# Patient Record
Sex: FEMALE | Race: WHITE | NOT HISPANIC OR LATINO | Employment: OTHER | URBAN - METROPOLITAN AREA
[De-identification: names, ages, dates, MRNs, and addresses within clinical notes are randomized per-mention and may not be internally consistent; named-entity substitution may affect disease eponyms.]

---

## 2017-04-12 PROBLEM — R09.82 PND (POST-NASAL DRIP): Status: ACTIVE | Noted: 2017-04-12

## 2017-04-12 PROBLEM — R19.7 DIARRHEA: Status: ACTIVE | Noted: 2017-04-12

## 2017-04-12 PROBLEM — R06.7 SNEEZING: Status: ACTIVE | Noted: 2017-04-12

## 2017-04-12 PROBLEM — J01.00 ACUTE NON-RECURRENT MAXILLARY SINUSITIS: Status: ACTIVE | Noted: 2017-04-12

## 2017-04-12 PROBLEM — J34.89 RHINORRHEA: Status: ACTIVE | Noted: 2017-04-12

## 2017-04-12 PROBLEM — R42 DIZZINESS: Status: ACTIVE | Noted: 2017-04-12

## 2017-04-12 PROBLEM — R51 HEADACHE(784.0): Status: ACTIVE | Noted: 2017-04-12

## 2017-04-19 PROBLEM — R19.7 DIARRHEA: Status: RESOLVED | Noted: 2017-04-12 | Resolved: 2017-04-19

## 2017-04-19 PROBLEM — R42 DIZZINESS: Status: RESOLVED | Noted: 2017-04-12 | Resolved: 2017-04-19

## 2017-04-19 PROBLEM — R06.7 SNEEZING: Status: RESOLVED | Noted: 2017-04-12 | Resolved: 2017-04-19

## 2017-04-19 PROBLEM — R51 HEADACHE(784.0): Status: RESOLVED | Noted: 2017-04-12 | Resolved: 2017-04-19

## 2017-04-19 PROBLEM — J34.89 RHINORRHEA: Status: RESOLVED | Noted: 2017-04-12 | Resolved: 2017-04-19

## 2017-07-17 PROBLEM — J01.00 ACUTE NON-RECURRENT MAXILLARY SINUSITIS: Status: RESOLVED | Noted: 2017-04-12 | Resolved: 2017-07-17

## 2018-05-07 ENCOUNTER — HOSPITAL ENCOUNTER (OUTPATIENT)
Dept: CARDIOLOGY | Facility: HOSPITAL | Age: 73
Discharge: HOME | End: 2018-05-07
Attending: NURSE PRACTITIONER
Payer: MEDICARE

## 2018-05-07 DIAGNOSIS — R01.1 MURMUR, HEART: ICD-10-CM

## 2018-05-07 PROCEDURE — C8929 TTE W OR WO FOL WCON,DOPPLER: HCPCS

## 2018-05-10 LAB
AORTIC VALVE CUSP SEPERATION: 1 CM
CV ECHO LV RWT: 0.4 CM
DOP CALC LVOT AREA: 1.77 CM2
DOP CALC LVOT DIAMETER: 1.5 CM
DOP CALC LVOT PEAK VEL: 0.9 M/S
ECHO EF ESTIMATED: 55 %
ECHO LV POSTERIOR WALL: 0.8 CM (ref 0.6–1.1)
FRACTIONAL SHORTENING: 33 % (ref 28–44)
INTERVENTRICULAR SEPTUM: 0.9 CM (ref 0.6–1.1)
LEFT ATRIUM SIZE: 3.6 CM
LEFT INTERNAL DIMENSION IN SYSTOLE: 2.9 CM (ref 2.1–4)
LEFT VENTRICULAR INTERNAL DIMENSION IN DIASTOLE: 4.3 CM (ref 3.5–6)
RA PRESSURE: 8 MMHG
TR MAX PG: 30 MMHG
TV REST PULMONARY ARTERY PRESSURE: 38 MMHG

## 2018-05-14 ENCOUNTER — TELEPHONE (OUTPATIENT)
Dept: UROLOGY | Facility: CLINIC | Age: 73
End: 2018-05-14

## 2018-05-14 NOTE — TELEPHONE ENCOUNTER
Please have pt come in for a nurse visit for a voided urine sample  H/o microhematuria and hasn't been seen in 2 years but workup negative 2 years ago  If blood present needs ua microscopic

## 2018-05-15 NOTE — TELEPHONE ENCOUNTER
Spoke with patient informed her of recommendations. Patient verbally voiced understanding. Nurse visit scheduled for 5/22 at 1:30pm

## 2018-05-21 ENCOUNTER — TELEPHONE (OUTPATIENT)
Dept: UROLOGY | Facility: CLINIC | Age: 73
End: 2018-05-21

## 2018-05-21 NOTE — TELEPHONE ENCOUNTER
Left message on patient's voicemail informing her nurse visit rescheduled per patient request to Wednesday. And to give the office a call with any questions

## 2018-05-21 NOTE — TELEPHONE ENCOUNTER
----- Message from Elaine Gay sent at 5/21/2018 12:15 PM CDT -----  Contact: pt [phone]  Patient called and asked if you will reschedule her Nurse visit to Wednesday the same time.

## 2018-05-23 ENCOUNTER — CLINICAL SUPPORT (OUTPATIENT)
Dept: UROLOGY | Facility: CLINIC | Age: 73
End: 2018-05-23
Payer: MEDICARE

## 2018-05-23 DIAGNOSIS — R31.29 MICROHEMATURIA: Primary | ICD-10-CM

## 2018-05-23 LAB
BACTERIA #/AREA URNS HPF: NORMAL /HPF
BILIRUB SERPL-MCNC: ABNORMAL MG/DL
BLOOD URINE, POC: ABNORMAL
COLOR, POC UA: YELLOW
GLUCOSE UR QL STRIP: ABNORMAL
KETONES UR QL STRIP: ABNORMAL
LEUKOCYTE ESTERASE URINE, POC: ABNORMAL
MICROSCOPIC COMMENT: NORMAL
NITRITE, POC UA: ABNORMAL
PH, POC UA: 6
PROTEIN, POC: ABNORMAL
RBC #/AREA URNS HPF: 2 /HPF (ref 0–4)
SPECIFIC GRAVITY, POC UA: 1.01
SQUAMOUS #/AREA URNS HPF: 2 /HPF
UROBILINOGEN, POC UA: ABNORMAL
WBC #/AREA URNS HPF: 1 /HPF (ref 0–5)

## 2018-05-23 PROCEDURE — 87086 URINE CULTURE/COLONY COUNT: CPT

## 2018-05-23 PROCEDURE — 81002 URINALYSIS NONAUTO W/O SCOPE: CPT | Mod: PBBFAC,PN

## 2018-05-23 PROCEDURE — 87088 URINE BACTERIA CULTURE: CPT

## 2018-05-23 PROCEDURE — 81000 URINALYSIS NONAUTO W/SCOPE: CPT

## 2018-05-23 PROCEDURE — 99499 UNLISTED E&M SERVICE: CPT | Mod: S$PBB,,, | Performed by: UROLOGY

## 2018-05-23 NOTE — PROGRESS NOTES
Per  patient in clinic today to give a urine sample. poct urine showed 1.015, 6, trace leukocytes, trace blood. Urine sent for microscopic u/a and urine culture.

## 2018-05-25 ENCOUNTER — TELEPHONE (OUTPATIENT)
Dept: UROLOGY | Facility: CLINIC | Age: 73
End: 2018-05-25

## 2018-05-25 DIAGNOSIS — R31.29 MICROHEMATURIA: Primary | ICD-10-CM

## 2018-05-25 LAB — BACTERIA UR CULT: NORMAL

## 2018-05-28 ENCOUNTER — TELEPHONE (OUTPATIENT)
Dept: UROLOGY | Facility: CLINIC | Age: 73
End: 2018-05-28

## 2018-05-28 NOTE — TELEPHONE ENCOUNTER
----- Message from Mandy Nuñez sent at 5/28/2018  9:47 AM CDT -----  Contact: patient   Patient calling to speak to the Nurse. Please advise. She is returning a missed call. Call to pod. No answer.   Call back    Thanks!

## 2018-05-30 ENCOUNTER — TELEPHONE (OUTPATIENT)
Dept: GASTROENTEROLOGY | Facility: CLINIC | Age: 73
End: 2018-05-30

## 2018-05-30 ENCOUNTER — TELEPHONE (OUTPATIENT)
Dept: CARDIOLOGY | Facility: CLINIC | Age: 73
End: 2018-05-30

## 2018-05-30 NOTE — TELEPHONE ENCOUNTER
Called pt to let her know we have her scheduled for 6/6/2018 at 3:20pm. No answer, left a message for pt to call us back.

## 2018-05-30 NOTE — TELEPHONE ENCOUNTER
----- Message from Precious Castorena MD sent at 5/30/2018  2:01 PM CDT -----  Please call patient to schedule clinic appointment for LLQ abdominal pain and family history of colon cancer    Thanks (it is Dr. Cristobal's mother in law)

## 2018-06-05 ENCOUNTER — TELEPHONE (OUTPATIENT)
Dept: CARDIOLOGY | Facility: CLINIC | Age: 73
End: 2018-06-05

## 2018-06-05 ENCOUNTER — HOSPITAL ENCOUNTER (OUTPATIENT)
Dept: RADIOLOGY | Facility: HOSPITAL | Age: 73
Discharge: HOME | End: 2018-06-05
Attending: UROLOGY
Payer: MEDICARE

## 2018-06-05 ENCOUNTER — OFFICE VISIT (OUTPATIENT)
Dept: CARDIOLOGY | Facility: CLINIC | Age: 73
End: 2018-06-05
Payer: MEDICARE

## 2018-06-05 ENCOUNTER — CLINICAL SUPPORT (OUTPATIENT)
Dept: UROLOGY | Facility: CLINIC | Age: 73
End: 2018-06-05
Payer: MEDICARE

## 2018-06-05 VITALS
WEIGHT: 115.5 LBS | HEIGHT: 60 IN | DIASTOLIC BLOOD PRESSURE: 88 MMHG | OXYGEN SATURATION: 98 % | SYSTOLIC BLOOD PRESSURE: 168 MMHG | BODY MASS INDEX: 22.68 KG/M2 | HEART RATE: 76 BPM | RESPIRATION RATE: 12 BRPM

## 2018-06-05 DIAGNOSIS — R09.89 BILATERAL CAROTID BRUITS: ICD-10-CM

## 2018-06-05 DIAGNOSIS — F41.1 GAD (GENERALIZED ANXIETY DISORDER): ICD-10-CM

## 2018-06-05 DIAGNOSIS — E78.5 DYSLIPIDEMIA: ICD-10-CM

## 2018-06-05 DIAGNOSIS — F10.10 EXCESSIVE DRINKING ALCOHOL: ICD-10-CM

## 2018-06-05 DIAGNOSIS — I35.0 AORTIC STENOSIS, MILD: ICD-10-CM

## 2018-06-05 DIAGNOSIS — R31.29 MICROHEMATURIA: ICD-10-CM

## 2018-06-05 DIAGNOSIS — Z91.89 CARDIOVASCULAR EVENT RISK: ICD-10-CM

## 2018-06-05 DIAGNOSIS — I10 HYPERTENSION, UNSPECIFIED TYPE: ICD-10-CM

## 2018-06-05 DIAGNOSIS — R55 SYNCOPE, UNSPECIFIED SYNCOPE TYPE: ICD-10-CM

## 2018-06-05 DIAGNOSIS — C50.911 MALIGNANT NEOPLASM OF RIGHT FEMALE BREAST, UNSPECIFIED ESTROGEN RECEPTOR STATUS, UNSPECIFIED SITE OF BREAST: ICD-10-CM

## 2018-06-05 DIAGNOSIS — R01.1 HEART MURMUR: Primary | ICD-10-CM

## 2018-06-05 DIAGNOSIS — I10 HYPERTENSION, UNSPECIFIED TYPE: Primary | ICD-10-CM

## 2018-06-05 DIAGNOSIS — I10 ESSENTIAL HYPERTENSION: ICD-10-CM

## 2018-06-05 LAB
BILIRUB UR QL STRIP: NEGATIVE
CLARITY UR: CLEAR
COLOR UR: YELLOW
GLUCOSE UR QL STRIP: NEGATIVE
HGB UR QL STRIP: NEGATIVE
KETONES UR QL STRIP: NEGATIVE
LEUKOCYTE ESTERASE UR QL STRIP: NEGATIVE
NITRITE UR QL STRIP: NEGATIVE
PH UR STRIP: 7 [PH] (ref 5–8)
PROT UR QL STRIP: NEGATIVE
SP GR UR STRIP: 1.01 (ref 1–1.03)
URN SPEC COLLECT METH UR: NORMAL
UROBILINOGEN UR STRIP-ACNC: NEGATIVE EU/DL

## 2018-06-05 PROCEDURE — 99499 UNLISTED E&M SERVICE: CPT | Mod: S$PBB,,, | Performed by: UROLOGY

## 2018-06-05 PROCEDURE — 87086 URINE CULTURE/COLONY COUNT: CPT

## 2018-06-05 PROCEDURE — 76770 US EXAM ABDO BACK WALL COMP: CPT | Mod: TC

## 2018-06-05 PROCEDURE — 88112 CYTOPATH CELL ENHANCE TECH: CPT | Mod: 26,,, | Performed by: PATHOLOGY

## 2018-06-05 PROCEDURE — 99205 OFFICE O/P NEW HI 60 MIN: CPT | Mod: S$PBB,,, | Performed by: INTERNAL MEDICINE

## 2018-06-05 PROCEDURE — 99212 OFFICE O/P EST SF 10 MIN: CPT | Mod: PBBFAC,25,27,PN

## 2018-06-05 PROCEDURE — 99213 OFFICE O/P EST LOW 20 MIN: CPT | Mod: PBBFAC,25,PO | Performed by: INTERNAL MEDICINE

## 2018-06-05 PROCEDURE — 76770 US EXAM ABDO BACK WALL COMP: CPT | Mod: 26,,, | Performed by: RADIOLOGY

## 2018-06-05 PROCEDURE — 93005 ELECTROCARDIOGRAM TRACING: CPT | Mod: PBBFAC,PO | Performed by: INTERNAL MEDICINE

## 2018-06-05 PROCEDURE — 93010 ELECTROCARDIOGRAM REPORT: CPT | Mod: S$PBB,,, | Performed by: INTERNAL MEDICINE

## 2018-06-05 PROCEDURE — 88112 CYTOPATH CELL ENHANCE TECH: CPT | Performed by: PATHOLOGY

## 2018-06-05 PROCEDURE — 81003 URINALYSIS AUTO W/O SCOPE: CPT

## 2018-06-05 PROCEDURE — 99999 PR PBB SHADOW E&M-EST. PATIENT-LVL III: CPT | Mod: PBBFAC,,, | Performed by: INTERNAL MEDICINE

## 2018-06-05 PROCEDURE — 99999 PR PBB SHADOW E&M-EST. PATIENT-LVL II: CPT | Mod: PBBFAC,,,

## 2018-06-05 RX ORDER — NAPROXEN SODIUM 220 MG/1
81 TABLET, FILM COATED ORAL DAILY
Qty: 50 TABLET | Refills: 3 | Status: SHIPPED | OUTPATIENT
Start: 2018-06-05 | End: 2019-01-21 | Stop reason: HOSPADM

## 2018-06-05 NOTE — PROGRESS NOTES
Subjective:    Patient ID:  Vivien Burton is a 73 y.o. female who presents for evaluation of Establish Care and Heart Murmur  PCP: Dr. Robert, see biannually, in [facility]  Urologist and referred by Dr. LORRIE Burton  Lives alone, no pets  Retired     Patient is a new patient to me.    HPI  WF with history of HTN around the time breast CA diagnosis, treated with radiation to the right chest and chemo. Stopped HTN Rx about a year ago with normal BP. Here due to heart murmur, first detected in childhood and then no mention until recent examination. Active, own housework, own yard work, caring to grandchildren, no problem, occasional soreness. Quit smoking in 1998, after 15 pack-years, admit to drinking Ethel about 7.5 oz daily. Get sleepy not drunk, do not drive after. ECG is normal, rate 87. Labs reviewed: LDL 57, ASCVD 10-year event risk of 11.3%, intermediate.     Echo read by Dr. Koehler - Conclusion   · Left ventricle shows mild concentric hypertrophy.  · Mitral valve shows mild regurgitation.  · Tricuspid valve shows trace regurgitation.  · There is mild valvular aortic stenosis.  · Mild regurgitation is present in the aortic valve.  · Left ventricular diastolic filling is abnormal.       Review of Systems   Constitution: Negative for diaphoresis, fever, weakness, malaise/fatigue, night sweats and weight gain.   HENT: Positive for congestion. Negative for nosebleeds and tinnitus.    Eyes: Positive for visual halos. Negative for visual disturbance.   Cardiovascular: Positive for dyspnea on exertion (with grass work) and palpitations. Negative for chest pain, claudication, cyanosis, irregular heartbeat, leg swelling, near-syncope, orthopnea and paroxysmal nocturnal dyspnea.   Respiratory: Positive for cough and snoring. Negative for shortness of breath, sleep disturbances due to breathing and wheezing.    Endocrine: Negative for polydipsia and polyuria.  "  Hematologic/Lymphatic: Does not bruise/bleed easily.   Skin: Positive for dry skin, itching and skin cancer. Negative for color change, flushing, nail changes, poor wound healing and suspicious lesions.   Musculoskeletal: Positive for muscle cramps and stiffness. Negative for arthritis, falls, gout, joint pain, joint swelling, muscle weakness and myalgias.   Gastrointestinal: Positive for bloating, abdominal pain, constipation, diarrhea, flatus, heartburn and hemorrhoids. Negative for hematemesis, hematochezia, melena and nausea.   Genitourinary: Positive for hematuria.   Neurological: Positive for excessive daytime sleepiness, dizziness, headaches (sinus) and paresthesias. Negative for disturbances in coordination, focal weakness, light-headedness, loss of balance, numbness and vertigo.   Psychiatric/Behavioral: Negative for depression and substance abuse. The patient is nervous/anxious. The patient does not have insomnia.         Some claustrophobia        Objective:    Physical Exam   Constitutional: She is oriented to person, place, and time. She appears well-developed and well-nourished.   HENT:   Head: Normocephalic.   Eyes: Conjunctivae and EOM are normal. Pupils are equal, round, and reactive to light.   Neck: Normal range of motion. Neck supple. No JVD present. No thyromegaly present.   Circumference 12"   Cardiovascular: Normal rate, regular rhythm and intact distal pulses.  Exam reveals no gallop and no friction rub.    Murmur heard.   Medium-pitched harsh midsystolic murmur is present with a grade of 3/6  at the upper right sternal border radiating to the neck S2 sound is preserved.  Superficial varicose veins in both LE.  Pulses:       Carotid pulses are 2+ on the right side with bruit, and 2+ on the left side with bruit.       Radial pulses are 2+ on the right side, and 2+ on the left side.        Femoral pulses are 2+ on the right side, and 2+ on the left side.       Popliteal pulses are 2+ on the " "right side, and 2+ on the left side.        Dorsalis pedis pulses are 2+ on the right side, and 2+ on the left side.        Posterior tibial pulses are 2+ on the right side, and 2+ on the left side.   Pulmonary/Chest: Effort normal and breath sounds normal. She has no rales. She exhibits no tenderness.   Abdominal: Soft. Bowel sounds are normal. There is no tenderness.   Waist 30.5", hip 35"   Musculoskeletal: Normal range of motion. She exhibits no edema.   Lymphadenopathy:     She has no cervical adenopathy.   Neurological: She is alert and oriented to person, place, and time.   Skin: Skin is warm and dry. Rash (mild stasis dermatitis) noted.         Assessment:       1. Heart murmur, since childhood    2. Excessive drinking alcohol    3. Essential hypertension    4. Malignant neoplasm of right female breast, unspecified estrogen receptor status, unspecified site of breast    5. Syncope, unspecified syncope type    6. Dyslipidemia, baseline     7. Cardiovascular event risk, ASCVD 10-year risk 11.3%, on simvastatin 20 mg, 2017    8. Hypertension, unspecified type    9. Bilateral carotid bruits    10. Aortic stenosis, mild    11. ISIS (generalized anxiety disorder)    12. BMI 22.0-22.9, adult         Plan:       Vivien was seen today for establish care and heart murmur.    Diagnoses and all orders for this visit:    Heart murmur, since childhood  -     Stress test; Future    Excessive drinking alcohol    Essential hypertension  -     Stress test; Future    Malignant neoplasm of right female breast, unspecified estrogen receptor status, unspecified site of breast    Syncope, unspecified syncope type  -     US Carotid Bilateral; Future  -     CAR Ultrasound doppler carotid bliateral; Future  -     Stress test; Future    Dyslipidemia, baseline     Cardiovascular event risk, ASCVD 10-year risk 11.3%, on simvastatin 20 mg, 2017  -     US Carotid Bilateral; Future  -     CAR Ultrasound doppler carotid " bliateral; Future  -     Stress test; Future  -     aspirin 81 MG Chew; Take 1 tablet (81 mg total) by mouth once daily.    Hypertension, unspecified type  -     EKG 12-lead    Bilateral carotid bruits  -     US Carotid Bilateral; Future  -     CAR Ultrasound doppler carotid bliateral; Future  -     aspirin 81 MG Chew; Take 1 tablet (81 mg total) by mouth once daily.    Aortic stenosis, mild    ISIS (generalized anxiety disorder)    BMI 22.0-22.9, adult    - All medical issues reviewed, continue current Rx.  - Instruction for Mediterranean diet and heart healthy exercise given.  - Check home blood pressure, 2 days weekly, do 2 readings within 5 minutes in AM and PM, keep log for review.  - Highly recommend 30 minutes of exercise daily, can have Sunday off, with 2-3 sessions of muscle strengthening weekly. A  would be very helpful.  - Discussed healthy daily limit of 0.5 oz of pure alcohol in any 24 hours (roughly 1 12-oz beers or 12.5 oz of liquor (80 proof)), can not save up.  - Consider Yoga, Siddhartha-Chi and or meditation  - Recommend at least annual cardiovascular evaluation in view of her significant risk factors.  - Phone review / encourage use of [employer]      Patient Active Problem List   Diagnosis    Cancer    GERD (gastroesophageal reflux disease)    Hyperlipidemia    Hypertension, onset 2012, off medications since 2016    Breast cancer    Rash    Unspecified hypothyroidism    Breast cancer, post right chest radiation    Malignant neoplasm of female breast    Encounter for long-term (current) use of medications    Syncope, 2016, dehydration    PND (post-nasal drip)    Excessive drinking alcohol    Heart murmur, since childhood    Dyslipidemia, baseline     Cardiovascular event risk, ASCVD 10-year risk 11.3%, on simvastatin 20 mg, 2017    Bilateral carotid bruits    Aortic stenosis, mild    ISIS (generalized anxiety disorder)    BMI 22.0-22.9, adult     Total  face-to-face time with the patient was 40 minutes and greater than 50% was spent in counseling and coordination of care. The above assessment and plan have been discussed at length. Labs and procedure over the last 6 months reviewed. Problem List updated. Asked to bring in all active medications / pills bottles with next visit.

## 2018-06-05 NOTE — LETTER
June 5, 2018      Dottie Burton MD  [address] [place] [address]   [address]  [place] [address]           [facility] [place] [facility]  [address]  [place] [address]  Phone: [phone]          Patient: Vivien Burton   MR Number: [id number]   YOB: 1945   Date of Visit: 6/5/2018       Dear Dr. Dottie Burton:    Thank you for referring Vivien Burton to me for evaluation. Attached you will find relevant portions of my assessment and plan of care.    If you have questions, please do not hesitate to call me. I look forward to following Vivien Burton along with you.    Sincerely,    Rick Mccoy MD    Enclosure  CC:  No Recipients    If you would like to receive this communication electronically, please contact [email or web address] or [phone] to request more information on [employer] Link access.    For providers and/or their staff who would like to refer a patient to [facility], please contact us through our one-stop-shop provider referral line, [facility], at [phone].    If you feel you have received this communication in error or would no longer like to receive these types of communications, please e-mail [email or web address]

## 2018-06-05 NOTE — PROGRESS NOTES
Per  patient in clinic today for catheterized urine sample sent for u/a, urine culture and urine cytology

## 2018-06-05 NOTE — TELEPHONE ENCOUNTER
----- Message from Samantha Ponce sent at 6/5/2018  9:26 AM CDT -----  Type:  Patient Returning Call    Who Called:  Self Who Left Message for Patient:  Swathi Does the patient know what this is regarding?:  Patient returning a phone call. Call was placed to the pod.  Best Call Back Number:  [phone] Additional Information:

## 2018-06-05 NOTE — PATIENT INSTRUCTIONS
Recommended Mediterranean dietEating Heart-Healthy Food: Using the DASH Plan  Eating for your heart doesnt have to be hard or boring. You just need to know how to make healthier choices. The DASH eating plan has been developed to help you do just that. DASH stands for Dietary Approaches to Stop Hypertension. It is a plan that has been proven to be healthier for your heart and to lower your risk for high blood pressure. It can also help lower your risk for cancer, heart disease, osteoporosis, and diabetes.  Choosing from Each Food Group  Choose foods from each of the food groups below each day. Try to get the recommended number of servings for each food group. The serving numbers are based on a diet of 2,000 calories a day. Talk to your doctor if youre unsure about your calorie needs.  Grains   Servings: 7-8 a day  A serving is:  · 1 slice bread  · 1 ounce dry cereal  · half a cup cooked rice or pasta  Best choices: Whole grains and any grains high in fiber.  Vegetables   Servings: 4-5 a day  A serving is:  · 1 cup raw leafy vegetable  · Half a cup cooked vegetable  · Three-quarter cup vegetable juice  Best choices: Fresh or frozen vegetable prepared without too much added salt or fat.    Fruits   Servings: 4-5 a day  A serving is:  · Three-quarter cup fruit juice  · 1 medium fruit  · One-quarter cup dried fruit  · One-half cup fresh, frozen, or canned fruit  Best choices: A variety of fresh fruits of different colors. Whole fruits are a much better choice than fruit juices.  Low-fat or Fat Free Dairy   Servings: 2-3 a day  A serving is:  · 8 ounces milk  · 1 cup yogurt  · One and a half ounces cheese  Best choices: Skim or 1% milk, low-fat or fat free yogurt or buttermilk, and low-fat cheeses.       Meat, Poultry, Fish   Servings: 2 or fewer a day  A serving is:  · 3 ounces cooked meat, poultry, or fish  Best choices: Lean meats and fish. Trim away visible fat. Broil, roast, or boil instead of frying. Remove skin  from poultry before eating.  Nuts, Seeds, Beans   Servings: 4-5 a week  A serving is:  · One third cup nuts (or one and a half ounces)  · 2 tablespoons sunflower seeds  · Half a cup cooked beans  Best choices: Dry roasted nuts with no salt added, lentils, kidney beans, garbanzo beans, and whole saenz beans.    Fats and Oils   Servings: 2 a day  A serving is:  · 1 teaspoon vegetable oil  · 1 teaspoon soft margarine  · 1 tablespoon low-fat mayonnaise  · 1 teaspoon regular mayonnaise  · 2 tablespoons light salad dressing  · 1 tablespoon regular salad dressing  Best choices: Monounsaturated and polyunsaturated fats such as olive, canola, or safflower oil.  Sweets   Servings: 5 a week or fewer  A serving is:  · 1 tablespoon sugar, maple syrup, or honey  · 1 tablespoon jam or jelly  · 1 half-ounce jelly beans (about 15)  · 8 ounces lemonade  Best choices: Dried fruit can be a satisfying sweet. Choose low-fat sweets when possible. And watch your serving sizes!       Aerobic Exercise for a Healthy Heart  Exercise is a lot more than an energy booster and a stress reliever. It also strengthens your heart muscle, lowers your blood pressure and blood cholesterol, and burns calories.      Remember, some activity is better than none.     Choose an Aerobic Activity  Choose a nonstop activity that makes your heart and lungs work harder than they do when you rest or walk normally. This aerobic exercise can improve the way your heart and other muscles use oxygen. Make it fun by exercising with a friend and choosing an activity you enjoy. Here are some ideas:  · Walking  · Swimming  · Bicycling  · Stair climbing  · Dancing  · Jogging  Exercise Regularly  If you havent been exercising regularly,  get your doctors okay first. Then start slowly.  Here are some tips:  · Begin exercising 3 times a week for 5-10 minutes at a time.  · When you feel comfortable, add a few minutes each week.  · Slowly build up to exercising 3-4 times each  week for 20-40 minutes. Aim for a total of 150 or more minutes a week.  · Be sure to carry your nitroglycerin with you when you exercise.  · If you get angina when youre exercising, stop what youre doing, take your nitroglycerin, and call your doctor.  © 5815-5023 Emily Owens, [address] [place] [address], [place] [address]. All rights reserved. This information is not intended as a substitute for professional medical care. Always follow your healthcare professional's instructions.

## 2018-06-05 NOTE — TELEPHONE ENCOUNTER
Called pt per Dr. Mccoy to see if she could come in this morning. Called a few times and a voicemail was left twice. Pt did call back. But since then I have been calling and it would ring once and say that she is not able to take calls at this time. Will continue to call.

## 2018-06-05 NOTE — TELEPHONE ENCOUNTER
Returned call to Ms. Burton, No answer. Informed Dr. Burton that she has an appt at 10:30 this morning

## 2018-06-06 LAB — BACTERIA UR CULT: NO GROWTH

## 2018-06-13 ENCOUNTER — HOSPITAL ENCOUNTER (OUTPATIENT)
Dept: RADIOLOGY | Facility: HOSPITAL | Age: 73
Discharge: HOME | End: 2018-06-13
Attending: INTERNAL MEDICINE
Payer: MEDICARE

## 2018-06-13 ENCOUNTER — HOSPITAL ENCOUNTER (OUTPATIENT)
Dept: CARDIOLOGY | Facility: HOSPITAL | Age: 73
Discharge: HOME | End: 2018-06-13
Attending: INTERNAL MEDICINE
Payer: MEDICARE

## 2018-06-13 VITALS — SYSTOLIC BLOOD PRESSURE: 154 MMHG | DIASTOLIC BLOOD PRESSURE: 94 MMHG | HEART RATE: 71 BPM

## 2018-06-13 DIAGNOSIS — Z91.89 CARDIOVASCULAR EVENT RISK: ICD-10-CM

## 2018-06-13 DIAGNOSIS — I10 ESSENTIAL HYPERTENSION: ICD-10-CM

## 2018-06-13 DIAGNOSIS — R01.1 HEART MURMUR: ICD-10-CM

## 2018-06-13 DIAGNOSIS — R55 SYNCOPE, UNSPECIFIED SYNCOPE TYPE: ICD-10-CM

## 2018-06-13 DIAGNOSIS — I10 HYPERTENSION, UNSPECIFIED TYPE: ICD-10-CM

## 2018-06-13 DIAGNOSIS — R09.89 BILATERAL CAROTID BRUITS: ICD-10-CM

## 2018-06-13 LAB
CV STRESS BASE HR: 81
CVPEAKDIABP: 101
CVPEAKSYSBP: 220
CVRESTSYSBP: 154
STRESS ECHO POST ESTIMATED WORKLOAD: 7 METS
STRESS ECHO POST EXERCISE DUR MIN: 4 MIN
STRESS ECHO POST EXERCISE DUR SEC: 40

## 2018-06-13 PROCEDURE — 93018 CV STRESS TEST I&R ONLY: CPT | Mod: ,,, | Performed by: INTERNAL MEDICINE

## 2018-06-13 PROCEDURE — 93016 CV STRESS TEST SUPVJ ONLY: CPT | Mod: ,,, | Performed by: INTERNAL MEDICINE

## 2018-06-13 PROCEDURE — 93880 EXTRACRANIAL BILAT STUDY: CPT | Mod: 26,,, | Performed by: INTERNAL MEDICINE

## 2018-06-13 PROCEDURE — 93017 CV STRESS TEST TRACING ONLY: CPT

## 2018-06-13 PROCEDURE — 93880 EXTRACRANIAL BILAT STUDY: CPT

## 2018-06-14 LAB
LEFT CCA DIST DIAS: 14 CM/S
LEFT CCA DIST SYS: 51 CM/S
LEFT CCA MID DIAS: 15 CM/S
LEFT CCA MID SYS: 55 CM/S
LEFT CCA PROX DIAS: 13 CM/S
LEFT CCA PROX SYS: 57 CM/S
LEFT ECA DIAS: 6 CM/S
LEFT ECA SYS: 47 CM/S
LEFT ICA DIST DIAS: 23 CM/S
LEFT ICA DIST SYS: 58 CM/S
LEFT ICA MID DIAS: 24 CM/S
LEFT ICA MID SYS: 69 CM/S
LEFT ICA PROX DIAS: 21 CM/S
LEFT ICA PROX SYS: 59 CM/S
LEFT ICA/CCA SYS: 1.4 M/S
LEFT VERTEBRAL DIAS: 12 CM/S
LEFT VERTEBRAL SYS: 45 CM/S
RIGHT CCA DIST DIAS: 18 CM/S
RIGHT CCA DIST SYS: 69 CM/S
RIGHT CCA MID DIAS: 16 CM/S
RIGHT CCA MID SYS: 65 CM/S
RIGHT CCA PROX DIAS: 17 CM/S
RIGHT CCA PROX SYS: 73 CM/S
RIGHT ECA DIAS: 9 CM/S
RIGHT ECA SYS: 56 CM/S
RIGHT ICA DIST DIAS: 23 CM/S
RIGHT ICA DIST SYS: 73 CM/S
RIGHT ICA MID DIAS: 24 CM/S
RIGHT ICA MID SYS: 94 CM/S
RIGHT ICA PROX DIAS: 23 CM/S
RIGHT ICA PROX SYS: 85 CM/S
RIGHT ICA/CCA SYS: 1.4 CM/S
RIGHT VERTEBRAL DIAS: 10 CM/S
RIGHT VERTEBRAL SYS: 41 CM/S

## 2018-06-15 ENCOUNTER — OFFICE VISIT (OUTPATIENT)
Dept: GASTROENTEROLOGY | Facility: CLINIC | Age: 73
End: 2018-06-15
Payer: MEDICARE

## 2018-06-15 VITALS
DIASTOLIC BLOOD PRESSURE: 73 MMHG | HEART RATE: 66 BPM | SYSTOLIC BLOOD PRESSURE: 175 MMHG | BODY MASS INDEX: 22.69 KG/M2 | WEIGHT: 116.19 LBS

## 2018-06-15 DIAGNOSIS — Z80.0 FAMILY HISTORY OF COLON CANCER: Primary | ICD-10-CM

## 2018-06-15 DIAGNOSIS — Z12.11 SCREENING FOR COLON CANCER: ICD-10-CM

## 2018-06-15 PROCEDURE — 99212 OFFICE O/P EST SF 10 MIN: CPT | Mod: PBBFAC,PO | Performed by: INTERNAL MEDICINE

## 2018-06-15 PROCEDURE — 99205 OFFICE O/P NEW HI 60 MIN: CPT | Mod: S$PBB,,, | Performed by: INTERNAL MEDICINE

## 2018-06-15 PROCEDURE — 99999 PR PBB SHADOW E&M-EST. PATIENT-LVL II: CPT | Mod: PBBFAC,,, | Performed by: INTERNAL MEDICINE

## 2018-06-15 NOTE — LETTER
June 18, 2018      Dottie Burton MD  [address] [place] [address]   [address]  [place] [address]           [place] [facility]  [address]  [place] [address]  Phone: [phone]          Patient: Vivien Burton   MR Number: [id number]   YOB: 1945   Date of Visit: 6/15/2018       Dear Dr. Dottie Burton:    Thank you for referring Vivien Burton to me for evaluation. Attached you will find relevant portions of my assessment and plan of care.    If you have questions, please do not hesitate to call me. I look forward to following Vivien Burton along with you.    Sincerely,    Renae Garcia LPN    Enclosure  CC:  No Recipients    If you would like to receive this communication electronically, please contact [email or web address] or [phone] to request more information on [employer] Link access.    For providers and/or their staff who would like to refer a patient to [facility], please contact us through our one-stop-shop provider referral line, [facility], at [phone].    If you feel you have received this communication in error or would no longer like to receive these types of communications, please e-mail [email or web address]

## 2018-06-15 NOTE — PROGRESS NOTES
[facility]     CC: Family history of colon cancer    HPI 73 y.o. female with history of hypothyroidism, presents due to family history of colon cancer in her brother diagnosed in his 70's, as well as in her nephew diagnosed in his 30's. She denies changes in bowel habits, blood in stool, abdominal pain or weight loss. Her last colonoscopy was > 10 years ago. She does note history of diverticulitis, with rare episodes of lower abdominal pain, last several months ago.      Past Medical History:   Diagnosis Date    Cancer     GERD (gastroesophageal reflux disease)     Hyperlipidemia     Hypertension        Past Surgical History:   Procedure Laterality Date     SECTION, CLASSIC      right breast      TONSILLECTOMY, ADENOIDECTOMY         Social History   Substance Use Topics    Smoking status: Former Smoker     Packs/day: 1.00     Types: Cigarettes    Smokeless tobacco: Never Used    Alcohol use 1.8 oz/week     3 Shots of liquor per week   -No illicits    Family History:  -Brother (70's) and nephew (30's) with colon cancer    Review of Systems  General ROS: negative for - chills, fever or weight loss  Psychological ROS: negative for - hallucination, depression or suicidal ideation  Ophthalmic ROS: negative for - blurry vision, photophobia or eye pain  ENT ROS: negative for - epistaxis, sore throat or rhinorrhea  Respiratory ROS: no cough, shortness of breath, or wheezing  Cardiovascular ROS: no chest pain or dyspnea on exertion  Gastrointestinal ROS: no changes in bowel habits, no abdominal pain, no blood in stool   Genito-Urinary ROS: no dysuria, trouble voiding, or hematuria  Musculoskeletal ROS: negative for - arthralgia, myalgia, weakness  Neurological ROS: no syncope or seizures; no ataxia  Dermatological ROS: negative for pruritis, rash and jaundice    Physical Examination  BP (!) 175/73   Pulse 66   Wt 52.7 kg (116 lb 2.9 oz)   BMI 22.69 kg/m²   General appearance: alert,  cooperative, no distress  HENT: Normocephalic, atraumatic, neck symmetrical, no nasal discharge   Eyes: conjunctivae/corneas clear, PERRL, EOM's intact, sclera anicteric  Lungs: clear to auscultation bilaterally, no dullness to percussion bilaterally, symmetric expansion, breathing unlabored  Heart: regular rate and rhythm without rub; no displacement of the PMI   Abdomen: thin, soft, nontender,nondistended, BS normoactive, no masses palpated  Extremities: extremities symmetric; no clubbing, cyanosis, or edema  Integument: Skin color, texture, turgor normal; no rashes; hair distrubution normal, no jaundice  Neurologic: Alert and oriented X 3, no focal sensory or motor neurologic deficits  Psychiatric: no pressured speech; normal affect; no evidence of impaired cognition, no anxiety/depression     Labs:  Lab Results   Component Value Date    WBC 12.9 (H) 12/21/2017    HGB 12.7 12/21/2017    HCT 37.1 12/21/2017    MCV 88.3 12/21/2017     12/21/2017     -LFTs- normal    Imaging:  Retroperitoneal ultrasound May 2018 was independently visualized and reviewed by me and showed small right renal simple cyst    Assessment:   73 y.o. female with history of diverticulosis/diverticulitis and family history of colon cancer presents for evaluation. She currently feels well.    Plan:  -Schedule colonoscopy     Precious Castorena MD  [facility]  [address] [facility], [address]  [place] [address]  Office: [phone]  Fax: [phone]

## 2018-07-11 ENCOUNTER — ANESTHESIA EVENT (OUTPATIENT)
Dept: ENDOSCOPY | Facility: HOSPITAL | Age: 73
End: 2018-07-11
Payer: MEDICARE

## 2018-07-11 ENCOUNTER — HOSPITAL ENCOUNTER (OUTPATIENT)
Facility: HOSPITAL | Age: 73
Discharge: HOME | End: 2018-07-11
Attending: INTERNAL MEDICINE | Admitting: INTERNAL MEDICINE
Payer: MEDICARE

## 2018-07-11 ENCOUNTER — SURGERY (OUTPATIENT)
Age: 73
End: 2018-07-11

## 2018-07-11 ENCOUNTER — TELEPHONE (OUTPATIENT)
Dept: UROLOGY | Facility: CLINIC | Age: 73
End: 2018-07-11

## 2018-07-11 ENCOUNTER — ANESTHESIA (OUTPATIENT)
Dept: ENDOSCOPY | Facility: HOSPITAL | Age: 73
End: 2018-07-11
Payer: MEDICARE

## 2018-07-11 VITALS
SYSTOLIC BLOOD PRESSURE: 158 MMHG | TEMPERATURE: 98 F | RESPIRATION RATE: 16 BRPM | HEART RATE: 70 BPM | DIASTOLIC BLOOD PRESSURE: 76 MMHG | OXYGEN SATURATION: 98 %

## 2018-07-11 DIAGNOSIS — Z80.0 FAMILY HISTORY OF COLON CANCER: ICD-10-CM

## 2018-07-11 PROCEDURE — 37000009 HC ANESTHESIA EA ADD 15 MINS: Performed by: INTERNAL MEDICINE

## 2018-07-11 PROCEDURE — 27201012 HC FORCEPS, HOT/COLD, DISP: Performed by: INTERNAL MEDICINE

## 2018-07-11 PROCEDURE — D9220A PRA ANESTHESIA: Mod: PT,CRNA,, | Performed by: NURSE ANESTHETIST, CERTIFIED REGISTERED

## 2018-07-11 PROCEDURE — 45380 COLONOSCOPY AND BIOPSY: CPT | Performed by: INTERNAL MEDICINE

## 2018-07-11 PROCEDURE — 25000003 PHARM REV CODE 250: Performed by: INTERNAL MEDICINE

## 2018-07-11 PROCEDURE — 63600175 PHARM REV CODE 636 W HCPCS: Performed by: NURSE ANESTHETIST, CERTIFIED REGISTERED

## 2018-07-11 PROCEDURE — 88305 TISSUE EXAM BY PATHOLOGIST: CPT | Mod: 26,,, | Performed by: PATHOLOGY

## 2018-07-11 PROCEDURE — 45380 COLONOSCOPY AND BIOPSY: CPT | Mod: PT,,, | Performed by: INTERNAL MEDICINE

## 2018-07-11 PROCEDURE — 88305 TISSUE EXAM BY PATHOLOGIST: CPT | Performed by: PATHOLOGY

## 2018-07-11 PROCEDURE — 37000008 HC ANESTHESIA 1ST 15 MINUTES: Performed by: INTERNAL MEDICINE

## 2018-07-11 PROCEDURE — D9220A PRA ANESTHESIA: Mod: PT,ANES,, | Performed by: ANESTHESIOLOGY

## 2018-07-11 RX ORDER — PROPOFOL 10 MG/ML
VIAL (ML) INTRAVENOUS
Status: DISCONTINUED | OUTPATIENT
Start: 2018-07-11 | End: 2018-07-11

## 2018-07-11 RX ORDER — SODIUM CHLORIDE 9 MG/ML
INJECTION, SOLUTION INTRAVENOUS CONTINUOUS
Status: DISCONTINUED | OUTPATIENT
Start: 2018-07-11 | End: 2018-07-11 | Stop reason: HOSPADM

## 2018-07-11 RX ADMIN — PROPOFOL 30 MG: 10 INJECTION, EMULSION INTRAVENOUS at 09:07

## 2018-07-11 RX ADMIN — SODIUM CHLORIDE: 0.9 INJECTION, SOLUTION INTRAVENOUS at 08:07

## 2018-07-11 RX ADMIN — PROPOFOL 20 MG: 10 INJECTION, EMULSION INTRAVENOUS at 09:07

## 2018-07-11 RX ADMIN — PROPOFOL 100 MG: 10 INJECTION, EMULSION INTRAVENOUS at 09:07

## 2018-07-11 NOTE — TELEPHONE ENCOUNTER
----- Message from Dottie Burton MD sent at 7/11/2018  7:22 AM CDT -----  Please make f/u at next avail sometime in august

## 2018-07-11 NOTE — ANESTHESIA POSTPROCEDURE EVALUATION
Anesthesia Post Evaluation    Patient: Vivien Burton    Procedure(s) Performed: Procedure(s) (LRB):  COLONOSCOPY (N/A)    Final Anesthesia Type: general  Patient location during evaluation: PACU  Patient participation: Yes- Able to Participate  Level of consciousness: awake and alert and oriented  Post-procedure vital signs: reviewed and stable  Pain management: adequate  Airway patency: patent  PONV status at discharge: No PONV  Anesthetic complications: no      Cardiovascular status: blood pressure returned to baseline  Respiratory status: unassisted, spontaneous ventilation and room air  Hydration status: euvolemic  Follow-up not needed.        Visit Vitals  /65 (BP Location: Left arm, Patient Position: Lying)   Pulse 69   Temp 36.9 °C (98.4 °F) (Temporal)   Resp 18   SpO2 98%   Breastfeeding? No       Pain/Lizy Score: Pain Assessment Performed: Yes (7/11/2018 10:10 AM)  Presence of Pain: denies (7/11/2018 10:10 AM)  Lizy Score: 10 (7/11/2018 10:10 AM)

## 2018-07-11 NOTE — OR NURSING
Have you had a colonoscopy LESS THAN 3 years ago?   * If YES, answer these questions*: No. Last colonoscopy 15 years ago.    1. Did patient have a prior colonic polyp in a previous surveillance/diagnostic colonoscopy and is 18 years or older on date of encounter?  2. Documentation of < 3 year interval since the patients last colonoscopy due to medical reasons (eg., last colonoscopy incomplete, last colonoscopy had inadequate prep, piecemeal removal of adenomas, or last colonoscopy found > 10 adenomas) ?

## 2018-07-11 NOTE — DISCHARGE INSTRUCTIONS
Hemorrhoids    Hemorrhoids are swollen and inflamed veins inside the rectum and near the anus. The rectum is the last several inches of the colon. The anus is the passage between the rectum and the outside of the body.  Causes  The veins can become swollen due to increased pressure in them. This is most often caused by:  · Chronic constipation or diarrhea  · Straining when having a bowel movement  · Sitting too long on the toilet  · A low-fiber diet  · Pregnancy  Symptoms  · Bleeding from the rectum (this may be noticeable after bowel movements)  · Lump near the anus  · Itching around the anus  · Pain around the anus  There are different types of hemorrhoids. Depending on the type you have and the severity, you may be able to treat yourself at home. In some cases, a procedure may be the best treatment option. Your healthcare provider can tell you more about this, if needed.  Home care  General care  · To get relief from pain or itching, try:  ¨ Topical products. Your healthcare provider may prescribe or recommend creams, ointments, or pads that can be applied to the hemorrhoid. Use these exactly as directed.  ¨ Medicines. Your healthcare provider may recommend stool softeners, suppositories, or laxatives to help manage constipation. Use these exactly as directed.  ¨ Sitz baths. A sitz bath involves sitting in a few inches of warm bath water. Be careful not to make the water so hot that you burn yourself--test it before sitting in it. Soak for about 10 to 15 minutes a few times a day. This may help relieve pain.  Tips to help prevent hemorrhoids  · Eat more fiber. Fiber adds bulk to stool and absorbs water as it moves through your colon. This makes stool softer and easier to pass.  ¨ Increase the fiber in your diet with more fiber-rich foods. These include fresh fruit, vegetables, and whole grains.  ¨ Take a fiber supplement or bulking agent, if advised to by your provider. These include products such as psyllium  or methylcellulose.  · Drink plenty of water, if directed to by your provider. This can help keep stool soft.  · Be more active. Frequent exercise aids digestion and helps prevent constipation. It may also help make bowel movements more regular.  · Dont strain during bowel movements. This can make hemorrhoids more likely. Also, dont sit on the toilet for long periods of time.  Follow-up care  Follow up with your healthcare provider, or as advised. If a culture or imaging tests were done, you will be notified of the results when they are ready. This may take a few days or longer.  When to seek medical advice  Call your healthcare provider right away if any of these occur:  · Increased bleeding from the rectum  · Increased pain around the rectum or anus  · Weakness or dizziness  Call 911  Call 911 or return to the emergency department right away if any of these occur:  · Trouble breathing or swallowing  · Fainting or loss of consciousness  · Unusually fast heart rate  · Vomiting blood  · Large amounts of blood in stool  Date Last Reviewed: 6/22/2015 © 2000-2017 [employer]. [address] [place] [address]. All rights reserved. This information is not intended as a substitute for professional medical care. Always follow your healthcare professional's instructions.        Diverticulosis    Diverticulosis means that small pouches have formed in the wall of your large intestine (colon). Most often, this problem causes no symptoms and is common as people age. But the pouches in the colon are at risk of becoming infected. When this happens, the condition is called diverticulitis. Although most people with diverticulosis never develop diverticulitis, it is still not uncommon. Rectal bleeding can also occur and in less common situations, a type of colon inflammation called colitis.  While most people do not have symptoms, some people with diverticulosis may have:  · Abdominal cramps and  pain  · Bloating  · Constipation  · Change in bowel habits  Causes  The exact cause of diverticulosis (and diverticulitis) has not been proved, but a few things are associated with the condition:  · Low-fiber diet  · Constipation  · Lack of exercise  Your healthcare provider will talk with you about how to manage your condition. Diet changes may be all that are needed to help control diverticulosis and prevent progression to diverticulitis. If you develop diverticulitis, you will likely need other treatments.  Home care  You may be told to take fiber supplements daily. Fiber adds bulk to the stool so that it passes through the colon more easily. Stool softeners may be recommended. You may also be given medications for pain relief. Be sure to take all medications as directed.  In the past, people were told to avoid corn, nuts, and seeds. This is no longer necessary.  Follow these guidelines when caring for yourself at home:  · Eat unprocessed foods that are high in fiber. Whole grains, fruits, and vegetables are good choices.  · Drink 6 to 8 glasses of water every day unless your healthcare provider has you limit how much fluid you should have.  · Watch for changes in your bowel movements. Tell your provider if you notice any changes.  · Begin an exercise program. Ask your provider how to get started. Generally, walking is the best.  · Get plenty of rest and sleep.  Follow-up care  Follow up with your healthcare provider, or as advised. Regular visits may be needed to check on your health. Sometimes special procedures such as colonoscopy, are needed after an episode of diverticulitis or blooding. Be sure to keep all your appointments.  If a stool sample was taken, or cultures were done, you should be told if they are positive, or if your treatment needs to be changed. You can call as directed for the results.  If X-rays were done, a radiologist will look at them. You will be told if there is a change in your  treatment.  If antibiotics were prescribed, be sure to finish them all.  When to seek medical advice  Call your healthcare provider right away if any of these occur:  · Fever of 100.4°F (38°C) or higher, or as directed by your healthcare provider  · Severe cramps in the lower left side of the abdomen or pain that is getting worse  · Tenderness in the lower left side of the abdomen or worsening pain throughout the abdomen  · Diarrhea or constipation that doesn't get better within 24 hours  · Nausea and vomiting  · Bleeding from the rectum  Call 911  Call emergency services if any of the following occur:  · Trouble breathing  · Confusion  · Very drowsy or trouble awakening  · Fainting or loss of consciousness  · Rapid heart rate  · Chest pain  Date Last Reviewed: 12/30/2015 © 2000-2017 [employer]. [address] [place] [address], [place] [address]. All rights reserved. This information is not intended as a substitute for professional medical care. Always follow your healthcare professional's instructions.        Understanding Colon and Rectal Polyps    The colon (also called the large intestine) is a muscular tube that forms the last part of the digestive tract. It absorbs water and stores food waste. The colon is about 4 to 6 feet long. The rectum is the last 6 inches of the colon. The colon and rectum have a smooth lining composed of millions of cells. Changes in these cells can lead to growths in the colon that can become cancerous and should be removed. Multiple tests are available to screen for colon cancer, but the colonoscopy is the most recommended test. During colonoscopy, these polyps can be removed. How often you need this test depends on many things including your condition, your family history, symptoms, and what the findings were at the previous colonoscopy.   When the colon lining changes  Changes that happen in the cells that line the colon or rectum can lead to growths called polyps. Over a  period of years, polyps can turn cancerous. Removing polyps early may prevent cancer from ever forming.  Polyps  Polyps are fleshy clumps of tissue that form on the lining of the colon or rectum. Small polyps are usually benign (not cancerous). However, over time, cells in a polyp can change and become cancerous. Certain types of polyps known as adenomatous polyps are premalignant. The risk for invasive cancer increases with the size of the polyp and certain cell and gene features. This means that they can become cancerous if they're not removed. Hyperplastic polyps are benign. They can grow quite large and not turn cancerous.   Cancer  Almost all colorectal cancers start when polyp cells begin growing abnormally. As a cancerous tumor grows, it may involve more and more of the colon or rectum. In time, cancer can also grow beyond the colon or rectum and spread to nearby organs or to glands called lymph nodes. The cells can also travel to other parts of the body. This is known as metastasis. The earlier a cancerous tumor is removed, the better the chance of preventing its spread.    Date Last Reviewed: 8/1/2016  © 9457-1015 [employer]. [address] [place] [id number]. All rights reserved. This information is not intended as a substitute for professional medical care. Always follow your healthcare professional's instructions.      Discharge Instructions: After Your Surgery/Procedure  Youve just had surgery. During surgery you were given medicine called anesthesia to keep you relaxed and free of pain. After surgery you may have some pain or nausea. This is common. Here are some tips for feeling better and getting well after surgery.     Stay on schedule with your medication.   Going home  Your doctor or nurse will show you how to take care of yourself when you go home. He or she will also answer your questions. Have an adult family member or friend drive you home.      For your safety we  "recommend these precaution for the first 24 hours after your procedure:  · Do not drive or use heavy equipment.  · Do not make important decisions or sign legal papers.  · Do not drink alcohol.  · Have someone stay with you, if needed. He or she can watch for problems and help keep you safe.  · Your concentration, balance, coordination, and judgement may be impaired for many hours after anesthesia.  Use caution when ambulating or standing up.     · You may feel weak and "washed out" after anesthesia and surgery.      Subtle residual effects of general anesthesia or sedation with regional / local anesthesia can last more than 24 hours.  Rest for the remainder of the day or longer if your Doctor/Surgeon has advised you to do so.  Although you may feel normal within the first 24 hours, your reflexes and mental ability may be impaired without you realizing it.  You may feel dizzy, lightheaded or sleepy for 24 hours or longer.      Be sure to go to all follow-up visits with your doctor. And rest after your surgery for as long as your doctor tells you to.  Coping with pain  If you have pain after surgery, pain medicine will help you feel better. Take it as told, before pain becomes severe. Also, ask your doctor or pharmacist about other ways to control pain. This might be with heat, ice, or relaxation. And follow any other instructions your surgeon or nurse gives you.  Tips for taking pain medicine  To get the best relief possible, remember these points:  · Pain medicines can upset your stomach. Taking them with a little food may help.  · Most pain relievers taken by mouth need at least 20 to 30 minutes to start to work.  · Taking medicine on a schedule can help you remember to take it. Try to time your medicine so that you can take it before starting an activity. This might be before you get dressed, go for a walk, or sit down for dinner.  · Constipation is a common side effect of pain medicines. Call your doctor before " taking any medicines such as laxatives or stool softeners to help ease constipation. Also ask if you should skip any foods. Drinking lots of fluids and eating foods such as fruits and vegetables that are high in fiber can also help. Remember, do not take laxatives unless your surgeon has prescribed them.  · Drinking alcohol and taking pain medicine can cause dizziness and slow your breathing. It can even be deadly. Do not drink alcohol while taking pain medicine.  · Pain medicine can make you react more slowly to things. Do not drive or run machinery while taking pain medicine.  Your health care provider may tell you to take acetaminophen to help ease your pain. Ask him or her how much you are supposed to take each day. Acetaminophen or other pain relievers may interact with your prescription medicines or other over-the-counter (OTC) drugs. Some prescription medicines have acetaminophen and other ingredients. Using both prescription and OTC acetaminophen for pain can cause you to overdose. Read the labels on your OTC medicines with care. This will help you to clearly know the list of ingredients, how much to take, and any warnings. It may also help you not take too much acetaminophen. If you have questions or do not understand the information, ask your pharmacist or health care provider to explain it to you before you take the OTC medicine.  Managing nausea  Some people have an upset stomach after surgery. This is often because of anesthesia, pain, or pain medicine, or the stress of surgery. These tips will help you handle nausea and eat healthy foods as you get better. If you were on a special food plan before surgery, ask your doctor if you should follow it while you get better. These tips may help:  · Do not push yourself to eat. Your body will tell you when to eat and how much.  · Start off with clear liquids and soup. They are easier to digest.  · Next try semi-solid foods, such as mashed potatoes, applesauce,  and gelatin, as you feel ready.  · Slowly move to solid foods. Dont eat fatty, rich, or spicy foods at first.  · Do not force yourself to have 3 large meals a day. Instead eat smaller amounts more often.  · Take pain medicines with a small amount of solid food, such as crackers or toast, to avoid nausea.     Call your surgeon if  · You still have pain an hour after taking medicine. The medicine may not be strong enough.  · You feel too sleepy, dizzy, or groggy. The medicine may be too strong.  · You have side effects like nausea, vomiting, or skin changes, such as rash, itching, or hives.       If you have obstructive sleep apnea  You were given anesthesia medicine during surgery to keep you comfortable and free of pain. After surgery, you may have more apnea spells because of this medicine and other medicines you were given. The spells may last longer than usual.   At home:  · Keep using the continuous positive airway pressure (CPAP) device when you sleep. Unless your health care provider tells you not to, use it when you sleep, day or night. CPAP is a common device used to treat obstructive sleep apnea.  · Talk with your provider before taking any pain medicine, muscle relaxants, or sedatives. Your provider will tell you about the possible dangers of taking these medicines.  © 9887-1519 [employer]. [address] [place] [address], [place] [address]. All rights reserved. This information is not intended as a substitute for professional medical care. Always follow your healthcare professional's instructions.

## 2018-07-11 NOTE — ANESTHESIA PREPROCEDURE EVALUATION
07/11/2018  Vivien Burton is a 73 y.o., female.    Anesthesia Evaluation    I have reviewed the Patient Summary Reports.    I have reviewed the Nursing Notes.   I have reviewed the Medications.     Review of Systems  Anesthesia Hx:  No problems with previous Anesthesia    Social:  Non-Smoker Smoking Status: Former Smoker  Quit Smoking:    Smokeless Tobacco Status: Never Used  Alcohol use: Yes; 1.8 oz per week  Drug use: No       Hematology/Oncology:        Oncology Comments: Breast cancer, post right chest radiation  Malignant neoplasm of female breast       Cardiovascular:   Hypertension    Hepatic/GI:   GERD    Endocrine:   Hypothyroidism    Psych:   Psychiatric History          Physical Exam  General:  Well nourished    Airway/Jaw/Neck:  Airway Findings: Mouth Opening: Normal Tongue: Normal  General Airway Assessment: Adult, Good  Mallampati: II  Improves to II with phonation.  TM Distance: 4-6 cm      Dental:  Dental Findings: In tact   Chest/Lungs:  Chest/Lungs Findings: Clear to auscultation, Normal Respiratory Rate     Heart/Vascular:  Heart Findings: Rate: Normal  Rhythm: Regular Rhythm  Sounds: Normal  Heart murmur: negative       Mental Status:  Mental Status Findings:  Cooperative, Alert and Oriented         Anesthesia Plan  Type of Anesthesia, risks & benefits discussed:  Anesthesia Type:  general  Patient's Preference:   Intra-op Monitoring Plan: standard ASA monitors  Intra-op Monitoring Plan Comments:   Post Op Pain Control Plan:   Post Op Pain Control Plan Comments:   Induction:   IV  Beta Blocker:  Patient is not currently on a Beta-Blocker (No further documentation required).       Informed Consent: Patient understands risks and agrees with Anesthesia plan.  Questions answered. Anesthesia consent signed with patient.  ASA Score: 3     Day of Surgery Review of History & Physical: I  have interviewed and examined the patient. I have reviewed the patient's H&P dated:  There are no significant changes.          Ready For Surgery From Anesthesia Perspective.

## 2018-07-11 NOTE — PLAN OF CARE
Pt. Awake and alert, vital signs stable.  Tolerated liquids without nausea.  Denies pain. Ambulates readily. IV removed per policy, catheter intact. Discharge instructions reviewed with patient and written instructions given to patient and her son,  Who both verbalized understanding.  Dr. Cerna spoke with pt. And answered questions and states pt. Is ready to discharge. Discharge home with family per wheelchair to lobby.

## 2018-07-11 NOTE — TRANSFER OF CARE
Anesthesia Transfer of Care Note    Patient: Vivien Burton    Procedure(s) Performed: Procedure(s) (LRB):  COLONOSCOPY (N/A)    Patient location: GI    Anesthesia Type: general    Transport from OR: Transported from OR on 2-3 L/min O2 by NC with adequate spontaneous ventilation    Post pain: adequate analgesia    Post assessment: no apparent anesthetic complications    Post vital signs: stable    Level of consciousness: awake    Nausea/Vomiting: no nausea/vomiting    Complications: none    Transfer of care protocol was followed      Last vitals:   Visit Vitals  BP (!) 161/73   Pulse 80   Temp 36.9 °C (98.4 °F) (Tympanic)   Resp 15   SpO2 100%   Breastfeeding? No

## 2018-07-11 NOTE — H&P (VIEW-ONLY)
[facility]     CC: Family history of colon cancer    HPI 73 y.o. female with history of hypothyroidism, presents due to family history of colon cancer in her brother diagnosed in his 70's, as well as in her nephew diagnosed in his 30's. She denies changes in bowel habits, blood in stool, abdominal pain or weight loss. Her last colonoscopy was > 10 years ago. She does note history of diverticulitis, with rare episodes of lower abdominal pain, last several months ago.      Past Medical History:   Diagnosis Date    Cancer     GERD (gastroesophageal reflux disease)     Hyperlipidemia     Hypertension        Past Surgical History:   Procedure Laterality Date     SECTION, CLASSIC      right breast      TONSILLECTOMY, ADENOIDECTOMY         Social History   Substance Use Topics    Smoking status: Former Smoker     Packs/day: 1.00     Types: Cigarettes    Smokeless tobacco: Never Used    Alcohol use 1.8 oz/week     3 Shots of liquor per week   -No illicits    Family History:  -Brother (70's) and nephew (30's) with colon cancer    Review of Systems  General ROS: negative for - chills, fever or weight loss  Psychological ROS: negative for - hallucination, depression or suicidal ideation  Ophthalmic ROS: negative for - blurry vision, photophobia or eye pain  ENT ROS: negative for - epistaxis, sore throat or rhinorrhea  Respiratory ROS: no cough, shortness of breath, or wheezing  Cardiovascular ROS: no chest pain or dyspnea on exertion  Gastrointestinal ROS: no changes in bowel habits, no abdominal pain, no blood in stool   Genito-Urinary ROS: no dysuria, trouble voiding, or hematuria  Musculoskeletal ROS: negative for - arthralgia, myalgia, weakness  Neurological ROS: no syncope or seizures; no ataxia  Dermatological ROS: negative for pruritis, rash and jaundice    Physical Examination  BP (!) 175/73   Pulse 66   Wt 52.7 kg (116 lb 2.9 oz)   BMI 22.69 kg/m²   General appearance: alert,  cooperative, no distress  HENT: Normocephalic, atraumatic, neck symmetrical, no nasal discharge   Eyes: conjunctivae/corneas clear, PERRL, EOM's intact, sclera anicteric  Lungs: clear to auscultation bilaterally, no dullness to percussion bilaterally, symmetric expansion, breathing unlabored  Heart: regular rate and rhythm without rub; no displacement of the PMI   Abdomen: thin, soft, nontender,nondistended, BS normoactive, no masses palpated  Extremities: extremities symmetric; no clubbing, cyanosis, or edema  Integument: Skin color, texture, turgor normal; no rashes; hair distrubution normal, no jaundice  Neurologic: Alert and oriented X 3, no focal sensory or motor neurologic deficits  Psychiatric: no pressured speech; normal affect; no evidence of impaired cognition, no anxiety/depression     Labs:  Lab Results   Component Value Date    WBC 12.9 (H) 12/21/2017    HGB 12.7 12/21/2017    HCT 37.1 12/21/2017    MCV 88.3 12/21/2017     12/21/2017     -LFTs- normal    Imaging:  Retroperitoneal ultrasound May 2018 was independently visualized and reviewed by me and showed small right renal simple cyst    Assessment:   73 y.o. female with history of diverticulosis/diverticulitis and family history of colon cancer presents for evaluation. She currently feels well.    Plan:  -Schedule colonoscopy     Precious Castorena MD  [facility]  [address], [address]  [place] [address]  Office: [phone]  Fax: [phone]

## 2018-07-17 ENCOUNTER — PATIENT MESSAGE (OUTPATIENT)
Dept: GASTROENTEROLOGY | Facility: CLINIC | Age: 73
End: 2018-07-17

## 2018-08-09 ENCOUNTER — OFFICE VISIT (OUTPATIENT)
Dept: UROLOGY | Facility: CLINIC | Age: 73
End: 2018-08-09
Payer: MEDICARE

## 2018-08-09 VITALS
DIASTOLIC BLOOD PRESSURE: 76 MMHG | RESPIRATION RATE: 18 BRPM | HEIGHT: 60 IN | BODY MASS INDEX: 22.76 KG/M2 | HEART RATE: 83 BPM | SYSTOLIC BLOOD PRESSURE: 156 MMHG | WEIGHT: 115.94 LBS

## 2018-08-09 DIAGNOSIS — R31.29 MICROHEMATURIA: Primary | ICD-10-CM

## 2018-08-09 DIAGNOSIS — N28.1 BILATERAL RENAL CYSTS: ICD-10-CM

## 2018-08-09 DIAGNOSIS — R73.9 HYPERGLYCEMIA: ICD-10-CM

## 2018-08-09 DIAGNOSIS — R80.9 PROTEINURIA, UNSPECIFIED TYPE: ICD-10-CM

## 2018-08-09 LAB
BILIRUB SERPL-MCNC: ABNORMAL MG/DL
BLOOD URINE, POC: ABNORMAL
COLOR, POC UA: CLEAR
GLUCOSE UR QL STRIP: ABNORMAL
KETONES UR QL STRIP: ABNORMAL
LEUKOCYTE ESTERASE URINE, POC: ABNORMAL
NITRITE, POC UA: ABNORMAL
PH, POC UA: 6
PROTEIN, POC: ABNORMAL
SPECIFIC GRAVITY, POC UA: 1.01
UROBILINOGEN, POC UA: ABNORMAL

## 2018-08-09 PROCEDURE — 99213 OFFICE O/P EST LOW 20 MIN: CPT | Mod: PBBFAC,PN | Performed by: UROLOGY

## 2018-08-09 PROCEDURE — 99215 OFFICE O/P EST HI 40 MIN: CPT | Mod: S$PBB,,, | Performed by: UROLOGY

## 2018-08-09 PROCEDURE — 99999 PR PBB SHADOW E&M-EST. PATIENT-LVL III: CPT | Mod: PBBFAC,,, | Performed by: UROLOGY

## 2018-08-09 PROCEDURE — 81002 URINALYSIS NONAUTO W/O SCOPE: CPT | Mod: PBBFAC,PN | Performed by: UROLOGY

## 2018-08-16 ENCOUNTER — LAB VISIT (OUTPATIENT)
Dept: LAB | Facility: HOSPITAL | Age: 73
End: 2018-08-16
Attending: UROLOGY
Payer: MEDICARE

## 2018-08-16 DIAGNOSIS — R80.9 PROTEINURIA, UNSPECIFIED TYPE: ICD-10-CM

## 2018-08-16 LAB
BILIRUB UR QL STRIP: NEGATIVE
CLARITY UR: CLEAR
COLOR UR: YELLOW
GLUCOSE UR QL STRIP: NEGATIVE
HGB UR QL STRIP: NEGATIVE
KETONES UR QL STRIP: NEGATIVE
LEUKOCYTE ESTERASE UR QL STRIP: NEGATIVE
NITRITE UR QL STRIP: NEGATIVE
PH UR STRIP: 6 [PH] (ref 5–8)
PROT UR QL STRIP: NEGATIVE
SP GR UR STRIP: <=1.005 (ref 1–1.03)
URN SPEC COLLECT METH UR: NORMAL
UROBILINOGEN UR STRIP-ACNC: NEGATIVE EU/DL

## 2018-08-16 PROCEDURE — 81003 URINALYSIS AUTO W/O SCOPE: CPT

## 2019-01-29 ENCOUNTER — HOSPITAL ENCOUNTER (OUTPATIENT)
Dept: RADIOLOGY | Facility: HOSPITAL | Age: 74
Discharge: HOME | End: 2019-01-29
Attending: INTERNAL MEDICINE
Payer: MEDICARE

## 2019-01-29 PROCEDURE — 77080 DXA BONE DENSITY AXIAL: CPT | Mod: TC

## 2019-01-29 PROCEDURE — 77080 DEXA BONE DENSITY SPINE HIP: ICD-10-PCS | Mod: 26,,, | Performed by: RADIOLOGY

## 2019-01-29 PROCEDURE — 77080 DXA BONE DENSITY AXIAL: CPT | Mod: 26,,, | Performed by: RADIOLOGY

## 2019-10-02 PROBLEM — R51.9 HEADACHE: Status: RESOLVED | Noted: 2017-04-12 | Resolved: 2017-04-19

## 2019-11-19 ENCOUNTER — HOSPITAL ENCOUNTER (OUTPATIENT)
Dept: RADIOLOGY | Facility: HOSPITAL | Age: 74
Discharge: HOME | End: 2019-11-19
Attending: NURSE PRACTITIONER
Payer: MEDICARE

## 2019-11-19 DIAGNOSIS — J22 LOWER RESPIRATORY INFECTION (E.G., BRONCHITIS, PNEUMONIA, PNEUMONITIS, PULMONITIS): ICD-10-CM

## 2019-11-19 DIAGNOSIS — J01.90 ACUTE NON-RECURRENT SINUSITIS, UNSPECIFIED LOCATION: ICD-10-CM

## 2020-06-16 ENCOUNTER — HOSPITAL ENCOUNTER (OUTPATIENT)
Dept: RADIOLOGY | Facility: HOSPITAL | Age: 75
Discharge: HOME | End: 2020-06-16
Attending: INTERNAL MEDICINE
Payer: MEDICARE

## 2020-06-16 PROCEDURE — 71250 CT THORAX DX C-: CPT | Mod: 26,,, | Performed by: RADIOLOGY

## 2020-06-16 PROCEDURE — 71250 CT CHEST WITHOUT CONTRAST: ICD-10-PCS | Mod: 26,,, | Performed by: RADIOLOGY

## 2020-06-16 PROCEDURE — 71250 CT THORAX DX C-: CPT | Mod: TC

## 2020-07-10 ENCOUNTER — HOSPITAL ENCOUNTER (OUTPATIENT)
Dept: RADIOLOGY | Facility: HOSPITAL | Age: 75
Discharge: HOME | End: 2020-07-10
Attending: INTERNAL MEDICINE
Payer: MEDICARE

## 2020-07-10 VITALS — WEIGHT: 115 LBS | BODY MASS INDEX: 22.58 KG/M2 | HEIGHT: 60 IN

## 2020-07-10 DIAGNOSIS — M81.0 OSTEOPOROSIS WITHOUT CURRENT PATHOLOGICAL FRACTURE, UNSPECIFIED OSTEOPOROSIS TYPE: ICD-10-CM

## 2020-07-10 DIAGNOSIS — Z12.31 ENCOUNTER FOR SCREENING MAMMOGRAM FOR MALIGNANT NEOPLASM OF BREAST: ICD-10-CM

## 2020-07-10 DIAGNOSIS — E78.5 HYPERLIPIDEMIA, UNSPECIFIED HYPERLIPIDEMIA TYPE: ICD-10-CM

## 2020-07-10 DIAGNOSIS — Z85.3 HX OF BREAST CANCER: ICD-10-CM

## 2020-07-10 DIAGNOSIS — I35.0 AORTIC VALVE STENOSIS, ETIOLOGY OF CARDIAC VALVE DISEASE UNSPECIFIED: ICD-10-CM

## 2020-07-10 DIAGNOSIS — R07.1 CHEST PAIN ON BREATHING: ICD-10-CM

## 2020-07-10 DIAGNOSIS — K21.9 GASTROESOPHAGEAL REFLUX DISEASE, ESOPHAGITIS PRESENCE NOT SPECIFIED: ICD-10-CM

## 2020-07-10 DIAGNOSIS — I89.0 LYMPHEDEMA: ICD-10-CM

## 2020-07-10 DIAGNOSIS — R07.9 CHEST PAIN, UNSPECIFIED TYPE: ICD-10-CM

## 2020-07-10 DIAGNOSIS — I10 ESSENTIAL HYPERTENSION: ICD-10-CM

## 2020-07-10 PROCEDURE — 77063 MAMMO DIGITAL SCREENING LEFT WITH TOMOSYNTHESIS_CAD: ICD-10-PCS | Mod: 26,,, | Performed by: RADIOLOGY

## 2020-07-10 PROCEDURE — 77067 SCR MAMMO BI INCL CAD: CPT | Mod: 26,,, | Performed by: RADIOLOGY

## 2020-07-10 PROCEDURE — 77067 SCR MAMMO BI INCL CAD: CPT | Mod: TC

## 2020-07-10 PROCEDURE — 77063 BREAST TOMOSYNTHESIS BI: CPT | Mod: 26,,, | Performed by: RADIOLOGY

## 2020-07-10 PROCEDURE — 77067 MAMMO DIGITAL SCREENING LEFT WITH TOMOSYNTHESIS_CAD: ICD-10-PCS | Mod: 26,,, | Performed by: RADIOLOGY

## 2020-07-30 ENCOUNTER — OFFICE VISIT (OUTPATIENT)
Dept: PRIMARY CARE CLINIC | Facility: CLINIC | Age: 75
End: 2020-07-30
Payer: MEDICARE

## 2020-07-30 VITALS
DIASTOLIC BLOOD PRESSURE: 90 MMHG | HEART RATE: 82 BPM | SYSTOLIC BLOOD PRESSURE: 194 MMHG | TEMPERATURE: 98 F | RESPIRATION RATE: 18 BRPM | OXYGEN SATURATION: 96 %

## 2020-07-30 DIAGNOSIS — R05.9 COUGH: ICD-10-CM

## 2020-07-30 PROCEDURE — U0003 INFECTIOUS AGENT DETECTION BY NUCLEIC ACID (DNA OR RNA); SEVERE ACUTE RESPIRATORY SYNDROME CORONAVIRUS 2 (SARS-COV-2) (CORONAVIRUS DISEASE [COVID-19]), AMPLIFIED PROBE TECHNIQUE, MAKING USE OF HIGH THROUGHPUT TECHNOLOGIES AS DESCRIBED BY CMS-2020-01-R: HCPCS

## 2020-07-30 PROCEDURE — 99203 OFFICE O/P NEW LOW 30 MIN: CPT | Mod: S$GLB,,, | Performed by: NURSE PRACTITIONER

## 2020-07-30 PROCEDURE — 99203 PR OFFICE/OUTPT VISIT, NEW, LEVL III, 30-44 MIN: ICD-10-PCS | Mod: S$GLB,,, | Performed by: NURSE PRACTITIONER

## 2020-07-30 NOTE — PROGRESS NOTES
Subjective:        Time seen by provider: 2:07 PM on 07/30/2020    Vivien Burton is a 75 y.o. female with PMHx of HLD and HTN who presents for an evaluation of possible COVID-19. The patient c/o HA and cough. She denies any other symptoms at this time. Patient reports exposure to an individual who recently tested positive for COVID-19. No pertinent PSHx. Patient is a former smoker.    Review of Systems   Constitutional: Negative for activity change, appetite change, fatigue and fever.   HENT: Negative for congestion, rhinorrhea and sore throat.    Respiratory: Positive for cough. Negative for chest tightness, shortness of breath and wheezing.    Cardiovascular: Negative for chest pain and palpitations.   Gastrointestinal: Negative for diarrhea, nausea and vomiting.   Musculoskeletal: Negative for arthralgias and myalgias.   Skin: Negative for rash.   Neurological: Positive for headaches. Negative for weakness, light-headedness and numbness.       Objective:      Physical Exam  Vitals signs and nursing note reviewed.   Constitutional:       General: She is not in acute distress.     Appearance: She is well-developed. She is not diaphoretic.   HENT:      Head: Normocephalic and atraumatic.      Nose: Nose normal.   Eyes:      Conjunctiva/sclera: Conjunctivae normal.   Neck:      Musculoskeletal: Normal range of motion.   Cardiovascular:      Rate and Rhythm: Normal rate. Rhythm irregular.      Heart sounds: Murmur present. Systolic murmur present with a grade of 4/6.   Pulmonary:      Effort: No respiratory distress.      Breath sounds: Normal breath sounds. No wheezing.   Musculoskeletal: Normal range of motion.   Skin:     General: Skin is warm and dry.   Neurological:      Mental Status: She is alert and oriented to person, place, and time.         Assessment:     1. Suspected COVID-19  COVID-19 Routine Screening     Plan:       1. Suspected COVID-19  COVID-19 Routine Screening     2. Discharge home and  await results.   3. Return to clinic or ED for new or worsening symptoms.   4. Follow-up with PCP as needed.     Scribe Attestation:   I, Deepa Plascencia, am scribing for, and in the presence of, BRIE Ayers. I performed the above scribed service and the documentation accurately describes the services I performed. I attest to the accuracy of the note.    I, BRIE Ayers, personally performed the services described in this documentation. All medical record entries made by the scribe were at my direction and in my presence.  I have reviewed the chart and agree that the record reflects my personal performance and is accurate and complete. BRIE Ayers.  2:32 PM 07/30/2020

## 2020-07-30 NOTE — PATIENT INSTRUCTIONS
Instructions for Patients with Confirmed or Suspected COVID-19    If you are awaiting your test result, you will either be called or it will be released to the patient portal.  If you have any questions about your test, please visit [email or web address] or call our COVID-19 information line at [phone].      Instructions for non-hospitalized or discharged patients with confirmed or suspected COVID-19:       Stay home except to get medical care.    Separate yourself from other people and animals in your home.    Call ahead before visiting your doctor.    Wear a face mask.    Cover your coughs and sneezes.    Clean your hands often.    Avoid sharing personal household items.    Clean all high-touch surfaces every day.    Monitor your symptoms. Seek prompt medical attention if your illness is worsening (e.g., difficulty breathing). Before seeking care, call your healthcare provider.    If you have a medical emergency and must call 911, notify the dispatcher that you have or are being evaluated for COVID-19. If possible, put on a face mask before emergency medical services arrive.    Use the following symptom-based strategy to return to normal activity following a suspected or confirmed case of COVID-19. Continue isolation until:   o At least 3 days (72 hours) have passed since recovery defined as resolution of fever without the use of fever-reducing medications and improvement in respiratory symptoms (e.g. cough, shortness of breath), and   o At least 10 days have passed since the first positive test.       As one of the next steps, you will receive a call or text from the [place] Department of Health ([facility]) COVID-19 Tracing Team. See the contact information below so you know not to ignore the health departments call. It is important that you contact them back immediately so they can help.     Contact Tracer Number:  [phone]  Caller ID for most carriers: [place] Dept [facility]    What is  contact tracing?   Contact tracing is a process that helps identify everyone who has been in close contact with an infected person. Contact tracers let those people know they may have been exposed and guide them on next steps. Confidentiality is important for everyone; no one will be told who may have exposed them to the virus.   Your involvement is important. The more we know about where and how this virus is spreading, the better chance we have at stopping it from spreading further.  What does exposure mean?   Exposure means you have been within 6 feet for more than 15 minutes with a person who has or had COVID-19.  What kind of questions do the contact tracers ask?   A contact tracer will confirm your basic contact information including name, address, phone number, and next of kin, as well as asking about any symptoms you may have had. Theyll also ask you how you think you may have gotten sick, such as places where you may have been exposed to the virus, and people you were with. Those names will never be shared with anyone outside of that call, and will only be used to help trace and stop the spread of the virus.   I have privacy concerns. How will the state use my information?   Your privacy about your health is important. All calls are completed using call centers that use the appropriate health privacy protection measures (HIPAA compliance), meaning that your patient information is safe. No one will ever ask you any questions related to immigration status. Your health comes first.   Do I have to participate?   You do not have to participate, but we strongly encourage you to. Contact tracing can help us catch and control new outbreaks as theyre developing to keep your friends and family safe.   What if I dont hear from anyone?   If you dont receive a call within 24 hours, you can call the number above right away to inquire about your status. That line is open from 8:00 am - 8:00 p.m., 7 days a  week.  Contact tracing saves lives! Together, we have the power to beat this virus and keep our loved ones and neighbors safe.       Instructions for household members, intimate partners and caregivers in a non-healthcare setting of a patient with confirmed or suspected COVID-19:         Close contacts should monitor their health and call their healthcare provider right away if they develop symptoms suggestive of COVID-19 (e.g., fever, cough, shortness of breath).    Stay home except to get medical care. Separate yourself from other people and animals in the home.   Monitor the patients symptoms. If the patient is getting sicker, call his or her healthcare provider. If the patient has a medical emergency and you need to call 911, notify the dispatch personnel that the patient has or is being evaluated for COVID-19.    Wear a facemask when around other people such as sharing a room or vehicle and before entering a healthcare provider's office.   Cover coughs and sneezes with a tissue. Throw used tissues in a lined trash can immediately and wash hands.   Clean hands often with soap and water for at least 20 seconds or with an alcohol-based hand , rubbing hands together until they feel dry. Avoid touching your eyes, nose, and mouth with unwashed hands.   Clean all high-touch; surfaces every day, including counters, tabletops, doorknobs, bathroom fixtures, toilets, phones, keyboards, tablets, bedside tables, etc. Use a household cleaning spray or wipe according to label instructions.   Avoid sharing personal household items such as dishes, drinking glasses, cups, towels, bedding, etc. After these items are used, they should be washed thoroughly with soap and water.   Continue isolation until:   At least 3 days (72 hours) have passed since recovery defined as resolution of fever without the use of fever-reducing medications and improvement in respiratory symptoms (e.g. cough, shortness of breath),  and    At least 10 days have passed since the patients first positive test.    https://www.cdc.gov/coronavirus/2019-ncov/your-health/index.htm

## 2020-07-31 LAB — SARS-COV-2 RNA RESP QL NAA+PROBE: NOT DETECTED

## 2021-01-02 ENCOUNTER — OFFICE VISIT (OUTPATIENT)
Dept: URGENT CARE | Facility: CLINIC | Age: 76
End: 2021-01-02
Payer: MEDICARE

## 2021-01-02 VITALS
BODY MASS INDEX: 23.44 KG/M2 | DIASTOLIC BLOOD PRESSURE: 96 MMHG | RESPIRATION RATE: 16 BRPM | WEIGHT: 120 LBS | OXYGEN SATURATION: 98 % | SYSTOLIC BLOOD PRESSURE: 191 MMHG | TEMPERATURE: 97 F

## 2021-01-02 DIAGNOSIS — J06.9 UPPER RESPIRATORY TRACT INFECTION, UNSPECIFIED TYPE: ICD-10-CM

## 2021-01-02 DIAGNOSIS — R05.9 COUGH: Primary | ICD-10-CM

## 2021-01-02 DIAGNOSIS — B34.9 VIRAL SYNDROME: ICD-10-CM

## 2021-01-02 LAB
CTP QC/QA: YES
SARS-COV-2 RDRP RESP QL NAA+PROBE: NEGATIVE

## 2021-01-02 PROCEDURE — 99214 PR OFFICE/OUTPT VISIT, EST, LEVL IV, 30-39 MIN: ICD-10-PCS | Mod: S$GLB,,, | Performed by: NURSE PRACTITIONER

## 2021-01-02 PROCEDURE — 99214 OFFICE O/P EST MOD 30 MIN: CPT | Mod: S$GLB,,, | Performed by: NURSE PRACTITIONER

## 2021-01-02 PROCEDURE — U0002 COVID-19 LAB TEST NON-CDC: HCPCS | Mod: QW,CR,S$GLB, | Performed by: NURSE PRACTITIONER

## 2021-01-02 PROCEDURE — U0002: ICD-10-PCS | Mod: QW,CR,S$GLB, | Performed by: NURSE PRACTITIONER

## 2021-01-05 ENCOUNTER — PATIENT MESSAGE (OUTPATIENT)
Dept: ADMINISTRATIVE | Facility: OTHER | Age: 76
End: 2021-01-05

## 2021-01-09 ENCOUNTER — IMMUNIZATION (OUTPATIENT)
Dept: INTERNAL MEDICINE | Facility: CLINIC | Age: 76
End: 2021-01-09
Payer: MEDICARE

## 2021-01-09 DIAGNOSIS — Z23 NEED FOR VACCINATION: ICD-10-CM

## 2021-01-09 PROCEDURE — 91300 COVID-19, MRNA, LNP-S, PF, 30 MCG/0.3 ML DOSE VACCINE: ICD-10-PCS | Mod: ,,, | Performed by: INTERNAL MEDICINE

## 2021-01-09 PROCEDURE — 0001A COVID-19, MRNA, LNP-S, PF, 30 MCG/0.3 ML DOSE VACCINE: ICD-10-PCS | Mod: CV19,,, | Performed by: INTERNAL MEDICINE

## 2021-01-09 PROCEDURE — 0001A COVID-19, MRNA, LNP-S, PF, 30 MCG/0.3 ML DOSE VACCINE: CPT | Mod: CV19,,, | Performed by: INTERNAL MEDICINE

## 2021-01-09 PROCEDURE — 91300 COVID-19, MRNA, LNP-S, PF, 30 MCG/0.3 ML DOSE VACCINE: CPT | Mod: ,,, | Performed by: INTERNAL MEDICINE

## 2021-01-30 ENCOUNTER — IMMUNIZATION (OUTPATIENT)
Dept: INTERNAL MEDICINE | Facility: CLINIC | Age: 76
End: 2021-01-30
Payer: MEDICARE

## 2021-01-30 DIAGNOSIS — Z23 NEED FOR VACCINATION: Primary | ICD-10-CM

## 2021-01-30 PROCEDURE — 91300 PR SARS-COV- 2 COVID-19 VACCINE, NO PRSV, 30MCG/0.3ML, IM: CPT | Mod: PBBFAC

## 2021-01-30 PROCEDURE — 0002A PR IMMUNIZ ADMIN, SARS-COV-2 COVID-19 VACC, 30MCG/0.3ML, 2ND DOSE: CPT | Mod: PBBFAC

## 2021-01-30 RX ADMIN — RNA INGREDIENT BNT-162B2 0.3 ML: 0.23 INJECTION, SUSPENSION INTRAMUSCULAR at 08:01

## 2021-02-15 ENCOUNTER — HOSPITAL ENCOUNTER (OUTPATIENT)
Dept: RADIOLOGY | Facility: HOSPITAL | Age: 76
Discharge: HOME | End: 2021-02-15
Attending: NURSE PRACTITIONER
Payer: MEDICARE

## 2021-02-15 DIAGNOSIS — J06.9 UPPER RESPIRATORY TRACT INFECTION, UNSPECIFIED TYPE: ICD-10-CM

## 2021-02-22 ENCOUNTER — OFFICE VISIT (OUTPATIENT)
Dept: OTOLARYNGOLOGY | Facility: CLINIC | Age: 76
End: 2021-02-22
Payer: MEDICARE

## 2021-02-22 ENCOUNTER — OFFICE VISIT (OUTPATIENT)
Dept: DERMATOLOGY | Facility: CLINIC | Age: 76
End: 2021-02-22
Payer: MEDICARE

## 2021-02-22 VITALS
HEART RATE: 87 BPM | WEIGHT: 119.25 LBS | DIASTOLIC BLOOD PRESSURE: 78 MMHG | BODY MASS INDEX: 23.41 KG/M2 | HEIGHT: 60 IN | OXYGEN SATURATION: 98 % | SYSTOLIC BLOOD PRESSURE: 168 MMHG

## 2021-02-22 DIAGNOSIS — Z85.828 HISTORY OF NONMELANOMA SKIN CANCER: ICD-10-CM

## 2021-02-22 DIAGNOSIS — J30.2 SEASONAL ALLERGIC RHINITIS, UNSPECIFIED TRIGGER: Primary | ICD-10-CM

## 2021-02-22 DIAGNOSIS — D23.9 BENIGN NEOPLASM OF SKIN, UNSPECIFIED LOCATION: ICD-10-CM

## 2021-02-22 DIAGNOSIS — L57.8 ACTINIC SKIN DAMAGE: Primary | ICD-10-CM

## 2021-02-22 DIAGNOSIS — L73.8 SEBACEOUS HYPERPLASIA: ICD-10-CM

## 2021-02-22 DIAGNOSIS — L81.4 SOLAR LENTIGO: ICD-10-CM

## 2021-02-22 DIAGNOSIS — R05.9 COUGH: ICD-10-CM

## 2021-02-22 DIAGNOSIS — L82.1 SEBORRHEIC KERATOSIS: ICD-10-CM

## 2021-02-22 DIAGNOSIS — L57.0 ACTINIC KERATOSIS: ICD-10-CM

## 2021-02-22 DIAGNOSIS — L90.5 SCAR: ICD-10-CM

## 2021-02-22 PROCEDURE — 11310 SHAVE SKIN LESION 0.5 CM/<: CPT | Mod: S$PBB,,, | Performed by: STUDENT IN AN ORGANIZED HEALTH CARE EDUCATION/TRAINING PROGRAM

## 2021-02-22 PROCEDURE — 99203 PR OFFICE/OUTPT VISIT, NEW, LEVL III, 30-44 MIN: ICD-10-PCS | Mod: S$GLB,,, | Performed by: OTOLARYNGOLOGY

## 2021-02-22 PROCEDURE — 17003 DESTRUCT PREMALG LES 2-14: CPT | Mod: S$PBB,,, | Performed by: STUDENT IN AN ORGANIZED HEALTH CARE EDUCATION/TRAINING PROGRAM

## 2021-02-22 PROCEDURE — 17000 PR DESTRUCTION(LASER SURGERY,CRYOSURGERY,CHEMOSURGERY),PREMALIGNANT LESIONS,FIRST LESION: ICD-10-PCS | Mod: 59,S$PBB,, | Performed by: STUDENT IN AN ORGANIZED HEALTH CARE EDUCATION/TRAINING PROGRAM

## 2021-02-22 PROCEDURE — 99204 OFFICE O/P NEW MOD 45 MIN: CPT | Mod: 25,S$PBB,, | Performed by: STUDENT IN AN ORGANIZED HEALTH CARE EDUCATION/TRAINING PROGRAM

## 2021-02-22 PROCEDURE — 99999 PR PBB SHADOW E&M-EST. PATIENT-LVL III: ICD-10-PCS | Mod: PBBFAC,,, | Performed by: STUDENT IN AN ORGANIZED HEALTH CARE EDUCATION/TRAINING PROGRAM

## 2021-02-22 PROCEDURE — 17000 DESTRUCT PREMALG LESION: CPT | Mod: 59,S$PBB,, | Performed by: STUDENT IN AN ORGANIZED HEALTH CARE EDUCATION/TRAINING PROGRAM

## 2021-02-22 PROCEDURE — 88305 TISSUE EXAM BY PATHOLOGIST: CPT | Mod: 26,,, | Performed by: PATHOLOGY

## 2021-02-22 PROCEDURE — 99204 PR OFFICE/OUTPT VISIT, NEW, LEVL IV, 45-59 MIN: ICD-10-PCS | Mod: 25,S$PBB,, | Performed by: STUDENT IN AN ORGANIZED HEALTH CARE EDUCATION/TRAINING PROGRAM

## 2021-02-22 PROCEDURE — 88305 TISSUE EXAM BY PATHOLOGIST: ICD-10-PCS | Mod: 26,,, | Performed by: PATHOLOGY

## 2021-02-22 PROCEDURE — 99213 OFFICE O/P EST LOW 20 MIN: CPT | Mod: PBBFAC,PO | Performed by: STUDENT IN AN ORGANIZED HEALTH CARE EDUCATION/TRAINING PROGRAM

## 2021-02-22 PROCEDURE — 17003 DESTRUCT PREMALG LES 2-14: CPT | Mod: 59,PBBFAC,PO | Performed by: STUDENT IN AN ORGANIZED HEALTH CARE EDUCATION/TRAINING PROGRAM

## 2021-02-22 PROCEDURE — 88305 TISSUE EXAM BY PATHOLOGIST: CPT | Performed by: PATHOLOGY

## 2021-02-22 PROCEDURE — 11310 PR SHAV SKIN LES <0.5 CM FACE,FACIAL: ICD-10-PCS | Mod: S$PBB,,, | Performed by: STUDENT IN AN ORGANIZED HEALTH CARE EDUCATION/TRAINING PROGRAM

## 2021-02-22 PROCEDURE — 11310 SHAVE SKIN LESION 0.5 CM/<: CPT | Mod: PBBFAC,PO | Performed by: STUDENT IN AN ORGANIZED HEALTH CARE EDUCATION/TRAINING PROGRAM

## 2021-02-22 PROCEDURE — 99203 OFFICE O/P NEW LOW 30 MIN: CPT | Mod: S$GLB,,, | Performed by: OTOLARYNGOLOGY

## 2021-02-22 PROCEDURE — 17000 DESTRUCT PREMALG LESION: CPT | Mod: 59,PBBFAC,PO | Performed by: STUDENT IN AN ORGANIZED HEALTH CARE EDUCATION/TRAINING PROGRAM

## 2021-02-22 PROCEDURE — 99999 PR PBB SHADOW E&M-EST. PATIENT-LVL III: CPT | Mod: PBBFAC,,, | Performed by: STUDENT IN AN ORGANIZED HEALTH CARE EDUCATION/TRAINING PROGRAM

## 2021-02-22 PROCEDURE — 17003 DESTRUCTION, PREMALIGNANT LESIONS; SECOND THROUGH 14 LESIONS: ICD-10-PCS | Mod: S$PBB,,, | Performed by: STUDENT IN AN ORGANIZED HEALTH CARE EDUCATION/TRAINING PROGRAM

## 2021-02-22 RX ORDER — MONTELUKAST SODIUM 10 MG/1
10 TABLET ORAL NIGHTLY
Qty: 90 TABLET | Refills: 2 | Status: SHIPPED | OUTPATIENT
Start: 2021-02-22 | End: 2021-05-23

## 2021-02-22 RX ORDER — FLUOROURACIL 50 MG/G
CREAM TOPICAL 2 TIMES DAILY
Qty: 40 G | Refills: 0 | Status: SHIPPED | OUTPATIENT
Start: 2021-02-22 | End: 2021-11-17

## 2021-02-23 ENCOUNTER — PATIENT MESSAGE (OUTPATIENT)
Dept: OTOLARYNGOLOGY | Facility: CLINIC | Age: 76
End: 2021-02-23

## 2021-02-24 DIAGNOSIS — J30.2 SEASONAL ALLERGIC RHINITIS, UNSPECIFIED TRIGGER: Primary | ICD-10-CM

## 2021-02-24 LAB
FINAL PATHOLOGIC DIAGNOSIS: NORMAL
GROSS: NORMAL

## 2021-02-24 RX ORDER — FLUTICASONE PROPIONATE 50 MCG
2 SPRAY, SUSPENSION (ML) NASAL DAILY
Qty: 16 G | Refills: 2 | Status: SHIPPED | OUTPATIENT
Start: 2021-02-24 | End: 2021-05-25

## 2021-03-09 ENCOUNTER — PATIENT MESSAGE (OUTPATIENT)
Dept: DERMATOLOGY | Facility: CLINIC | Age: 76
End: 2021-03-09

## 2021-03-11 ENCOUNTER — PATIENT MESSAGE (OUTPATIENT)
Dept: DERMATOLOGY | Facility: CLINIC | Age: 76
End: 2021-03-11

## 2021-03-26 ENCOUNTER — PATIENT MESSAGE (OUTPATIENT)
Dept: OTOLARYNGOLOGY | Facility: CLINIC | Age: 76
End: 2021-03-26

## 2021-03-26 ENCOUNTER — TELEPHONE (OUTPATIENT)
Dept: OTOLARYNGOLOGY | Facility: CLINIC | Age: 76
End: 2021-03-26

## 2021-04-19 ENCOUNTER — LAB VISIT (OUTPATIENT)
Dept: FAMILY MEDICINE | Facility: CLINIC | Age: 76
End: 2021-04-19
Payer: MEDICARE

## 2021-04-19 DIAGNOSIS — J30.2 SEASONAL ALLERGIC RHINITIS, UNSPECIFIED TRIGGER: ICD-10-CM

## 2021-04-19 PROCEDURE — U0005 INFEC AGEN DETEC AMPLI PROBE: HCPCS | Performed by: OTOLARYNGOLOGY

## 2021-04-19 PROCEDURE — U0003 INFECTIOUS AGENT DETECTION BY NUCLEIC ACID (DNA OR RNA); SEVERE ACUTE RESPIRATORY SYNDROME CORONAVIRUS 2 (SARS-COV-2) (CORONAVIRUS DISEASE [COVID-19]), AMPLIFIED PROBE TECHNIQUE, MAKING USE OF HIGH THROUGHPUT TECHNOLOGIES AS DESCRIBED BY CMS-2020-01-R: HCPCS | Performed by: OTOLARYNGOLOGY

## 2021-04-20 LAB — SARS-COV-2 RNA RESP QL NAA+PROBE: NOT DETECTED

## 2021-04-21 ENCOUNTER — OFFICE VISIT (OUTPATIENT)
Dept: OTOLARYNGOLOGY | Facility: CLINIC | Age: 76
End: 2021-04-21
Payer: MEDICARE

## 2021-04-21 VITALS
DIASTOLIC BLOOD PRESSURE: 86 MMHG | HEIGHT: 60 IN | OXYGEN SATURATION: 97 % | HEART RATE: 90 BPM | SYSTOLIC BLOOD PRESSURE: 158 MMHG | BODY MASS INDEX: 22.95 KG/M2 | WEIGHT: 116.88 LBS

## 2021-04-21 DIAGNOSIS — J30.2 SEASONAL ALLERGIC RHINITIS, UNSPECIFIED TRIGGER: Primary | ICD-10-CM

## 2021-04-21 DIAGNOSIS — R05.9 COUGH: ICD-10-CM

## 2021-04-21 PROCEDURE — 31575 PR LARYNGOSCOPY, FLEXIBLE; DIAGNOSTIC: ICD-10-PCS | Mod: S$GLB,,, | Performed by: OTOLARYNGOLOGY

## 2021-04-21 PROCEDURE — 99214 OFFICE O/P EST MOD 30 MIN: CPT | Mod: 25,S$GLB,, | Performed by: OTOLARYNGOLOGY

## 2021-04-21 PROCEDURE — 31575 DIAGNOSTIC LARYNGOSCOPY: CPT | Mod: S$GLB,,, | Performed by: OTOLARYNGOLOGY

## 2021-04-21 PROCEDURE — 99214 PR OFFICE/OUTPT VISIT, EST, LEVL IV, 30-39 MIN: ICD-10-PCS | Mod: 25,S$GLB,, | Performed by: OTOLARYNGOLOGY

## 2021-04-22 ENCOUNTER — OFFICE VISIT (OUTPATIENT)
Dept: DERMATOLOGY | Facility: CLINIC | Age: 76
End: 2021-04-22
Payer: MEDICARE

## 2021-04-22 DIAGNOSIS — L57.8 ACTINIC SKIN DAMAGE: Primary | ICD-10-CM

## 2021-04-22 PROCEDURE — 99999 PR PBB SHADOW E&M-EST. PATIENT-LVL III: ICD-10-PCS | Mod: PBBFAC,,, | Performed by: STUDENT IN AN ORGANIZED HEALTH CARE EDUCATION/TRAINING PROGRAM

## 2021-04-22 PROCEDURE — 99212 PR OFFICE/OUTPT VISIT, EST, LEVL II, 10-19 MIN: ICD-10-PCS | Mod: S$PBB,,, | Performed by: STUDENT IN AN ORGANIZED HEALTH CARE EDUCATION/TRAINING PROGRAM

## 2021-04-22 PROCEDURE — 99213 OFFICE O/P EST LOW 20 MIN: CPT | Mod: PBBFAC,PO | Performed by: STUDENT IN AN ORGANIZED HEALTH CARE EDUCATION/TRAINING PROGRAM

## 2021-04-22 PROCEDURE — 99999 PR PBB SHADOW E&M-EST. PATIENT-LVL III: CPT | Mod: PBBFAC,,, | Performed by: STUDENT IN AN ORGANIZED HEALTH CARE EDUCATION/TRAINING PROGRAM

## 2021-04-22 PROCEDURE — 99212 OFFICE O/P EST SF 10 MIN: CPT | Mod: S$PBB,,, | Performed by: STUDENT IN AN ORGANIZED HEALTH CARE EDUCATION/TRAINING PROGRAM

## 2021-08-09 ENCOUNTER — HOSPITAL ENCOUNTER (OUTPATIENT)
Dept: RADIOLOGY | Facility: HOSPITAL | Age: 76
Discharge: HOME | End: 2021-08-09
Attending: INTERNAL MEDICINE
Payer: MEDICARE

## 2021-08-09 VITALS — HEIGHT: 60 IN | BODY MASS INDEX: 22.95 KG/M2 | WEIGHT: 116.88 LBS

## 2021-08-09 DIAGNOSIS — Z79.899 ENCOUNTER FOR LONG-TERM (CURRENT) USE OF MEDICATIONS: ICD-10-CM

## 2021-08-09 DIAGNOSIS — Z12.31 ENCOUNTER FOR SCREENING MAMMOGRAM FOR MALIGNANT NEOPLASM OF BREAST: ICD-10-CM

## 2021-08-09 DIAGNOSIS — M81.0 OSTEOPOROSIS WITHOUT CURRENT PATHOLOGICAL FRACTURE, UNSPECIFIED OSTEOPOROSIS TYPE: ICD-10-CM

## 2021-08-09 DIAGNOSIS — I10 ESSENTIAL HYPERTENSION: ICD-10-CM

## 2021-08-09 DIAGNOSIS — E78.5 DYSLIPIDEMIA: ICD-10-CM

## 2021-08-09 DIAGNOSIS — E78.5 HYPERLIPIDEMIA, UNSPECIFIED HYPERLIPIDEMIA TYPE: ICD-10-CM

## 2021-08-09 DIAGNOSIS — Z23 NEED FOR VACCINATION: ICD-10-CM

## 2021-08-09 DIAGNOSIS — I35.0 AORTIC VALVE STENOSIS, ETIOLOGY OF CARDIAC VALVE DISEASE UNSPECIFIED: ICD-10-CM

## 2021-08-09 DIAGNOSIS — I89.0 LYMPHEDEMA: ICD-10-CM

## 2021-08-09 DIAGNOSIS — C50.911 MALIGNANT NEOPLASM OF RIGHT FEMALE BREAST, UNSPECIFIED ESTROGEN RECEPTOR STATUS, UNSPECIFIED SITE OF BREAST: ICD-10-CM

## 2021-08-09 PROCEDURE — 77067 SCR MAMMO BI INCL CAD: CPT | Mod: TC

## 2021-08-09 PROCEDURE — 77063 BREAST TOMOSYNTHESIS BI: CPT | Mod: 26,,, | Performed by: RADIOLOGY

## 2021-08-09 PROCEDURE — 77080 DXA BONE DENSITY AXIAL: CPT | Mod: TC

## 2021-08-09 PROCEDURE — 77067 MAMMO DIGITAL SCREENING LEFT WITH TOMO: ICD-10-PCS | Mod: 26,,, | Performed by: RADIOLOGY

## 2021-08-09 PROCEDURE — 77080 DEXA BONE DENSITY SPINE HIP: ICD-10-PCS | Mod: 26,,, | Performed by: RADIOLOGY

## 2021-08-09 PROCEDURE — 77063 MAMMO DIGITAL SCREENING LEFT WITH TOMO: ICD-10-PCS | Mod: 26,,, | Performed by: RADIOLOGY

## 2021-08-09 PROCEDURE — 77080 DXA BONE DENSITY AXIAL: CPT | Mod: 26,,, | Performed by: RADIOLOGY

## 2021-08-09 PROCEDURE — 77067 SCR MAMMO BI INCL CAD: CPT | Mod: 26,,, | Performed by: RADIOLOGY

## 2021-11-09 ENCOUNTER — HOSPITAL ENCOUNTER (OUTPATIENT)
Dept: RADIOLOGY | Facility: HOSPITAL | Age: 76
Discharge: HOME | End: 2021-11-09
Attending: NURSE PRACTITIONER
Payer: MEDICARE

## 2021-11-09 DIAGNOSIS — J22 LOWER RESPIRATORY INFECTION: ICD-10-CM

## 2021-12-08 ENCOUNTER — HOSPITAL ENCOUNTER (OUTPATIENT)
Dept: RADIOLOGY | Facility: HOSPITAL | Age: 76
Discharge: HOME | End: 2021-12-08
Attending: INTERNAL MEDICINE
Payer: MEDICARE

## 2021-12-10 ENCOUNTER — OFFICE VISIT (OUTPATIENT)
Dept: CARDIOLOGY | Facility: CLINIC | Age: 76
End: 2021-12-10
Payer: MEDICARE

## 2021-12-10 VITALS
TEMPERATURE: 98 F | HEIGHT: 60 IN | HEART RATE: 97 BPM | SYSTOLIC BLOOD PRESSURE: 140 MMHG | DIASTOLIC BLOOD PRESSURE: 87 MMHG | WEIGHT: 116 LBS | BODY MASS INDEX: 22.78 KG/M2

## 2021-12-10 DIAGNOSIS — D64.9 ANEMIA, UNSPECIFIED TYPE: ICD-10-CM

## 2021-12-10 DIAGNOSIS — Z91.89 CARDIOVASCULAR EVENT RISK: ICD-10-CM

## 2021-12-10 DIAGNOSIS — I11.9 LVH (LEFT VENTRICULAR HYPERTROPHY) DUE TO HYPERTENSIVE DISEASE, WITHOUT HEART FAILURE: ICD-10-CM

## 2021-12-10 DIAGNOSIS — R79.89 ELEVATED BRAIN NATRIURETIC PEPTIDE (BNP) LEVEL: Primary | ICD-10-CM

## 2021-12-10 DIAGNOSIS — I10 PRIMARY HYPERTENSION: ICD-10-CM

## 2021-12-10 DIAGNOSIS — E78.5 DYSLIPIDEMIA: ICD-10-CM

## 2021-12-10 DIAGNOSIS — I35.0 AORTIC STENOSIS, MILD: ICD-10-CM

## 2021-12-10 DIAGNOSIS — F10.10 EXCESSIVE DRINKING ALCOHOL: ICD-10-CM

## 2021-12-10 DIAGNOSIS — R74.01 ELEVATED ALT MEASUREMENT: ICD-10-CM

## 2021-12-10 PROCEDURE — 99214 OFFICE O/P EST MOD 30 MIN: CPT | Mod: PBBFAC | Performed by: INTERNAL MEDICINE

## 2021-12-10 PROCEDURE — 99999 PR PBB SHADOW E&M-EST. PATIENT-LVL IV: CPT | Mod: PBBFAC,,, | Performed by: INTERNAL MEDICINE

## 2021-12-10 PROCEDURE — 99205 OFFICE O/P NEW HI 60 MIN: CPT | Mod: S$PBB,,, | Performed by: INTERNAL MEDICINE

## 2021-12-10 PROCEDURE — 99205 PR OFFICE/OUTPT VISIT, NEW, LEVL V, 60-74 MIN: ICD-10-PCS | Mod: S$PBB,,, | Performed by: INTERNAL MEDICINE

## 2021-12-10 PROCEDURE — 99999 PR PBB SHADOW E&M-EST. PATIENT-LVL IV: ICD-10-PCS | Mod: PBBFAC,,, | Performed by: INTERNAL MEDICINE

## 2021-12-22 ENCOUNTER — HOSPITAL ENCOUNTER (OUTPATIENT)
Dept: CARDIOLOGY | Facility: HOSPITAL | Age: 76
Discharge: HOME | End: 2021-12-22
Attending: INTERNAL MEDICINE
Payer: MEDICARE

## 2021-12-22 ENCOUNTER — PATIENT MESSAGE (OUTPATIENT)
Dept: CARDIOLOGY | Facility: CLINIC | Age: 76
End: 2021-12-22
Payer: MEDICARE

## 2021-12-22 VITALS — HEIGHT: 60 IN | WEIGHT: 116 LBS | BODY MASS INDEX: 22.78 KG/M2

## 2021-12-22 DIAGNOSIS — I35.0 CRITICAL AORTIC VALVE STENOSIS: Primary | ICD-10-CM

## 2021-12-22 DIAGNOSIS — R60.0 PERIPHERAL EDEMA: ICD-10-CM

## 2021-12-22 DIAGNOSIS — I50.21 ACUTE HFREF (HEART FAILURE WITH REDUCED EJECTION FRACTION): ICD-10-CM

## 2021-12-22 LAB
AORTIC ROOT ANNULUS: 2.37 CM
AORTIC VALVE CUSP SEPERATION: 1.26 CM
AV INDEX (PROSTH): 0.15
AV MEAN GRADIENT: 22 MMHG
AV PEAK GRADIENT: 38 MMHG
AV VALVE AREA: 0.36 CM2
AV VELOCITY RATIO: 0.15
BSA FOR ECHO PROCEDURE: 1.49 M2
CV ECHO LV RWT: 0.61 CM
DOP CALC AO PEAK VEL: 3.07 M/S
DOP CALC AO VTI: 61.42 CM
DOP CALC LVOT AREA: 2.5 CM2
DOP CALC LVOT DIAMETER: 1.77 CM
DOP CALC LVOT PEAK VEL: 0.46 M/S
DOP CALC LVOT STROKE VOLUME: 21.99 CM3
DOP CALC MV VTI: 19.22 CM
DOP CALCLVOT PEAK VEL VTI: 8.94 CM
E WAVE DECELERATION TIME: 97.76 MSEC
E/A RATIO: 3.36
E/E' RATIO: 22 M/S
ECHO LV POSTERIOR WALL: 1.32 CM (ref 0.6–1.1)
EJECTION FRACTION: 21 %
FRACTIONAL SHORTENING: 9 % (ref 28–44)
INTERVENTRICULAR SEPTUM: 1.3 CM (ref 0.6–1.1)
IVRT: 64.7 MSEC
LA MAJOR: 4.46 CM
LA MINOR: 3.99 CM
LEFT ATRIUM SIZE: 4.12 CM
LEFT INTERNAL DIMENSION IN SYSTOLE: 3.92 CM (ref 2.1–4)
LEFT VENTRICLE DIASTOLIC VOLUME INDEX: 56.59 ML/M2
LEFT VENTRICLE DIASTOLIC VOLUME: 83.76 ML
LEFT VENTRICLE MASS INDEX: 143 G/M2
LEFT VENTRICLE SYSTOLIC VOLUME INDEX: 45 ML/M2
LEFT VENTRICLE SYSTOLIC VOLUME: 66.58 ML
LEFT VENTRICULAR INTERNAL DIMENSION IN DIASTOLE: 4.32 CM (ref 3.5–6)
LEFT VENTRICULAR MASS: 211.62 G
LV LATERAL E/E' RATIO: 20.17 M/S
LV SEPTAL E/E' RATIO: 24.2 M/S
MV MEAN GRADIENT: 1 MMHG
MV PEAK A VEL: 0.36 M/S
MV PEAK E VEL: 1.21 M/S
MV PEAK GRADIENT: 7 MMHG
MV STENOSIS PRESSURE HALF TIME: 51.35 MS
MV VALVE AREA BY CONTINUITY EQUATION: 1.14 CM2
MV VALVE AREA P 1/2 METHOD: 4.28 CM2
PISA MRMAX VEL: 0.03 M/S
PISA TR MAX VEL: 3 M/S
PV MEAN GRADIENT: 1 MMHG
PV PEAK VELOCITY: 0.81 CM/S
RA MAJOR: 4.11 CM
RA PRESSURE: 3 MMHG
RA WIDTH: 4.1 CM
RIGHT VENTRICULAR END-DIASTOLIC DIMENSION: 2.82 CM
TDI LATERAL: 0.06 M/S
TDI SEPTAL: 0.05 M/S
TDI: 0.06 M/S
TR MAX PG: 36 MMHG
TRICUSPID ANNULAR PLANE SYSTOLIC EXCURSION: 1.15 CM
TV REST PULMONARY ARTERY PRESSURE: 39 MMHG

## 2021-12-22 PROCEDURE — 93356 ECHO (CUPID ONLY): ICD-10-PCS | Mod: ,,, | Performed by: INTERNAL MEDICINE

## 2021-12-22 PROCEDURE — 93306 TTE W/DOPPLER COMPLETE: CPT

## 2021-12-22 PROCEDURE — 93356 MYOCRD STRAIN IMG SPCKL TRCK: CPT | Mod: ,,, | Performed by: INTERNAL MEDICINE

## 2021-12-22 PROCEDURE — 93306 TTE W/DOPPLER COMPLETE: CPT | Mod: 26,,, | Performed by: INTERNAL MEDICINE

## 2021-12-22 PROCEDURE — 93306 ECHO (CUPID ONLY): ICD-10-PCS | Mod: 26,,, | Performed by: INTERNAL MEDICINE

## 2021-12-22 RX ORDER — FUROSEMIDE 20 MG/1
20 TABLET ORAL DAILY PRN
Qty: 30 TABLET | Refills: 2 | Status: SHIPPED | OUTPATIENT
Start: 2021-12-22 | End: 2022-02-17

## 2021-12-23 DIAGNOSIS — I35.0 AORTIC VALVE STENOSIS, ETIOLOGY OF CARDIAC VALVE DISEASE UNSPECIFIED: Primary | ICD-10-CM

## 2021-12-27 ENCOUNTER — OFFICE VISIT (OUTPATIENT)
Dept: CARDIOLOGY | Facility: CLINIC | Age: 76
End: 2021-12-27
Payer: MEDICARE

## 2021-12-27 VITALS
HEART RATE: 92 BPM | OXYGEN SATURATION: 98 % | HEIGHT: 60 IN | BODY MASS INDEX: 23.03 KG/M2 | DIASTOLIC BLOOD PRESSURE: 79 MMHG | WEIGHT: 117.31 LBS | SYSTOLIC BLOOD PRESSURE: 141 MMHG

## 2021-12-27 DIAGNOSIS — Z91.89 CARDIOVASCULAR EVENT RISK: ICD-10-CM

## 2021-12-27 DIAGNOSIS — D64.9 ANEMIA, UNSPECIFIED TYPE: ICD-10-CM

## 2021-12-27 DIAGNOSIS — C50.919 MALIGNANT NEOPLASM OF FEMALE BREAST, UNSPECIFIED ESTROGEN RECEPTOR STATUS, UNSPECIFIED LATERALITY, UNSPECIFIED SITE OF BREAST: ICD-10-CM

## 2021-12-27 DIAGNOSIS — I35.0 NONRHEUMATIC AORTIC (VALVE) STENOSIS: ICD-10-CM

## 2021-12-27 DIAGNOSIS — E78.5 DYSLIPIDEMIA: ICD-10-CM

## 2021-12-27 DIAGNOSIS — Z88.6 HISTORY OF ALLERGY TO ASPIRIN: ICD-10-CM

## 2021-12-27 DIAGNOSIS — I50.21 ACUTE HFREF (HEART FAILURE WITH REDUCED EJECTION FRACTION): ICD-10-CM

## 2021-12-27 DIAGNOSIS — E78.5 HYPERLIPIDEMIA, UNSPECIFIED HYPERLIPIDEMIA TYPE: ICD-10-CM

## 2021-12-27 DIAGNOSIS — I35.0 AORTIC VALVE STENOSIS, ETIOLOGY OF CARDIAC VALVE DISEASE UNSPECIFIED: Primary | ICD-10-CM

## 2021-12-27 DIAGNOSIS — I35.0 AORTIC STENOSIS, MILD: Primary | ICD-10-CM

## 2021-12-27 DIAGNOSIS — I35.0 CRITICAL AORTIC VALVE STENOSIS: ICD-10-CM

## 2021-12-27 DIAGNOSIS — I10 PRIMARY HYPERTENSION: ICD-10-CM

## 2021-12-27 PROCEDURE — 99214 OFFICE O/P EST MOD 30 MIN: CPT | Mod: PBBFAC | Performed by: INTERNAL MEDICINE

## 2021-12-27 PROCEDURE — 99999 PR PBB SHADOW E&M-EST. PATIENT-LVL IV: CPT | Mod: PBBFAC,,, | Performed by: INTERNAL MEDICINE

## 2021-12-27 PROCEDURE — 99204 OFFICE O/P NEW MOD 45 MIN: CPT | Mod: S$PBB,,, | Performed by: INTERNAL MEDICINE

## 2021-12-27 PROCEDURE — 99204 PR OFFICE/OUTPT VISIT, NEW, LEVL IV, 45-59 MIN: ICD-10-PCS | Mod: S$PBB,,, | Performed by: INTERNAL MEDICINE

## 2021-12-27 PROCEDURE — 99999 PR PBB SHADOW E&M-EST. PATIENT-LVL IV: ICD-10-PCS | Mod: PBBFAC,,, | Performed by: INTERNAL MEDICINE

## 2021-12-27 RX ORDER — SODIUM CHLORIDE 9 MG/ML
INJECTION, SOLUTION INTRAVENOUS CONTINUOUS
Status: CANCELLED | OUTPATIENT
Start: 2021-12-27 | End: 2021-12-27

## 2021-12-27 RX ORDER — DIPHENHYDRAMINE HCL 50 MG
50 CAPSULE ORAL ONCE
Status: CANCELLED | OUTPATIENT
Start: 2021-12-27 | End: 2021-12-27

## 2021-12-28 ENCOUNTER — PATIENT MESSAGE (OUTPATIENT)
Dept: CARDIOLOGY | Facility: CLINIC | Age: 76
End: 2021-12-28
Payer: MEDICARE

## 2021-12-28 ENCOUNTER — TELEPHONE (OUTPATIENT)
Dept: CARDIOLOGY | Facility: CLINIC | Age: 76
End: 2021-12-28
Payer: MEDICARE

## 2021-12-28 ENCOUNTER — HOSPITAL ENCOUNTER (OUTPATIENT)
Dept: CARDIOLOGY | Facility: HOSPITAL | Age: 76
Discharge: HOME | End: 2021-12-28
Attending: INTERNAL MEDICINE
Payer: MEDICARE

## 2021-12-28 VITALS
SYSTOLIC BLOOD PRESSURE: 140 MMHG | RESPIRATION RATE: 16 BRPM | HEIGHT: 60 IN | WEIGHT: 117 LBS | BODY MASS INDEX: 22.97 KG/M2 | HEART RATE: 94 BPM | DIASTOLIC BLOOD PRESSURE: 82 MMHG

## 2021-12-28 DIAGNOSIS — I35.0 AORTIC STENOSIS, MILD: ICD-10-CM

## 2021-12-28 LAB
ASCENDING AORTA: 3.02 CM
AV INDEX (PROSTH): 0.22
AV MEAN GRADIENT: 37 MMHG
AV PEAK GRADIENT: 55 MMHG
AV VALVE AREA: 0.56 CM2
AV VELOCITY RATIO: 0.22
BSA FOR ECHO PROCEDURE: 1.5 M2
CV ECHO LV RWT: 0.31 CM
CV STRESS BASE HR: 94 BPM
DIASTOLIC BLOOD PRESSURE: 91 MMHG
DOP CALC AO PEAK VEL: 3.7 M/S
DOP CALC AO VTI: 81 CM
DOP CALC LVOT AREA: 2.5 CM2
DOP CALC LVOT DIAMETER: 1.8 CM
DOP CALC LVOT PEAK VEL: 0.83 M/S
DOP CALC LVOT STROKE VOLUME: 45.07 CM3
DOP CALCLVOT PEAK VEL VTI: 17.72 CM
E WAVE DECELERATION TIME: 125.12 MSEC
E/A RATIO: 2.46
ECHO LV POSTERIOR WALL: 0.81 CM (ref 0.6–1.1)
EJECTION FRACTION: 25 %
FRACTIONAL SHORTENING: 18 % (ref 28–44)
INTERVENTRICULAR SEPTUM: 0.66 CM (ref 0.6–1.1)
LEFT ATRIUM SIZE: 4.1 CM
LEFT INTERNAL DIMENSION IN SYSTOLE: 4.23 CM (ref 2.1–4)
LEFT VENTRICLE DIASTOLIC VOLUME INDEX: 86.09 ML/M2
LEFT VENTRICLE DIASTOLIC VOLUME: 128.27 ML
LEFT VENTRICLE MASS INDEX: 87 G/M2
LEFT VENTRICLE SYSTOLIC VOLUME INDEX: 53.7 ML/M2
LEFT VENTRICLE SYSTOLIC VOLUME: 80.06 ML
LEFT VENTRICULAR INTERNAL DIMENSION IN DIASTOLE: 5.18 CM (ref 3.5–6)
LEFT VENTRICULAR MASS: 129.63 G
MV PEAK A VEL: 0.48 M/S
MV PEAK E VEL: 1.18 M/S
MV STENOSIS PRESSURE HALF TIME: 36.29 MS
MV VALVE AREA P 1/2 METHOD: 6.06 CM2
OHS CV CPX 1 MINUTE RECOVERY HEART RATE: 93 BPM
OHS CV CPX 85 PERCENT MAX PREDICTED HEART RATE MALE: 120
OHS CV CPX MAX PREDICTED HEART RATE: 141
OHS CV CPX PATIENT IS FEMALE: 1
OHS CV CPX PATIENT IS MALE: 0
OHS CV CPX PEAK DIASTOLIC BLOOD PRESSURE: 92 MMHG
OHS CV CPX PEAK HEAR RATE: 94 BPM
OHS CV CPX PEAK RATE PRESSURE PRODUCT: ABNORMAL
OHS CV CPX PEAK SYSTOLIC BLOOD PRESSURE: 147 MMHG
OHS CV CPX PERCENT MAX PREDICTED HEART RATE ACHIEVED: 67
OHS CV CPX RATE PRESSURE PRODUCT PRESENTING: ABNORMAL
PISA TR MAX VEL: 3.72 M/S
RA PRESSURE: 3 MMHG
RIGHT VENTRICULAR END-DIASTOLIC DIMENSION: 4 CM
SINUS: 2.52 CM
STJ: 2.85 CM
SYSTOLIC BLOOD PRESSURE: 138 MMHG
TR MAX PG: 55 MMHG
TV REST PULMONARY ARTERY PRESSURE: 58 MMHG

## 2021-12-28 PROCEDURE — 63600175 PHARM REV CODE 636 W HCPCS: Performed by: INTERNAL MEDICINE

## 2021-12-28 PROCEDURE — 93351 STRESS ECHO (CUPID ONLY): ICD-10-PCS | Mod: 26,,, | Performed by: INTERNAL MEDICINE

## 2021-12-28 PROCEDURE — 93351 STRESS TTE COMPLETE: CPT

## 2021-12-28 PROCEDURE — 93351 STRESS TTE COMPLETE: CPT | Mod: 26,,, | Performed by: INTERNAL MEDICINE

## 2021-12-28 RX ORDER — DOBUTAMINE HYDROCHLORIDE 400 MG/100ML
10 INJECTION INTRAVENOUS
Status: COMPLETED | OUTPATIENT
Start: 2021-12-28 | End: 2021-12-28

## 2021-12-28 RX ADMIN — DOBUTAMINE HYDROCHLORIDE 5 MCG/KG/MIN: 400 INJECTION INTRAVENOUS at 04:12

## 2021-12-29 ENCOUNTER — PATIENT MESSAGE (OUTPATIENT)
Dept: CARDIOLOGY | Facility: CLINIC | Age: 76
End: 2021-12-29
Payer: MEDICARE

## 2021-12-29 ENCOUNTER — DOCUMENTATION ONLY (OUTPATIENT)
Dept: CARDIAC CATH/INVASIVE PROCEDURES | Facility: HOSPITAL | Age: 76
End: 2021-12-29
Payer: MEDICARE

## 2021-12-30 ENCOUNTER — PATIENT MESSAGE (OUTPATIENT)
Dept: CARDIOLOGY | Facility: CLINIC | Age: 76
End: 2021-12-30
Payer: MEDICARE

## 2022-01-03 ENCOUNTER — EDUCATION (OUTPATIENT)
Dept: CARDIOLOGY | Facility: CLINIC | Age: 77
End: 2022-01-03
Payer: MEDICARE

## 2022-01-03 ENCOUNTER — PATIENT MESSAGE (OUTPATIENT)
Dept: CARDIOLOGY | Facility: CLINIC | Age: 77
End: 2022-01-03
Payer: MEDICARE

## 2022-01-03 DIAGNOSIS — I25.10 CAD (CORONARY ARTERY DISEASE): ICD-10-CM

## 2022-01-03 RX ORDER — CLOPIDOGREL BISULFATE 75 MG/1
TABLET ORAL
Qty: 30 TABLET | Refills: 11 | Status: SHIPPED | OUTPATIENT
Start: 2022-01-03 | End: 2022-01-03 | Stop reason: SDUPTHER

## 2022-01-03 RX ORDER — CLOPIDOGREL BISULFATE 75 MG/1
TABLET ORAL
Qty: 30 TABLET | Refills: 11 | Status: ON HOLD | OUTPATIENT
Start: 2022-01-03 | End: 2022-01-07 | Stop reason: SDUPTHER

## 2022-01-03 NOTE — PROGRESS NOTES
OUTPATIENT CATHETERIZATION INSTRUCTIONS    You have been scheduled for a procedure in the catheterization lab on Friday, January 7, 2022.     Please report to the Cardiology Waiting Area on the Third floor of the hospital and check in at 9 AM.   You will then be taken to the SSCU (Short Stay Cardiac Unit) and prepared for your procedure. Please be aware that this is not the time of your procedure but the time you are to arrive. The procedures are scheduled on an hourly basis; however, emergency cases take precedence over all other cases.       You may not have anything to eat or drink after midnight the night before your test. You may take your regular morning medications with water. If there are any medications that you should not take you will be instructed to hold them that morning. If you are diabetic and on Metformin (Glucophage) do not take it the day before, the day of, and for 2 days after your procedure.      The procedure will take 1-2 hours to perform. After the procedure, you will return to SSCU on the third floor of the hospital. You will need to lie still (or keep your arm still) for the next 4 to 6 hours to minimize bleeding from the puncture site. Your family may remain in the room with you during this time.       You may be able to be discharged home that same afternoon if there is someone to drive you home and there were no complications. If you have one of the balloon, stent, or device procedures you may spend the night in the hospital. Your doctor will determine, based on your progress, the date and time of your discharge. The results of your procedure will be discussed with you before you are discharged. Any further testing or procedures will be scheduled for you either before you leave or you will be called with these appointments.       If you should have any questions, concerns, or need to change the date of your procedure, please call  JESSIE Golden @ [phone]    Special  Instructions:  Take 4 plavix tablets the day before then one daily  Aspirin 81 mg daily        THE ABOVE INSTRUCTIONS WERE GIVEN TO THE PATIENT VERBALLY AND THEY VERBALIZED UNDERSTANDING.  THEY DO NOT REQUIRE ANY SPECIAL NEEDS AND DO NOT HAVE ANY LEARNING BARRIERS.          Directions for Reporting to Cardiology Waiting Area in the Hospital  If you park in the Parking Garage:  Take elevators to the1st floor of the parking garage.  Continue past the gift shop, coffee shop, and piano.  Take a right and go to the gold elevators. (Elevator B)  Take the elevator to the 3rd floor.  Follow the arrow on the sign on the wall that says Cath Lab Registration/EP Lab Registration.  Follow the long hallway all the way around until you come to a big open area.  This is the registration area.  Check in at Reception Desk.    OR    If family is dropping you off:  Have them drop you off at the front of the Hospital under the green overhang.  Enter through the doors and take a right.  Take the E elevators to the 3rd floor Cardiology Waiting Area.  Check in at the Reception Desk in the waiting room.

## 2022-01-07 ENCOUNTER — HOSPITAL ENCOUNTER (OUTPATIENT)
Facility: HOSPITAL | Age: 77
Discharge: HOME | End: 2022-01-07
Attending: INTERNAL MEDICINE | Admitting: INTERNAL MEDICINE
Payer: MEDICARE

## 2022-01-07 VITALS
SYSTOLIC BLOOD PRESSURE: 139 MMHG | TEMPERATURE: 98 F | OXYGEN SATURATION: 95 % | DIASTOLIC BLOOD PRESSURE: 66 MMHG | HEIGHT: 60 IN | BODY MASS INDEX: 22.58 KG/M2 | RESPIRATION RATE: 16 BRPM | HEART RATE: 95 BPM | WEIGHT: 115 LBS

## 2022-01-07 DIAGNOSIS — R06.02 SOB (SHORTNESS OF BREATH): ICD-10-CM

## 2022-01-07 DIAGNOSIS — I35.0 AORTIC VALVE STENOSIS, ETIOLOGY OF CARDIAC VALVE DISEASE UNSPECIFIED: ICD-10-CM

## 2022-01-07 DIAGNOSIS — I25.10 CAD (CORONARY ARTERY DISEASE): ICD-10-CM

## 2022-01-07 LAB
ABO + RH BLD: NORMAL
ANION GAP SERPL CALC-SCNC: 11 MMOL/L (ref 8–16)
BASOPHILS # BLD AUTO: 0.05 K/UL (ref 0–0.2)
BASOPHILS NFR BLD: 0.6 % (ref 0–1.9)
BLD GP AB SCN CELLS X3 SERPL QL: NORMAL
BLD GP AB SCN CELLS X3 SERPL QL: NORMAL
BLOOD GROUP ANTIBODIES SERPL: NORMAL
BUN SERPL-MCNC: 11 MG/DL (ref 8–23)
CALCIUM SERPL-MCNC: 9.7 MG/DL (ref 8.7–10.5)
CHLORIDE SERPL-SCNC: 102 MMOL/L (ref 95–110)
CO2 SERPL-SCNC: 26 MMOL/L (ref 23–29)
CREAT SERPL-MCNC: 0.7 MG/DL (ref 0.5–1.4)
CTP QC/QA: YES
DAT IGG-SP REAG RBC-IMP: NORMAL
DIFFERENTIAL METHOD: ABNORMAL
EOSINOPHIL # BLD AUTO: 0.2 K/UL (ref 0–0.5)
EOSINOPHIL NFR BLD: 2.3 % (ref 0–8)
ERYTHROCYTE [DISTWIDTH] IN BLOOD BY AUTOMATED COUNT: 14.8 % (ref 11.5–14.5)
EST. GFR  (AFRICAN AMERICAN): >60 ML/MIN/1.73 M^2
EST. GFR  (NON AFRICAN AMERICAN): >60 ML/MIN/1.73 M^2
GLUCOSE SERPL-MCNC: 98 MG/DL (ref 70–110)
HCT VFR BLD AUTO: 38.7 % (ref 37–48.5)
HGB BLD-MCNC: 12.8 G/DL (ref 12–16)
IMM GRANULOCYTES # BLD AUTO: 0.04 K/UL (ref 0–0.04)
IMM GRANULOCYTES NFR BLD AUTO: 0.5 % (ref 0–0.5)
LYMPHOCYTES # BLD AUTO: 1.8 K/UL (ref 1–4.8)
LYMPHOCYTES NFR BLD: 20.8 % (ref 18–48)
MCH RBC QN AUTO: 29.7 PG (ref 27–31)
MCHC RBC AUTO-ENTMCNC: 33.1 G/DL (ref 32–36)
MCV RBC AUTO: 90 FL (ref 82–98)
MONOCYTES # BLD AUTO: 0.6 K/UL (ref 0.3–1)
MONOCYTES NFR BLD: 7.1 % (ref 4–15)
NEUTROPHILS # BLD AUTO: 6.1 K/UL (ref 1.8–7.7)
NEUTROPHILS NFR BLD: 68.7 % (ref 38–73)
NRBC BLD-RTO: 0 /100 WBC
PLATELET # BLD AUTO: 291 K/UL (ref 150–450)
PLATELET RESPONSE PLAVIX: 165 PRU (ref 194–418)
PMV BLD AUTO: 9.9 FL (ref 9.2–12.9)
POTASSIUM SERPL-SCNC: 3.3 MMOL/L (ref 3.5–5.1)
RBC # BLD AUTO: 4.31 M/UL (ref 4–5.4)
SARS-COV-2 AG RESP QL IA.RAPID: NEGATIVE
SODIUM SERPL-SCNC: 139 MMOL/L (ref 136–145)
WBC # BLD AUTO: 8.84 K/UL (ref 3.9–12.7)

## 2022-01-07 PROCEDURE — 92928 PRQ TCAT PLMT NTRAC ST 1 LES: CPT | Mod: LD,GC,, | Performed by: INTERNAL MEDICINE

## 2022-01-07 PROCEDURE — 85025 COMPLETE CBC W/AUTO DIFF WBC: CPT | Performed by: PHYSICIAN ASSISTANT

## 2022-01-07 PROCEDURE — 99153 MOD SED SAME PHYS/QHP EA: CPT | Performed by: INTERNAL MEDICINE

## 2022-01-07 PROCEDURE — C9600 PERC DRUG-EL COR STENT SING: HCPCS | Mod: LD,GC | Performed by: INTERNAL MEDICINE

## 2022-01-07 PROCEDURE — C1874 STENT, COATED/COV W/DEL SYS: HCPCS | Performed by: INTERNAL MEDICINE

## 2022-01-07 PROCEDURE — 25500020 PHARM REV CODE 255: Performed by: INTERNAL MEDICINE

## 2022-01-07 PROCEDURE — C1760 CLOSURE DEV, VASC: HCPCS | Performed by: INTERNAL MEDICINE

## 2022-01-07 PROCEDURE — C1894 INTRO/SHEATH, NON-LASER: HCPCS | Performed by: INTERNAL MEDICINE

## 2022-01-07 PROCEDURE — 85576 BLOOD PLATELET AGGREGATION: CPT | Performed by: INTERNAL MEDICINE

## 2022-01-07 PROCEDURE — 93454 PR CATH PLACE/CORONARY ANGIO, IMG SUPER/INTERP: ICD-10-PCS | Mod: 26,59,51,GC | Performed by: INTERNAL MEDICINE

## 2022-01-07 PROCEDURE — C1725 CATH, TRANSLUMIN NON-LASER: HCPCS | Performed by: INTERNAL MEDICINE

## 2022-01-07 PROCEDURE — 27201423 OPTIME MED/SURG SUP & DEVICES STERILE SUPPLY: Performed by: INTERNAL MEDICINE

## 2022-01-07 PROCEDURE — 93454 CORONARY ARTERY ANGIO S&I: CPT | Mod: 59,GC | Performed by: INTERNAL MEDICINE

## 2022-01-07 PROCEDURE — 93010 EKG 12-LEAD: ICD-10-PCS | Mod: ,,, | Performed by: INTERNAL MEDICINE

## 2022-01-07 PROCEDURE — 93005 ELECTROCARDIOGRAM TRACING: CPT

## 2022-01-07 PROCEDURE — 93010 ELECTROCARDIOGRAM REPORT: CPT | Mod: ,,, | Performed by: INTERNAL MEDICINE

## 2022-01-07 PROCEDURE — 86850 RBC ANTIBODY SCREEN: CPT | Performed by: PHYSICIAN ASSISTANT

## 2022-01-07 PROCEDURE — 25000003 PHARM REV CODE 250: Performed by: PHYSICIAN ASSISTANT

## 2022-01-07 PROCEDURE — C1769 GUIDE WIRE: HCPCS | Performed by: INTERNAL MEDICINE

## 2022-01-07 PROCEDURE — 92928 PR STENT: ICD-10-PCS | Mod: LD,GC,, | Performed by: INTERNAL MEDICINE

## 2022-01-07 PROCEDURE — 93010 ELECTROCARDIOGRAM REPORT: CPT | Mod: 76,,, | Performed by: INTERNAL MEDICINE

## 2022-01-07 PROCEDURE — C1887 CATHETER, GUIDING: HCPCS | Performed by: INTERNAL MEDICINE

## 2022-01-07 PROCEDURE — 93005 ELECTROCARDIOGRAM TRACING: CPT | Mod: 59

## 2022-01-07 PROCEDURE — 25000003 PHARM REV CODE 250: Performed by: INTERNAL MEDICINE

## 2022-01-07 PROCEDURE — 36415 COLL VENOUS BLD VENIPUNCTURE: CPT | Performed by: INTERNAL MEDICINE

## 2022-01-07 PROCEDURE — 63600175 PHARM REV CODE 636 W HCPCS: Performed by: INTERNAL MEDICINE

## 2022-01-07 PROCEDURE — 93454 CORONARY ARTERY ANGIO S&I: CPT | Mod: 26,59,51,GC | Performed by: INTERNAL MEDICINE

## 2022-01-07 PROCEDURE — 80048 BASIC METABOLIC PNL TOTAL CA: CPT | Performed by: PHYSICIAN ASSISTANT

## 2022-01-07 PROCEDURE — 86860 RBC ANTIBODY ELUTION: CPT | Performed by: PHYSICIAN ASSISTANT

## 2022-01-07 PROCEDURE — 99152 MOD SED SAME PHYS/QHP 5/>YRS: CPT | Performed by: INTERNAL MEDICINE

## 2022-01-07 PROCEDURE — 86870 RBC ANTIBODY IDENTIFICATION: CPT | Performed by: PHYSICIAN ASSISTANT

## 2022-01-07 PROCEDURE — 86901 BLOOD TYPING SEROLOGIC RH(D): CPT | Performed by: PHYSICIAN ASSISTANT

## 2022-01-07 PROCEDURE — 86880 COOMBS TEST DIRECT: CPT | Performed by: PHYSICIAN ASSISTANT

## 2022-01-07 DEVICE — STENT RONYX30015UX RESOLUTE ONYX 3.00X15
Type: IMPLANTABLE DEVICE | Site: CORONARY | Status: FUNCTIONAL
Brand: RESOLUTE ONYX™

## 2022-01-07 RX ORDER — ONDANSETRON 8 MG/1
8 TABLET, ORALLY DISINTEGRATING ORAL EVERY 8 HOURS PRN
Status: DISCONTINUED | OUTPATIENT
Start: 2022-01-07 | End: 2022-01-07 | Stop reason: HOSPADM

## 2022-01-07 RX ORDER — HEPARIN SODIUM 1000 [USP'U]/ML
INJECTION, SOLUTION INTRAVENOUS; SUBCUTANEOUS
Status: DISCONTINUED | OUTPATIENT
Start: 2022-01-07 | End: 2022-01-07 | Stop reason: HOSPADM

## 2022-01-07 RX ORDER — SODIUM CHLORIDE 9 MG/ML
INJECTION, SOLUTION INTRAVENOUS CONTINUOUS
Status: ACTIVE | OUTPATIENT
Start: 2022-01-07 | End: 2022-01-07

## 2022-01-07 RX ORDER — ASPIRIN 81 MG/1
81 TABLET ORAL DAILY
COMMUNITY

## 2022-01-07 RX ORDER — POTASSIUM CHLORIDE 20 MEQ/1
40 TABLET, EXTENDED RELEASE ORAL ONCE
Status: DISCONTINUED | OUTPATIENT
Start: 2022-01-07 | End: 2022-01-07 | Stop reason: HOSPADM

## 2022-01-07 RX ORDER — CLOPIDOGREL BISULFATE 75 MG/1
75 TABLET ORAL DAILY
Qty: 30 TABLET | Refills: 11 | Status: SHIPPED | OUTPATIENT
Start: 2022-01-07 | End: 2023-01-23

## 2022-01-07 RX ORDER — ACETAMINOPHEN 325 MG/1
650 TABLET ORAL EVERY 4 HOURS PRN
Status: DISCONTINUED | OUTPATIENT
Start: 2022-01-07 | End: 2022-01-07 | Stop reason: HOSPADM

## 2022-01-07 RX ORDER — MIDAZOLAM HYDROCHLORIDE 2 MG/2ML
INJECTION, SOLUTION INTRAMUSCULAR; INTRAVENOUS
Status: DISCONTINUED | OUTPATIENT
Start: 2022-01-07 | End: 2022-01-07 | Stop reason: HOSPADM

## 2022-01-07 RX ORDER — DIPHENHYDRAMINE HCL 50 MG
50 CAPSULE ORAL ONCE
Status: COMPLETED | OUTPATIENT
Start: 2022-01-07 | End: 2022-01-07

## 2022-01-07 RX ORDER — CEFAZOLIN SODIUM 1 G/3ML
INJECTION, POWDER, FOR SOLUTION INTRAMUSCULAR; INTRAVENOUS
Status: DISCONTINUED | OUTPATIENT
Start: 2022-01-07 | End: 2022-01-07 | Stop reason: HOSPADM

## 2022-01-07 RX ADMIN — SODIUM CHLORIDE: 0.9 INJECTION, SOLUTION INTRAVENOUS at 09:01

## 2022-01-07 RX ADMIN — DIPHENHYDRAMINE HYDROCHLORIDE 50 MG: 50 CAPSULE ORAL at 09:01

## 2022-01-07 RX ADMIN — IOHEXOL 100 ML: 350 INJECTION, SOLUTION INTRAVENOUS at 04:01

## 2022-01-07 NOTE — BRIEF OP NOTE
Brief Operative Note:    : Dontae Johns MD     Referring Physician: Rick Mccoy     All Operators: Surgeon(s):  MD Musa Pham MD Aashish Gupta, MD     Preoperative Diagnosis: Aortic valve stenosis, etiology of cardiac valve disease unspecified [I35.0]     Postop Diagnosis: Aortic valve stenosis, etiology of cardiac valve disease unspecified [I35.0]    Treatments/Procedures: Procedure(s) (LRB):  Left heart cath (Left)  Stent, Drug Eluting, Single Vessel, Coronary    Access: Right CFA    Findings: High Grade Stenosis of Proximal LAD     See catheterization report for full details.    Intervention: LAWRENCE placed to proximal LAD     See catheterization report for full details.    Closure device: Perclose       Plan:  - Post cath protocol   - IVF @ 75 cc/kg/hr x 4 hours  - Bed rest x 2 hours   - Continue aspirin 81 mg daily indefinitely  - Continue Plavix  for minimum 6 months, ideally up to 1 year  - Continue high intensity statin therapy (LDL goal < 70)  - Risk factor reduction (BP <130/80 mmHg, glycemic control, etc)  - Cardiac rehab referral  - Follow up with outpatient cardiologist    Estimated Blood loss: 20 cc    Specimens removed: No    Musa Renae MD  Interventional Cardiology PGY-4

## 2022-01-07 NOTE — Clinical Note
54 ml of contrast were injected throughout the case. 146 mL of contrast was the total wasted during the case. 200 mL was the total amount used during the case.

## 2022-01-07 NOTE — Clinical Note
The catheter was inserted into the ostium   left main. An angiography was performed of the left coronary arteries. Multiple views were taken.

## 2022-01-07 NOTE — Clinical Note
The catheter was repositioned into the ostium   left main. An angiography was performed of the left coronary arteries. Multiple views were taken.

## 2022-01-07 NOTE — SUBJECTIVE & OBJECTIVE
Past Medical History:   Diagnosis Date    Basal cell carcinoma     Breast cancer     Breast cancer     Cancer     GERD (gastroesophageal reflux disease)     Hyperlipidemia     Hypertension     Osteoporosis 2019    Squamous cell carcinoma of skin 2018    Thyroid disease        Past Surgical History:   Procedure Laterality Date    BREAST SURGERY       SECTION, CLASSIC      COLONOSCOPY N/A 2018    Procedure: COLONOSCOPY;  Surgeon: Precious Castorena MD;  Location: [facility];  Service: Endoscopy;  Laterality: N/A;    HYSTERECTOMY      MASTECTOMY Right 2000    right breast      TONSILLECTOMY, ADENOIDECTOMY         Review of patient's allergies indicates:  No Known Allergies    PTA Medications   Medication Sig    aspirin (ECOTRIN) 81 MG EC tablet Take 81 mg by mouth once daily.    clopidogreL (PLAVIX) 75 mg tablet Take 4 tablets the night before the procedure and one tablet on day of procedure(coronary angiogram).    fluticasone propionate (FLONASE) 50 mcg/actuation nasal spray 1 spray by Each Nostril route once daily.    furosemide (LASIX) 20 MG tablet Take 1 tablet (20 mg total) by mouth daily as needed (For fluid retention).    levothyroxine (SYNTHROID) 75 MCG tablet TAKE 1 TABLET BY MOUTH EVERY DAY    simvastatin (ZOCOR) 20 MG tablet TAKE ONE (1) TABLET BY MOUTH EVERY EVENING    doxycycline (VIBRAMYCIN) 100 MG Cap Take 1 capsule (100 mg total) by mouth 2 (two) times daily with meals.    ibandronate (BONIVA) 150 mg tablet Take 1 tablet (150 mg total) by mouth every 30 days.     Family History     Problem Relation (Age of Onset)    Cancer Sister, Brother    Liver cancer Sister    Melanoma Sister        Tobacco Use    Smoking status: Former Smoker     Packs/day: 1.00     Types: Cigarettes    Smokeless tobacco: Never Used    Tobacco comment: quit 20 years ago   Substance and Sexual Activity    Alcohol use: Yes     Alcohol/week: 2.0 standard drinks     Types: 2 Shots  of liquor per week     Comment: per pt 2 burbon drinks per night however none in last month    Drug use: No    Sexual activity: Not Currently     Review of Systems   All other systems reviewed and are negative.    Objective:     Vital Signs (Most Recent):  Temp: 98.1 °F (36.7 °C) (01/07/22 0938)  Pulse: 86 (01/07/22 0938)  Resp: 16 (01/07/22 0938)  BP: 130/64 (01/07/22 0938)  SpO2: 97 % (01/07/22 0938) Vital Signs (24h Range):  Temp:  [98.1 °F (36.7 °C)] 98.1 °F (36.7 °C)  Pulse:  [86] 86  Resp:  [16] 16  SpO2:  [97 %] 97 %  BP: (130)/(64) 130/64     Weight: 52.2 kg (115 lb)  Body mass index is 22.46 kg/m².    SpO2: 97 %       No intake or output data in the 24 hours ending 01/07/22 0954    Lines/Drains/Airways     Peripheral Intravenous Line                 Peripheral IV - Single Lumen 01/07/22 0947 18 G Left Antecubital <1 day                Physical Exam  General: alert, awake and oriented x 3  Eyes:PERRL.   Neck:no JVD   Lungs:  clear to auscultation bilaterally   Cardiovascular: Heart: regular rate and rhythm, +EDITH   rub or gallop.   Chest Wall: no tenderness.   Pulses-2+ radial, femoral, DP, PT b/l  Extremities: no cyanosis or edema.   Abdomen/Rectal: Abdomen: soft, non-tender non-distented; bowel sounds normal  Neurologic: Normal strength and tone. No focal numbness or weakness  Significant Labs: BMP: No results for input(s): GLU, NA, K, CL, CO2, BUN, CREATININE, CALCIUM, MG in the last 48 hours. and CBC No results for input(s): WBC, HGB, HCT, PLT in the last 48 hours.    Significant Imaging: Echocardiogram: 2D echo with color flow doppler: No results found for this or any previous visit.

## 2022-01-07 NOTE — PLAN OF CARE
patient completed CT scan. Ate dinner  and voided without any problems. rihgt groin with dressing intact. No bleeding, no hematoma. IV heplock removed. AVS printed. Home care instructions reviewed with patient. All questions answered. Stent card given to patient. Plavix to be continued daily. patient discharged with wheelchair. Son at bedside.

## 2022-01-07 NOTE — PLAN OF CARE
This patient likely has severe AS with depressed EF, however, dobutamine did not qualify for TAVR.  Would like to do Partner CTA and Aortic Valve Calcium Score by CT today after coronary angiography and possible intervention.    Will also need PRU.

## 2022-01-07 NOTE — H&P
[facility] - Short Stay Cardiac Unit  Interventional Cardiology  H&P    Patient Name: Vivien Burton  MRN: [id number]  Admission Date: 1/7/2022  Code Status: No Order   Attending Provider: Dontae Johns MD   Primary Care Physician: Ellen Robert III, MD  Principal Problem:<principal problem not specified>    Patient information was obtained from patient and past medical records.     Subjective:     Chief Complaint:  SOB    HPI:  76 y.o. female who presents for evaluation of Aortic Stenosis        Referring Physician: Dr Mccoy     HPI     Vivien Burton is a 76 y.o. female with a past medical history of anxiety, HTN, HLD, breast cancer, GERD, and hypothyroidism referred by Dr Mccoy for evaluation of severe AS (NYHA Class III sx). She was seen by Dr Mccoy reporting SOB. She had labs done which showed a significantly elevated BNP of 2000. TTE done showed decreased EF at 21% as well as low flow aortic stenosis. Labs also showed WBC count elevated. She is a former smoker. She has never had an angiogram in the past.    Vivien Burton is a 76 y.o. female referred by Dr Mccoy for evaluation of severe AS (NYHA Class III sx).     The patient has undergone the following TAVR work-up:   · ECHO (Date 12/22/2021): BREANNA= 0.36 cm2, MG= 22 mmHg, Peak Ruben= 3.07 m/s, EF= 20%.   · LHC: Needs  · STS: 4.0%   · Frailty: 1/4 (failed )   · Iliacs are needs CTA  · LVOT area by CTA is  · Incidental findings on CT:   · CT Surgery risk assessment: needs CTS  · Rhythm issues: none  · PFTs: needs PFTs (former smoker/pneumonia)  · Comorbidities: history of right breast cancer (s/p mastectomy and radiation), hypothyroidism, HLD        NYHA: III CCS: 0      Past Medical History:   Diagnosis Date    Basal cell carcinoma 1999    Breast cancer     Breast cancer     Cancer     GERD (gastroesophageal reflux disease)     Hyperlipidemia     Hypertension     Osteoporosis 02/05/2019    Squamous cell carcinoma of skin 2018     Thyroid disease        Past Surgical History:   Procedure Laterality Date    BREAST SURGERY       SECTION, CLASSIC      COLONOSCOPY N/A 2018    Procedure: COLONOSCOPY;  Surgeon: Precious Castorena MD;  Location: [facility];  Service: Endoscopy;  Laterality: N/A;    HYSTERECTOMY      MASTECTOMY Right 2000    right breast      TONSILLECTOMY, ADENOIDECTOMY         Review of patient's allergies indicates:  No Known Allergies    PTA Medications   Medication Sig    aspirin (ECOTRIN) 81 MG EC tablet Take 81 mg by mouth once daily.    clopidogreL (PLAVIX) 75 mg tablet Take 4 tablets the night before the procedure and one tablet on day of procedure(coronary angiogram).    fluticasone propionate (FLONASE) 50 mcg/actuation nasal spray 1 spray by Each Nostril route once daily.    furosemide (LASIX) 20 MG tablet Take 1 tablet (20 mg total) by mouth daily as needed (For fluid retention).    levothyroxine (SYNTHROID) 75 MCG tablet TAKE 1 TABLET BY MOUTH EVERY DAY    simvastatin (ZOCOR) 20 MG tablet TAKE ONE (1) TABLET BY MOUTH EVERY EVENING    doxycycline (VIBRAMYCIN) 100 MG Cap Take 1 capsule (100 mg total) by mouth 2 (two) times daily with meals.    ibandronate (BONIVA) 150 mg tablet Take 1 tablet (150 mg total) by mouth every 30 days.     Family History     Problem Relation (Age of Onset)    Cancer Sister, Brother    Liver cancer Sister    Melanoma Sister        Tobacco Use    Smoking status: Former Smoker     Packs/day: 1.00     Types: Cigarettes    Smokeless tobacco: Never Used    Tobacco comment: quit 20 years ago   Substance and Sexual Activity    Alcohol use: Yes     Alcohol/week: 2.0 standard drinks     Types: 2 Shots of liquor per week     Comment: per pt 2 burbon drinks per night however none in last month    Drug use: No    Sexual activity: Not Currently     Review of Systems   All other systems reviewed and are negative.    Objective:     Vital Signs (Most Recent):  Temp: 98.1 °F  (36.7 °C) (01/07/22 0938)  Pulse: 86 (01/07/22 0938)  Resp: 16 (01/07/22 0938)  BP: 130/64 (01/07/22 0938)  SpO2: 97 % (01/07/22 0938) Vital Signs (24h Range):  Temp:  [98.1 °F (36.7 °C)] 98.1 °F (36.7 °C)  Pulse:  [86] 86  Resp:  [16] 16  SpO2:  [97 %] 97 %  BP: (130)/(64) 130/64     Weight: 52.2 kg (115 lb)  Body mass index is 22.46 kg/m².    SpO2: 97 %       No intake or output data in the 24 hours ending 01/07/22 0954    Lines/Drains/Airways     Peripheral Intravenous Line                 Peripheral IV - Single Lumen 01/07/22 0947 18 G Left Antecubital <1 day                Physical Exam  General: alert, awake and oriented x 3  Eyes:PERRL.   Neck:no JVD   Lungs:  clear to auscultation bilaterally   Cardiovascular: Heart: regular rate and rhythm, +EDITH   rub or gallop.   Chest Wall: no tenderness.   Pulses-2+ radial, femoral, DP, PT b/l  Extremities: no cyanosis or edema.   Abdomen/Rectal: Abdomen: soft, non-tender non-distented; bowel sounds normal  Neurologic: Normal strength and tone. No focal numbness or weakness  Significant Labs: BMP: No results for input(s): GLU, NA, K, CL, CO2, BUN, CREATININE, CALCIUM, MG in the last 48 hours. and CBC No results for input(s): WBC, HGB, HCT, PLT in the last 48 hours.    Significant Imaging: Echocardiogram: 2D echo with color flow doppler: No results found for this or any previous visit.    Assessment :     Nonrheumatic aortic (valve) stenosis  Vivien Burton is a 76 y.o. female referred by Dr Mccoy for evaluation of severe AS (NYHA Class III sx).     The patient has undergone the following TAVR work-up:   · ECHO (Date 12/22/2021): BREANNA= 0.36 cm2, MG= 22 mmHg, Peak Ruben= 3.07 m/s, EF= 20%.   · LHC: Needs  · STS: 4.0%   · Frailty: 1/4 (failed )   · Iliacs are needs CTA  · LVOT area by CTA is  · Incidental findings on CT:   · CT Surgery risk assessment: needs CTS  · Rhythm issues: none  · PFTs: needs PFTs (former smoker/pneumonia)  · Comorbidities: history of right  breast cancer (s/p mastectomy and radiation), hypothyroidism, HLD     Hyperlipidemia  Chronic. Followed by Dr Mccoy. On simvastatin.      Hypertension, onset 2012, off medications since 2016  Chronic. Off medication. Followed by Dr. Mccoy.      Breast cancer  S/p right mastectomy and radiation. In remission since 2000.       PLAN  -coronary angiogram +/- PCI via right CFA access  -tolerated aspirin -continue Aspirin 81 mg daily  -Plavix load yesterday, PRU ordered  -CTA tavr and CT calcium score today    VTE Risk Mitigation (From admission, onward)    None          Tal Mckeon MD  Interventional Cardiology   [facility]

## 2022-01-07 NOTE — HOSPITAL COURSE
1. Moderate to severe aortic stenosis with low ejection fraction, needs aortic valve calcium score to further quantify the severity of AS.  2. Severe single-vessel proximal LAD disease with 99% calcified proximal stenosis.  This was successfully dilated and stented with a drug-eluting stent.  3. Cardiomyopathy, possibly ischemic.         Summary:     1. Severe single-vessel coronary disease with proximal LAD stenting today.  2. Moderate to severe aortic stenosis with aortic valve calcium score pending.  3. Breast cancer status post radiation  4. Cardiomyopathy.       Recommendations:     1. CT surgery consultation as an outpatient.  2. CT aortic valve calcium score today.  3. Partner CTA today.

## 2022-01-07 NOTE — PLAN OF CARE
Received pt to floor from home accompanied by daughter in law.  AAO x 4. Denies pain or discomfort. Respirations even and unlabored. No distress noted. Pt stable.  Admit assessment complete. IV x 2 placed.  Pt oriented to room and call bell placed within reach.  Will continue to monitor.

## 2022-01-07 NOTE — HPI
76 y.o. female who presents for evaluation of Aortic Stenosis        Referring Physician: Dr Mccoy     HPI     Vivien Burton is a 76 y.o. female with a past medical history of anxiety, HTN, HLD, breast cancer, GERD, and hypothyroidism referred by Dr Mccoy for evaluation of severe AS (NYHA Class III sx). She was seen by Dr Mccoy reporting SOB. She had labs done which showed a significantly elevated BNP of 2000. TTE done showed decreased EF at 21% as well as low flow aortic stenosis. Labs also showed WBC count elevated. She is a former smoker. She has never had an angiogram in the past.    Vivien Burton is a 76 y.o. female referred by Dr Mccoy for evaluation of severe AS (NYHA Class III sx).     The patient has undergone the following TAVR work-up:   ECHO (Date 12/22/2021): BREANNA= 0.36 cm2, MG= 22 mmHg, Peak Ruben= 3.07 m/s, EF= 20%.   LHC: Needs  STS: 4.0%   Frailty: 1/4 (failed )   Iliacs are needs CTA  LVOT area by CTA is  Incidental findings on CT:   CT Surgery risk assessment: needs CTS  Rhythm issues: none  PFTs: needs PFTs (former smoker/pneumonia)  Comorbidities: history of right breast cancer (s/p mastectomy and radiation), hypothyroidism, HLD        NYHA: III CCS: 0

## 2022-01-07 NOTE — Clinical Note
The catheter was inserted into the ostium   right coronary artery. An angiography was performed of the right coronary arteries. Multiple views were taken.

## 2022-01-07 NOTE — PLAN OF CARE
Ambulated to bathroom and around nurse's station. No complications. Dressing remains clean and intact.

## 2022-01-08 ENCOUNTER — PATIENT MESSAGE (OUTPATIENT)
Dept: CARDIOLOGY | Facility: CLINIC | Age: 77
End: 2022-01-08
Payer: MEDICARE

## 2022-01-10 ENCOUNTER — DOCUMENTATION ONLY (OUTPATIENT)
Dept: CARDIOLOGY | Facility: CLINIC | Age: 77
End: 2022-01-10
Payer: MEDICARE

## 2022-01-10 ENCOUNTER — TELEPHONE (OUTPATIENT)
Dept: CARDIAC REHAB | Facility: CLINIC | Age: 77
End: 2022-01-10
Payer: MEDICARE

## 2022-01-10 DIAGNOSIS — I25.10 CORONARY ARTERY DISEASE INVOLVING NATIVE CORONARY ARTERY OF NATIVE HEART WITHOUT ANGINA PECTORIS: ICD-10-CM

## 2022-01-10 DIAGNOSIS — Z98.61 POSTSURGICAL PERCUTANEOUS TRANSLUMINAL CORONARY ANGIOPLASTY STATUS: Primary | ICD-10-CM

## 2022-01-10 DIAGNOSIS — I35.0 AORTIC STENOSIS, MILD: Primary | ICD-10-CM

## 2022-01-10 NOTE — PROGRESS NOTES
Aortic Valve Calcium Score is only 1110.  Doesn't qualify as severe aortic stenosis.  Will follow her every six months with Dobutamine TTE for AV Gradient and velocity and intervene when she has severe, symptomatic AS.  Will f/u in one month with TTE to see if PCI improved her EF.

## 2022-01-10 NOTE — TELEPHONE ENCOUNTER
Order was placed for Phase II cardiac rehab for [facility].  Patient had a PTCA.  Nellie Pierre RN  Cardiac Rehab Nurse

## 2022-01-10 NOTE — PROGRESS NOTES
Dr. Garcia calcuated her [facility] Aortic Valve Calification Score and it is less than 1000 and does not qualify as severe AS.  Will follow her every six months and wait for echo to qualify.

## 2022-02-11 ENCOUNTER — TELEPHONE (OUTPATIENT)
Dept: RESEARCH | Facility: HOSPITAL | Age: 77
End: 2022-02-11
Payer: MEDICARE

## 2022-02-11 NOTE — TELEPHONE ENCOUNTER
Study title: Detection of Reduced Left Ventricular Ejection Fraction and Atrial Arrhymias with Single Lead ECG using [employer] Intelligence  IRB #: [id number]  IRB approval date: 6/30/2021  Sponsor: [employer]  Site Number: [id number]  Subject ID: N/A  Date of Encounter:  2/11/2022    Called to introduce the patient to the [employer] clinical trial. Left voicemail with my return number.

## 2022-02-14 ENCOUNTER — OFFICE VISIT (OUTPATIENT)
Dept: CARDIOLOGY | Facility: CLINIC | Age: 77
End: 2022-02-14
Payer: MEDICARE

## 2022-02-14 ENCOUNTER — HOSPITAL ENCOUNTER (OUTPATIENT)
Dept: CARDIOLOGY | Facility: HOSPITAL | Age: 77
Discharge: HOME | End: 2022-02-14
Attending: INTERNAL MEDICINE
Payer: MEDICARE

## 2022-02-14 ENCOUNTER — EDUCATION (OUTPATIENT)
Dept: CARDIOLOGY | Facility: CLINIC | Age: 77
End: 2022-02-14
Payer: MEDICARE

## 2022-02-14 ENCOUNTER — RESEARCH ENCOUNTER (OUTPATIENT)
Dept: RESEARCH | Facility: HOSPITAL | Age: 77
End: 2022-02-14

## 2022-02-14 VITALS
WEIGHT: 115 LBS | HEIGHT: 60 IN | SYSTOLIC BLOOD PRESSURE: 112 MMHG | DIASTOLIC BLOOD PRESSURE: 68 MMHG | BODY MASS INDEX: 22.58 KG/M2 | HEART RATE: 82 BPM

## 2022-02-14 VITALS
SYSTOLIC BLOOD PRESSURE: 115 MMHG | OXYGEN SATURATION: 100 % | BODY MASS INDEX: 21.12 KG/M2 | DIASTOLIC BLOOD PRESSURE: 52 MMHG | HEART RATE: 94 BPM | WEIGHT: 107.56 LBS | HEIGHT: 60 IN

## 2022-02-14 DIAGNOSIS — N18.9 CHRONIC KIDNEY DISEASE, UNSPECIFIED CKD STAGE: ICD-10-CM

## 2022-02-14 DIAGNOSIS — I35.0 AORTIC STENOSIS: ICD-10-CM

## 2022-02-14 DIAGNOSIS — I35.0 NONRHEUMATIC AORTIC VALVE STENOSIS: Primary | ICD-10-CM

## 2022-02-14 DIAGNOSIS — Q23.0 AORTIC STENOSIS DUE TO BICUSPID AORTIC VALVE: Primary | ICD-10-CM

## 2022-02-14 DIAGNOSIS — Q23.1 AORTIC STENOSIS DUE TO BICUSPID AORTIC VALVE: Primary | ICD-10-CM

## 2022-02-14 DIAGNOSIS — I35.0 AORTIC STENOSIS, MILD: ICD-10-CM

## 2022-02-14 LAB
ASCENDING AORTA: 2.64 CM
AV INDEX (PROSTH): 0.11
AV MEAN GRADIENT: 45 MMHG
AV PEAK GRADIENT: 78 MMHG
AV VALVE AREA: 0.29 CM2
AV VELOCITY RATIO: 0.13
BSA FOR ECHO PROCEDURE: 1.49 M2
CV ECHO LV RWT: 0.35 CM
DOP CALC AO PEAK VEL: 4.43 M/S
DOP CALC AO VTI: 110.81 CM
DOP CALC LVOT AREA: 2.5 CM2
DOP CALC LVOT DIAMETER: 1.8 CM
DOP CALC LVOT PEAK VEL: 0.59 M/S
DOP CALC LVOT STROKE VOLUME: 31.61 CM3
DOP CALC MV VTI: 6.17 CM
DOP CALCLVOT PEAK VEL VTI: 12.43 CM
E WAVE DECELERATION TIME: 141.93 MSEC
E/A RATIO: 3.09
E/E' RATIO: 23.33 M/S
ECHO LV POSTERIOR WALL: 0.79 CM (ref 0.6–1.1)
EJECTION FRACTION: 30 %
FRACTIONAL SHORTENING: 11 % (ref 28–44)
INTERVENTRICULAR SEPTUM: 0.9 CM (ref 0.6–1.1)
IVRT: 34.25 MSEC
LA MAJOR: 5.77 CM
LA MINOR: 5.82 CM
LA WIDTH: 4.48 CM
LEFT ATRIUM SIZE: 3.97 CM
LEFT ATRIUM VOLUME INDEX MOD: 48.5 ML/M2
LEFT ATRIUM VOLUME INDEX: 59.2 ML/M2
LEFT ATRIUM VOLUME MOD: 71.8 CM3
LEFT ATRIUM VOLUME: 87.61 CM3
LEFT INTERNAL DIMENSION IN SYSTOLE: 4.02 CM (ref 2.1–4)
LEFT VENTRICLE DIASTOLIC VOLUME INDEX: 62.3 ML/M2
LEFT VENTRICLE DIASTOLIC VOLUME: 92.21 ML
LEFT VENTRICLE MASS INDEX: 83 G/M2
LEFT VENTRICLE SYSTOLIC VOLUME INDEX: 48 ML/M2
LEFT VENTRICLE SYSTOLIC VOLUME: 71.03 ML
LEFT VENTRICULAR INTERNAL DIMENSION IN DIASTOLE: 4.5 CM (ref 3.5–6)
LEFT VENTRICULAR MASS: 122.11 G
LV LATERAL E/E' RATIO: 21 M/S
LV SEPTAL E/E' RATIO: 26.25 M/S
MV A" WAVE DURATION": 9.99 MSEC
MV MEAN GRADIENT: 0 MMHG
MV PEAK A VEL: 0.34 M/S
MV PEAK E VEL: 1.05 M/S
MV PEAK GRADIENT: 1 MMHG
MV STENOSIS PRESSURE HALF TIME: 44.84 MS
MV VALVE AREA BY CONTINUITY EQUATION: 5.12 CM2
MV VALVE AREA P 1/2 METHOD: 4.91 CM2
PISA TR MAX VEL: 3.07 M/S
PULM VEIN S/D RATIO: 0.47
PV PEAK D VEL: 0.62 M/S
PV PEAK S VEL: 0.29 M/S
RA MAJOR: 4.48 CM
RA PRESSURE: 3 MMHG
RA WIDTH: 4.06 CM
RIGHT VENTRICULAR END-DIASTOLIC DIMENSION: 3.67 CM
RV TISSUE DOPPLER FREE WALL SYSTOLIC VELOCITY 1 (APICAL 4 CHAMBER VIEW): 13.19 CM/S
SINUS: 2.4 CM
STJ: 2.02 CM
TDI LATERAL: 0.05 M/S
TDI SEPTAL: 0.04 M/S
TDI: 0.05 M/S
TR MAX PG: 38 MMHG
TRICUSPID ANNULAR PLANE SYSTOLIC EXCURSION: 1.51 CM
TV REST PULMONARY ARTERY PRESSURE: 41 MMHG

## 2022-02-14 PROCEDURE — 99999 PR PBB SHADOW E&M-EST. PATIENT-LVL III: CPT | Mod: PBBFAC,,, | Performed by: INTERNAL MEDICINE

## 2022-02-14 PROCEDURE — 99215 OFFICE O/P EST HI 40 MIN: CPT | Mod: S$PBB,,, | Performed by: INTERNAL MEDICINE

## 2022-02-14 PROCEDURE — 93306 TTE W/DOPPLER COMPLETE: CPT

## 2022-02-14 PROCEDURE — 93306 ECHO (CUPID ONLY): ICD-10-PCS | Mod: 26,,, | Performed by: INTERNAL MEDICINE

## 2022-02-14 PROCEDURE — 93306 TTE W/DOPPLER COMPLETE: CPT | Mod: 26,,, | Performed by: INTERNAL MEDICINE

## 2022-02-14 PROCEDURE — 99215 PR OFFICE/OUTPT VISIT, EST, LEVL V, 40-54 MIN: ICD-10-PCS | Mod: S$PBB,,, | Performed by: INTERNAL MEDICINE

## 2022-02-14 PROCEDURE — 99213 OFFICE O/P EST LOW 20 MIN: CPT | Mod: PBBFAC,25 | Performed by: INTERNAL MEDICINE

## 2022-02-14 PROCEDURE — 99999 PR PBB SHADOW E&M-EST. PATIENT-LVL III: ICD-10-PCS | Mod: PBBFAC,,, | Performed by: INTERNAL MEDICINE

## 2022-02-14 RX ORDER — DIPHENHYDRAMINE HCL 50 MG
50 CAPSULE ORAL ONCE
Status: CANCELLED | OUTPATIENT
Start: 2022-02-14 | End: 2022-02-14

## 2022-02-14 RX ORDER — SODIUM CHLORIDE 9 MG/ML
INJECTION, SOLUTION INTRAVENOUS CONTINUOUS
Status: CANCELLED | OUTPATIENT
Start: 2022-02-14 | End: 2022-02-14

## 2022-02-14 NOTE — PROGRESS NOTES
Subjective:    Patient ID:  Vivien Burton is a 77 y.o. female who presents for evaluation of Severe AS    Referring Physician: Dr Mccoy    HPI    Vivien Burton is a 76 y.o. female with a past medical history of anxiety, HTN, HLD, breast cancer, GERD, and hypothyroidism referred by Dr Mccoy for evaluation of severe AS (NYHA Class III sx). She was seen by Dr Mccoy reporting SOB. She had labs done which showed a significantly elevated BNP of 2000. TTE done showed decreased EF at 21% as well as low flow aortic stenosis. Labs also showed WBC count elevated. She is currently on antibiotics for pneumonia treatment. She is a former smoker. She has never had an angiogram in the past.     Today she states she has been short of breath for a few months now. She first noticed she shortness of breath in November when she was diagnosed with pneumonia. Prior to her pneumonia diagnosis she was experiencing dyspnea on exertion with activities like cutting her grass but did not notice her symptoms on a day to day basis. She now has SOB with walking far distances as well as leg swelling. She had to stop twice walking from the parking garage to clinic.         Vivien Burton is a 77 y.o. female referred by Dr Mccoy for evaluation of severe AS (NYHA Class III sx).    The patient has undergone the following TAVR work-up:   ECHO (Date 12/22/2021): BREANNA= 0.29 cm2, MG= 45 mmHg, Peak Ruben= 4.43 m/s, EF= 30%.   LHC: 1/7/2022: Prox LAD PCI with 3.0 x 15 LAWRENCE  STS: 4.0%    Frailty: 1/4 (failed )   Iliacs are 5.92 mm > on the L and > 7.33 on the R  LVOT area by CTA is 3.15 cm2 ( 24.3 x 17.6 ) Avg Diameter of 20.0. Bicuspid valve  Incidental findings on CT: None  CT Surgery risk assessment: Low risk per Dr. Scales  Rhythm issues: none  Comorbidities: history of right breast cancer (s/p mastectomy and radiation), hypothyroidism, HLD      NYHA: III CCS: 0    Review of Systems   Constitutional: Negative for chills and fever.    HENT: Negative for sore throat.    Eyes: Negative for blurred vision.   Cardiovascular: Positive for dyspnea on exertion and leg swelling. Negative for chest pain, claudication, cyanosis, irregular heartbeat, near-syncope, orthopnea, palpitations, paroxysmal nocturnal dyspnea and syncope.   Respiratory: Negative for cough and sputum production.    Hematologic/Lymphatic: Does not bruise/bleed easily.   Skin: Negative for itching, rash and suspicious lesions.   Musculoskeletal: Negative for falls.   Gastrointestinal: Negative for abdominal pain and change in bowel habit.   Genitourinary: Negative for dysuria.   Neurological: Negative for disturbances in coordination, dizziness and loss of balance.   Psychiatric/Behavioral: Negative for altered mental status.          Past Medical History:   Diagnosis Date    Basal cell carcinoma 1999    Breast cancer     Breast cancer     Cancer     GERD (gastroesophageal reflux disease)     Hyperlipidemia     Hypertension     Osteoporosis 02/05/2019    Squamous cell carcinoma of skin 2018    Thyroid disease        Current Outpatient Medications:     aspirin (ECOTRIN) 81 MG EC tablet, Take 81 mg by mouth once daily., Disp: , Rfl:     clopidogreL (PLAVIX) 75 mg tablet, Take 1 tablet (75 mg total) by mouth once daily., Disp: 30 tablet, Rfl: 11    doxycycline (VIBRAMYCIN) 100 MG Cap, Take 1 capsule (100 mg total) by mouth 2 (two) times daily with meals., Disp: 20 capsule, Rfl: 1    fluticasone propionate (FLONASE) 50 mcg/actuation nasal spray, 1 spray by Each Nostril route once daily., Disp: , Rfl:     furosemide (LASIX) 20 MG tablet, Take 1 tablet (20 mg total) by mouth daily as needed (For fluid retention)., Disp: 30 tablet, Rfl: 2    ibandronate (BONIVA) 150 mg tablet, Take 1 tablet (150 mg total) by mouth every 30 days., Disp: 3 tablet, Rfl: 3    levothyroxine (SYNTHROID) 75 MCG tablet, TAKE 1 TABLET BY MOUTH EVERY DAY, Disp: 90 tablet, Rfl: 4    simvastatin  (ZOCOR) 20 MG tablet, TAKE ONE (1) TABLET BY MOUTH EVERY EVENING, Disp: 90 tablet, Rfl: 3    Objective:    Physical Exam  Vitals reviewed.   Constitutional:       General: She is not in acute distress.     Appearance: She is well-developed. She is not diaphoretic.   HENT:      Head: Normocephalic and atraumatic.   Neck:      Vascular: No JVD.   Cardiovascular:      Rate and Rhythm: Normal rate and regular rhythm.      Pulses: Intact distal pulses.      Heart sounds: Murmur heard.    Harsh midsystolic murmur is present at the upper right sternal border radiating to the neck.      Pulmonary:      Effort: Pulmonary effort is normal. No respiratory distress.      Breath sounds: Normal breath sounds.   Musculoskeletal:      Cervical back: Normal range of motion.      Right lower leg: No edema.      Left lower leg: No edema.   Skin:     General: Skin is warm and dry.   Neurological:      Mental Status: She is alert and oriented to person, place, and time.             There were no vitals filed for this visit.  There is no height or weight on file to calculate BMI.    Test(s) Reviewed  I have reviewed the following in detail:  [x] Stress test   [] Angiography   [x] Echocardiogram   [x] Labs:   [] Other:       Assessment/Plan:   Nonrheumatic aortic (valve) stenosis  Vivien Burton is a 77 y.o. female referred by Dr Mccoy for evaluation of severe AS (NYHA Class III sx).    The patient has undergone the following TAVR work-up:   ECHO (Date 12/22/2021): BREANNA= 0.29 cm2, MG= 45 mmHg, Peak Ruben= 4.43 m/s, EF= 30%.   LHC: 1/7/2022: Prox LAD PCI with 3.0 x 15 LAWRENCE  STS: 4.0%    Frailty: 1/4 (failed )   Iliacs are 5.92 mm > on the L and > 7.33 on the R  LVOT area by CTA is 3.15 cm2 ( 24.3 x 17.6 ) Avg Diameter of 20.0. Bicuspid valve  Incidental findings on CT: None  CT Surgery risk assessment: Low risk per Dr. Scales  Rhythm issues: none  Comorbidities: history of right breast cancer (s/p mastectomy and radiation),  hypothyroidism, HLD    She is a 26 mm Evolut candidate via R TF access for a bicuspid aortic valve    Transcatheter Aortic valve replacement   1. Valve: 26 mm Evolut  2. TAVR access: R TF  3. Valvuloplasty balloon: 16 mm True  4. Viabahn size if needed: 7 x 5  5. Antithrombotic therapy: ASA/Plavix  6. Pacemaker risk factors: None  1. The patient was told that the procedure carries around a 12.5% risk of permanent pacemaker requirement and that risk depends on the patients underlying conduction system.  2. Prior to the [facility] visit, all available records from referring provider were reviewed.  3. I have personally reviewed all the lab tests available related to the patient's case  4. The patient's images were reviewed by myself and the procedural planning was done with my own interpretation of the iliac and aortic anatomy based on CTA, angiography or [facility]. I have reviewed all other imaging studies relevant to the patient including echocardiography and coronary angiography.  5. Patient was educated abut the pathophysiology and natural history of severe aortic stenosis and was educated about the treatment options including surgical and transcatheter valve replacement. She agrees to be full code for the forseable future and to undergo workup for treatment of severe AS.   6. The risks, benefits, and alternatives of transcatheter aortic valve replacement were discussed with the patient. All questions were answered and informed consent was obtained. I had a detailed discussion with the patient regarding risk of stroke, MI, bleeding access site complications including limb loss, allergy, kidney failure including dialysis and death.  7. The patient understands the risks and benefits and wishes to go ahead with the procedure.  8. The referring Cardiologist was notified of the plan  9. All patient's questions were answered          Hyperlipidemia  Chronic. Followed by Dr Mccoy. On simvastatin.     Hypertension, onset 2012, off  medications since 2016  Chronic. Off medication. Followed by Dr. Mccoy.     Breast cancer  S/p right mastectomy and radiation. In remission since 2000.     ASA intolerance  Needs evaluation    Lucho Holloway MD  Interventional Cardiology Fellow  Pager [phone]      Staff:  I have personally taken the history and examined this patient and agree with the fellow's note as stated above and amended it accordingly :-)

## 2022-02-14 NOTE — H&P (VIEW-ONLY)
Subjective:    Patient ID:  Vivien Burton is a 77 y.o. female who presents for evaluation of Severe AS    Referring Physician: Dr Mccoy    HPI    Vivien Burton is a 76 y.o. female with a past medical history of anxiety, HTN, HLD, breast cancer, GERD, and hypothyroidism referred by Dr Mccoy for evaluation of severe AS (NYHA Class III sx). She was seen by Dr Mccoy reporting SOB. She had labs done which showed a significantly elevated BNP of 2000. TTE done showed decreased EF at 21% as well as low flow aortic stenosis. Labs also showed WBC count elevated. She is currently on antibiotics for pneumonia treatment. She is a former smoker. She has never had an angiogram in the past.     Today she states she has been short of breath for a few months now. She first noticed she shortness of breath in November when she was diagnosed with pneumonia. Prior to her pneumonia diagnosis she was experiencing dyspnea on exertion with activities like cutting her grass but did not notice her symptoms on a day to day basis. She now has SOB with walking far distances as well as leg swelling. She had to stop twice walking from the parking garage to clinic.         Vivien Burton is a 77 y.o. female referred by Dr Mccoy for evaluation of severe AS (NYHA Class III sx).    The patient has undergone the following TAVR work-up:   ECHO (Date 12/22/2021): BREANNA= 0.29 cm2, MG= 45 mmHg, Peak Ruben= 4.43 m/s, EF= 30%.   LHC: 1/7/2022: Prox LAD PCI with 3.0 x 15 LAWRENCE  STS: 4.0%    Frailty: 1/4 (failed )   Iliacs are 5.92 mm > on the L and > 7.33 on the R  LVOT area by CTA is 3.15 cm2 ( 24.3 x 17.6 ) Avg Diameter of 20.0. Bicuspid valve  Incidental findings on CT: None  CT Surgery risk assessment: Low risk per Dr. Scales  Rhythm issues: none  Comorbidities: history of right breast cancer (s/p mastectomy and radiation), hypothyroidism, HLD      NYHA: III CCS: 0    Review of Systems   Constitutional: Negative for chills and fever.    HENT: Negative for sore throat.    Eyes: Negative for blurred vision.   Cardiovascular: Positive for dyspnea on exertion and leg swelling. Negative for chest pain, claudication, cyanosis, irregular heartbeat, near-syncope, orthopnea, palpitations, paroxysmal nocturnal dyspnea and syncope.   Respiratory: Negative for cough and sputum production.    Hematologic/Lymphatic: Does not bruise/bleed easily.   Skin: Negative for itching, rash and suspicious lesions.   Musculoskeletal: Negative for falls.   Gastrointestinal: Negative for abdominal pain and change in bowel habit.   Genitourinary: Negative for dysuria.   Neurological: Negative for disturbances in coordination, dizziness and loss of balance.   Psychiatric/Behavioral: Negative for altered mental status.          Past Medical History:   Diagnosis Date    Basal cell carcinoma 1999    Breast cancer     Breast cancer     Cancer     GERD (gastroesophageal reflux disease)     Hyperlipidemia     Hypertension     Osteoporosis 02/05/2019    Squamous cell carcinoma of skin 2018    Thyroid disease        Current Outpatient Medications:     aspirin (ECOTRIN) 81 MG EC tablet, Take 81 mg by mouth once daily., Disp: , Rfl:     clopidogreL (PLAVIX) 75 mg tablet, Take 1 tablet (75 mg total) by mouth once daily., Disp: 30 tablet, Rfl: 11    doxycycline (VIBRAMYCIN) 100 MG Cap, Take 1 capsule (100 mg total) by mouth 2 (two) times daily with meals., Disp: 20 capsule, Rfl: 1    fluticasone propionate (FLONASE) 50 mcg/actuation nasal spray, 1 spray by Each Nostril route once daily., Disp: , Rfl:     furosemide (LASIX) 20 MG tablet, Take 1 tablet (20 mg total) by mouth daily as needed (For fluid retention)., Disp: 30 tablet, Rfl: 2    ibandronate (BONIVA) 150 mg tablet, Take 1 tablet (150 mg total) by mouth every 30 days., Disp: 3 tablet, Rfl: 3    levothyroxine (SYNTHROID) 75 MCG tablet, TAKE 1 TABLET BY MOUTH EVERY DAY, Disp: 90 tablet, Rfl: 4    simvastatin  (ZOCOR) 20 MG tablet, TAKE ONE (1) TABLET BY MOUTH EVERY EVENING, Disp: 90 tablet, Rfl: 3    Objective:    Physical Exam  Vitals reviewed.   Constitutional:       General: She is not in acute distress.     Appearance: She is well-developed. She is not diaphoretic.   HENT:      Head: Normocephalic and atraumatic.   Neck:      Vascular: No JVD.   Cardiovascular:      Rate and Rhythm: Normal rate and regular rhythm.      Pulses: Intact distal pulses.      Heart sounds: Murmur heard.    Harsh midsystolic murmur is present at the upper right sternal border radiating to the neck.      Pulmonary:      Effort: Pulmonary effort is normal. No respiratory distress.      Breath sounds: Normal breath sounds.   Musculoskeletal:      Cervical back: Normal range of motion.      Right lower leg: No edema.      Left lower leg: No edema.   Skin:     General: Skin is warm and dry.   Neurological:      Mental Status: She is alert and oriented to person, place, and time.             There were no vitals filed for this visit.  There is no height or weight on file to calculate BMI.    Test(s) Reviewed  I have reviewed the following in detail:  [x] Stress test   [] Angiography   [x] Echocardiogram   [x] Labs:   [] Other:       Assessment/Plan:   Nonrheumatic aortic (valve) stenosis  Vivien Burton is a 77 y.o. female referred by Dr Mccoy for evaluation of severe AS (NYHA Class III sx).    The patient has undergone the following TAVR work-up:   ECHO (Date 12/22/2021): BREANNA= 0.29 cm2, MG= 45 mmHg, Peak Ruben= 4.43 m/s, EF= 30%.   LHC: 1/7/2022: Prox LAD PCI with 3.0 x 15 LAWRENCE  STS: 4.0%    Frailty: 1/4 (failed )   Iliacs are 5.92 mm > on the L and > 7.33 on the R  LVOT area by CTA is 3.15 cm2 ( 24.3 x 17.6 ) Avg Diameter of 20.0. Bicuspid valve  Incidental findings on CT: None  CT Surgery risk assessment: Low risk per Dr. Scales  Rhythm issues: none  Comorbidities: history of right breast cancer (s/p mastectomy and radiation),  hypothyroidism, HLD    She is a 26 mm Evolut candidate via R TF access for a bicuspid aortic valve    Transcatheter Aortic valve replacement   1. Valve: 26 mm Evolut  2. TAVR access: R TF  3. Valvuloplasty balloon: 16 mm True  4. Viabahn size if needed: 7 x 5  5. Antithrombotic therapy: ASA/Plavix  6. Pacemaker risk factors: None  1. The patient was told that the procedure carries around a 12.5% risk of permanent pacemaker requirement and that risk depends on the patients underlying conduction system.  2. Prior to the [facility] visit, all available records from referring provider were reviewed.  3. I have personally reviewed all the lab tests available related to the patient's case  4. The patient's images were reviewed by myself and the procedural planning was done with my own interpretation of the iliac and aortic anatomy based on CTA, angiography or [facility]. I have reviewed all other imaging studies relevant to the patient including echocardiography and coronary angiography.  5. Patient was educated abut the pathophysiology and natural history of severe aortic stenosis and was educated about the treatment options including surgical and transcatheter valve replacement. She agrees to be full code for the forseable future and to undergo workup for treatment of severe AS.   6. The risks, benefits, and alternatives of transcatheter aortic valve replacement were discussed with the patient. All questions were answered and informed consent was obtained. I had a detailed discussion with the patient regarding risk of stroke, MI, bleeding access site complications including limb loss, allergy, kidney failure including dialysis and death.  7. The patient understands the risks and benefits and wishes to go ahead with the procedure.  8. The referring Cardiologist was notified of the plan  9. All patient's questions were answered          Hyperlipidemia  Chronic. Followed by Dr Mccoy. On simvastatin.     Hypertension, onset 2012, off  medications since 2016  Chronic. Off medication. Followed by Dr. Mccoy.     Breast cancer  S/p right mastectomy and radiation. In remission since 2000.     ASA intolerance  Needs evaluation    Lucho Holloway MD  Interventional Cardiology Fellow  Pager [phone]      Staff:  I have personally taken the history and examined this patient and agree with the fellow's note as stated above and amended it accordingly :-)

## 2022-02-14 NOTE — PROGRESS NOTES
Transcatheter Valve Replacement (TAVR)    You have been scheduled for your valve replacement on Tuesday, February 22, 2022.     Please report to the Cardiology Waiting Area on the Third floor of the hospital and check in at 6 AM.   You will then be taken to the SSCU (Short Stay Cardiac Unit) and prepared for your procedure. Please be aware that this is not the time of your procedure but the time you are to arrive.     Preperations for your procedure:  1. Shower with Dial soap the night before and the morning of your procedure.  2. No solid foods 8 hours prior to your procedure.  You may have clear liquids (12 ounces or 1 1/2 cups) up until 2 hours of your procedure.  Patients with gastric         emptying issues should be fasting for 6- 8 hours prior to the procedure.  Clear liquids include water, black coffee, clear juices, and performance drinks - no pulp         or milk.    3. Heart failure or dialysis patients will be limited to 8 ounces (1 cup) of clear liquids up until 2 hours of the procedure.  You may take your regular morning medications         with as much water as necessary.   4. Bring your cane and/or walker with you to the hospital.  5. Bring CPAP/BiPAP, or oxygen if you are on oxygen at home.  6. Call the office for any signs of infection ( fever, cough, pneumonia, urinary tract, etc.) We cannot implant a device in an infected patient.    Medications:  You may take your regular scheduled medications the morning of your procedure with as much water as necessary.  If you are diabetic and on Metformin (Glucophage), do not take it the day before, the day of, and 2 days after your procedure.  If you are on Pradaxa, Xarelto, Eliquis, or Coumadin hold 3 days prior to your procedure.     How long will the procedure take?  The entire procedure takes three to four hours.  After the procedure, you will go to an ICU where you will be monitored closely for the next several hours.  You will remain in the ICU  overnight.      Covid-19 restrictions:  Our visitation policy has been altered due to Covid-19.  You may bring one family member with you for the day until 6 pm.  No one is allowed to spend the night with you at this time.  Please keep in mind that our policies are constantly changing and we must adhere to the current policy.    If you walk with a cane or walker, please bring it with you to the hospital so that we can get you out of bed several hours after your valve replacement.  Our goal is to get you up in a chair and moving as quickly as possible as long as it is safe for you to do so.    When can I go home?  Your length of stay in the hospital, will be determined by your physician.  Be prepared to stay 1-3 days.  You will be discharged when your physician thinks it is safe for you to go home.    Follow Up After Valve Replacement:  It is required that you return to [facility] for the follow up in one month and one year with an echo, lab work, and a clinic visit.  If you are in a research trial, your follow up will be slightly different and according to the trial requirements.  You can follow up with your regular cardiologist regarding any other heart issues.    Contacts one of our nurses at [phone] for any questions.  Chantel King, RN  Audra Carrillo RN        THE ABOVE INSTRUCTIONS WERE GIVEN TO THE PATIENT VERBALLY AND THEY VERBALIZED UNDERSTANDING.  THEY DO NOT REQUIRE ANY SPECIAL NEEDS AND DO NOT HAVE ANY LEARNING BARRIERS.          Directions for Reporting to Cardiology Waiting Area in the Hospital  If you park in the Parking Garage:  Take elevators to the1st floor of the parking garage.  Continue past the gift shop, coffee shop, and piano.  Take a right and go to the gold elevators. (Elevator B)  Take the elevator to the 3rd floor.  Follow the arrow on the sign on the wall that says Cath Lab Registration/EP Lab Registration.  Follow the long hallway all the way around until you come to a big open  area.  This is the registration area.  Check in at Reception Desk.    OR    If family is dropping you off:  Have them drop you off at the front of the Hospital under the green overhang.  Enter through the doors and take a right.  Take the E elevators to the 3rd floor Cardiology Waiting Area.  Check in at the Reception Desk in the waiting room.

## 2022-02-14 NOTE — PROGRESS NOTES
Cardiothoracic Surgery  Transcatheter Aortic Valve Replacement Evaluation      SUBJECTIVE:     History of Present Illness:  Vivien Burton is a 76 y.o. female with a past medical history of anxiety, HTN, HLD, breast cancer, GERD, and hypothyroidism referred by Dr Mccoy for evaluation of severe AS (NYHA Class III sx). She was seen by Dr Mccoy reporting SOB. She had labs done which showed a significantly elevated BNP of 2000. TTE done showed decreased EF at 21% as well as low flow aortic stenosis. Labs also showed WBC count elevated. She is currently on antibiotics for pneumonia treatment. She is a former smoker. She has never had an angiogram in the past.      Today she states she has been short of breath for a few months now. She first noticed she shortness of breath in November when she was diagnosed with pneumonia. Prior to her pneumonia diagnosis she was experiencing dyspnea on exertion with activities like cutting her grass but did not notice her symptoms on a day to day basis. She now has SOB with walking far distances as well as leg swelling. She had to stop twice walking from the parking garage to clinic. She is now s/p stening to her LAD and her symptoms have improved but she notices she is still not at her baseline from 2020. She has several grandchilrden and has several activities she would like to get back to.    Past Medical History:   Diagnosis Date    Basal cell carcinoma     Breast cancer     Breast cancer     Cancer     GERD (gastroesophageal reflux disease)     Hyperlipidemia     Hypertension     Osteoporosis 2019    Squamous cell carcinoma of skin 2018    Thyroid disease      Past Surgical History:   Procedure Laterality Date    BREAST SURGERY       SECTION, CLASSIC      COLONOSCOPY N/A 2018    Procedure: COLONOSCOPY;  Surgeon: Precious Castorena MD;  Location[facility];  Service: Endoscopy;  Laterality: N/A;    HYSTERECTOMY      LEFT HEART  CATHETERIZATION Left 2022    Procedure: Left heart cath;  Surgeon: Dontae Johns MD;  Location: [facility] CATH LAB;  Service: Cardiology;  Laterality: Left;    MASTECTOMY Right 2000    right breast      TONSILLECTOMY, ADENOIDECTOMY       Family History   Problem Relation Age of Onset    Cancer Sister     Liver cancer Sister     Melanoma Sister     Cancer Brother     Breast cancer Neg Hx     Psoriasis Neg Hx     Lupus Neg Hx     Eczema Neg Hx      Social History     Tobacco Use    Smoking status: Former Smoker     Packs/day: 1.00     Types: Cigarettes     Quit date: 2000     Years since quittin.0    Smokeless tobacco: Never Used    Tobacco comment: quit 20 years ago   Substance Use Topics    Alcohol use: Yes     Alcohol/week: 2.0 standard drinks     Types: 2 Shots of liquor per week     Comment: per pt 2 burbon drinks per night however none in last month    Drug use: No      Review of patient's allergies indicates:  No Known Allergies  Current medications reviewed    Review of Systems:  Constitutional: Negative for fever, chills, distress  Skin: no acute disorders.  Negative for rash, itching  HEENT: unremarkable.  Negative for sore throat, congestion  Cardiovascular: Positive for chest pain lower extremity edema. Negative for orthopnea or palipitations.  Respiratory: Positive for shortness of breath.  Negative for cough.  GI:  unremarkable.  Negative for abdominal pain, vomiting  :  Negative for hematuria, flank pain  Musculoskeletal: unremarkable.  Negative for falls, myalgias  Neuro:  Negative for dizziness, LOC  Psych:  Negative for substance abuse, memory loss      OBJECTIVE:     Vital Signs (Most Recent)  Pulse: 94 (22 1305)  BP: (!) 115/52 (22 1305)  SpO2: 100 % (22 1305)  Vital signs reviewed    Physical Exam:  General Appearance: no acute distress.  Normal for age  Skin: no acute lesions, minor bruises from phlebotomy  HEENT: no masses/hematoma, Jugular  veins: not distended  Resp:  excursion/effort normal; clear to auscultation  CV:  Rate:  regular  Rhythm: regular  Murmur:  systolic ejection murmur at right upper sternal border  GI: Bowel sounds: present; abdo soft, nondistended, nontender, no masses palpable  Extrem: Edema: minimal  Pulses: normal  Neuro: Alert and oriented; no focal deficit  Psych: no acute delirium noted  : voiding well  MSK: no acute findings, ranges of motion unchanged      I have personally reviewed the imaging, electrocardiogram, and pertinent lab findings    ASSESSMENT/PLAN:       77 y.o. female with multiple comorbidities presented with symptomatic severe aortic stenosis. Deemed intermediate risk for surgical aortic valve replacement (SAVR) due to age, low EF and comorbidities.  Appropriate candidate for TAVR evaluation. I have personally reviewed her CT scan. We explained the patient's condition, pathology and prognosis of severe aortic stenosis, treatment options including medical therapy, SAVR and TAVR, details of SAVR and the TAVR procedure, risks and benefits of SAVR and TAVR including myocardial infarction, stroke, pacemaker, bleeding, infection, acute renal injury, conversion to open surgery, need for emergency mechanical circulatory support, and potential complications and recovery course with patient and family, and also durability of surgical vs transcatheter valve and options for reintervention in the future.  Patient and family understood and agreed to proceed. All questions answered.She has elected to proceed with TF-TAVR which she will undergo on Feb 22, 2022.    Magdiel Scales MD  CV Surgery

## 2022-02-14 NOTE — PROGRESS NOTES
Date Consent signed: 2/14/2022    Company/  Name: [employer]  Study Title: Detection of Reduced Left Ventricular Ejection Fraction and Atrial Arrhythmias with Single Lead ECG using  Artificial Intelligence- EKO Study    IRB Number: 2021.079    Principle Investigator: Hayley Frances MD    Present for Discussion: Patient, Patient's Son, Myself    Is LAR Consenting for Subject: N/A      Michael Monge met with patient to review consent form and answer any and all outstanding questions that would present in regard to the EKO clinical trial. After thoroughly discussing the consent form patient did agree to participate in cardiology clinical trial 2021.079.      Prior to the Informed Consent (IC) being signed, or any study protocol required data collection, testing, procedure, or intervention being performed, the following was done and/or discussed:   Patient was given a copy of the IC for review   Purpose of the study and qualifications to participate   Study design, Follow up schedule, and tests or procedures done at each visit   Confidentiality and HIPAA Authorization for Release of Medical Records for the research trial/ subject's rights/research related injury   Risk, Benefits, Alternative Treatments, Compensation and Costs   Participation in the research trial is voluntary and patient may withdraw at anytime   Contact information for study related questions      Patient verbalizes understanding of the above: Yes  Contact information for CRC and PI given to patient: Yes  Patient able to adequately summarize: the purpose of the study, the risks associated with the study, and all procedures, testing, and follow-ups associated with the study: Yes      Vivien Burton signed the informed consent form for the EKO clinical research study with an IRB approval date of 6/30/2021. Each page of the consent form was reviewed with Vivien Burton (and pts family) and all questions  answered satisfactorily. Vivien Burton signed the consent form and received a copy of same. The original consent was scanned into electronic medical records (EPIC) and filed into the subject's research study binder.      Consent was obtained by: Michael Monge      As a participant in the EKO clinical trial, 5 recordings were done by a DUO monitor device. Recordings were done by Michael Monge      After all study related procedures were completed, patient was issued a $10.00 stipend for participation via [employer] provided by the sponsor.

## 2022-02-21 ENCOUNTER — ANESTHESIA EVENT (OUTPATIENT)
Dept: MEDSURG UNIT | Facility: HOSPITAL | Age: 77
DRG: 266 | End: 2022-02-21
Payer: MEDICARE

## 2022-02-22 ENCOUNTER — ANESTHESIA (OUTPATIENT)
Dept: MEDSURG UNIT | Facility: HOSPITAL | Age: 77
DRG: 266 | End: 2022-02-22
Payer: MEDICARE

## 2022-02-22 ENCOUNTER — HOSPITAL ENCOUNTER (INPATIENT)
Facility: HOSPITAL | Age: 77
LOS: 1 days | Discharge: HOME | DRG: 266 | End: 2022-02-23
Attending: INTERNAL MEDICINE | Admitting: INTERNAL MEDICINE
Payer: MEDICARE

## 2022-02-22 DIAGNOSIS — I35.0 AORTIC STENOSIS: ICD-10-CM

## 2022-02-22 DIAGNOSIS — Z95.2 S/P TAVR (TRANSCATHETER AORTIC VALVE REPLACEMENT): Primary | ICD-10-CM

## 2022-02-22 DIAGNOSIS — N18.9 CHRONIC KIDNEY DISEASE, UNSPECIFIED CKD STAGE: ICD-10-CM

## 2022-02-22 DIAGNOSIS — I35.0 NONRHEUMATIC AORTIC VALVE STENOSIS: ICD-10-CM

## 2022-02-22 PROBLEM — Z95.3 S/P TAVR (TRANSCATHETER AORTIC VALVE REPLACEMENT): Status: ACTIVE | Noted: 2022-02-22

## 2022-02-22 LAB
ABO + RH BLD: NORMAL
ANION GAP SERPL CALC-SCNC: 11 MMOL/L (ref 8–16)
APTT BLDCRRT: 29.1 SEC (ref 21–32)
BLD GP AB SCN CELLS X3 SERPL QL: NORMAL
BLD GP AB SCN CELLS X3 SERPL QL: NORMAL
BLOOD GROUP ANTIBODIES SERPL: NORMAL
BUN SERPL-MCNC: 10 MG/DL (ref 8–23)
CALCIUM SERPL-MCNC: 9.6 MG/DL (ref 8.7–10.5)
CHLORIDE SERPL-SCNC: 100 MMOL/L (ref 95–110)
CO2 SERPL-SCNC: 23 MMOL/L (ref 23–29)
CREAT SERPL-MCNC: 0.7 MG/DL (ref 0.5–1.4)
CTP QC/QA: YES
DAT IGG-SP REAG RBC-IMP: NORMAL
ERYTHROCYTE [DISTWIDTH] IN BLOOD BY AUTOMATED COUNT: 15.8 % (ref 11.5–14.5)
EST. GFR  (AFRICAN AMERICAN): >60 ML/MIN/1.73 M^2
EST. GFR  (NON AFRICAN AMERICAN): >60 ML/MIN/1.73 M^2
GLUCOSE SERPL-MCNC: 117 MG/DL (ref 70–110)
HCT VFR BLD AUTO: 28 % (ref 37–48.5)
HGB BLD-MCNC: 9.2 G/DL (ref 12–16)
INR PPP: 1.1 (ref 0.8–1.2)
MCH RBC QN AUTO: 29.4 PG (ref 27–31)
MCHC RBC AUTO-ENTMCNC: 32.9 G/DL (ref 32–36)
MCV RBC AUTO: 90 FL (ref 82–98)
PLATELET # BLD AUTO: 275 K/UL (ref 150–450)
PMV BLD AUTO: 9.6 FL (ref 9.2–12.9)
POCT GLUCOSE: 118 MG/DL (ref 70–110)
POCT GLUCOSE: 98 MG/DL (ref 70–110)
POTASSIUM SERPL-SCNC: 3.6 MMOL/L (ref 3.5–5.1)
PROTHROMBIN TIME: 10.9 SEC (ref 9–12.5)
RBC # BLD AUTO: 3.13 M/UL (ref 4–5.4)
SARS-COV-2 AG RESP QL IA.RAPID: NEGATIVE
SODIUM SERPL-SCNC: 134 MMOL/L (ref 136–145)
WBC # BLD AUTO: 7.89 K/UL (ref 3.9–12.7)

## 2022-02-22 PROCEDURE — 37000008 HC ANESTHESIA 1ST 15 MINUTES: Performed by: INTERNAL MEDICINE

## 2022-02-22 PROCEDURE — 27000239 HC STAND-BY BYPASS PUMP

## 2022-02-22 PROCEDURE — 25000003 PHARM REV CODE 250: Performed by: STUDENT IN AN ORGANIZED HEALTH CARE EDUCATION/TRAINING PROGRAM

## 2022-02-22 PROCEDURE — C1725 CATH, TRANSLUMIN NON-LASER: HCPCS | Performed by: INTERNAL MEDICINE

## 2022-02-22 PROCEDURE — 94761 N-INVAS EAR/PLS OXIMETRY MLT: CPT

## 2022-02-22 PROCEDURE — 36620 INSERTION CATHETER ARTERY: CPT | Mod: 59,Q0,, | Performed by: ANESTHESIOLOGY

## 2022-02-22 PROCEDURE — D9220A PRA ANESTHESIA: ICD-10-PCS | Mod: Q0,,, | Performed by: ANESTHESIOLOGY

## 2022-02-22 PROCEDURE — 93010 EKG 12-LEAD: ICD-10-PCS | Mod: ,,, | Performed by: INTERNAL MEDICINE

## 2022-02-22 PROCEDURE — 27000191 HC C-V MONITORING

## 2022-02-22 PROCEDURE — 27800903 OPTIME MED/SURG SUP & DEVICES OTHER IMPLANTS: Performed by: INTERNAL MEDICINE

## 2022-02-22 PROCEDURE — A4216 STERILE WATER/SALINE, 10 ML: HCPCS | Performed by: STUDENT IN AN ORGANIZED HEALTH CARE EDUCATION/TRAINING PROGRAM

## 2022-02-22 PROCEDURE — 93010 ELECTROCARDIOGRAM REPORT: CPT | Mod: ,,, | Performed by: INTERNAL MEDICINE

## 2022-02-22 PROCEDURE — 37000009 HC ANESTHESIA EA ADD 15 MINS: Performed by: INTERNAL MEDICINE

## 2022-02-22 PROCEDURE — 20000000 HC ICU ROOM

## 2022-02-22 PROCEDURE — 93005 ELECTROCARDIOGRAM TRACING: CPT

## 2022-02-22 PROCEDURE — 86850 RBC ANTIBODY SCREEN: CPT | Mod: 91 | Performed by: INTERNAL MEDICINE

## 2022-02-22 PROCEDURE — C1769 GUIDE WIRE: HCPCS | Performed by: INTERNAL MEDICINE

## 2022-02-22 PROCEDURE — 85027 COMPLETE CBC AUTOMATED: CPT | Performed by: INTERNAL MEDICINE

## 2022-02-22 PROCEDURE — D9220A PRA ANESTHESIA: Mod: Q0,,, | Performed by: ANESTHESIOLOGY

## 2022-02-22 PROCEDURE — C1894 INTRO/SHEATH, NON-LASER: HCPCS | Performed by: INTERNAL MEDICINE

## 2022-02-22 PROCEDURE — 27201423 OPTIME MED/SURG SUP & DEVICES STERILE SUPPLY: Performed by: INTERNAL MEDICINE

## 2022-02-22 PROCEDURE — 86880 COOMBS TEST DIRECT: CPT | Performed by: INTERNAL MEDICINE

## 2022-02-22 PROCEDURE — 86850 RBC ANTIBODY SCREEN: CPT | Performed by: INTERNAL MEDICINE

## 2022-02-22 PROCEDURE — 85730 THROMBOPLASTIN TIME PARTIAL: CPT | Performed by: INTERNAL MEDICINE

## 2022-02-22 PROCEDURE — 86870 RBC ANTIBODY IDENTIFICATION: CPT | Performed by: INTERNAL MEDICINE

## 2022-02-22 PROCEDURE — 85610 PROTHROMBIN TIME: CPT | Performed by: INTERNAL MEDICINE

## 2022-02-22 PROCEDURE — 25000003 PHARM REV CODE 250: Performed by: PHYSICIAN ASSISTANT

## 2022-02-22 PROCEDURE — 25000003 PHARM REV CODE 250: Performed by: INTERNAL MEDICINE

## 2022-02-22 PROCEDURE — 63600175 PHARM REV CODE 636 W HCPCS: Performed by: STUDENT IN AN ORGANIZED HEALTH CARE EDUCATION/TRAINING PROGRAM

## 2022-02-22 PROCEDURE — 99900035 HC TECH TIME PER 15 MIN (STAT)

## 2022-02-22 PROCEDURE — C1760 CLOSURE DEV, VASC: HCPCS | Performed by: INTERNAL MEDICINE

## 2022-02-22 PROCEDURE — 33361 REPLACE AORTIC VALVE PERQ: CPT | Mod: 62,Q0,, | Performed by: THORACIC SURGERY (CARDIOTHORACIC VASCULAR SURGERY)

## 2022-02-22 PROCEDURE — 33361 PR TAVR, PERCUTANEOUS FEMORAL: ICD-10-PCS | Mod: 62,Q0,, | Performed by: INTERNAL MEDICINE

## 2022-02-22 PROCEDURE — 33361 PR TAVR, PERCUTANEOUS FEMORAL: ICD-10-PCS | Mod: 62,Q0,, | Performed by: THORACIC SURGERY (CARDIOTHORACIC VASCULAR SURGERY)

## 2022-02-22 PROCEDURE — 33361 REPLACE AORTIC VALVE PERQ: CPT | Mod: 62,Q0,, | Performed by: INTERNAL MEDICINE

## 2022-02-22 PROCEDURE — 33361 REPLACE AORTIC VALVE PERQ: CPT | Performed by: INTERNAL MEDICINE

## 2022-02-22 PROCEDURE — 36620 ARTERIAL: ICD-10-PCS | Mod: 59,Q0,, | Performed by: ANESTHESIOLOGY

## 2022-02-22 PROCEDURE — 80048 BASIC METABOLIC PNL TOTAL CA: CPT | Performed by: INTERNAL MEDICINE

## 2022-02-22 PROCEDURE — 36556 CENTRAL LINE: ICD-10-PCS | Mod: 59,Q0,, | Performed by: ANESTHESIOLOGY

## 2022-02-22 PROCEDURE — 36556 INSERT NON-TUNNEL CV CATH: CPT | Mod: 59,Q0,, | Performed by: ANESTHESIOLOGY

## 2022-02-22 DEVICE — SYS EVOLUT PRO+ DELIVERY 23-29: Type: IMPLANTABLE DEVICE | Site: OTHER (ADD COMMENT) | Status: FUNCTIONAL

## 2022-02-22 DEVICE — VALVE EVOLUT PRO+ TAVR 26MM: Type: IMPLANTABLE DEVICE | Site: HEART | Status: FUNCTIONAL

## 2022-02-22 DEVICE — SYS EVOLUT PRO+ LOADING 23-29: Type: IMPLANTABLE DEVICE | Site: OTHER (ADD COMMENT) | Status: FUNCTIONAL

## 2022-02-22 RX ORDER — SODIUM,POTASSIUM PHOSPHATES 280-250MG
2 POWDER IN PACKET (EA) ORAL
Status: DISCONTINUED | OUTPATIENT
Start: 2022-02-22 | End: 2022-02-23 | Stop reason: HOSPADM

## 2022-02-22 RX ORDER — ACETAMINOPHEN 325 MG/1
650 TABLET ORAL EVERY 4 HOURS PRN
Status: DISCONTINUED | OUTPATIENT
Start: 2022-02-22 | End: 2022-02-23 | Stop reason: HOSPADM

## 2022-02-22 RX ORDER — LANOLIN ALCOHOL/MO/W.PET/CERES
800 CREAM (GRAM) TOPICAL
Status: DISCONTINUED | OUTPATIENT
Start: 2022-02-22 | End: 2022-02-23 | Stop reason: HOSPADM

## 2022-02-22 RX ORDER — POTASSIUM CHLORIDE 20 MEQ/1
40 TABLET, EXTENDED RELEASE ORAL ONCE
Status: COMPLETED | OUTPATIENT
Start: 2022-02-22 | End: 2022-02-22

## 2022-02-22 RX ORDER — ATORVASTATIN CALCIUM 10 MG/1
20 TABLET, FILM COATED ORAL NIGHTLY
Refills: 3 | Status: CANCELLED | OUTPATIENT
Start: 2022-02-22

## 2022-02-22 RX ORDER — SODIUM CHLORIDE 9 MG/ML
INJECTION, SOLUTION INTRAVENOUS CONTINUOUS
Status: ACTIVE | OUTPATIENT
Start: 2022-02-22 | End: 2022-02-22

## 2022-02-22 RX ORDER — LEVOTHYROXINE SODIUM 75 UG/1
75 TABLET ORAL DAILY
Status: CANCELLED | OUTPATIENT
Start: 2022-02-23

## 2022-02-22 RX ORDER — HEPARIN SODIUM 1000 [USP'U]/ML
INJECTION, SOLUTION INTRAVENOUS; SUBCUTANEOUS
Status: DISCONTINUED | OUTPATIENT
Start: 2022-02-22 | End: 2022-02-22

## 2022-02-22 RX ORDER — HYDROMORPHONE HYDROCHLORIDE 1 MG/ML
0.2 INJECTION, SOLUTION INTRAMUSCULAR; INTRAVENOUS; SUBCUTANEOUS EVERY 5 MIN PRN
Status: CANCELLED | OUTPATIENT
Start: 2022-02-22

## 2022-02-22 RX ORDER — LIDOCAINE HYDROCHLORIDE 20 MG/ML
INJECTION, SOLUTION INFILTRATION; PERINEURAL
Status: DISCONTINUED | OUTPATIENT
Start: 2022-02-22 | End: 2022-02-22 | Stop reason: HOSPADM

## 2022-02-22 RX ORDER — MEPERIDINE HYDROCHLORIDE 50 MG/ML
12.5 INJECTION INTRAMUSCULAR; INTRAVENOUS; SUBCUTANEOUS ONCE AS NEEDED
Status: CANCELLED | OUTPATIENT
Start: 2022-02-22 | End: 2022-02-23

## 2022-02-22 RX ORDER — SODIUM CHLORIDE 9 MG/ML
INJECTION, SOLUTION INTRAVENOUS CONTINUOUS
Status: DISCONTINUED | OUTPATIENT
Start: 2022-02-22 | End: 2022-02-23 | Stop reason: HOSPADM

## 2022-02-22 RX ORDER — CEFAZOLIN SODIUM 1 G/3ML
INJECTION, POWDER, FOR SOLUTION INTRAMUSCULAR; INTRAVENOUS
Status: DISCONTINUED | OUTPATIENT
Start: 2022-02-22 | End: 2022-02-22

## 2022-02-22 RX ORDER — ONDANSETRON 2 MG/ML
4 INJECTION INTRAMUSCULAR; INTRAVENOUS DAILY PRN
Status: CANCELLED | OUTPATIENT
Start: 2022-02-22

## 2022-02-22 RX ORDER — ONDANSETRON 8 MG/1
8 TABLET, ORALLY DISINTEGRATING ORAL EVERY 8 HOURS PRN
Status: DISCONTINUED | OUTPATIENT
Start: 2022-02-22 | End: 2022-02-23 | Stop reason: HOSPADM

## 2022-02-22 RX ORDER — LIDOCAINE HYDROCHLORIDE 20 MG/ML
INJECTION, SOLUTION EPIDURAL; INFILTRATION; INTRACAUDAL; PERINEURAL
Status: DISCONTINUED | OUTPATIENT
Start: 2022-02-22 | End: 2022-02-22

## 2022-02-22 RX ORDER — PROTAMINE SULFATE 10 MG/ML
INJECTION, SOLUTION INTRAVENOUS
Status: DISCONTINUED | OUTPATIENT
Start: 2022-02-22 | End: 2022-02-22

## 2022-02-22 RX ORDER — SODIUM CHLORIDE 0.9 % (FLUSH) 0.9 %
3 SYRINGE (ML) INJECTION
Status: DISCONTINUED | OUTPATIENT
Start: 2022-02-22 | End: 2022-02-23 | Stop reason: HOSPADM

## 2022-02-22 RX ORDER — CLOPIDOGREL BISULFATE 75 MG/1
75 TABLET ORAL DAILY
Status: CANCELLED | OUTPATIENT
Start: 2022-02-23

## 2022-02-22 RX ORDER — ASPIRIN 81 MG/1
81 TABLET ORAL DAILY
Status: CANCELLED | OUTPATIENT
Start: 2022-02-23

## 2022-02-22 RX ORDER — NOREPINEPHRINE BITARTRATE 1 MG/ML
INJECTION, SOLUTION INTRAVENOUS
Status: DISCONTINUED | OUTPATIENT
Start: 2022-02-22 | End: 2022-02-22

## 2022-02-22 RX ORDER — DIPHENHYDRAMINE HCL 50 MG
50 CAPSULE ORAL ONCE
Status: COMPLETED | OUTPATIENT
Start: 2022-02-22 | End: 2022-02-22

## 2022-02-22 RX ORDER — HEPARIN SOD,PORCINE/0.9 % NACL 1000/500ML
INTRAVENOUS SOLUTION INTRAVENOUS
Status: DISCONTINUED | OUTPATIENT
Start: 2022-02-22 | End: 2022-02-22 | Stop reason: HOSPADM

## 2022-02-22 RX ADMIN — NOREPINEPHRINE BITARTRATE 0.04 MCG/KG/MIN: 1 INJECTION, SOLUTION, CONCENTRATE INTRAVENOUS at 08:02

## 2022-02-22 RX ADMIN — LIDOCAINE HYDROCHLORIDE 60 MG: 20 INJECTION, SOLUTION EPIDURAL; INFILTRATION; INTRACAUDAL at 08:02

## 2022-02-22 RX ADMIN — FENTANYL CITRATE 50 MCG: 50 INJECTION, SOLUTION INTRAMUSCULAR; INTRAVENOUS at 07:02

## 2022-02-22 RX ADMIN — DEXMEDETOMIDINE HYDROCHLORIDE 1 MCG/KG/HR: 100 INJECTION, SOLUTION, CONCENTRATE INTRAVENOUS at 07:02

## 2022-02-22 RX ADMIN — SODIUM CHLORIDE: 0.9 INJECTION, SOLUTION INTRAVENOUS at 07:02

## 2022-02-22 RX ADMIN — FENTANYL CITRATE 50 MCG: 50 INJECTION, SOLUTION INTRAMUSCULAR; INTRAVENOUS at 08:02

## 2022-02-22 RX ADMIN — DIPHENHYDRAMINE HYDROCHLORIDE 50 MG: 50 CAPSULE ORAL at 07:02

## 2022-02-22 RX ADMIN — PROTAMINE SULFATE 50 MG: 10 INJECTION, SOLUTION INTRAVENOUS at 09:02

## 2022-02-22 RX ADMIN — SODIUM CHLORIDE 200 ML/HR: 0.9 INJECTION, SOLUTION INTRAVENOUS at 09:02

## 2022-02-22 RX ADMIN — CEFAZOLIN SODIUM 2 G: 1 INJECTION, POWDER, FOR SOLUTION INTRAMUSCULAR; INTRAVENOUS at 08:02

## 2022-02-22 RX ADMIN — HEPARIN SODIUM 5500 UNITS: 1000 INJECTION, SOLUTION INTRAVENOUS; SUBCUTANEOUS at 08:02

## 2022-02-22 RX ADMIN — NOREPINEPHRINE BITARTRATE 16 MCG: 1 INJECTION INTRAVENOUS at 08:02

## 2022-02-22 RX ADMIN — SODIUM CHLORIDE, SODIUM GLUCONATE, SODIUM ACETATE, POTASSIUM CHLORIDE, MAGNESIUM CHLORIDE, SODIUM PHOSPHATE, DIBASIC, AND POTASSIUM PHOSPHATE: .53; .5; .37; .037; .03; .012; .00082 INJECTION, SOLUTION INTRAVENOUS at 07:02

## 2022-02-22 RX ADMIN — NOREPINEPHRINE BITARTRATE 8 MCG: 1 INJECTION INTRAVENOUS at 08:02

## 2022-02-22 RX ADMIN — POTASSIUM CHLORIDE 40 MEQ: 1500 TABLET, EXTENDED RELEASE ORAL at 03:02

## 2022-02-22 NOTE — ANESTHESIA PROCEDURE NOTES
Arterial    Diagnosis: aortic stenosis    Patient location during procedure: done in OR    Staffing  Authorizing Provider: Jemal Wetzel Jr., MD  Performing Provider: Derik Marie MD    Anesthesiologist was present at the time of the procedure.    Preanesthetic Checklist  Completed: patient identified, IV checked, site marked, risks and benefits discussed, surgical consent, monitors and equipment checked, pre-op evaluation, timeout performed and anesthesia consent givenArterial  Skin Prep: chlorhexidine gluconate  Local Infiltration: lidocaine  Orientation: left  Location: radial    Catheter placement by Ultrasound guidance. Heme positive aspiration all ports.   Vessel Caliber: patent  Needle advanced into vessel with real time Ultrasound guidance.Insertion Attempts: 1  Assessment  Dressing: secured with tape and tegaderm  Patient: Tolerated well

## 2022-02-22 NOTE — PROGRESS NOTES
Dr kristin villasenor at bedside. ekg done and in chart. md shown ekg.  tvp discontinued by dr villasenor in ep pacu 1. drsg clean, dry and intact.

## 2022-02-22 NOTE — Clinical Note
The DP pulses were detected with doppler bilaterally. The PT pulses were detected with doppler bilaterally. The radial pulses were +2 bilaterally.

## 2022-02-22 NOTE — PROGRESS NOTES
PATIENT ADMITTED TO REC[facility]ERY [facility] FOR COMPLETE ASSESSMENT PACU BCG'S MAINTAINED SAFETY MEASURES VERIFIED PATIENT INSTRUCTED ON PAIN SCALE AND PAITENT VERBALIZED UNDERSTANDING. CALLED FOR EKG ALSO CALLED PATIENT'S SON AND UPDATED ON PATIENT LOCATION WITH THE PERMISSION OF PATIENT. PATIENT HAS NORMAL SALINE INFUSING AT 10CC/HR VIA CORDIS AND NORMAL SALINE AT 200CC/HR X 1 LITER TO PIV AS ORDERED WILL MONITOR. NO DISTRESS NO COMPLAINTS NOTED. TRANSVENOUS PACER INTACT WITH WIRES ISOLATED AND SECURED .

## 2022-02-22 NOTE — TRANSFER OF CARE
Anesthesia Transfer of Care Note    Patient: iVvien Burton    Procedure(s) Performed: Procedure(s) (LRB):  REPLACEMENT, AORTIC VALVE, TRANSCATHETER (TAVR) (N/A)  Cardiac Cath [facility]geon (N/A)  Left heart cath (Left)    Patient location: PACU    Anesthesia Type: MAC    Transport from OR: Transported from OR on 6-10 L/min O2 by face mask with adequate spontaneous ventilation    Post pain: adequate analgesia    Post assessment: no apparent anesthetic complications    Post vital signs: stable    Level of consciousness: awake and alert    Nausea/Vomiting: no nausea/vomiting    Complications: none    Transfer of care protocol was followed      Last vitals:   Visit Vitals  BP (!) 118/57 (BP Location: Left arm, Patient Position: Lying)   Pulse 91   Temp 36.6 °C (97.9 °F)   Resp 17   Ht 5' (1.524 m)   Wt 49.9 kg (110 lb)   SpO2 98%   BMI 21.48 kg/m²

## 2022-02-22 NOTE — BRIEF OP NOTE
Brief Operative Report:    : Dontae Johns MD     All Operators: Surgeon(s):  MD Dontae Johnson MD Aashish Gupta, MD Jose D. Tafur Soto, MD Marloe Prince, MD     Preoperative Diagnosis: Nonrheumatic aortic valve stenosis [I35.0]     Postop Diagnosis: Nonrheumatic aortic valve stenosis [I35.0]    Treatments/Procedures: Procedure(s) (LRB):  REPLACEMENT, AORTIC VALVE, TRANSCATHETER (TAVR) (N/A)  Cardiac Cath Cosurgeon (N/A)  Left heart cath (Left)    Findings:Severe structural disease is present. See catheterization report for full details. 26 mm Evolut no PVL.     Estimated Blood loss: 20 cc    Specimens removed: Shannon Holloway

## 2022-02-22 NOTE — INTERVAL H&P NOTE
The patient has been examined and the H&P has been reviewed:    I concur with the findings and no changes have occurred since H&P was written.    Anesthesia/Surgery risks, benefits and alternative options discussed and understood by patient/family.    Nonrheumatic aortic (valve) stenosis  Vivien Burton is a 77 y.o. female referred by Dr Mccoy for evaluation of severe AS (NYHA Class III sx).     The patient has undergone the following TAVR work-up:   ECHO (Date 12/22/2021): BREANNA= 0.29 cm2, MG= 45 mmHg, Peak Ruben= 4.43 m/s, EF= 30%.   LHC: 1/7/2022: Prox LAD PCI with 3.0 x 15 LAWRENCE  STS: 4.0%    Frailty: 1/4 (failed )   Iliacs are 5.92 mm > on the L and > 7.33 on the R  LVOT area by CTA is 3.15 cm2 ( 24.3 x 17.6 ) Avg Diameter of 20.0. Bicuspid valve  Incidental findings on CT: None  CT Surgery risk assessment: Low risk per Dr. Scales  Rhythm issues: none  Comorbidities: history of right breast cancer (s/p mastectomy and radiation), hypothyroidism, HLD     She is a 26 mm Evolut candidate via R TF access for a bicuspid aortic valve     Transcatheter Aortic valve replacement   Valve: 26 mm Evolut  TAVR access: R TF  Valvuloplasty balloon: 16 mm True  Viabahn size if needed: 7 x 5  Antithrombotic therapy: ASA/Plavix  Pacemaker risk factors: None  The patient was told that the procedure carries around a 12.5% risk of permanent pacemaker requirement and that risk depends on the patients underlying conduction system.  Prior to the clinc visit, all available records from referring provider were reviewed.  I have personally reviewed all the lab tests available related to the patient's case  The patient's images were reviewed by myself and the procedural planning was done with my own interpretation of the iliac and aortic anatomy based on CTA, angiography or [facility]. I have reviewed all other imaging studies relevant to the patient including echocardiography and coronary angiography.  Patient was educated abut the  pathophysiology and natural history of severe aortic stenosis and was educated about the treatment options including surgical and transcatheter valve replacement. She agrees to be full code for the forseable future and to undergo workup for treatment of severe AS.   The risks, benefits, and alternatives of transcatheter aortic valve replacement were discussed with the patient. All questions were answered and informed consent was obtained. I had a detailed discussion with the patient regarding risk of stroke, MI, bleeding access site complications including limb loss, allergy, kidney failure including dialysis and death.  The patient understands the risks and benefits and wishes to go ahead with the procedure.  The referring Cardiologist was notified of the plan  All patient's questions were answered      There are no hospital problems to display for this patient.

## 2022-02-22 NOTE — PLAN OF CARE
SICU PLAN OF CARE NOTE    Dx: S/P TAVR (transcatheter aortic valve replacement)    Shift Events: transfer from PACU, ambulated to recliner    Goals of Care: MAP>65, ambulation    Neuro: AAO x4, Follows Commands and Moves All Extremities    Vital Signs: BP (!) 102/50 (BP Location: Left arm, Patient Position: Lying)   Pulse 79   Temp 97.6 °F (36.4 °C) (Oral)   Resp (!) 21   Ht 5' (1.524 m)   Wt 49.9 kg (110 lb)   SpO2 99%   BMI 21.48 kg/m²     Respiratory: Nasal Cannula    Diet: Cardiac Diet        Urine Output: Voids Spontaneously 000 cc/shift         Labs/Accuchecks: Daily Labs in AM    Skin: foam to heels and sacrum, patient able to turn self Q2H, up in recliner, no breakdown noted to skin upon transfer

## 2022-02-22 NOTE — Clinical Note
15 ml of contrast were injected throughout the case. 85 mL of contrast was the total wasted during the case. 100 mL was the total amount used during the case.

## 2022-02-22 NOTE — PLAN OF CARE
Pt ambulated on unit this morning with her son at her side.  Denies pain or SOB.  Oriented to unit and call bell provided.  Verbalized an understanding of procedure.  Will continue to monitor.

## 2022-02-22 NOTE — PROGRESS NOTES
Perfusion Standby Note:      02/22/2022  Perfusionist:  Dax Nicole Jr  [facility]  Surgeon(s) and Role:  Panel 1:     * Dontae Johns MD - Primary     * Harsh Johnson MD - Assisting     * Tal Mckeon MD - Fellow     * Lucho Holloway MD - Fellow  Panel 2:     * Dontae Wooten MD - Primary  Anesthesiologist:  Jemal Wetzel Jr., MD    Past Medical History:   Diagnosis Date    Basal cell carcinoma 1999    Breast cancer     Breast cancer     Cancer     GERD (gastroesophageal reflux disease)     Hyperlipidemia     Hypertension     Osteoporosis 02/05/2019    Squamous cell carcinoma of skin 2018    Thyroid disease          Perfusion department standby was requested for this case, utilizing either a full cardiopulmonary machine or ECMO pump.      All pre-op checklists were completed, all equipment was available, as were cannulae and other disposables.  A TAVR instrument tray was also verified as available, per the checklist, for any case requiring ECMO standby.    I fully participated in the briefing and time-out for this procedure.  I was present in the room for all critical portions of this case during which mechanical circulatory support could be required.  Please see cardiopulmonary bypass record for details.  There were no complications.    9:08 AM

## 2022-02-22 NOTE — ANESTHESIA PROCEDURE NOTES
Central Line    Diagnosis: aortic stenosis  Patient location during procedure: done in OR  Procedure start time: 2/22/2022 7:15 AM    Staffing  Authorizing Provider: Jemal Wetzel Jr., MD  Performing Provider: Derik Marie MD    Anesthesiologist was present at the time of the procedure.  Preanesthetic Checklist  Completed: patient identified, IV checked, site marked, risks and benefits discussed, surgical consent, monitors and equipment checked, pre-op evaluation, timeout performed and anesthesia consent given  Indication   Indication: vascular access, hemodynamic monitoring     Anesthesia   local infiltration    Central Line   Skin Prep: skin prepped with ChloraPrep, skin prep agent completely dried prior to procedure  Sterile Barriers Followed: Yes    All five maximal barriers used- gloves, gown, cap, mask, and large sterile sheet    hand hygiene performed prior to central venous catheter insertion  Location: right internal jugular.   Catheter type: introducer  Catheter Size: 8 Fr  Ultrasound: vascular probe with ultrasound   Vessel Caliber: patent  Needle advanced into vessel with real time Ultrasound guidance.  Guidewire confirmed in vessel.  sterile gel and probe cover used in ultrasound-guided central venous catheter insertion   Manometry: Venous cannualation confirmed by visual estimation of blood vessel pressure using manometry.  Insertion Attempts: 1   Securement:line sutured, chlorhexidine patch, sterile dressing applied and blood return through all ports    Post-Procedure        Guidewire Guidewire removed intact.    Medications:  Medication Administration Time: 2/22/2022 7:15 AM

## 2022-02-22 NOTE — PROGRESS NOTES
PATIENT CAME OUT FROM PROCEDURE WITH PADDING TO BILATERAL HEELS AND TO BUTTOCKS FOAM DRESSINGS CDI.

## 2022-02-22 NOTE — Clinical Note
An airway assessment has been completed by Anesthesia; all sedation and sedation monitoring done by anesthesia.

## 2022-02-22 NOTE — OP NOTE
[facility]  Cardiothoracic Surgery Operative Report    Patient Name:  Vivien Burton; [id number]    DATE OF PROCEDURE: 02/22/2022   ATTENDING SURGEONS: Dontae Johns M.D., Harsh Garrett M.D., and Dontae Wooten M.D.  PREOPERATIVE DIAGNOSIS:  Severe aortic stenosis.  POSTOPERATIVE DIAGNOSIS:  Severe aortic stenosis  ?  OPERATION PERFORMED: Transcatheter aortic valve insertion via transfemoral approach using a 26mm Medtronic Evolut bioprosthesis  ANESTHESIA: General.  ESTIMATED BLOOD LOSS: 10 mL.  BRIEF HISTORY: This patient is a 77 year old female with symptomatic severe aortic stenosis.  The patient has had progressive dyspnea on exertion. This prompted a thoughtful and thorough evaluation, which demonstrated severe aortic stenosis. Given the comorbid conditions, a transcatheter valve insertion was recommended. The patient now presents for transcatheter aortic valve insertion.  PROCEDURE: After obtaining informed and written consent, the patient was brought to the cath lab and placed on the cath table in supine position. After induction of adequate anesthesia, a transvenous pacing wire was placed.  Bilateral femoral arterial and femoral venous access was obtained. A wire was advanced across the aortic valve. It was exchanged for stiff wire, and an aortic balloon was placed. Under rapid ventricular pacing, balloon valvuloplasty was performed. The balloon was then withdrawn, and a valve was advanced to the level of the aortic annulus. Once the team was satisfied that the valve was in proper position, it was deployed. Excellent positioning was obtained. Post-procedure echo demonstrated no aortic insufficiency. The femoral access sites were controlled using percutaneous techniques. The patient tolerated the procedure well, there were no complications. At the conclusion of the case, sponge and instrument counts were correct.

## 2022-02-22 NOTE — ANESTHESIA PREPROCEDURE EVALUATION
[facility]  Anesthesia Pre-Operative Evaluation         Patient Name: Vivien Burton  YOB: 1945  MRN: [id number]    SUBJECTIVE:     Pre-operative evaluation for Procedure(s) (LRB):  REPLACEMENT, AORTIC VALVE, TRANSCATHETER (TAVR) (N/A)     02/22/2022    Vivien Burton is a 77 y.o. female w/ a significant PMHx of HTN, HLD, GERD, hypothyroidism, and severe aortic stenosis.     She is a 26 mm Evolut candidate via R TF access for a bicuspid aortic valve.    Patient now presents for the above procedure(s).    2D ECHO: 2/14/22  · Severe left atrial enlargement.  · The left ventricle is normal in size with moderately decreased systolic function.  · The estimated ejection fraction is 30%.  · Grade III left ventricular diastolic dysfunction.  · Normal right ventricular size with normal right ventricular systolic function.  · Critical aortic valve stenosis.  · Aortic valve area is 0.29 cm2; peak velocity is 4.43 m/s; mean gradient is 45 mmHg.  · Mild mitral regurgitation.  · Mild tricuspid regurgitation.  · Mild pulmonic regurgitation.  · Normal central venous pressure (3 mmHg).  · There is pulmonary hypertension. The estimated PA systolic pressure is 41 mmHg.    LDA: None documented.     Prev airway: None documented.    Drips: None documented.    Patient Active Problem List   Diagnosis    Cancer    GERD (gastroesophageal reflux disease)    Hyperlipidemia    Hypertension, onset 2012, off medications since 2016    Breast cancer    Rash    Unspecified hypothyroidism    Breast cancer, post right chest radiation    Malignant neoplasm of female breast    Encounter for long-term (current) use of medications    Syncope, 2016, dehydration    PND (post-nasal drip)    Excessive drinking alcohol    Heart murmur, since childhood    Dyslipidemia, baseline     Cardiovascular event risk, ASCVD 10-year risk 11.3%, on simvastatin 20 mg, 2017    Bilateral carotid bruits     Nonrheumatic aortic (valve) stenosis    ISIS (generalized anxiety disorder)    BMI 22.0-22.9, adult    Family history of colon cancer    Elevated brain natriuretic peptide (BNP) level    Anemia    Elevated ALT measurement    LVH (left ventricular hypertrophy) due to hypertensive disease, without heart failure    Acute HFrEF (heart failure with reduced ejection fraction)       Review of patient's allergies indicates:  No Known Allergies    Current Inpatient Medications:      No current facility-administered medications on file prior to encounter.     Current Outpatient Medications on File Prior to Encounter   Medication Sig Dispense Refill    aspirin (ECOTRIN) 81 MG EC tablet Take 81 mg by mouth once daily.      clopidogreL (PLAVIX) 75 mg tablet Take 1 tablet (75 mg total) by mouth once daily. 30 tablet 11    doxycycline (VIBRAMYCIN) 100 MG Cap Take 1 capsule (100 mg total) by mouth 2 (two) times daily with meals. 20 capsule 1    fluticasone propionate (FLONASE) 50 mcg/actuation nasal spray 1 spray by Each Nostril route once daily.      ibandronate (BONIVA) 150 mg tablet Take 1 tablet (150 mg total) by mouth every 30 days. 3 tablet 3    levothyroxine (SYNTHROID) 75 MCG tablet TAKE 1 TABLET BY MOUTH EVERY DAY 90 tablet 4    simvastatin (ZOCOR) 20 MG tablet TAKE ONE (1) TABLET BY MOUTH EVERY EVENING 90 tablet 3       Past Surgical History:   Procedure Laterality Date    BREAST SURGERY       SECTION, CLASSIC      COLONOSCOPY N/A 2018    Procedure: COLONOSCOPY;  Surgeon: Precious Castorena MD;  Location: [facility];  Service: Endoscopy;  Laterality: N/A;    HYSTERECTOMY      LEFT HEART CATHETERIZATION Left 2022    Procedure: Left heart cath;  Surgeon: Dontae Johns MD;  Location: [facility] CATH LAB;  Service: Cardiology;  Laterality: Left;    MASTECTOMY Right 2000    right breast      TONSILLECTOMY, ADENOIDECTOMY         Social History     Socioeconomic History    Marital  status:    Tobacco Use    Smoking status: Former Smoker     Packs/day: 1.00     Types: Cigarettes     Quit date: 2000     Years since quittin.0    Smokeless tobacco: Never Used    Tobacco comment: quit 20 years ago   Substance and Sexual Activity    Alcohol use: Yes     Alcohol/week: 2.0 standard drinks     Types: 2 Shots of liquor per week     Comment: per pt 2 burbon drinks per night however none in last month    Drug use: No    Sexual activity: Not Currently     Social Determinants of Health     Financial Resource Strain: Low Risk     Difficulty of Paying Living Expenses: Not very hard   Food Insecurity: Food Insecurity Present    Worried About Running Out of Food in the Last Year: Sometimes true    Ran Out of Food in the Last Year: Sometimes true   Transportation Needs: No Transportation Needs    Lack of Transportation (Medical): No    Lack of Transportation (Non-Medical): No   Physical Activity: Insufficiently Active    Days of Exercise per Week: 4 days    Minutes of Exercise per Session: 30 min   Stress: No Stress Concern Present    Feeling of Stress : Only a little   Social Connections: Unknown    Frequency of Communication with Friends and Family: More than three times a week    Frequency of Social Gatherings with Friends and Family: Once a week    Active Member of Clubs or Organizations: Yes    Attends Club or Organization Meetings: Patient refused    Marital Status:        OBJECTIVE:     Vital Signs Range (Last 24H):         Significant Labs:  Lab Results   Component Value Date    WBC 8.84 2022    HGB 12.8 2022    HCT 38.7 2022     2022    CHOL 135 2021    TRIG 82 2021    HDL 69 2021    ALT 71 (H) 2021    AST 28 2021     2022    K 3.3 (L) 2022     2022    CREATININE 0.7 2022    BUN 11 2022    CO2 26 2022    TSH 2.85 2021    HGBA1C 4.2 2021        Diagnostic Studies: No relevant studies.    EKG:     Results for orders placed or performed during the hospital encounter of 01/07/22   EKG 12-LEAD on arrival to floor    Collection Time: 01/07/22 12:33 PM    Narrative    Test Reason : I25.10,    Vent. Rate : 079 BPM     Atrial Rate : 079 BPM     P-R Int : 154 ms          QRS Dur : 082 ms      QT Int : 436 ms       P-R-T Axes : 066 038 073 degrees     QTc Int : 499 ms    Normal sinus rhythm  Minimal voltage criteria for LVH, may be normal variant ( Sokolow-Bartlett )  Nonspecific T wave abnormality  Prolonged QT  Abnormal ECG  When compared with ECG of 07-JAN-2022 09:24,  No significant change was found  Confirmed by FARHAN MORALES MD ([id number]) on 1/7/2022 12:57:43 PM    Referred By: STEVO BROWN           Confirmed By:FARHAN MORALES MD         2D ECHO: 2/14/22  · Severe left atrial enlargement.  · The left ventricle is normal in size with moderately decreased systolic function.  · The estimated ejection fraction is 30%.  · Grade III left ventricular diastolic dysfunction.  · Normal right ventricular size with normal right ventricular systolic function.  · Critical aortic valve stenosis.  · Aortic valve area is 0.29 cm2; peak velocity is 4.43 m/s; mean gradient is 45 mmHg.  · Mild mitral regurgitation.  · Mild tricuspid regurgitation.  · Mild pulmonic regurgitation.  · Normal central venous pressure (3 mmHg).  · There is pulmonary hypertension. The estimated PA systolic pressure is 41 mmHg.        ASSESSMENT/PLAN:       Pre-op Assessment    I have reviewed the Patient Summary Reports.     I have reviewed the Nursing Notes.    I have reviewed the Medications.     Review of Systems  Anesthesia Hx:  No problems with previous Anesthesia  History of prior surgery of interest to airway management or planning:   Social:  Non-Smoker        Hematology/Oncology:        Oncology Comments: Breast cancer, post right chest radiation  Malignant neoplasm of female breast        Cardiovascular:   Hypertension    Hepatic/GI:   GERD    Endocrine:   Hypothyroidism    Psych:   Psychiatric History          Physical Exam  General: Alert, Cooperative and Well nourished    Airway:  Mallampati: III / IV  Mouth Opening: Small, but > 3cm  TM Distance: 4 - 6 cm  Neck ROM: Normal ROM        Anesthesia Plan  Type of Anesthesia, risks & benefits discussed:    Anesthesia Type: Gen Natural Airway  Intra-op Monitoring Plan: Standard ASA Monitors, Art Line and Central Line  Post Op Pain Control Plan: multimodal analgesia  ASA Score: 4  Day of Surgery Review of History & Physical: H&P Update referred to the surgeon/provider.    Ready For Surgery From Anesthesia Perspective.     .

## 2022-02-22 NOTE — NURSING TRANSFER
Nursing Transfer Note      2/22/2022     Reason patient is being transferred: D/C CRITERIA MET     Transfer To: [id number]    Transfer via stretcher    Transfer with cardiac monitoring AND 2LITERS NC .   Transported by NASREEN RN     Medicines sent: IV FLUIDS     Any special needs or follow-up needed: GROIN CHECKS     Chart send with patient: Yes    Notified: son    Patient reassessed at: SEE Epic  (date, time)    Upon arrival to floor: TO ROOM NO COMPLAINTS NO DISTRESS NOTED.

## 2022-02-23 VITALS
HEART RATE: 87 BPM | SYSTOLIC BLOOD PRESSURE: 131 MMHG | HEIGHT: 60 IN | WEIGHT: 110 LBS | BODY MASS INDEX: 21.6 KG/M2 | TEMPERATURE: 98 F | OXYGEN SATURATION: 99 % | DIASTOLIC BLOOD PRESSURE: 59 MMHG | RESPIRATION RATE: 23 BRPM

## 2022-02-23 PROBLEM — Z79.02 PLATELET INHIBITION DUE TO PLAVIX: Status: ACTIVE | Noted: 2022-02-23

## 2022-02-23 PROBLEM — I50.43 ACUTE ON CHRONIC COMBINED SYSTOLIC AND DIASTOLIC HEART FAILURE: Status: ACTIVE | Noted: 2022-02-23

## 2022-02-23 PROBLEM — I25.10 CORONARY ARTERY DISEASE: Status: ACTIVE | Noted: 2022-02-23

## 2022-02-23 PROBLEM — R57.0 CARDIOGENIC SHOCK: Status: ACTIVE | Noted: 2022-02-23

## 2022-02-23 LAB
ALBUMIN SERPL BCP-MCNC: 2.4 G/DL (ref 3.5–5.2)
ALP SERPL-CCNC: 97 U/L (ref 55–135)
ALT SERPL W/O P-5'-P-CCNC: 18 U/L (ref 10–44)
ANION GAP SERPL CALC-SCNC: 9 MMOL/L (ref 8–16)
AST SERPL-CCNC: 26 U/L (ref 10–40)
BASOPHILS # BLD AUTO: 0.04 K/UL (ref 0–0.2)
BASOPHILS # BLD AUTO: 0.05 K/UL (ref 0–0.2)
BASOPHILS NFR BLD: 0.4 % (ref 0–1.9)
BASOPHILS NFR BLD: 0.5 % (ref 0–1.9)
BILIRUB SERPL-MCNC: 0.4 MG/DL (ref 0.1–1)
BUN SERPL-MCNC: 11 MG/DL (ref 8–23)
CALCIUM SERPL-MCNC: 7.4 MG/DL (ref 8.7–10.5)
CHLORIDE SERPL-SCNC: 108 MMOL/L (ref 95–110)
CO2 SERPL-SCNC: 18 MMOL/L (ref 23–29)
CREAT SERPL-MCNC: 0.6 MG/DL (ref 0.5–1.4)
DIFFERENTIAL METHOD: ABNORMAL
DIFFERENTIAL METHOD: ABNORMAL
EOSINOPHIL # BLD AUTO: 0.2 K/UL (ref 0–0.5)
EOSINOPHIL # BLD AUTO: 0.2 K/UL (ref 0–0.5)
EOSINOPHIL NFR BLD: 1.6 % (ref 0–8)
EOSINOPHIL NFR BLD: 1.8 % (ref 0–8)
ERYTHROCYTE [DISTWIDTH] IN BLOOD BY AUTOMATED COUNT: 15.8 % (ref 11.5–14.5)
ERYTHROCYTE [DISTWIDTH] IN BLOOD BY AUTOMATED COUNT: 15.9 % (ref 11.5–14.5)
EST. GFR  (AFRICAN AMERICAN): >60 ML/MIN/1.73 M^2
EST. GFR  (NON AFRICAN AMERICAN): >60 ML/MIN/1.73 M^2
GLUCOSE SERPL-MCNC: 102 MG/DL (ref 70–110)
HCT VFR BLD AUTO: 22.9 % (ref 37–48.5)
HCT VFR BLD AUTO: 24.7 % (ref 37–48.5)
HCT VFR BLD AUTO: 24.8 % (ref 37–48.5)
HGB BLD-MCNC: 7.4 G/DL (ref 12–16)
HGB BLD-MCNC: 7.8 G/DL (ref 12–16)
HGB BLD-MCNC: 8 G/DL (ref 12–16)
IMM GRANULOCYTES # BLD AUTO: 0.1 K/UL (ref 0–0.04)
IMM GRANULOCYTES # BLD AUTO: 0.11 K/UL (ref 0–0.04)
IMM GRANULOCYTES NFR BLD AUTO: 1 % (ref 0–0.5)
IMM GRANULOCYTES NFR BLD AUTO: 1.1 % (ref 0–0.5)
LYMPHOCYTES # BLD AUTO: 1.3 K/UL (ref 1–4.8)
LYMPHOCYTES # BLD AUTO: 1.6 K/UL (ref 1–4.8)
LYMPHOCYTES NFR BLD: 13 % (ref 18–48)
LYMPHOCYTES NFR BLD: 16.1 % (ref 18–48)
MCH RBC QN AUTO: 28.9 PG (ref 27–31)
MCH RBC QN AUTO: 29.4 PG (ref 27–31)
MCHC RBC AUTO-ENTMCNC: 31.6 G/DL (ref 32–36)
MCHC RBC AUTO-ENTMCNC: 32.3 G/DL (ref 32–36)
MCV RBC AUTO: 91 FL (ref 82–98)
MCV RBC AUTO: 92 FL (ref 82–98)
MONOCYTES # BLD AUTO: 0.6 K/UL (ref 0.3–1)
MONOCYTES # BLD AUTO: 0.6 K/UL (ref 0.3–1)
MONOCYTES NFR BLD: 5.6 % (ref 4–15)
MONOCYTES NFR BLD: 6.1 % (ref 4–15)
NEUTROPHILS # BLD AUTO: 7.4 K/UL (ref 1.8–7.7)
NEUTROPHILS # BLD AUTO: 8.1 K/UL (ref 1.8–7.7)
NEUTROPHILS NFR BLD: 74.6 % (ref 38–73)
NEUTROPHILS NFR BLD: 78.2 % (ref 38–73)
NRBC BLD-RTO: 0 /100 WBC
NRBC BLD-RTO: 0 /100 WBC
PLATELET # BLD AUTO: 203 K/UL (ref 150–450)
PLATELET # BLD AUTO: 205 K/UL (ref 150–450)
PMV BLD AUTO: 10.3 FL (ref 9.2–12.9)
PMV BLD AUTO: 9.8 FL (ref 9.2–12.9)
POC ACTIVATED CLOTTING TIME K: 237 SEC (ref 74–137)
POC ACTIVATED CLOTTING TIME K: 321 SEC (ref 74–137)
POTASSIUM SERPL-SCNC: 3.8 MMOL/L (ref 3.5–5.1)
PROT SERPL-MCNC: 4.4 G/DL (ref 6–8.4)
RBC # BLD AUTO: 2.52 M/UL (ref 4–5.4)
RBC # BLD AUTO: 2.7 M/UL (ref 4–5.4)
SAMPLE: ABNORMAL
SAMPLE: ABNORMAL
SODIUM SERPL-SCNC: 135 MMOL/L (ref 136–145)
WBC # BLD AUTO: 10.34 K/UL (ref 3.9–12.7)
WBC # BLD AUTO: 9.91 K/UL (ref 3.9–12.7)

## 2022-02-23 PROCEDURE — 93005 ELECTROCARDIOGRAM TRACING: CPT

## 2022-02-23 PROCEDURE — 94761 N-INVAS EAR/PLS OXIMETRY MLT: CPT

## 2022-02-23 PROCEDURE — 80053 COMPREHEN METABOLIC PANEL: CPT | Performed by: STUDENT IN AN ORGANIZED HEALTH CARE EDUCATION/TRAINING PROGRAM

## 2022-02-23 PROCEDURE — 85018 HEMOGLOBIN: CPT | Performed by: PHYSICIAN ASSISTANT

## 2022-02-23 PROCEDURE — 99239 PR HOSPITAL DISCHARGE DAY,>30 MIN: ICD-10-PCS | Mod: ,,, | Performed by: PHYSICIAN ASSISTANT

## 2022-02-23 PROCEDURE — 85025 COMPLETE CBC W/AUTO DIFF WBC: CPT | Mod: 91 | Performed by: INTERNAL MEDICINE

## 2022-02-23 PROCEDURE — 93010 ELECTROCARDIOGRAM REPORT: CPT | Mod: ,,, | Performed by: INTERNAL MEDICINE

## 2022-02-23 PROCEDURE — 85014 HEMATOCRIT: CPT | Performed by: PHYSICIAN ASSISTANT

## 2022-02-23 PROCEDURE — 85025 COMPLETE CBC W/AUTO DIFF WBC: CPT | Performed by: STUDENT IN AN ORGANIZED HEALTH CARE EDUCATION/TRAINING PROGRAM

## 2022-02-23 PROCEDURE — 99239 HOSP IP/OBS DSCHRG MGMT >30: CPT | Mod: ,,, | Performed by: PHYSICIAN ASSISTANT

## 2022-02-23 PROCEDURE — 93010 EKG 12-LEAD: ICD-10-PCS | Mod: ,,, | Performed by: INTERNAL MEDICINE

## 2022-02-23 PROCEDURE — 25000003 PHARM REV CODE 250: Performed by: PHYSICIAN ASSISTANT

## 2022-02-23 RX ADMIN — POTASSIUM BICARBONATE 50 MEQ: 978 TABLET, EFFERVESCENT ORAL at 06:02

## 2022-02-23 NOTE — HOSPITAL COURSE
Vivien Burton was admitted and underwent successful placement of a 26 mm Evolut TAVR via RTF access under MAC sedation on 2/22/22. Please see full cath report for details. A transthoracic echo was performed immediately post procedure which showed no paravalvular leak. An aortic valve maximum velocity through the aortic valve of 1.0 m/s. She was transported to the CCU in stable condition with a TVP and arterial line in place. She remained hemodynamically stable overnight. EKG remained stable post TAVR therefore no EP consult was warranted. This morning, she ambulated without difficulty and was eager to go home. It was felt she was stable for discharge and will go home on ASA/Plavix for antithrombotic therapy post TAVR.

## 2022-02-23 NOTE — PLAN OF CARE
Pt RA, no gtts, lines pulled, ambulated with minimal SOB, O2 sats >95% with ambulation.     Problem: Adult Inpatient Plan of Care  Goal: Plan of Care Review  Outcome: Met  Goal: Patient-Specific Goal (Individualized)  Outcome: Met  Goal: Absence of Hospital-Acquired Illness or Injury  Outcome: Met  Goal: Optimal Comfort and Wellbeing  Outcome: Met  Goal: Readiness for Transition of Care  Outcome: Met     Problem: Infection  Goal: Absence of Infection Signs and Symptoms  Outcome: Met     Problem: Fall Injury Risk  Goal: Absence of Fall and Fall-Related Injury  Outcome: Met

## 2022-02-23 NOTE — ASSESSMENT & PLAN NOTE
Successful right transfemoral 26 mm Evolut Pro TAVR. No paravalvular leak, peak velocity 1.0 and mean gradient for post TAVR by transthoracic echo.     Discharge Plans:  1. Follow up with [facility] in 1 month and 1 year with labs and Echo.  2. ASA indefinitely.   3. Plavix for 1 year post PCI (OK to discontinue 1/7/23).   4. No non sterile procedures which could cause endocarditis for 6 months post TAVR including dental work, endoscopy, colonoscopy, and  procedures.   5. SBE prophylaxis for life.

## 2022-02-23 NOTE — DISCHARGE SUMMARY
[facility]  Interventional Cardiology  Discharge Summary      Patient Name: Vivien Burton  MRN: [id number]  Admission Date: 2/22/2022  Hospital Length of Stay: 1 days  Discharge Date and Time:  02/23/2022 10:31 AM  Attending Physician: Dontae Johns MD  Discharging Provider: Marley Rudolph PA-C  Primary Care Physician: Ellen Robert III, MD    HPI:  Referring Physician: Dr Mccoy     HPI     Vivien Burton is a 76 y.o. female with a past medical history of anxiety, HTN, HLD, breast cancer, GERD, and hypothyroidism referred by Dr Mccoy for evaluation of severe AS (NYHA Class III sx). She was seen by Dr Mccoy reporting SOB. She had labs done which showed a significantly elevated BNP of 2000. TTE done showed decreased EF at 21% as well as low flow aortic stenosis. Labs also showed WBC count elevated. She is currently on antibiotics for pneumonia treatment. She is a former smoker. She has never had an angiogram in the past.      Today she states she has been short of breath for a few months now. She first noticed she shortness of breath in November when she was diagnosed with pneumonia. Prior to her pneumonia diagnosis she was experiencing dyspnea on exertion with activities like cutting her grass but did not notice her symptoms on a day to day basis. She now has SOB with walking far distances as well as leg swelling. She had to stop twice walking from the parking garage to clinic.            Vivien Burton is a 77 y.o. female referred by Dr Mccoy for evaluation of severe AS (NYHA Class III sx).     The patient has undergone the following TAVR work-up:   · ECHO (Date 12/22/2021): BREANNA= 0.29 cm2, MG= 45 mmHg, Peak Ruben= 4.43 m/s, EF= 30%.   · LHC: 1/7/2022: Prox LAD PCI with 3.0 x 15 LAWRENCE  · STS: 4.0%    · Frailty: 1/4 (failed )   · Iliacs are 5.92 mm > on the L and > 7.33 on the R  · LVOT area by CTA is 3.15 cm2 ( 24.3 x 17.6 ) Avg Diameter of 20.0. Bicuspid  valve  · Incidental findings on CT: None  · CT Surgery risk assessment: Low risk per Dr. Scales  · Rhythm issues: none  · Comorbidities: history of right breast cancer (s/p mastectomy and radiation), hypothyroidism, HLD      Procedure(s) (LRB):  REPLACEMENT, AORTIC VALVE, TRANSCATHETER (TAVR) (N/A)  Cardiac Cath Cosurgeon (N/A)  Left heart cath (Left)     Indwelling Lines/Drains at time of discharge:  Lines/Drains/Airways     None                 Hospital Course:  Vivien Burton was admitted and underwent successful placement of a 26 mm Evolut TAVR via RTF access under MAC sedation on 2/22/22. Please see full cath report for details. A transthoracic echo was performed immediately post procedure which showed no paravalvular leak. An aortic valve maximum velocity through the aortic valve of 1.0 m/s. She was transported to the CCU in stable condition with a TVP and arterial line in place. She remained hemodynamically stable overnight. EKG remained stable post TAVR therefore no EP consult was warranted. This morning, she ambulated without difficulty and was eager to go home. It was felt she was stable for discharge and will go home on ASA/Plavix for antithrombotic therapy post TAVR.       Goals of Care Treatment Preferences:       Living Will: Yes                  Significant Diagnostic Studies: Labs:   BMP:   Recent Labs   Lab 02/22/22  0558 02/23/22  0353   * 102   * 135*   K 3.6 3.8    108   CO2 23 18*   BUN 10 11   CREATININE 0.7 0.6   CALCIUM 9.6 7.4*   , CMP   Recent Labs   Lab 02/22/22  0558 02/23/22  0353   * 135*   K 3.6 3.8    108   CO2 23 18*   * 102   BUN 10 11   CREATININE 0.7 0.6   CALCIUM 9.6 7.4*   PROT  --  4.4*   ALBUMIN  --  2.4*   BILITOT  --  0.4   ALKPHOS  --  97   AST  --  26   ALT  --  18   ANIONGAP 11 9   ESTGFRAFRICA >60.0 >60.0   EGFRNONAA >60.0 >60.0   , CBC   Recent Labs   Lab 02/22/22  0558 02/23/22  0353 02/23/22  0544 02/23/22  0743   WBC  7.89 10.34 9.91  --    HGB 9.2* 7.4* 7.8* 8.0*   HCT 28.0* 22.9* 24.7* 24.8*    205 203  --     and All labs within the past 24 hours have been reviewed    Pending Diagnostic Studies:     None        * S/P TAVR (transcatheter aortic valve replacement)  Successful right transfemoral 26 mm Evolut Pro TAVR. No paravalvular leak, peak velocity 1.0 and mean gradient for post TAVR by transthoracic echo.     Discharge Plans:  1. Follow up with [facility] in 1 month and 1 year with labs and Echo.  2. ASA indefinitely.   3. Plavix for 1 year post PCI (OK to discontinue 1/7/23).   4. No non sterile procedures which could cause endocarditis for 6 months post TAVR including dental work, endoscopy, colonoscopy, and  procedures.   5. SBE prophylaxis for life.    Coronary artery disease  S/p LAD PCI with LAWRENCE on 1/7/22. DAPT for 1 year. Continue statin.     Cardiogenic shock  Cardiogenic shock requiring 1 inotrope. Now resolved.     Acute on chronic combined systolic and diastolic heart failure  Severe symptomatic aortic stenosis with acute on chronic combined heart failure and LVEDP of 22.     Nonrheumatic aortic (valve) stenosis  See above.     Hyperlipidemia  Chronic. Controlled. Follow up with Cardiology/PCP.        Discharged Condition: good    Follow Up:    Patient Instructions:      CBC Without Differential   Standing Status: Future Standing Exp. Date: 04/23/23     Basic Metabolic Panel   Standing Status: Future Standing Exp. Date: 04/23/23     Notify your health care provider if you experience any of the following:  temperature >100.4     Notify your health care provider if you experience any of the following:  persistent nausea and vomiting or diarrhea     Notify your health care provider if you experience any of the following:  severe uncontrolled pain     Notify your health care provider if you experience any of the following:  redness, tenderness, or signs of infection (pain, swelling, redness, odor or  green/yellow discharge around incision site)     Notify your health care provider if you experience any of the following:  difficulty breathing or increased cough     Notify your health care provider if you experience any of the following:  severe persistent headache     Notify your health care provider if you experience any of the following:  worsening rash     Notify your health care provider if you experience any of the following:  persistent dizziness, light-headedness, or visual disturbances     Notify your health care provider if you experience any of the following:  increased confusion or weakness     Echo   Standing Status: Future Standing Exp. Date: 02/22/23     Order Specific Question Answer Comments   Color Doppler? Yes      Medications:  Reconciled Home Medications:      Medication List      CONTINUE taking these medications    aspirin 81 MG EC tablet  Commonly known as: ECOTRIN  Take 81 mg by mouth once daily.     clopidogreL 75 mg tablet  Commonly known as: PLAVIX  Take 1 tablet (75 mg total) by mouth once daily.     fluticasone propionate 50 mcg/actuation nasal spray  Commonly known as: FLONASE  1 spray by Each Nostril route once daily.     furosemide 20 MG tablet  Commonly known as: LASIX  TAKE 1 TABLET BY MOUTH EVERY DAY AS NEEDED FOR FLUID RETENTION     ibandronate 150 mg tablet  Commonly known as: BONIVA  Take 1 tablet (150 mg total) by mouth every 30 days.     levothyroxine 75 MCG tablet  Commonly known as: SYNTHROID  TAKE 1 TABLET BY MOUTH EVERY DAY     simvastatin 20 MG tablet  Commonly known as: ZOCOR  TAKE ONE (1) TABLET BY MOUTH EVERY EVENING        STOP taking these medications    doxycycline 100 MG Cap  Commonly known as: VIBRAMYCIN            Time spent on the discharge of patient: 40 minutes    Marley Rudolph PA-C  Interventional Cardiology  [facility]

## 2022-02-23 NOTE — PLAN OF CARE
02/23/22 1056   Post-Acute Status   Post-Acute Authorization Other   Other Status No Post-Acute Service Needs      The patient is being d/c today with no social service needs identified at this time.         Deanna Segura LMSW  Case Management [facility] [place]  Ext: [phone]

## 2022-02-23 NOTE — PLAN OF CARE
[facility]  Discharge Final Note    Primary Care Provider: Ellen Robert III, MD    Expected Discharge Date: 2/23/2022    Final Discharge Note (most recent)     Final Note - 02/23/22 1135        Final Note    Assessment Type Final Discharge Note     Anticipated Discharge Disposition Home or Self Care     Hospital Resources/Appts/Education Provided Appointments scheduled and added to AVS        Post-Acute Status    Post-Acute Authorization Other     Other Status No Post-Acute Service Needs                 Important Message from Medicare             Contact Info     Ellen Robert III, MD   Specialty: Internal Medicine, Cardiology   Relationship: PCP - General    [address] [place] [id number]   Phone: [phone]       Next Steps: Go on 3/2/2022    Instructions: Please go to your hospital follow up appointment with Dr. Robert on 3/2/2022 at 1:45PM. Please bring your discharge paperwork, ID and insurance card.          Deanna Segura LMSW  Case Management [facility]  Ext: [phone]

## 2022-02-23 NOTE — ANESTHESIA POSTPROCEDURE EVALUATION
Anesthesia Post Evaluation    Patient: Vivien Burton    Procedure(s) Performed: Procedure(s) (LRB):  REPLACEMENT, AORTIC VALVE, TRANSCATHETER (TAVR) (N/A)  Cardiac Cath Cosurgeon (N/A)  Left heart cath (Left)    Final Anesthesia Type: general      Patient location during evaluation: ICU  Patient participation: Yes- Able to Participate  Level of consciousness: awake and alert  Post-procedure vital signs: reviewed and stable  Pain management: adequate  Airway patency: patent    PONV status at discharge: No PONV  Anesthetic complications: no      Cardiovascular status: stable  Respiratory status: spontaneous ventilation  Hydration status: euvolemic  Follow-up not needed.          Vitals Value Taken Time   /59 02/23/22 0901   Temp 36.4 °C (97.6 °F) 02/23/22 0700   Pulse 88 02/23/22 0934   Resp 19 02/23/22 0934   SpO2 98 % 02/23/22 0934   Vitals shown include unvalidated device data.      Event Time   Out of Recovery 13:30:00         Pain/Lizy Score: Lizy Score: 8 (2/22/2022  1:00 PM)

## 2022-02-23 NOTE — DISCHARGE INSTRUCTIONS
TAVR Discharge Instructions    You have received a temporary card with your valve serial number on it. Please keep this in your wallet. The company will send you a permanent plastic card in approximately 6 weeks. This is important to keep with you in the future.     Preventing Infection on Your Heart Valve  You have also been given a card: PREVENTION OF INFECTIVE BACTERIAL ENDOCARDITIS  Provide a copy of this card to your Dentist and Gastroenterologist to keep in your chart.  You DO need to take antibiotics prior to any routine dental cleaning, GI procedures (colonoscopy, endoscopy),  (cystoscopy, bladder procedures), or respiratory procedures (bronchoscopy).  NO elective procedures for 6 months after your valve replacement  You need antibiotics if you are having a procedure that requires cutting into infected skin (lancing a boil).  Your Dentist or Gastroenterologist will prescribe these for you prior to your appointment. Should you have any issues getting these prescribed, please call our office.     Managing Your Heart Failure  Weigh yourself daily.  If you are on Lasix, continue to take it as prescribed.  If you gain 3 pounds overnight or 5 pounds over 5 days, begin taking Lasix or double on your Lasix for 3 days.     General Post-op Instructions  No lifting > 5 pounds for 5 days.  No driving for 5 days.   You may shower as soon as your get home but no bathing or submerging in water (lakes, pools, tub, etc.) for 1 week. You may remove the dressing and replace it with a band-aid.  Keep incision clean and dry. No lotions, oils, or creams. You may chance the band-aid daily until a scab has formed over.     Follow Up  As agreed upon prior to your TAVR, you are required to return to [facility] for a one month and one year visit with lab work, an echo, and an appointment to see a member of our team. Please remember these visits are required at [facility] in [place] in order to have your TAVR covered by your  insurance. These appointments will be made for you.     Discharge  Should you have any questions or concerns, please do not hesitate to call our office ([phone])    Please address any other concerns with your primary Cardiologist.

## 2022-02-24 RX ORDER — FENTANYL CITRATE 50 UG/ML
INJECTION, SOLUTION INTRAMUSCULAR; INTRAVENOUS
Status: DISCONTINUED | OUTPATIENT
Start: 2022-02-22 | End: 2022-02-24

## 2022-03-02 PROBLEM — R57.0 CARDIOGENIC SHOCK: Status: RESOLVED | Noted: 2022-02-23 | Resolved: 2022-03-02

## 2022-03-29 ENCOUNTER — HOSPITAL ENCOUNTER (OUTPATIENT)
Dept: CARDIOLOGY | Facility: HOSPITAL | Age: 77
Discharge: HOME | End: 2022-03-29
Attending: PHYSICIAN ASSISTANT
Payer: MEDICARE

## 2022-03-29 ENCOUNTER — OFFICE VISIT (OUTPATIENT)
Dept: CARDIOLOGY | Facility: CLINIC | Age: 77
End: 2022-03-29
Payer: MEDICARE

## 2022-03-29 VITALS
OXYGEN SATURATION: 100 % | HEART RATE: 78 BPM | DIASTOLIC BLOOD PRESSURE: 79 MMHG | SYSTOLIC BLOOD PRESSURE: 184 MMHG | WEIGHT: 105.81 LBS | BODY MASS INDEX: 20.67 KG/M2

## 2022-03-29 VITALS
BODY MASS INDEX: 21.6 KG/M2 | WEIGHT: 110 LBS | HEIGHT: 60 IN | HEART RATE: 75 BPM | SYSTOLIC BLOOD PRESSURE: 144 MMHG | DIASTOLIC BLOOD PRESSURE: 70 MMHG

## 2022-03-29 DIAGNOSIS — I25.10 CORONARY ARTERY DISEASE, UNSPECIFIED VESSEL OR LESION TYPE, UNSPECIFIED WHETHER ANGINA PRESENT, UNSPECIFIED WHETHER NATIVE OR TRANSPLANTED HEART: ICD-10-CM

## 2022-03-29 DIAGNOSIS — Z95.2 S/P TAVR (TRANSCATHETER AORTIC VALVE REPLACEMENT): ICD-10-CM

## 2022-03-29 DIAGNOSIS — I70.0 AORTIC ATHEROSCLEROSIS: ICD-10-CM

## 2022-03-29 DIAGNOSIS — I50.42 CHRONIC COMBINED SYSTOLIC AND DIASTOLIC HEART FAILURE: ICD-10-CM

## 2022-03-29 LAB
ASCENDING AORTA: 2.8 CM
AV INDEX (PROSTH): 0.46
AV MEAN GRADIENT: 9 MMHG
AV PEAK GRADIENT: 14 MMHG
AV VALVE AREA: 1.71 CM2
AV VELOCITY RATIO: 0.46
BSA FOR ECHO PROCEDURE: 1.45 M2
CV ECHO LV RWT: 0.4 CM
DOP CALC AO PEAK VEL: 1.85 M/S
DOP CALC AO VTI: 42.59 CM
DOP CALC LVOT AREA: 3.7 CM2
DOP CALC LVOT DIAMETER: 2.17 CM
DOP CALC LVOT PEAK VEL: 0.85 M/S
DOP CALC LVOT STROKE VOLUME: 72.67 CM3
DOP CALCLVOT PEAK VEL VTI: 19.66 CM
E WAVE DECELERATION TIME: 160.72 MSEC
E/A RATIO: 1.25
E/E' RATIO: 20.2 M/S
ECHO LV POSTERIOR WALL: 0.92 CM (ref 0.6–1.1)
EJECTION FRACTION: 50 %
FRACTIONAL SHORTENING: 18 % (ref 28–44)
INTERVENTRICULAR SEPTUM: 1.1 CM (ref 0.6–1.1)
LA MAJOR: 4.62 CM
LA MINOR: 4.17 CM
LA WIDTH: 3.99 CM
LEFT ATRIUM SIZE: 4.22 CM
LEFT ATRIUM VOLUME INDEX MOD: 32.3 ML/M2
LEFT ATRIUM VOLUME INDEX: 43.3 ML/M2
LEFT ATRIUM VOLUME MOD: 46.84 CM3
LEFT ATRIUM VOLUME: 62.74 CM3
LEFT INTERNAL DIMENSION IN SYSTOLE: 3.74 CM (ref 2.1–4)
LEFT VENTRICLE DIASTOLIC VOLUME INDEX: 66.4 ML/M2
LEFT VENTRICLE DIASTOLIC VOLUME: 96.28 ML
LEFT VENTRICLE MASS INDEX: 110 G/M2
LEFT VENTRICLE SYSTOLIC VOLUME INDEX: 41 ML/M2
LEFT VENTRICLE SYSTOLIC VOLUME: 59.51 ML
LEFT VENTRICULAR INTERNAL DIMENSION IN DIASTOLE: 4.58 CM (ref 3.5–6)
LEFT VENTRICULAR MASS: 159.86 G
LV LATERAL E/E' RATIO: 16.83 M/S
LV SEPTAL E/E' RATIO: 25.25 M/S
MV A" WAVE DURATION": 8.85 MSEC
MV PEAK A VEL: 0.81 M/S
MV PEAK E VEL: 1.01 M/S
MV STENOSIS PRESSURE HALF TIME: 46.61 MS
MV VALVE AREA P 1/2 METHOD: 4.72 CM2
PISA TR MAX VEL: 2.3 M/S
PULM VEIN S/D RATIO: 0.88
PV PEAK D VEL: 0.49 M/S
PV PEAK S VEL: 0.43 M/S
QEF: 48 %
RA MAJOR: 4.28 CM
RA PRESSURE: 3 MMHG
RA WIDTH: 3.62 CM
RIGHT VENTRICULAR END-DIASTOLIC DIMENSION: 3.99 CM
RV TISSUE DOPPLER FREE WALL SYSTOLIC VELOCITY 1 (APICAL 4 CHAMBER VIEW): 9.89 CM/S
SINUS: 2.78 CM
STJ: 2.65 CM
TDI LATERAL: 0.06 M/S
TDI SEPTAL: 0.04 M/S
TDI: 0.05 M/S
TR MAX PG: 21 MMHG
TRICUSPID ANNULAR PLANE SYSTOLIC EXCURSION: 1.7 CM
TV REST PULMONARY ARTERY PRESSURE: 24 MMHG

## 2022-03-29 PROCEDURE — 99213 OFFICE O/P EST LOW 20 MIN: CPT | Mod: PBBFAC,25 | Performed by: PHYSICIAN ASSISTANT

## 2022-03-29 PROCEDURE — 99214 PR OFFICE/OUTPT VISIT, EST, LEVL IV, 30-39 MIN: ICD-10-PCS | Mod: S$PBB,,, | Performed by: PHYSICIAN ASSISTANT

## 2022-03-29 PROCEDURE — 99999 PR PBB SHADOW E&M-EST. PATIENT-LVL III: CPT | Mod: PBBFAC,,, | Performed by: PHYSICIAN ASSISTANT

## 2022-03-29 PROCEDURE — 93306 ECHO (CUPID ONLY): ICD-10-PCS | Mod: 26,,, | Performed by: INTERNAL MEDICINE

## 2022-03-29 PROCEDURE — 93306 TTE W/DOPPLER COMPLETE: CPT

## 2022-03-29 PROCEDURE — 99214 OFFICE O/P EST MOD 30 MIN: CPT | Mod: S$PBB,,, | Performed by: PHYSICIAN ASSISTANT

## 2022-03-29 PROCEDURE — 99999 PR PBB SHADOW E&M-EST. PATIENT-LVL III: ICD-10-PCS | Mod: PBBFAC,,, | Performed by: PHYSICIAN ASSISTANT

## 2022-03-29 PROCEDURE — 93306 TTE W/DOPPLER COMPLETE: CPT | Mod: 26,,, | Performed by: INTERNAL MEDICINE

## 2022-03-29 NOTE — PROGRESS NOTES
Subjective:    Patient ID:  Vivien Burton is a 77 y.o. female who presents for follow-up of Aortic Stenosis      Referring Physician: Dr Mccoy    HPI  Vivien Burton is a 77 y.o. female with a past medical history of anxiety, HTN, HLD, breast cancer, GERD, and hypothyroidism who is now s/p TAVR. She was seen by Dr Mccoy reporting SOB. She had labs done which showed a significantly elevated BNP of 2000. TTE done showed decreased EF at 21% as well as low flow aortic stenosis. Labs also showed WBC count elevated. She is a former smoker.      TAVR Hospital Course:  Vivien Burton was admitted and underwent successful placement of a 26 mm Evolut TAVR via RTF access under MAC sedation on 2/22/22. Please see full cath report for details. A transthoracic echo was performed immediately post procedure which showed no paravalvular leak. An aortic valve maximum velocity through the aortic valve of 1.0 m/s. She was transported to the CCU in stable condition with a TVP and arterial line in place. She remained hemodynamically stable overnight. EKG remained stable post TAVR therefore no EP consult was warranted. This morning, she ambulated without difficulty and was eager to go home. It was felt she was stable for discharge and will go home on ASA/Plavix for antithrombotic therapy post TAVR.     Interval History  She is doing very well today with no complaint. Was able to ride in a [employer] parade 1 week after TAVR. Denies SOB, CLIFFORD, LE swelling, weight gain, and orthopnea.       NYHA: I CCS: 0    Review of Systems   Constitutional: Negative for chills and fever.   HENT: Negative for sore throat.    Eyes: Negative for blurred vision.   Cardiovascular: Negative for chest pain, claudication, cyanosis, dyspnea on exertion, irregular heartbeat, leg swelling, near-syncope, orthopnea, palpitations, paroxysmal nocturnal dyspnea and syncope.   Respiratory: Negative for cough and sputum production.     Hematologic/Lymphatic: Does not bruise/bleed easily.   Skin: Negative for itching, rash and suspicious lesions.   Musculoskeletal: Negative for falls.   Gastrointestinal: Negative for abdominal pain and change in bowel habit.   Genitourinary: Negative for dysuria.   Neurological: Negative for disturbances in coordination, dizziness and loss of balance.   Psychiatric/Behavioral: Negative for altered mental status.          Past Medical History:   Diagnosis Date    Acute on chronic combined systolic and diastolic heart failure 2/23/2022    Anemia     Basal cell carcinoma 1999    Breast cancer     Breast cancer     Cancer     CHF (congestive heart failure)     Coronary artery disease     GERD (gastroesophageal reflux disease)     Hyperlipidemia     Hypertension     Hypothyroidism     Nonrheumatic aortic (valve) stenosis 6/5/2018    Osteoporosis 02/05/2019    S/P TAVR (transcatheter aortic valve replacement) 2/22/2022    Squamous cell carcinoma of skin 2018    Thyroid disease        Current Outpatient Medications:     aspirin (ECOTRIN) 81 MG EC tablet, Take 81 mg by mouth once daily., Disp: , Rfl:     clopidogreL (PLAVIX) 75 mg tablet, Take 1 tablet (75 mg total) by mouth once daily., Disp: 30 tablet, Rfl: 11    fluticasone propionate (FLONASE) 50 mcg/actuation nasal spray, 1 spray by Each Nostril route once daily., Disp: , Rfl:     furosemide (LASIX) 20 MG tablet, TAKE 1 TABLET BY MOUTH EVERY DAY AS NEEDED FOR FLUID RETENTION, Disp: 30 tablet, Rfl: 3    ibandronate (BONIVA) 150 mg tablet, Take 1 tablet (150 mg total) by mouth every 30 days., Disp: 3 tablet, Rfl: 3    levothyroxine (SYNTHROID) 75 MCG tablet, TAKE 1 TABLET BY MOUTH EVERY DAY, Disp: 90 tablet, Rfl: 4    simvastatin (ZOCOR) 20 MG tablet, TAKE ONE (1) TABLET BY MOUTH EVERY EVENING, Disp: 90 tablet, Rfl: 3    Objective:    Physical Exam  Vitals reviewed.   Constitutional:       General: She is not in acute distress.     Appearance:  She is well-developed. She is not diaphoretic.   HENT:      Head: Normocephalic and atraumatic.   Neck:      Vascular: No JVD.   Cardiovascular:      Rate and Rhythm: Normal rate and regular rhythm.      Pulses: Intact distal pulses.      Heart sounds: No murmur heard.  Pulmonary:      Effort: Pulmonary effort is normal. No respiratory distress.      Breath sounds: Normal breath sounds.   Musculoskeletal:      Cervical back: Normal range of motion.      Right lower leg: No edema.      Left lower leg: No edema.   Skin:     General: Skin is warm and dry.   Neurological:      Mental Status: She is alert and oriented to person, place, and time.             Vitals:    03/29/22 1428   BP: (!) 184/79   BP Location: Left arm   Patient Position: Sitting   BP Method: Large (Automatic)   Pulse: 78   SpO2: 100%   Weight: 48 kg (105 lb 13.1 oz)     Body mass index is 20.67 kg/m².    Test(s) Reviewed  I have reviewed the following in detail:  [] Stress test   [] Angiography   [] Echocardiogram   [] Labs:   [] Other:     · The left ventricle is normal in size with eccentric hypertrophy and low normal systolic function. The estimated ejection fraction is 50%.  · The quantitatively derived ejection fraction is 48%.  · Normal right ventricular size with normal right ventricular systolic function.  · Grade II left ventricular diastolic dysfunction.  · Moderate left atrial enlargement.  · There is a 26 mm Evolut transcutaneously-placed aortic bioprosthesis present. There is trivial paravalvular aortic insufficiency present.  · The aortic valve mean gradient is 9 mmHg with a dimensionless index of 0.46.  · The estimated PA systolic pressure is 24 mmHg.  · Normal central venous pressure (3 mmHg).       Assessment:   S/P TAVR (transcatheter aortic valve replacement)  Successful right transfemoral 26 mm Evolut Pro TAVR. Trivial PVL reviewed on TTE today.   Coronary artery disease  S/p LAD PCI with LAWRENCE on 1/7/22. DAPT for 1 year. Continue  statin.     Chronic combined systolic and diastolic heart failure  Chronic. Controlled. Follow up with Cardiology/PCP.    Aortic atherosclerosis  See CTA    Plan:       1. Follow up with [facility] in 1 year with labs and Echo.  2. ASA indefinitely.   3. Plavix for 1 year post PCI (OK to discontinue 1/7/23).   4. No non sterile procedures which could cause endocarditis for 6 months post TAVR including dental work, endoscopy, colonoscopy, and  procedures.   5. SBE prophylaxis for life.           Marley Rudolph PA-C  Valve and Structural Heart Disease  [facility]

## 2022-03-29 NOTE — ASSESSMENT & PLAN NOTE
Successful right transfemoral 26 mm Evolut Pro TAVR. No paravalvular leak, peak velocity 1.0 and mean gradient for post TAVR by transthoracic echo.     Discharge Plans:  1. Follow up with [facility] in 1 month and 1 year with labs and Echo.  2. ASA indefinitely.   3. Plavix for 1 year post PCI (OK to discontinue 1/7/23).   4. No non sterile procedures which could cause endocarditis for 6 months post TAVR including dental work, endoscopy, colonoscopy, and  procedures.   5. SBE prophylaxis for life.   How Severe Are Your Spot(S)?: moderate What Is The Reason For Today's Visit?: Full Body Skin Examination What Is The Reason For Today's Visit? (Being Monitored For X): concerning skin lesions on an annual basis

## 2022-04-05 NOTE — ADDENDUM NOTE
Addendum  created 04/05/22 1710 by Jemal Wetzel Jr., MD    Clinical Note Signed, Intraprocedure Blocks edited, SmartForm saved

## 2022-04-20 PROBLEM — R74.01 ELEVATED ALT MEASUREMENT: Status: RESOLVED | Noted: 2021-12-10 | Resolved: 2022-04-20

## 2022-04-20 PROBLEM — I50.21 ACUTE HFREF (HEART FAILURE WITH REDUCED EJECTION FRACTION): Status: RESOLVED | Noted: 2021-12-22 | Resolved: 2022-04-20

## 2022-04-20 PROBLEM — R09.82 PND (POST-NASAL DRIP): Status: RESOLVED | Noted: 2017-04-12 | Resolved: 2022-04-20

## 2022-05-11 ENCOUNTER — OFFICE VISIT (OUTPATIENT)
Dept: DERMATOLOGY | Facility: CLINIC | Age: 77
End: 2022-05-11
Payer: MEDICARE

## 2022-05-11 DIAGNOSIS — L57.0 ACTINIC KERATOSIS: ICD-10-CM

## 2022-05-11 DIAGNOSIS — D48.5 NEOPLASM OF UNCERTAIN BEHAVIOR OF SKIN: Primary | ICD-10-CM

## 2022-05-11 DIAGNOSIS — L82.1 SEBORRHEIC KERATOSES: ICD-10-CM

## 2022-05-11 DIAGNOSIS — L90.5 SCAR: ICD-10-CM

## 2022-05-11 DIAGNOSIS — Z85.828 HISTORY OF NONMELANOMA SKIN CANCER: ICD-10-CM

## 2022-05-11 PROCEDURE — 17000 DESTRUCT PREMALG LESION: CPT | Mod: 59,PBBFAC,PO | Performed by: STUDENT IN AN ORGANIZED HEALTH CARE EDUCATION/TRAINING PROGRAM

## 2022-05-11 PROCEDURE — 99213 PR OFFICE/OUTPT VISIT, EST, LEVL III, 20-29 MIN: ICD-10-PCS | Mod: 25,S$PBB,, | Performed by: STUDENT IN AN ORGANIZED HEALTH CARE EDUCATION/TRAINING PROGRAM

## 2022-05-11 PROCEDURE — 88305 TISSUE EXAM BY PATHOLOGIST: CPT | Mod: 26,,, | Performed by: PATHOLOGY

## 2022-05-11 PROCEDURE — 17003 DESTRUCT PREMALG LES 2-14: CPT | Mod: PBBFAC,PO | Performed by: STUDENT IN AN ORGANIZED HEALTH CARE EDUCATION/TRAINING PROGRAM

## 2022-05-11 PROCEDURE — 17003 DESTRUCTION, PREMALIGNANT LESIONS; SECOND THROUGH 14 LESIONS: ICD-10-PCS | Mod: S$PBB,,, | Performed by: STUDENT IN AN ORGANIZED HEALTH CARE EDUCATION/TRAINING PROGRAM

## 2022-05-11 PROCEDURE — 11103 PR TANGENTIAL BIOPSY, SKIN, EA ADDTL LESION: ICD-10-PCS | Mod: S$PBB,,, | Performed by: STUDENT IN AN ORGANIZED HEALTH CARE EDUCATION/TRAINING PROGRAM

## 2022-05-11 PROCEDURE — 88305 TISSUE EXAM BY PATHOLOGIST: ICD-10-PCS | Mod: 26,,, | Performed by: PATHOLOGY

## 2022-05-11 PROCEDURE — 88305 TISSUE EXAM BY PATHOLOGIST: CPT | Mod: 59 | Performed by: PATHOLOGY

## 2022-05-11 PROCEDURE — 11102 TANGNTL BX SKIN SINGLE LES: CPT | Mod: S$PBB,,, | Performed by: STUDENT IN AN ORGANIZED HEALTH CARE EDUCATION/TRAINING PROGRAM

## 2022-05-11 PROCEDURE — 99999 PR PBB SHADOW E&M-EST. PATIENT-LVL III: CPT | Mod: PBBFAC,,, | Performed by: STUDENT IN AN ORGANIZED HEALTH CARE EDUCATION/TRAINING PROGRAM

## 2022-05-11 PROCEDURE — 17003 DESTRUCT PREMALG LES 2-14: CPT | Mod: S$PBB,,, | Performed by: STUDENT IN AN ORGANIZED HEALTH CARE EDUCATION/TRAINING PROGRAM

## 2022-05-11 PROCEDURE — 11102 PR TANGENTIAL BIOPSY, SKIN, SINGLE LESION: ICD-10-PCS | Mod: S$PBB,,, | Performed by: STUDENT IN AN ORGANIZED HEALTH CARE EDUCATION/TRAINING PROGRAM

## 2022-05-11 PROCEDURE — 99999 PR PBB SHADOW E&M-EST. PATIENT-LVL III: ICD-10-PCS | Mod: PBBFAC,,, | Performed by: STUDENT IN AN ORGANIZED HEALTH CARE EDUCATION/TRAINING PROGRAM

## 2022-05-11 PROCEDURE — 11102 TANGNTL BX SKIN SINGLE LES: CPT | Mod: PBBFAC,PO | Performed by: STUDENT IN AN ORGANIZED HEALTH CARE EDUCATION/TRAINING PROGRAM

## 2022-05-11 PROCEDURE — 99213 OFFICE O/P EST LOW 20 MIN: CPT | Mod: PBBFAC,PO,25 | Performed by: STUDENT IN AN ORGANIZED HEALTH CARE EDUCATION/TRAINING PROGRAM

## 2022-05-11 PROCEDURE — 11103 TANGNTL BX SKIN EA SEP/ADDL: CPT | Mod: S$PBB,,, | Performed by: STUDENT IN AN ORGANIZED HEALTH CARE EDUCATION/TRAINING PROGRAM

## 2022-05-11 PROCEDURE — 17000 DESTRUCT PREMALG LESION: CPT | Mod: 59,S$PBB,, | Performed by: STUDENT IN AN ORGANIZED HEALTH CARE EDUCATION/TRAINING PROGRAM

## 2022-05-11 PROCEDURE — 11103 TANGNTL BX SKIN EA SEP/ADDL: CPT | Mod: PBBFAC,PO | Performed by: STUDENT IN AN ORGANIZED HEALTH CARE EDUCATION/TRAINING PROGRAM

## 2022-05-11 PROCEDURE — 99213 OFFICE O/P EST LOW 20 MIN: CPT | Mod: 25,S$PBB,, | Performed by: STUDENT IN AN ORGANIZED HEALTH CARE EDUCATION/TRAINING PROGRAM

## 2022-05-11 PROCEDURE — 17000 PR DESTRUCTION(LASER SURGERY,CRYOSURGERY,CHEMOSURGERY),PREMALIGNANT LESIONS,FIRST LESION: ICD-10-PCS | Mod: 59,S$PBB,, | Performed by: STUDENT IN AN ORGANIZED HEALTH CARE EDUCATION/TRAINING PROGRAM

## 2022-05-11 NOTE — PROGRESS NOTES
Subjective:       Patient ID:  Vivien Burton is a 77 y.o. female who presents for   Chief Complaint   Patient presents with    Spot     Right upper arm     LOV 4/22/21    Patient here today for spot on right upper arm x over 1 month  Patient states it itches and stings. Started as a dry spot and grew.    CAD w/ stent  Atrial valve TAVR    Derm hx:   phx of BCC (20 years ago) and SCC (2 years ago?)  Sister with h/o MM       Review of Systems   Constitutional: Negative for fever, chills and fatigue.   Respiratory: Negative for cough and shortness of breath.    Gastrointestinal: Negative for nausea and vomiting.   Skin: Positive for daily sunscreen use and activity-related sunscreen use.   Hematologic/Lymphatic: Bruises/bleeds easily (asa, plavix).        Objective:    Physical Exam   Constitutional: She appears well-developed and well-nourished. No distress.   Neurological: She is alert and oriented to person, place, and time. She is not disoriented.   Psychiatric: She has a normal mood and affect.   Skin:   Areas Examined (abnormalities noted in diagram):   Scalp / Hair Palpated and Inspected  Head / Face Inspection Performed  Neck Inspection Performed  Chest / Axilla Inspection Performed  Abdomen Inspection Performed  Back Inspection Performed  RUE Inspected  LUE Inspection Performed  Nails and Digits Inspection Performed                       Diagram Legend     Erythematous scaling macule/papule c/w actinic keratosis       Vascular papule c/w angioma      Pigmented verrucoid papule/plaque c/w seborrheic keratosis      Yellow umbilicated papule c/w sebaceous hyperplasia      Irregularly shaped tan macule c/w lentigo     1-2 mm smooth white papules consistent with Milia      Movable subcutaneous cyst with punctum c/w epidermal inclusion cyst      Subcutaneous movable cyst c/w pilar cyst      Firm pink to brown papule c/w dermatofibroma      Pedunculated fleshy papule(s) c/w skin tag(s)      Evenly  pigmented macule c/w junctional nevus     Mildly variegated pigmented, slightly irregular-bordered macule c/w mildly atypical nevus      Flesh colored to evenly pigmented papule c/w intradermal nevus       Pink pearly papule/plaque c/w basal cell carcinoma      Erythematous hyperkeratotic cursted plaque c/w SCC      Surgical scar with no sign of skin cancer recurrence      Open and closed comedones      Inflammatory papules and pustules      Verrucoid papule consistent consistent with wart     Erythematous eczematous patches and plaques     Dystrophic onycholytic nail with subungual debris c/w onychomycosis     Umbilicated papule    Erythematous-base heme-crusted tan verrucoid plaque consistent with inflamed seborrheic keratosis     Erythematous Silvery Scaling Plaque c/w Psoriasis     See annotation                  Assessment / Plan:      Pathology Orders:     Normal Orders This Visit    Specimen to Pathology, Dermatology     Comments:    Number of Specimens:->2  ------------------------->-------------------------  Spec 1 Procedure:->Biopsy  Spec 1 Clinical Impression:->r/o scc KA  Spec 1 Source:->right upper arm  ------------------------->-------------------------  Spec 2 Procedure:->Biopsy  Spec 2 Clinical Impression:->ISK  Spec 2 Source:->right nasal sidewall    Questions:    Procedure Type: Dermatology and skin neoplasms    Number of Specimens: 2    ------------------------: -------------------------    Spec 1 Procedure: Biopsy    Spec 1 Clinical Impression: r/o scc, KA    Spec 1 Source: right upper arm    ------------------------: -------------------------    Spec 2 Procedure: Biopsy    Spec 2 Clinical Impression: ISK    Spec 2 Source: right nasal sidewall    Release to patient:         Neoplasm of uncertain behavior of skin x2  -     Specimen to Pathology, Dermatology  Shave biopsy procedure note:    Shave biopsy performed after verbal consent including risk of infection, scar, recurrence, need for  additional treatment of site. Area prepped with alcohol, anesthetized with approximately 1.0cc of 1% lidocaine with epinephrine. Lesional tissue shaved with razor blade. Hemostasis achieved with application of aluminum chloride followed by hyfrecation. No complications. Dressing applied. Wound care explained.    Actinic keratosis  Cryosurgery Procedure Note    Verbal consent from the patient is obtained and the patient is aware of the precancerous quality and need for treatment of these lesions. Liquid nitrogen cryosurgery is applied to the 8 actinic keratoses, as detailed in the physical exam, to produce a freeze injury. The patient is aware that blisters may form and is instructed on wound care with gentle cleansing and use of vaseline ointment to keep moist until healed. The patient is supplied a handout on cryosurgery and is instructed to call if lesions do not completely resolve.    Seborrheic keratoses  These are benign inherited growths without a malignant potential. Reassurance given to patient. No treatment is necessary.     History of nonmelanoma skin cancer  Scar  Area(s) of previous NMSC evaluated with no signs of recurrence.    Upper body skin examination performed today including at least 6 points as noted in physical examination. Suspicious lesions noted.  Patient instructed in importance in daily broad spectrum sun protection of at least spf 30. Mineral sunscreen ingredients preferred (Zinc +/- Titanium) and can be found OTC.   Patient encouraged to wear hat for all outdoor exposure.   Also discussed sun avoidance and use of protective clothing.             6 months  No follow-ups on file.

## 2022-05-11 NOTE — PATIENT INSTRUCTIONS
Shave Biopsy Wound Care    Your doctor has performed a shave biopsy today.  A band aid and vaseline ointment has been placed over the site.  This should remain in place for 24 hours.  It is recommended that you keep the area dry for the first 24 hours.  After 24 hours, you may remove the band aid and wash the area with warm soap and water and apply Vaseline jelly.  Many patients prefer to use Neosporin or Bacitracin ointment.  This is acceptable; however, know that you can develop an allergy to this medication even if you have used it safely for years.  It is important to keep the area moist.  Letting it dry out and get air slows healing time, and will worsen the scar.  Band aid is optional after first 24 hours.      If you notice increasing redness, tenderness, pain, or yellow drainage at the biopsy site, please notify your doctor.  These are signs of an infection.    If your biopsy site is bleeding, apply firm pressure for 15 minutes straight.  Repeat for another 15 minutes, if it is still bleeding.   If the surgical site continues to bleed, then please contact your doctor.      [facility] [place] [address]/ [phone] FAX/ [email or web address]     CRYOSURGERY      Your doctor has used a method called cryosurgery to treat your skin condition. Cryosurgery refers to the use of very cold substances to treat a variety of skin conditions such as warts, pre-skin cancers, molluscum contagiosum, sun spots, and several benign growths. The substance we use in cryosurgery is liquid nitrogen and is so cold (-195 degrees Celsius) that is burns when administered.     Following treatment in the office, the skin may immediately burn and become red. You may find the area around the lesion is affected as well. It is sometimes necessary to treat not only the lesion, but a small area of the surrounding normal skin to achieve a good response.     A blister, and even a blood filled blister, may form  after treatment.   This is a normal response. If the blister is painful, it is acceptable to sterilize a needle and with rubbing alcohol and gently pop the blister. It is important that you gently wash the area with soap and warm water as the blister fluid may contain wart virus if a wart was treated. Do no remove the roof of the blister.     The area treated can take anywhere from 1-3 weeks to heal. Healing time depends on the kind skin lesion treated, the location, and how aggressively the lesion was treated. It is recommended that the areas treated are covered with Vaseline or bacitracin ointment and a band-aid. If a band-aid is not practical, just ointment applied several times per day will do. Keeping these areas moist will speed the healing time.    Treatment with liquid nitrogen can leave a scar. In dark skin, it may be a light or dark scar, in light skin it may be a white or pink scar. These will generally fade with time, but may never go away completely.     If you have any concerns after your treatment, please feel free to call the office.       [facility] [place] [address]/ [phone] FAX/ [email or web address]

## 2022-05-12 ENCOUNTER — HOSPITAL ENCOUNTER (EMERGENCY)
Facility: HOSPITAL | Age: 77
Discharge: HOME | End: 2022-05-12
Attending: EMERGENCY MEDICINE
Payer: MEDICARE

## 2022-05-12 VITALS
BODY MASS INDEX: 19.63 KG/M2 | SYSTOLIC BLOOD PRESSURE: 138 MMHG | DIASTOLIC BLOOD PRESSURE: 85 MMHG | OXYGEN SATURATION: 98 % | WEIGHT: 100 LBS | HEIGHT: 60 IN | RESPIRATION RATE: 18 BRPM | HEART RATE: 84 BPM | TEMPERATURE: 98 F

## 2022-05-12 DIAGNOSIS — Z48.89 ENCOUNTER FOR POST SURGICAL WOUND CHECK: Primary | ICD-10-CM

## 2022-05-12 PROCEDURE — 99282 EMERGENCY DEPT VISIT SF MDM: CPT

## 2022-05-12 NOTE — DISCHARGE INSTRUCTIONS
Continue previously prescribed medications and treatments.  Keep a tight dressing on the wound.  Return here as needed, otherwise follow-up with your primary care doctor and your dermatologist.

## 2022-05-12 NOTE — ED PROVIDER NOTES
"Encounter Date: 2022       History     Chief Complaint   Patient presents with    Wound Check     Pt states "I had a cancer removed yesterday morning and it won't stop bleeding."     [age]-year-old female here from home via private vehicle for evaluation and treatment of a wound on the right upper arm.  Patient had a procedure to remove skin cancer done yesterday morning, and patient states that she woke up few hours ago with blood all over the bed.  She states she tried wrapped the wound in a tighter fashion, but she was unsuccessful.  Denies any symptoms.  No lightheadedness, weakness, etc..  She states that she takes Plavix.        Review of patient's allergies indicates:  No Known Allergies  Past Medical History:   Diagnosis Date    Acute on chronic combined systolic and diastolic heart failure 2022    Anemia     Basal cell carcinoma     Breast cancer     Breast cancer     Cancer     CHF (congestive heart failure)     Coronary artery disease     GERD (gastroesophageal reflux disease)     Hyperlipidemia     Hypertension     Hypothyroidism     Nonrheumatic aortic (valve) stenosis 2018    Osteoporosis 2019    S/P TAVR (transcatheter aortic valve replacement) 2022    Squamous cell carcinoma of skin 2018    Thyroid disease      Past Surgical History:   Procedure Laterality Date    BREAST SURGERY      CARDIAC CATH COSURGEON N/A 2022    Procedure: Cardiac Cath Cosurgeon;  Surgeon: Dontae Wooten MD;  Location: [facility] CATH LAB;  Service: Cardiovascular;  Laterality: N/A;     SECTION, CLASSIC      COLONOSCOPY N/A 2018    Procedure: COLONOSCOPY;  Surgeon: Precious Castorena MD;  Location: [facility] ENDO;  Service: Endoscopy;  Laterality: N/A;    HYSTERECTOMY      LEFT HEART CATHETERIZATION Left 2022    Procedure: Left heart cath;  Surgeon: Dontae Johns MD;  Location: [facility] CATH LAB;  Service: Cardiology;  Laterality: Left;    LEFT HEART " CATHETERIZATION Left 2022    Procedure: Left heart cath;  Surgeon: Dontae Johns MD;  Location: [facility] CATH LAB;  Service: Cardiology;  Laterality: Left;    MASTECTOMY Right 2000    right breast      TONSILLECTOMY, ADENOIDECTOMY      TRANSCATHETER AORTIC VALVE REPLACEMENT (TAVR) N/A 2022    Procedure: REPLACEMENT, AORTIC VALVE, TRANSCATHETER (TAVR);  Surgeon: Dontae Johns MD;  Location: [facility] CATH LAB;  Service: Cardiology;  Laterality: N/A;     Family History   Problem Relation Age of Onset    Cancer Sister     Liver cancer Sister     Melanoma Sister     Cancer Brother     Breast cancer Neg Hx     Psoriasis Neg Hx     Lupus Neg Hx     Eczema Neg Hx      Social History     Tobacco Use    Smoking status: Former Smoker     Packs/day: 1.00     Types: Cigarettes     Quit date: 2000     Years since quittin.2    Smokeless tobacco: Never Used    Tobacco comment: quit 20 years ago   Substance Use Topics    Alcohol use: Yes     Alcohol/week: 2.0 standard drinks     Types: 2 Shots of liquor per week     Comment: per pt 2 burbon drinks per night however none in last month    Drug use: No     Review of Systems   Constitutional: Negative.    HENT: Negative.    Eyes: Negative.    Respiratory: Negative.    Cardiovascular: Negative.    Gastrointestinal: Negative.    Endocrine: Negative.    Genitourinary: Negative.    Musculoskeletal: Negative.    Skin: Negative.    Allergic/Immunologic: Negative.    Neurological: Negative.    Psychiatric/Behavioral: Negative.        Physical Exam     Initial Vitals [22 0237]   BP Pulse Resp Temp SpO2   138/85 84 18 97.9 °F (36.6 °C) 98 %      MAP       --         Physical Exam    Vitals reviewed.  Constitutional: She appears well-developed and well-nourished. She is not diaphoretic. No distress.   HENT:   Head: Normocephalic and atraumatic.   Nose: Nose normal.   Mouth/Throat: Oropharynx is clear and moist. No oropharyngeal exudate.   Eyes:  Conjunctivae and EOM are normal. Pupils are equal, round, and reactive to light. No scleral icterus.   Neck: Neck supple. No JVD present.   Normal range of motion.  Cardiovascular: Normal rate, regular rhythm, normal heart sounds and intact distal pulses.   No murmur heard.  Pulmonary/Chest: Breath sounds normal. No stridor. No respiratory distress. She has no wheezes. She has no rhonchi. She has no rales.   Abdominal: Abdomen is soft. Bowel sounds are normal. She exhibits no distension. There is no abdominal tenderness. There is no rebound and no guarding.   Genitourinary:    Vagina and uterus normal.     Musculoskeletal:         General: No tenderness or edema. Normal range of motion.      Cervical back: Normal range of motion and neck supple.     Neurological: She is alert and oriented to person, place, and time. She has normal strength and normal reflexes. No cranial nerve deficit or sensory deficit. GCS score is 15. GCS eye subscore is 4. GCS verbal subscore is 5. GCS motor subscore is 6.   Skin: Skin is warm and dry. Capillary refill takes less than 2 seconds. No rash noted. No erythema.   There is a 1.5 cm roughly circular surgical excision over the right bicep.  Very slight venous oozing only.   Psychiatric: She has a normal mood and affect. Her behavior is normal.         ED Course   Procedures  Labs Reviewed - No data to display       Imaging Results    None          Medications - No data to display  Medical Decision Making:   Differential Diagnosis:   Arterial bleed, venous oozing, coagulation disorder, etc.  ED Management:  Patient's wound has some very slight venous oozing only.  A pressure dressing has controlled bleeding well.  I believe patient is safe for discharge home.  She is instructions on how to apply pressure to the wound.  Later this morning she will follow-up with her primary care provider or with her dermatologist to perform the procedure.  She will return here as needed or if worse in any  way.                      Clinical Impression:   Final diagnoses:  [Z48.89] Encounter for post surgical wound check (Primary)          ED Disposition Condition    Discharge Stable        ED Prescriptions     None        Follow-up Information     Follow up With Specialties Details Why Contact Info    Ellen Robert III, MD Internal Medicine, Cardiology Today  [address]  [facility] [address] [phone]      Your surgeon/dermatologist  Today      [facility] Emergency Dept Emergency Medicine  If symptoms worsen [address] [facility] [address] [phone]           Damian Fowler MD  05/12/22 0401

## 2022-05-12 NOTE — ED NOTES
Wound bleeding small amounts at a time. Wound wrapped with sterile gauze, kerlex, and coban. Bleeding controlled at this time

## 2022-05-20 LAB
FINAL PATHOLOGIC DIAGNOSIS: NORMAL
GROSS: NORMAL
Lab: NORMAL
MICROSCOPIC EXAM: NORMAL

## 2022-06-10 ENCOUNTER — OFFICE VISIT (OUTPATIENT)
Dept: CARDIOLOGY | Facility: CLINIC | Age: 77
End: 2022-06-10
Payer: MEDICARE

## 2022-06-10 VITALS
HEART RATE: 86 BPM | RESPIRATION RATE: 18 BRPM | HEIGHT: 60 IN | SYSTOLIC BLOOD PRESSURE: 188 MMHG | WEIGHT: 107.5 LBS | TEMPERATURE: 97 F | DIASTOLIC BLOOD PRESSURE: 81 MMHG | OXYGEN SATURATION: 98 % | BODY MASS INDEX: 21.1 KG/M2

## 2022-06-10 DIAGNOSIS — I50.42 CHRONIC COMBINED SYSTOLIC AND DIASTOLIC HEART FAILURE: Primary | ICD-10-CM

## 2022-06-10 DIAGNOSIS — I10 PRIMARY HYPERTENSION: ICD-10-CM

## 2022-06-10 DIAGNOSIS — Z95.5 STATUS POST INSERTION OF DRUG ELUTING CORONARY ARTERY STENT: ICD-10-CM

## 2022-06-10 DIAGNOSIS — E78.5 DYSLIPIDEMIA: ICD-10-CM

## 2022-06-10 DIAGNOSIS — Z79.899 ENCOUNTER FOR LONG-TERM (CURRENT) USE OF HIGH-RISK MEDICATION: ICD-10-CM

## 2022-06-10 DIAGNOSIS — Z95.2 S/P TAVR (TRANSCATHETER AORTIC VALVE REPLACEMENT): ICD-10-CM

## 2022-06-10 DIAGNOSIS — F10.10 EXCESSIVE DRINKING ALCOHOL: ICD-10-CM

## 2022-06-10 PROBLEM — D64.9 ANEMIA: Status: RESOLVED | Noted: 2021-12-10 | Resolved: 2022-06-10

## 2022-06-10 PROCEDURE — 99215 PR OFFICE/OUTPT VISIT, EST, LEVL V, 40-54 MIN: ICD-10-PCS | Mod: S$PBB,,, | Performed by: INTERNAL MEDICINE

## 2022-06-10 PROCEDURE — 99213 OFFICE O/P EST LOW 20 MIN: CPT | Mod: PBBFAC | Performed by: INTERNAL MEDICINE

## 2022-06-10 PROCEDURE — 99999 PR PBB SHADOW E&M-EST. PATIENT-LVL III: CPT | Mod: PBBFAC,,, | Performed by: INTERNAL MEDICINE

## 2022-06-10 PROCEDURE — 99999 PR PBB SHADOW E&M-EST. PATIENT-LVL III: ICD-10-PCS | Mod: PBBFAC,,, | Performed by: INTERNAL MEDICINE

## 2022-06-10 PROCEDURE — 99215 OFFICE O/P EST HI 40 MIN: CPT | Mod: S$PBB,,, | Performed by: INTERNAL MEDICINE

## 2022-06-10 RX ORDER — SIMVASTATIN 40 MG/1
40 TABLET, FILM COATED ORAL NIGHTLY
Qty: 90 TABLET | Refills: 3 | Status: SHIPPED | OUTPATIENT
Start: 2022-06-10 | End: 2023-05-29 | Stop reason: SDUPTHER

## 2022-06-10 NOTE — PROGRESS NOTES
Subjective:    Patient ID:  Vivien Burton is a 77 y.o. female who presents for evaluation of Follow-up  For critical AS with HFrEF, CAD post LAWRENCE 1/2022, post TAVR 2/2022, HLD on 20 mg of simvastatin, LDL-C greater than 70  PCP: Dr. Robert, see biannually, in [facility]  Urologist and urgently referred by daughter-in-law Dr. LORRIE Burton for markedly elevated BNP with SOB  Interventional cardiologist: Dontae Johns MD  Valve and Structural Heart Disease: Marley Rudolph PA-C, last seen 3/2022  Lives alone, no pets, son, Damari, comes and goes.   Retired     Health literacy: high   Vaccinations: waiting on flu, completed COVID, no infection   Activities: do own housework, yard work and home schooling, no problem, not limited  Nicotine: quit 1995, 30 years < 1ppd  Alcohol: 2-3 drinks daily, 4.5 oz of whiskey, max 3 in any 24 hours.  Illicit drugs: none  Cardiac symptoms: none  Home BP: 140 /70  Medication compliance: yes  Diet: regular, low salt  Caffeine: 1 cpd  Labs:   Lab Results   Component Value Date    TSH 0.54 04/13/2022        Lab Results   Component Value Date    LABA1C 4.7 06/21/2018    HGBA1C 4.5 04/13/2022       Lab Results   Component Value Date    WBC 7.1 04/13/2022    HGB 12.0 04/13/2022    HCT 36.3 04/13/2022    MCV 87.1 04/13/2022     04/13/2022       CMP @CBC  Sodium   Date Value Ref Range Status   04/13/2022 138 135 - 146 mmol/L Final     Potassium   Date Value Ref Range Status   04/13/2022 4.0 3.5 - 5.3 mmol/L Final     Chloride   Date Value Ref Range Status   04/13/2022 98 98 - 110 mmol/L Final     CO2   Date Value Ref Range Status   04/13/2022 30 20 - 32 mmol/L Final     Glucose   Date Value Ref Range Status   04/13/2022 77 65 - 139 mg/dL Final     Comment:               Non-fasting reference interval          BUN   Date Value Ref Range Status   04/13/2022 10 7 - 25 mg/dL Final     Creatinine   Date Value Ref Range Status   04/13/2022 0.68 0.60  - 0.93 mg/dL Final     Comment:     For patients >49 years of age, the reference limit  for Creatinine is approximately 13% higher for people  identified as -American.          Calcium   Date Value Ref Range Status   04/13/2022 9.9 8.6 - 10.4 mg/dL Final     Total Protein   Date Value Ref Range Status   04/13/2022 6.2 6.1 - 8.1 g/dL Final     Albumin   Date Value Ref Range Status   04/13/2022 4.4 3.6 - 5.1 g/dL Final     Total Bilirubin   Date Value Ref Range Status   04/13/2022 0.5 0.2 - 1.2 mg/dL Final     Alkaline Phosphatase   Date Value Ref Range Status   02/23/2022 97 55 - 135 U/L Final     AST   Date Value Ref Range Status   04/13/2022 16 10 - 35 U/L Final     ALT   Date Value Ref Range Status   04/13/2022 14 6 - 29 U/L Final     Anion Gap   Date Value Ref Range Status   03/29/2022 7 (L) 8 - 16 mmol/L Final     eGFR if    Date Value Ref Range Status   04/13/2022 99 > OR = 60 mL/min/1.73m2 Final     eGFR if non    Date Value Ref Range Status   04/13/2022 86 > OR = 60 mL/min/1.73m2 Final     @labrcntip(troponini)@    BNP   Date Value Ref Range Status   04/13/2022 240 (H) <100 pg/mL Final     Comment:        BNP levels increase with age in the general  population with the highest values seen in  individuals greater than 75 years of age.  Reference: J. Am. Cynthia. Cardiol. 2002; 40:976-982.        }   Lab Results   Component Value Date    CHOL 185 04/13/2022    CHOL 135 08/09/2021    CHOL 173 01/06/2021     Lab Results   Component Value Date    HDL 75 04/13/2022    HDL 69 08/09/2021    HDL 75 01/06/2021     Lab Results   Component Value Date    LDLCALC 89 04/13/2022    LDLCALC 50 08/09/2021    LDLCALC 79 01/06/2021     Lab Results   Component Value Date    TRIG 110 04/13/2022    TRIG 82 08/09/2021    TRIG 104 01/06/2021     Lab Results   Component Value Date    CHOLHDL 2.5 04/13/2022    CHOLHDL 2.0 08/09/2021    CHOLHDL 2.3 01/06/2021     Lab Results   Component Value Date     "IRON 80 04/13/2022    TIBC 411 04/13/2022    FERRITIN 81 04/13/2022     UA 8/2021 no protein.    Last Echo: 3/2022  Last stress test: 6/2018  Cardiovascular angiogram: 1/2022, with PCI  ECG: NSR, rate 80, normal  Fundoscopic exam: within the past year, negative for retinopathy    In 6/2018:  WF with history of HTN around the time breast CA diagnosis, treated with radiation to the right chest and chemo. Stopped HTN Rx about a year ago with normal BP. Here due to heart murmur, first detected in childhood and then no mention until recent examination. Active, own housework, own yard work, caring to grandchildren, no problem, occasional soreness. Quit smoking in 1998, after 15 pack-years, admit to drinking Maries about 7.5 oz daily. Get sleepy not drunk, do not drive after. ECG is normal, rate 87. Labs reviewed: LDL 57, ASCVD 10-year event risk of 11.3%, intermediate.     Echo read by Dr. Koehler - Conclusion   · Left ventricle shows mild concentric hypertrophy.  · Mitral valve shows mild regurgitation.  · Tricuspid valve shows trace regurgitation.  · There is mild valvular aortic stenosis.  · Mild regurgitation is present in the aortic valve.  · Left ventricular diastolic filling is abnormal.     In 12/2021, return at advise of daughter for severely elevated BNP. SOB with first pneumonia about a month ago and then second pneumonia this week. No CP. Continue with excess alcohol use.     Dr. BRETT Robert III noted 12/8/2021 "2 week f/u pt states she isn't feeling good. She states she is sob and it makes her very fatigue. She has abd pain, sore throat)"    CXR - Impression:     1. Bibasilar pulmonary infiltrates with small bilateral pleural effusions and bibasilar dependent atelectasis.  Correlate clinically with possible fever and/or elevated white count.  2. Underlying COPD with mild chronic changes of interstitial fibrosis.  3. Bone demineralization.    Stress test 6/2018 - Arrhythmias during stress: rare PACs.  The EKG " "portion of this study is negative for myocardial ischemia.  The test was stopped because and the patient complained of fatigue.  The patient reported shortness of breath during the stress test.  Negative for ischemia with good exercise tolerance, 7 METS    Carotid US 6/2018 - No significant stenosis noted  Homogenous plaques in the ICAs  Antegrade flows in the vertebral arteries    HPI comments: in 6/2022, return for CV follow up. Complex recent history with critical AS and HFrEF. Now no symptoms, able to do everything she wants to do. Remain with excess alcohol intake.     Marley Rudolph PA-C noted "TAVR Hospital Course:  Vivien Burton was admitted and underwent successful placement of a 26 mm Evolut TAVR via RTF access under MAC sedation on 2/22/22. Please see full cath report for details. A transthoracic echo was performed immediately post procedure which showed no paravalvular leak. An aortic valve maximum velocity through the aortic valve of 1.0 m/s. She was transported to the CCU in stable condition with a TVP and arterial line in place. She remained hemodynamically stable overnight. EKG remained stable post TAVR therefore no EP consult was warranted. This morning, she ambulated without difficulty and was eager to go home. It was felt she was stable for discharge and will go home on ASA/Plavix for antithrombotic therapy post TAVR.      Interval History  She is doing very well today with no complaint. Was able to ride in a [employer] parade 1 week after TAVR. Denies SOB, CLIFFORD, LE swelling, weight gain, and orthopnea.     S/p LAD PCI with LAWRENCE on 1/7/22. DAPT for 1 year. Continue statin.  [facility] 1/2022 - The Prox LAD lesion was 99% stenosed with 0% stenosis post-intervention.  · A STENT RESOLUTE DONTRELL [id number] stent was successfully placed at 14 LORRI for 10 sec.    Follow up with [facility] in 1 year with labs and Echo.  ASA indefinitely.   Plavix for 1 year post PCI (OK to discontinue 1/7/23).   No " "non sterile procedures which could cause endocarditis for 6 months post TAVR including dental work, endoscopy, colonoscopy, and  procedures.   SBE prophylaxis for life."    Echo 3/2022 - The left ventricle is normal in size with eccentric hypertrophy and low normal systolic function. The estimated ejection fraction is 50%.  The quantitatively derived ejection fraction is 48%.  Normal right ventricular size with normal right ventricular systolic function.  Grade II left ventricular diastolic dysfunction.  Moderate left atrial enlargement.  There is a 26 mm Evolut transcutaneously-placed aortic bioprosthesis present. There is trivial paravalvular aortic insufficiency present.  The aortic valve mean gradient is 9 mmHg with a dimensionless index of 0.46.  The estimated PA systolic pressure is 24 mmHg.  Normal central venous pressure (3 mmHg).    Review of Systems   Constitutional: Negative for diaphoresis, fever, malaise/fatigue, night sweats and weight gain.        Weight stable from 6/2018   HENT: Negative for nosebleeds and tinnitus.    Eyes: Negative for visual disturbance.   Cardiovascular: Negative for chest pain, claudication, cyanosis, irregular heartbeat, leg swelling, near-syncope, orthopnea and paroxysmal nocturnal dyspnea. Dyspnea on exertion: with grass work.   Respiratory: Negative for sleep disturbances due to breathing and wheezing.         And[place] score 0, today a 3, awaken refreshed.   Endocrine: Negative for polydipsia and polyuria.   Hematologic/Lymphatic: Bruises/bleeds easily.   Skin: Positive for skin cancer. Negative for color change, flushing, nail changes, poor wound healing and suspicious lesions.   Musculoskeletal: Negative for arthritis, falls, gout, joint pain, joint swelling, muscle weakness and myalgias.   Gastrointestinal: Positive for heartburn and hemorrhoids. Negative for bloating, hematemesis, hematochezia, melena and nausea.   Genitourinary: Positive for nocturia.   Neurological: " "Negative for disturbances in coordination, focal weakness, light-headedness, loss of balance, numbness, vertigo and weakness. Headaches: sinus.   Psychiatric/Behavioral: Negative for depression and substance abuse. The patient does not have insomnia.         Some claustrophobia        Objective:    Physical Exam  Constitutional:       Appearance: She is well-developed.      Comments: RA O2 sat 98%   HENT:      Head: Normocephalic.   Eyes:      Conjunctiva/sclera: Conjunctivae normal.      Pupils: Pupils are equal, round, and reactive to light.   Neck:      Thyroid: No thyromegaly.      Vascular: No JVD.      Comments: Circumference 12"  Cardiovascular:      Rate and Rhythm: Normal rate and regular rhythm.      Pulses: Intact distal pulses.           Carotid pulses are 2+ on the right side with bruit and 2+ on the left side.       Radial pulses are 2+ on the right side and 2+ on the left side.        Dorsalis pedis pulses are 1+ on the right side and 1+ on the left side.        Posterior tibial pulses are 1+ on the right side and 1+ on the left side.      Heart sounds: Murmur heard.    Early systolic murmur is present with a grade of 1/6 at the upper right sternal border radiating to the neck. S2 sound is preserved.  Superficial varicose veins in both LE.     No friction rub. No gallop.   Pulmonary:      Effort: Pulmonary effort is normal.      Breath sounds: Rales: left basiler.      Comments: Diminished breath sounds and prolong expiration.  Chest:      Chest wall: No tenderness.   Abdominal:      General: Bowel sounds are normal.      Palpations: Abdomen is soft.      Tenderness: There is no abdominal tenderness.      Comments: Waist 30.5", hip 35"   Musculoskeletal:         General: Normal range of motion.      Cervical back: Normal range of motion and neck supple.   Lymphadenopathy:      Cervical: No cervical adenopathy.   Skin:     General: Skin is warm and dry.      Findings: Rash: mild stasis dermatitis.      " Comments: Decreased skin turgor    Neurological:      Mental Status: She is alert and oriented to person, place, and time.           Assessment:       1. Chronic combined systolic and diastolic heart failure, HFimpEF    2. Excessive drinking alcohol    3. Primary hypertension    4. Encounter for long-term (current) use of high-risk medication    5. S/P TAVR (transcatheter aortic valve replacement), 26 mm    6. Status post insertion of drug eluting coronary artery stent    7. Dyslipidemia, baseline          Plan:       Vivien was seen today for follow-up.    Diagnoses and all orders for this visit:    Chronic combined systolic and diastolic heart failure, HFimpEF    Excessive drinking alcohol  -     Hypertension Digital Medicine (HDMP) Enrollment Order  -     Hypertension Digital Medicine ([facility]): Assign Onboarding Questionnaires    Primary hypertension  -     Hypertension Digital Medicine ([facility]) Enrollment Order  -     Hypertension Digital Medicine ([facility]): Assign Onboarding Questionnaires    Encounter for long-term (current) use of high-risk medication    S/P TAVR (transcatheter aortic valve replacement), 26 mm    Status post insertion of drug eluting coronary artery stent  -     simvastatin (ZOCOR) 40 MG tablet; Take 1 tablet (40 mg total) by mouth every evening.    Dyslipidemia, baseline   -     simvastatin (ZOCOR) 40 MG tablet; Take 1 tablet (40 mg total) by mouth every evening.    - All medical issues reviewed, will stop Lasix, will double simvastatin dose, target LDL-C is less than 70.  - Warning signs of MI and stroke given, if symptoms last more than 5 minutes, stop immediately and call 911, then chew 2-4 low-dose ASA (81 mg).  - Instruction for Mediterranean diet and heart healthy exercise given.  - Check home blood pressure, 2 days weekly, do 2 readings within 5 minutes in AM and PM, keep log for review. Target resting BP is less than 130/85.  - Need good exercise program, 4 key elements: 1.  Aerobic (walking, swimming, dancing, jogging, biking, etc, 2. Muscle strengthening / resistance exercise, need to do 2-3 times weekly, 3. Stretching daily, good stretch takes a whole  total minute. 4. Balance exercise daily.  - Highly recommend 30-60 minutes of exercise daily, can have Sunday off, with 2-3 sessions of muscle strengthening weekly. A  would be very helpful.  - Discussed healthy daily limit of 0.5 oz of pure alcohol in any 24 hours (roughly 1 12-oz beers or 0.75 oz of liquor (80 proof)), can not save up.  - Recommend healthy living: moderate alcohol, healthy diet and regular exercise aiming for fitness, restorative sleep and weight control  - Consider Yoga, Siddhartha-Chi and or meditation  - Recommend at least annual cardiovascular evaluation in view of her significant risk factors.      Patient Active Problem List   Diagnosis    Cancer    GERD (gastroesophageal reflux disease)    Hyperlipidemia    Hypertension, onset 2012, off medications since 2016    Breast cancer    Unspecified hypothyroidism    Breast cancer, post right chest radiation    Unspecified essential hypertension    Malignant neoplasm of female breast    Encounter for long-term (current) use of medications    Syncope, 2016, dehydration    Excessive drinking alcohol    Heart murmur, since childhood    Dyslipidemia, baseline     Cardiovascular event risk, ASCVD 10-year risk 11.3%, on simvastatin 20 mg, 2017    Bilateral carotid bruits    Nonrheumatic aortic (valve) stenosis    ISIS (generalized anxiety disorder)    BMI 22.0-22.9, adult    Family history of colon cancer    Elevated brain natriuretic peptide (BNP) level    LVH (left ventricular hypertrophy) due to hypertensive disease, without heart failure    S/P TAVR (transcatheter aortic valve replacement), 26 mm    Platelet inhibition due to Plavix, responder    Chronic combined systolic and diastolic heart failure, HFimpEF    Coronary artery  disease    Aortic atherosclerosis    Status post insertion of drug eluting coronary artery stent     Greater than 50% was spent in counseling and coordination of care. The above assessment and plan have been discussed at length. Labs and procedure over the last 6 months reviewed. Problem List updated. Asked to bring in all active medications / pills bottles with next visit.

## 2022-06-11 ENCOUNTER — PATIENT MESSAGE (OUTPATIENT)
Dept: ADMINISTRATIVE | Facility: OTHER | Age: 77
End: 2022-06-11
Payer: MEDICARE

## 2022-06-14 ENCOUNTER — PROCEDURE VISIT (OUTPATIENT)
Dept: DERMATOLOGY | Facility: CLINIC | Age: 77
End: 2022-06-14
Payer: MEDICARE

## 2022-06-14 DIAGNOSIS — C44.622 SQUAMOUS CELL CARCINOMA, ARM, RIGHT: Primary | ICD-10-CM

## 2022-06-14 PROCEDURE — 12032 INTMD RPR S/A/T/EXT 2.6-7.5: CPT | Mod: PBBFAC,PO | Performed by: STUDENT IN AN ORGANIZED HEALTH CARE EDUCATION/TRAINING PROGRAM

## 2022-06-14 PROCEDURE — 17263 DSTRJ MAL LES T/A/L 2.1-3.0: CPT | Mod: PBBFAC,PO | Performed by: STUDENT IN AN ORGANIZED HEALTH CARE EDUCATION/TRAINING PROGRAM

## 2022-06-14 PROCEDURE — 17263 PR DESTR MALIG TRUNK,EXTREM 2.1-3 CM: ICD-10-PCS | Mod: S$PBB,59,, | Performed by: STUDENT IN AN ORGANIZED HEALTH CARE EDUCATION/TRAINING PROGRAM

## 2022-06-14 PROCEDURE — 17263 DSTRJ MAL LES T/A/L 2.1-3.0: CPT | Mod: S$PBB,59,, | Performed by: STUDENT IN AN ORGANIZED HEALTH CARE EDUCATION/TRAINING PROGRAM

## 2022-06-14 PROCEDURE — 12032 PR LAYR CLOS WND TRUNK,ARM,LEG 2.6-7.5 CM: ICD-10-PCS | Mod: S$PBB,,, | Performed by: STUDENT IN AN ORGANIZED HEALTH CARE EDUCATION/TRAINING PROGRAM

## 2022-06-14 PROCEDURE — 88305 TISSUE EXAM BY PATHOLOGIST: CPT | Performed by: DERMATOLOGY

## 2022-06-14 PROCEDURE — 99499 NO LOS: ICD-10-PCS | Mod: S$PBB,,, | Performed by: STUDENT IN AN ORGANIZED HEALTH CARE EDUCATION/TRAINING PROGRAM

## 2022-06-14 PROCEDURE — 88305 TISSUE EXAM BY PATHOLOGIST: ICD-10-PCS | Mod: 26,,, | Performed by: DERMATOLOGY

## 2022-06-14 PROCEDURE — 99499 UNLISTED E&M SERVICE: CPT | Mod: S$PBB,,, | Performed by: STUDENT IN AN ORGANIZED HEALTH CARE EDUCATION/TRAINING PROGRAM

## 2022-06-14 PROCEDURE — 12032 INTMD RPR S/A/T/EXT 2.6-7.5: CPT | Mod: S$PBB,,, | Performed by: STUDENT IN AN ORGANIZED HEALTH CARE EDUCATION/TRAINING PROGRAM

## 2022-06-14 PROCEDURE — 88305 TISSUE EXAM BY PATHOLOGIST: CPT | Mod: 26,,, | Performed by: DERMATOLOGY

## 2022-06-14 NOTE — PROGRESS NOTES
Subjective:       Patient ID:  Vivien Burton is a 77 y.o. female who presents for   Chief Complaint   Patient presents with    Skin Cancer     Patient here today for E&S of SCC to right upper arm.    Final Pathologic Diagnosis 1. Skin, right upper arm, shave biopsy:   -SQUAMOUS CELL CARCINOMA, KERATOACANTHOMA TYPE, EXTENDING TO THE DEEP BIOPSY   EDGE     Had porcine valve replacement.     On plavix and asa.      Review of Systems   Constitutional: Negative for fever, chills and fatigue.   Respiratory: Negative for cough and shortness of breath.    Gastrointestinal: Negative for nausea and vomiting.   Skin: Positive for daily sunscreen use and activity-related sunscreen use.   Hematologic/Lymphatic: Bruises/bleeds easily (asa, plavix).        Objective:    Physical Exam   Constitutional: She appears well-developed and well-nourished.   Neurological: She is alert and oriented to person, place, and time.   Psychiatric: She has a normal mood and affect.   Skin:   Areas Examined (abnormalities noted in diagram):   RUE Inspected              Diagram Legend     Erythematous scaling macule/papule c/w actinic keratosis       Vascular papule c/w angioma      Pigmented verrucoid papule/plaque c/w seborrheic keratosis      Yellow umbilicated papule c/w sebaceous hyperplasia      Irregularly shaped tan macule c/w lentigo     1-2 mm smooth white papules consistent with Milia      Movable subcutaneous cyst with punctum c/w epidermal inclusion cyst      Subcutaneous movable cyst c/w pilar cyst      Firm pink to brown papule c/w dermatofibroma      Pedunculated fleshy papule(s) c/w skin tag(s)      Evenly pigmented macule c/w junctional nevus     Mildly variegated pigmented, slightly irregular-bordered macule c/w mildly atypical nevus      Flesh colored to evenly pigmented papule c/w intradermal nevus       Pink pearly papule/plaque c/w basal cell carcinoma      Erythematous hyperkeratotic cursted plaque c/w SCC       Surgical scar with no sign of skin cancer recurrence      Open and closed comedones      Inflammatory papules and pustules      Verrucoid papule consistent consistent with wart     Erythematous eczematous patches and plaques     Dystrophic onycholytic nail with subungual debris c/w onychomycosis     Umbilicated papule    Erythematous-base heme-crusted tan verrucoid plaque consistent with inflamed seborrheic keratosis     Erythematous Silvery Scaling Plaque c/w Psoriasis     See annotation          Assessment / Plan:      Pathology Orders:     Normal Orders This Visit    Specimen to Pathology, Dermatology     Questions:    Procedure Type: Dermatology and skin neoplasms    Number of Specimens: 1    ------------------------: -------------------------    Spec 1 Procedure: Biopsy    Spec 1 Clinical Impression: biopsy proven SCC, please check margins    Spec 1 Source: right upper arm    Release to patient:         Squamous cell carcinoma in situ (SCCIS) of skin of right upper arm  PROCEDURE: Elliptical excision with intermediate layered repair in order to decrease dead space, decrease tension and close large gap.    ANESTHETIC: 12 cc 1% Xylocaine with Epinephrine 1:100,000, buffered    SURGEON: Arleth Van MD  ASSISTANTS: Fidel Griffin LPN    PREOPERATIVE DIAGNOSIS:  Biopsy-proven Squamous Cell Carcinoma    POSTOPERATIVE DIAGNOSIS:  Same as preoperative diagnosis    PATHOLOGIC DIAGNOSIS: Pending    LOCATION: right upper arm    INITIAL LESION SIZE: 1.2 cm    EXCISED DIAMETER: 2.3 cm    PREPARATION: The diagnosis, procedure, alternatives, benefits and risks, including but not limited to: infection, bleeding/bruising, drug reactions, pain, scar or cosmetic defect, local sensation disturbances, wound dehiscence (separation of wound edges after sutures removed) and/or recurrence of present condition were explained to the patient. The patient elected to proceed.  Patient's identity was verified using 2 patient identifiers  and the side and site was verified.  Time out period with surgeon, assistant and patient in surgical suite was taken.    PROCEDURE: The location noted above was prepped and draped in the usual sterile fashion. The area was anesthetized with intradermal buffered xylocaine. Lesional tissue was carefully marked with at least 5 mm margins of clinically normal skin in all directions. A fusiform elliptical excision was done with #15 blade carried down completely through the dermis into the subcutaneous tissues to the level of the subcutaneous fat, and dissection was carried out in that plane.  Electrocoagulation was used to obtain hemostasis. Blood loss was minimal. The wound was then approximated in a layered fashion with subcutaneous and intradermal sutures of 4.0 Monocryl, approximately 7 in number, and the wound was then superficially closed with running sutures of 4.0 Prolene.    The patient tolerated the procedure well.    The area was cleaned and dressed appropriately and the patient was given wound care instructions, as well as an appointment for follow-up evaluation.    LENGTH OF REPAIR: 7.4 cm               2 weeks for s/r   No follow-ups on file.

## 2022-06-14 NOTE — PATIENT INSTRUCTIONS

## 2022-06-20 LAB
FINAL PATHOLOGIC DIAGNOSIS: NORMAL
GROSS: NORMAL
Lab: NORMAL
MICROSCOPIC EXAM: NORMAL

## 2022-06-27 ENCOUNTER — TELEPHONE (OUTPATIENT)
Dept: DERMATOLOGY | Facility: CLINIC | Age: 77
End: 2022-06-27
Payer: MEDICARE

## 2022-06-28 ENCOUNTER — CLINICAL SUPPORT (OUTPATIENT)
Dept: DERMATOLOGY | Facility: CLINIC | Age: 77
End: 2022-06-28
Payer: MEDICARE

## 2022-06-28 DIAGNOSIS — C44.622 SQUAMOUS CELL CARCINOMA, ARM, RIGHT: Primary | ICD-10-CM

## 2022-06-28 PROCEDURE — 99024 PR POST-OP FOLLOW-UP VISIT: ICD-10-PCS | Mod: POP,,, | Performed by: STUDENT IN AN ORGANIZED HEALTH CARE EDUCATION/TRAINING PROGRAM

## 2022-06-28 PROCEDURE — 99024 POSTOP FOLLOW-UP VISIT: CPT | Mod: POP,,, | Performed by: STUDENT IN AN ORGANIZED HEALTH CARE EDUCATION/TRAINING PROGRAM

## 2022-08-11 ENCOUNTER — HOSPITAL ENCOUNTER (OUTPATIENT)
Dept: RADIOLOGY | Facility: HOSPITAL | Age: 77
Discharge: HOME | End: 2022-08-11
Attending: NURSE PRACTITIONER
Payer: MEDICARE

## 2022-08-11 VITALS — HEIGHT: 60 IN | BODY MASS INDEX: 20.77 KG/M2 | WEIGHT: 105.81 LBS

## 2022-08-11 DIAGNOSIS — Z90.11 HX OF MASTECTOMY, RIGHT: ICD-10-CM

## 2022-08-11 DIAGNOSIS — Z12.31 ENCOUNTER FOR SCREENING MAMMOGRAM FOR MALIGNANT NEOPLASM OF BREAST: ICD-10-CM

## 2022-08-11 DIAGNOSIS — Z85.3 HISTORY OF BREAST CANCER: ICD-10-CM

## 2022-08-11 PROCEDURE — 77063 BREAST TOMOSYNTHESIS BI: CPT | Mod: 26,,, | Performed by: RADIOLOGY

## 2022-08-11 PROCEDURE — 77067 SCR MAMMO BI INCL CAD: CPT | Mod: 26,,, | Performed by: RADIOLOGY

## 2022-08-11 PROCEDURE — 77063 MAMMO DIGITAL SCREENING LEFT WITH TOMO: ICD-10-PCS | Mod: 26,,, | Performed by: RADIOLOGY

## 2022-08-11 PROCEDURE — 77067 MAMMO DIGITAL SCREENING LEFT WITH TOMO: ICD-10-PCS | Mod: 26,,, | Performed by: RADIOLOGY

## 2022-08-11 PROCEDURE — 77063 BREAST TOMOSYNTHESIS BI: CPT | Mod: TC

## 2022-09-20 ENCOUNTER — OFFICE VISIT (OUTPATIENT)
Dept: PODIATRY | Facility: CLINIC | Age: 77
End: 2022-09-20
Payer: MEDICARE

## 2022-09-20 VITALS
SYSTOLIC BLOOD PRESSURE: 156 MMHG | WEIGHT: 104.31 LBS | DIASTOLIC BLOOD PRESSURE: 75 MMHG | RESPIRATION RATE: 16 BRPM | HEART RATE: 83 BPM | HEIGHT: 60 IN | BODY MASS INDEX: 20.48 KG/M2

## 2022-09-20 DIAGNOSIS — B35.3 TINEA PEDIS OF LEFT FOOT: ICD-10-CM

## 2022-09-20 DIAGNOSIS — M20.11 HALLUX ABDUCTO VALGUS, BILATERAL: Primary | ICD-10-CM

## 2022-09-20 DIAGNOSIS — M20.12 HALLUX ABDUCTO VALGUS, BILATERAL: Primary | ICD-10-CM

## 2022-09-20 DIAGNOSIS — L84 FOOT CALLUS: ICD-10-CM

## 2022-09-20 DIAGNOSIS — B35.1 ONYCHOMYCOSIS OF TOENAIL: ICD-10-CM

## 2022-09-20 PROCEDURE — 99214 OFFICE O/P EST MOD 30 MIN: CPT | Mod: PBBFAC | Performed by: PODIATRIST

## 2022-09-20 PROCEDURE — 99999 PR PBB SHADOW E&M-EST. PATIENT-LVL IV: ICD-10-PCS | Mod: PBBFAC,,, | Performed by: PODIATRIST

## 2022-09-20 PROCEDURE — 99202 OFFICE O/P NEW SF 15 MIN: CPT | Mod: S$PBB,,, | Performed by: PODIATRIST

## 2022-09-20 PROCEDURE — 99202 PR OFFICE/OUTPT VISIT, NEW, LEVL II, 15-29 MIN: ICD-10-PCS | Mod: S$PBB,,, | Performed by: PODIATRIST

## 2022-09-20 PROCEDURE — 99999 PR PBB SHADOW E&M-EST. PATIENT-LVL IV: CPT | Mod: PBBFAC,,, | Performed by: PODIATRIST

## 2022-09-23 NOTE — PROGRESS NOTES
Subjective:       Patient ID: Vivien Burton is a 77 y.o. female.    Chief Complaint: Nail Problem, Callouses, and Skin Problem  Patient presents for evaluation skin and nail problem.  Relates her daughter in law wanted her to come in to have her feet checked.  Has had problems with skin and nails on both feet, calluses left foot for years.  Occasionally will have itching and uses Lotrimin.  No foot pain    Past Medical History:   Diagnosis Date    Acute on chronic combined systolic and diastolic heart failure 2022    Anemia     Basal cell carcinoma     Breast cancer     Breast cancer     Cancer     CHF (congestive heart failure)     Coronary artery disease     GERD (gastroesophageal reflux disease)     Hyperlipidemia     Hypertension     Hypothyroidism     Nonrheumatic aortic (valve) stenosis 2018    Osteoporosis 2019    S/P TAVR (transcatheter aortic valve replacement) 2022    Squamous cell carcinoma of skin     Thyroid disease      Past Surgical History:   Procedure Laterality Date    BREAST SURGERY      CARDIAC CATH COSURGEON N/A 2022    Procedure: Cardiac Cath Cosurgeon;  Surgeon: Dontae Wooten MD;  Location: [facility] CATH LAB;  Service: Cardiovascular;  Laterality: N/A;     SECTION, CLASSIC      COLONOSCOPY N/A 2018    Procedure: COLONOSCOPY;  Surgeon: Precious Castorena MD;  Location: [facility] ENDO;  Service: Endoscopy;  Laterality: N/A;    HYSTERECTOMY      LEFT HEART CATHETERIZATION Left 2022    Procedure: Left heart cath;  Surgeon: Dontae Johns MD;  Location: [facility] CATH LAB;  Service: Cardiology;  Laterality: Left;    LEFT HEART CATHETERIZATION Left 2022    Procedure: Left heart cath;  Surgeon: Dontae Johns MD;  Location: [facility] CATH LAB;  Service: Cardiology;  Laterality: Left;    MASTECTOMY Right 2000    right breast      TONSILLECTOMY, ADENOIDECTOMY      TRANSCATHETER AORTIC VALVE REPLACEMENT (TAVR) N/A 2022    Procedure:  REPLACEMENT, AORTIC VALVE, TRANSCATHETER (TAVR);  Surgeon: Dontae Johns MD;  Location: [facility] CATH LAB;  Service: Cardiology;  Laterality: N/A;     Family History   Problem Relation Age of Onset    Cancer Sister     Liver cancer Sister     Melanoma Sister     Cancer Brother     Breast cancer Neg Hx     Psoriasis Neg Hx     Lupus Neg Hx     Eczema Neg Hx      Social History     Socioeconomic History    Marital status:    Tobacco Use    Smoking status: Former     Packs/day: 1.00     Types: Cigarettes     Quit date: 2000     Years since quittin.6    Smokeless tobacco: Never    Tobacco comments:     quit 20 years ago   Substance and Sexual Activity    Alcohol use: Yes     Alcohol/week: 2.0 standard drinks     Types: 2 Shots of liquor per week     Comment: per pt 2 burbon drinks per night however none in last month    Drug use: No    Sexual activity: Not Currently       Current Outpatient Medications   Medication Sig Dispense Refill    aspirin (ECOTRIN) 81 MG EC tablet Take 81 mg by mouth once daily.      clopidogreL (PLAVIX) 75 mg tablet Take 1 tablet (75 mg total) by mouth once daily. 30 tablet 11    ibandronate (BONIVA) 150 mg tablet Take 1 tablet (150 mg total) by mouth every 30 days. 3 tablet 3    levothyroxine (SYNTHROID) 75 MCG tablet TAKE 1 TABLET BY MOUTH EVERY DAY 90 tablet 4    lisinopriL (PRINIVIL,ZESTRIL) 5 MG tablet Take 1 tablet (5 mg total) by mouth once daily. 30 tablet 1    simvastatin (ZOCOR) 40 MG tablet Take 1 tablet (40 mg total) by mouth every evening. 90 tablet 3     No current facility-administered medications for this visit.     Review of patient's allergies indicates:  No Known Allergies    Review of Systems   HENT:  Negative for congestion.    Respiratory:  Negative for cough.    Cardiovascular:  Negative for leg swelling.   Musculoskeletal:  Negative for gait problem.   All other systems reviewed and are negative.    Objective:      Vitals:    22 1037   BP: (!)  156/75   Pulse: 83   Resp: 16   Weight: 47.3 kg (104 lb 4.8 oz)   Height: 5' (1.524 m)     Physical Exam  Vitals and nursing note reviewed.   Constitutional:       General: She is not in acute distress.  Cardiovascular:      Pulses:           Dorsalis pedis pulses are 2+ on the right side and 2+ on the left side.        Posterior tibial pulses are 1+ on the right side and 1+ on the left side.   Pulmonary:      Effort: Pulmonary effort is normal.   Musculoskeletal:         General: No tenderness. Normal range of motion.      Right foot: Bunion present.      Left foot: Bunion and prominent metatarsal heads present.   Feet:      Right foot:      Skin integrity: Dry skin present.      Toenail Condition: Right toenails are abnormally thick. Fungal disease present.     Left foot:      Skin integrity: Callus and dry skin (Dry skin with chronic fungal involvement plantar left foot mild calluses.  Onychomycosis.  No foot pain or complications) present.      Toenail Condition: Left toenails are abnormally thick. Fungal disease present.  Skin:     General: Skin is dry.      Capillary Refill: Capillary refill takes 2 to 3 seconds.   Neurological:      General: No focal deficit present.      Mental Status: She is alert.   Psychiatric:         Mood and Affect: Mood normal.         Behavior: Behavior normal.         Thought Content: Thought content normal.         Judgment: Judgment normal.                            Assessment:       1. Hallux abducto valgus, bilateral    2. Foot callus    3. Tinea pedis of left foot    4. Onychomycosis of toenail        Plan:         We reviewed bunion deformities and calluses especially under the 2nd met head left foot.    Calluses were debrided  Advised patient these areas are fairly well maintained and we discussed maintenance of callus, appropriate shoes, wide light tennis/walking shoe with soft breathable fabric.  Additionally we discussed arch supports to help take pressure off the ball of  the foot.  Reviewed treatment for athlete's foot/fungal involvement of skin on the left foot.  Explained to patient how this has affected the nails over the years and she is developing a chronic fungal skin and nail problem on the left foot.  Explained Lotrimin can be helpful but to resolve this condition needs to be applied twice daily x2 weeks and then spot treat any remaining areas until resolved.  We also discussed other over-the-counter treatments and soaking regimens which have been very effective for both skin and nails  Additionally we discussed treatment for fungal nails.  Showed patient how to reduce thickness of nails weekly to allow medication to penetrate more effectively.  This is also more effective when soaking.  We discussed multiple over-the-counter treatments for fungal nail which needs to be applied daily over an 8-12 months period of time.  We did discuss potential complications, any area of redness, swelling, cracking or bleeding which has not improved with conservative treatments in a few days should be followed up with in the office  Patient was in understanding and agreement with treatment plan.  I counseled the patient on their conditions, implications and medical management.  Instructed patient/family to contact the office with any changes, questions, concerns, worsening of symptoms.   Total face to face time, exam, assessment, treatment, discussion, documentation 20 minutes, more than half this time spent on consultation and coordination of care.   Follow up as needed      This note was created using M*Modal voice recognition software that occasionally misinterpreted phrases or words.

## 2022-11-21 ENCOUNTER — OFFICE VISIT (OUTPATIENT)
Dept: CARDIOLOGY | Facility: CLINIC | Age: 77
End: 2022-11-21
Payer: MEDICARE

## 2022-11-21 VITALS
HEIGHT: 60 IN | HEART RATE: 88 BPM | DIASTOLIC BLOOD PRESSURE: 63 MMHG | SYSTOLIC BLOOD PRESSURE: 140 MMHG | WEIGHT: 108.19 LBS | BODY MASS INDEX: 21.24 KG/M2 | OXYGEN SATURATION: 99 %

## 2022-11-21 DIAGNOSIS — F10.10 EXCESSIVE DRINKING ALCOHOL: ICD-10-CM

## 2022-11-21 DIAGNOSIS — E61.1 IRON DEFICIENCY: ICD-10-CM

## 2022-11-21 DIAGNOSIS — R79.89 ELEVATED BRAIN NATRIURETIC PEPTIDE (BNP) LEVEL: ICD-10-CM

## 2022-11-21 DIAGNOSIS — D64.9 ANEMIA, UNSPECIFIED TYPE: ICD-10-CM

## 2022-11-21 DIAGNOSIS — I50.42 CHRONIC COMBINED SYSTOLIC AND DIASTOLIC HEART FAILURE: ICD-10-CM

## 2022-11-21 DIAGNOSIS — Z80.0 FAMILY HISTORY OF COLON CANCER: ICD-10-CM

## 2022-11-21 DIAGNOSIS — Z91.89 CARDIOVASCULAR EVENT RISK: ICD-10-CM

## 2022-11-21 DIAGNOSIS — E78.5 DYSLIPIDEMIA: ICD-10-CM

## 2022-11-21 DIAGNOSIS — I11.9 LVH (LEFT VENTRICULAR HYPERTROPHY) DUE TO HYPERTENSIVE DISEASE, WITHOUT HEART FAILURE: ICD-10-CM

## 2022-11-21 DIAGNOSIS — Z95.5 STATUS POST INSERTION OF DRUG ELUTING CORONARY ARTERY STENT: ICD-10-CM

## 2022-11-21 DIAGNOSIS — Z01.810 PREOP CARDIOVASCULAR EXAM: Primary | ICD-10-CM

## 2022-11-21 DIAGNOSIS — I10 PRIMARY HYPERTENSION: ICD-10-CM

## 2022-11-21 DIAGNOSIS — Z95.2 S/P TAVR (TRANSCATHETER AORTIC VALVE REPLACEMENT): ICD-10-CM

## 2022-11-21 DIAGNOSIS — Z79.02 PLATELET INHIBITION DUE TO PLAVIX: ICD-10-CM

## 2022-11-21 PROCEDURE — 99214 PR OFFICE/OUTPT VISIT, EST, LEVL IV, 30-39 MIN: ICD-10-PCS | Mod: S$PBB,,, | Performed by: INTERNAL MEDICINE

## 2022-11-21 PROCEDURE — 99999 PR PBB SHADOW E&M-EST. PATIENT-LVL III: ICD-10-PCS | Mod: PBBFAC,,, | Performed by: INTERNAL MEDICINE

## 2022-11-21 PROCEDURE — 99214 OFFICE O/P EST MOD 30 MIN: CPT | Mod: S$PBB,,, | Performed by: INTERNAL MEDICINE

## 2022-11-21 PROCEDURE — 99999 PR PBB SHADOW E&M-EST. PATIENT-LVL III: CPT | Mod: PBBFAC,,, | Performed by: INTERNAL MEDICINE

## 2022-11-21 PROCEDURE — 99213 OFFICE O/P EST LOW 20 MIN: CPT | Mod: PBBFAC | Performed by: INTERNAL MEDICINE

## 2022-11-21 NOTE — PROGRESS NOTES
Subjective:    Patient ID:  Vivien Burton is a 77 y.o. female who presents for evaluation of No chief complaint on file.  For critical AS with HFrEF, CAD post LAWRENCE 1/2022, post TAVR 2/2022 on DAPT, HLD on 20 mg of simvastatin, LDL-C greater than 70, for pre-op endoscopy clearance.  PCP: Dr. Robert, now see Rosi Del Real NP biannually, in [facility]  Urologist and urgently referred by daughter-in-law Willam ANDREW Josephine for markedly elevated BNP with SOB  Interventional cardiologist: Dontae Johns MD  Valve and Structural Heart Disease: Marley Rudolph PA-C, last seen 3/2022  Lives alone, no pets, son, Damari, comes and goes.   Retired     Health literacy: high   Vaccinations: up-to-date, completed COVID, no infection   Activities: do own housework, yard work and home schooling, no problem, not limited, do not get tired.  Nicotine: quit 1995, 30 years < 1ppd  Alcohol: 2-3 drinks daily, 4.5 oz of whiskey, max 3 in any 24 hours.  Illicit drugs: none  Cardiac symptoms: none  Home BP: 140 /70, now with [facility], wants to stop since readings are off a lot. Get BP check with Dr. BRETT Robert III   Medication compliance: yes  Diet: regular, low salt  Caffeine: 1 cpd  Labs:   Lab Results   Component Value Date    TSH 0.83 10/04/2022        Lab Results   Component Value Date    LABA1C 4.7 06/21/2018    HGBA1C 4.3 10/04/2022       Lab Results   Component Value Date    WBC 7.2 10/04/2022    HGB 11.2 (L) 10/04/2022    HCT 32.8 (L) 10/04/2022    MCV 92.7 10/04/2022     10/04/2022       CMP @CBC  Sodium   Date Value Ref Range Status   10/04/2022 139 135 - 146 mmol/L Final     Potassium   Date Value Ref Range Status   10/04/2022 3.8 3.5 - 5.3 mmol/L Final     Chloride   Date Value Ref Range Status   10/04/2022 102 98 - 110 mmol/L Final     CO2   Date Value Ref Range Status   10/04/2022 31 20 - 32 mmol/L Final     Glucose   Date Value Ref Range Status   10/04/2022 78 64 - 139  mg/dL Final     Comment:               Non-fasting reference interval          BUN   Date Value Ref Range Status   10/04/2022 13 7 - 25 mg/dL Final     Creatinine   Date Value Ref Range Status   10/04/2022 0.54 (L) 0.60 - 1.00 mg/dL Final     Calcium   Date Value Ref Range Status   10/04/2022 10.2 8.6 - 10.4 mg/dL Final     Total Protein   Date Value Ref Range Status   10/04/2022 5.8 (L) 6.1 - 8.1 g/dL Final     Albumin   Date Value Ref Range Status   10/04/2022 4.2 3.6 - 5.1 g/dL Final     Total Bilirubin   Date Value Ref Range Status   10/04/2022 0.6 0.2 - 1.2 mg/dL Final     Alkaline Phosphatase   Date Value Ref Range Status   02/23/2022 97 55 - 135 U/L Final     AST   Date Value Ref Range Status   10/04/2022 12 10 - 35 U/L Final     ALT   Date Value Ref Range Status   10/04/2022 11 6 - 29 U/L Final     Anion Gap   Date Value Ref Range Status   03/29/2022 7 (L) 8 - 16 mmol/L Final     eGFR if    Date Value Ref Range Status   04/13/2022 99 > OR = 60 mL/min/1.73m2 Final     eGFR if non    Date Value Ref Range Status   04/13/2022 86 > OR = 60 mL/min/1.73m2 Final     @labrcntip(troponini)@    BNP   Date Value Ref Range Status   10/04/2022 141 (H) <100 pg/mL Final     Comment:        BNP levels increase with age in the general  population with the highest values seen in  individuals greater than 75 years of age.  Reference: J. Am. Cynthia. Cardiol. 2002; 40:976-982.        }   Lab Results   Component Value Date    CHOL 144 10/04/2022    CHOL 185 04/13/2022    CHOL 135 08/09/2021     Lab Results   Component Value Date    HDL 71 10/04/2022    HDL 75 04/13/2022    HDL 69 08/09/2021     Lab Results   Component Value Date    LDLCALC 57 10/04/2022    LDLCALC 89 04/13/2022    LDLCALC 50 08/09/2021     Lab Results   Component Value Date    TRIG 77 10/04/2022    TRIG 110 04/13/2022    TRIG 82 08/09/2021     Lab Results   Component Value Date    CHOLHDL 2.0 10/04/2022    CHOLHDL 2.5 04/13/2022     "CHOLHDL 2.0 2021     Lab Results   Component Value Date    IRON 80 2022    TIBC 411 2022    FERRITIN 81 2022     Lab Results   Component Value Date    IRON 80 2022    TIBC 411 2022    LABIRON 19 2022      UA 2021 no protein.    Last Echo: 3/2022  Last stress test: 2018  Cardiovascular angiogram: 2022, with PCI  EC/10/2022 NSR, rate 80, normal  Fundoscopic exam: within the past year, negative for retinopathy    In 2018:  WF with history of HTN around the time breast CA diagnosis, treated with radiation to the right chest and chemo. Stopped HTN Rx about a year ago with normal BP. Here due to heart murmur, first detected in childhood and then no mention until recent examination. Active, own housework, own yard work, caring to grandchildren, no problem, occasional soreness. Quit smoking in , after 15 pack-years, admit to drinking [place] about 7.5 oz daily. Get sleepy not drunk, do not drive after. ECG is normal, rate 87. Labs reviewed: LDL 57, ASCVD 10-year event risk of 11.3%, intermediate.     Echo read by Dr. Koehler - Conclusion   Left ventricle shows mild concentric hypertrophy.  Mitral valve shows mild regurgitation.  Tricuspid valve shows trace regurgitation.  There is mild valvular aortic stenosis.  Mild regurgitation is present in the aortic valve.  Left ventricular diastolic filling is abnormal.     In 2021, return at advise of daughter for severely elevated BNP. SOB with first pneumonia about a month ago and then second pneumonia this week. No CP. Continue with excess alcohol use.     Dr. BRETT Robert III noted 2021 "2 week f/u pt states she isn't feeling good. She states she is sob and it makes her very fatigue. She has abd pain, sore throat)"    CXR - Impression:     1. Bibasilar pulmonary infiltrates with small bilateral pleural effusions and bibasilar dependent atelectasis.  Correlate clinically with possible fever and/or elevated white " "count.  2. Underlying COPD with mild chronic changes of interstitial fibrosis.  3. Bone demineralization.    Stress test 6/2018 - Arrhythmias during stress: rare PACs.  The EKG portion of this study is negative for myocardial ischemia.  The test was stopped because and the patient complained of fatigue.  The patient reported shortness of breath during the stress test.  Negative for ischemia with good exercise tolerance, 7 METS    Carotid US 6/2018 - No significant stenosis noted  Homogenous plaques in the ICAs  Antegrade flows in the vertebral arteries    In 6/2022, return for CV follow up. Complex recent history with critical AS and HFrEF. Now no symptoms, able to do everything she wants to do. Remain with excess alcohol intake.     Marley Rudolph PA-C noted "TAVR Hospital Course:  Vivien Burton was admitted and underwent successful placement of a 26 mm Evolut TAVR via RTF access under MAC sedation on 2/22/22. Please see full cath report for details. A transthoracic echo was performed immediately post procedure which showed no paravalvular leak. An aortic valve maximum velocity through the aortic valve of 1.0 m/s. She was transported to the CCU in stable condition with a TVP and arterial line in place. She remained hemodynamically stable overnight. EKG remained stable post TAVR therefore no EP consult was warranted. This morning, she ambulated without difficulty and was eager to go home. It was felt she was stable for discharge and will go home on ASA/Plavix for antithrombotic therapy post TAVR.      Interval History  She is doing very well today with no complaint. Was able to ride in a [employer] parade 1 week after TAVR. Denies SOB, CLIFFORD, LE swelling, weight gain, and orthopnea.     S/p LAD PCI with LAWRENCE on 1/7/22. DAPT for 1 year. Continue statin.  [facility] 1/2022 - The Prox LAD lesion was 99% stenosed with 0% stenosis post-intervention.  A STENT RESOLUTE DONTRELL [id number] stent was successfully placed at 14 " "LORRI for 10 sec.    Follow up with [facility] in 1 year with labs and Echo.  ASA indefinitely.   Plavix for 1 year post PCI (OK to discontinue 1/7/23).   No non sterile procedures which could cause endocarditis for 6 months post TAVR including dental work, endoscopy, colonoscopy, and  procedures.   SBE prophylaxis for life."    Echo 3/2022 - The left ventricle is normal in size with eccentric hypertrophy and low normal systolic function. The estimated ejection fraction is 50%.  The quantitatively derived ejection fraction is 48%.  Normal right ventricular size with normal right ventricular systolic function.  Grade II left ventricular diastolic dysfunction.  Moderate left atrial enlargement.  There is a 26 mm Evolut transcutaneously-placed aortic bioprosthesis present. There is trivial paravalvular aortic insufficiency present.  The aortic valve mean gradient is 9 mmHg with a dimensionless index of 0.46.  The estimated PA systolic pressure is 24 mmHg.  Normal central venous pressure (3 mmHg).    HPI comments: in 11/2022 for pre-endoscopy clearance. Feeling fine, do not tire anymore. Continue with excess alcohol and home [employer] always shows high reading which are way off.     Rosi Del Real NP noted 10/12/2022 "Pt to make f/u with Dr. Mccoy for cardiac clearance  If cleared, schedule EGD and c-scope    Review of Systems   Constitutional: Negative for diaphoresis, fever, malaise/fatigue, night sweats and weight gain.        Weight stable from 6/2018   HENT:  Positive for congestion. Negative for nosebleeds and tinnitus.    Eyes:  Negative for visual disturbance.   Cardiovascular:  Negative for chest pain, claudication, cyanosis, irregular heartbeat, leg swelling, near-syncope, orthopnea, palpitations and paroxysmal nocturnal dyspnea. Dyspnea on exertion: with grass work.  Respiratory:  Positive for cough. Negative for shortness of breath, sleep disturbances due to breathing, snoring and wheezing.       " "  [place] score 0, today a 5, awaken refreshed.   Endocrine: Negative for polydipsia and polyuria.   Hematologic/Lymphatic: Bruises/bleeds easily.   Skin:  Positive for dry skin. Negative for color change, flushing, nail changes, poor wound healing and suspicious lesions.   Musculoskeletal:  Negative for arthritis, falls, gout, joint pain, joint swelling, muscle cramps, muscle weakness and myalgias.   Gastrointestinal:  Positive for dysphagia, hemorrhoids and nausea. Negative for bloating, heartburn, hematemesis, hematochezia and melena.   Genitourinary:  Positive for nocturia.   Neurological:  Negative for disturbances in coordination, excessive daytime sleepiness, dizziness, focal weakness, light-headedness, loss of balance, numbness, vertigo and weakness. Headaches: sinus.  Psychiatric/Behavioral:  Negative for depression and substance abuse. The patient does not have insomnia and is not nervous/anxious.         Some claustrophobia   Allergic/Immunologic: Positive for environmental allergies.      Objective:    Physical Exam  Constitutional:       Appearance: She is well-developed.      Comments: RA O2 sat 99%   HENT:      Head: Normocephalic.   Eyes:      Conjunctiva/sclera: Conjunctivae normal.      Pupils: Pupils are equal, round, and reactive to light.   Neck:      Thyroid: No thyromegaly.      Vascular: No JVD.      Comments: Circumference 12"  Cardiovascular:      Rate and Rhythm: Normal rate and regular rhythm.      Pulses: Intact distal pulses.           Carotid pulses are 2+ on the right side with bruit and 2+ on the left side.       Radial pulses are 2+ on the right side and 2+ on the left side.        Dorsalis pedis pulses are 1+ on the right side and 1+ on the left side.        Posterior tibial pulses are 1+ on the right side and 1+ on the left side.      Heart sounds: Murmur heard.   Early systolic murmur is present with a grade of 1/6 at the upper right sternal border radiating to the neck. S2 sound " "is preserved.  Superficial varicose veins in both LE.     No friction rub. No gallop.   Pulmonary:      Effort: Pulmonary effort is normal.      Breath sounds: Rales: left basiler.      Comments: Diminished breath sounds and prolong expiration.  Chest:      Chest wall: No tenderness.   Abdominal:      General: Bowel sounds are normal.      Palpations: Abdomen is soft.      Tenderness: There is no abdominal tenderness.      Comments: Waist 31.5", hip 35"   Musculoskeletal:         General: Normal range of motion.      Cervical back: Normal range of motion and neck supple.   Lymphadenopathy:      Cervical: No cervical adenopathy.   Skin:     General: Skin is warm and dry.      Findings: Rash: mild stasis dermatitis.      Comments: Decreased skin turgor    Neurological:      Mental Status: She is alert and oriented to person, place, and time.         Assessment:       1. Preop cardiovascular exam    2. Excessive drinking alcohol    3. Anemia, unspecified type    4. Elevated brain natriuretic peptide (BNP) level    5. Iron deficiency    6. Primary hypertension    7. Dyslipidemia, baseline     8. Cardiovascular event risk, ASCVD 10-year risk 11.3%, on simvastatin 20 mg, 2017    9. Family history of colon cancer    10. LVH (left ventricular hypertrophy) due to hypertensive disease, without heart failure    11. S/P TAVR (transcatheter aortic valve replacement), 26 mm    12. Platelet inhibition due to Plavix, responder    13. Chronic combined systolic and diastolic heart failure, HFimpEF    14. Status post insertion of drug eluting coronary artery stent           Plan:       Diagnoses and all orders for this visit:    Preop cardiovascular exam    Excessive drinking alcohol    Anemia, unspecified type    Elevated brain natriuretic peptide (BNP) level    Iron deficiency    Primary hypertension    Dyslipidemia, baseline     Cardiovascular event risk, ASCVD 10-year risk 11.3%, on simvastatin 20 mg, 2017    Family " history of colon cancer    LVH (left ventricular hypertrophy) due to hypertensive disease, without heart failure    S/P TAVR (transcatheter aortic valve replacement), 26 mm    Platelet inhibition due to Plavix, responder    Chronic combined systolic and diastolic heart failure, HFimpEF    Status post insertion of drug eluting coronary artery stent    - All medical issues reviewed, continue current Rx.  - Warning signs of MI and stroke given, if symptoms last more than 5 minutes, stop immediately and call 911, then chew 2-4 low-dose ASA (81 mg).  - Clear for Surgery and anesthesia with acceptable risk from the cardiac standpoint. Will be at increase risk for complications secondary to patient's co-morbidities.   - Would hold ASA and Plavix starting 5-7 days before operation per surgeon's preference.   - Have a number of potential severed life-threatening comorbidities, all review along with medication regiment and discussed future plans.   - Consider use of Potassium chloride salt substitute, Perdomo Nu-Salt.   - Instruction for Mediterranean, high potassium diet and heart healthy exercise given.  - Check home blood pressure, 2 days weekly, do 2 readings within 5 minutes in AM and PM, keep log for review. Target resting BP is less than 130/85.  - Need good exercise program, 4 key elements: 1. Aerobic (walking, swimming, dancing, jogging, biking, etc, 2. Muscle strengthening / resistance exercise, need to do 2-3 times weekly, 3. Stretching daily, good stretch takes a whole  total minute. 4. Balance exercise daily.  - Highly recommend 30-60 minutes of exercise daily, can have Sunday off, with 2-3 sessions of muscle strengthening weekly. A  would be very helpful.  - Discussed healthy daily limit of 0.5 oz of pure alcohol in any 24 hours (roughly 1 12-oz beers or 0.75 oz of liquor (80 proof)), can not save up.  - Recommend healthy living: moderate alcohol, healthy diet and regular exercise aiming for  fitness, restorative sleep and weight control  - Consider Yoga, Siddhartha-Chi and or meditation  - Recommend at least annual cardiovascular evaluation in view of her significant risk factors. Patient's preference.  - Will send note to to referring provider for review.       Patient Active Problem List   Diagnosis    Cancer    GERD (gastroesophageal reflux disease)    Hyperlipidemia    Hypertension, onset 2012, off medications since 2016    Breast cancer    Unspecified hypothyroidism    Breast cancer, post right chest radiation    Unspecified essential hypertension    Malignant neoplasm of female breast    Encounter for long-term (current) use of medications    Syncope, 2016, dehydration    Excessive drinking alcohol    Heart murmur, since childhood    Dyslipidemia, baseline     Cardiovascular event risk, ASCVD 10-year risk 11.3%, on simvastatin 20 mg, 2017    Bilateral carotid bruits    Nonrheumatic aortic (valve) stenosis    ISIS (generalized anxiety disorder)    BMI 22.0-22.9, adult    Family history of colon cancer    Elevated brain natriuretic peptide (BNP) level    Anemia    LVH (left ventricular hypertrophy) due to hypertensive disease, without heart failure    S/P TAVR (transcatheter aortic valve replacement), 26 mm    Platelet inhibition due to Plavix, responder    Chronic combined systolic and diastolic heart failure, HFimpEF    Coronary artery disease    Aortic atherosclerosis    Status post insertion of drug eluting coronary artery stent    Iron deficiency    Preop cardiovascular exam     Greater than 50% was spent in counseling and coordination of care. The above assessment and plan have been discussed at length. Labs and procedure over the last 6 months reviewed. Problem List updated. Asked to bring in all active medications / pills bottles with next visit.

## 2023-01-23 ENCOUNTER — HOSPITAL ENCOUNTER (INPATIENT)
Facility: HOSPITAL | Age: 78
LOS: 6 days | Discharge: HOME HEALTH | DRG: 871 | End: 2023-01-29
Attending: STUDENT IN AN ORGANIZED HEALTH CARE EDUCATION/TRAINING PROGRAM | Admitting: STUDENT IN AN ORGANIZED HEALTH CARE EDUCATION/TRAINING PROGRAM
Payer: MEDICARE

## 2023-01-23 ENCOUNTER — HOSPITAL ENCOUNTER (INPATIENT)
Facility: HOSPITAL | Age: 78
LOS: 1 days | Discharge: ANOTHER HOSPITAL | DRG: 871 | End: 2023-01-23
Attending: INTERNAL MEDICINE | Admitting: STUDENT IN AN ORGANIZED HEALTH CARE EDUCATION/TRAINING PROGRAM
Payer: MEDICARE

## 2023-01-23 VITALS
TEMPERATURE: 99 F | SYSTOLIC BLOOD PRESSURE: 149 MMHG | HEIGHT: 60 IN | HEART RATE: 88 BPM | OXYGEN SATURATION: 98 % | RESPIRATION RATE: 20 BRPM | WEIGHT: 110 LBS | DIASTOLIC BLOOD PRESSURE: 49 MMHG | BODY MASS INDEX: 21.6 KG/M2

## 2023-01-23 DIAGNOSIS — R51.9 ACUTE NONINTRACTABLE HEADACHE, UNSPECIFIED HEADACHE TYPE: Primary | ICD-10-CM

## 2023-01-23 DIAGNOSIS — R51.9 NONINTRACTABLE EPISODIC HEADACHE, UNSPECIFIED HEADACHE TYPE: ICD-10-CM

## 2023-01-23 DIAGNOSIS — I10 HYPERTENSION: ICD-10-CM

## 2023-01-23 DIAGNOSIS — J18.9 PNEUMONIA: ICD-10-CM

## 2023-01-23 DIAGNOSIS — R78.81 BACTEREMIA: ICD-10-CM

## 2023-01-23 DIAGNOSIS — A41.9 SEVERE SEPSIS: Primary | ICD-10-CM

## 2023-01-23 DIAGNOSIS — D64.9 ANEMIA, UNSPECIFIED TYPE: ICD-10-CM

## 2023-01-23 DIAGNOSIS — T39.1X1D ACCIDENTAL ACETAMINOPHEN OVERDOSE, SUBSEQUENT ENCOUNTER: ICD-10-CM

## 2023-01-23 DIAGNOSIS — R07.9 CHEST PAIN: ICD-10-CM

## 2023-01-23 DIAGNOSIS — D72.829 LEUKOCYTOSIS, UNSPECIFIED TYPE: ICD-10-CM

## 2023-01-23 DIAGNOSIS — R65.20 SEVERE SEPSIS: Primary | ICD-10-CM

## 2023-01-23 DIAGNOSIS — A41.9 SEPSIS: ICD-10-CM

## 2023-01-23 DIAGNOSIS — J18.9 COMMUNITY ACQUIRED PNEUMONIA: ICD-10-CM

## 2023-01-23 PROBLEM — T39.1X1A ACCIDENTAL ACETAMINOPHEN OVERDOSE: Status: ACTIVE | Noted: 2023-01-23

## 2023-01-23 PROBLEM — R74.01 TRANSAMINITIS: Status: ACTIVE | Noted: 2023-01-23

## 2023-01-23 LAB
ALBUMIN SERPL BCP-MCNC: 2.7 G/DL (ref 3.5–5.2)
ALBUMIN SERPL BCP-MCNC: 3.2 G/DL (ref 3.5–5.2)
ALP SERPL-CCNC: 145 U/L (ref 55–135)
ALP SERPL-CCNC: 161 U/L (ref 55–135)
ALT SERPL W/O P-5'-P-CCNC: 222 U/L (ref 10–44)
ALT SERPL W/O P-5'-P-CCNC: 279 U/L (ref 10–44)
AMPHET+METHAMPHET UR QL: NEGATIVE
ANION GAP SERPL CALC-SCNC: 10 MMOL/L (ref 8–16)
ANION GAP SERPL CALC-SCNC: 15 MMOL/L (ref 8–16)
APAP SERPL-MCNC: <3 UG/ML (ref 10–20)
APTT BLDCRRT: 42.4 SEC (ref 21–32)
AST SERPL-CCNC: 239 U/L (ref 10–40)
AST SERPL-CCNC: 280 U/L (ref 10–40)
BACTERIA #/AREA URNS HPF: ABNORMAL /HPF
BARBITURATES UR QL SCN>200 NG/ML: NEGATIVE
BASOPHILS # BLD AUTO: ABNORMAL K/UL (ref 0–0.2)
BASOPHILS NFR BLD: 0 % (ref 0–1.9)
BENZODIAZ UR QL SCN>200 NG/ML: NEGATIVE
BILIRUB SERPL-MCNC: 0.9 MG/DL (ref 0.1–1)
BILIRUB SERPL-MCNC: 1.1 MG/DL (ref 0.1–1)
BILIRUB UR QL STRIP: ABNORMAL
BUN SERPL-MCNC: 13 MG/DL (ref 8–23)
BUN SERPL-MCNC: 13 MG/DL (ref 8–23)
BZE UR QL SCN: NEGATIVE
CALCIUM SERPL-MCNC: 8.6 MG/DL (ref 8.7–10.5)
CALCIUM SERPL-MCNC: 9.5 MG/DL (ref 8.7–10.5)
CANNABINOIDS UR QL SCN: NEGATIVE
CHLORIDE SERPL-SCNC: 103 MMOL/L (ref 95–110)
CHLORIDE SERPL-SCNC: 99 MMOL/L (ref 95–110)
CLARITY UR: CLEAR
CO2 SERPL-SCNC: 20 MMOL/L (ref 23–29)
CO2 SERPL-SCNC: 20 MMOL/L (ref 23–29)
COLOR UR: YELLOW
CREAT SERPL-MCNC: 0.7 MG/DL (ref 0.5–1.4)
CREAT SERPL-MCNC: 0.7 MG/DL (ref 0.5–1.4)
CREAT UR-MCNC: 133.4 MG/DL (ref 15–325)
DIFFERENTIAL METHOD: ABNORMAL
EOSINOPHIL # BLD AUTO: ABNORMAL K/UL (ref 0–0.5)
EOSINOPHIL NFR BLD: 0 % (ref 0–8)
ERYTHROCYTE [DISTWIDTH] IN BLOOD BY AUTOMATED COUNT: 13.6 % (ref 11.5–14.5)
ERYTHROCYTE [SEDIMENTATION RATE] IN BLOOD BY WESTERGREN METHOD: 37 MM/HR (ref 0–20)
EST. GFR  (NO RACE VARIABLE): >60 ML/MIN/1.73 M^2
EST. GFR  (NO RACE VARIABLE): >60 ML/MIN/1.73 M^2
GLUCOSE SERPL-MCNC: 116 MG/DL (ref 70–110)
GLUCOSE SERPL-MCNC: 151 MG/DL (ref 70–110)
GLUCOSE UR QL STRIP: NEGATIVE
HCT VFR BLD AUTO: 30.7 % (ref 37–48.5)
HGB BLD-MCNC: 10.7 G/DL (ref 12–16)
HGB UR QL STRIP: NEGATIVE
HYALINE CASTS #/AREA URNS LPF: 0 /LPF
IMM GRANULOCYTES # BLD AUTO: ABNORMAL K/UL (ref 0–0.04)
IMM GRANULOCYTES NFR BLD AUTO: ABNORMAL % (ref 0–0.5)
INFLUENZA A, MOLECULAR: NEGATIVE
INFLUENZA B, MOLECULAR: NEGATIVE
INR PPP: 1.8 (ref 0.8–1.2)
KETONES UR QL STRIP: ABNORMAL
LACTATE SERPL-SCNC: 1.6 MMOL/L (ref 0.5–2.2)
LACTATE SERPL-SCNC: 2.4 MMOL/L (ref 0.5–2.2)
LEUKOCYTE ESTERASE UR QL STRIP: ABNORMAL
LYMPHOCYTES # BLD AUTO: ABNORMAL K/UL (ref 1–4.8)
LYMPHOCYTES NFR BLD: 4 % (ref 18–48)
MAGNESIUM SERPL-MCNC: 1.2 MG/DL (ref 1.6–2.6)
MCH RBC QN AUTO: 29.9 PG (ref 27–31)
MCHC RBC AUTO-ENTMCNC: 34.9 G/DL (ref 32–36)
MCV RBC AUTO: 86 FL (ref 82–98)
METAMYELOCYTES NFR BLD MANUAL: 3 %
METHADONE UR QL SCN>300 NG/ML: NEGATIVE
MICROSCOPIC COMMENT: ABNORMAL
MONOCYTES # BLD AUTO: ABNORMAL K/UL (ref 0.3–1)
MONOCYTES NFR BLD: 3 % (ref 4–15)
MYELOCYTES NFR BLD MANUAL: 2 %
NEUTROPHILS NFR BLD: 80 % (ref 38–73)
NEUTS BAND NFR BLD MANUAL: 8 %
NITRITE UR QL STRIP: NEGATIVE
NRBC BLD-RTO: 0 /100 WBC
OPIATES UR QL SCN: NEGATIVE
PCP UR QL SCN>25 NG/ML: NEGATIVE
PH UR STRIP: 7 [PH] (ref 5–8)
PLATELET # BLD AUTO: 214 K/UL (ref 150–450)
PMV BLD AUTO: 9.1 FL (ref 9.2–12.9)
POLYCHROMASIA BLD QL SMEAR: ABNORMAL
POTASSIUM SERPL-SCNC: 3.3 MMOL/L (ref 3.5–5.1)
POTASSIUM SERPL-SCNC: 3.5 MMOL/L (ref 3.5–5.1)
PROT SERPL-MCNC: 5.2 G/DL (ref 6–8.4)
PROT SERPL-MCNC: 5.8 G/DL (ref 6–8.4)
PROT UR QL STRIP: ABNORMAL
PROTHROMBIN TIME: 17.9 SEC (ref 9–12.5)
RBC # BLD AUTO: 3.58 M/UL (ref 4–5.4)
RBC #/AREA URNS HPF: 8 /HPF (ref 0–4)
SARS-COV-2 RDRP RESP QL NAA+PROBE: NEGATIVE
SODIUM SERPL-SCNC: 133 MMOL/L (ref 136–145)
SODIUM SERPL-SCNC: 134 MMOL/L (ref 136–145)
SP GR UR STRIP: 1.01 (ref 1–1.03)
SPECIMEN SOURCE: NORMAL
TARGETS BLD QL SMEAR: ABNORMAL
TOXICOLOGY INFORMATION: NORMAL
TROPONIN I SERPL DL<=0.01 NG/ML-MCNC: <0.006 NG/ML (ref 0–0.03)
URN SPEC COLLECT METH UR: ABNORMAL
UROBILINOGEN UR STRIP-ACNC: 4 EU/DL
WBC # BLD AUTO: 43.13 K/UL (ref 3.9–12.7)
WBC #/AREA URNS HPF: 10 /HPF (ref 0–5)

## 2023-01-23 PROCEDURE — 87899 AGENT NOS ASSAY W/OPTIC: CPT | Performed by: STUDENT IN AN ORGANIZED HEALTH CARE EDUCATION/TRAINING PROGRAM

## 2023-01-23 PROCEDURE — 93010 EKG 12-LEAD: ICD-10-PCS | Mod: ,,, | Performed by: INTERNAL MEDICINE

## 2023-01-23 PROCEDURE — 25500020 PHARM REV CODE 255: Performed by: STUDENT IN AN ORGANIZED HEALTH CARE EDUCATION/TRAINING PROGRAM

## 2023-01-23 PROCEDURE — 85610 PROTHROMBIN TIME: CPT | Performed by: INTERNAL MEDICINE

## 2023-01-23 PROCEDURE — 71045 X-RAY EXAM CHEST 1 VIEW: CPT | Mod: TC

## 2023-01-23 PROCEDURE — 25000003 PHARM REV CODE 250: Performed by: STUDENT IN AN ORGANIZED HEALTH CARE EDUCATION/TRAINING PROGRAM

## 2023-01-23 PROCEDURE — 80307 DRUG TEST PRSMV CHEM ANLYZR: CPT | Performed by: INTERNAL MEDICINE

## 2023-01-23 PROCEDURE — 84145 PROCALCITONIN (PCT): CPT | Performed by: NURSE PRACTITIONER

## 2023-01-23 PROCEDURE — 93005 ELECTROCARDIOGRAM TRACING: CPT

## 2023-01-23 PROCEDURE — 71260 CT THORAX DX C+: CPT | Mod: TC

## 2023-01-23 PROCEDURE — 87186 SC STD MICRODIL/AGAR DIL: CPT | Performed by: INTERNAL MEDICINE

## 2023-01-23 PROCEDURE — 85027 COMPLETE CBC AUTOMATED: CPT | Performed by: INTERNAL MEDICINE

## 2023-01-23 PROCEDURE — 85007 BL SMEAR W/DIFF WBC COUNT: CPT | Performed by: INTERNAL MEDICINE

## 2023-01-23 PROCEDURE — 74177 CT ABD & PELVIS W/CONTRAST: CPT | Mod: 26,,, | Performed by: RADIOLOGY

## 2023-01-23 PROCEDURE — 87502 INFLUENZA DNA AMP PROBE: CPT | Performed by: INTERNAL MEDICINE

## 2023-01-23 PROCEDURE — 93010 ELECTROCARDIOGRAM REPORT: CPT | Mod: ,,, | Performed by: INTERNAL MEDICINE

## 2023-01-23 PROCEDURE — 87449 NOS EACH ORGANISM AG IA: CPT | Performed by: STUDENT IN AN ORGANIZED HEALTH CARE EDUCATION/TRAINING PROGRAM

## 2023-01-23 PROCEDURE — 74177 CT CHEST ABDOMEN PELVIS WITH CONTRAST (XPD): ICD-10-PCS | Mod: 26,,, | Performed by: RADIOLOGY

## 2023-01-23 PROCEDURE — 71260 CT CHEST ABDOMEN PELVIS WITH CONTRAST (XPD): ICD-10-PCS | Mod: 26,,, | Performed by: RADIOLOGY

## 2023-01-23 PROCEDURE — 84484 ASSAY OF TROPONIN QUANT: CPT | Performed by: INTERNAL MEDICINE

## 2023-01-23 PROCEDURE — U0002 COVID-19 LAB TEST NON-CDC: HCPCS | Performed by: INTERNAL MEDICINE

## 2023-01-23 PROCEDURE — 71260 CT THORAX DX C+: CPT | Mod: 26,,, | Performed by: RADIOLOGY

## 2023-01-23 PROCEDURE — 96375 TX/PRO/DX INJ NEW DRUG ADDON: CPT

## 2023-01-23 PROCEDURE — 85651 RBC SED RATE NONAUTOMATED: CPT | Performed by: INTERNAL MEDICINE

## 2023-01-23 PROCEDURE — 83605 ASSAY OF LACTIC ACID: CPT | Mod: 91 | Performed by: INTERNAL MEDICINE

## 2023-01-23 PROCEDURE — 80053 COMPREHEN METABOLIC PANEL: CPT | Performed by: INTERNAL MEDICINE

## 2023-01-23 PROCEDURE — 81000 URINALYSIS NONAUTO W/SCOPE: CPT | Mod: 59 | Performed by: INTERNAL MEDICINE

## 2023-01-23 PROCEDURE — 70450 CT HEAD/BRAIN W/O DYE: CPT | Mod: 26,,, | Performed by: RADIOLOGY

## 2023-01-23 PROCEDURE — 71045 X-RAY EXAM CHEST 1 VIEW: CPT | Mod: 26,,, | Performed by: RADIOLOGY

## 2023-01-23 PROCEDURE — 80053 COMPREHEN METABOLIC PANEL: CPT | Mod: 91 | Performed by: STUDENT IN AN ORGANIZED HEALTH CARE EDUCATION/TRAINING PROGRAM

## 2023-01-23 PROCEDURE — 80143 DRUG ASSAY ACETAMINOPHEN: CPT | Performed by: INTERNAL MEDICINE

## 2023-01-23 PROCEDURE — 99223 PR INITIAL HOSPITAL CARE,LEVL III: ICD-10-PCS | Mod: AI,,, | Performed by: STUDENT IN AN ORGANIZED HEALTH CARE EDUCATION/TRAINING PROGRAM

## 2023-01-23 PROCEDURE — 71045 XR CHEST AP PORTABLE: ICD-10-PCS | Mod: 26,,, | Performed by: RADIOLOGY

## 2023-01-23 PROCEDURE — 85730 THROMBOPLASTIN TIME PARTIAL: CPT | Performed by: INTERNAL MEDICINE

## 2023-01-23 PROCEDURE — 87040 BLOOD CULTURE FOR BACTERIA: CPT | Mod: 59 | Performed by: INTERNAL MEDICINE

## 2023-01-23 PROCEDURE — 70450 CT HEAD/BRAIN W/O DYE: CPT | Mod: TC

## 2023-01-23 PROCEDURE — 12000002 HC ACUTE/MED SURGE SEMI-PRIVATE ROOM

## 2023-01-23 PROCEDURE — 70450 CT HEAD WITHOUT CONTRAST: ICD-10-PCS | Mod: 26,,, | Performed by: RADIOLOGY

## 2023-01-23 PROCEDURE — 74177 CT ABD & PELVIS W/CONTRAST: CPT | Mod: TC

## 2023-01-23 PROCEDURE — 99285 EMERGENCY DEPT VISIT HI MDM: CPT | Mod: 25

## 2023-01-23 PROCEDURE — 83605 ASSAY OF LACTIC ACID: CPT | Performed by: NURSE PRACTITIONER

## 2023-01-23 PROCEDURE — 63600175 PHARM REV CODE 636 W HCPCS: Performed by: INTERNAL MEDICINE

## 2023-01-23 PROCEDURE — 83735 ASSAY OF MAGNESIUM: CPT | Performed by: INTERNAL MEDICINE

## 2023-01-23 PROCEDURE — 36415 COLL VENOUS BLD VENIPUNCTURE: CPT | Performed by: NURSE PRACTITIONER

## 2023-01-23 PROCEDURE — 99223 1ST HOSP IP/OBS HIGH 75: CPT | Mod: AI,,, | Performed by: STUDENT IN AN ORGANIZED HEALTH CARE EDUCATION/TRAINING PROGRAM

## 2023-01-23 PROCEDURE — 96374 THER/PROPH/DIAG INJ IV PUSH: CPT

## 2023-01-23 PROCEDURE — 25000003 PHARM REV CODE 250: Performed by: INTERNAL MEDICINE

## 2023-01-23 PROCEDURE — 63600175 PHARM REV CODE 636 W HCPCS: Performed by: STUDENT IN AN ORGANIZED HEALTH CARE EDUCATION/TRAINING PROGRAM

## 2023-01-23 RX ORDER — MAG HYDROX/ALUMINUM HYD/SIMETH 200-200-20
30 SUSPENSION, ORAL (FINAL DOSE FORM) ORAL 4 TIMES DAILY PRN
Status: DISCONTINUED | OUTPATIENT
Start: 2023-01-24 | End: 2023-01-24

## 2023-01-23 RX ORDER — ENOXAPARIN SODIUM 100 MG/ML
40 INJECTION SUBCUTANEOUS EVERY 24 HOURS
Status: DISCONTINUED | OUTPATIENT
Start: 2023-01-24 | End: 2023-01-23 | Stop reason: HOSPADM

## 2023-01-23 RX ORDER — MAGNESIUM SULFATE 1 G/100ML
1 INJECTION INTRAVENOUS ONCE
Status: COMPLETED | OUTPATIENT
Start: 2023-01-23 | End: 2023-01-23

## 2023-01-23 RX ORDER — POTASSIUM CHLORIDE 20 MEQ/1
40 TABLET, EXTENDED RELEASE ORAL EVERY 4 HOURS
Status: DISCONTINUED | OUTPATIENT
Start: 2023-01-23 | End: 2023-01-23 | Stop reason: HOSPADM

## 2023-01-23 RX ORDER — PIPERACILLIN SODIUM, TAZOBACTAM SODIUM 3; .375 G/15ML; G/15ML
3.38 INJECTION, POWDER, LYOPHILIZED, FOR SOLUTION INTRAVENOUS
Status: DISCONTINUED | OUTPATIENT
Start: 2023-01-23 | End: 2023-01-23

## 2023-01-23 RX ORDER — LANOLIN ALCOHOL/MO/W.PET/CERES
800 CREAM (GRAM) TOPICAL
Status: DISCONTINUED | OUTPATIENT
Start: 2023-01-24 | End: 2023-01-24

## 2023-01-23 RX ORDER — SIMETHICONE 80 MG
1 TABLET,CHEWABLE ORAL 4 TIMES DAILY PRN
Status: DISCONTINUED | OUTPATIENT
Start: 2023-01-24 | End: 2023-01-24

## 2023-01-23 RX ORDER — ONDANSETRON 2 MG/ML
4 INJECTION INTRAMUSCULAR; INTRAVENOUS EVERY 8 HOURS PRN
Status: DISCONTINUED | OUTPATIENT
Start: 2023-01-24 | End: 2023-01-29 | Stop reason: HOSPADM

## 2023-01-23 RX ORDER — GLUCAGON 1 MG
1 KIT INJECTION
Status: DISCONTINUED | OUTPATIENT
Start: 2023-01-23 | End: 2023-01-23 | Stop reason: HOSPADM

## 2023-01-23 RX ORDER — LEVOTHYROXINE SODIUM 25 UG/1
75 TABLET ORAL
Status: DISCONTINUED | OUTPATIENT
Start: 2023-01-24 | End: 2023-01-23 | Stop reason: HOSPADM

## 2023-01-23 RX ORDER — SODIUM CHLORIDE 0.9 % (FLUSH) 0.9 %
10 SYRINGE (ML) INJECTION EVERY 8 HOURS PRN
Status: DISCONTINUED | OUTPATIENT
Start: 2023-01-24 | End: 2023-01-29 | Stop reason: HOSPADM

## 2023-01-23 RX ORDER — MORPHINE SULFATE 4 MG/ML
4 INJECTION, SOLUTION INTRAMUSCULAR; INTRAVENOUS ONCE
Status: COMPLETED | OUTPATIENT
Start: 2023-01-23 | End: 2023-01-24

## 2023-01-23 RX ORDER — NALOXONE HCL 0.4 MG/ML
0.02 VIAL (ML) INJECTION
Status: DISCONTINUED | OUTPATIENT
Start: 2023-01-23 | End: 2023-01-23 | Stop reason: HOSPADM

## 2023-01-23 RX ORDER — IBUPROFEN 200 MG
24 TABLET ORAL
Status: DISCONTINUED | OUTPATIENT
Start: 2023-01-24 | End: 2023-01-24

## 2023-01-23 RX ORDER — IPRATROPIUM BROMIDE AND ALBUTEROL SULFATE 2.5; .5 MG/3ML; MG/3ML
3 SOLUTION RESPIRATORY (INHALATION) EVERY 4 HOURS PRN
Status: DISCONTINUED | OUTPATIENT
Start: 2023-01-24 | End: 2023-01-29 | Stop reason: HOSPADM

## 2023-01-23 RX ORDER — SODIUM CHLORIDE 0.9 % (FLUSH) 0.9 %
10 SYRINGE (ML) INJECTION EVERY 12 HOURS PRN
Status: DISCONTINUED | OUTPATIENT
Start: 2023-01-23 | End: 2023-01-23 | Stop reason: HOSPADM

## 2023-01-23 RX ORDER — SODIUM,POTASSIUM PHOSPHATES 280-250MG
2 POWDER IN PACKET (EA) ORAL
Status: DISCONTINUED | OUTPATIENT
Start: 2023-01-24 | End: 2023-01-24

## 2023-01-23 RX ORDER — NALOXONE HCL 0.4 MG/ML
0.02 VIAL (ML) INJECTION
Status: DISCONTINUED | OUTPATIENT
Start: 2023-01-24 | End: 2023-01-24

## 2023-01-23 RX ORDER — IBUPROFEN 200 MG
16 TABLET ORAL
Status: DISCONTINUED | OUTPATIENT
Start: 2023-01-23 | End: 2023-01-23 | Stop reason: HOSPADM

## 2023-01-23 RX ORDER — KETOROLAC TROMETHAMINE 30 MG/ML
15 INJECTION, SOLUTION INTRAMUSCULAR; INTRAVENOUS
Status: COMPLETED | OUTPATIENT
Start: 2023-01-23 | End: 2023-01-23

## 2023-01-23 RX ORDER — IBUPROFEN 200 MG
16 TABLET ORAL
Status: DISCONTINUED | OUTPATIENT
Start: 2023-01-24 | End: 2023-01-24

## 2023-01-23 RX ORDER — TALC
9 POWDER (GRAM) TOPICAL NIGHTLY PRN
Status: DISCONTINUED | OUTPATIENT
Start: 2023-01-24 | End: 2023-01-24

## 2023-01-23 RX ORDER — GLUCAGON 1 MG
1 KIT INJECTION
Status: DISCONTINUED | OUTPATIENT
Start: 2023-01-24 | End: 2023-01-24

## 2023-01-23 RX ORDER — IBUPROFEN 200 MG
24 TABLET ORAL
Status: DISCONTINUED | OUTPATIENT
Start: 2023-01-23 | End: 2023-01-23 | Stop reason: HOSPADM

## 2023-01-23 RX ORDER — AZITHROMYCIN 250 MG/1
500 TABLET, FILM COATED ORAL DAILY
Status: DISCONTINUED | OUTPATIENT
Start: 2023-01-23 | End: 2023-01-23 | Stop reason: HOSPADM

## 2023-01-23 RX ADMIN — POTASSIUM CHLORIDE 40 MEQ: 1500 TABLET, EXTENDED RELEASE ORAL at 06:01

## 2023-01-23 RX ADMIN — IOHEXOL 75 ML: 350 INJECTION, SOLUTION INTRAVENOUS at 05:01

## 2023-01-23 RX ADMIN — SODIUM CHLORIDE 1000 ML: 9 INJECTION, SOLUTION INTRAVENOUS at 05:01

## 2023-01-23 RX ADMIN — MAGNESIUM SULFATE IN DEXTROSE 1 G: 10 INJECTION, SOLUTION INTRAVENOUS at 05:01

## 2023-01-23 RX ADMIN — MAGNESIUM SULFATE IN DEXTROSE 1 G: 10 INJECTION, SOLUTION INTRAVENOUS at 06:01

## 2023-01-23 RX ADMIN — KETOROLAC TROMETHAMINE 15 MG: 30 INJECTION, SOLUTION INTRAMUSCULAR at 04:01

## 2023-01-23 RX ADMIN — PIPERACILLIN AND TAZOBACTAM 3.38 G: 3; .375 INJECTION, POWDER, LYOPHILIZED, FOR SOLUTION INTRAVENOUS; PARENTERAL at 05:01

## 2023-01-23 RX ADMIN — ACETYLCYSTEINE 7500 MG: 200 INJECTION INTRAVENOUS at 06:01

## 2023-01-23 RX ADMIN — ACETYLCYSTEINE 2500 MG: 200 INJECTION INTRAVENOUS at 08:01

## 2023-01-23 NOTE — ED PROVIDER NOTES
Encounter Date: 2023       History     Chief Complaint   Patient presents with    Headache     Pt c/o headache since last Wednesday. Pt was told by MD to come to ER to be evaluated. Pt denies dizziness, photophobia. Reports nausea when pain becomes intense. No hx of migraines. Unrelieved by tylenol and ibuprofen.      Patient was sent here by her primary care provider for right-sided headaches that occurred approximately 5 days ago.  She said is feel like ice pick on the right side of her head.  She denies any vision abnormalities, syncope, blurred vision, nausea vomiting, paralysis paresthesias for drop or seizure.  No previous history of any strokes but hold chart review of sinuses.  Patient recently had valve surgery placement last year.      Review of patient's allergies indicates:  No Known Allergies  Past Medical History:   Diagnosis Date    Acute on chronic combined systolic and diastolic heart failure 2022    Anemia     Basal cell carcinoma     Breast cancer     Breast cancer     Cancer     CHF (congestive heart failure)     Coronary artery disease     GERD (gastroesophageal reflux disease)     Hyperlipidemia     Hypertension     Hypothyroidism     Nonrheumatic aortic (valve) stenosis 2018    Osteoporosis 2019    S/P TAVR (transcatheter aortic valve replacement) 2022    Squamous cell carcinoma of skin 2018    Thyroid disease      Past Surgical History:   Procedure Laterality Date    BREAST SURGERY      CARDIAC CATH COSURGEON N/A 2022    Procedure: Cardiac Cath Cosurgeon;  Surgeon: Dontae Wooten MD;  Location: [facility] CATH LAB;  Service: Cardiovascular;  Laterality: N/A;     SECTION, CLASSIC      COLONOSCOPY N/A 2018    Procedure: COLONOSCOPY;  Surgeon: Precious Castorena MD;  Location: [facility];  Service: Endoscopy;  Laterality: N/A;    HYSTERECTOMY      LEFT HEART CATHETERIZATION Left 2022    Procedure: Left heart cath;  Surgeon: Dontae Johns MD;   Location: [facility] CATH LAB;  Service: Cardiology;  Laterality: Left;    LEFT HEART CATHETERIZATION Left 2022    Procedure: Left heart cath;  Surgeon: Dontae Johns MD;  Location: [facility] CATH LAB;  Service: Cardiology;  Laterality: Left;    MASTECTOMY Right 2000    right breast      TONSILLECTOMY, ADENOIDECTOMY      TRANSCATHETER AORTIC VALVE REPLACEMENT (TAVR) N/A 2022    Procedure: REPLACEMENT, AORTIC VALVE, TRANSCATHETER (TAVR);  Surgeon: Dontae Johns MD;  Location: [facility] CATH LAB;  Service: Cardiology;  Laterality: N/A;     Family History   Problem Relation Age of Onset    Cancer Sister     Liver cancer Sister     Melanoma Sister     Cancer Brother     Breast cancer Neg Hx     Psoriasis Neg Hx     Lupus Neg Hx     Eczema Neg Hx      Social History     Tobacco Use    Smoking status: Former     Packs/day: 1.00     Types: Cigarettes     Quit date: 2000     Years since quittin.9    Smokeless tobacco: Never    Tobacco comments:     quit 20 years ago   Substance Use Topics    Alcohol use: Yes     Alcohol/week: 2.0 standard drinks     Types: 2 Shots of liquor per week     Comment: per pt 2 burbon drinks per night however none in last month    Drug use: No     Review of Systems   Constitutional:  Negative for fever.   HENT:  Negative for sore throat.    Respiratory:  Negative for shortness of breath.    Cardiovascular:  Negative for chest pain.   Gastrointestinal:  Negative for nausea.   Genitourinary:  Negative for dysuria.   Musculoskeletal:  Negative for back pain.   Skin:  Negative for rash.   Neurological:  Negative for weakness.   Hematological:  Does not bruise/bleed easily.     Physical Exam     Initial Vitals [23 1525]   BP Pulse Resp Temp SpO2   (!) 197/91 (!) 112 20 98.5 °F (36.9 °C) 96 %      MAP       --         Physical Exam    Vitals reviewed.  Constitutional: She appears well-developed.   Eyes: Pupils are equal, round, and reactive to light.   Neck: Trachea normal. Neck  supple. No Brudzinski's sign and no Kernig's sign noted.   Normal range of motion.   Full passive range of motion without pain.     Cardiovascular:  Normal rate, regular rhythm, normal heart sounds and normal pulses.           Pulmonary/Chest: Effort normal. She has no decreased breath sounds. She has rhonchi.   Abdominal: Abdomen is soft.   Musculoskeletal:         General: Normal range of motion.      Cervical back: Full passive range of motion without pain, normal range of motion and neck supple.     Neurological: She is alert. She has normal strength and normal reflexes. No cranial nerve deficit or sensory deficit. GCS eye subscore is 4. GCS verbal subscore is 5. GCS motor subscore is 6.   Skin: Skin is warm.   Psychiatric: She has a normal mood and affect.       ED Course   Procedures  Labs Reviewed   CBC W/ AUTO DIFFERENTIAL - Abnormal; Notable for the following components:       Result Value    WBC 43.13 (*)     RBC 3.58 (*)     Hemoglobin 10.7 (*)     Hematocrit 30.7 (*)     MPV 9.1 (*)     All other components within normal limits   COMPREHENSIVE METABOLIC PANEL - Abnormal; Notable for the following components:    Sodium 134 (*)     Potassium 3.3 (*)     CO2 20 (*)     Glucose 116 (*)     Total Protein 5.8 (*)     Albumin 3.2 (*)     Total Bilirubin 1.1 (*)     Alkaline Phosphatase 161 (*)      (*)      (*)     All other components within normal limits   MAGNESIUM - Abnormal; Notable for the following components:    Magnesium 1.2 (*)     All other components within normal limits   URINALYSIS, REFLEX TO URINE CULTURE - Abnormal; Notable for the following components:    Protein, UA 3+ (*)     Ketones, UA Trace (*)     Bilirubin (UA) 1+ (*)     Leukocytes, UA 1+ (*)     All other components within normal limits    Narrative:     Preferred Collection Type->Urine, Clean Catch  Specimen Source->Urine   INFLUENZA A & B BY MOLECULAR   CULTURE, BLOOD   CULTURE, BLOOD   DRUG SCREEN PANEL, URINE  EMERGENCY    Narrative:     Preferred Collection Type->Urine, Clean Catch  Specimen Source->Urine   URINALYSIS MICROSCOPIC    Narrative:     Preferred Collection Type->Urine, Clean Catch  Specimen Source->Urine   SARS-COV-2 RNA AMPLIFICATION, QUAL   APTT   LACTIC ACID, PLASMA   PROTIME-INR   TROPONIN I   SEDIMENTATION RATE     EKG Readings: (Independently Interpreted)   Initial Reading: No STEMI. Rhythm: Normal Sinus Rhythm. Heart Rate: 103. Ectopy: PVCs. Conduction: Normal. ST Segments: Normal ST Segments. T Waves: Normal. Axis: Left Axis Deviation. Clinical Impression: Sinus Tachycardia with PVCs   Sinus tachycardia rate of 103, LVH and no other acute abnormality except for occasional PVC     Imaging Results               X-Ray Chest AP Portable (Final result)  Result time 01/23/23 16:25:28      Final result by Seun Amaro MD (01/23/23 16:25:28)                   Impression:      1. Dense masslike infiltrate of the right lower lobe.  Correlate clinically with possible fever and/or elevated white count.  This should be followed until clear to exclude underlying suspicious pulmonary lesion.  2. Prior aortic valve replacement.  This report was flagged in Epic as abnormal.      Electronically signed by: Seun Amaro  Date:    01/23/2023  Time:    16:25               Narrative:    EXAMINATION:  XR CHEST AP PORTABLE    CLINICAL HISTORY:  Cough;    TECHNIQUE:  Portable view of the chest was performed.    COMPARISON:  12/28/2021.    FINDINGS:  Dense masslike consolidation of the right lower lobe.  Dependent discoid atelectasis at both lung bases.  Heart size normal.  Trachea midline.  Prior aortic valve replacement.    Bony thorax intact.                                       CT Head Without Contrast (Final result)  Result time 01/23/23 16:05:24      Final result by Seun Amaro MD (01/23/23 16:05:24)                   Impression:      Cortical atrophy with periventricular deep white matter change  consistent with chronic small vessel ischemic disease.    Mild paranasal sinus disease.      Electronically signed by: Seun Amaro  Date:    01/23/2023  Time:    16:05               Narrative:    EXAMINATION:  CT HEAD WITHOUT CONTRAST    CLINICAL HISTORY:  Headache, new or worsening (Age >= 50y);    TECHNIQUE:  Low dose axial images were obtained through the head.  Coronal and sagittal reformations were also performed. Contrast was not administered.    COMPARISON:  CT 12/05/2007.    FINDINGS:  There is no acute hemorrhage or infarction.  There is cortical atrophy.  There are periventricular deep white matter changes consistent with chronic small vessel ischemic disease.    No extra-axial fluid collections.  Ventricles are normal in size, shape and configuration.  The basal cisterns are patent.    Mild circumferential mucoperiosteal thickening of the right and left maxillary sinus with small air-fluid levels.  Dependent mucoperiosteal thickening is present within the sphenoid sinus.  Frontal sinuses hypoplastic.  Minimal scattered mucoperiosteal thickening within the ethmoid air cells.    The mastoid air cells and middle ears are normally pneumatized.                                    X-Rays:   Independently Interpreted Readings:   Other Readings:  CT scan shows atrophy but no acute finding the bleed a mass.  CHEST X-RAY SHOWS OF THE RIGHT MASSLIKE INFILTRATIVE CONSOLIDATION RIGHT LOWER LOBE CONSISTENT WITH PNEUMONIA WITH SOME ATELECTASIS.  Medications   magnesium sulfate in dextrose IVPB (premix) 1 g (1 g Intravenous New Bag 1/23/23 1704)   piperacillin-tazobactam (ZOSYN) 3.375 g in dextrose 5 % in water (D5W) 5 % 50 mL IVPB (MB+) (3.375 g Intravenous New Bag 1/23/23 1705)   ketorolac injection 15 mg (15 mg Intravenous Given 1/23/23 1611)     Medical Decision Making:   History:   Old Medical Records: I decided to obtain old medical records.  Old Records Summarized: records from clinic visits.       <> Summary of  Records: There has been no neurological deficits in her old chart suggest any strokes or any CT scans or neurological workup.  Initial Assessment:   Patient with right-sided headache for the last 6 days ice pick and quantity, not the worse headache of her life.  Also has associated elevated blood pressure.  Differential Diagnosis:   Will rule out CNS bleed, stroke, mass lesion, blood pressure or sinuses.  Clinical Tests:   Lab Tests: Ordered and Reviewed  The following lab test(s) were unremarkable: CBC and CMP       <> Summary of Lab: WHITE BLOOD CELL COUNT IS 40 3 LB, THE FUNCTION TESTS ELEVATED AND LOW MAGNESIUM  Radiological Study: Ordered and Reviewed  Medical Tests: Ordered and Reviewed  ED Management:  DISCUSSED FINDINGS WITH PATIENT.  ALSO PAGED DR. DO, HOSPITALIST WILL ADMIT PATIENT HERE FOR PNEUMONIA.  SEE NO NEUROLOGICAL DEFICITS TO SUGGEST ANY CAN MENINGITIS OR MENINGEAL SIGNS AT THIS TIME.           ED Course as of 01/23/23 1725   Mon Jan 23, 2023   1612 Complete Blood Count (CBC)(!) [PW]   1612 CT Head Without Contrast [PW]   1618 DISCUSSED WITH PATIENT THE ELEVATED WHITE BLOOD CELL COUNT, SHE DENIES ANY STIFF NECK, ANY NEUROLOGICAL DEFICIT EXCEPT HEADACHE, BUT SHE DOES COMPLAIN ABOUT A COUGH FOR LAST SEVERAL DAYS AND SOME SINUS TENDERNESS.  WILL CHECK FOR ANY OTHER SOURCE OF INFECTION FOR THE WHITE BLOOD CELL COUNT. [PW]   1626 Patient had episode of nausea vomiting x1, and states now his symptoms are completely resolved. [PW]   1639 Magnesium(!): 1.2 [PW]   1639 Comprehensive Metabolic Panel (CMP)(!) [PW]   1639 BILIRUBIN TOTAL(!): 1.1 [PW]   1639 AST(!): 239 [PW]   1639 ALT(!): 222 [PW]   1639 Alkaline Phosphatase(!): 161 [PW]   1640 X-Ray Chest AP Portable(!) [PW]   1640 CHEST X-RAY IS ABNORMAL WITH A LARGE INFILTRATE AND POSSIBLE MASS.  PATIENT HAS A HISTORY OF BREAST CANCER THAT HAS BEEN REMISSION.  THIS MAY BE SOURCE OF WHITE BLOOD CELL COUNT EVEN THOUGH SHE HAS COMPLAINED ABOUT HEADACHE.   WILL GET CT SCAN OF HER CHEST. [PW]   1646 THE PATIENT STATES THAT SHE HAS BEEN COUGHING, AND SHE IS PRONE TO PNEUMONIA.  WILL GET ZOSYN AND SINCE OF BILIRUBIN AND LIVER FUNCTION TESTS ELEVATED WILL GET CT SCAN OF THE ABDOMEN PELVIS AND CHEST [PW]   1655 Drug screen panel, emergency [PW]   1721 Lactate, Bob(!): 2.4 [PW]   1721 Influenza B, Molecular: Negative [PW]   1721 Influenza A, Molecular: Negative [PW]      ED Course User Index  [PW] Abisai Lyle MD                   Clinical Impression:   Final diagnoses:  [I10] Hypertension  [J18.9] Community acquired pneumonia  [R51.9] Acute nonintractable headache, unspecified headache type (Primary)  [R51.9] Nonintractable episodic headache, unspecified headache type        ED Disposition Condition    Admit Stable                Abisai Lyle MD  01/23/23 4893

## 2023-01-23 NOTE — Clinical Note
Diagnosis: Community acquired pneumonia [[id number]]   Admitting Provider:: JANET DO [[id number]]   Future Attending Provider: JANET DO [[id number]]   Reason for IP Medical Treatment  (Clinical interventions that can only be accomplished in the IP setting? ) :: IV ANTIBIOTICS, OXYGEN, BREATHING TREATMENTS   Estimated Length of Stay:: 2 midnights   I certify that Inpatient services for greater than or equal to 2 midnights are medically necessary:: Yes   Plans for Post-Acute care--if anticipated (pick the single best option):: A. No post acute care anticipated at this time

## 2023-01-24 ENCOUNTER — OUTSIDE PLACE OF SERVICE (OUTPATIENT)
Dept: INFECTIOUS DISEASES | Facility: CLINIC | Age: 78
End: 2023-01-24
Payer: MEDICARE

## 2023-01-24 DIAGNOSIS — B95.5 STREPTOCOCCAL BACTEREMIA: Primary | ICD-10-CM

## 2023-01-24 DIAGNOSIS — R78.81 STREPTOCOCCAL BACTEREMIA: Primary | ICD-10-CM

## 2023-01-24 PROBLEM — E87.6 HYPOKALEMIA: Status: ACTIVE | Noted: 2023-01-24

## 2023-01-24 PROBLEM — E87.1 HYPONATREMIA: Status: ACTIVE | Noted: 2023-01-24

## 2023-01-24 PROBLEM — Z01.810 PREOP CARDIOVASCULAR EXAM: Status: RESOLVED | Noted: 2022-11-21 | Resolved: 2023-01-24

## 2023-01-24 PROBLEM — Z79.02 PLATELET INHIBITION DUE TO PLAVIX: Status: RESOLVED | Noted: 2022-02-23 | Resolved: 2023-01-24

## 2023-01-24 PROBLEM — J18.9 PNEUMONIA: Status: ACTIVE | Noted: 2023-01-24

## 2023-01-24 PROBLEM — R51.9 HEADACHE: Status: ACTIVE | Noted: 2017-04-12

## 2023-01-24 LAB
ALBUMIN SERPL BCP-MCNC: 2.1 G/DL (ref 3.5–5.2)
ALP SERPL-CCNC: 129 U/L (ref 55–135)
ALT SERPL W/O P-5'-P-CCNC: 415 U/L (ref 10–44)
ANION GAP SERPL CALC-SCNC: 11 MMOL/L (ref 8–16)
ANION GAP SERPL CALC-SCNC: 7 MMOL/L (ref 8–16)
AORTIC ROOT ANNULUS: 1.68 CM
AORTIC VALVE CUSP SEPERATION: 1.24 CM
APAP SERPL-MCNC: <3 UG/ML (ref 10–20)
ASCENDING AORTA: 1.97 CM
AST SERPL-CCNC: 392 U/L (ref 10–40)
AV INDEX (PROSTH): 0.74
AV MEAN GRADIENT: 9 MMHG
AV PEAK GRADIENT: 16 MMHG
AV VALVE AREA: 1.89 CM2
AV VELOCITY RATIO: 0.82
BASOPHILS # BLD AUTO: ABNORMAL K/UL (ref 0–0.2)
BASOPHILS NFR BLD: 0 % (ref 0–1.9)
BILIRUB SERPL-MCNC: 0.7 MG/DL (ref 0.1–1)
BSA FOR ECHO PROCEDURE: 1.51 M2
BUN SERPL-MCNC: 11 MG/DL (ref 8–23)
BUN SERPL-MCNC: 7 MG/DL (ref 8–23)
C GATTII+NEOFOR DNA CSF QL NAA+NON-PROBE: NOT DETECTED
CALCIUM SERPL-MCNC: 7.4 MG/DL (ref 8.7–10.5)
CALCIUM SERPL-MCNC: 7.7 MG/DL (ref 8.7–10.5)
CHLORIDE SERPL-SCNC: 100 MMOL/L (ref 95–110)
CHLORIDE SERPL-SCNC: 98 MMOL/L (ref 95–110)
CLARITY CSF: CLEAR
CMV DNA CSF QL NAA+NON-PROBE: NOT DETECTED
CO2 SERPL-SCNC: 18 MMOL/L (ref 23–29)
CO2 SERPL-SCNC: 19 MMOL/L (ref 23–29)
COLOR CSF: COLORLESS
CREAT SERPL-MCNC: 0.6 MG/DL (ref 0.5–1.4)
CREAT SERPL-MCNC: 0.8 MG/DL (ref 0.5–1.4)
CREAT UR-MCNC: 7.7 MG/DL (ref 15–325)
CSF TUBE NUMBER: 2
CSF TUBE NUMBER: 3
CV ECHO LV RWT: 0.35 CM
DIFFERENTIAL METHOD: ABNORMAL
DOP CALC AO PEAK VEL: 2.01 M/S
DOP CALC AO VTI: 40.2 CM
DOP CALC LVOT AREA: 2.5 CM2
DOP CALC LVOT DIAMETER: 1.8 CM
DOP CALC LVOT PEAK VEL: 1.65 M/S
DOP CALC LVOT STROKE VOLUME: 76.05 CM3
DOP CALC MV VTI: 25.2 CM
DOP CALCLVOT PEAK VEL VTI: 29.9 CM
E COLI K1 DNA CSF QL NAA+NON-PROBE: NOT DETECTED
E WAVE DECELERATION TIME: 239.98 MSEC
E/A RATIO: 0.72
E/E' RATIO: 12.83 M/S
ECHO LV POSTERIOR WALL: 0.76 CM (ref 0.6–1.1)
EJECTION FRACTION: 55 %
EOSINOPHIL # BLD AUTO: ABNORMAL K/UL (ref 0–0.5)
EOSINOPHIL NFR BLD: 0 % (ref 0–8)
ERYTHROCYTE [DISTWIDTH] IN BLOOD BY AUTOMATED COUNT: 13.7 % (ref 11.5–14.5)
EST. GFR  (NO RACE VARIABLE): >60 ML/MIN/1.73 M^2
EST. GFR  (NO RACE VARIABLE): >60 ML/MIN/1.73 M^2
EV RNA CSF QL NAA+NON-PROBE: NOT DETECTED
FRACTIONAL SHORTENING: 28 % (ref 28–44)
GLUCOSE CSF-MCNC: 78 MG/DL (ref 40–70)
GLUCOSE SERPL-MCNC: 292 MG/DL (ref 70–110)
GLUCOSE SERPL-MCNC: 431 MG/DL (ref 70–110)
GP B STREP DNA CSF QL NAA+NON-PROBE: NOT DETECTED
GROUP A STREP, MOLECULAR: NEGATIVE
HAEM INFLU DNA CSF QL NAA+NON-PROBE: NOT DETECTED
HAEM INFLU DNA CSF QL NAA+NON-PROBE: NOT DETECTED
HCT VFR BLD AUTO: 27.8 % (ref 37–48.5)
HGB BLD-MCNC: 9.3 G/DL (ref 12–16)
HHV6 DNA CSF QL NAA+NON-PROBE: NOT DETECTED
HSV1 DNA CSF QL NAA+NON-PROBE: NOT DETECTED
HSV2 DNA CSF QL NAA+NON-PROBE: NOT DETECTED
IMM GRANULOCYTES # BLD AUTO: ABNORMAL K/UL (ref 0–0.04)
IMM GRANULOCYTES NFR BLD AUTO: ABNORMAL % (ref 0–0.5)
INR PPP: 1.8 (ref 0.8–1.2)
INTERVENTRICULAR SEPTUM: 0.78 CM (ref 0.6–1.1)
IVRT: 125.59 MSEC
LA MAJOR: 4.19 CM
LA MINOR: 4.41 CM
LACTATE SERPL-SCNC: 1.6 MMOL/L (ref 0.5–2.2)
LEFT ATRIUM VOLUME INDEX MOD: 26.7 ML/M2
LEFT ATRIUM VOLUME MOD: 39.81 CM3
LEFT INTERNAL DIMENSION IN SYSTOLE: 3.11 CM (ref 2.1–4)
LEFT VENTRICLE DIASTOLIC VOLUME INDEX: 56.99 ML/M2
LEFT VENTRICLE DIASTOLIC VOLUME: 84.91 ML
LEFT VENTRICLE MASS INDEX: 68 G/M2
LEFT VENTRICLE SYSTOLIC VOLUME INDEX: 25.7 ML/M2
LEFT VENTRICLE SYSTOLIC VOLUME: 38.23 ML
LEFT VENTRICULAR INTERNAL DIMENSION IN DIASTOLE: 4.34 CM (ref 3.5–6)
LEFT VENTRICULAR MASS: 101.73 G
LV LATERAL E/E' RATIO: 12.83 M/S
LV SEPTAL E/E' RATIO: 12.83 M/S
LVOT MG: 5.54 MMHG
LVOT MV: 1.09 CM/S
LYMPHOCYTES # BLD AUTO: ABNORMAL K/UL (ref 1–4.8)
LYMPHOCYTES NFR BLD: 12 % (ref 18–48)
MAGNESIUM SERPL-MCNC: 1.9 MG/DL (ref 1.6–2.6)
MCH RBC QN AUTO: 29.4 PG (ref 27–31)
MCHC RBC AUTO-ENTMCNC: 33.5 G/DL (ref 32–36)
MCV RBC AUTO: 88 FL (ref 82–98)
MONOCYTES # BLD AUTO: ABNORMAL K/UL (ref 0.3–1)
MONOCYTES NFR BLD: 6 % (ref 4–15)
MV A" WAVE DURATION": 163.65 MSEC
MV MEAN GRADIENT: 2 MMHG
MV PEAK A VEL: 1.07 M/S
MV PEAK E VEL: 0.77 M/S
MV PEAK GRADIENT: 5 MMHG
MV STENOSIS PRESSURE HALF TIME: 69.97 MS
MV VALVE AREA BY CONTINUITY EQUATION: 3.02 CM2
MV VALVE AREA P 1/2 METHOD: 3.14 CM2
N MEN DNA CSF QL NAA+NON-PROBE: NOT DETECTED
NEUTROPHILS NFR BLD: 77 % (ref 38–73)
NEUTS BAND NFR BLD MANUAL: 5 %
NRBC BLD-RTO: 0 /100 WBC
OSMOLALITY SERPL: 273 MOSM/KG (ref 275–295)
OVALOCYTES BLD QL SMEAR: ABNORMAL
PARECHOVIRUS A RNA CSF QL NAA+NON-PROBE: NOT DETECTED
PISA MRMAX VEL: 4.53 M/S
PISA TR MAX VEL: 3.35 M/S
PLATELET # BLD AUTO: 174 K/UL (ref 150–450)
PLATELET BLD QL SMEAR: ABNORMAL
PMV BLD AUTO: 9.6 FL (ref 9.2–12.9)
POIKILOCYTOSIS BLD QL SMEAR: SLIGHT
POLYCHROMASIA BLD QL SMEAR: ABNORMAL
POTASSIUM SERPL-SCNC: 2.4 MMOL/L (ref 3.5–5.1)
POTASSIUM SERPL-SCNC: 3.8 MMOL/L (ref 3.5–5.1)
PROCALCITONIN SERPL IA-MCNC: 143.94 NG/ML
PROT CSF-MCNC: 29 MG/DL (ref 15–40)
PROT SERPL-MCNC: 4.1 G/DL (ref 6–8.4)
PROTHROMBIN TIME: 18.8 SEC (ref 9–12.5)
PULM VEIN S/D RATIO: 0.77
PV MV: 0.56 M/S
PV PEAK D VEL: 0.6 M/S
PV PEAK S VEL: 0.46 M/S
PV PEAK VELOCITY: 0.73 CM/S
RA MAJOR: 4.09 CM
RA PRESSURE: 3 MMHG
RA VOL SYS: 28.99 ML
RA WIDTH: 3.77 CM
RBC # BLD AUTO: 3.16 M/UL (ref 4–5.4)
RBC # CSF: 3 /CU MM
RIGHT VENTRICULAR END-DIASTOLIC DIMENSION: 2.46 CM
RIGHT VENTRICULAR LENGTH IN DIASTOLE (APICAL 4-CHAMBER VIEW): 4.95 CM
RV TISSUE DOPPLER FREE WALL SYSTOLIC VELOCITY 1 (APICAL 4 CHAMBER VIEW): 0.01 CM/S
S PNEUM DNA CSF QL NAA+NON-PROBE: NOT DETECTED
SINUS: 1.75 CM
SODIUM SERPL-SCNC: 123 MMOL/L (ref 136–145)
SODIUM SERPL-SCNC: 130 MMOL/L (ref 136–145)
SODIUM UR-SCNC: <20 MMOL/L (ref 20–250)
SPECIMEN VOL CSF: 2 ML
STJ: 1.63 CM
TDI LATERAL: 0.06 M/S
TDI SEPTAL: 0.06 M/S
TDI: 0.06 M/S
TR MAX PG: 45 MMHG
TR MEAN GRADIENT: 29 MMHG
TRICUSPID ANNULAR PLANE SYSTOLIC EXCURSION: 2.46 CM
TV REST PULMONARY ARTERY PRESSURE: 48 MMHG
VANCOMYCIN SERPL-MCNC: 8.4 UG/ML
VZV DNA CSF QL NAA+NON-PROBE: NOT DETECTED
WBC # BLD AUTO: 33.73 K/UL (ref 3.9–12.7)
WBC # CSF: 1 /CU MM (ref 0–5)

## 2023-01-24 PROCEDURE — 36415 COLL VENOUS BLD VENIPUNCTURE: CPT | Performed by: STUDENT IN AN ORGANIZED HEALTH CARE EDUCATION/TRAINING PROGRAM

## 2023-01-24 PROCEDURE — 63600175 PHARM REV CODE 636 W HCPCS: Performed by: STUDENT IN AN ORGANIZED HEALTH CARE EDUCATION/TRAINING PROGRAM

## 2023-01-24 PROCEDURE — 94761 N-INVAS EAR/PLS OXIMETRY MLT: CPT

## 2023-01-24 PROCEDURE — 25000242 PHARM REV CODE 250 ALT 637 W/ HCPCS: Performed by: STUDENT IN AN ORGANIZED HEALTH CARE EDUCATION/TRAINING PROGRAM

## 2023-01-24 PROCEDURE — 87070 CULTURE OTHR SPECIMN AEROBIC: CPT | Performed by: STUDENT IN AN ORGANIZED HEALTH CARE EDUCATION/TRAINING PROGRAM

## 2023-01-24 PROCEDURE — 63600175 PHARM REV CODE 636 W HCPCS: Performed by: NURSE PRACTITIONER

## 2023-01-24 PROCEDURE — 99900035 HC TECH TIME PER 15 MIN (STAT)

## 2023-01-24 PROCEDURE — 25500020 PHARM REV CODE 255: Performed by: STUDENT IN AN ORGANIZED HEALTH CARE EDUCATION/TRAINING PROGRAM

## 2023-01-24 PROCEDURE — 80143 DRUG ASSAY ACETAMINOPHEN: CPT | Performed by: NURSE PRACTITIONER

## 2023-01-24 PROCEDURE — 25000242 PHARM REV CODE 250 ALT 637 W/ HCPCS: Performed by: NURSE PRACTITIONER

## 2023-01-24 PROCEDURE — 80048 BASIC METABOLIC PNL TOTAL CA: CPT | Mod: XB | Performed by: STUDENT IN AN ORGANIZED HEALTH CARE EDUCATION/TRAINING PROGRAM

## 2023-01-24 PROCEDURE — 83935 ASSAY OF URINE OSMOLALITY: CPT | Performed by: STUDENT IN AN ORGANIZED HEALTH CARE EDUCATION/TRAINING PROGRAM

## 2023-01-24 PROCEDURE — 99222 PR INITIAL HOSPITAL CARE,LEVL II: ICD-10-PCS | Mod: ,,, | Performed by: INTERNAL MEDICINE

## 2023-01-24 PROCEDURE — 99223 1ST HOSP IP/OBS HIGH 75: CPT | Mod: ,,, | Performed by: INTERNAL MEDICINE

## 2023-01-24 PROCEDURE — 87483 CNS DNA AMP PROBE TYPE 12-25: CPT | Performed by: STUDENT IN AN ORGANIZED HEALTH CARE EDUCATION/TRAINING PROGRAM

## 2023-01-24 PROCEDURE — 36410 VNPNXR 3YR/> PHY/QHP DX/THER: CPT

## 2023-01-24 PROCEDURE — 99223 1ST HOSP IP/OBS HIGH 75: CPT | Mod: S$GLB,,, | Performed by: STUDENT IN AN ORGANIZED HEALTH CARE EDUCATION/TRAINING PROGRAM

## 2023-01-24 PROCEDURE — 25000003 PHARM REV CODE 250: Performed by: NURSE PRACTITIONER

## 2023-01-24 PROCEDURE — 86255 FLUORESCENT ANTIBODY SCREEN: CPT | Mod: 59 | Performed by: INTERNAL MEDICINE

## 2023-01-24 PROCEDURE — 82945 GLUCOSE OTHER FLUID: CPT | Performed by: STUDENT IN AN ORGANIZED HEALTH CARE EDUCATION/TRAINING PROGRAM

## 2023-01-24 PROCEDURE — 83735 ASSAY OF MAGNESIUM: CPT | Performed by: NURSE PRACTITIONER

## 2023-01-24 PROCEDURE — 25000003 PHARM REV CODE 250: Performed by: STUDENT IN AN ORGANIZED HEALTH CARE EDUCATION/TRAINING PROGRAM

## 2023-01-24 PROCEDURE — 80053 COMPREHEN METABOLIC PANEL: CPT | Performed by: NURSE PRACTITIONER

## 2023-01-24 PROCEDURE — 87040 BLOOD CULTURE FOR BACTERIA: CPT | Mod: 59 | Performed by: STUDENT IN AN ORGANIZED HEALTH CARE EDUCATION/TRAINING PROGRAM

## 2023-01-24 PROCEDURE — A9585 GADOBUTROL INJECTION: HCPCS | Performed by: STUDENT IN AN ORGANIZED HEALTH CARE EDUCATION/TRAINING PROGRAM

## 2023-01-24 PROCEDURE — 36415 COLL VENOUS BLD VENIPUNCTURE: CPT | Performed by: NURSE PRACTITIONER

## 2023-01-24 PROCEDURE — 87449 NOS EACH ORGANISM AG IA: CPT | Performed by: NURSE PRACTITIONER

## 2023-01-24 PROCEDURE — 87205 SMEAR GRAM STAIN: CPT | Performed by: STUDENT IN AN ORGANIZED HEALTH CARE EDUCATION/TRAINING PROGRAM

## 2023-01-24 PROCEDURE — 83930 ASSAY OF BLOOD OSMOLALITY: CPT | Performed by: STUDENT IN AN ORGANIZED HEALTH CARE EDUCATION/TRAINING PROGRAM

## 2023-01-24 PROCEDURE — 80202 ASSAY OF VANCOMYCIN: CPT | Performed by: STUDENT IN AN ORGANIZED HEALTH CARE EDUCATION/TRAINING PROGRAM

## 2023-01-24 PROCEDURE — 99222 1ST HOSP IP/OBS MODERATE 55: CPT | Mod: ,,, | Performed by: INTERNAL MEDICINE

## 2023-01-24 PROCEDURE — 85007 BL SMEAR W/DIFF WBC COUNT: CPT | Performed by: NURSE PRACTITIONER

## 2023-01-24 PROCEDURE — 99223 PR INITIAL HOSPITAL CARE,LEVL III: ICD-10-PCS | Mod: ,,, | Performed by: INTERNAL MEDICINE

## 2023-01-24 PROCEDURE — 85027 COMPLETE CBC AUTOMATED: CPT | Performed by: NURSE PRACTITIONER

## 2023-01-24 PROCEDURE — 84157 ASSAY OF PROTEIN OTHER: CPT | Performed by: STUDENT IN AN ORGANIZED HEALTH CARE EDUCATION/TRAINING PROGRAM

## 2023-01-24 PROCEDURE — 87651 STREP A DNA AMP PROBE: CPT | Performed by: STUDENT IN AN ORGANIZED HEALTH CARE EDUCATION/TRAINING PROGRAM

## 2023-01-24 PROCEDURE — 89051 BODY FLUID CELL COUNT: CPT | Performed by: STUDENT IN AN ORGANIZED HEALTH CARE EDUCATION/TRAINING PROGRAM

## 2023-01-24 PROCEDURE — 82570 ASSAY OF URINE CREATININE: CPT | Performed by: STUDENT IN AN ORGANIZED HEALTH CARE EDUCATION/TRAINING PROGRAM

## 2023-01-24 PROCEDURE — 84300 ASSAY OF URINE SODIUM: CPT | Performed by: STUDENT IN AN ORGANIZED HEALTH CARE EDUCATION/TRAINING PROGRAM

## 2023-01-24 PROCEDURE — 12000002 HC ACUTE/MED SURGE SEMI-PRIVATE ROOM

## 2023-01-24 PROCEDURE — C1751 CATH, INF, PER/CENT/MIDLINE: HCPCS

## 2023-01-24 PROCEDURE — 94640 AIRWAY INHALATION TREATMENT: CPT

## 2023-01-24 PROCEDURE — 85610 PROTHROMBIN TIME: CPT | Performed by: NURSE PRACTITIONER

## 2023-01-24 PROCEDURE — 99223 PR INITIAL HOSPITAL CARE,LEVL III: ICD-10-PCS | Mod: S$GLB,,, | Performed by: STUDENT IN AN ORGANIZED HEALTH CARE EDUCATION/TRAINING PROGRAM

## 2023-01-24 RX ORDER — POTASSIUM CHLORIDE 7.45 MG/ML
10 INJECTION INTRAVENOUS
Status: DISCONTINUED | OUTPATIENT
Start: 2023-01-24 | End: 2023-01-24

## 2023-01-24 RX ORDER — LEVOTHYROXINE SODIUM 20 UG/ML
75 INJECTION, SOLUTION INTRAVENOUS DAILY
Status: DISCONTINUED | OUTPATIENT
Start: 2023-01-24 | End: 2023-01-24

## 2023-01-24 RX ORDER — NAPROXEN SODIUM 220 MG/1
81 TABLET, FILM COATED ORAL DAILY
Status: DISCONTINUED | OUTPATIENT
Start: 2023-01-24 | End: 2023-01-29 | Stop reason: HOSPADM

## 2023-01-24 RX ORDER — ENOXAPARIN SODIUM 100 MG/ML
40 INJECTION SUBCUTANEOUS EVERY 24 HOURS
Status: DISCONTINUED | OUTPATIENT
Start: 2023-01-24 | End: 2023-01-25

## 2023-01-24 RX ORDER — PANTOPRAZOLE SODIUM 40 MG/10ML
40 INJECTION, POWDER, LYOPHILIZED, FOR SOLUTION INTRAVENOUS DAILY
Status: DISCONTINUED | OUTPATIENT
Start: 2023-01-24 | End: 2023-01-24

## 2023-01-24 RX ORDER — SODIUM CHLORIDE FOR INHALATION 3 %
4 VIAL, NEBULIZER (ML) INHALATION ONCE
Status: COMPLETED | OUTPATIENT
Start: 2023-01-24 | End: 2023-01-24

## 2023-01-24 RX ORDER — LOSARTAN POTASSIUM 25 MG/1
25 TABLET ORAL DAILY
Status: DISCONTINUED | OUTPATIENT
Start: 2023-01-24 | End: 2023-01-24

## 2023-01-24 RX ORDER — SODIUM CHLORIDE FOR INHALATION 7 %
4 VIAL, NEBULIZER (ML) INHALATION ONCE
Status: DISCONTINUED | OUTPATIENT
Start: 2023-01-24 | End: 2023-01-24

## 2023-01-24 RX ORDER — THIAMINE HCL 100 MG
100 TABLET ORAL DAILY
Status: DISCONTINUED | OUTPATIENT
Start: 2023-01-24 | End: 2023-01-29 | Stop reason: HOSPADM

## 2023-01-24 RX ORDER — POTASSIUM CHLORIDE 20 MEQ/1
40 TABLET, EXTENDED RELEASE ORAL ONCE
Status: COMPLETED | OUTPATIENT
Start: 2023-01-24 | End: 2023-01-24

## 2023-01-24 RX ORDER — POTASSIUM CHLORIDE 7.45 MG/ML
10 INJECTION INTRAVENOUS
Status: DISPENSED | OUTPATIENT
Start: 2023-01-24 | End: 2023-01-24

## 2023-01-24 RX ORDER — GADOBUTROL 604.72 MG/ML
5 INJECTION INTRAVENOUS
Status: COMPLETED | OUTPATIENT
Start: 2023-01-24 | End: 2023-01-24

## 2023-01-24 RX ORDER — FOLIC ACID 1 MG/1
1 TABLET ORAL DAILY
Status: DISCONTINUED | OUTPATIENT
Start: 2023-01-24 | End: 2023-01-29 | Stop reason: HOSPADM

## 2023-01-24 RX ORDER — PANTOPRAZOLE SODIUM 40 MG/1
40 TABLET, DELAYED RELEASE ORAL DAILY
Status: DISCONTINUED | OUTPATIENT
Start: 2023-01-24 | End: 2023-01-29 | Stop reason: HOSPADM

## 2023-01-24 RX ORDER — IBUPROFEN 400 MG/1
400 TABLET ORAL EVERY 6 HOURS PRN
Status: DISCONTINUED | OUTPATIENT
Start: 2023-01-24 | End: 2023-01-25

## 2023-01-24 RX ADMIN — AMPICILLIN SODIUM 2 G: 2 INJECTION, POWDER, FOR SOLUTION INTRAMUSCULAR; INTRAVENOUS at 01:01

## 2023-01-24 RX ADMIN — AZITHROMYCIN MONOHYDRATE 500 MG: 500 INJECTION, POWDER, LYOPHILIZED, FOR SOLUTION INTRAVENOUS at 09:01

## 2023-01-24 RX ADMIN — CEFTRIAXONE SODIUM 2 G: 2 INJECTION, POWDER, FOR SOLUTION INTRAMUSCULAR; INTRAVENOUS at 12:01

## 2023-01-24 RX ADMIN — MORPHINE SULFATE 4 MG: 4 INJECTION INTRAVENOUS at 12:01

## 2023-01-24 RX ADMIN — FOLIC ACID 1 MG: 1 TABLET ORAL at 09:01

## 2023-01-24 RX ADMIN — CEFTRIAXONE 1 G: 1 INJECTION, POWDER, FOR SOLUTION INTRAMUSCULAR; INTRAVENOUS at 03:01

## 2023-01-24 RX ADMIN — POTASSIUM CHLORIDE 10 MEQ: 7.46 INJECTION, SOLUTION INTRAVENOUS at 02:01

## 2023-01-24 RX ADMIN — GADOBUTROL 5 ML: 604.72 INJECTION INTRAVENOUS at 12:01

## 2023-01-24 RX ADMIN — AMPICILLIN SODIUM 2 G: 2 INJECTION, POWDER, FOR SOLUTION INTRAMUSCULAR; INTRAVENOUS at 04:01

## 2023-01-24 RX ADMIN — AMPICILLIN SODIUM 2 G: 2 INJECTION, POWDER, FOR SOLUTION INTRAMUSCULAR; INTRAVENOUS at 02:01

## 2023-01-24 RX ADMIN — ENOXAPARIN SODIUM 40 MG: 40 INJECTION SUBCUTANEOUS at 04:01

## 2023-01-24 RX ADMIN — THERA TABS 1 TABLET: TAB at 09:01

## 2023-01-24 RX ADMIN — POTASSIUM CHLORIDE 10 MEQ: 7.46 INJECTION, SOLUTION INTRAVENOUS at 07:01

## 2023-01-24 RX ADMIN — VANCOMYCIN HYDROCHLORIDE 1000 MG: 1 INJECTION, POWDER, LYOPHILIZED, FOR SOLUTION INTRAVENOUS at 06:01

## 2023-01-24 RX ADMIN — SODIUM CHLORIDE SOLN NEBU 3% 4 ML: 3 NEBU SOLN at 02:01

## 2023-01-24 RX ADMIN — POTASSIUM CHLORIDE 10 MEQ: 7.46 INJECTION, SOLUTION INTRAVENOUS at 04:01

## 2023-01-24 RX ADMIN — LEVOTHYROXINE SODIUM 75 MCG: 50 TABLET ORAL at 05:01

## 2023-01-24 RX ADMIN — IBUPROFEN 400 MG: 400 TABLET, FILM COATED ORAL at 10:01

## 2023-01-24 RX ADMIN — THIAMINE HCL TAB 100 MG 100 MG: 100 TAB at 09:01

## 2023-01-24 RX ADMIN — ASPIRIN 81 MG CHEWABLE TABLET 81 MG: 81 TABLET CHEWABLE at 09:01

## 2023-01-24 RX ADMIN — IPRATROPIUM BROMIDE AND ALBUTEROL SULFATE 3 ML: .5; 3 SOLUTION RESPIRATORY (INHALATION) at 02:01

## 2023-01-24 RX ADMIN — VANCOMYCIN HYDROCHLORIDE 1250 MG: 1.25 INJECTION, POWDER, LYOPHILIZED, FOR SOLUTION INTRAVENOUS at 03:01

## 2023-01-24 RX ADMIN — PANTOPRAZOLE SODIUM 40 MG: 40 TABLET, DELAYED RELEASE ORAL at 09:01

## 2023-01-24 RX ADMIN — POTASSIUM CHLORIDE 10 MEQ: 7.46 INJECTION, SOLUTION INTRAVENOUS at 03:01

## 2023-01-24 RX ADMIN — POTASSIUM CHLORIDE 40 MEQ: 1500 TABLET, EXTENDED RELEASE ORAL at 09:01

## 2023-01-24 RX ADMIN — ACETYLCYSTEINE 5300 MG: 200 INJECTION, SOLUTION INTRAVENOUS at 03:01

## 2023-01-24 NOTE — PROGRESS NOTES
Pharmacokinetic Initial Assessment: IV Vancomycin    Assessment/Plan:    Initiate intravenous vancomycin with  750 mg every 24 hours.  Desired empiric serum trough concentration is 15 to 20 mcg/mL  Draw vancomycin random level on 1/24/23 at 2100; draw trough level on 1/26/23 at 2100.  Pharmacy will continue to follow and monitor vancomycin.      Please contact pharmacy at extension [phone] with any questions regarding this assessment.     Thank you for the consult,   Deny Cisneros, GuzmanD       Patient brief summary:  Vivien Burton is a 77 y.o. female initiated on antimicrobial therapy with IV Vancomycin for treatment of suspected  pneumonia.    Drug Allergies:   Review of patient's allergies indicates:  No Known Allergies    Actual Body Weight:   49.9 kg    Renal Function:   Estimated Creatinine Clearance: 49.9 mL/min (based on SCr of 0.7 mg/dL).,     Dialysis Method (if applicable):  N/A    CBC (last 72 hours):  Recent Labs   Lab Result Units 01/23/23  1546   WBC K/uL 43.13*   Hemoglobin g/dL 10.7*   Hematocrit % 30.7*   Platelets K/uL 214   Gran % % 80.0*   Lymph % % 4.0*   Mono % % 3.0*   Eosinophil % % 0.0   Basophil % % 0.0   Differential Method  Manual       Metabolic Panel (last 72 hours):  Recent Labs   Lab Result Units 01/23/23  1546 01/23/23  1631   Sodium mmol/L 134*  --    Potassium mmol/L 3.3*  --    Chloride mmol/L 99  --    CO2 mmol/L 20*  --    Glucose mg/dL 116*  --    Glucose, UA   --  Negative   BUN mg/dL 13  --    Creatinine mg/dL 0.7  --    Creatinine, Urine mg/dL  --  133.4   Albumin g/dL 3.2*  --    Total Bilirubin mg/dL 1.1*  --    Alkaline Phosphatase U/L 161*  --    AST U/L 239*  --    ALT U/L 222*  --    Magnesium mg/dL 1.2*  --        Drug levels (last 3 results):  No results for input(s): VANCOMYCINRA, VANCORANDOM, VANCOMYCINPE, VANCOPEAK, VANCOMYCINTR, VANCOTROUGH in the last 72 hours.    Microbiologic Results:  Microbiology Results (last 7 days)       Procedure Component Value  Units Date/Time    Culture, Respiratory with Gram Stain [[id number]]     Order Status: No result Specimen: Respiratory     Influenza A & B by Molecular [[id number]] Collected: 01/23/23 1646    Order Status: Completed Specimen: Nasopharyngeal Swab Updated: 01/23/23 1718     Influenza A, Molecular Negative     Influenza B, Molecular Negative     Flu A & B Source Nasal Swab    Blood culture #2 **CANNOT BE ORDERED STAT** [[id number]] Collected: 01/23/23 1648    Order Status: Sent Specimen: Blood from Peripheral, Hand, Left Updated: 01/23/23 1648    Blood culture #1 **CANNOT BE ORDERED STAT** [[id number]] Collected: 01/23/23 1644    Order Status: Sent Specimen: Blood from Peripheral, Antecubital, Left Updated: 01/23/23 1645

## 2023-01-24 NOTE — NURSING
Pt arrived to room 320 via ambulance transport from [place]. VSS. NAD. Oriented pt to room. Call light within reach. NP notified of pt arrival.

## 2023-01-24 NOTE — ASSESSMENT & PLAN NOTE
This patient does have evidence of infective focus  My overall impression is sepsis. Vital signs were reviewed and noted in progress note.  Antibiotics given-   Antibiotics (From admission, onward)    Start     Stop Route Frequency Ordered    01/24/23 0030  cefTRIAXone (ROCEPHIN) 2 g in dextrose 5 % in water (D5W) 5 % 50 mL IVPB (MB+)         -- IV Every 12 hours (non-standard times) 01/23/23 2327 01/24/23 0030  ampicillin (OMNIPEN) 2 g in sodium chloride 0.9 % 100 mL IVPB (MB+)         -- IV Every 4 hours (non-standard times) 01/23/23 2327 01/24/23 0026  vancomycin - pharmacy to dose  (vancomycin IVPB)        [facility].    -- IV pharmacy to manage frequency 01/23/23 2327 01/24/23 0000  vancomycin 1,250 mg in dextrose 5 % (D5W) 250 mL IVPB (Vial-Mate)         -- IV Once 01/23/23 2336        Cultures were taken-   Microbiology Results (last 7 days)     Procedure Component Value Units Date/Time    Group A Strep, Molecular [[id number]] Collected: 01/24/23 0130    Order Status: No result Updated: 01/24/23 0146    Throat Screen, Rapid [[id number]] Collected: 01/24/23 0130    Order Status: Sent Specimen: Throat         Latest lactate reviewed, they are-  Recent Labs   Lab 01/23/23  1637 01/23/23 2014 01/23/23 2328   LACTATE 2.4* 1.6 1.6       Organ dysfunction indicated by Encephalopathy  and Acute respiratory failure  Source- pulmonary    Source control Achieved by- vancomycin, Rocephin, ampicillin

## 2023-01-24 NOTE — ASSESSMENT & PLAN NOTE
History of CAD.  -Continue cardiac telemetry  -Hold losartan and initiation of beta blockade in setting of sepsis  -Strict I's and O's

## 2023-01-24 NOTE — ASSESSMENT & PLAN NOTE
Chronic problem   Chronic, controlled.  Latest blood pressure and vitals reviewed-   Temp:  [97.6 °F (36.4 °C)-98.5 °F (36.9 °C)]   Pulse:  []   Resp:  [16-22]   BP: (141-197)/(49-91)   SpO2:  [95 %-98 %] .   Home meds for hypertension were reviewed and noted below.   Hypertension Medications             losartan (COZAAR) 25 MG tablet Take 1 tablet (25 mg total) by mouth once daily.          While in the hospital, will manage blood pressure as follows; Continue home antihypertensive regimen    Will utilize p.r.n. blood pressure medication only if patient's blood pressure greater than  180/110 and she develops symptoms such as worsening chest pain or shortness of breath.

## 2023-01-24 NOTE — SUBJECTIVE & OBJECTIVE
Past Medical History:   Diagnosis Date    Acute on chronic combined systolic and diastolic heart failure 2022    Anemia     Basal cell carcinoma     Breast cancer     Breast cancer     Cancer     CHF (congestive heart failure)     Coronary artery disease     GERD (gastroesophageal reflux disease)     Hyperlipidemia     Hypertension     Hypothyroidism     Nonrheumatic aortic (valve) stenosis 2018    Osteoporosis 2019    S/P TAVR (transcatheter aortic valve replacement) 2022    Squamous cell carcinoma of skin     Thyroid disease        Past Surgical History:   Procedure Laterality Date    BREAST SURGERY      CARDIAC CATH COSURGEON N/A 2022    Procedure: Cardiac Cath Cosurgeon;  Surgeon: Dontae Wooten MD;  Location: [facility] CATH LAB;  Service: Cardiovascular;  Laterality: N/A;     SECTION, CLASSIC      COLONOSCOPY N/A 2018    Procedure: COLONOSCOPY;  Surgeon: Precious Castorena MD;  Location: [facility] ENDO;  Service: Endoscopy;  Laterality: N/A;    HYSTERECTOMY      LEFT HEART CATHETERIZATION Left 2022    Procedure: Left heart cath;  Surgeon: Dontae Johns MD;  Location: [facility] CATH LAB;  Service: Cardiology;  Laterality: Left;    LEFT HEART CATHETERIZATION Left 2022    Procedure: Left heart cath;  Surgeon: Dontae Johns MD;  Location: [facility] CATH LAB;  Service: Cardiology;  Laterality: Left;    MASTECTOMY Right 2000    right breast      TONSILLECTOMY, ADENOIDECTOMY      TRANSCATHETER AORTIC VALVE REPLACEMENT (TAVR) N/A 2022    Procedure: REPLACEMENT, AORTIC VALVE, TRANSCATHETER (TAVR);  Surgeon: Dontae Johns MD;  Location: [facility] CATH LAB;  Service: Cardiology;  Laterality: N/A;       Review of patient's allergies indicates:  No Known Allergies    No current facility-administered medications on file prior to encounter.     Current Outpatient Medications on File Prior to Encounter   Medication Sig    aspirin (ECOTRIN) 81 MG EC tablet Take 81 mg by  mouth once daily.    cetirizine (ZYRTEC) 10 mg Cap Take by mouth.    ferrous sulfate (FEOSOL) Tab tablet Take 1 tablet by mouth daily with breakfast. 27mg    ibandronate (BONIVA) 150 mg tablet Take 1 tablet (150 mg total) by mouth every 30 days.    levothyroxine (SYNTHROID) 75 MCG tablet TAKE 1 TABLET BY MOUTH EVERY DAY    losartan (COZAAR) 25 MG tablet Take 1 tablet (25 mg total) by mouth once daily.    omeprazole (PRILOSEC) 20 MG capsule Take 20 mg by mouth once daily.    simvastatin (ZOCOR) 40 MG tablet Take 1 tablet (40 mg total) by mouth every evening.    [DISCONTINUED] clopidogreL (PLAVIX) 75 mg tablet Take 1 tablet (75 mg total) by mouth once daily. (Patient not taking: Reported on 2023)     Family History       Problem Relation (Age of Onset)    Cancer Sister, Brother    Liver cancer Sister    Melanoma Sister          Tobacco Use    Smoking status: Former     Packs/day: 1.00     Types: Cigarettes     Quit date: 2000     Years since quittin.9    Smokeless tobacco: Never    Tobacco comments:     quit 20 years ago   Substance and Sexual Activity    Alcohol use: Yes     Alcohol/week: 2.0 standard drinks     Types: 2 Shots of liquor per week     Comment: per pt 2 burbon drinks per night however none in last month    Drug use: No    Sexual activity: Not Currently     Review of Systems   All other systems reviewed and are negative.  Objective:     Vital Signs (Most Recent):  Temp: 98.5 °F (36.9 °C) (23 1525)  Pulse: 95 (23 1703)  Resp: 20 (23 1703)  BP: (!) 141/68 (23 1703)  SpO2: 96 % (23 1703)   Vital Signs (24h Range):  Temp:  [98.2 °F (36.8 °C)-98.5 °F (36.9 °C)] 98.5 °F (36.9 °C)  Pulse:  [] 95  Resp:  [16-22] 20  SpO2:  [95 %-97 %] 96 %  BP: (141-197)/(68-91) 141/68     Weight: 49.9 kg (110 lb)  Body mass index is 21.48 kg/m².    Physical Exam     Vitals reviewed  General: NAD, Well developed, Well Nourished  Head: NC/AT  Eyes: EOMI, PBALO  Cardiovascular:  Pulses intact distally, Regular Rate and rhythm  Pulmonary: Normal Respiratory Rate, No respiratory distress  Gi: Soft, Non-tender  Extremities: Warm, No edema present  Skin: Warm, dry  Neuro: Alert, Oriented x3, No focal Deficit, no evidence of meningismus, no neck tenderness with flexion or extension. No photophobia.  Psych: Appropriate mood and affect       Significant Labs: All pertinent labs within the past 24 hours have been reviewed.    Significant Imaging: I have reviewed all pertinent imaging results/findings within the past 24 hours.

## 2023-01-24 NOTE — ASSESSMENT & PLAN NOTE
Acute problem  CT the head unremarkable   Appreciate recommendations from Neurology   Lumbar puncture via Interventional Radiology per ID request

## 2023-01-24 NOTE — ASSESSMENT & PLAN NOTE
CT head unremarkable.  -LP ordered given severe sepsis  -Continue ampicillin, vancomycin and Rocephin  -Follow up LP studies

## 2023-01-24 NOTE — HOSPITAL COURSE
Vivien Burton is a 77 year old female with a past medical history of CAD s/p PCI to LAD 1/2022, combined CHF, aortic stenosis s/p TAVR, anemia, hypothyroidism, HLD, HTN, breast cancer, and GERD who presented with one week of headache and cough secondary to a severe sepsis secondary to Strep pneumo pneumonia and bacteremia. She was started on empiric bacterial meningitis therapy initially with ampicillin, ceftriaxone and vancomycin as well as azithromycin. An LP was ordered and preliminary data was not consistent with an acute meningitis. Ampicillin and azithromycin were discontinued per ID. CT of the chest shows consolidation at the RLL with air bronchograms and ground glass opacities concerning for pneumonia. Blood cultures on admission also showed Strep pneumo. Vancomycin was also discontinued per ID. Neurology and Pulmonary were also consulted. Her sepsis eventually resolved. Of note, the patient endorsed taking four tablets of Tylenol every four hours for roughly three to five days. Her LFTs were elevated as well as INR on admission. Her albumin was also low and CT of the abdomen showed hepatic steatosis. She was started on NAC therapy while at [facility] and has started another NAC protocol on 1/25 ending 1/28. GI was also been consulted. Her LFTs improved as well as the INR. An MRI of the brain showed enhancement of the skull for which Oncology was consulted 1/25; flow cytometry is unremarkable. Her headache has improved during her course with initiation of Benadryl, Reglan and Decadron per Neurology recommendations. She was discharged 1/29 and will complete a short course of Levaquin per ID recommendations. She was counseled on EtOH cessation and to stop using Tylenol. She will follow up with her PCP, Neurology, and Oncology in the outpatient and will need a repeat CMP to reassess her LFTs.

## 2023-01-24 NOTE — ASSESSMENT & PLAN NOTE
Patient endorses cough. Procalcitonin 143.94. CXR and CT chest shows consolidation.  -Continue Rocephin and azithromycin  -Follow up respiratory cultures  -Trend WBC count with CBC  -Pulmonary consulted  -PRN [place]

## 2023-01-24 NOTE — NURSING
Nurses Note -- 4 Eyes      1/23/2023   11:54 PM      Skin assessed during: Admit      [x] No Pressure Injuries Present    []Prevention Measures Documented      [] Yes- Altered Skin Integrity Present or Discovered   [] LDA Added if Not in Epic (Describe Wound)   [] New Altered Skin Integrity was Present on Admit and Documented in LDA   [] Wound Image Taken    Wound Care Consulted? No    Attending Nurse:  Massiel Lindsey RN     Second RN/Staff Member:  Tameka VERA LPN

## 2023-01-24 NOTE — ASSESSMENT & PLAN NOTE
Acute problem   Vancomycin, Rocephin, ampicillin   Chest x-ray concerning for pulmonary lesion  Appreciate recommendations from pulmonology

## 2023-01-24 NOTE — ASSESSMENT & PLAN NOTE
Possibly secondary to meningitis and/or pneumonia.  -Continue broad spectrum antibiotics with vancomycin, Rocephin, azithromycin, and ampicillin  -Follow up respiratory and blood cultures  -Trend LFTs, INR and albumin  -Follow up LP studies

## 2023-01-24 NOTE — PLAN OF CARE
Recommendations  1) Advance PO diet to goal of Cardiac + boost plus BID chocolate  -DM 1500 kcal if glucose remains elevated   2) weigh weekly   3) NFPE at f/u    Goals: 1) PO intakes > 50% of meals and supplements at f/u  Nutrition Goal Status: new  Communication of RD Recs:  (POC, sticky note)

## 2023-01-24 NOTE — ASSESSMENT & PLAN NOTE
History of EtOH use. Possible Tylenol toxicity. Hepatic steatosis seen on CT.  -Trend LFTs, albumin and INR  -Continue NAC  -GI consulted

## 2023-01-24 NOTE — HPI
Vivien Burton is a 77-year-old female who presents in transfer from [facility].  Patient presented [facility] for evaluation of headache and cough.  She reports headache and cough onset approximately 1 week ago.  She endorses taking 4 tablets of Tylenol every 4 hours for least 3-5 days for headache.  She describes the headache as a stabbing and piercing sensation to the occipital region.  She denies any fever or chills.  She denies any known sick contacts or travel.  No aggravating or alleviating factors.  Previous medical history includes anemia, breast cancer, systolic/diastolic heart failure, coronary artery disease, heart murmur, TAVR, hypothyroidism, hypertension.  ER workup at outside facility:  CBC with leukocytosis of 43,000 in H&H of 10 and 30.  CMP with hypokalemia 3.3, bilirubin of 1.1, alk-phos 161, , .  Magnesium 1.2 (repleted).  Lactic acid elevated at 2.4.  Urinalysis with 8 red blood cells, 10 white blood cells, few bacteria.  INR was elevated 1.8, and sed rate was elevated 37.  Blood urine cultures are pending.  CT the head demonstrated no acute findings.  CT the abdomen and pelvis demonstrated right lower lobe air bronchograms concerning for pneumonia with hepatomegaly, diverticulosis, and aortic valve replacement.  Chest x-ray with a dense masslike suspicious lesion/ infiltrate of the right lower lobe.  Patient admitted to Hospital Medicine for treatment management.  Infectious Disease, Neurology, and Pulmonary consultations made.  Patient will be empirically started on meningitis coverage including ampicillin, Rocephin, vancomycin.

## 2023-01-24 NOTE — PLAN OF CARE
[facility]  Initial Discharge Assessment       Primary Care Provider: Ellen Robert III, MD    Admission Diagnosis: Sepsis [A41.9]    Admission Date: 1/23/2023  Expected Discharge Date:     Discharge Barriers Identified: None    Payor: MEDICARE / Plan: MEDICARE PART A & B / Product Type: Government /     Extended Emergency Contact Information  Primary Emergency Contact: Damari Burton   [place]  Mobile Phone: [phone]  Relation: Son  Preferred language: English   needed? No  Secondary Emergency Contact: Cleveland Burton           [facility]  Home Phone: [phone]  Mobile Phone: [phone]  Relation: Son    Discharge Plan A: Home  Discharge Plan B: Home Health      [facility] - [facility], [place] - [address]  [address] [address]  Phone: [phone] Fax: [phone]      Completed DC assessment with pt and son,Cleveland, at bedside. Verified information on facesheet as correct. Reports POA as Cleveland (requested copy). Lives at listed address alone. Reports that she will have help from friends/family as needed after DC. PCP is Dr. Robert. Pharm is [facility] in [place]. Denies hh/hd/dme/blood thinners. Independent at baseline and able to drive herself to [facility]. Reports that Cleveland will provide transportation home upon DC. Denies any outpt services. Reports being compliant with home medications and able to afford them. Prescription coverage is with [facility]. Verified insurance on file denies recent admission in last 30 days. DC plan is home.     Initial Assessment (most recent)       Adult Discharge Assessment - 01/24/23 1052          Discharge Assessment    Assessment Type Discharge Planning Assessment     Confirmed/corrected address, phone number and insurance Yes     Confirmed Demographics Correct on Facesheet     Source of Information patient;family     Communicated MIRELLA with  patient/caregiver Yes     Reason For Admission sepsis     People in Home alone     Do you expect to return to your current living situation? No     Do you have help at home or someone to help you manage your care at home? Yes     Prior to hospitilization cognitive status: Alert/Oriented     Current cognitive status: Alert/Oriented     Equipment Currently Used at Home none     Readmission within 30 days? No     Patient currently being followed by outpatient case management? No     Do you currently have service(s) that help you manage your care at home? No     Do you take prescription medications? Yes     Do you have prescription coverage? Yes     Coverage [employer]     Do you have any problems affording any of your prescribed medications? No     Is the patient taking medications as prescribed? yes     Who is going to help you get home at discharge? priyanka     How do you get to doctors appointments? car, drives self;family or friend will provide     Are you on dialysis? No     Do you take coumadin? No     Discharge Plan A Home     Discharge Plan B Home Health     DME Needed Upon Discharge  none     Discharge Plan discussed with: Patient;Adult children     Discharge Barriers Identified None

## 2023-01-24 NOTE — ASSESSMENT & PLAN NOTE
In setting of severe sepsis with pneumonia.  -Follow up urine sodium, creatinine and osmolality  -Follow up serum osmolality  -Trend Na

## 2023-01-24 NOTE — HPI
76 yo w/ hx of HF, Hypothyroidism, CAD s/p PCI of proximal LAD stenosis with LAWRENCE x 1 on 01/07/22 , Aortic Stenosis s/p TAVR 2/2022, breast cancer presenting with headache and cough since last Wednesday. No fever, chills, sick contacts. Former smoker. Has not smoked in 20 years. No SOB. Patient states that she has been taking 4 tablets of tylenol every 4 hours from Wednesday until Sunday. Unclear what dose she was taking. Patient drinks 3 bourbon drinks per night. Patient with no headache at all at time of my exam. She describes headache as pinpiont on one side of posterior head that comes and goes. No neck pain or stiffness.

## 2023-01-24 NOTE — ASSESSMENT & PLAN NOTE
This patient does have evidence of infective focus  My overall impression is sepsis. Vital signs were reviewed and noted in progress note.  Antibiotics given-   Antibiotics (From admission, onward)    Start     Stop Route Frequency Ordered    01/23/23 2200  vancomycin 750 mg in dextrose 5 % (D5W) 250 mL IVPB (Vial-Mate)         -- IV Every 24 hours (non-standard times) 01/23/23 1826    01/23/23 1845  azithromycin tablet 500 mg         -- Oral Daily 01/23/23 1834    01/23/23 1836  vancomycin - pharmacy to dose  (vancomycin IVPB)        See Hyperspace for full Linked Orders Report.    -- IV pharmacy to manage frequency 01/23/23 1736    01/23/23 0100  piperacillin-tazobactam (ZOSYN) 4.5 g in dextrose 5 % in water (D5W) 5 % 100 mL IVPB (MB+)         -- IV Every 8 hours (non-standard times) 01/23/23 2101        Cultures were taken-   Microbiology Results (last 7 days)     Procedure Component Value Units Date/Time    Culture, MRSA [[id number]]     Order Status: No result Specimen: MRSA source from Nares, Right     Culture, Respiratory with Gram Stain [[id number]]     Order Status: No result Specimen: Respiratory     Influenza A & B by Molecular [[id number]] Collected: 01/23/23 1646    Order Status: Completed Specimen: Nasopharyngeal Swab Updated: 01/23/23 1718     Influenza A, Molecular Negative     Influenza B, Molecular Negative     Flu A & B Source Nasal Swab    Blood culture #2 **CANNOT BE ORDERED STAT** [[id number]] Collected: 01/23/23 1648    Order Status: Sent Specimen: Blood from Peripheral, Hand, Left Updated: 01/23/23 1648    Blood culture #1 **CANNOT BE ORDERED STAT** [[id number]] Collected: 01/23/23 1644    Order Status: Sent Specimen: Blood from Peripheral, Antecubital, Left Updated: 01/23/23 1645        Latest lactate reviewed, they are-  Recent Labs   Lab 01/23/23  1637 01/23/23 2014   LACTATE 2.4* 1.6       Organ dysfunction indicated by Acute liver injury  Source- Lung    Source control Achieved by-  Abx  CAP(unknown organism)  F/U cultures sputum and blood  Urinary legionella and strep pneumo pending  Azithromycin added to cover for atypical pathogens such as legionella or mycoplasma  F/U MRSA nares- Deescalate abx if negative  COVID and Flu negative  Repeat lactate ordered

## 2023-01-24 NOTE — CONSULTS
[facility]  Adult Nutrition  Consult Note    SUMMARY     Recommendations  1) Advance PO diet to goal of Cardiac + boost plus BID chocolate  -DM 1500 kcal if glucose remains elevated   2) weigh weekly   3) NFPE at f/u    Goals: 1) PO intakes > 50% of meals and supplements at f/u  Nutrition Goal Status: new  Communication of RD Recs:  (POC, sticky note)    Assessment and Plan    Inadequate energy intake  R/t decreased appetite, pneumonia  As evidenced by possible PO intakes < /=75% of needs x > 1  Month  Intervention: collaboration with other providers  New    Possible malnutrition     Malnutrition Assessment     Skin (Micronutrient):  (Deo = 20, groin incision)   Micronutrient Evaluation: suspected deficiency  Micronutrient Evaluation Comments: check iron, Na, K   Energy Intake (Malnutrition): less than 75% for greater than or equal to 1 month (possible)           Edema (Fluid Accumulation): 3-->moderate             Reason for Assessment    Reason For Assessment: consult  Diagnosis:  (severe sepsis)  Relevant Medical History: CAD, HTN, TAVR, anemia, GERD, CHF, skin CA, breast CA  Interdisciplinary Rounds: attended    General Information Comments: 76 y/o female admited with pneumonia, transaminitis and tylenol OD. Down for lumba puncture today, NPO x 1 day. Per pt's son, who does not live with her, she typically eats 2-3 small balanced meals/day. Upset stomach this morning prior to procedure. NFPE to be completed at f/u, will send boost for now. Wt gain per chart review.    Nutrition Discharge Planning: To be determined- Cardiac diet + Boost plus 1-2 x daily if needed    Nutrition Risk Screen    Nutrition Risk Screen: no indicators present    Nutrition/Diet History    Spiritual, Cultural Beliefs, Adventism Practices, Values that Affect Care: no  Food Allergies: NKFA  Factors Affecting Nutritional Intake: NPO    Anthropometrics    Temp: 97.8 °F (36.6 °C)  Height Method: Measured (office  22)  Height: 5'  Height (inches): 60 in  Weight Method: Bed Scale  Weight: 53.8 kg (118 lb 9.7 oz)  Weight (lb): 118.61 lb  Ideal Body Weight (IBW), Female: 100 lb  % Ideal Body Weight, Female (lb): 118.61 %  BMI (Calculated): 23.2  BMI Grade: 18.5-24.9 - normal  Usual Body Weight (UBW), k.8 kg (22)  % Usual Body Weight: 110.48  % Weight Change From Usual Weight: 10.24 %       Lab/Procedures/Meds    Pertinent Labs Reviewed: reviewed  BMP  Lab Results   Component Value Date     (L) 2023    K 2.4 (LL) 2023     2023    CO2 19 (L) 2023    BUN 11 2023    CREATININE 0.8 2023    CALCIUM 7.7 (L) 2023    ANIONGAP 11 2023    EGFRNORACEVR >60 2023       Lab Results   Component Value Date    ALBUMIN 2.1 (L) 2023       Pertinent Medications Reviewed: reviewed  Pertinent Medications Comments: folic acid, MVI, KCl, thiamine, zofran    Estimated/Assessed Needs    Weight Used For Calorie Calculations: 53.8 kg (118 lb 9.7 oz)  Energy Calorie Requirements (kcal): MSJ ( x 1.2-1.4) = 8104-6653 kcal  Energy Need Method: [place]  Protein Requirements: 1-1.2 g protein/kg ( 53-64 g)  Weight Used For Protein Calculations: 53.8 kg (118 lb 9.7 oz)  Fluid Requirements (mL): 1350 ml or per MD  Estimated Fluid Requirement Method: RDA Method  CHO Requirement: N/A      Nutrition Prescription Ordered    Current Diet Order: NPO x 1 day    Evaluation of Received Nutrient/Fluid Intake    Energy Calories Required: not meeting needs  Protein Required: not meeting needs  Fluid Required: meeting needs  Tolerance: other (see comments) (npo)     Intake/Output Summary (Last 24 hours) at 2023 1159  Last data filed at 2023 0853  Gross per 24 hour   Intake 2248.44 ml   Output 0 ml   Net 2248.44 ml   ? How meeting as no IVF ordered    % Intake of Estimated Energy Needs: 0%  % Meal Intake: NPO    Nutrition Risk    Level of Risk/Frequency of Follow-up: moderate  2 x weekly      Monitor and Evaluation    Food and Nutrient Intake: energy intake, food and beverage intake  Food and Nutrient Adminstration: diet order  Anthropometric Measurements: weight  Biochemical Data, Medical Tests and Procedures: electrolyte and renal panel, gastrointestinal profile  Nutrition-Focused Physical Findings: overall appearance, extremities, muscles and bones       Nutrition Follow-Up    RD Follow-up?: Yes

## 2023-01-24 NOTE — PLAN OF CARE
01/24/23 1424   Patient Assessment/Suction   Level of Consciousness (AVPU) alert   Respiratory Effort Normal;Unlabored   Expansion/Accessory Muscles/Retractions no retractions;no use of accessory muscles   All Lung Fields Breath Sounds clear;Anterior:;Lateral:   Rhythm/Pattern, Respiratory pattern regular;depth regular;unlabored   Cough Frequency infrequent   PRE-TX-O2   Device (Oxygen Therapy) room air   SpO2 98 %   Pulse Oximetry Type Intermittent   $ Pulse Oximetry - Multiple Charge Pulse Oximetry - Multiple   Pulse 85   Resp 18   Aerosol Therapy   $ Aerosol Therapy Charges Aerosol Treatment   Daily Review of Necessity (SVN) completed   Respiratory Treatment Status (SVN) given   Treatment Route (SVN) mask   Patient Position (SVN) HOB elevated   Post Treatment Assessment (SVN) breath sounds unchanged   Signs of Intolerance (SVN) none   Breath Sounds Post-Respiratory Treatment   Throughout All Fields Post-Treatment All Fields   Throughout All Fields Post-Treatment no change   Post-treatment Heart Rate (beats/min) 86   Post-treatment Resp Rate (breaths/min) 18

## 2023-01-24 NOTE — ASSESSMENT & PLAN NOTE
Elevated in setting of severe sepsis and tylenol overdose  Continue to monitor  CT abdomen/pelvis completed-liver enlarged w/ fatty infiltration

## 2023-01-24 NOTE — ASSESSMENT & PLAN NOTE
Chronic problem   Patient is identified as having Combined Systolic and Diastolic heart failure that is Chronic. CHF is currently controlled. Latest ECHO performed and demonstrates- Results for orders placed during the hospital encounter of 03/29/22    Echo    Interpretation Summary  · The left ventricle is normal in size with eccentric hypertrophy and low normal systolic function. The estimated ejection fraction is 50%.  · The quantitatively derived ejection fraction is 48%.  · Normal right ventricular size with normal right ventricular systolic function.  · Grade II left ventricular diastolic dysfunction.  · Moderate left atrial enlargement.  · There is a 26 mm Evolut transcutaneously-placed aortic bioprosthesis present. There is trivial paravalvular aortic insufficiency present.  · The aortic valve mean gradient is 9 mmHg with a dimensionless index of 0.46.  · The estimated PA systolic pressure is 24 mmHg.  · Normal central venous pressure (3 mmHg).  . Continue Beta Blocker and monitor clinical status closely. Monitor on telemetry. Patient is off CHF pathway.  Monitor strict Is&Os and daily weights.  Place on fluid restriction of 1.5 L. Continue to stress to patient importance of self efficacy and  on diet for CHF. Last BNP reviewed- and noted below No results for input(s): BNP, BNPTRIAGEBLO in the last 168 hours..

## 2023-01-24 NOTE — CONSULTS
[facility]  Neurology  Consult Note        PATIENT NAME: Vivien Burton  MRN: [id number]  CSN: [id number]      TODAY'S DATE: 01/24/2023  ADMIT DATE: 1/23/2023                            CONSULTING PROVIDER: Car Tucker MD  CONSULT REQUESTED BY: Marek Escamilla MD      Reason for consult: Headache       History obtained from chart review and the patient.    HPI per EMR: Vivien Burton is a 77 y.o. female with a history of  presents in transfer from [facility].  Patient presented [facility] for evaluation of headache and cough.  She reports headache and cough onset approximately 1 week ago.  She endorses taking 4 tablets of Tylenol every 4 hours for least 3-5 days for headache.  She describes the headache as a stabbing and piercing sensation to the occipital region.  She denies any fever or chills.  She denies any known sick contacts or travel.  No aggravating or alleviating factors.  Previous medical history includes anemia, breast cancer, systolic/diastolic heart failure, coronary artery disease, heart murmur, TAVR, hypothyroidism, hypertension.  ER workup at outside facility:  CBC with leukocytosis of 43,000 in H&H of 10 and 30.  CMP with hypokalemia 3.3, bilirubin of 1.1, alk-phos 161, , .  Magnesium 1.2 (repleted).  Lactic acid elevated at 2.4.  Urinalysis with 8 red blood cells, 10 white blood cells, few bacteria.  INR was elevated 1.8, and sed rate was elevated 37.  Blood urine cultures are pending.  CT the head demonstrated no acute findings.  CT the abdomen and pelvis demonstrated right lower lobe air bronchograms concerning for pneumonia with hepatomegaly, diverticulosis, and aortic valve replacement.  Chest x-ray with a dense masslike suspicious lesion/ infiltrate of the right lower lobe.  Patient admitted to Hospital Medicine for treatment management.    Neurology consult:  Patient was seen examined me.  She is alert and  oriented x3.  She states that since Wednesday she is been having intractable headache which she describes as ice pricking headache.  She states that the headache comes in episodes of 15-20 seconds with intermittent relief for 1 minute.  She was taking around the clock Tylenol and ibuprofen with symptomatic relief however headache would come back.  She presented to [facility] with these symptoms. ER workup at outside facility:  CBC with leukocytosis of 43,000 in H&H of 10 and 30.  CMP with hypokalemia 3.3, bilirubin of 1.1, alk-phos 161, , .  Magnesium 1.2 (repleted).  Lactic acid elevated at 2.4.  Urinalysis with 8 red blood cells, 10 white blood cells, few bacteria.    Patient was then transferred here for further workup and management for headache.    On arrival here, she was started on broad-spectrum antibiotics with concern for meningitis due to elevated white count however she was afebrile.  She was also given Tylenol and ibuprofen for headache with improvement in pain.  Since morning, she is not had headache like she did at home.    Patient denies any associated symptoms with the headache including vision changes, unilateral weakness or sensory changes, nausea vomiting, dizziness.    PREVIOUS MEDICAL HISTORY:  Past Medical History:   Diagnosis Date    Acute on chronic combined systolic and diastolic heart failure 2/23/2022    Anemia     Basal cell carcinoma 1999    Breast cancer     Breast cancer     Cancer     CHF (congestive heart failure)     Coronary artery disease     GERD (gastroesophageal reflux disease)     Hyperlipidemia     Hypertension     Hypothyroidism     Nonrheumatic aortic (valve) stenosis 6/5/2018    Osteoporosis 02/05/2019    S/P TAVR (transcatheter aortic valve replacement) 2/22/2022    Squamous cell carcinoma of skin 2018    Thyroid disease      PREVIOUS SURGICAL HISTORY:  Past Surgical History:   Procedure Laterality Date    BREAST SURGERY      CARDIAC CATH  COSURGEON N/A 2022    Procedure: Cardiac Cath Cosurgeon;  Surgeon: Dontae Wooten MD;  Location: [facility] CATH LAB;  Service: Cardiovascular;  Laterality: N/A;     SECTION, CLASSIC      COLONOSCOPY N/A 2018    Procedure: COLONOSCOPY;  Surgeon: Precious Castorena MD;  Location: [facility] ENDO;  Service: Endoscopy;  Laterality: N/A;    HYSTERECTOMY      LEFT HEART CATHETERIZATION Left 2022    Procedure: Left heart cath;  Surgeon: Dontae Johns MD;  Location: [facility] CATH LAB;  Service: Cardiology;  Laterality: Left;    LEFT HEART CATHETERIZATION Left 2022    Procedure: Left heart cath;  Surgeon: Dontae Johns MD;  Location: [facility] CATH LAB;  Service: Cardiology;  Laterality: Left;    MASTECTOMY Right 2000    right breast      TONSILLECTOMY, ADENOIDECTOMY      TRANSCATHETER AORTIC VALVE REPLACEMENT (TAVR) N/A 2022    Procedure: REPLACEMENT, AORTIC VALVE, TRANSCATHETER (TAVR);  Surgeon: Dontae Johns MD;  Location: [facility] CATH LAB;  Service: Cardiology;  Laterality: N/A;     FAMILY MEDICAL HISTORY:  Family History   Problem Relation Age of Onset    Cancer Sister     Liver cancer Sister     Melanoma Sister     Cancer Brother     Breast cancer Neg Hx     Psoriasis Neg Hx     Lupus Neg Hx     Eczema Neg Hx      SOCIAL HISTORY:  Social History     Tobacco Use    Smoking status: Former     Packs/day: 1.00     Types: Cigarettes     Quit date: 2000     Years since quittin.9    Smokeless tobacco: Never    Tobacco comments:     quit 20 years ago   Substance Use Topics    Alcohol use: Yes     Alcohol/week: 2.0 standard drinks     Types: 2 Shots of liquor per week     Comment: per pt 2 burbon drinks per night however none in last month    Drug use: No     ALLERGIES:  Review of patient's allergies indicates:  No Known Allergies  HOME MEDICATIONS:  Prior to Admission medications    Medication Sig Start Date End Date Taking? Authorizing Provider   aspirin (ECOTRIN) 81 MG EC tablet Take 81  mg by mouth once daily.    Historical Provider   cetirizine (ZYRTEC) 10 mg Cap Take by mouth.    Historical Provider   ferrous sulfate (FEOSOL) Tab tablet Take 1 tablet by mouth daily with breakfast. 27mg    Historical Provider   ibandronate (BONIVA) 150 mg tablet Take 1 tablet (150 mg total) by mouth every 30 days. 10/20/21   Rosi Harrell NP-TAMMY   levothyroxine (SYNTHROID) 75 MCG tablet TAKE 1 TABLET BY MOUTH EVERY DAY 1/2/23   Ellen Robert III, MD   losartan (COZAAR) 25 MG tablet Take 1 tablet (25 mg total) by mouth once daily. 11/29/22 11/29/23  Rosi Del Real NP   omeprazole (PRILOSEC) 20 MG capsule Take 20 mg by mouth once daily.    Historical Provider   simvastatin (ZOCOR) 40 MG tablet Take 1 tablet (40 mg total) by mouth every evening. 6/10/22   Rick Mccoy MD     CURRENT SCHEDULED MEDICATIONS:   acetylcysteine  100 mg/kg Intravenous Once    ampicillin IVPB  2 g Intravenous Q4H    aspirin  81 mg Oral Daily    azithromycin  500 mg Intravenous Q24H    cefTRIAXone (ROCEPHIN) IVPB  2 g Intravenous Q12H    enoxaparin  40 mg Subcutaneous Daily    folic acid  1 mg Oral Daily    [START ON 1/25/2023] levothyroxine  75 mcg Oral Before breakfast    multivitamin  1 tablet Oral Daily    pantoprazole  40 mg Oral Daily    potassium chloride  10 mEq Intravenous Q1H    sodium chloride 3%  4 mL Nebulization Once    thiamine  100 mg Oral Daily     CURRENT INFUSIONS:    CURRENT PRN MEDICATIONS:  albuterol-ipratropium, ondansetron, sodium chloride 0.9%, [COMPLETED] Pharmacy to dose Vancomycin consult **AND** vancomycin - pharmacy to dose    REVIEW OF SYSTEMS:  Please refer to the HPI for all pertinent positive and negative findings. A comprehensive review of all other systems was negative.       PHYSICAL EXAM:  Patient Vitals for the past 24 hrs:   BP Temp Temp src Pulse Resp SpO2 Height Weight   01/24/23 0745 (!) 197/80 97.8 °F (36.6 °C) Tympanic 85 18 98 % -- --   01/24/23 0600 -- -- -- -- -- -- -- 53.8 kg (118 lb 9.7  oz)   01/24/23 0348 137/65 97.9 °F (36.6 °C) -- 83 18 98 % -- --   01/24/23 0049 -- -- -- -- -- 95 % -- --   01/24/23 0040 (!) 146/70 97.6 °F (36.4 °C) -- 91 16 97 % -- --   01/23/23 2249 (!) 141/64 97.6 °F (36.4 °C) -- 100 20 95 % 5' (1.524 m) 52.6 kg (115 lb 15.4 oz)       GENERAL APPEARANCE: Alert, well-developed, well-nourished female in no acute distress.  HEENT: Normocephalic and atraumatic. PERRL. Oropharynx unremarkable.  PULM: Normal respiratory effort. No accessory muscle use.  CV: RRR.  ABDOMEN: Soft, nontender.  EXTREMITIES: No obvious signs of vascular compromise. Pulses present. No cyanosis, clubbing or edema.  SKIN: Clear; no rashes, lesions or skin breaks in exposed areas.    NEURO:  MENTAL STATUS: Patient awake and oriented to time, place, and person, recent/remote memory normal, attention span/concentration normal, speech fluent without paraphasic errors, and good comprehension with appropriate thought content.  Affect euthymic.    CRANIAL NERVES:  CN I: Not tested.  CN II: Fundoscopic exam deferred.  CN III, IV, VI: Pupils equal, round and reactive to light.  Extraocular movements full and intact.  CN V: Facial sensation normal.  CN VII: Facial asymmetry absent.  CN VIII: Hearing grossly normal and equal bilaterally.  No skew deviation or pathologic nystagmus.  CN IX, X: Palate elevates symmetrically. Speech/articulation is clear without dysarthria.  CN XI: Shoulder shrug and chin rotation equal with good strength.  CN XII: Tongue protrusion midline.    MOTOR:  Bulk normal. Tone normal and symmetric throughout.  Abnormal movements absent.  Tremor: none present.  Strength 5/5 throughout.    REFLEXES:  DTRs 2+ throughout.  Plantar response downgoing bilaterally.  SENSATION:grossly intact throughout.  COORDINATION: normal finger-to-nose.  STATION: not tested.  GAIT: not tested.      Labs:  Recent Labs   Lab 01/23/23  1546 01/23/23 2014 01/24/23  0605   * 133* 130*   K 3.3* 3.5 2.4*   CL 99 103  100   CO2 20* 20* 19*   BUN 13 13 11   CREATININE 0.7 0.7 0.8   * 151* 292*   CALCIUM 9.5 8.6* 7.7*   MG 1.2*  --  1.9     Recent Labs   Lab 01/23/23  1546 01/24/23  0445   WBC 43.13* 33.73*   HGB 10.7* 9.3*   HCT 30.7* 27.8*    174     Recent Labs   Lab 01/23/23  1546 01/23/23 2014 01/24/23  0445   ALBUMIN 3.2* 2.7* 2.1*   PROT 5.8* 5.2* 4.1*   BILITOT 1.1* 0.9 0.7   ALKPHOS 161* 145* 129   * 279* 415*   * 280* 392*     Lab Results   Component Value Date    INR 1.8 (H) 01/24/2023     Lab Results   Component Value Date    TRIG 77 10/04/2022    HDL 71 10/04/2022    CHOLHDL 2.0 10/04/2022     Lab Results   Component Value Date    HGBA1C 4.3 10/04/2022     No results found for: PROTEINCSF, GLUCCSF, WBCCSF    Imaging:  I have reviewed and interpreted the pertinent imaging and lab results.      CT Chest Abdomen Pelvis With Contrast (xpd)  Narrative: EXAMINATION:  CT CHEST ABDOMEN PELVIS WITH CONTRAST (XPD)    CLINICAL HISTORY:  Sepsis;    TECHNIQUE:  Low dose axial images, sagittal and coronal reformations were obtained from the neck base to the pubic symphysis after the administration of 75 cc Omnipaque 350 intravenous contrast.  Oral contrast was not administered.    COMPARISON:  Chest radiograph 01/23/2023, CTA cardiac TAVR 01/07/2022    FINDINGS:  Base of neck/thoracic soft tissues: Postsurgical changes of right mastectomy.    Thoracic aorta: Left-sided aortic arch.  Postsurgical changes of aortic valve replacement.  No significant atherosclerosis or aneurysm.    Heart: Normal in size.  No pericardial effusion. Marked coronary artery calcification.    Terrie/Mediastinum: Mildly prominent but non pathologically enlarged right lower paratracheal and subcarinal lymph nodes again demonstrated, not significant changed in size since the prior study.    Lungs/Airways: Airways are patent.  Moderate consolidation within the medial posterior aspect of the right lower lobe with air bronchograms, new  since 01/07/2022, concerning for pneumonia.  Mild dependent consolidation at the left lung base.  Scattered centrilobular ground-glass opacities and tree-in-bud micro nodules within the inferior lingula and bilateral lower lobes compatible with infectious or inflammatory bronchiolitis.  Chronic pleural thickening and subpleural reticulation within the anterior aspect of the right upper and middle lobes likely reflecting radiation therapy change.  No pleural effusion or pneumothorax.    Esophagus: No significant abnormality.    Liver: Liver is enlarged and diffusely decreased in attenuation suggestive of fatty infiltration.  No focal hepatic lesions.    Gallbladder: No calcified gallstones, gallbladder wall thickening, or evidence of pericholecystic inflammation.    Bile Ducts: No evidence of dilated ducts.    Pancreas: No definite mass or peripancreatic fat stranding.    Spleen: Within normal limits.    Adrenals: No significant abnormality.    Kidneys/ Ureters: Kidneys normal in size and location. Stable subcentimeter left renal hypodensity, too small to definitively characterize, possibly a cyst.  No hydronephrosis or nephrolithiasis. No ureteral dilatation.    Bladder: Circumferential urinary bladder wall thickening.    Reproductive organs: Hysterectomy.    GI tract/Mesentery: Small type hiatal hernia.  Small diverticulum arising from the 2nd portion of the duodenum.  Diverticulosis coli without evidence of diverticulitis.  No evidence of bowel obstruction or inflammation. No evidence of appendicitis.    Peritoneal Space: No ascites. No free air.    Lymph nodes: No pathologically enlarged lymph nodes.    Soft tissues: Small right periumbilical fat containing hernia.    Vasculature: Mild calcific atherosclerosis of the abdominal aorta.  No aortic aneurysm.    Bones:  Age-appropriate degenerative changes. No acute fracture or aggressive-appearing lytic or blastic lesion.  Impression: 1. Right lower lobe  consolidation with air bronchograms, concerning for pneumonia.  Superimposed scattered centrilobular ground-glass opacities and tree-in-bud micro nodules within the inferior lingula and bilateral lower lobes suspicious for infectious or inflammatory bronchiolitis.  2. Hepatomegaly and hepatic steatosis.  3. Diverticulosis coli without evidence of diverticulitis.  4. Right mastectomy.  5. Aortic valve replacement.  Coronary artery calcification.  6. Additional findings, as above.    Electronically signed by: Marek Moreno  Date:    01/23/2023  Time:    18:22  X-Ray Chest AP Portable  Narrative: EXAMINATION:  XR CHEST AP PORTABLE    CLINICAL HISTORY:  Cough;    TECHNIQUE:  Portable view of the chest was performed.    COMPARISON:  12/28/2021.    FINDINGS:  Dense masslike consolidation of the right lower lobe.  Dependent discoid atelectasis at both lung bases.  Heart size normal.  Trachea midline.  Prior aortic valve replacement.    Bony thorax intact.  Impression: 1. Dense masslike infiltrate of the right lower lobe.  Correlate clinically with possible fever and/or elevated white count.  This should be followed until clear to exclude underlying suspicious pulmonary lesion.  2. Prior aortic valve replacement.  This report was flagged in Epic as abnormal.    Electronically signed by: Seun Amaro  Date:    01/23/2023  Time:    16:25  CT Head Without Contrast  Narrative: EXAMINATION:  CT HEAD WITHOUT CONTRAST    CLINICAL HISTORY:  Headache, new or worsening (Age >= 50y);    TECHNIQUE:  Low dose axial images were obtained through the head.  Coronal and sagittal reformations were also performed. Contrast was not administered.    COMPARISON:  CT 12/05/2007.    FINDINGS:  There is no acute hemorrhage or infarction.  There is cortical atrophy.  There are periventricular deep white matter changes consistent with chronic small vessel ischemic disease.    No extra-axial fluid collections.  Ventricles are normal in size, shape and  configuration.  The basal cisterns are patent.    Mild circumferential mucoperiosteal thickening of the right and left maxillary sinus with small air-fluid levels.  Dependent mucoperiosteal thickening is present within the sphenoid sinus.  Frontal sinuses hypoplastic.  Minimal scattered mucoperiosteal thickening within the ethmoid air cells.    The mastoid air cells and middle ears are normally pneumatized.  Impression: Cortical atrophy with periventricular deep white matter change consistent with chronic small vessel ischemic disease.    Mild paranasal sinus disease.    Electronically signed by: Seun Amaro  Date:    01/23/2023  Time:    16:05         ASSESSMENT & PLAN:    Intractable headache  Leukocytosis  Transaminitis  Sepsis    Plan  Etiology of patient's intractable headache is unknown.  She has mostly headache in the right side with radiation to the back.  There is no conjunctival injection or lacrimation associated with the headache.  Concern for trigeminal autonomic cephalgias is low however can not rule out.  MRI brain was done showed no acute intracranial pathology.  There is diffuse heterogeneous marrow signal and enhancement throughout the calvarium with suspicious for hematopoietic disorder.  Lumbar puncture was performed and CSF analysis showed WBC of 1 protein 29 and glucose 78.  Concern for intracranial infection is low.  Recommend Oncology evaluation for above MRI findings.  Infectious Disease on board.  Patient has elevated white count, elevated procalcitonin, labs showed transaminitis. Appreciate ID recommendations regarding antibiotic management  Hold Tylenol- concern for transaminitis from acetaminophen overdose  Trend LFTs  Neuro checks per unit protocol  If headache recurs, will consider starting Benadryl, Reglan around the clock to help with headache.  Continue IV fluids  Will follow      Thank you kindly for including us in the care of this patient. Please do not hesitate to contact us  with any questions.      48 minutes of care time has been spent evaluating with the patient. Time includes chart review not limited to diagnostic imaging, labs, and vitals, patient assessment, discussion with family and nursing, current order evaluations, and new order entries.       Car Tucker MD  Neurology/vascular Neurology  Date of Service: 01/24/2023  9:56 AM        Please note: This note was transcribed using voice recognition software. Because of this technology there are often uinintended grammatical, spelling, and other transcription errors. Please disregard these errors.

## 2023-01-24 NOTE — ED NOTES
Report called to JESSIE English at [facility]. Pt/family updated. Awaiting transport by [facility].

## 2023-01-24 NOTE — ASSESSMENT & PLAN NOTE
Not in acute exacerbation  Patient given 1L IVF- Hold further fluids as patient not hypotensive unless clinically necessary

## 2023-01-24 NOTE — PLAN OF CARE
Plan of care reviewed with pt. Pt verbalized understanding. Patient is alert and oriented x 4, able to make needs known. Continuous cardiac monitoring in place as ordered. A-febrile throughout the shift. ABX administered as scheduled. Meds given per MAR. IVF infusing as ordered. Ambulated to bathroom independently with standby assist. Repositions self independently. Complaints of pain and discomfort, PRN pain medication given, full relief obtained. Purposeful hourly/q2hr rounding done during shift to promote patient safety. NAD noted. Safety maintained with side rails up x3, bed wheels locked, bed in lowest positioned, call light in reach. Patient educated to call for assistance with ambulation if needed, verbalized understanding. Pt remains free of falls. No further needs expressed at this time. Will continue to monitor.

## 2023-01-24 NOTE — PROGRESS NOTES
[facility] Hospital Medicine  Progress Note    Patient Name: Vivien Burton  MRN: [id number]  Patient Class: IP- Inpatient   Admission Date: 1/23/2023  Length of Stay: 1 days  Attending Physician: Marek Escamilla MD  Primary Care Provider: Ellen Robert III, MD        Subjective:     Principal Problem:Severe sepsis        HPI:  Vivien Burton is a 77-year-old female who presents in transfer from [facility].  Patient presented [facility] for evaluation of headache and cough.  She reports headache and cough onset approximately 1 week ago.  She endorses taking 4 tablets of Tylenol every 4 hours for least 3-5 days for headache.  She describes the headache as a stabbing and piercing sensation to the occipital region.  She denies any fever or chills.  She denies any known sick contacts or travel.  No aggravating or alleviating factors.  Previous medical history includes anemia, breast cancer, systolic/diastolic heart failure, coronary artery disease, heart murmur, TAVR, hypothyroidism, hypertension.  ER workup at outside facility:  CBC with leukocytosis of 43,000 in H&H of 10 and 30.  CMP with hypokalemia 3.3, bilirubin of 1.1, alk-phos 161, , .  Magnesium 1.2 (repleted).  Lactic acid elevated at 2.4.  Urinalysis with 8 red blood cells, 10 white blood cells, few bacteria.  INR was elevated 1.8, and sed rate was elevated 37.  Blood urine cultures are pending.  CT the head demonstrated no acute findings.  CT the abdomen and pelvis demonstrated right lower lobe air bronchograms concerning for pneumonia with hepatomegaly, diverticulosis, and aortic valve replacement.  Chest x-ray with a dense masslike suspicious lesion/ infiltrate of the right lower lobe.  Patient admitted to Hospital Medicine for treatment management.  Infectious Disease, Neurology, and Pulmonary consultations made.  Patient will be empirically started on meningitis coverage including  "ampicillin, Rocephin, vancomycin.         Overview/Hospital Course:  Vivien Burton is a 77 year old female with a past medical history of CAD s/p PCI to LAD 1/2022, combined CHF, aortic stenosis s/p TAVR, anemia, hypothyroidism, HLD, HTN, and GERD who presented with one week of headache and cough secondary to a severe sepsis with potential etiologies including CAP and/or meningitis. She has been started on empiric bacterial meningitis therapy with ampicillin, ceftriaxone and vancomycin. She is also on azithromycin. An LP has been ordered and is pending. CT of the chest shows consolidation at the RLL with air bronchograms and ground glass opacities concerning for pneumonia. Respiratory and blood cultures are pending. Pulmonary and Neurology have been consulted. Of note, the patient endorses taking four tablets of Tylenol every four hours for roughly three to five days. Her LFTs are elevated as well as INR. Her albumin is also low and CT of the abdomen shows hepatic steatosis. She was started on NAC therapy while at [place]. GI has also been consulted.       Interval History: see "Hospital Course"    Review of Systems   Constitutional:  Positive for activity change and appetite change. Negative for chills, diaphoresis and fever.   HENT:  Negative for congestion, nosebleeds and tinnitus.    Eyes:  Negative for photophobia and visual disturbance.   Respiratory:  Positive for cough, shortness of breath and wheezing. Negative for chest tightness.    Cardiovascular:  Negative for chest pain, palpitations and leg swelling.   Gastrointestinal:  Negative for abdominal distention, abdominal pain, constipation, diarrhea, nausea and vomiting.   Endocrine: Negative for cold intolerance and heat intolerance.   Genitourinary:  Negative for difficulty urinating, dysuria, frequency, hematuria and urgency.   Musculoskeletal:  Negative for arthralgias, back pain and myalgias.   Skin:  Negative for pallor, rash and wound. "   Allergic/Immunologic: Negative for immunocompromised state.   Neurological:  Positive for headaches. Negative for dizziness, tremors, facial asymmetry, speech difficulty and weakness.   Hematological:  Negative for adenopathy. Does not bruise/bleed easily.   Psychiatric/Behavioral:  Negative for confusion and sleep disturbance. The patient is not nervous/anxious.    Objective:     Vital Signs (Most Recent):  Temp: 97.8 °F (36.6 °C) (01/24/23 0745)  Pulse: 85 (01/24/23 0745)  Resp: 18 (01/24/23 0745)  BP: (!) 197/80 (01/24/23 0745)  SpO2: 98 % (01/24/23 0745)   Vital Signs (24h Range):  Temp:  [97.6 °F (36.4 °C)-98.5 °F (36.9 °C)] 97.8 °F (36.6 °C)  Pulse:  [] 85  Resp:  [16-22] 18  SpO2:  [95 %-98 %] 98 %  BP: (137-197)/(49-91) 197/80     Weight: 53.8 kg (118 lb 9.7 oz)  Body mass index is 23.16 kg/m².    Intake/Output Summary (Last 24 hours) at 1/24/2023 0748  Last data filed at 1/24/2023 0626  Gross per 24 hour   Intake 1148.44 ml   Output --   Net 1148.44 ml      Physical Exam  Vitals and nursing note reviewed.   Constitutional:       General: She is not in acute distress.     Appearance: She is well-developed. She is ill-appearing. She is not diaphoretic.   HENT:      Head: Normocephalic and atraumatic.      Right Ear: External ear normal.      Left Ear: External ear normal.      Nose: Nose normal.      Mouth/Throat:      Mouth: Mucous membranes are dry.      Pharynx: Oropharynx is clear.   Eyes:      General: No scleral icterus.     Extraocular Movements: Extraocular movements intact.      Conjunctiva/sclera: Conjunctivae normal.   Neck:      Vascular: No JVD.   Cardiovascular:      Rate and Rhythm: Normal rate and regular rhythm.      Heart sounds: Murmur heard.     No friction rub. No gallop.   Pulmonary:      Effort: Pulmonary effort is normal. No respiratory distress.      Breath sounds: Wheezing present. No rales.   Abdominal:      General: Bowel sounds are normal. There is no distension.       Palpations: Abdomen is soft.      Tenderness: There is no abdominal tenderness. There is no guarding or rebound.   Musculoskeletal:         General: No tenderness. Normal range of motion.      Cervical back: Normal range of motion and neck supple.   Lymphadenopathy:      Cervical: No cervical adenopathy.   Skin:     General: Skin is warm and dry.      Coloration: Skin is not pale.      Findings: No erythema or rash.   Neurological:      General: No focal deficit present.      Mental Status: She is alert and oriented to person, place, and time. Mental status is at baseline.      Cranial Nerves: No cranial nerve deficit.      Sensory: No sensory deficit.      Coordination: Coordination normal.      Deep Tendon Reflexes: Reflexes normal.   Psychiatric:         Behavior: Behavior normal.         Thought Content: Thought content normal.         Judgment: Judgment normal.       Significant Labs: All pertinent labs within the past 24 hours have been reviewed.    Significant Imaging: I have reviewed all pertinent imaging results/findings within the past 24 hours.      Assessment/Plan:      * Severe sepsis  Possibly secondary to meningitis and/or pneumonia.  -Continue broad spectrum antibiotics with vancomycin, Rocephin, azithromycin, and ampicillin  -Follow up respiratory and blood cultures  -Trend LFTs, INR and albumin  -Follow up LP studies    Pneumonia  Patient endorses cough. Procalcitonin 143.94. CXR and CT chest shows consolidation.  -Continue Rocephin and azithromycin  -Follow up respiratory cultures  -Trend WBC count with CBC  -Pulmonary consulted  -PRN Duonebs      Transaminitis  History of EtOH use. Possible Tylenol toxicity. Hepatic steatosis seen on CT.  -Trend LFTs, albumin and INR  -Continue NAC  -GI consulted      Acetaminophen overdose  Patient endorses taking four tablets of Tylenol every four ours for three to five days.  -GI consulted  -Monitor LFTs, albumin, and INR  -Continue  NAC      Headache(784.0)  CT head unremarkable.  -LP ordered given severe sepsis  -Continue ampicillin, vancomycin and Rocephin  -Follow up LP studies      Hypokalemia  -Telemetry  -Trend K   -Replete K PRN      Hyponatremia  In setting of severe sepsis with pneumonia.  -Follow up urine sodium, creatinine and osmolality  -Follow up serum osmolality  -Trend Na      Coronary artery disease  -Continue home ASA and statin      Chronic combined systolic and diastolic heart failure, HFimpEF  History of CAD.  -Continue cardiac telemetry  -Hold losartan and initiation of beta blockade in setting of sepsis  -Strict I's and O's        S/P TAVR (transcatheter aortic valve replacement), 26 mm  Stable.      Anemia  Stable.  -Trend Hgb with CBC  -Hold home iron in setting of severe sepsis      Excessive drinking alcohol  -Monitor for signs and symptoms of withdrawal  -Folic acid, thiamine and MVI      Unspecified essential hypertension  -Hold losartan in setting of severe sepsis  -Continue to monitor        Unspecified hypothyroidism  -Continue Synthroid IV      GERD (gastroesophageal reflux disease)  -Continue PPI IV      VTE Risk Mitigation (From admission, onward)         Ordered     enoxaparin injection 40 mg  Daily         01/24/23 0739     Place LAMBERT hose  Until discontinued         01/23/23 2322     IP VTE HIGH RISK PATIENT  Once         01/23/23 2322     Place sequential compression device  Until discontinued         01/23/23 2322                Discharge Planning   MIRELLA: 1/28/2023    Code Status: Full Code   Is the patient medically ready for discharge?:     Reason for patient still in hospital (select all that apply): Patient trending condition, Laboratory test, Treatment, Imaging and Consult recommendations                     Marek Escamilla MD  Department of Hospital Medicine   [facility]

## 2023-01-24 NOTE — SUBJECTIVE & OBJECTIVE
Past Medical History:   Diagnosis Date    Acute on chronic combined systolic and diastolic heart failure 2022    Anemia     Basal cell carcinoma     Breast cancer     Breast cancer     Cancer     CHF (congestive heart failure)     Coronary artery disease     GERD (gastroesophageal reflux disease)     Hyperlipidemia     Hypertension     Hypothyroidism     Nonrheumatic aortic (valve) stenosis 2018    Osteoporosis 2019    S/P TAVR (transcatheter aortic valve replacement) 2022    Squamous cell carcinoma of skin     Thyroid disease        Past Surgical History:   Procedure Laterality Date    BREAST SURGERY      CARDIAC CATH COSURGEON N/A 2022    Procedure: Cardiac Cath Cosurgeon;  Surgeon: Dontae Wooten MD;  Location: [facility] CATH LAB;  Service: Cardiovascular;  Laterality: N/A;     SECTION, CLASSIC      COLONOSCOPY N/A 2018    Procedure: COLONOSCOPY;  Surgeon: Precious Castorena MD;  Location: [facility] ENDO;  Service: Endoscopy;  Laterality: N/A;    HYSTERECTOMY      LEFT HEART CATHETERIZATION Left 2022    Procedure: Left heart cath;  Surgeon: Dontae Johns MD;  Location: [facility] CATH LAB;  Service: Cardiology;  Laterality: Left;    LEFT HEART CATHETERIZATION Left 2022    Procedure: Left heart cath;  Surgeon: Dontae Johns MD;  Location: [facility] CATH LAB;  Service: Cardiology;  Laterality: Left;    MASTECTOMY Right 2000    right breast      TONSILLECTOMY, ADENOIDECTOMY      TRANSCATHETER AORTIC VALVE REPLACEMENT (TAVR) N/A 2022    Procedure: REPLACEMENT, AORTIC VALVE, TRANSCATHETER (TAVR);  Surgeon: Dontae Johns MD;  Location: [facility] CATH LAB;  Service: Cardiology;  Laterality: N/A;       Review of patient's allergies indicates:  No Known Allergies    Current Facility-Administered Medications on File Prior to Encounter   Medication    [COMPLETED] acetylcysteine 20% (200 mg/ml) (ACETADOTE) 7,500 mg in dextrose 5 % (D5W) 250 mL infusion    [COMPLETED]  iohexoL (OMNIPAQUE 350) injection 75 mL    [COMPLETED] ketorolac injection 15 mg    [COMPLETED] magnesium sulfate in dextrose IVPB (premix) 1 g    [COMPLETED] magnesium sulfate in dextrose IVPB (premix) 1 g    [COMPLETED] piperacillin-tazobactam (ZOSYN) 3.375 g in dextrose 5 % in water (D5W) 5 % 50 mL IVPB (MB+)    [COMPLETED] sodium chloride 0.9% bolus 1,000 mL 1,000 mL    [COMPLETED] acetylcysteine 20% (200 mg/ml) (ACETADOTE) 2,500 mg in dextrose 5 % (D5W) 250 mL infusion    [DISCONTINUED] acetylcysteine 20% (200 mg/ml) (ACETADOTE) 5,000 mg in dextrose 5 % (D5W) 1,000 mL infusion    [DISCONTINUED] azithromycin tablet 500 mg    [DISCONTINUED] dextrose 50% injection 12.5 g    [DISCONTINUED] dextrose 50% injection 25 g    [DISCONTINUED] enoxaparin injection 40 mg    [DISCONTINUED] glucagon (human recombinant) injection 1 mg    [DISCONTINUED] glucose chewable tablet 16 g    [DISCONTINUED] glucose chewable tablet 24 g    [DISCONTINUED] levothyroxine tablet 75 mcg    [DISCONTINUED] naloxone 0.4 mg/mL injection 0.02 mg    [DISCONTINUED] piperacillin-tazobactam (ZOSYN) 4.5 g in dextrose 5 % in water (D5W) 5 % 100 mL IVPB (MB+)    [DISCONTINUED] piperacillin-tazobactam injection 3.375 g    [DISCONTINUED] potassium chloride SA CR tablet 40 mEq    [DISCONTINUED] sodium chloride 0.9% bolus 500 mL 500 mL    [DISCONTINUED] sodium chloride 0.9% flush 10 mL    [DISCONTINUED] vancomycin - pharmacy to dose    [DISCONTINUED] vancomycin 750 mg in dextrose 5 % (D5W) 250 mL IVPB (Vial-Mate)     Current Outpatient Medications on File Prior to Encounter   Medication Sig    aspirin (ECOTRIN) 81 MG EC tablet Take 81 mg by mouth once daily.    cetirizine (ZYRTEC) 10 mg Cap Take by mouth.    ferrous sulfate (FEOSOL) Tab tablet Take 1 tablet by mouth daily with breakfast. 27mg    ibandronate (BONIVA) 150 mg tablet Take 1 tablet (150 mg total) by mouth every 30 days.    levothyroxine (SYNTHROID) 75 MCG tablet TAKE 1 TABLET BY MOUTH EVERY DAY     losartan (COZAAR) 25 MG tablet Take 1 tablet (25 mg total) by mouth once daily.    omeprazole (PRILOSEC) 20 MG capsule Take 20 mg by mouth once daily.    simvastatin (ZOCOR) 40 MG tablet Take 1 tablet (40 mg total) by mouth every evening.     Family History       Problem Relation (Age of Onset)    Cancer Sister, Brother    Liver cancer Sister    Melanoma Sister          Tobacco Use    Smoking status: Former     Packs/day: 1.00     Types: Cigarettes     Quit date: 2000     Years since quittin.9    Smokeless tobacco: Never    Tobacco comments:     quit 20 years ago   Substance and Sexual Activity    Alcohol use: Yes     Alcohol/week: 2.0 standard drinks     Types: 2 Shots of liquor per week     Comment: per pt 2 burbon drinks per night however none in last month    Drug use: No    Sexual activity: Not Currently     Review of Systems   Constitutional:  Positive for activity change and appetite change. Negative for chills, diaphoresis and fever.   HENT:  Negative for congestion, nosebleeds and tinnitus.    Eyes:  Negative for photophobia and visual disturbance.   Respiratory:  Positive for cough, shortness of breath and wheezing. Negative for chest tightness.    Cardiovascular:  Negative for chest pain, palpitations and leg swelling.   Gastrointestinal:  Negative for abdominal distention, abdominal pain, constipation, diarrhea, nausea and vomiting.   Endocrine: Negative for cold intolerance and heat intolerance.   Genitourinary:  Negative for difficulty urinating, dysuria, frequency, hematuria and urgency.   Musculoskeletal:  Negative for arthralgias, back pain and myalgias.   Skin:  Negative for pallor, rash and wound.   Allergic/Immunologic: Negative for immunocompromised state.   Neurological:  Positive for headaches. Negative for dizziness, tremors, facial asymmetry, speech difficulty and weakness.   Hematological:  Negative for adenopathy. Does not bruise/bleed easily.   Psychiatric/Behavioral:   Negative for confusion and sleep disturbance. The patient is not nervous/anxious.    Objective:     Vital Signs (Most Recent):  Temp: 97.6 °F (36.4 °C) (01/24/23 0040)  Pulse: 91 (01/24/23 0040)  Resp: 16 (01/24/23 0040)  BP: (!) 146/70 (01/24/23 0040)  SpO2: 95 % (01/24/23 0049)   Vital Signs (24h Range):  Temp:  [97.6 °F (36.4 °C)-98.5 °F (36.9 °C)] 97.6 °F (36.4 °C)  Pulse:  [] 91  Resp:  [16-22] 16  SpO2:  [95 %-98 %] 95 %  BP: (141-197)/(49-91) 146/70     Weight: 52.6 kg (115 lb 15.4 oz)  Body mass index is 22.65 kg/m².    Physical Exam  Vitals and nursing note reviewed.   Constitutional:       General: She is not in acute distress.     Appearance: She is well-developed. She is ill-appearing. She is not diaphoretic.   HENT:      Head: Normocephalic.      Mouth/Throat:      Mouth: Mucous membranes are dry.      Pharynx: Oropharynx is clear.   Eyes:      General: No scleral icterus.     Conjunctiva/sclera: Conjunctivae normal.      Pupils: Pupils are equal, round, and reactive to light.   Neck:      Vascular: No JVD.   Cardiovascular:      Rate and Rhythm: Regular rhythm. Tachycardia present.      Heart sounds: Murmur heard.     No friction rub. No gallop.   Pulmonary:      Effort: Pulmonary effort is normal. No respiratory distress.      Breath sounds: Wheezing present. No rales.   Abdominal:      General: Bowel sounds are normal. There is no distension.      Palpations: Abdomen is soft.      Tenderness: There is no abdominal tenderness. There is no guarding or rebound.   Musculoskeletal:         General: No tenderness. Normal range of motion.      Cervical back: Normal range of motion and neck supple.   Lymphadenopathy:      Cervical: No cervical adenopathy.   Skin:     General: Skin is warm and dry.      Capillary Refill: Capillary refill takes less than 2 seconds.      Coloration: Skin is not pale.      Findings: No erythema or rash.   Neurological:      Mental Status: She is alert and oriented to  person, place, and time.      Cranial Nerves: No cranial nerve deficit.      Sensory: No sensory deficit.      Coordination: Coordination normal.      Deep Tendon Reflexes: Reflexes normal.   Psychiatric:         Behavior: Behavior normal.         Thought Content: Thought content normal.         Judgment: Judgment normal.         CRANIAL NERVES     CN III, IV, VI   Pupils are equal, round, and reactive to light.     Significant Labs: All pertinent labs within the past 24 hours have been reviewed.  Blood Culture: No results for input(s): LABBLOO in the last 48 hours.  CBC:   Recent Labs   Lab 01/23/23  1546   WBC 43.13*   HGB 10.7*   HCT 30.7*        CMP:   Recent Labs   Lab 01/23/23  1546 01/23/23 2014   * 133*   K 3.3* 3.5   CL 99 103   CO2 20* 20*   * 151*   BUN 13 13   CREATININE 0.7 0.7   CALCIUM 9.5 8.6*   PROT 5.8* 5.2*   ALBUMIN 3.2* 2.7*   BILITOT 1.1* 0.9   ALKPHOS 161* 145*   * 280*   * 279*   ANIONGAP 15 10     Cardiac Markers: No results for input(s): CKMB, MYOGLOBIN, BNP, TROPISTAT in the last 48 hours.  Coagulation:   Recent Labs   Lab 01/23/23  1637   INR 1.8*   APTT 42.4*     Lactic Acid:   Recent Labs   Lab 01/23/23  1637 01/23/23 2014 01/23/23  2328   LACTATE 2.4* 1.6 1.6     Urine Culture: No results for input(s): LABURIN in the last 48 hours.  Urine Studies:   Recent Labs   Lab 01/23/23  1631   COLORU Yellow   APPEARANCEUA Clear   PHUR 7.0   SPECGRAV 1.015   PROTEINUA 3+*   GLUCUA Negative   KETONESU Trace*   BILIRUBINUA 1+*   OCCULTUA Negative   NITRITE Negative   UROBILINOGEN 4.0   LEUKOCYTESUR 1+*   RBCUA 8*   WBCUA 10*   BACTERIA Few*   HYALINECASTS 0       Significant Imaging: I have reviewed all pertinent imaging results/findings within the past 24 hours.    CT abdomen pelvis:     Impression:     1. Right lower lobe consolidation with air bronchograms, concerning for pneumonia.  Superimposed scattered centrilobular ground-glass opacities and tree-in-bud  micro nodules within the inferior lingula and bilateral lower lobes suspicious for infectious or inflammatory bronchiolitis.  2. Hepatomegaly and hepatic steatosis.  3. Diverticulosis coli without evidence of diverticulitis.  4. Right mastectomy.  5. Aortic valve replacement.  Coronary artery calcification    Chest x-ray:   Impression:     1. Dense masslike infiltrate of the right lower lobe.  Correlate clinically with possible fever and/or elevated white count.  This should be followed until clear to exclude underlying suspicious pulmonary lesion.  2. Prior aortic valve replacement.    CT head:     Impression:     Cortical atrophy with periventricular deep white matter change consistent with chronic small vessel ischemic disease.     Mild paranasal sinus disease.

## 2023-01-24 NOTE — PROVIDER TRANSFER
Outside Transfer Acceptance Note / Regional Referral Center    Referring facility: [facility]   Referring provider: BAILEY BERMUDEZ  Accepting facility: [facility]  Accepting provider: BAILEY BERMUDEZ  Admitting provider: MARLIN MOHAN  Reason for transfer:  ID CONSULT  Transfer diagnosis: SEVERE SEPSIS  Transfer specialty requested: Infectious Diseases  Transfer specialty notified: yes  Transfer level: NUMBER 1-5: 2  Bed type requested: TELEMETRY   Isolation status: No active isolations   Admission class or status: IP- Inpatient      Narrative     The patient is a 78 y/o female with PMH of combined heart failure, CAD, HTN, hypothyroidism, breast cancer and GERD. She presented with headache, dizziness, and photophobia which have been present since last Wednesday. No fevers reported. Work up in the ER was concerning for severe sepsis due to PNA. The patient had also been taking several grams of tylenol over the past few days. Transaminases were elevated as well. She was started on vancomycin, azithromycin, and zosyn. The patient was also started on NAC. Patient is being transferred to [facility] as a family request by Dr. Burton, patient's daughter in law. There is concern for meningitis given the headache and that the patient was exposed to Dr. Burton who had had strep erysipelas a few days ago, and also with the high leukocytosis of 43 K. Initial physical exam did not show any signs concerning for meningitis. Dr. Burton discussed the case with ID at [facility], Dr. Monique, who will consult on the patient in the am. Dr. Monique requesting the following be done:    Vancomycin 1500 initially  Rocephin 2g q12  Ampicillin 2g q4  Stret test   Procalcitonin  LP by IR; keep NPO  Blood cultures x 2 sets  Consult ID   Repeat tylenol    Please refer to Dr. Mariano's H&P from today for full admit details.       Objective     Vitals: Temp: 98.5 °F (36.9 °C) (01/23/23  1525)  Pulse: 88 (01/23/23 2100)  Resp: 20 (01/23/23 2100)  BP: (!) 149/49 (01/23/23 2100)  SpO2: 98 % (01/23/23 2100)  Recent Labs: All pertinent labs within the past 24 hours have been reviewed.  Recent imaging:    Airway:     Vent settings:     IV access:        Peripheral IV - Single Lumen 01/23/23 1546 20 G Left Antecubital (Active)   Site Assessment Clean;Dry;Intact 01/23/23 1547            Peripheral IV - Single Lumen 01/23/23 1854 22 G Left;Posterior Hand (Active)   Site Assessment Clean;Dry;Intact 01/23/23 1854     Infusions:   Allergies: Review of patient's allergies indicates:  No Known Allergies   NPO: No    Anticoagulation:   Anticoagulants       Ordered     Route Frequency Start Stop    01/23/23 1740  enoxaparin  (VTE Pharmacological Prophylaxis Orders - High Risk)         SubQ Daily 01/24/23 1700 --             Instructions      [facility]  Admit to Hospital Medicine  Upon patient arrival to floor, please contact Hospital Medicine on call.

## 2023-01-24 NOTE — PLAN OF CARE
01/24/23 0654   Final Note   Assessment Type Final Discharge Note   Anticipated Discharge Disposition Short Term     Per notes patient transferred from ER to [facility] for upgrade in care.

## 2023-01-24 NOTE — ED NOTES
[facility] at bedside for transport. Pt ambulated to stretcher w/out difficulty.  Acetylcysteine continues to infuse through L hand IV. NAD noted upon departure.

## 2023-01-24 NOTE — H&P
[facility] Medicine  History & Physical    Patient Name: Vivien Burton  MRN: [id number]  Patient Class: IP- Inpatient  Admission Date: 1/23/2023  Attending Physician: Luca Mariano MD   Primary Care Provider: Ellen Robert III, MD         Patient information was obtained from patient, relative(s), past medical records and ER records.     Subjective:     Principal Problem:Severe sepsis    Chief Complaint:   Chief Complaint   Patient presents with    Headache     Pt c/o headache since last Wednesday. Pt was told by MD to come to ER to be evaluated. Pt denies dizziness, photophobia. Reports nausea when pain becomes intense. No hx of migraines. Unrelieved by tylenol and ibuprofen.         HPI: 78 yo w/ hx of HF, Hypothyroidism, CAD s/p PCI of proximal LAD stenosis with LAWRENCE x 1 on 01/07/22 , Aortic Stenosis s/p TAVR 2/2022, breast cancer presenting with headache and cough since last Wednesday. No fever, chills, sick contacts. Former smoker. Has not smoked in 20 years. No SOB. Patient states that she has been taking 4 tablets of tylenol every 4 hours from Wednesday until Sunday. Unclear what dose she was taking. Patient drinks 3 bourbon drinks per night. Patient with no headache at all at time of my exam. She describes headache as pinpiont on one side of posterior head that comes and goes. No neck pain or stiffness.       Past Medical History:   Diagnosis Date    Acute on chronic combined systolic and diastolic heart failure 2/23/2022    Anemia     Basal cell carcinoma 1999    Breast cancer     Breast cancer     Cancer     CHF (congestive heart failure)     Coronary artery disease     GERD (gastroesophageal reflux disease)     Hyperlipidemia     Hypertension     Hypothyroidism     Nonrheumatic aortic (valve) stenosis 6/5/2018    Osteoporosis 02/05/2019    S/P TAVR (transcatheter aortic valve replacement) 2/22/2022    Squamous cell carcinoma of skin 2018    Thyroid disease        Past  Surgical History:   Procedure Laterality Date    BREAST SURGERY      CARDIAC CATH COSURGEON N/A 2022    Procedure: Cardiac Cath Cosurgeon;  Surgeon: Dontae Wooten MD;  Location: [facility] CATH LAB;  Service: Cardiovascular;  Laterality: N/A;     SECTION, CLASSIC      COLONOSCOPY N/A 2018    Procedure: COLONOSCOPY;  Surgeon: Precious Castorena MD;  Location: [facility] ENDO;  Service: Endoscopy;  Laterality: N/A;    HYSTERECTOMY      LEFT HEART CATHETERIZATION Left 2022    Procedure: Left heart cath;  Surgeon: Dontae Johns MD;  Location: [facility] CATH LAB;  Service: Cardiology;  Laterality: Left;    LEFT HEART CATHETERIZATION Left 2022    Procedure: Left heart cath;  Surgeon: Dontae Johns MD;  Location: [facility] CATH LAB;  Service: Cardiology;  Laterality: Left;    MASTECTOMY Right 2000    right breast      TONSILLECTOMY, ADENOIDECTOMY      TRANSCATHETER AORTIC VALVE REPLACEMENT (TAVR) N/A 2022    Procedure: REPLACEMENT, AORTIC VALVE, TRANSCATHETER (TAVR);  Surgeon: Dontae Johns MD;  Location: [facility] CATH LAB;  Service: Cardiology;  Laterality: N/A;       Review of patient's allergies indicates:  No Known Allergies    No current facility-administered medications on file prior to encounter.     Current Outpatient Medications on File Prior to Encounter   Medication Sig    aspirin (ECOTRIN) 81 MG EC tablet Take 81 mg by mouth once daily.    cetirizine (ZYRTEC) 10 mg Cap Take by mouth.    ferrous sulfate (FEOSOL) Tab tablet Take 1 tablet by mouth daily with breakfast. 27mg    ibandronate (BONIVA) 150 mg tablet Take 1 tablet (150 mg total) by mouth every 30 days.    levothyroxine (SYNTHROID) 75 MCG tablet TAKE 1 TABLET BY MOUTH EVERY DAY    losartan (COZAAR) 25 MG tablet Take 1 tablet (25 mg total) by mouth once daily.    omeprazole (PRILOSEC) 20 MG capsule Take 20 mg by mouth once daily.    simvastatin (ZOCOR) 40 MG tablet Take 1 tablet (40 mg total) by mouth every evening.     [DISCONTINUED] clopidogreL (PLAVIX) 75 mg tablet Take 1 tablet (75 mg total) by mouth once daily. (Patient not taking: Reported on 2023)     Family History       Problem Relation (Age of Onset)    Cancer Sister, Brother    Liver cancer Sister    Melanoma Sister          Tobacco Use    Smoking status: Former     Packs/day: 1.00     Types: Cigarettes     Quit date: 2000     Years since quittin.9    Smokeless tobacco: Never    Tobacco comments:     quit 20 years ago   Substance and Sexual Activity    Alcohol use: Yes     Alcohol/week: 2.0 standard drinks     Types: 2 Shots of liquor per week     Comment: per pt 2 burbon drinks per night however none in last month    Drug use: No    Sexual activity: Not Currently     Review of Systems   All other systems reviewed and are negative.  Objective:     Vital Signs (Most Recent):  Temp: 98.5 °F (36.9 °C) (23 1525)  Pulse: 95 (23 1703)  Resp: 20 (23 1703)  BP: (!) 141/68 (23 1703)  SpO2: 96 % (23)   Vital Signs (24h Range):  Temp:  [98.2 °F (36.8 °C)-98.5 °F (36.9 °C)] 98.5 °F (36.9 °C)  Pulse:  [] 95  Resp:  [16-22] 20  SpO2:  [95 %-97 %] 96 %  BP: (141-197)/(68-91) 141/68     Weight: 49.9 kg (110 lb)  Body mass index is 21.48 kg/m².    Physical Exam     Vitals reviewed  General: NAD, Well developed, Well Nourished  Head: NC/AT  Eyes: EOMI, PABLO  Cardiovascular: Pulses intact distally, Regular Rate and rhythm  Pulmonary: Normal Respiratory Rate, No respiratory distress  Gi: Soft, Non-tender  Extremities: Warm, No edema present  Skin: Warm, dry  Neuro: Alert, Oriented x3, No focal Deficit, no neck tenderness with flexion or extension. No photophobia.  Psych: Appropriate mood and affect       Significant Labs: All pertinent labs within the past 24 hours have been reviewed.    Significant Imaging: I have reviewed all pertinent imaging results/findings within the past 24 hours.    Assessment/Plan:     * Severe sepsis  This  patient does have evidence of infective focus  My overall impression is sepsis. Vital signs were reviewed and noted in progress note.  Antibiotics given-   Antibiotics (From admission, onward)      Start     Stop Route Frequency Ordered    01/23/23 2200  vancomycin 750 mg in dextrose 5 % (D5W) 250 mL IVPB (Vial-Mate)         -- IV Every 24 hours (non-standard times) 01/23/23 1826    01/23/23 1845  azithromycin tablet 500 mg         -- Oral Daily 01/23/23 1834    01/23/23 1836  vancomycin - pharmacy to dose  (vancomycin IVPB)        See Hyperspace for full Linked Orders Report.    -- IV pharmacy to manage frequency 01/23/23 1736    01/23/23 0100  piperacillin-tazobactam (ZOSYN) 4.5 g in dextrose 5 % in water (D5W) 5 % 100 mL IVPB (MB+)         -- IV Every 8 hours (non-standard times) 01/23/23 2101          Cultures were taken-   Microbiology Results (last 7 days)       Procedure Component Value Units Date/Time    Culture, MRSA [[id number]]     Order Status: No result Specimen: MRSA source from Nares, Right     Culture, Respiratory with Gram Stain [[id number]]     Order Status: No result Specimen: Respiratory     Influenza A & B by Molecular [[id number]] Collected: 01/23/23 1646    Order Status: Completed Specimen: Nasopharyngeal Swab Updated: 01/23/23 1718     Influenza A, Molecular Negative     Influenza B, Molecular Negative     Flu A & B Source Nasal Swab    Blood culture #2 **CANNOT BE ORDERED STAT** [[id number]] Collected: 01/23/23 1648    Order Status: Sent Specimen: Blood from Peripheral, Hand, Left Updated: 01/23/23 1648    Blood culture #1 **CANNOT BE ORDERED STAT** [[id number]] Collected: 01/23/23 1644    Order Status: Sent Specimen: Blood from Peripheral, Antecubital, Left Updated: 01/23/23 1645          Latest lactate reviewed, they are-  Recent Labs   Lab 01/23/23  1637 01/23/23 2014   LACTATE 2.4* 1.6       Organ dysfunction indicated by Acute liver injury  Source- Lung    Source control Achieved by-  Abx  CAP(unknown organism)  F/U cultures sputum and blood  Urinary legionella and strep pneumo pending  Azithromycin added to cover for atypical pathogens such as legionella or mycoplasma  F/U MRSA nares- Deescalate abx if negative  COVID and Flu negative  Repeat lactate ordered    Transaminitis  Elevated in setting of severe sepsis and tylenol overdose  Continue to monitor  CT abdomen/pelvis completed-liver enlarged w/ fatty infiltration      Accidental acetaminophen overdose  Patient 24h out from last dose of tylenol- level negative  Significant transaminitis and elevated INR  Poison control consulted- Started NAC 20h protocol  Repeat LFTs at 2100 with repeat lactate and then with am draw  If LFT's become significantly elevated will reach out to GI for possible transfer if needed      Coronary artery disease  Continue ASA      Chronic combined systolic and diastolic heart failure, HFimpEF  Not in acute exacerbation  Patient given 1L IVF- Hold further fluids as patient not hypotensive unless clinically necessary      Unspecified hypothyroidism  Continue synthroid      Hypertension, onset 2012, off medications since 2016  Hold losartan for now in setting of severe sepsis  Can restart once clinical picture more clear      Hyperlipidemia  Hold statin in setting of transaminitis      GERD (gastroesophageal reflux disease)  Protonix        VTE Risk Mitigation (From admission, onward)           Ordered     enoxaparin injection 40 mg  Daily         01/23/23 1740     IP VTE HIGH RISK PATIENT  Once         01/23/23 1740     Place sequential compression device  Until discontinued         01/23/23 1740                       Luca Mariano MD  Department of Hospital Medicine   [facility], [place], operated by [facility]

## 2023-01-24 NOTE — H&P
[facility] Medicine  History & Physical    Patient Name: Vivien Burton  MRN: [id number]  Patient Class: IP- Inpatient  Admission Date: 1/23/2023  Attending Physician: Marek Escamilla MD   Primary Care Provider: Ellen Robert III, MD         Patient information was obtained from patient, past medical records and ER records.     Subjective:     Principal Problem:Sepsis    Chief Complaint: No chief complaint on file.       HPI: Vivien Burton is a 77-year-old female who presents in transfer from [facility].  Patient presented [facility] for evaluation of headache and cough.  She reports headache and cough onset approximately 1 week ago.  She endorses taking 4 tablets of Tylenol every 4 hours for least 3-5 days for headache.  She describes the headache as a stabbing and piercing sensation to the occipital region.  She denies any fever or chills.  She denies any known sick contacts or travel.  No aggravating or alleviating factors.  Previous medical history includes anemia, breast cancer, systolic/diastolic heart failure, coronary artery disease, heart murmur, TAVR, hypothyroidism, hypertension.  ER workup at outside facility:  CBC with leukocytosis of 43,000 in H&H of 10 and 30.  CMP with hypokalemia 3.3, bilirubin of 1.1, alk-phos 161, , .  Magnesium 1.2 (repleted).  Lactic acid elevated at 2.4.  Urinalysis with 8 red blood cells, 10 white blood cells, few bacteria.  INR was elevated 1.8, and sed rate was elevated 37.  Blood urine cultures are pending.  CT the head demonstrated no acute findings.  CT the abdomen and pelvis demonstrated right lower lobe air bronchograms concerning for pneumonia with hepatomegaly, diverticulosis, and aortic valve replacement.  Chest x-ray with a dense masslike suspicious lesion/ infiltrate of the right lower lobe.  Patient admitted to Hospital Medicine for treatment management.  Infectious Disease, Neurology, and  Pulmonary consultations made.  Patient will be empirically started on meningitis coverage including ampicillin, Rocephin, vancomycin.         Past Medical History:   Diagnosis Date    Acute on chronic combined systolic and diastolic heart failure 2022    Anemia     Basal cell carcinoma     Breast cancer     Breast cancer     Cancer     CHF (congestive heart failure)     Coronary artery disease     GERD (gastroesophageal reflux disease)     Hyperlipidemia     Hypertension     Hypothyroidism     Nonrheumatic aortic (valve) stenosis 2018    Osteoporosis 2019    S/P TAVR (transcatheter aortic valve replacement) 2022    Squamous cell carcinoma of skin 2018    Thyroid disease        Past Surgical History:   Procedure Laterality Date    BREAST SURGERY      CARDIAC CATH COSURGEON N/A 2022    Procedure: Cardiac Cath Cosurgeon;  Surgeon: Dontae Wooten MD;  Location: [facility] CATH LAB;  Service: Cardiovascular;  Laterality: N/A;     SECTION, CLASSIC      COLONOSCOPY N/A 2018    Procedure: COLONOSCOPY;  Surgeon: Precious Castorena MD;  Location: [facility] ENDO;  Service: Endoscopy;  Laterality: N/A;    HYSTERECTOMY      LEFT HEART CATHETERIZATION Left 2022    Procedure: Left heart cath;  Surgeon: Dontae Johns MD;  Location: [facility] CATH LAB;  Service: Cardiology;  Laterality: Left;    LEFT HEART CATHETERIZATION Left 2022    Procedure: Left heart cath;  Surgeon: Dontae Johns MD;  Location: [facility] CATH LAB;  Service: Cardiology;  Laterality: Left;    MASTECTOMY Right 2000    right breast      TONSILLECTOMY, ADENOIDECTOMY      TRANSCATHETER AORTIC VALVE REPLACEMENT (TAVR) N/A 2022    Procedure: REPLACEMENT, AORTIC VALVE, TRANSCATHETER (TAVR);  Surgeon: Dontae Johns MD;  Location: [facility] CATH LAB;  Service: Cardiology;  Laterality: N/A;       Review of patient's allergies indicates:  No Known Allergies    Current Facility-Administered Medications on File Prior to  Encounter   Medication    [COMPLETED] acetylcysteine 20% (200 mg/ml) (ACETADOTE) 7,500 mg in dextrose 5 % (D5W) 250 mL infusion    [COMPLETED] iohexoL (OMNIPAQUE 350) injection 75 mL    [COMPLETED] ketorolac injection 15 mg    [COMPLETED] magnesium sulfate in dextrose IVPB (premix) 1 g    [COMPLETED] magnesium sulfate in dextrose IVPB (premix) 1 g    [COMPLETED] piperacillin-tazobactam (ZOSYN) 3.375 g in dextrose 5 % in water (D5W) 5 % 50 mL IVPB (MB+)    [COMPLETED] sodium chloride 0.9% bolus 1,000 mL 1,000 mL    [COMPLETED] acetylcysteine 20% (200 mg/ml) (ACETADOTE) 2,500 mg in dextrose 5 % (D5W) 250 mL infusion    [DISCONTINUED] acetylcysteine 20% (200 mg/ml) (ACETADOTE) 5,000 mg in dextrose 5 % (D5W) 1,000 mL infusion    [DISCONTINUED] azithromycin tablet 500 mg    [DISCONTINUED] dextrose 50% injection 12.5 g    [DISCONTINUED] dextrose 50% injection 25 g    [DISCONTINUED] enoxaparin injection 40 mg    [DISCONTINUED] glucagon (human recombinant) injection 1 mg    [DISCONTINUED] glucose chewable tablet 16 g    [DISCONTINUED] glucose chewable tablet 24 g    [DISCONTINUED] levothyroxine tablet 75 mcg    [DISCONTINUED] naloxone 0.4 mg/mL injection 0.02 mg    [DISCONTINUED] piperacillin-tazobactam (ZOSYN) 4.5 g in dextrose 5 % in water (D5W) 5 % 100 mL IVPB (MB+)    [DISCONTINUED] piperacillin-tazobactam injection 3.375 g    [DISCONTINUED] potassium chloride SA CR tablet 40 mEq    [DISCONTINUED] sodium chloride 0.9% bolus 500 mL 500 mL    [DISCONTINUED] sodium chloride 0.9% flush 10 mL    [DISCONTINUED] vancomycin - pharmacy to dose    [DISCONTINUED] vancomycin 750 mg in dextrose 5 % (D5W) 250 mL IVPB (Vial-Mate)     Current Outpatient Medications on File Prior to Encounter   Medication Sig    aspirin (ECOTRIN) 81 MG EC tablet Take 81 mg by mouth once daily.    cetirizine (ZYRTEC) 10 mg Cap Take by mouth.    ferrous sulfate (FEOSOL) Tab tablet Take 1 tablet by mouth daily with breakfast. 27mg    ibandronate (BONIVA)  150 mg tablet Take 1 tablet (150 mg total) by mouth every 30 days.    levothyroxine (SYNTHROID) 75 MCG tablet TAKE 1 TABLET BY MOUTH EVERY DAY    losartan (COZAAR) 25 MG tablet Take 1 tablet (25 mg total) by mouth once daily.    omeprazole (PRILOSEC) 20 MG capsule Take 20 mg by mouth once daily.    simvastatin (ZOCOR) 40 MG tablet Take 1 tablet (40 mg total) by mouth every evening.     Family History       Problem Relation (Age of Onset)    Cancer Sister, Brother    Liver cancer Sister    Melanoma Sister          Tobacco Use    Smoking status: Former     Packs/day: 1.00     Types: Cigarettes     Quit date: 2000     Years since quittin.9    Smokeless tobacco: Never    Tobacco comments:     quit 20 years ago   Substance and Sexual Activity    Alcohol use: Yes     Alcohol/week: 2.0 standard drinks     Types: 2 Shots of liquor per week     Comment: per pt 2 burbon drinks per night however none in last month    Drug use: No    Sexual activity: Not Currently     Review of Systems   Constitutional:  Positive for activity change and appetite change. Negative for chills, diaphoresis and fever.   HENT:  Negative for congestion, nosebleeds and tinnitus.    Eyes:  Negative for photophobia and visual disturbance.   Respiratory:  Positive for cough, shortness of breath and wheezing. Negative for chest tightness.    Cardiovascular:  Negative for chest pain, palpitations and leg swelling.   Gastrointestinal:  Negative for abdominal distention, abdominal pain, constipation, diarrhea, nausea and vomiting.   Endocrine: Negative for cold intolerance and heat intolerance.   Genitourinary:  Negative for difficulty urinating, dysuria, frequency, hematuria and urgency.   Musculoskeletal:  Negative for arthralgias, back pain and myalgias.   Skin:  Negative for pallor, rash and wound.   Allergic/Immunologic: Negative for immunocompromised state.   Neurological:  Positive for headaches. Negative for dizziness, tremors, facial  asymmetry, speech difficulty and weakness.   Hematological:  Negative for adenopathy. Does not bruise/bleed easily.   Psychiatric/Behavioral:  Negative for confusion and sleep disturbance. The patient is not nervous/anxious.    Objective:     Vital Signs (Most Recent):  Temp: 97.6 °F (36.4 °C) (01/24/23 0040)  Pulse: 91 (01/24/23 0040)  Resp: 16 (01/24/23 0040)  BP: (!) 146/70 (01/24/23 0040)  SpO2: 95 % (01/24/23 0049)   Vital Signs (24h Range):  Temp:  [97.6 °F (36.4 °C)-98.5 °F (36.9 °C)] 97.6 °F (36.4 °C)  Pulse:  [] 91  Resp:  [16-22] 16  SpO2:  [95 %-98 %] 95 %  BP: (141-197)/(49-91) 146/70     Weight: 52.6 kg (115 lb 15.4 oz)  Body mass index is 22.65 kg/m².    Physical Exam  Vitals and nursing note reviewed.   Constitutional:       General: She is not in acute distress.     Appearance: She is well-developed. She is ill-appearing. She is not diaphoretic.   HENT:      Head: Normocephalic.      Mouth/Throat:      Mouth: Mucous membranes are dry.      Pharynx: Oropharynx is clear.   Eyes:      General: No scleral icterus.     Conjunctiva/sclera: Conjunctivae normal.      Pupils: Pupils are equal, round, and reactive to light.   Neck:      Vascular: No JVD.   Cardiovascular:      Rate and Rhythm: Regular rhythm. Tachycardia present.      Heart sounds: Murmur heard.     No friction rub. No gallop.   Pulmonary:      Effort: Pulmonary effort is normal. No respiratory distress.      Breath sounds: Wheezing present. No rales.   Abdominal:      General: Bowel sounds are normal. There is no distension.      Palpations: Abdomen is soft.      Tenderness: There is no abdominal tenderness. There is no guarding or rebound.   Musculoskeletal:         General: No tenderness. Normal range of motion.      Cervical back: Normal range of motion and neck supple.   Lymphadenopathy:      Cervical: No cervical adenopathy.   Skin:     General: Skin is warm and dry.      Capillary Refill: Capillary refill takes less than 2  seconds.      Coloration: Skin is not pale.      Findings: No erythema or rash.   Neurological:      Mental Status: She is alert and oriented to person, place, and time.      Cranial Nerves: No cranial nerve deficit.      Sensory: No sensory deficit.      Coordination: Coordination normal.      Deep Tendon Reflexes: Reflexes normal.   Psychiatric:         Behavior: Behavior normal.         Thought Content: Thought content normal.         Judgment: Judgment normal.         CRANIAL NERVES     CN III, IV, VI   Pupils are equal, round, and reactive to light.     Significant Labs: All pertinent labs within the past 24 hours have been reviewed.  Blood Culture: No results for input(s): LABBLOO in the last 48 hours.  CBC:   Recent Labs   Lab 01/23/23  1546   WBC 43.13*   HGB 10.7*   HCT 30.7*        CMP:   Recent Labs   Lab 01/23/23  1546 01/23/23 2014   * 133*   K 3.3* 3.5   CL 99 103   CO2 20* 20*   * 151*   BUN 13 13   CREATININE 0.7 0.7   CALCIUM 9.5 8.6*   PROT 5.8* 5.2*   ALBUMIN 3.2* 2.7*   BILITOT 1.1* 0.9   ALKPHOS 161* 145*   * 280*   * 279*   ANIONGAP 15 10     Cardiac Markers: No results for input(s): CKMB, MYOGLOBIN, BNP, TROPISTAT in the last 48 hours.  Coagulation:   Recent Labs   Lab 01/23/23  1637   INR 1.8*   APTT 42.4*     Lactic Acid:   Recent Labs   Lab 01/23/23  1637 01/23/23 2014 01/23/23  2328   LACTATE 2.4* 1.6 1.6     Urine Culture: No results for input(s): LABURIN in the last 48 hours.  Urine Studies:   Recent Labs   Lab 01/23/23  1631   COLORU Yellow   APPEARANCEUA Clear   PHUR 7.0   SPECGRAV 1.015   PROTEINUA 3+*   GLUCUA Negative   KETONESU Trace*   BILIRUBINUA 1+*   OCCULTUA Negative   NITRITE Negative   UROBILINOGEN 4.0   LEUKOCYTESUR 1+*   RBCUA 8*   WBCUA 10*   BACTERIA Few*   HYALINECASTS 0       Significant Imaging: I have reviewed all pertinent imaging results/findings within the past 24 hours.    CT abdomen pelvis:     Impression:     1. Right lower  lobe consolidation with air bronchograms, concerning for pneumonia.  Superimposed scattered centrilobular ground-glass opacities and tree-in-bud micro nodules within the inferior lingula and bilateral lower lobes suspicious for infectious or inflammatory bronchiolitis.  2. Hepatomegaly and hepatic steatosis.  3. Diverticulosis coli without evidence of diverticulitis.  4. Right mastectomy.  5. Aortic valve replacement.  Coronary artery calcification    Chest x-ray:   Impression:     1. Dense masslike infiltrate of the right lower lobe.  Correlate clinically with possible fever and/or elevated white count.  This should be followed until clear to exclude underlying suspicious pulmonary lesion.  2. Prior aortic valve replacement.    CT head:     Impression:     Cortical atrophy with periventricular deep white matter change consistent with chronic small vessel ischemic disease.     Mild paranasal sinus disease.        Assessment/Plan:     * Sepsis  This patient does have evidence of infective focus  My overall impression is sepsis. Vital signs were reviewed and noted in progress note.  Antibiotics given-   Antibiotics (From admission, onward)      Start     Stop Route Frequency Ordered    01/24/23 0030  cefTRIAXone (ROCEPHIN) 2 g in dextrose 5 % in water (D5W) 5 % 50 mL IVPB (MB+)         -- IV Every 12 hours (non-standard times) 01/23/23 2327 01/24/23 0030  ampicillin (OMNIPEN) 2 g in sodium chloride 0.9 % 100 mL IVPB (MB+)         -- IV Every 4 hours (non-standard times) 01/23/23 2327 01/24/23 0026  vancomycin - pharmacy to dose  (vancomycin IVPB)        See Hyperspace for full Linked Orders Report.    -- IV pharmacy to manage frequency 01/23/23 2327 01/24/23 0000  vancomycin 1,250 mg in dextrose 5 % (D5W) 250 mL IVPB (Vial-Mate)         -- IV Once 01/23/23 2336          Cultures were taken-   Microbiology Results (last 7 days)       Procedure Component Value Units Date/Time    Group A Strep, Molecular  [[id number]] Collected: 01/24/23 0130    Order Status: No result Updated: 01/24/23 0146    Throat Screen, Rapid [[id number]] Collected: 01/24/23 0130    Order Status: Sent Specimen: Throat           Latest lactate reviewed, they are-  Recent Labs   Lab 01/23/23  1637 01/23/23 2014 01/23/23  2328   LACTATE 2.4* 1.6 1.6       Organ dysfunction indicated by Encephalopathy  and Acute respiratory failure  Source- pulmonary    Source control Achieved by- vancomycin, Rocephin, ampicillin      Pneumonia  Acute problem   Vancomycin, Rocephin, ampicillin   Chest x-ray concerning for pulmonary lesion  Appreciate recommendations from pulmonology      Transaminitis  Acute on chronic problem   Trend LFTs      Acetaminophen overdose  Acute problem   See the note of level stable   Patient given 3 doses of a Acetadote.  Follow-up Tylenol levels      Chronic combined systolic and diastolic heart failure, HFimpEF  Chronic problem   Patient is identified as having Combined Systolic and Diastolic heart failure that is Chronic. CHF is currently controlled. Latest ECHO performed and demonstrates- Results for orders placed during the hospital encounter of 03/29/22    Echo    Interpretation Summary  · The left ventricle is normal in size with eccentric hypertrophy and low normal systolic function. The estimated ejection fraction is 50%.  · The quantitatively derived ejection fraction is 48%.  · Normal right ventricular size with normal right ventricular systolic function.  · Grade II left ventricular diastolic dysfunction.  · Moderate left atrial enlargement.  · There is a 26 mm Evolut transcutaneously-placed aortic bioprosthesis present. There is trivial paravalvular aortic insufficiency present.  · The aortic valve mean gradient is 9 mmHg with a dimensionless index of 0.46.  · The estimated PA systolic pressure is 24 mmHg.  · Normal central venous pressure (3 mmHg).  . Continue Beta Blocker and monitor clinical status closely. Monitor on  telemetry. Patient is off CHF pathway.  Monitor strict Is&Os and daily weights.  Place on fluid restriction of 1.5 L. Continue to stress to patient importance of self efficacy and  on diet for CHF. Last BNP reviewed- and noted below No results for input(s): BNP, BNPTRIAGEBLO in the last 168 hours..          S/P TAVR (transcatheter aortic valve replacement), 26 mm  Chronic problem   Stable      Headache(784.0)  Acute problem  CT the head unremarkable   Appreciate recommendations from Neurology   Lumbar puncture via Interventional Radiology per ID request      Unspecified essential hypertension  Chronic problem   Chronic, controlled.  Latest blood pressure and vitals reviewed-   Temp:  [97.6 °F (36.4 °C)-98.5 °F (36.9 °C)]   Pulse:  []   Resp:  [16-22]   BP: (141-197)/(49-91)   SpO2:  [95 %-98 %] .   Home meds for hypertension were reviewed and noted below.   Hypertension Medications               losartan (COZAAR) 25 MG tablet Take 1 tablet (25 mg total) by mouth once daily.            While in the hospital, will manage blood pressure as follows; Continue home antihypertensive regimen    Will utilize p.r.n. blood pressure medication only if patient's blood pressure greater than  180/110 and she develops symptoms such as worsening chest pain or shortness of breath.        Unspecified hypothyroidism  Chronic problem   Continue levothyroxine      GERD (gastroesophageal reflux disease)  Chronic problem   Stable        VTE Risk Mitigation (From admission, onward)           Ordered     Place LAMBERT hose  Until discontinued         01/23/23 2322     IP VTE HIGH RISK PATIENT  Once         01/23/23 2322     Place sequential compression device  Until discontinued         01/23/23 2322                       Seun Valdez NP  Department of Hospital Medicine   [facility]

## 2023-01-24 NOTE — CONSULTS
"Pulmonary/Critical Care Consult      PATIENT NAME: Vivien Burton  MRN: [id number]  TODAY'S DATE: 2023  4:12 PM  ADMIT DATE: 2023  AGE: 77 y.o. : 1945    CONSULT REQUESTED BY: Marek Escamilla MD    REASON FOR CONSULT:   Pneumonia    HISTORY OF PRESENT ILLNESS   Vivien Burton is a 77 y.o. female with a PMH of breast cancer s/p mastectomy, HFrEF (48%), TAVR, CAD, hypothyroidism, and HTN on whom we have been consulted for RLL consolidation in the setting of pneumonia.      SMOKING HISTORY  Quit at age 42 (25 years ago).  Smoked 1/2 PPD x 30 years    REVIEW OF SYSTEMS  GENERAL: Feeling well. No fevers, chills, or night sweats.  EYES: Vision is good.  ENT: No sinusitis or pharyngitis.   HEART: No chest pain or palpitations.  LUNGS: No cough, sputum, or wheezing.  GI: No abdominal pain, nausea, vomiting, or diarrhea.  : No dysuria, urgency, or frequency.  SKIN: No lesions or rashes.  MUSCULOSKELETAL: No joint pain or myalgias.  NEURO: "Icepick headache", improved.  LYMPH: No edema or adenopathy.  PSYCH: No anxiety or depression.  ENDO: No unexpected weight change.    ALLERGIES  Review of patient's allergies indicates:  No Known Allergies    INPATIENT SCHEDULED MEDICATIONS   acetylcysteine  100 mg/kg Intravenous Once    aspirin  81 mg Oral Daily    azithromycin  500 mg Intravenous Q24H    cefTRIAXone (ROCEPHIN) IVPB  1 g Intravenous Q24H    enoxaparin  40 mg Subcutaneous Daily    folic acid  1 mg Oral Daily    [START ON 2023] levothyroxine  75 mcg Oral Before breakfast    multivitamin  1 tablet Oral Daily    pantoprazole  40 mg Oral Daily    potassium chloride  10 mEq Intravenous Q1H    thiamine  100 mg Oral Daily         MEDICAL AND SURGICAL HISTORY  Past Medical History:   Diagnosis Date    Acute on chronic combined systolic and diastolic heart failure 2022    Anemia     Basal cell carcinoma     Breast cancer     Breast cancer     Cancer     CHF (congestive heart failure) "     Coronary artery disease     GERD (gastroesophageal reflux disease)     Hyperlipidemia     Hypertension     Hypothyroidism     Nonrheumatic aortic (valve) stenosis 2018    Osteoporosis 2019    S/P TAVR (transcatheter aortic valve replacement) 2022    Squamous cell carcinoma of skin 2018    Thyroid disease      Past Surgical History:   Procedure Laterality Date    BREAST SURGERY      CARDIAC CATH COSURGEON N/A 2022    Procedure: Cardiac Cath Cosurgeon;  Surgeon: Dontae Wooten MD;  Location: [facility] CATH LAB;  Service: Cardiovascular;  Laterality: N/A;     SECTION, CLASSIC      COLONOSCOPY N/A 2018    Procedure: COLONOSCOPY;  Surgeon: Precious Castorena MD;  Location: [facility] ENDO;  Service: Endoscopy;  Laterality: N/A;    HYSTERECTOMY      LEFT HEART CATHETERIZATION Left 2022    Procedure: Left heart cath;  Surgeon: Dontae Johns MD;  Location: [facility] CATH LAB;  Service: Cardiology;  Laterality: Left;    LEFT HEART CATHETERIZATION Left 2022    Procedure: Left heart cath;  Surgeon: Dontae Johns MD;  Location: [facility] CATH LAB;  Service: Cardiology;  Laterality: Left;    MASTECTOMY Right 2000    right breast      TONSILLECTOMY, ADENOIDECTOMY      TRANSCATHETER AORTIC VALVE REPLACEMENT (TAVR) N/A 2022    Procedure: REPLACEMENT, AORTIC VALVE, TRANSCATHETER (TAVR);  Surgeon: Dontae Johns MD;  Location: [facility] CATH LAB;  Service: Cardiology;  Laterality: N/A;       ALCOHOL, TOBACCO AND DRUG USE  Social History     Tobacco Use   Smoking Status Former    Packs/day: 1.00    Types: Cigarettes    Quit date: 2000    Years since quittin.9   Smokeless Tobacco Never   Tobacco Comments    quit 20 years ago     Social History     Substance and Sexual Activity   Alcohol Use Yes    Alcohol/week: 2.0 standard drinks    Types: 2 Shots of liquor per week    Comment: per pt 2 burbon drinks per night however none in last month     Social History     Substance and Sexual  Activity   Drug Use No       FAMILY HISTORY  Family History   Problem Relation Age of Onset    Cancer Sister     Liver cancer Sister     Melanoma Sister     Cancer Brother     Breast cancer Neg Hx     Psoriasis Neg Hx     Lupus Neg Hx     Eczema Neg Hx        VITAL SIGNS (MOST RECENT)  Temp: 97.8 °F (36.6 °C) (01/24/23 0745)  Pulse: 85 (01/24/23 1424)  Resp: 18 (01/24/23 1424)  BP: (!) 197/80 (01/24/23 0745)  SpO2: 98 % (01/24/23 1424)    INTAKE AND OUTPUT (LAST 24 HOURS):  Intake/Output Summary (Last 24 hours) at 1/24/2023 1612  Last data filed at 1/24/2023 1200  Gross per 24 hour   Intake 2368.44 ml   Output 0 ml   Net 2368.44 ml       WEIGHT  Wt Readings from Last 1 Encounters:   01/24/23 53.8 kg (118 lb 9.7 oz)       PHYSICAL EXAM  GENERAL: A&O. NAD.  HEENT: Extraocular movements intact. Pharynx moist.  NECK: Supple. No JVD or hepatojugular reflux.  HEART: Regular rate and normal rhythm. No murmur or gallop auscultated.  LUNGS: Mild crackles from midlung to bases b/l. Right-sided squeak.  ABDOMEN: Soft, non-tender, non-distended, no masses palpated.  EXTREMITIES: Normal muscle tone and joint movement, no cyanosis or clubbing.   LYMPHATICS: No adenopathy palpated, no edema.  SKIN: Dry, intact, no lesions.   NEURO: No gross deficit.  PSYCH: Appropriate affect    ACUTE PHASE REACTANT (LAST 24 HOURS)  No results for input(s): FERRITIN, CRP, LDH, DDIMER in the last 24 hours.    CBC LAST (LAST 24 HOURS)  Recent Labs   Lab 01/24/23  0445   WBC 33.73*   RBC 3.16*   HGB 9.3*   HCT 27.8*   MCV 88   MCH 29.4   MCHC 33.5   RDW 13.7      MPV 9.6   GRAN 77.0*   LYMPH 12.0*  CANCELED   MONO 6.0  CANCELED   BASO CANCELED   NRBC 0       CHEMISTRY LAST (LAST 24 HOURS)  Recent Labs   Lab 01/24/23  0445   *   K 2.4*      CO2 19*   ANIONGAP 11   BUN 11   CREATININE 0.8   *   CALCIUM 7.7*   MG 1.9   ALBUMIN 2.1*   PROT 4.1*   ALKPHOS 129   *   *   BILITOT 0.7       COAGULATION LAST (LAST 24  HOURS)  Recent Labs   Lab 01/23/23  1637 01/24/23  0720   INR 1.8* 1.8*   APTT 42.4*  --        CARDIAC PROFILE (LAST 24 HOURS)  Recent Labs   Lab 01/23/23  1637   TROPONINI <0.006       LAST 7 DAYS MICROBIOLOGY   Microbiology Results (last 7 days)       Procedure Component Value Units Date/Time    Blood culture [[id number]]     Order Status: Sent Specimen: Blood     Blood culture [[id number]]     Order Status: Sent Specimen: Blood     CSF culture [[id number]] Collected: 01/24/23 1136    Order Status: Completed Specimen: CSF (Spinal Fluid) from CSF Tap, Tube 2 Updated: 01/24/23 1449     Gram Stain Result No WBC's      No epithelial cells      No organisms seen      01/24/2023  14:48 TN3    Culture, Respiratory with Gram Stain [[id number]]     Order Status: No result Specimen: Respiratory from Sputum, Expectorated     Culture, Respiratory with Gram Stain [[id number]]     Order Status: No result Specimen: Respiratory from Sputum     Culture, MRSA [[id number]]     Order Status: No result Specimen: MRSA source     Group A Strep, Molecular [[id number]] Collected: 01/24/23 0130    Order Status: Completed Updated: 01/24/23 0213     Group A Strep, Molecular Negative     Comment: Arcanobacterium haemolyticum and Beta Streptococcus group C   and G will not be detected by this test method.  Please order   Throat Culture ([id number]) if suspected.         Throat Screen, Rapid [[id number]] Collected: 01/24/23 0130    Order Status: Sent Specimen: Throat             MOST RECENT IMAGING  MRI Brain W WO Contrast  Narrative: EXAMINATION:  MRI BRAIN W WO CONTRAST    CLINICAL HISTORY:  Headache. R/o structural abnormalities;.    TECHNIQUE:  Multiplanar multisequence MR imaging of the brain was performed before and after the administration of 5 mL Gadavist  intravenous contrast.    COMPARISON:  CT head 01/23/2023    FINDINGS:  Prominence of the ventricles and sulci compatible with mild generalized cerebral volume loss.  No  hydrocephalus.    Scattered foci of T2/FLAIR hyperintense signal within the supratentorial white matter, nonspecific and may reflect sequelae of chronic microvascular ischemic change.    Diffusion-weighted images demonstrate no evidence of an acute infarct.   Susceptibility weighted images demonstrate no evidence of acute or chronic hemorrhage. No mass effect or midline shift.    No abnormal enhancing parenchymal or meningeal lesion.    Normal vascular flow voids are preserved.    Diffusely heterogeneous marrow signal and enhancement throughout the calvarium.  No obvious cortical destruction or extraosseous extension.  Mild scattered paranasal sinus mucosal thickening.  Small air-fluid levels within bilateral maxillary and left sphenoid sinuses.  Trace dependent left mastoid fluid.  Orbits are unremarkable.  Impression: 1. No evidence of acute intracranial abnormality.  2. Diffusely heterogeneous marrow signal and enhancement throughout the calvarium without obvious cortical destruction or extraosseous extension, nonspecific but suspicious for a hematopoietic disorder, to include a malignant process, such as lymphoma/leukemia or multiple myeloma.  Metastasis less likely, given negative CT evaluation of the chest, abdomen and pelvis.  3. Pansinusitis.  This report was flagged in Epic as abnormal.    Electronically signed by: Marek Moreno  Date:    01/24/2023  Time:    13:32  FL Lumbar Puncture (xpd)  Narrative: EXAMINATION:  FL LUMBAR PUNCTURE DIAGNOSTIC WITH IMAGING    CLINICAL HISTORY:  HA and leukocytosis;    TECHNIQUE:  Lumbar puncture under fluoroscopy performed after obtaining informed consent and using sterile technique 1% local xylocaine anesthetic and a 22 gauge spinal needle.  Procedure including risks, alternatives, precautions were discussed with the patient who consented.  Pre-procedure time-out was performed.  With the patient supine on the x-ray table the L3-L4 disc space level was marked, prepped,  draped, anesthetized with 5 cc local% xylocaine anesthetic.  A 22 gauge needle was advanced into the spinal canal no fluoroscopic observation.  Clear cerebral spinal fluid flowed spontaneously from the needle.  6 cc was obtained.  The needle was removed, hemostasis achieved and site cleansed and bandage.  Specimen sent to lab for evaluation.  The patient tolerated procedure well without immediate complication.  Blood loss minimal.  Fluoroscopy time 1.5 minutes.  Patient was returned to her room following procedure    COMPARISON:  None    FINDINGS:  Lumbar puncture under fluoroscopy  Impression: Puncture under fluoroscopy.  6 cc of clear CSF obtained and sent to the lab for evaluation.    Electronically signed by: Chantel Alarcon MD  Date:    01/24/2023  Time:    12:13      CURRENT VISIT EKG  No results found for this visit on 01/23/23.    ECHOCARDIOGRAM RESULTS  Results for orders placed during the hospital encounter of 03/29/22    Echo    Interpretation Summary  · The left ventricle is normal in size with eccentric hypertrophy and low normal systolic function. The estimated ejection fraction is 50%.  · The quantitatively derived ejection fraction is 48%.  · Normal right ventricular size with normal right ventricular systolic function.  · Grade II left ventricular diastolic dysfunction.  · Moderate left atrial enlargement.  · There is a 26 mm Evolut transcutaneously-placed aortic bioprosthesis present. There is trivial paravalvular aortic insufficiency present.  · The aortic valve mean gradient is 9 mmHg with a dimensionless index of 0.46.  · The estimated PA systolic pressure is 24 mmHg.  · Normal central venous pressure (3 mmHg).        RESPIRATORY SUPPORT          LAST ARTERIAL BLOOD GAS  ABG  No results for input(s): PH, PO2, PCO2, HCO3, BE in the last 168 hours.    IMPRESSION AND PLAN  Vivien Burton is a 77 y.o. female with a PMH of breast cancer s/p mastectomy, HFrEF (48%), TAVR, CAD, hypothyroidism,  and HTN on whom we have been consulted for RLL consolidation in the setting of pneumonia.    #CAP  #Severe sepsis  #RLL consolidation and mediastinal LAD likely secondary to pneumonia  Procal extremely elevated. GAS (-) on rapid test. Flu (-). COVID (-).  - continue Abx as per ID   - tailor as per culture data  - f/u respiratory and blood cultures  - f/u MRSA culture  - no need for repeat imaging of chest unless concern for malignancy after clinical recovery    Pulmonary & Critical Care Medicine will sign off at this time.   Please call with any further questions or concerns.    César Cartwright MD  Formerly [facility], later [facility]  Department of Pulmonology  Date of Service: 01/24/2023  4:12 PM

## 2023-01-24 NOTE — ASSESSMENT & PLAN NOTE
Patient endorses taking four tablets of Tylenol every four ours for three to five days.  -GI consulted  -Monitor LFTs, albumin, and INR  -Continue NAC

## 2023-01-24 NOTE — ASSESSMENT & PLAN NOTE
This patient does have evidence of infective focus  My overall impression is sepsis. Vital signs were reviewed and noted in progress note.  Antibiotics given-   Antibiotics (From admission, onward)    Start     Stop Route Frequency Ordered    01/23/23 2200  vancomycin 750 mg in dextrose 5 % (D5W) 250 mL IVPB (Vial-Mate)         -- IV Every 24 hours (non-standard times) 01/23/23 1826    01/23/23 1845  azithromycin tablet 500 mg         -- Oral Daily 01/23/23 1834    01/23/23 1836  vancomycin - pharmacy to dose  (vancomycin IVPB)        See Hyperspace for full Linked Orders Report.    -- IV pharmacy to manage frequency 01/23/23 1736        Cultures were taken-   Microbiology Results (last 7 days)     Procedure Component Value Units Date/Time    Culture, MRSA [[id number]]     Order Status: No result Specimen: MRSA source from Nares, Right     Culture, Respiratory with Gram Stain [[id number]]     Order Status: No result Specimen: Respiratory     Influenza A & B by Molecular [[id number]] Collected: 01/23/23 1646    Order Status: Completed Specimen: Nasopharyngeal Swab Updated: 01/23/23 1718     Influenza A, Molecular Negative     Influenza B, Molecular Negative     Flu A & B Source Nasal Swab    Blood culture #2 **CANNOT BE ORDERED STAT** [[id number]] Collected: 01/23/23 1648    Order Status: Sent Specimen: Blood from Peripheral, Hand, Left Updated: 01/23/23 1648    Blood culture #1 **CANNOT BE ORDERED STAT** [[id number]] Collected: 01/23/23 1644    Order Status: Sent Specimen: Blood from Peripheral, Antecubital, Left Updated: 01/23/23 1645        Latest lactate reviewed, they are-  Recent Labs   Lab 01/23/23  1637   LACTATE 2.4*       Organ dysfunction indicated by Acute liver injury  Source- Lung    Source control Achieved by- Abx  CAP(unknown organism)  F/U cultures sputum and blood  Urinary legionella and strep pneumo pending  Azithromycin added to cover for atypical pathogens such as legionella or mycoplasma  F/U MRSA  nares- Deescalate abx if negative  COVID and Flu negative  Repeat lactate ordered

## 2023-01-24 NOTE — ASSESSMENT & PLAN NOTE
Patient 24h out from last dose of tylenol- level negative  Significant transaminitis and elevated INR  Poison control consulted- Started NAC 20h protocol  Repeat LFTs at 2100 with repeat lactate and then with am draw  If LFT's become significantly elevated will reach out to GI for possible transfer if needed

## 2023-01-24 NOTE — PT/OT/SLP PROGRESS
Occupational Therapy      Patient Name:  Vivien Burton   MRN:  [id number]    Patient not seen today secondary to Other (Comment) (Pt out for tests.). Will follow-up.    1/24/2023

## 2023-01-24 NOTE — PROGRESS NOTES
Lumbar puncture completed at L3 L4 level without  complication. Patient tolerated procedure well. Returned to her room. Full report to be dictated.

## 2023-01-24 NOTE — CONSULTS
Consult Note  Infectious Disease    Reason for Consult:  PNA/rule out meningitis     HPI: Vivien Burton is a 77 y.o. female very nice lady, former smoker, with past medical history of CAD status post TAVR, hypertension, hyperlipidemia, right breast cancer status post mastectomy, GERD, hypothyroidism, who presents with severe, 10/10, stabbing in nature occipital right headache for the last week.  She was taking 4 tablets of Tylenol every 4 hours for the last couple of days with partial improvement of symptoms.  She also has had persistent nonproductive cough, no SOB.  Denies fever or chills, no nausea or vomit, no abdominal pain, no dysuria increased urinary frequency, no change in bowel movement.  She states she has baseline macular degeneration, has near vision impairment.    She presented to [facility] yesterday, Lab stairs significant for leukemoid reaction on CBC 67120 WBC, left shift 80%, bands 8%, H&H 10.7/30.7, platelet count 214.  ESR 37   INR 1.8   Hyponatremia 133, normal kidney function   LFTs; /, transaminitis likely from acute Tylenol toxicity   Procalcitonin 143.9, extremely high   Tylenol level less than 3   U tox negative   Group a strep throat negative   Flu a, B, COVID negative   UA with pyuria 10, few bacteria, asymptomatic-negative for UTI   CT chest revealed right lower lobe pneumonia with air bronchogram.  Scattered ground-glass opacities and tree-in-bud micro nodules within the inferior lingula and bilateral lower lobes suspicious for infectious/inflammatory bronchiolitis.  Hepatomegaly, hepatic steatosis.  Aortic valve replacement.  Right mastectomy.    Patient admitted for severe sepsis likely secondary to multifocal pneumonia, rule out meningitis.    She was empirically treated with Zosyn at [place], switch to vancomycin/ceftriaxone/ampicillin on arrival to [facility].    ID consult for severe sepsis.    Review of patient's allergies indicates:  No Known  Allergies  Past Medical History:   Diagnosis Date    Acute on chronic combined systolic and diastolic heart failure 2022    Anemia     Basal cell carcinoma     Breast cancer     Breast cancer     Cancer     CHF (congestive heart failure)     Coronary artery disease     GERD (gastroesophageal reflux disease)     Hyperlipidemia     Hypertension     Hypothyroidism     Nonrheumatic aortic (valve) stenosis 2018    Osteoporosis 2019    S/P TAVR (transcatheter aortic valve replacement) 2022    Squamous cell carcinoma of skin     Thyroid disease      Past Surgical History:   Procedure Laterality Date    BREAST SURGERY      CARDIAC CATH COSURGEON N/A 2022    Procedure: Cardiac Cath Cosurgeon;  Surgeon: Dontae Wooten MD;  Location: [facility] CATH LAB;  Service: Cardiovascular;  Laterality: N/A;     SECTION, CLASSIC      COLONOSCOPY N/A 2018    Procedure: COLONOSCOPY;  Surgeon: Precious Castorena MD;  Location: [facility] ENDO;  Service: Endoscopy;  Laterality: N/A;    HYSTERECTOMY      LEFT HEART CATHETERIZATION Left 2022    Procedure: Left heart cath;  Surgeon: Dontae Johns MD;  Location: [facility] CATH LAB;  Service: Cardiology;  Laterality: Left;    LEFT HEART CATHETERIZATION Left 2022    Procedure: Left heart cath;  Surgeon: Dontae Johns MD;  Location: [facility] CATH LAB;  Service: Cardiology;  Laterality: Left;    MASTECTOMY Right 2000    right breast      TONSILLECTOMY, ADENOIDECTOMY      TRANSCATHETER AORTIC VALVE REPLACEMENT (TAVR) N/A 2022    Procedure: REPLACEMENT, AORTIC VALVE, TRANSCATHETER (TAVR);  Surgeon: Dontae Johns MD;  Location: [facility] CATH LAB;  Service: Cardiology;  Laterality: N/A;     Social History     Tobacco Use    Smoking status: Former     Packs/day: 1.00     Types: Cigarettes     Quit date: 2000     Years since quittin.9    Smokeless tobacco: Never    Tobacco comments:     quit 20 years ago   Substance Use Topics    Alcohol  use: Yes     Alcohol/week: 2.0 standard drinks     Types: 2 Shots of liquor per week     Comment: per pt 2 burbon drinks per night however none in last month        Family History   Problem Relation Age of Onset    Cancer Sister     Liver cancer Sister     Melanoma Sister     Cancer Brother     Breast cancer Neg Hx     Psoriasis Neg Hx     Lupus Neg Hx     Eczema Neg Hx        Review of Systems:   No chills, fever, +night sweats, no weight loss  Baseline macular degeneration/near vision impairment - no diplopia  Minimal sinus congestion, no purulent nasal discharge, post nasal drip or facial pain  No pain in mouth or throat. No problems with teeth, gums.  No chest pain, palpitations, syncope  Persistent nonproductive cough, no shortness of breath, on exertion or at rest, no pleurisy, no hemoptysis.   No dysphagia, odynophagia  No nausea, vomiting, diarrhea, constipation, blood in stool, or focal abd pain,    No dysuria, hesitancy, hematuria, retention, incontinence  No swelling of joints, redness of joints, injuries, or new focal pain  Significant severe 10/10 right occipital headache, no dizziness, vertigo, numbness, paresthesias, neuropathy, falls  No anxiety, depression, substance abuse, sleep disturbance  No bleeding, lymphadenopathy  No new rashes, lesions, or wounds    Outdoor activities:  Retired 4th , lives at home.  Former smoker.  Drinks 2-3 bourbons/day.  No drugs.  Travel: none recent.  States she was recently at a , does not remember prior sick contacts.  Daughter-in-law had recent strep facial cellulitis.  Implants:  TAVR  Antibiotic History:  See HPI    EXAM & DIAGNOSTICS REVIEWED:   Vitals:     Temp:  [97.6 °F (36.4 °C)-98.5 °F (36.9 °C)]   Temp: 97.8 °F (36.6 °C) (23)  Pulse: 85 (23)  Resp: 18 (23)  BP: (!) 197/80 (23)  SpO2: 98 % (23)    Intake/Output Summary (Last 24 hours) at 2023 3118  Last data filed at  1/24/2023 0626  Gross per 24 hour   Intake 1148.44 ml   Output --   Net 1148.44 ml       General:  In NAD. Alert and attentive, cooperative, comfortable on room air  Eyes:  Anicteric, PERRL  ENT:  No ulcers, exudates, thrush, nares patent, dentition is fair  Neck:  Supple, no adenopathy appreciated  Lungs: Rhonchi R>L   Heart:  S1/S2+, regular rhythm, soft systolic murmur+  Abd:  +BS, soft, non tender, non distended, no rebound  :  Voids, urine clear, no flank tenderness  Musc:  Joints without effusion, swelling,  erythema, synovitis, ambulatory  Skin:  Warm, no rash  Wound:   Neuro:  R occipital headache, improves when laying down,   Following commands, no acute focal deficit   Psych:  Calm, cooperative  Lymphatic:     No cervical, supraclavicular nodes  Extrem: No LE edema b/l  VAD:  Peripheral IV      Isolation: None       General Labs reviewed:  Recent Labs   Lab 01/23/23  1546 01/24/23  0445   WBC 43.13* 33.73*   HGB 10.7* 9.3*   HCT 30.7* 27.8*    174       Recent Labs   Lab 01/23/23  1546 01/23/23 2014 01/24/23  0445   * 133* 130*   K 3.3* 3.5 2.4*   CL 99 103 100   CO2 20* 20* 19*   BUN 13 13 11   CREATININE 0.7 0.7 0.8   CALCIUM 9.5 8.6* 7.7*   PROT 5.8* 5.2* 4.1*   BILITOT 1.1* 0.9 0.7   ALKPHOS 161* 145* 129   * 279* 415*   * 280* 392*     No results for input(s): CRP in the last 168 hours.  Recent Labs   Lab 01/23/23  1637   SEDRATE 37*       Estimated Creatinine Clearance: 43.6 mL/min (based on SCr of 0.8 mg/dL).     Micro:  Microbiology Results (last 7 days)       Procedure Component Value Units Date/Time    Culture, Respiratory with Gram Stain [[id number]]     Order Status: No result Specimen: Respiratory from Sputum, Expectorated     CSF culture [[id number]]     Order Status: No result Specimen: CSF (Spinal Fluid)     Culture, Respiratory with Gram Stain [[id number]]     Order Status: No result Specimen: Respiratory from Sputum     Culture, MRSA [[id number]]     Order  Status: No result Specimen: MRSA source     Group A Strep, Molecular [[id number]] Collected: 01/24/23 0130    Order Status: Completed Updated: 01/24/23 0213     Group A Strep, Molecular Negative     Comment: Arcanobacterium haemolyticum and Beta Streptococcus group C   and G will not be detected by this test method.  Please order   Throat Culture ([id number]) if suspected.         Throat Screen, Rapid [[id number]] Collected: 01/24/23 0130    Order Status: Sent Specimen: Throat             Imaging Reviewed:  CXR  CT head  Cortical atrophy with periventricular deep white matter change consistent with chronic small vessel ischemic disease.  Mild paranasal sinus disease.    CT chest/abdomen/pelvis:   1. Right lower lobe consolidation with air bronchograms, concerning for pneumonia.  Superimposed scattered centrilobular ground-glass opacities and tree-in-bud micro nodules within the inferior lingula and bilateral lower lobes suspicious for infectious or inflammatory bronchiolitis.  2. Hepatomegaly and hepatic steatosis.  3. Diverticulosis coli without evidence of diverticulitis.  4. Right mastectomy.  5. Aortic valve replacement.  Coronary artery calcification.  6. Additional findings, as above.       Cardiology: LAST ECHO 3/29/22  The left ventricle is normal in size with eccentric hypertrophy and low normal systolic function. The estimated ejection fraction is 50%.  The quantitatively derived ejection fraction is 48%.  Normal right ventricular size with normal right ventricular systolic function.  Grade II left ventricular diastolic dysfunction.  Moderate left atrial enlargement.  There is a 26 mm Evolut transcutaneously-placed aortic bioprosthesis present. There is trivial paravalvular aortic insufficiency present.  The aortic valve mean gradient is 9 mmHg with a dimensionless index of 0.46.  The estimated PA systolic pressure is 24 mmHg.  Normal central venous pressure (3 mmHg).          IMPRESSION & PLAN     Severe sepsis  likely secondary to multifocal pneumonia, rule out meningitis given persistent severe headache  Leukemoid reaction WBC 43-->33, bands 8%-->5%  Procal 143.94 - very high   Blood cultures x2 1/23 no growth to date, pending final             Group A strep 1/24 negative    Transaminitis secondary to Tylenol toxicity, on NAC    PMHx:  CAD status post TAVR, hypertension, hyperlipidemia, right breast cancer status post mastectomy, GERD, hypothyroidism    Recommendations:  IR for LP, please send CSF for cell count, Gram stain and cultures, based on WBC will send CSF for PCR  Respiratory culture to be induced  MRSA nasal swab ordered  Follow cultures  Continue vancomycin IV, keep level 15-20   Ceftriaxone 2 g IV q.12  Ampicillin 2 g IV q.4  Trend protocol, order with a.m. labs   Appreciate neurology recommendations   Aspiration precautions   Incentive spirometry     Will follow     D/w patient, nursing, Dr Escamilla       Medical Decision Making during this encounter was  [_] Low Complexity  [_] Moderate Complexity  [xx] High Complexity

## 2023-01-24 NOTE — CONSULTS
Pharmacokinetic Initial Assessment: IV Vancomycin    Assessment/Plan:    Initiate intravenous vancomycin with loading dose of 1250 mg once with subsequent doses when random concentrations are less than 25 mcg/mL  Desired empiric serum trough concentration is 15 to 20 mcg/mL  Draw vancomycin random level on 01/24/23 at 1700.  Pharmacy will continue to follow and monitor vancomycin.      Please contact pharmacy at extension [phone] with any questions regarding this assessment.     Thank you for the consult,   Rachid Escobaren       Patient brief summary:  Vivien Burton is a 77 y.o. female initiated on antimicrobial therapy with IV Vancomycin for treatment of suspected meningitis    Drug Allergies:   Review of patient's allergies indicates:  No Known Allergies    Actual Body Weight:   52.6kg    Renal Function:   Estimated Creatinine Clearance: 49.9 mL/min (based on SCr of 0.7 mg/dL).,     CBC (last 72 hours):  Recent Labs   Lab Result Units 01/23/23  1546   WBC K/uL 43.13*   Hemoglobin g/dL 10.7*   Hematocrit % 30.7*   Platelets K/uL 214   Gran % % 80.0*   Lymph % % 4.0*   Mono % % 3.0*   Eosinophil % % 0.0   Basophil % % 0.0   Differential Method  Manual       Metabolic Panel (last 72 hours):  Recent Labs   Lab Result Units 01/23/23  1546 01/23/23  1631 01/23/23 2014   Sodium mmol/L 134*  --  133*   Potassium mmol/L 3.3*  --  3.5   Chloride mmol/L 99  --  103   CO2 mmol/L 20*  --  20*   Glucose mg/dL 116*  --  151*   Glucose, UA   --  Negative  --    BUN mg/dL 13  --  13   Creatinine mg/dL 0.7  --  0.7   Creatinine, Urine mg/dL  --  133.4  --    Albumin g/dL 3.2*  --  2.7*   Total Bilirubin mg/dL 1.1*  --  0.9   Alkaline Phosphatase U/L 161*  --  145*   AST U/L 239*  --  280*   ALT U/L 222*  --  279*   Magnesium mg/dL 1.2*  --   --        Drug levels (last 3 results):  No results for input(s): VANCOMYCINRA, VANCORANDOM, VANCOMYCINPE, VANCOPEAK, VANCOMYCINTR, VANCOTROUGH in the last 72 hours.    Microbiologic  Results:  Microbiology Results (last 7 days)       Procedure Component Value Units Date/Time    Throat Screen, Rapid [[id number]]     Order Status: No result Specimen: Throat

## 2023-01-25 PROBLEM — B95.5 STREPTOCOCCAL BACTEREMIA: Status: ACTIVE | Noted: 2023-01-25

## 2023-01-25 PROBLEM — R78.81 STREPTOCOCCAL BACTEREMIA: Status: ACTIVE | Noted: 2023-01-25

## 2023-01-25 PROBLEM — R65.20 SEVERE SEPSIS: Status: RESOLVED | Noted: 2023-01-23 | Resolved: 2023-01-25

## 2023-01-25 PROBLEM — A41.9 SEVERE SEPSIS: Status: RESOLVED | Noted: 2023-01-23 | Resolved: 2023-01-25

## 2023-01-25 PROBLEM — E87.6 HYPOKALEMIA: Status: RESOLVED | Noted: 2023-01-24 | Resolved: 2023-01-25

## 2023-01-25 LAB
ALBUMIN SERPL BCP-MCNC: 2.3 G/DL (ref 3.5–5.2)
ALBUMIN SERPL BCP-MCNC: 2.4 G/DL (ref 3.5–5.2)
ALBUMIN SERPL BCP-MCNC: 2.4 G/DL (ref 3.5–5.2)
ALP SERPL-CCNC: 174 U/L (ref 55–135)
ALP SERPL-CCNC: 174 U/L (ref 55–135)
ALP SERPL-CCNC: 188 U/L (ref 55–135)
ALT SERPL W/O P-5'-P-CCNC: 1915 U/L (ref 10–44)
ALT SERPL W/O P-5'-P-CCNC: 2215 U/L (ref 10–44)
ALT SERPL W/O P-5'-P-CCNC: 2215 U/L (ref 10–44)
ANION GAP SERPL CALC-SCNC: 8 MMOL/L (ref 8–16)
AST SERPL-CCNC: 1776 U/L (ref 10–40)
AST SERPL-CCNC: 1776 U/L (ref 10–40)
AST SERPL-CCNC: 931 U/L (ref 10–40)
BASOPHILS NFR BLD: 0 % (ref 0–1.9)
BASOPHILS NFR BLD: 0 % (ref 0–1.9)
BILIRUB SERPL-MCNC: 0.9 MG/DL (ref 0.1–1)
BILIRUB SERPL-MCNC: 1 MG/DL (ref 0.1–1)
BILIRUB SERPL-MCNC: 1 MG/DL (ref 0.1–1)
BUN SERPL-MCNC: 5 MG/DL (ref 8–23)
BUN SERPL-MCNC: 5 MG/DL (ref 8–23)
BUN SERPL-MCNC: 8 MG/DL (ref 8–23)
CALCIUM SERPL-MCNC: 8.5 MG/DL (ref 8.7–10.5)
CALCIUM SERPL-MCNC: 8.5 MG/DL (ref 8.7–10.5)
CALCIUM SERPL-MCNC: 8.6 MG/DL (ref 8.7–10.5)
CHLORIDE SERPL-SCNC: 103 MMOL/L (ref 95–110)
CHLORIDE SERPL-SCNC: 103 MMOL/L (ref 95–110)
CHLORIDE SERPL-SCNC: 105 MMOL/L (ref 95–110)
CO2 SERPL-SCNC: 20 MMOL/L (ref 23–29)
CO2 SERPL-SCNC: 20 MMOL/L (ref 23–29)
CO2 SERPL-SCNC: 21 MMOL/L (ref 23–29)
CREAT SERPL-MCNC: 0.6 MG/DL (ref 0.5–1.4)
DIFFERENTIAL METHOD: ABNORMAL
DIFFERENTIAL METHOD: ABNORMAL
EOSINOPHIL NFR BLD: 0 % (ref 0–8)
EOSINOPHIL NFR BLD: 0 % (ref 0–8)
ERYTHROCYTE [DISTWIDTH] IN BLOOD BY AUTOMATED COUNT: 13.8 % (ref 11.5–14.5)
ERYTHROCYTE [DISTWIDTH] IN BLOOD BY AUTOMATED COUNT: 13.8 % (ref 11.5–14.5)
EST. GFR  (NO RACE VARIABLE): >60 ML/MIN/1.73 M^2
ESTIMATED AVG GLUCOSE: 88 MG/DL (ref 68–131)
GLUCOSE SERPL-MCNC: 117 MG/DL (ref 70–110)
GLUCOSE SERPL-MCNC: 81 MG/DL (ref 70–110)
GLUCOSE SERPL-MCNC: 81 MG/DL (ref 70–110)
HBA1C MFR BLD: 4.7 % (ref 4–5.6)
HCT VFR BLD AUTO: 27.7 % (ref 37–48.5)
HCT VFR BLD AUTO: 27.7 % (ref 37–48.5)
HGB BLD-MCNC: 9.6 G/DL (ref 12–16)
HGB BLD-MCNC: 9.6 G/DL (ref 12–16)
IMM GRANULOCYTES # BLD AUTO: ABNORMAL K/UL (ref 0–0.04)
IMM GRANULOCYTES # BLD AUTO: ABNORMAL K/UL (ref 0–0.04)
IMM GRANULOCYTES NFR BLD AUTO: ABNORMAL % (ref 0–0.5)
IMM GRANULOCYTES NFR BLD AUTO: ABNORMAL % (ref 0–0.5)
INR PPP: 1.4 (ref 0.8–1.2)
INR PPP: 1.4 (ref 0.8–1.2)
LYMPHOCYTES NFR BLD: 11 % (ref 18–48)
LYMPHOCYTES NFR BLD: 11 % (ref 18–48)
MAGNESIUM SERPL-MCNC: 1.9 MG/DL (ref 1.6–2.6)
MCH RBC QN AUTO: 29.5 PG (ref 27–31)
MCH RBC QN AUTO: 29.5 PG (ref 27–31)
MCHC RBC AUTO-ENTMCNC: 34.7 G/DL (ref 32–36)
MCHC RBC AUTO-ENTMCNC: 34.7 G/DL (ref 32–36)
MCV RBC AUTO: 85 FL (ref 82–98)
MCV RBC AUTO: 85 FL (ref 82–98)
METAMYELOCYTES NFR BLD MANUAL: 3 %
METAMYELOCYTES NFR BLD MANUAL: 3 %
MONOCYTES NFR BLD: 1 % (ref 4–15)
MONOCYTES NFR BLD: 1 % (ref 4–15)
NEUTROPHILS NFR BLD: 84 % (ref 38–73)
NEUTROPHILS NFR BLD: 84 % (ref 38–73)
NEUTS BAND NFR BLD MANUAL: 1 %
NEUTS BAND NFR BLD MANUAL: 1 %
NRBC BLD-RTO: 0 /100 WBC
NRBC BLD-RTO: 0 /100 WBC
OSMOLALITY UR: 68 MOSM/KG (ref 50–1200)
PATH REV BLD -IMP: NORMAL
PHOSPHATE SERPL-MCNC: 1.6 MG/DL (ref 2.7–4.5)
PLATELET # BLD AUTO: 199 K/UL (ref 150–450)
PLATELET # BLD AUTO: 199 K/UL (ref 150–450)
PLATELET BLD QL SMEAR: ABNORMAL
PLATELET BLD QL SMEAR: ABNORMAL
PMV BLD AUTO: 9.8 FL (ref 9.2–12.9)
PMV BLD AUTO: 9.8 FL (ref 9.2–12.9)
POCT GLUCOSE: 115 MG/DL (ref 70–110)
POTASSIUM SERPL-SCNC: 3.9 MMOL/L (ref 3.5–5.1)
POTASSIUM SERPL-SCNC: 4.3 MMOL/L (ref 3.5–5.1)
POTASSIUM SERPL-SCNC: 4.3 MMOL/L (ref 3.5–5.1)
PROCALCITONIN SERPL IA-MCNC: 56.08 NG/ML
PROT SERPL-MCNC: 4.4 G/DL (ref 6–8.4)
PROTHROMBIN TIME: 14.8 SEC (ref 9–12.5)
PROTHROMBIN TIME: 14.8 SEC (ref 9–12.5)
RBC # BLD AUTO: 3.25 M/UL (ref 4–5.4)
RBC # BLD AUTO: 3.25 M/UL (ref 4–5.4)
SODIUM SERPL-SCNC: 131 MMOL/L (ref 136–145)
SODIUM SERPL-SCNC: 131 MMOL/L (ref 136–145)
SODIUM SERPL-SCNC: 134 MMOL/L (ref 136–145)
T4 FREE SERPL-MCNC: 1.21 NG/DL (ref 0.71–1.51)
TSH SERPL DL<=0.005 MIU/L-ACNC: 5.78 UIU/ML (ref 0.4–4)
VANCOMYCIN TROUGH SERPL-MCNC: 16.8 UG/ML (ref 10–22)
WBC # BLD AUTO: 19.47 K/UL (ref 3.9–12.7)
WBC # BLD AUTO: 19.47 K/UL (ref 3.9–12.7)

## 2023-01-25 PROCEDURE — 99222 PR INITIAL HOSPITAL CARE,LEVL II: ICD-10-PCS | Mod: ,,, | Performed by: INTERNAL MEDICINE

## 2023-01-25 PROCEDURE — 25000003 PHARM REV CODE 250: Performed by: STUDENT IN AN ORGANIZED HEALTH CARE EDUCATION/TRAINING PROGRAM

## 2023-01-25 PROCEDURE — 63600175 PHARM REV CODE 636 W HCPCS: Performed by: STUDENT IN AN ORGANIZED HEALTH CARE EDUCATION/TRAINING PROGRAM

## 2023-01-25 PROCEDURE — 85007 BL SMEAR W/DIFF WBC COUNT: CPT | Performed by: STUDENT IN AN ORGANIZED HEALTH CARE EDUCATION/TRAINING PROGRAM

## 2023-01-25 PROCEDURE — 88189 FLOWCYTOMETRY/READ 16 & >: CPT | Mod: ,,, | Performed by: PATHOLOGY

## 2023-01-25 PROCEDURE — 88184 FLOWCYTOMETRY/ TC 1 MARKER: CPT | Performed by: PATHOLOGY

## 2023-01-25 PROCEDURE — 25000003 PHARM REV CODE 250: Performed by: NURSE PRACTITIONER

## 2023-01-25 PROCEDURE — 85060 PATHOLOGIST REVIEW: ICD-10-PCS | Mod: ,,, | Performed by: PATHOLOGY

## 2023-01-25 PROCEDURE — 88189 PR  FLOWCYTOMETRY/READ, 16 & > MARKERS: ICD-10-PCS | Mod: ,,, | Performed by: PATHOLOGY

## 2023-01-25 PROCEDURE — 85060 BLOOD SMEAR INTERPRETATION: CPT | Mod: ,,, | Performed by: PATHOLOGY

## 2023-01-25 PROCEDURE — 12000002 HC ACUTE/MED SURGE SEMI-PRIVATE ROOM

## 2023-01-25 PROCEDURE — 63600175 PHARM REV CODE 636 W HCPCS: Performed by: INTERNAL MEDICINE

## 2023-01-25 PROCEDURE — 83036 HEMOGLOBIN GLYCOSYLATED A1C: CPT | Performed by: STUDENT IN AN ORGANIZED HEALTH CARE EDUCATION/TRAINING PROGRAM

## 2023-01-25 PROCEDURE — 97161 PT EVAL LOW COMPLEX 20 MIN: CPT

## 2023-01-25 PROCEDURE — 94640 AIRWAY INHALATION TREATMENT: CPT

## 2023-01-25 PROCEDURE — 99233 PR SUBSEQUENT HOSPITAL CARE,LEVL III: ICD-10-PCS | Mod: S$GLB,,, | Performed by: STUDENT IN AN ORGANIZED HEALTH CARE EDUCATION/TRAINING PROGRAM

## 2023-01-25 PROCEDURE — 36415 COLL VENOUS BLD VENIPUNCTURE: CPT | Performed by: STUDENT IN AN ORGANIZED HEALTH CARE EDUCATION/TRAINING PROGRAM

## 2023-01-25 PROCEDURE — 63600175 PHARM REV CODE 636 W HCPCS: Performed by: NURSE PRACTITIONER

## 2023-01-25 PROCEDURE — 84100 ASSAY OF PHOSPHORUS: CPT | Performed by: STUDENT IN AN ORGANIZED HEALTH CARE EDUCATION/TRAINING PROGRAM

## 2023-01-25 PROCEDURE — 84145 PROCALCITONIN (PCT): CPT | Performed by: STUDENT IN AN ORGANIZED HEALTH CARE EDUCATION/TRAINING PROGRAM

## 2023-01-25 PROCEDURE — 80202 ASSAY OF VANCOMYCIN: CPT | Performed by: STUDENT IN AN ORGANIZED HEALTH CARE EDUCATION/TRAINING PROGRAM

## 2023-01-25 PROCEDURE — 84165 PROTEIN E-PHORESIS SERUM: CPT | Mod: 26,,, | Performed by: PATHOLOGY

## 2023-01-25 PROCEDURE — 99233 SBSQ HOSP IP/OBS HIGH 50: CPT | Mod: S$GLB,,, | Performed by: STUDENT IN AN ORGANIZED HEALTH CARE EDUCATION/TRAINING PROGRAM

## 2023-01-25 PROCEDURE — 99900035 HC TECH TIME PER 15 MIN (STAT)

## 2023-01-25 PROCEDURE — 85610 PROTHROMBIN TIME: CPT | Performed by: STUDENT IN AN ORGANIZED HEALTH CARE EDUCATION/TRAINING PROGRAM

## 2023-01-25 PROCEDURE — 84165 PROTEIN E-PHORESIS SERUM: CPT | Performed by: NURSE PRACTITIONER

## 2023-01-25 PROCEDURE — 99222 1ST HOSP IP/OBS MODERATE 55: CPT | Mod: ,,, | Performed by: INTERNAL MEDICINE

## 2023-01-25 PROCEDURE — 99232 SBSQ HOSP IP/OBS MODERATE 35: CPT | Mod: ,,, | Performed by: INTERNAL MEDICINE

## 2023-01-25 PROCEDURE — 25000242 PHARM REV CODE 250 ALT 637 W/ HCPCS: Performed by: NURSE PRACTITIONER

## 2023-01-25 PROCEDURE — 84439 ASSAY OF FREE THYROXINE: CPT | Performed by: STUDENT IN AN ORGANIZED HEALTH CARE EDUCATION/TRAINING PROGRAM

## 2023-01-25 PROCEDURE — 84443 ASSAY THYROID STIM HORMONE: CPT | Performed by: STUDENT IN AN ORGANIZED HEALTH CARE EDUCATION/TRAINING PROGRAM

## 2023-01-25 PROCEDURE — 99232 PR SUBSEQUENT HOSPITAL CARE,LEVL II: ICD-10-PCS | Mod: ,,, | Performed by: INTERNAL MEDICINE

## 2023-01-25 PROCEDURE — 94761 N-INVAS EAR/PLS OXIMETRY MLT: CPT

## 2023-01-25 PROCEDURE — 83735 ASSAY OF MAGNESIUM: CPT | Performed by: STUDENT IN AN ORGANIZED HEALTH CARE EDUCATION/TRAINING PROGRAM

## 2023-01-25 PROCEDURE — 85027 COMPLETE CBC AUTOMATED: CPT | Performed by: STUDENT IN AN ORGANIZED HEALTH CARE EDUCATION/TRAINING PROGRAM

## 2023-01-25 PROCEDURE — 80053 COMPREHEN METABOLIC PANEL: CPT | Performed by: STUDENT IN AN ORGANIZED HEALTH CARE EDUCATION/TRAINING PROGRAM

## 2023-01-25 PROCEDURE — 87081 CULTURE SCREEN ONLY: CPT | Performed by: NURSE PRACTITIONER

## 2023-01-25 PROCEDURE — 88185 FLOWCYTOMETRY/TC ADD-ON: CPT | Mod: 59 | Performed by: PATHOLOGY

## 2023-01-25 PROCEDURE — 84165 PATHOLOGIST INTERPRETATION SPE: ICD-10-PCS | Mod: 26,,, | Performed by: PATHOLOGY

## 2023-01-25 RX ORDER — METOCLOPRAMIDE HYDROCHLORIDE 5 MG/ML
5 INJECTION INTRAMUSCULAR; INTRAVENOUS EVERY 8 HOURS
Status: DISCONTINUED | OUTPATIENT
Start: 2023-01-25 | End: 2023-01-27

## 2023-01-25 RX ORDER — DIPHENHYDRAMINE HYDROCHLORIDE 50 MG/ML
25 INJECTION INTRAMUSCULAR; INTRAVENOUS EVERY 8 HOURS
Status: DISPENSED | OUTPATIENT
Start: 2023-01-25 | End: 2023-01-27

## 2023-01-25 RX ORDER — HYDRALAZINE HYDROCHLORIDE 20 MG/ML
10 INJECTION INTRAMUSCULAR; INTRAVENOUS EVERY 6 HOURS PRN
Status: DISCONTINUED | OUTPATIENT
Start: 2023-01-25 | End: 2023-01-29 | Stop reason: HOSPADM

## 2023-01-25 RX ORDER — LOSARTAN POTASSIUM 25 MG/1
25 TABLET ORAL DAILY
Status: DISCONTINUED | OUTPATIENT
Start: 2023-01-25 | End: 2023-01-29 | Stop reason: HOSPADM

## 2023-01-25 RX ORDER — SODIUM,POTASSIUM PHOSPHATES 280-250MG
1 POWDER IN PACKET (EA) ORAL
Status: DISCONTINUED | OUTPATIENT
Start: 2023-01-25 | End: 2023-01-29 | Stop reason: HOSPADM

## 2023-01-25 RX ADMIN — POTASSIUM, SODIUM PHOSPHATES 280 MG-160 MG-250 MG ORAL POWDER PACKET 1 PACKET: POWDER IN PACKET at 05:01

## 2023-01-25 RX ADMIN — FOLIC ACID 1 MG: 1 TABLET ORAL at 09:01

## 2023-01-25 RX ADMIN — IPRATROPIUM BROMIDE AND ALBUTEROL SULFATE 3 ML: .5; 3 SOLUTION RESPIRATORY (INHALATION) at 08:01

## 2023-01-25 RX ADMIN — POTASSIUM, SODIUM PHOSPHATES 280 MG-160 MG-250 MG ORAL POWDER PACKET 1 PACKET: POWDER IN PACKET at 09:01

## 2023-01-25 RX ADMIN — THIAMINE HCL TAB 100 MG 100 MG: 100 TAB at 09:01

## 2023-01-25 RX ADMIN — IBUPROFEN 400 MG: 400 TABLET, FILM COATED ORAL at 03:01

## 2023-01-25 RX ADMIN — POTASSIUM, SODIUM PHOSPHATES 280 MG-160 MG-250 MG ORAL POWDER PACKET 1 PACKET: POWDER IN PACKET at 12:01

## 2023-01-25 RX ADMIN — ASPIRIN 81 MG CHEWABLE TABLET 81 MG: 81 TABLET CHEWABLE at 09:01

## 2023-01-25 RX ADMIN — VANCOMYCIN HYDROCHLORIDE 1000 MG: 1 INJECTION, POWDER, LYOPHILIZED, FOR SOLUTION INTRAVENOUS at 05:01

## 2023-01-25 RX ADMIN — METOCLOPRAMIDE 5 MG: 5 INJECTION, SOLUTION INTRAMUSCULAR; INTRAVENOUS at 12:01

## 2023-01-25 RX ADMIN — METOCLOPRAMIDE 5 MG: 5 INJECTION, SOLUTION INTRAMUSCULAR; INTRAVENOUS at 09:01

## 2023-01-25 RX ADMIN — PANTOPRAZOLE SODIUM 40 MG: 40 TABLET, DELAYED RELEASE ORAL at 09:01

## 2023-01-25 RX ADMIN — CEFTRIAXONE SODIUM 2 G: 2 INJECTION, POWDER, FOR SOLUTION INTRAMUSCULAR; INTRAVENOUS at 04:01

## 2023-01-25 RX ADMIN — IBUPROFEN 400 MG: 400 TABLET, FILM COATED ORAL at 09:01

## 2023-01-25 RX ADMIN — ACETYLCYSTEINE 5400 MG: 200 INJECTION, SOLUTION INTRAVENOUS at 05:01

## 2023-01-25 RX ADMIN — DIPHENHYDRAMINE HYDROCHLORIDE 25 MG: 50 INJECTION INTRAMUSCULAR; INTRAVENOUS at 12:01

## 2023-01-25 RX ADMIN — LOSARTAN POTASSIUM 25 MG: 25 TABLET, FILM COATED ORAL at 12:01

## 2023-01-25 RX ADMIN — ACETYLCYSTEINE 2700 MG: 200 INJECTION, SOLUTION INTRAVENOUS at 01:01

## 2023-01-25 RX ADMIN — ACETYLCYSTEINE 8100 MG: 200 INJECTION, SOLUTION INTRAVENOUS at 12:01

## 2023-01-25 RX ADMIN — DIPHENHYDRAMINE HYDROCHLORIDE 25 MG: 50 INJECTION INTRAMUSCULAR; INTRAVENOUS at 09:01

## 2023-01-25 RX ADMIN — THERA TABS 1 TABLET: TAB at 09:01

## 2023-01-25 RX ADMIN — LEVOTHYROXINE SODIUM 75 MCG: 0.03 TABLET ORAL at 05:01

## 2023-01-25 NOTE — CONSULTS
[facility]     CC: Elevated LFTs    HPI 77 y.o. female who presents in transfer from [facility].  Patient presented [facility] for evaluation of headache and cough.  She reports headache and cough onset approximately 1 week ago.  She endorses taking 4 tablets of Tylenol every 4 hours for least 3-5 days for headache.  She describes the headache as a stabbing and piercing sensation to the occipital region.  She denies any fever or chills.  She denies any known sick contacts or travel.  No aggravating or alleviating factors.  Previous medical history includes anemia, breast cancer, systolic/diastolic heart failure, coronary artery disease, heart murmur, TAVR, hypothyroidism, hypertension.  ER workup at outside facility:  CBC with leukocytosis of 43,000 in H&H of 10 and 30.  CMP with hypokalemia 3.3, bilirubin of 1.1, alk-phos 161, , .  Magnesium 1.2 (repleted).  Lactic acid elevated at 2.4.  Urinalysis with 8 red blood cells, 10 white blood cells, few bacteria.  INR was elevated 1.8, and sed rate was elevated 37.  Blood urine cultures are pending.  CT the head demonstrated no acute findings.  CT the abdomen and pelvis demonstrated right lower lobe air bronchograms concerning for pneumonia with hepatomegaly, diverticulosis, and aortic valve replacement.  Chest x-ray with a dense masslike suspicious lesion/ infiltrate of the right lower lobe.  Patient admitted to Hospital Medicine for treatment management.  Infectious Disease, Neurology, and Pulmonary consultations made.  Patient will be empirically started on meningitis coverage including ampicillin, Rocephin, vancomycin.           Overview/Hospital Course:  Vivien Burton is a 77 year old female with a past medical history of CAD s/p PCI to LAD 1/2022, combined CHF, aortic stenosis s/p TAVR, anemia, hypothyroidism, HLD, HTN, and GERD who presented with one week of headache and cough secondary to a severe sepsis  with potential etiologies including CAP and/or meningitis. She has been started on empiric bacterial meningitis therapy with ampicillin, ceftriaxone and vancomycin. She is also on azithromycin. An LP has been ordered and is pending. CT of the chest shows consolidation at the RLL with air bronchograms and ground glass opacities concerning for pneumonia. Respiratory and blood cultures are pending. Pulmonary and Neurology have been consulted. Of note, the patient endorses taking four tablets of Tylenol every four hours for roughly three to five days. Her LFTs are elevated as well as INR. Her albumin is also low and CT of the abdomen shows hepatic steatosis. She was started on NAC therapy while at [place]. GI has also been consulted.     FURTHER HISTORY:  Above obtained from independent review of records from admitting provider as well as from direct discussion with family member at bedside who states that patient took excessive acetaminophen for her symptoms.  In addition, on my interview, I note the following:  She has complaint of cough and generalized malaise.  Currently with sepsis workup ongoing and ID following.  She has strep bacteremia/infection without evidence of meningitis on LP.  No known history of liver disease.  She states that she took 3-4 acetaminophen every 4 hours for the last few days.  INR currently mildly/moderately elevated and acetadote protocol in progress.      Past Medical History:   Diagnosis Date    Acute on chronic combined systolic and diastolic heart failure 2/23/2022    Anemia     Basal cell carcinoma 1999    Breast cancer     Breast cancer     Cancer     CHF (congestive heart failure)     Coronary artery disease     GERD (gastroesophageal reflux disease)     Hyperlipidemia     Hypertension     Hypothyroidism     Nonrheumatic aortic (valve) stenosis 6/5/2018    Osteoporosis 02/05/2019    S/P TAVR (transcatheter aortic valve replacement) 2/22/2022    Squamous cell carcinoma of skin 2018     Thyroid disease        Past Surgical History:   Procedure Laterality Date    BREAST SURGERY      CARDIAC CATH COSURGEON N/A 2022    Procedure: Cardiac Cath Cosurgeon;  Surgeon: Dontae Wooten MD;  Location: [facility] CATH LAB;  Service: Cardiovascular;  Laterality: N/A;     SECTION, CLASSIC      COLONOSCOPY N/A 2018    Procedure: COLONOSCOPY;  Surgeon: Precious Castorena MD;  Location: [facility] ENDO;  Service: Endoscopy;  Laterality: N/A;    HYSTERECTOMY      LEFT HEART CATHETERIZATION Left 2022    Procedure: Left heart cath;  Surgeon: Dontae Johns MD;  Location: [facility] CATH LAB;  Service: Cardiology;  Laterality: Left;    LEFT HEART CATHETERIZATION Left 2022    Procedure: Left heart cath;  Surgeon: Dontae Johns MD;  Location: [facility] CATH LAB;  Service: Cardiology;  Laterality: Left;    MASTECTOMY Right 2000    right breast      TONSILLECTOMY, ADENOIDECTOMY      TRANSCATHETER AORTIC VALVE REPLACEMENT (TAVR) N/A 2022    Procedure: REPLACEMENT, AORTIC VALVE, TRANSCATHETER (TAVR);  Surgeon: Dontae Johns MD;  Location: [facility] CATH LAB;  Service: Cardiology;  Laterality: N/A;       Social History     Tobacco Use    Smoking status: Former     Packs/day: 1.00     Types: Cigarettes     Quit date: 2000     Years since quittin.9    Smokeless tobacco: Never    Tobacco comments:     quit 20 years ago   Substance Use Topics    Alcohol use: Yes     Alcohol/week: 2.0 standard drinks     Types: 2 Shots of liquor per week     Comment: per pt 2 burbon drinks per night however none in last month    Drug use: No       Family History   Problem Relation Age of Onset    Cancer Sister     Liver cancer Sister     Melanoma Sister     Cancer Brother     Breast cancer Neg Hx     Psoriasis Neg Hx     Lupus Neg Hx     Eczema Neg Hx        Review of Systems  General ROS: negative for - chills, fever or weight loss  Psychological ROS: negative for - hallucination, depression or suicidal  ideation  Ophthalmic ROS: negative for - blurry vision, photophobia or eye pain  ENT ROS: negative for - epistaxis, sore throat or rhinorrhea  Respiratory ROS: + cough,no shortness of breath, or wheezing  Cardiovascular ROS: no chest pain or dyspnea on exertion  Gastrointestinal ROS: as above  Genito-Urinary ROS: no dysuria, trouble voiding, or hematuria  Musculoskeletal ROS: negative for - arthralgia, myalgia, weakness  Neurological ROS: no syncope or seizures; no ataxia  Dermatological ROS: negative for pruritis, rash and jaundice    Physical Examination  /60 (BP Location: Left arm, Patient Position: Lying)   Pulse 82   Temp 97.4 °F (36.3 °C) (Oral)   Resp 18   Ht 5' (1.524 m)   Wt 53.5 kg (118 lb)   SpO2 100%   Breastfeeding No   BMI 23.05 kg/m²   General appearance: alert, cooperative, no distress  HENT: Normocephalic, atraumatic, neck symmetrical, no nasal discharge   Eyes: conjunctivae/corneas clear, PERRL, EOM's intact, sclera anicteric  Lungs: clear to auscultation bilaterally, no dullness to percussion bilaterally, symmetric expansion, breathing unlabored  Heart: regular rate and rhythm without rub; no displacement of the PMI   Abdomen: soft, NT  Extremities: extremities symmetric; no clubbing, cyanosis, or edema  Integument: Skin color, texture, turgor normal; no rashes; hair distrubution normal, no jaundice  Neurologic: Alert and oriented X 3, no focal sensory or motor neurologic deficits  Psychiatric: no pressured speech; normal affect; no evidence of impaired cognition, no anxiety/depression     Labs:  Lab Results   Component Value Date    WBC 33.73 (H) 01/24/2023    HGB 9.3 (L) 01/24/2023    HCT 27.8 (L) 01/24/2023    MCV 88 01/24/2023     01/24/2023         CMP  Sodium   Date Value Ref Range Status   01/24/2023 123 (L) 136 - 145 mmol/L Final     Potassium   Date Value Ref Range Status   01/24/2023 3.8 3.5 - 5.1 mmol/L Final     Chloride   Date Value Ref Range Status   01/24/2023  98 95 - 110 mmol/L Final     CO2   Date Value Ref Range Status   01/24/2023 18 (L) 23 - 29 mmol/L Final     Glucose   Date Value Ref Range Status   01/24/2023 431 (H) 70 - 110 mg/dL Final     BUN   Date Value Ref Range Status   01/24/2023 7 (L) 8 - 23 mg/dL Final     Creatinine   Date Value Ref Range Status   01/24/2023 0.6 0.5 - 1.4 mg/dL Final     Calcium   Date Value Ref Range Status   01/24/2023 7.4 (L) 8.7 - 10.5 mg/dL Final     Total Protein   Date Value Ref Range Status   01/24/2023 4.1 (L) 6.0 - 8.4 g/dL Final     Albumin   Date Value Ref Range Status   01/24/2023 2.1 (L) 3.5 - 5.2 g/dL Final     Total Bilirubin   Date Value Ref Range Status   01/24/2023 0.7 0.1 - 1.0 mg/dL Final     Comment:     For infants and newborns, interpretation of results should be based  on gestational age, weight and in agreement with clinical  observations.    Premature Infant recommended reference ranges:  Up to 24 hours.............<8.0 mg/dL  Up to 48 hours............<12.0 mg/dL  3-5 days..................<15.0 mg/dL  6-29 days.................<15.0 mg/dL       Alkaline Phosphatase   Date Value Ref Range Status   01/24/2023 129 55 - 135 U/L Final     AST   Date Value Ref Range Status   01/24/2023 392 (H) 10 - 40 U/L Final     ALT   Date Value Ref Range Status   01/24/2023 415 (H) 10 - 44 U/L Final     Anion Gap   Date Value Ref Range Status   01/24/2023 7 (L) 8 - 16 mmol/L Final     eGFR   Date Value Ref Range Status   01/24/2023 >60 >60 mL/min/1.73 m^2 Final   10/04/2022 96 > OR = 60 mL/min/1.73m2 Final     Comment:     The eGFR is based on the CKD-EPI 2021 equation. To calculate   the new eGFR from a previous Creatinine or Cystatin C  result, go to https://www.kidney.org/professionals/  kdoqi/gfr%5Fcalculator       Lab Results   Component Value Date    INR 1.8 (H) 01/24/2023    INR 1.8 (H) 01/23/2023    INR 1.1 02/22/2022         Imaging:  CT scan was independently visualized and reviewed by me and showed no acute GI  process, + hepatomegaly.  See report for other findings.    I have personally reviewed these images        Case discussed as above.      Assessment:   Recent acetaminophen ingestion  Sepsis  Gram positive bacteremia  Elevated LFTs - likely multifactorial    Plan:  Continue acetadote to complete protocol  Diet as tolerated  Agree with antibiotics  Trend LFTs and INR.  If any evidence of decompensation, will need transfer to [place] [facility] for hepatology evaluation  Further recommendations to follow after above.  Communication will be sent to the referring provider regarding my assessment and plan on this patient via EPIC.      Mal Negron MD  [facility]  [address], [address]  [place] [address]  Office: [phone]  Fax: [phone]

## 2023-01-25 NOTE — PROGRESS NOTES
Pharmacokinetic Assessment Follow Up: IV Vancomycin    Vancomycin serum concentration assessment(s):    The random level was drawn correctly and can be used to guide therapy at this time. The measurement is below the desired definitive target range of 15 to 20 mcg/mL.    Vancomycin Regimen Plan:    Change regimen to Vancomycin 1000 mg IV every 12 hours with next serum trough concentration measured at 1730 prior to 3rd dose on 1/25    Drug levels (last 3 results):  Recent Labs   Lab Result Units 01/24/23  1655   Vancomycin, Random ug/mL 8.4       Pharmacy will continue to follow and monitor vancomycin.    Please contact pharmacy at extension [phone] for questions regarding this assessment.    Thank you for the consult,   Radha Castro, PharmD       Patient brief summary:  Vivien Burton is a 77 y.o. female initiated on antimicrobial therapy with IV Vancomycin for treatment of meningitis      Drug Allergies:   Review of patient's allergies indicates:  No Known Allergies    Actual Body Weight:   53.5 kg (118 lb)    Renal Function:   Estimated Creatinine Clearance: 58.2 mL/min (based on SCr of 0.6 mg/dL).,     Dialysis Method (if applicable):  N/A    CBC (last 72 hours):  Recent Labs   Lab Result Units 01/23/23  1546 01/24/23  0445   WBC K/uL 43.13* 33.73*   Hemoglobin g/dL 10.7* 9.3*   Hematocrit % 30.7* 27.8*   Platelets K/uL 214 174   Gran % % 80.0* 77.0*   Lymph % % 4.0* 12.0*   Mono % % 3.0* 6.0   Eosinophil % % 0.0 0.0   Basophil % % 0.0 0.0   Differential Method  Manual Manual       Metabolic Panel (last 72 hours):  Recent Labs   Lab Result Units 01/23/23  1546 01/23/23  1631 01/23/23 2014 01/24/23  0445 01/24/23  1133 01/24/23  1655   Sodium mmol/L 134*  --  133* 130*  --  123*   Potassium mmol/L 3.3*  --  3.5 2.4*  --  3.8   Chloride mmol/L 99  --  103 100  --  98   CO2 mmol/L 20*  --  20* 19*  --  18*   Glucose mg/dL 116*  --  151* 292*  --  431*   Glucose, CSF mg/dL  --   --   --   --  78*  --     Glucose, UA   --  Negative  --   --   --   --    BUN mg/dL 13  --  13 11  --  7*   Creatinine mg/dL 0.7  --  0.7 0.8  --  0.6   Creatinine, Urine mg/dL  --  133.4  --   --   --   --    Albumin g/dL 3.2*  --  2.7* 2.1*  --   --    Total Bilirubin mg/dL 1.1*  --  0.9 0.7  --   --    Alkaline Phosphatase U/L 161*  --  145* 129  --   --    AST U/L 239*  --  280* 392*  --   --    ALT U/L 222*  --  279* 415*  --   --    Magnesium mg/dL 1.2*  --   --  1.9  --   --        Vancomycin Administrations:  vancomycin given in the last 96 hours                     vancomycin 1,250 mg in dextrose 5 % (D5W) 250 mL IVPB (Vial-Mate) (mg) 1,250 mg New Bag 01/24/23 0314                    Microbiologic Results:  Microbiology Results (last 7 days)       Procedure Component Value Units Date/Time    Blood culture [[id number]] Collected: 01/24/23 1716    Order Status: Sent Specimen: Blood Updated: 01/24/23 1716    Blood culture [[id number]] Collected: 01/24/23 1655    Order Status: Sent Specimen: Blood Updated: 01/24/23 1655    CSF culture [[id number]] Collected: 01/24/23 1136    Order Status: Completed Specimen: CSF (Spinal Fluid) from CSF Tap, Tube 2 Updated: 01/24/23 1449     Gram Stain Result No WBC's      No epithelial cells      No organisms seen      01/24/2023  14:48 TN3    Culture, Respiratory with Gram Stain [[id number]]     Order Status: No result Specimen: Respiratory from Sputum, Expectorated     Culture, Respiratory with Gram Stain [[id number]]     Order Status: No result Specimen: Respiratory from Sputum     Culture, MRSA [[id number]]     Order Status: No result Specimen: MRSA source     Group A Strep, Molecular [[id number]] Collected: 01/24/23 0130    Order Status: Completed Updated: 01/24/23 0213     Group A Strep, Molecular Negative     Comment: Arcanobacterium haemolyticum and Beta Streptococcus group C   and G will not be detected by this test method.  Please order   Throat Culture ([id number]) if suspected.         Throat  Screen, Rapid [[id number]] Collected: 01/24/23 0130    Order Status: Sent Specimen: Throat

## 2023-01-25 NOTE — CONSULTS
[facility]  Hematology/Oncology  Consult Note    Patient Name: Vivien Burton  MRN: [id number]  Admission Date: 1/23/2023  Hospital Length of Stay: 2 days  Code Status: Full Code   Attending Provider: Marek Escamilla MD  Consulting Provider: Priya Glover NP  Primary Care Physician: Ellen Robert III, MD  Principal Problem:Pneumonia    Consults  Subjective:     HPI: Vivien Burton is a 77 year old female with a past medical history of CAD s/p PCI to LAD 1/2022, combined CHF, aortic stenosis s/p TAVR, anemia, hypothyroidism, HLD, HTN, breast cancer, and GERD who presented with one week of headache and cough secondary to a severe sepsis with potential etiologies including CAP and/or meningitis. She was started on empiric bacterial meningitis therapy with ampicillin, ceftriaxone and vancomycin as well as azithromycin. An LP was ordered and preliminary data was not consistent with an acute meningitis. Ampicillin and azithromycin were discontinued per ID. CT of the chest shows consolidation at the RLL with air bronchograms and ground glass opacities concerning for pneumonia. Blood cultures on admission also showed organisms resembling Strep. Respiratory cultures are pending. Neurology and Pulmonary were also consulted. Of note, the patient endorses taking four tablets of Tylenol every four hours for roughly three to five days. Her LFTs were elevated as well as INR on admission. Her albumin is also low and CT of the abdomen shows hepatic steatosis. She was started on NAC therapy while at [place] and has started another NAC protocol on 1/25. GI has also been consulted. Her LFTs continue to rise, yet her liver function remains compensated. An MRI of the brain shows enhancement of the skull for which Oncology was consulted 1/25; a peripheral smear is pending.  Per medical record  We have been consulted for abnormal enhancement of the skull concerning for lymphoma/leukemia or multiple  myeloma    Her breast cancer was treated with chemotherapy followed by radiation 20 years.  Oncologist Dr. Marie at [facility]  Oncology Treatment Plan:   [No matching plan found]    Medications:  Continuous Infusions:  Scheduled Meds:   acetylcysteine  50 mg/kg Intravenous Once    acetylcysteine  100 mg/kg Intravenous Once    aspirin  81 mg Oral Daily    cefTRIAXone (ROCEPHIN) IVPB  2 g Intravenous Q24H    diphenhydrAMINE  25 mg Intravenous Q8H    enoxaparin  40 mg Subcutaneous Daily    folic acid  1 mg Oral Daily    levothyroxine  75 mcg Oral Before breakfast    losartan  25 mg Oral Daily    metoclopramide HCl  5 mg Intravenous Q8H    multivitamin  1 tablet Oral Daily    pantoprazole  40 mg Oral Daily    potassium, sodium phosphates  1 packet Oral QID (WM & HS)    thiamine  100 mg Oral Daily     PRN Meds:albuterol-ipratropium, hydrALAZINE, ondansetron, sodium chloride 0.9%     Review of patient's allergies indicates:  No Known Allergies     Past Medical History:   Diagnosis Date    Acute on chronic combined systolic and diastolic heart failure 2022    Anemia     Basal cell carcinoma     Breast cancer     Breast cancer     Cancer     CHF (congestive heart failure)     Coronary artery disease     GERD (gastroesophageal reflux disease)     Hyperlipidemia     Hypertension     Hypothyroidism     Nonrheumatic aortic (valve) stenosis 2018    Osteoporosis 2019    S/P TAVR (transcatheter aortic valve replacement) 2022    Squamous cell carcinoma of skin 2018    Thyroid disease      Past Surgical History:   Procedure Laterality Date    BREAST SURGERY      CARDIAC CATH COSURGEON N/A 2022    Procedure: Cardiac Cath Cosurgeon;  Surgeon: Dontae Wooten MD;  Location: [facility] CATH LAB;  Service: Cardiovascular;  Laterality: N/A;     SECTION, CLASSIC      COLONOSCOPY N/A 2018    Procedure: COLONOSCOPY;  Surgeon: Precious Castorena MD;  Location: [facility];  Service: Endoscopy;   Laterality: N/A;    HYSTERECTOMY      LEFT HEART CATHETERIZATION Left 2022    Procedure: Left heart cath;  Surgeon: Dontae Johns MD;  Location: [facility] CATH LAB;  Service: Cardiology;  Laterality: Left;    LEFT HEART CATHETERIZATION Left 2022    Procedure: Left heart cath;  Surgeon: Dontae Johns MD;  Location: [facility] CATH LAB;  Service: Cardiology;  Laterality: Left;    MASTECTOMY Right 2000    right breast      TONSILLECTOMY, ADENOIDECTOMY      TRANSCATHETER AORTIC VALVE REPLACEMENT (TAVR) N/A 2022    Procedure: REPLACEMENT, AORTIC VALVE, TRANSCATHETER (TAVR);  Surgeon: Dontae Johns MD;  Location: [facility] CATH LAB;  Service: Cardiology;  Laterality: N/A;     Family History       Problem Relation (Age of Onset)    Cancer Sister, Brother    Liver cancer Sister    Melanoma Sister          Tobacco Use    Smoking status: Former     Packs/day: 1.00     Types: Cigarettes     Quit date: 2000     Years since quittin.9    Smokeless tobacco: Never    Tobacco comments:     quit 20 years ago   Substance and Sexual Activity    Alcohol use: Yes     Alcohol/week: 2.0 standard drinks     Types: 2 Shots of liquor per week     Comment: per pt 2 burbon drinks per night however none in last month    Drug use: No    Sexual activity: Not Currently       Review of Systems  As per mentioned in HPI; all other systems reviewed and negative  Objective:     Vital Signs (Most Recent):  Temp: 96.5 °F (35.8 °C) (23)  Pulse: 79 (23)  Resp: 17 (23)  BP: (!) 144/63 (23)  SpO2: 96 % (23)   Vital Signs (24h Range):  Temp:  [96.5 °F (35.8 °C)-98.3 °F (36.8 °C)] 96.5 °F (35.8 °C)  Pulse:  [68-83] 79  Resp:  [16-20] 17  SpO2:  [96 %-100 %] 96 %  BP: (130-187)/(60-87) 144/63     Weight: 53.8 kg (118 lb 9.7 oz)  Body mass index is 23.16 kg/m².  Body surface area is 1.51 meters squared.      Intake/Output Summary (Last 24 hours) at 2023 1630  Last data filed at  1/25/2023 1233  Gross per 24 hour   Intake 5130.55 ml   Output 1450 ml   Net 3680.55 ml       Physical Exam  PHYSICAL EXAM:     Vitals:    01/25/23 1513   BP: (!) 144/63   Pulse: 79   Resp: 17   Temp: 96.5 °F (35.8 °C)       GENERAL: Comfortable looking patient. Patient is in no distress.  Awake, alert and oriented to time, person and place.  No anxiety, or agitation.      HEENT: Normal conjunctivae and eyelids. WNL.  PERRLA 3 to 4 mm. No icterus, no pallor, no congestion, and no discharge noted.     NECK:  Supple. Trachea is central.  No crepitus.  No JVD or masses.    RESPIRATORY:  No intercostal retractions.  No dullness to percussion.  Chest is clear to auscultation.  No rales, rhonchi or wheezes.  No crepitus.  Good air entry bilaterally.    CARDIOVASCULAR:  S1 and S2 are normally heard without murmurs or gallops.  All peripheral pulses are present.    ABDOMEN:  Normal abdomen.  No hepatosplenomegaly.  No free fluid.  Bowel sounds are present.  No hernia noted. No masses.  No rebound or tenderness.  No guarding or rigidity.  Umbilicus is midline.    LYMPHATICS:  No axillary, cervical, supraclavicular, submental, or inguinal lymphadenopathy.    SKIN/MUSCULOSKELETAL:  There is no evidence of excoriation marks or ecchmosis.  No rashes.  No cyanosis.  No clubbing.  No joint or skeletal deformities noted.  Normal range of motion.    NEUROLOGIC:  Higher functions are appropriate.  No cranial nerve deficits.  Normal keegan.  Normal strength.  Motor and sensory functions are normal.  Deep tendon reflexes are normal.    GENITAL/RECTAL:  Exams are deferred.   Significant Labs:   CBC:   Recent Labs   Lab 01/24/23  0445 01/25/23  0501   WBC 33.73* 19.47*  19.47*   HGB 9.3* 9.6*  9.6*   HCT 27.8* 27.7*  27.7*    199  199    and CMP:   Recent Labs   Lab 01/23/23 2014 01/24/23  0445 01/24/23  1655 01/25/23  0501   * 130* 123* 131*  131*   K 3.5 2.4* 3.8 4.3  4.3    100 98 103  103   CO2 20* 19* 18*  20*  20*   * 292* 431* 81  81   BUN 13 11 7* 5*  5*   CREATININE 0.7 0.8 0.6 0.6  0.6   CALCIUM 8.6* 7.7* 7.4* 8.5*  8.5*   PROT 5.2* 4.1*  --  4.4*  4.4*   ALBUMIN 2.7* 2.1*  --  2.4*  2.4*   BILITOT 0.9 0.7  --  1.0  1.0   ALKPHOS 145* 129  --  174*  174*   * 392*  --  1,776*  1,776*   * 415*  --  2,215*  2,215*   ANIONGAP 10 11 7* 8  8       Diagnostic Results:  I have reviewed all pertinent imaging results/findings within the past 24 hours.    Assessment/Plan:     Active Diagnoses:    Diagnosis Date Noted POA    PRINCIPAL PROBLEM:  Pneumonia [J18.9] 01/24/2023 Yes    Streptococcal bacteremia [R78.81, B95.5] 01/25/2023 Yes    Hyponatremia [E87.1] 01/24/2023 Yes    Transaminitis [R74.01] 01/23/2023 Yes    Acetaminophen overdose [T39.1X1A] 01/23/2023 Yes    Chronic combined systolic and diastolic heart failure, HFimpEF [I50.42] 02/23/2022 Yes    Coronary artery disease [I25.10] 02/23/2022 Yes    S/P TAVR (transcatheter aortic valve replacement), 26 mm [Z95.2] 02/22/2022 Not Applicable    Anemia [D64.9] 12/10/2021 Yes    Excessive drinking alcohol [F10.10] 06/05/2018 Yes    Headache(784.0) [R51.9] 04/12/2017 Yes    Unspecified hypothyroidism [E03.9] 07/22/2015 Yes    Unspecified essential hypertension [I10] 07/22/2015 Yes    GERD (gastroesophageal reflux disease) [K21.9]  Yes      Problems Resolved During this Admission:    Diagnosis Date Noted Date Resolved POA    Hypokalemia [E87.6] 01/24/2023 01/25/2023 Yes    Severe sepsis [A41.9, R65.20] 01/23/2023 01/25/2023 Yes       Abnormal MRI:   We will check flow cytometry  Check SPEP and serum free light chain  We will follow-up on results    Thank you for your consult. I will follow-up with patient. Please contact us if you have any additional questions.    Priya Glover NP  Hematology/Oncology  [facility]

## 2023-01-25 NOTE — PROGRESS NOTES
[facility] Hospital Medicine  Progress Note    Patient Name: Vivien Burton  MRN: [id number]  Patient Class: IP- Inpatient   Admission Date: 1/23/2023  Length of Stay: 2 days  Attending Physician: Marek Escamilla MD  Primary Care Provider: Ellen Robert III, MD        Subjective:     Principal Problem:Pneumonia        HPI:  Vivien Burton is a 77-year-old female who presents in transfer from [facility].  Patient presented [facility] for evaluation of headache and cough.  She reports headache and cough onset approximately 1 week ago.  She endorses taking 4 tablets of Tylenol every 4 hours for least 3-5 days for headache.  She describes the headache as a stabbing and piercing sensation to the occipital region.  She denies any fever or chills.  She denies any known sick contacts or travel.  No aggravating or alleviating factors.  Previous medical history includes anemia, breast cancer, systolic/diastolic heart failure, coronary artery disease, heart murmur, TAVR, hypothyroidism, hypertension.  ER workup at outside facility:  CBC with leukocytosis of 43,000 in H&H of 10 and 30.  CMP with hypokalemia 3.3, bilirubin of 1.1, alk-phos 161, , .  Magnesium 1.2 (repleted).  Lactic acid elevated at 2.4.  Urinalysis with 8 red blood cells, 10 white blood cells, few bacteria.  INR was elevated 1.8, and sed rate was elevated 37.  Blood urine cultures are pending.  CT the head demonstrated no acute findings.  CT the abdomen and pelvis demonstrated right lower lobe air bronchograms concerning for pneumonia with hepatomegaly, diverticulosis, and aortic valve replacement.  Chest x-ray with a dense masslike suspicious lesion/ infiltrate of the right lower lobe.  Patient admitted to Hospital Medicine for treatment management.  Infectious Disease, Neurology, and Pulmonary consultations made.  Patient will be empirically started on meningitis coverage including ampicillin,  "Rocephin, vancomycin.         Overview/Hospital Course:  Vivien Burton is a 77 year old female with a past medical history of CAD s/p PCI to LAD 1/2022, combined CHF, aortic stenosis s/p TAVR, anemia, hypothyroidism, HLD, HTN, breast cancer, and GERD who presented with one week of headache and cough secondary to a severe sepsis with potential etiologies including CAP and/or meningitis. She was started on empiric bacterial meningitis therapy with ampicillin, ceftriaxone and vancomycin as well as azithromycin. An LP was ordered and preliminary data was not consistent with an acute meningitis. Ampicillin and azithromycin were discontinued per ID. CT of the chest shows consolidation at the RLL with air bronchograms and ground glass opacities concerning for pneumonia. Blood cultures on admission also showed organisms resembling Strep. Respiratory cultures are pending. Neurology and Pulmonary were also consulted. Of note, the patient endorses taking four tablets of Tylenol every four hours for roughly three to five days. Her LFTs were elevated as well as INR on admission. Her albumin is also low and CT of the abdomen shows hepatic steatosis. She was started on NAC therapy while at [place] and has started another NAC protocol on 1/25. GI has also been consulted. Her LFTs continue to rise, yet her liver function remains compensated. An MRI of the brain shows enhancement of the skull for which Oncology was consulted 1/25; a peripheral smear is pending.      Interval History: see "Hospital Course"    Review of Systems   Constitutional:  Positive for activity change and appetite change. Negative for chills, diaphoresis and fever.   HENT:  Negative for congestion, nosebleeds and tinnitus.    Eyes:  Negative for photophobia and visual disturbance.   Respiratory:  Positive for cough, shortness of breath and wheezing. Negative for chest tightness.    Cardiovascular:  Negative for chest pain, palpitations and leg swelling. "   Gastrointestinal:  Negative for abdominal distention, abdominal pain, constipation, diarrhea, nausea and vomiting.   Endocrine: Negative for cold intolerance and heat intolerance.   Genitourinary:  Negative for difficulty urinating, dysuria, frequency, hematuria and urgency.   Musculoskeletal:  Negative for arthralgias, back pain and myalgias.   Skin:  Negative for pallor, rash and wound.   Allergic/Immunologic: Negative for immunocompromised state.   Neurological:  Positive for headaches. Negative for dizziness, tremors, facial asymmetry, speech difficulty and weakness.   Hematological:  Negative for adenopathy. Does not bruise/bleed easily.   Psychiatric/Behavioral:  Negative for confusion and sleep disturbance. The patient is not nervous/anxious.    Objective:     Vital Signs (Most Recent):  Temp: 97.9 °F (36.6 °C) (01/25/23 0745)  Pulse: 71 (01/25/23 0745)  Resp: 16 (01/25/23 0745)  BP: (!) 167/87 (01/25/23 0745)  SpO2: 99 % (01/25/23 0810)   Vital Signs (24h Range):  Temp:  [97.4 °F (36.3 °C)-98.1 °F (36.7 °C)] 97.9 °F (36.6 °C)  Pulse:  [71-85] 71  Resp:  [16-20] 16  SpO2:  [96 %-100 %] 99 %  BP: (130-187)/(60-87) 167/87     Weight: 53.8 kg (118 lb 9.7 oz)  Body mass index is 23.16 kg/m².    Intake/Output Summary (Last 24 hours) at 1/25/2023 1039  Last data filed at 1/25/2023 0823  Gross per 24 hour   Intake 5010.55 ml   Output 1450 ml   Net 3560.55 ml      Physical Exam  Vitals and nursing note reviewed.   Constitutional:       General: She is not in acute distress.     Appearance: She is well-developed. She is ill-appearing. She is not diaphoretic.   HENT:      Head: Normocephalic and atraumatic.      Right Ear: External ear normal.      Left Ear: External ear normal.      Nose: Nose normal.      Mouth/Throat:      Mouth: Mucous membranes are moist.      Pharynx: Oropharynx is clear.   Eyes:      General: No scleral icterus.     Extraocular Movements: Extraocular movements intact.      Conjunctiva/sclera:  Conjunctivae normal.   Neck:      Vascular: No JVD.   Cardiovascular:      Rate and Rhythm: Normal rate and regular rhythm.      Heart sounds: Murmur heard.     No friction rub. No gallop.   Pulmonary:      Effort: Pulmonary effort is normal. No respiratory distress.      Breath sounds: Wheezing present. No rales.   Abdominal:      General: Bowel sounds are normal. There is no distension.      Palpations: Abdomen is soft.      Tenderness: There is no abdominal tenderness. There is no guarding or rebound.   Musculoskeletal:         General: No tenderness. Normal range of motion.      Cervical back: Normal range of motion and neck supple.   Lymphadenopathy:      Cervical: No cervical adenopathy.   Skin:     General: Skin is warm and dry.      Coloration: Skin is not pale.      Findings: No erythema or rash.   Neurological:      General: No focal deficit present.      Mental Status: She is alert and oriented to person, place, and time. Mental status is at baseline.      Cranial Nerves: No cranial nerve deficit.      Sensory: No sensory deficit.      Coordination: Coordination normal.      Deep Tendon Reflexes: Reflexes normal.   Psychiatric:         Behavior: Behavior normal.         Thought Content: Thought content normal.         Judgment: Judgment normal.       Significant Labs: All pertinent labs within the past 24 hours have been reviewed.    Significant Imaging: I have reviewed all pertinent imaging results/findings within the past 24 hours.      Assessment/Plan:      * Pneumonia  Patient endorses cough. Procalcitonin 143.94. CXR and CT chest shows consolidation. Concern for Strep sp.  -Continue Rocephin  -Follow up respiratory cultures  -Trend WBC count with CBC  -PRN Duonebs      Streptococcal bacteremia  TTE shows no vegetations. Likely from pneumonia.  -Follow up final speciation and sensitivities  -Follow up repeat blood cultures  -Continue Rocephin and vancomycin  -ID following      Transaminitis  History  of EtOH use. Possible Tylenol toxicity. Hepatic steatosis seen on CT. Worsening, yet liver function remains compensated.  -Trend LFTs, albumin and INR  -Continue NAC repeat protocol  -Low threshold to transfer for Hepatology evaluation  -GI consulted      Acetaminophen overdose  Patient endorses taking four tablets of Tylenol every four ours for three to five days.  -GI consulted  -Monitor LFTs, albumin, and INR  -NAC protocol repeated 1/25  -Poison control aware of case  -If patient's liver failure decompensates, will need transfer to [facility] for Hepatology consultation      Headache(784.0)  CT head unremarkable. MRI brain shows enhancement of skull. LP studies not consistent with meningoencephalitis.  -PRN ibuprofen  -Oncology consulted  -Follow up peripheral smear      Hyponatremia  In setting of severe sepsis with pneumonia as well as liver injury.  -Trend Na      Coronary artery disease  -Continue home ASA    Chronic combined systolic and diastolic heart failure, HFimpEF  History of CAD. Not observed on 1/24 TTE.  -Continue cardiac telemetry  -Strict I's and O's        S/P TAVR (transcatheter aortic valve replacement), 26 mm  Stable.      Anemia  Stable.  -Trend Hgb with CBC  -Hold home iron in setting of severe sepsis      Excessive drinking alcohol  -Monitor for signs and symptoms of withdrawal  -Folic acid, thiamine and MVI      Unspecified essential hypertension  -Losartan  -PRN hydralazine  -Continue to monitor        Unspecified hypothyroidism  -Continue Synthroid      GERD (gastroesophageal reflux disease)  -Continue PPI      VTE Risk Mitigation (From admission, onward)         Ordered     enoxaparin injection 40 mg  Daily         01/24/23 0739     Place LAMBERT hose  Until discontinued         01/23/23 2322     IP VTE HIGH RISK PATIENT  Once         01/23/23 2322     Place sequential compression device  Until discontinued         01/23/23 2322                Discharge Planning   MIRELLA: 1/29/2023     Code Status:  Full Code   Is the patient medically ready for discharge?:     Reason for patient still in hospital (select all that apply): Patient trending condition, Laboratory test, Treatment, Consult recommendations, PT / OT recommendations and Pending disposition  Discharge Plan A: Home                  Marek Escamilla MD  Department of Hospital Medicine   [facility]

## 2023-01-25 NOTE — DISCHARGE SUMMARY
[facility] Emergency Dept  [facility]  Discharge Summary      Patient Name: Vivien Burton  MRN: [id number]  JYOTSNA: [id number]  Patient Class: IP- Inpatient  Admission Date: 1/23/2023  Hospital Length of Stay: 1 days  Discharge Date and Time: 1/23/2023  9:56 PM  Attending Physician: No att. providers found   Discharging Provider: Luca Mariano MD  Primary Care Provider: Ellen Robert III, MD    Primary Care Team: Networked reference to record PCT     HPI:   76 yo w/ hx of HF, Hypothyroidism, CAD s/p PCI of proximal LAD stenosis with LAWRENCE x 1 on 01/07/22 , Aortic Stenosis s/p TAVR 2/2022, breast cancer presenting with headache and cough since last Wednesday. No fever, chills, sick contacts. Former smoker. Has not smoked in 20 years. No SOB. Patient states that she has been taking 4 tablets of tylenol every 4 hours from Wednesday until Sunday. Unclear what dose she was taking. Patient drinks 3 bourbon drinks per night. Patient with no headache at all at time of my exam. She describes headache as pinpiont on one side of posterior head that comes and goes. No neck pain or stiffness.       * No surgery found *      Hospital Course:   Patient transferred for higher level of care       Goals of Care Treatment Preferences:  Code Status: Full Code    Living Will: Yes              Consults:     * Severe sepsis  This patient does have evidence of infective focus  My overall impression is sepsis. Vital signs were reviewed and noted in progress note.  Antibiotics given-   Antibiotics (From admission, onward)    Start     Stop Route Frequency Ordered    01/23/23 2200  vancomycin 750 mg in dextrose 5 % (D5W) 250 mL IVPB (Vial-Mate)         -- IV Every 24 hours (non-standard times) 01/23/23 1826    01/23/23 1845  azithromycin tablet 500 mg         -- Oral Daily 01/23/23 1834    01/23/23 1836  vancomycin - pharmacy to dose  (vancomycin IVPB)        See Oral for full Linked Orders Report.    -- IV pharmacy to  manage frequency 01/23/23 1736    01/23/23 0100  piperacillin-tazobactam (ZOSYN) 4.5 g in dextrose 5 % in water (D5W) 5 % 100 mL IVPB (MB+)         -- IV Every 8 hours (non-standard times) 01/23/23 2101        Cultures were taken-   Microbiology Results (last 7 days)     Procedure Component Value Units Date/Time    Culture, MRSA [[id number]]     Order Status: No result Specimen: MRSA source from Nares, Right     Culture, Respiratory with Gram Stain [[id number]]     Order Status: No result Specimen: Respiratory     Influenza A & B by Molecular [[id number]] Collected: 01/23/23 1646    Order Status: Completed Specimen: Nasopharyngeal Swab Updated: 01/23/23 1718     Influenza A, Molecular Negative     Influenza B, Molecular Negative     Flu A & B Source Nasal Swab    Blood culture #2 **CANNOT BE ORDERED STAT** [[id number]] Collected: 01/23/23 1648    Order Status: Sent Specimen: Blood from Peripheral, Hand, Left Updated: 01/23/23 1648    Blood culture #1 **CANNOT BE ORDERED STAT** [[id number]] Collected: 01/23/23 1644    Order Status: Sent Specimen: Blood from Peripheral, Antecubital, Left Updated: 01/23/23 1645        Latest lactate reviewed, they are-  Recent Labs   Lab 01/23/23  1637 01/23/23 2014   LACTATE 2.4* 1.6       Organ dysfunction indicated by Acute liver injury  Source- Lung    Source control Achieved by- Abx  CAP(unknown organism)  F/U cultures sputum and blood  Urinary legionella and strep pneumo pending  Azithromycin added to cover for atypical pathogens such as legionella or mycoplasma  F/U MRSA nares- Deescalate abx if negative  COVID and Flu negative  Repeat lactate ordered      Final Active Diagnoses:    Diagnosis Date Noted POA    PRINCIPAL PROBLEM:  Severe sepsis [A41.9, R65.20] 01/23/2023 Yes    Acetaminophen overdose [T39.1X1A] 01/23/2023 Yes    Transaminitis [R74.01] 01/23/2023 Yes    Chronic combined systolic and diastolic heart failure, HFimpEF [I50.42] 02/23/2022 Yes    Coronary artery  disease [I25.10] 02/23/2022 Yes    Unspecified hypothyroidism [E03.9] 07/22/2015 Yes    GERD (gastroesophageal reflux disease) [K21.9]  Yes      Problems Resolved During this Admission:       Discharged Condition: stable    Disposition: Another Health Care Inst*    Follow Up:    Patient Instructions:   No discharge procedures on file.    Significant Diagnostic Studies:     Pending Diagnostic Studies:     None         Medications:  None    Indwelling Lines/Drains at time of discharge:   Lines/Drains/Airways     None                 Time spent on the discharge of patient: 45 minutes         Luca Mariano MD  Department of Hospital Medicine  [facility] - Emergency Dept

## 2023-01-25 NOTE — PT/OT/SLP DISCHARGE
Occupational Therapy Discharge Summary    Vivien Burton  MRN: [id number]   Principal Problem: Pneumonia      Patient Discharged from acute Occupational Therapy on 1/25/2023.    Assessment:       OT screen completed; OTR met and spoke with patient regarding OT role/purpose - patient verbalizes familiarity with OT services and declines OT services at this time. Patient reports she is able to ambulate to toilet with Supervision, etc. No acute, skilled OT services indicated at this time. OTR providing education regarding safety/fall prevention in home environment - encouraging use AD should PT recommend to increase stability/safety, encouraging slowing pace of activity, energy conservation, and recommending only light meal prep activities upon arrival home. OTR also recommending patient have Supervision with bathing tasks - patient verbalizes understanding and in agreement with all education/instruction provided.     Objective:     GOALS:   Multidisciplinary Problems       Occupational Therapy Goals       Not on file                    Reasons for Discontinuation of Therapy Services  Patient functioning at baseline to near baseline and declining OT services at this time. No acute, skilled OT indicated at this time.       Plan:     Patient Discharged to: In house with medical management. Re-consult OT should patient have change in status.    1/25/2023

## 2023-01-25 NOTE — CARE UPDATE
01/25/23 0810   Patient Assessment/Suction   Level of Consciousness (AVPU) alert   Respiratory Effort Unlabored   Expansion/Accessory Muscles/Retractions expansion symmetric   All Lung Fields Breath Sounds clear   RML Breath Sounds diminished   RLL Breath Sounds diminished   Rhythm/Pattern, Respiratory no shortness of breath reported   Cough Frequency infrequent   Cough Type nonproductive   PRE-TX-O2   Device (Oxygen Therapy) room air   SpO2 99 %   Pulse Oximetry Type Intermittent   $ Pulse Oximetry - Multiple Charge Pulse Oximetry - Multiple   Aerosol Therapy   $ Aerosol Therapy Charges PRN treatment not required

## 2023-01-25 NOTE — PLAN OF CARE
Problem: Physical Therapy  Goal: Physical Therapy Goal  Description: Goals to be met by: 2023     Patient will increase functional independence with mobility by performin. Supine to sit with Contact Guard Assistance  2. Sit to stand transfer with Contact Guard Assistance  3. Bed to chair transfer with Contact Guard Assistance using Rolling Walker  4. Gait  x 250x2 feet with Contact Guard Assistance using Rolling Walker.   5. Lower extremity exercise program x20 reps  Outcome: Ongoing, Progressing   PT eval and treat. Pt ambulated 250ft with RW min assist. OOB chair

## 2023-01-25 NOTE — PLAN OF CARE
Plan of care reviewed with pt. Pt verbalized understanding. Patient is alert and oriented x 4, able to make needs known. Continuous cardiac monitoring in place as ordered. A-febrile throughout the shift. ABX administered as scheduled. Meds given per MAR. IVF infusing as ordered. Ambulated to bathroom independently with standby assist. Repositions self independently. Left upper arm midline in place infusing without difficulty, no s/s of infection noted to site, dsg with moist drainage/blood from previous midline placement, initial dsg changed and clot inducing dsg placed to promote clotting at insertion site. Complaints of pain and discomfort, PRN pain medication given. Purposeful hourly/q2hr rounding done during shift to promote patient safety. NAD noted. Safety maintained with side rails up x3, bed wheels locked, bed in lowest positioned, call light in reach. Patient educated to call for assistance with ambulation if needed, verbalized understanding. Pt remains free of falls. No further needs expressed at this time. Will continue to monitor.

## 2023-01-25 NOTE — SUBJECTIVE & OBJECTIVE
"Interval History: see "Hospital Course"    Review of Systems   Constitutional:  Positive for activity change and appetite change. Negative for chills, diaphoresis and fever.   HENT:  Negative for congestion, nosebleeds and tinnitus.    Eyes:  Negative for photophobia and visual disturbance.   Respiratory:  Positive for cough, shortness of breath and wheezing. Negative for chest tightness.    Cardiovascular:  Negative for chest pain, palpitations and leg swelling.   Gastrointestinal:  Negative for abdominal distention, abdominal pain, constipation, diarrhea, nausea and vomiting.   Endocrine: Negative for cold intolerance and heat intolerance.   Genitourinary:  Negative for difficulty urinating, dysuria, frequency, hematuria and urgency.   Musculoskeletal:  Negative for arthralgias, back pain and myalgias.   Skin:  Negative for pallor, rash and wound.   Allergic/Immunologic: Negative for immunocompromised state.   Neurological:  Positive for headaches. Negative for dizziness, tremors, facial asymmetry, speech difficulty and weakness.   Hematological:  Negative for adenopathy. Does not bruise/bleed easily.   Psychiatric/Behavioral:  Negative for confusion and sleep disturbance. The patient is not nervous/anxious.    Objective:     Vital Signs (Most Recent):  Temp: 97.9 °F (36.6 °C) (01/25/23 0745)  Pulse: 71 (01/25/23 0745)  Resp: 16 (01/25/23 0745)  BP: (!) 167/87 (01/25/23 0745)  SpO2: 99 % (01/25/23 0810)   Vital Signs (24h Range):  Temp:  [97.4 °F (36.3 °C)-98.1 °F (36.7 °C)] 97.9 °F (36.6 °C)  Pulse:  [71-85] 71  Resp:  [16-20] 16  SpO2:  [96 %-100 %] 99 %  BP: (130-187)/(60-87) 167/87     Weight: 53.8 kg (118 lb 9.7 oz)  Body mass index is 23.16 kg/m².    Intake/Output Summary (Last 24 hours) at 1/25/2023 1039  Last data filed at 1/25/2023 0823  Gross per 24 hour   Intake 5010.55 ml   Output 1450 ml   Net 3560.55 ml      Physical Exam  Vitals and nursing note reviewed.   Constitutional:       General: She is not " in acute distress.     Appearance: She is well-developed. She is ill-appearing. She is not diaphoretic.   HENT:      Head: Normocephalic and atraumatic.      Right Ear: External ear normal.      Left Ear: External ear normal.      Nose: Nose normal.      Mouth/Throat:      Mouth: Mucous membranes are moist.      Pharynx: Oropharynx is clear.   Eyes:      General: No scleral icterus.     Extraocular Movements: Extraocular movements intact.      Conjunctiva/sclera: Conjunctivae normal.   Neck:      Vascular: No JVD.   Cardiovascular:      Rate and Rhythm: Normal rate and regular rhythm.      Heart sounds: Murmur heard.     No friction rub. No gallop.   Pulmonary:      Effort: Pulmonary effort is normal. No respiratory distress.      Breath sounds: Wheezing present. No rales.   Abdominal:      General: Bowel sounds are normal. There is no distension.      Palpations: Abdomen is soft.      Tenderness: There is no abdominal tenderness. There is no guarding or rebound.   Musculoskeletal:         General: No tenderness. Normal range of motion.      Cervical back: Normal range of motion and neck supple.   Lymphadenopathy:      Cervical: No cervical adenopathy.   Skin:     General: Skin is warm and dry.      Coloration: Skin is not pale.      Findings: No erythema or rash.   Neurological:      General: No focal deficit present.      Mental Status: She is alert and oriented to person, place, and time. Mental status is at baseline.      Cranial Nerves: No cranial nerve deficit.      Sensory: No sensory deficit.      Coordination: Coordination normal.      Deep Tendon Reflexes: Reflexes normal.   Psychiatric:         Behavior: Behavior normal.         Thought Content: Thought content normal.         Judgment: Judgment normal.       Significant Labs: All pertinent labs within the past 24 hours have been reviewed.    Significant Imaging: I have reviewed all pertinent imaging results/findings within the past 24 hours.

## 2023-01-25 NOTE — PLAN OF CARE
POC/Meds reviewed, pt verbalized understanding. Vitals stable. Afebrile. Remains on room air. IVPB abx administered. Tele In place-NSR. Up with x1 assist to bathroom. IS at bedside, instructed on use and return demonstration performed. PRN pain meds administered. Repositions self. Hourly/Q2hr rounding performed, safety maintained. Bed in lowest position, wheels locked, SR up x2, call light in easy reach. No complaints at this time. Will continue to monitor.

## 2023-01-25 NOTE — ASSESSMENT & PLAN NOTE
TTE shows no vegetations. Likely from pneumonia.  -Follow up final speciation and sensitivities  -Follow up repeat blood cultures  -Continue Rocephin and vancomycin  -ID following

## 2023-01-25 NOTE — PROGRESS NOTES
Vivien Tyrone [place] [id number] is a 77 y.o. female who has been consulted for vancomycin dosing.    Pharmacy consult for vancomycin dosing in no longer required.  Vancomycin was discontinued.    Thank you for allowing us to participate in this patient's care.     Tyrone Masters, PharmD  [phone]

## 2023-01-25 NOTE — PT/OT/SLP EVAL
Physical Therapy Evaluation    Patient Name:  Vivien Burton   MRN:  [id number]    Recommendations:     Discharge Recommendations: home health PT   Discharge Equipment Recommendations: walker, rolling   Barriers to discharge: None    Assessment:     Vivien Burton is a 77 y.o. female admitted with a medical diagnosis of Pneumonia.  She presents with the following impairments/functional limitations: weakness, impaired endurance, impaired balance, impaired functional mobility, decreased lower extremity function, pain, impaired cardiopulmonary response to activity .    Pt seen seated in chair with 2 sons present. Pt stated was previously active and indep, no assistive device and even mows her yard. Pt requiring min assist for sit to stand and ambulated at hallways 250ft with RW.  Pt to benefit from continued therapies HHPT    Rehab Prognosis: Good; patient would benefit from acute skilled PT services to address these deficits and reach maximum level of function.    Recent Surgery: * No surgery found *      Plan:     During this hospitalization, patient to be seen 6 x/week to address the identified rehab impairments via gait training, therapeutic activities, therapeutic exercises and progress toward the following goals:    Plan of Care Expires:  01/30/23    Subjective   Stated is feeling much better now- had a rough day yesterday  Pt seen seated in chair with 2 sons present  Chief Complaint: headache  Patient/Family Comments/goals: get well  Pain/Comfort:  Pain Rating 1:  (not rated)  Location 1: head  Pain Addressed 1: Reposition, Distraction, Cessation of Activity    Patients cultural, spiritual, Taoism conflicts given the current situation:      Living Environment:  Home alone but has good support from sons and their family  Prior to admission, patients level of function was ambulatory/independent.  Equipment used at home: none.  DME owned (not currently used): none.  Upon discharge, patient will  have assistance from family.    Objective:     Communicated with nurse Ronel prior to session.  Patient found up in chair with telemetry, peripheral IV  upon PT entry to room.    General Precautions: Standard, fall  Orthopedic Precautions:N/A   Braces: N/A  Respiratory Status: Room air    Exams:  Postural Exam:  Patient presented with the following abnormalities:    -       Rounded shoulders  -       Forward head  -       BMI 23  RLE ROM: WFL  RLE Strength: WFL  LLE ROM: WFL  LLE Strength: WFL    Functional Mobility:  Transfers:     Sit to Stand:  contact guard assistance with rolling walker  Gait: 250ft with RW min assist      AM-PAC 6 CLICK MOBILITY  Total Score:17       Treatment & Education:  Patient was educated on the importance of OOB activity and functional mobility to negate negative effects of prolonged bed rest during hospitalization, safe transfers and ambulation, and D/C planning   Bathroom use to void  Gait at hallways and back  to chair post PT    Patient left up in chair with all lines intact, call button in reach, chair alarm on, and 2 sons present.    GOALS:   Multidisciplinary Problems       Physical Therapy Goals          Problem: Physical Therapy    Goal Priority Disciplines Outcome Goal Variances Interventions   Physical Therapy Goal     PT, PT/OT Ongoing, Progressing     Description: Goals to be met by: 2023     Patient will increase functional independence with mobility by performin. Supine to sit with Contact Guard Assistance  2. Sit to stand transfer with Contact Guard Assistance  3. Bed to chair transfer with Contact Guard Assistance using Rolling Walker  4. Gait  x 250x2 feet with Contact Guard Assistance using Rolling Walker.   5. Lower extremity exercise program x20 reps                       History:     Past Medical History:   Diagnosis Date    Acute on chronic combined systolic and diastolic heart failure 2022    Anemia     Basal cell carcinoma     Breast  cancer     Breast cancer     Cancer     CHF (congestive heart failure)     Coronary artery disease     GERD (gastroesophageal reflux disease)     Hyperlipidemia     Hypertension     Hypothyroidism     Nonrheumatic aortic (valve) stenosis 2018    Osteoporosis 2019    S/P TAVR (transcatheter aortic valve replacement) 2022    Squamous cell carcinoma of skin 2018    Thyroid disease        Past Surgical History:   Procedure Laterality Date    BREAST SURGERY      CARDIAC CATH COSURGEON N/A 2022    Procedure: Cardiac Cath Cosurgeon;  Surgeon: Dontae Wooten MD;  Location: [facility] CATH LAB;  Service: Cardiovascular;  Laterality: N/A;     SECTION, CLASSIC      COLONOSCOPY N/A 2018    Procedure: COLONOSCOPY;  Surgeon: Precious Castorena MD;  Location: [facility] ENDO;  Service: Endoscopy;  Laterality: N/A;    HYSTERECTOMY      LEFT HEART CATHETERIZATION Left 2022    Procedure: Left heart cath;  Surgeon: Dontae Johns MD;  Location: [facility] CATH LAB;  Service: Cardiology;  Laterality: Left;    LEFT HEART CATHETERIZATION Left 2022    Procedure: Left heart cath;  Surgeon: Dontae Johns MD;  Location: [facility] CATH LAB;  Service: Cardiology;  Laterality: Left;    MASTECTOMY Right 2000    right breast      TONSILLECTOMY, ADENOIDECTOMY      TRANSCATHETER AORTIC VALVE REPLACEMENT (TAVR) N/A 2022    Procedure: REPLACEMENT, AORTIC VALVE, TRANSCATHETER (TAVR);  Surgeon: Dontae Johns MD;  Location: [facility] CATH LAB;  Service: Cardiology;  Laterality: N/A;       Time Tracking:     PT Received On: 23  PT Start Time: 915     PT Stop Time: 925  PT Total Time (min): 10 min     Billable Minutes: Evaluation 10      2023

## 2023-01-25 NOTE — ASSESSMENT & PLAN NOTE
CT head unremarkable. MRI brain shows enhancement of skull. LP studies not consistent with meningoencephalitis.  -PRN ibuprofen  -Oncology consulted  -Follow up peripheral smear

## 2023-01-25 NOTE — ASSESSMENT & PLAN NOTE
History of EtOH use. Possible Tylenol toxicity. Hepatic steatosis seen on CT. Worsening, yet liver function remains compensated.  -Trend LFTs, albumin and INR  -Continue NAC repeat protocol  -Low threshold to transfer for Hepatology evaluation  -GI consulted

## 2023-01-25 NOTE — CARE UPDATE
01/24/23 1948   Patient Assessment/Suction   Level of Consciousness (AVPU) alert   Respiratory Effort Normal;Unlabored   Expansion/Accessory Muscles/Retractions expansion symmetric;no retractions;no use of accessory muscles   All Lung Fields Breath Sounds clear   PRE-TX-O2   Device (Oxygen Therapy) room air   SpO2 96 %   Pulse Oximetry Type Intermittent   Pulse 83   Resp 18   Aerosol Therapy   $ Aerosol Therapy Charges PRN treatment not required   Respiratory Treatment Status (SVN) PRN treatment not required

## 2023-01-25 NOTE — PROGRESS NOTES
Progress Note  Infectious Disease    Reason for Consult:  PNA/rule out meningitis     HPI: Vivien Burton is a 77 y.o. female very nice lady, former smoker, with past medical history of CAD status post TAVR, hypertension, hyperlipidemia, right breast cancer status post mastectomy, GERD, hypothyroidism, who presents with severe, 10/10, stabbing in nature occipital right headache for the last week.  She was taking 4 tablets of Tylenol every 4 hours for the last couple of days with partial improvement of symptoms.  She also has had persistent nonproductive cough, no SOB.  Denies fever or chills, no nausea or vomit, no abdominal pain, no dysuria increased urinary frequency, no change in bowel movement.  She states she has baseline macular degeneration, has near vision impairment.    She presented to [facility] yesterday, Lab stairs significant for leukemoid reaction on CBC 24593 WBC, left shift 80%, bands 8%, H&H 10.7/30.7, platelet count 214.  ESR 37   INR 1.8   Hyponatremia 133, normal kidney function   LFTs; /, transaminitis likely from acute Tylenol toxicity   Procalcitonin 143.9, extremely high   Tylenol level less than 3   U tox negative   Group a strep throat negative   Flu a, B, COVID negative   UA with pyuria 10, few bacteria, asymptomatic-negative for UTI   CT chest revealed right lower lobe pneumonia with air bronchogram.  Scattered ground-glass opacities and tree-in-bud micro nodules within the inferior lingula and bilateral lower lobes suspicious for infectious/inflammatory bronchiolitis.  Hepatomegaly, hepatic steatosis.  Aortic valve replacement.  Right mastectomy.    Patient admitted for severe sepsis likely secondary to multifocal pneumonia, rule out meningitis.    She was empirically treated with Zosyn at [place], switch to vancomycin/ceftriaxone/ampicillin on arrival to [facility].    ID consult for severe sepsis.    INTERVAL HISTORY:  1/25: Interim reviewed, patient  seen and examined at bedside. Hypertensive, afebrile. Family at bedside. She states she is feeling better today although still c/o stabbing/needle-like R occipital headache. LP performed yesterday, negative for meningitis, CSF sample sent for PCR, not detected as expected.  Reviewed, leukocytosis down to 19.4, left shift 84%, bands 1%, H&H 9.6/27.7, platelet count 199.  Stable kidney function, shock liver; AST 1700 ALT 2200 procalcitonin trending down to 56.  MRI brain with diffusely heterogenous marrow signal and enhancement throughout the calvarium without obvious cortical destruction or extraosseous extension, nonspecific but suspicions for hematopoietic disorder.  Heme-Onc consult ordered.    Review of patient's allergies indicates:  No Known Allergies  Past Medical History:   Diagnosis Date    Acute on chronic combined systolic and diastolic heart failure 2022    Anemia     Basal cell carcinoma     Breast cancer     Breast cancer     Cancer     CHF (congestive heart failure)     Coronary artery disease     GERD (gastroesophageal reflux disease)     Hyperlipidemia     Hypertension     Hypothyroidism     Nonrheumatic aortic (valve) stenosis 2018    Osteoporosis 2019    S/P TAVR (transcatheter aortic valve replacement) 2022    Squamous cell carcinoma of skin     Thyroid disease      Past Surgical History:   Procedure Laterality Date    BREAST SURGERY      CARDIAC CATH COSURGEON N/A 2022    Procedure: Cardiac Cath Cosurgeon;  Surgeon: Dontae Wooten MD;  Location: [facility] CATH LAB;  Service: Cardiovascular;  Laterality: N/A;     SECTION, CLASSIC      COLONOSCOPY N/A 2018    Procedure: COLONOSCOPY;  Surgeon: Precious Castorena MD;  Location: [facility];  Service: Endoscopy;  Laterality: N/A;    HYSTERECTOMY      LEFT HEART CATHETERIZATION Left 2022    Procedure: Left heart cath;  Surgeon: Dontae Johns MD;  Location: [facility] CATH LAB;  Service: Cardiology;   Laterality: Left;    LEFT HEART CATHETERIZATION Left 2022    Procedure: Left heart cath;  Surgeon: Dontae Johns MD;  Location: [facility] CATH LAB;  Service: Cardiology;  Laterality: Left;    MASTECTOMY Right 2000    right breast      TONSILLECTOMY, ADENOIDECTOMY      TRANSCATHETER AORTIC VALVE REPLACEMENT (TAVR) N/A 2022    Procedure: REPLACEMENT, AORTIC VALVE, TRANSCATHETER (TAVR);  Surgeon: Dontae Johns MD;  Location: [facility] CATH LAB;  Service: Cardiology;  Laterality: N/A;     Social History     Tobacco Use    Smoking status: Former     Packs/day: 1.00     Types: Cigarettes     Quit date: 2000     Years since quittin.9    Smokeless tobacco: Never    Tobacco comments:     quit 20 years ago   Substance Use Topics    Alcohol use: Yes     Alcohol/week: 2.0 standard drinks     Types: 2 Shots of liquor per week     Comment: per pt 2 burbon drinks per night however none in last month        Family History   Problem Relation Age of Onset    Cancer Sister     Liver cancer Sister     Melanoma Sister     Cancer Brother     Breast cancer Neg Hx     Psoriasis Neg Hx     Lupus Neg Hx     Eczema Neg Hx        Review of Systems:   No chills, fever, +night sweats, no weight loss  Baseline macular degeneration/near vision impairment - no diplopia  Minimal sinus congestion, no purulent nasal discharge, post nasal drip or facial pain  No pain in mouth or throat. No problems with teeth, gums.  No chest pain, palpitations, syncope  Persistent nonproductive cough, no shortness of breath, on exertion or at rest, no pleurisy, no hemoptysis.   No dysphagia, odynophagia  No nausea, vomiting, diarrhea, constipation, blood in stool, or focal abd pain,    No dysuria, hesitancy, hematuria, retention, incontinence  No swelling of joints, redness of joints, injuries, or new focal pain  Significant severe 10/10 right occipital headache, no dizziness, vertigo, numbness, paresthesias, neuropathy, falls  No anxiety,  depression, substance abuse, sleep disturbance  No bleeding, lymphadenopathy  No new rashes, lesions, or wounds    Outdoor activities:  Retired 4th , lives at home.  Former smoker.  Drinks 2-3 bourbons/day.  No drugs.  Travel: none recent.  States she was recently at a , does not remember prior sick contacts.  Daughter-in-law had recent strep facial cellulitis.  Implants:  TAVR  Antibiotic History:  See HPI    EXAM & DIAGNOSTICS REVIEWED:   Vitals:     Temp:  [97.4 °F (36.3 °C)-98.1 °F (36.7 °C)]   Temp: 97.9 °F (36.6 °C) (23 07)  Pulse: 71 (23)  Resp: 16 (23)  BP: (!) 198/78 (2345)  SpO2: 99 % (23 0810)    Intake/Output Summary (Last 24 hours) at 2023 0847  Last data filed at 2023 1854  Gross per 24 hour   Intake 3655.3 ml   Output 0 ml   Net 3655.3 ml       General:  In NAD. Alert and attentive, cooperative, comfortable on room air  Eyes:  Anicteric, PERRL  ENT:  No ulcers, exudates, thrush, nares patent, dentition is fair  Neck:  Supple  Lungs: Better air entry bilaterally, scattered rales R>L     S/p R mastectomy   Heart:  S1/S2+, regular rhythm, soft systolic murmur+  Abd:  +BS, soft, non tender, non distended, no rebound  :  Voids, urine clear, no flank tenderness  Musc:  Joints without effusion, swelling,  erythema, synovitis, ambulatory  Skin:  Warm, no rash  Wound:   Neuro:  Persistent R occipital headache, improves when laying down   Following commands, no acute focal deficit   Psych:  Calm, cooperative  Lymphatic:       Extrem: No LE edema b/l  VAD:  Peripheral IV      L Midline in place, dressing with dry blood noted     Isolation: None       General Labs reviewed:  Recent Labs   Lab 23  1546 23  0445 23  0501   WBC 43.13* 33.73* 19.47*  19.47*   HGB 10.7* 9.3* 9.6*  9.6*   HCT 30.7* 27.8* 27.7*  27.7*    174 199  199       Recent Labs   Lab 23  0445 23  165  01/25/23  0501   * 130* 123* 131*  131*   K 3.5 2.4* 3.8 4.3  4.3    100 98 103  103   CO2 20* 19* 18* 20*  20*   BUN 13 11 7* 5*  5*   CREATININE 0.7 0.8 0.6 0.6  0.6   CALCIUM 8.6* 7.7* 7.4* 8.5*  8.5*   PROT 5.2* 4.1*  --  4.4*  4.4*   BILITOT 0.9 0.7  --  1.0  1.0   ALKPHOS 145* 129  --  174*  174*   * 415*  --  2,215*  2,215*   * 392*  --  1,776*  1,776*     No results for input(s): CRP in the last 168 hours.  Recent Labs   Lab 01/23/23  1637   SEDRATE 37*       Estimated Creatinine Clearance: 58.2 mL/min (based on SCr of 0.6 mg/dL).     Micro:  Microbiology Results (last 7 days)       Procedure Component Value Units Date/Time    Blood culture [[id number]] Collected: 01/24/23 1655    Order Status: Completed Specimen: Blood Updated: 01/25/23 0715     Blood Culture, Routine No Growth to date    Blood culture [[id number]] Collected: 01/24/23 1716    Order Status: Completed Specimen: Blood Updated: 01/25/23 0715     Blood Culture, Routine No Growth to date    CSF culture [[id number]] Collected: 01/24/23 1136    Order Status: Completed Specimen: CSF (Spinal Fluid) from CSF Tap, Tube 2 Updated: 01/24/23 1449     Gram Stain Result No WBC's      No epithelial cells      No organisms seen      01/24/2023  14:48 TN3    Culture, Respiratory with Gram Stain [[id number]]     Order Status: No result Specimen: Respiratory from Sputum, Expectorated     Culture, Respiratory with Gram Stain [[id number]]     Order Status: No result Specimen: Respiratory from Sputum     Culture, MRSA [[id number]]     Order Status: No result Specimen: MRSA source     Group A Strep, Molecular [[id number]] Collected: 01/24/23 0130    Order Status: Completed Updated: 01/24/23 0213     Group A Strep, Molecular Negative     Comment: Arcanobacterium haemolyticum and Beta Streptococcus group C   and G will not be detected by this test method.  Please order   Throat Culture ([id number]) if suspected.         Throat Screen, Rapid  [[id number]] Collected: 01/24/23 0130    Order Status: Sent Specimen: Throat              Latest Reference Range & Units 01/24/23 11:33   COLOR CSF Colorless  Colorless   Heme Aliquot mL 2.0   Appearance, CSF Clear  Clear   RBC, CSF 0 /cu mm 3 !   WBC, CSF 0 - 5 /cu mm 1   Glucose, CSF 40 - 70 mg/dL 78 (H)   Protein, CSF 15 - 40 mg/dL 29   CSF Tube Number  3  2   Neisseria meningtidis Not Detected  Not Detected   Eschericia coli K1 Not Detected  Not Detected   Haemophilus influenzae Not Detected  Not Detected   Listeria Monocytogenes Not Detected  Not Detected   Streptococcus agalactiae Not Detected  Not Detected   Streptococcus pneumoniae Not Detected  Not Detected   Cytomegalovirus Not Detected  Not Detected   Enterovirus Not Detected  Not Detected   Herpes Simplex Virus 1 Not Detected  Not Detected   Herpes Simplex Virus 2 Not Detected  Not Detected   Human Herpes Virus 6 Not Detected  Not Detected   Human Parechovirus Not Detected  Not Detected   Varicella Zoster Virus Not Detected  Not Detected   Cryptococcus neoformans/gattii Not Detected  Not Detected   !: Data is abnormal  (H): Data is abnormally high    Imaging Reviewed:  CXR  CT head  Cortical atrophy with periventricular deep white matter change consistent with chronic small vessel ischemic disease.  Mild paranasal sinus disease.    CT chest/abdomen/pelvis:   1. Right lower lobe consolidation with air bronchograms, concerning for pneumonia.  Superimposed scattered centrilobular ground-glass opacities and tree-in-bud micro nodules within the inferior lingula and bilateral lower lobes suspicious for infectious or inflammatory bronchiolitis.  2. Hepatomegaly and hepatic steatosis.  3. Diverticulosis coli without evidence of diverticulitis.  4. Right mastectomy.  5. Aortic valve replacement.  Coronary artery calcification.  6. Additional findings, as above.    MRI Brain:   1. No evidence of acute intracranial abnormality.   2. Diffusely heterogeneous marrow  signal and enhancement throughout the calvarium without obvious cortical destruction or extraosseous extension, nonspecific but suspicious for a hematopoietic disorder, to include a malignant process, such as lymphoma/leukemia or multiple myeloma.  Metastasis less likely, given negative CT evaluation of the chest, abdomen and pelvis.   3. Pansinusitis.   This report was flagged in Epic as abnormal.     Cardiology:   ECHO 1/24/23:  The left ventricle is normal in size with normal systolic function.  The estimated ejection fraction is 55%.  Indeterminate left ventricular diastolic function with elevated left atrial pressure.  Atrial fibrillation not observed.  Normal right ventricular size with normal right ventricular systolic function.  There is a 26 mm transcutaneously-placed aortic bioprosthesis present. There is no aortic insufficiency present. Prosthetic aortic valve is normal.  The aortic valve mean gradient is 9 mmHg with a dimensionless index of 0.74.  Mild mitral regurgitation.  There is mild pulmonary hypertension.  Mild to moderate tricuspid regurgitation.  Normal central venous pressure (3 mmHg).  The estimated PA systolic pressure is 48 mmHg.      LAST ECHO 3/29/22  The left ventricle is normal in size with eccentric hypertrophy and low normal systolic function. The estimated ejection fraction is 50%.  The quantitatively derived ejection fraction is 48%.  Normal right ventricular size with normal right ventricular systolic function.  Grade II left ventricular diastolic dysfunction.  Moderate left atrial enlargement.  There is a 26 mm Evolut transcutaneously-placed aortic bioprosthesis present. There is trivial paravalvular aortic insufficiency present.  The aortic valve mean gradient is 9 mmHg with a dimensionless index of 0.46.  The estimated PA systolic pressure is 24 mmHg.  Normal central venous pressure (3 mmHg).          IMPRESSION & PLAN     Severe sepsis likely secondary to Strep bacteremia (lab  called likely Strep pneumo) in the setting of  multifocal pneumonia, meningitis ruled out  Leukemoid reaction WBC 43-->33-->19.4, bands 8%-->5-->1%, improving  Procal 143.94 -->58, trending down    Blood cultures x2 1/24 no growth to date, pending final             Group A strep 1/24 negative    Transaminitis/Shock liver secondary to Tylenol toxicity   GI following   [id number]/ ALT 2215   INR 1.8-->1.4    PMHx:  CAD status post TAVR, hypertension, hyperlipidemia, right breast cancer status post mastectomy, GERD, hypothyroidism    Recommendations:  MRSA nasal swab ordered  Follow cultures  Strep urine Ag and Legionalla Ag in process   Continue vancomycin IV, keep level 15-20 - awaiting Strep ID and sensitivities   Ceftriaxone 2 g IV daily   Agreed with Heme/Onc eval for MRI brain findings   Appreciate neurology recommendations   Aspiration precautions   Incentive spirometry   PT/OT as tolerated     Will follow     D/w patient, nursing, Dr Escamilla, Dr Tucker     Medical Decision Making during this encounter was  [_] Low Complexity  [_] Moderate Complexity  [xx] High Complexity

## 2023-01-25 NOTE — ASSESSMENT & PLAN NOTE
Patient endorses cough. Procalcitonin 143.94. CXR and CT chest shows consolidation. Concern for Strep sp.  -Continue Rocephin  -Follow up respiratory cultures  -Trend WBC count with CBC  -PRN Duonebs

## 2023-01-25 NOTE — CARE UPDATE
Hypertonic saline aerosol tx administered 1/24/23 at 1424 to induce cough for sputum sample. Patient could not produce a sample.

## 2023-01-25 NOTE — PROGRESS NOTES
[facility]  Progress Note  Date: 2023 9:04 AM            Patient Name: Vivien Burton   MRN: [id number]   : 1945    AGE: 77 y.o.    LOS: 2 days Hospital Day: 3  Admit date: 2023 10:37 PM         HPI per EMR: Vivien Burton is a 77 y.o. female with a history of  presents in transfer from [facility].  Patient presented [facility] for evaluation of headache and cough.  She reports headache and cough onset approximately 1 week ago.  She endorses taking 4 tablets of Tylenol every 4 hours for least 3-5 days for headache.  She describes the headache as a stabbing and piercing sensation to the occipital region.  She denies any fever or chills.  She denies any known sick contacts or travel.  No aggravating or alleviating factors.  Previous medical history includes anemia, breast cancer, systolic/diastolic heart failure, coronary artery disease, heart murmur, TAVR, hypothyroidism, hypertension.  ER workup at outside facility:  CBC with leukocytosis of 43,000 in H&H of 10 and 30.  CMP with hypokalemia 3.3, bilirubin of 1.1, alk-phos 161, , .  Magnesium 1.2 (repleted).  Lactic acid elevated at 2.4.  Urinalysis with 8 red blood cells, 10 white blood cells, few bacteria.  INR was elevated 1.8, and sed rate was elevated 37.  Blood urine cultures are pending.  CT the head demonstrated no acute findings.  CT the abdomen and pelvis demonstrated right lower lobe air bronchograms concerning for pneumonia with hepatomegaly, diverticulosis, and aortic valve replacement.  Chest x-ray with a dense masslike suspicious lesion/ infiltrate of the right lower lobe.  Patient admitted to Hospital Medicine for treatment management.     Neurology consult:  Patient was seen examined me.  She is alert and oriented x3.  She states that since Wednesday she is been having intractable headache which she describes as ice pricking headache.  She states that the  headache comes in episodes of 15-20 seconds with intermittent relief for 1 minute.  She was taking around the clock Tylenol and ibuprofen with symptomatic relief however headache would come back.  She presented to [facility] with these symptoms. ER workup at outside facility:  CBC with leukocytosis of 43,000 in H&H of 10 and 30.  CMP with hypokalemia 3.3, bilirubin of 1.1, alk-phos 161, , .  Magnesium 1.2 (repleted).  Lactic acid elevated at 2.4.  Urinalysis with 8 red blood cells, 10 white blood cells, few bacteria.     Patient was then transferred here for further workup and management for headache.     On arrival here, she was started on broad-spectrum antibiotics with concern for meningitis due to elevated white count however she was afebrile.  She was also given Tylenol and ibuprofen for headache with improvement in pain.  Since morning, she is not had headache like she did at home.     Patient denies any associated symptoms with the headache including vision changes, unilateral weakness or sensory changes, nausea vomiting, dizziness.    01/25/2023: No acute events overnight. Patient was seen and examined by me this morning. Neuro exam is normal.  She continues to have headache and she states it is a dull headache mostly in the occipital region on the right side.  She denies any focal deficits, denies conjunctival injection or tearing or nausea/vomiting.  Patient's sons were at bedside and discussed plan of care with them.       Vitals:  Patient Vitals for the past 24 hrs:   BP Temp Temp src Pulse Resp SpO2 Height Weight   01/25/23 0810 -- -- -- -- -- 99 % -- --   01/25/23 0745 (!) 198/78 97.9 °F (36.6 °C) -- 71 16 97 % -- --   01/25/23 0323 (!) 187/79 97.9 °F (36.6 °C) -- 80 20 99 % -- --   01/24/23 2327 (!) 176/74 98.1 °F (36.7 °C) Oral 79 18 98 % -- --   01/24/23 1955 130/60 97.4 °F (36.3 °C) Oral 82 18 100 % -- --   01/24/23 1948 -- -- -- 83 18 96 % -- --   01/24/23 1628 135/63  97.6 °F (36.4 °C) Tympanic 81 18 98 % 5' (1.524 m) 53.5 kg (118 lb)   01/24/23 1424 -- -- -- 85 18 98 % -- --   01/24/23 1153 -- -- -- -- -- -- 5' (1.524 m) --   01/24/23 1146 -- -- -- -- -- -- 5' (1.524 m) 53.8 kg (118 lb 9.7 oz)     PHYSICAL EXAM:     GENERAL APPEARANCE: Well-developed, well-nourished female in no acute distress.  HEENT: Normocephalic and atraumatic. PERRL. Oropharynx unremarkable.  PULM: Comfortable on room air.  CV: RRR.  ABDOMEN: Soft, nontender.  EXTREMITIES: No signs of vascular compromise. Pulses present. No cyanosis, clubbing or edema.  SKIN: Clear; no rashes, lesions or skin breaks in exposed areas.      NEURO:   MENTAL STATUS: Patient awake and oriented to time, place, and person. Affect normal.  CRANIAL NERVES II-XII: Pupils equal, round and reactive to light. Extraocular movements full and intact. No facial asymmetry.  MOTOR: Normal bulk. Tone normal and symmetrical throughout.  No abnormal movements. No tremor.   Strength 5/5 throughout unless specified below.  REFLEXES: DTRs 2+; normal and symmetric throughout.   SENSATION: Sensation grossly intact to fine touch.  COORDINATION: Finger-to-nose normal for age and symmetric.  STATION: Romberg deferred.  GAIT: Deferred.    CURRENT SCHEDULED MEDICATIONS:   aspirin  81 mg Oral Daily    cefTRIAXone (ROCEPHIN) IVPB  2 g Intravenous Q24H    enoxaparin  40 mg Subcutaneous Daily    folic acid  1 mg Oral Daily    levothyroxine  75 mcg Oral Before breakfast    multivitamin  1 tablet Oral Daily    pantoprazole  40 mg Oral Daily    thiamine  100 mg Oral Daily    vancomycin 1000 mg in dextrose 5% 250 mL IVPB (MB+Pyxis prep type)  1,000 mg Intravenous Q12H     CURRENT INFUSIONS:    DATA:  Recent Labs   Lab 01/23/23  1546 01/23/23 2014 01/24/23  0445 01/24/23  1655 01/25/23  0501   * 133* 130* 123* 131*  131*   K 3.3* 3.5 2.4* 3.8 4.3  4.3   CL 99 103 100 98 103  103   CO2 20* 20* 19* 18* 20*  20*   BUN 13 13 11 7* 5*  5*   CREATININE 0.7  0.7 0.8 0.6 0.6  0.6   * 151* 292* 431* 81  81   CALCIUM 9.5 8.6* 7.7* 7.4* 8.5*  8.5*   PHOS  --   --   --   --  1.6*   MG 1.2*  --  1.9  --  1.9   * 280* 392*  --  1,776*  1,776*   * 279* 415*  --  2,215*  2,215*     Recent Labs   Lab 01/23/23  1546 01/24/23  0445 01/25/23  0501   WBC 43.13* 33.73* 19.47*  19.47*   HGB 10.7* 9.3* 9.6*  9.6*   HCT 30.7* 27.8* 27.7*  27.7*    174 199  199     Lab Results   Component Value Date    PROTEINCSF 29 01/24/2023    GLUCCSF 78 (H) 01/24/2023     Hemoglobin A1C   Date Value Ref Range Status   10/04/2022 4.3 <5.7 % of total Hgb Final     Comment:     For the purpose of screening for the presence of  diabetes:     <5.7%       Consistent with the absence of diabetes  5.7-6.4%    Consistent with increased risk for diabetes              (prediabetes)  > or =6.5%  Consistent with diabetes     This assay result is consistent with a decreased risk  of diabetes.     Currently, no consensus exists regarding use of  hemoglobin A1c for diagnosis of diabetes in children.     According to American Diabetes Association (ADA)  guidelines, hemoglobin A1c <7.0% represents optimal  control in non-pregnant diabetic patients. Different  metrics may apply to specific patient populations.   Standards of Medical Care in Diabetes(ADA).         04/13/2022 4.5 <5.7 % of total Hgb Final     Comment:     For the purpose of screening for the presence of  diabetes:     <5.7%       Consistent with the absence of diabetes  5.7-6.4%    Consistent with increased risk for diabetes              (prediabetes)  > or =6.5%  Consistent with diabetes     This assay result is consistent with a decreased risk  of diabetes.     Currently, no consensus exists regarding use of  hemoglobin A1c for diagnosis of diabetes in children.     According to American Diabetes Association (ADA)  guidelines, hemoglobin A1c <7.0% represents optimal  control in non-pregnant diabetic patients.  Different  metrics may apply to specific patient populations.   Standards of Medical Care in Diabetes(ADA).         08/09/2021 4.2 <5.7 % of total Hgb Final     Comment:     For the purpose of screening for the presence of  diabetes:     <5.7%       Consistent with the absence of diabetes  5.7-6.4%    Consistent with increased risk for diabetes              (prediabetes)  > or =6.5%  Consistent with diabetes     This assay result is consistent with a decreased risk  of diabetes.     Currently, no consensus exists regarding use of  hemoglobin A1c for diagnosis of diabetes in children.     According to American Diabetes Association (ADA)  guidelines, hemoglobin A1c <7.0% represents optimal  control in non-pregnant diabetic patients. Different  metrics may apply to specific patient populations.   Standards of Medical Care in Diabetes(ADA).                  I have personally reviewed and interpreted the pertinent imaging and lab results.              ASSESSMENT AND PLAN:    Intractable headache  Leukocytosis  Transaminitis  Sepsis     Plan  Etiology of patient's intractable headache is unknown.  She has mostly headache in the right side with radiation to the back.  There is no conjunctival injection or lacrimation associated with the headache.  Concern for trigeminal autonomic cephalgias is low however can not rule out.  MRI brain was done showed no acute intracranial pathology.  There is diffuse heterogeneous marrow signal and enhancement throughout the calvarium with suspicious for hematopoietic disorder.  Lumbar puncture was performed and CSF analysis showed WBC of 1 protein 29 and glucose 78.  Concern for intracranial infection is low.  Recommend Oncology evaluation for above MRI findings.  Blood cultures were positive for Gram-positive cocci-Streptococcus pneumoniae  Infectious Disease on board.  Patient has elevated white count, elevated procalcitonin, labs showed transaminitis. Appreciate ID recommendations  regarding antibiotic management  Hold Tylenol- concern for transaminitis from acetaminophen overdose  Trend LFTs  Neuro checks per unit protocol  Started Benadryl, Reglan round the clock to help with headache.  Continue IV fluids  Will follow         42 minutes of care time has been spent evaluating with the patient. Time includes chart review not limited to diagnostic imaging, labs, and vitals, patient assessment, discussion with family and nursing, current order evaluations, and new order entries.      Car Tucker MD  Neurology/vascular Neurology  Date of Service: 01/25/2023  9:04 AM    Please note: This note was transcribed using voice recognition software. Because of this technology there are often uinintended grammatical, spelling, and other transcription errors. Please disregard these errors.

## 2023-01-25 NOTE — ASSESSMENT & PLAN NOTE
Patient endorses taking four tablets of Tylenol every four ours for three to five days.  -GI consulted  -Monitor LFTs, albumin, and INR  -NAC protocol repeated 1/25  -Poison control aware of case  -If patient's liver failure decompensates, will need transfer to [facility] for Hepatology consultation

## 2023-01-25 NOTE — PROGRESS NOTES
[facility]    CC: Elevated LFTs    HPI 77 y.o. female who presents in transfer from [facility].  Patient presented [facility] for evaluation of headache and cough.  She reports headache and cough onset approximately 1 week ago.  She endorses taking 4 tablets of Tylenol every 4 hours for least 3-5 days for headache.  She describes the headache as a stabbing and piercing sensation to the occipital region.  She denies any fever or chills.  She denies any known sick contacts or travel.  No aggravating or alleviating factors.  Previous medical history includes anemia, breast cancer, systolic/diastolic heart failure, coronary artery disease, heart murmur, TAVR, hypothyroidism, hypertension.  ER workup at outside facility:  CBC with leukocytosis of 43,000 in H&H of 10 and 30.  CMP with hypokalemia 3.3, bilirubin of 1.1, alk-phos 161, , .  Magnesium 1.2 (repleted).  Lactic acid elevated at 2.4.  Urinalysis with 8 red blood cells, 10 white blood cells, few bacteria.  INR was elevated 1.8, and sed rate was elevated 37.  Blood urine cultures are pending.  CT the head demonstrated no acute findings.  CT the abdomen and pelvis demonstrated right lower lobe air bronchograms concerning for pneumonia with hepatomegaly, diverticulosis, and aortic valve replacement.  Chest x-ray with a dense masslike suspicious lesion/ infiltrate of the right lower lobe.  Patient admitted to Hospital Medicine for treatment management.  Infectious Disease, Neurology, and Pulmonary consultations made.  Patient will be empirically started on meningitis coverage including ampicillin, Rocephin, vancomycin.           Overview/Hospital Course:  Vivien Burton is a 77 year old female with a past medical history of CAD s/p PCI to LAD 1/2022, combined CHF, aortic stenosis s/p TAVR, anemia, hypothyroidism, HLD, HTN, and GERD who presented with one week of headache and cough secondary to a severe sepsis  with potential etiologies including CAP and/or meningitis. She has been started on empiric bacterial meningitis therapy with ampicillin, ceftriaxone and vancomycin. She is also on azithromycin. An LP has been ordered and is pending. CT of the chest shows consolidation at the RLL with air bronchograms and ground glass opacities concerning for pneumonia. Respiratory and blood cultures are pending. Pulmonary and Neurology have been consulted. Of note, the patient endorses taking four tablets of Tylenol every four hours for roughly three to five days. Her LFTs are elevated as well as INR. Her albumin is also low and CT of the abdomen shows hepatic steatosis. She was started on NAC therapy while at [facility]. GI has also been consulted.      FURTHER HISTORY:  Above obtained from independent review of records from admitting provider as well as from direct discussion with family member at bedside who states that patient took excessive acetaminophen for her symptoms.  In addition, on my interview, I note the following:  She has complaint of cough and generalized malaise.  Currently with sepsis workup ongoing and ID following.  She has strep bacteremia/infection without evidence of meningitis on LP.  No known history of liver disease.  She states that she took 3-4 acetaminophen every 4 hours for the last few days.  INR currently mildly/moderately elevated and acetadote protocol in progress.    INTERVAL HISTORY:  Patient comfortable and tolerating PO.  No evidence of jaundice or encephalopathy.  Transaminases markedly elevated today but INR trending down.  No new acute issues.  Acetadote on going.    Past Medical History:   Diagnosis Date    Acute on chronic combined systolic and diastolic heart failure 2/23/2022    Anemia     Basal cell carcinoma 1999    Breast cancer     Breast cancer     Cancer     CHF (congestive heart failure)     Coronary artery disease     GERD (gastroesophageal reflux disease)     Hyperlipidemia      Hypertension     Hypothyroidism     Nonrheumatic aortic (valve) stenosis 6/5/2018    Osteoporosis 02/05/2019    S/P TAVR (transcatheter aortic valve replacement) 2/22/2022    Squamous cell carcinoma of skin 2018    Thyroid disease          Review of Systems  General ROS: negative for - chills, fever or weight loss  Cardiovascular ROS: no chest pain or dyspnea on exertion  Gastrointestinal ROS: as above    Physical Examination  BP (!) 144/63 (BP Location: Left arm)   Pulse 79   Temp 96.5 °F (35.8 °C) (Oral)   Resp 17   Ht 5' (1.524 m)   Wt 53.8 kg (118 lb 9.7 oz)   SpO2 96%   Breastfeeding No   BMI 23.16 kg/m²   General appearance: alert, cooperative, no distress  HENT: Normocephalic, atraumatic, neck symmetrical, no nasal discharge, sclera anicteric   Lungs: clear to auscultation bilaterally, symmetric chest wall expansion bilaterally  Heart: regular rate and rhythm without rub; no displacement of the PMI   Abdomen: soft, NT  Extremities: extremities symmetric; no clubbing, cyanosis, or edema  Neurologic: Alert and oriented X 3, no sensory or motor neurologic deficits      Labs:  Lab Results   Component Value Date    WBC 19.47 (H) 01/25/2023    WBC 19.47 (H) 01/25/2023    HGB 9.6 (L) 01/25/2023    HGB 9.6 (L) 01/25/2023    HCT 27.7 (L) 01/25/2023    HCT 27.7 (L) 01/25/2023    MCV 85 01/25/2023    MCV 85 01/25/2023     01/25/2023     01/25/2023         CMP  Sodium   Date Value Ref Range Status   01/25/2023 131 (L) 136 - 145 mmol/L Final   01/25/2023 131 (L) 136 - 145 mmol/L Final     Potassium   Date Value Ref Range Status   01/25/2023 4.3 3.5 - 5.1 mmol/L Final   01/25/2023 4.3 3.5 - 5.1 mmol/L Final     Chloride   Date Value Ref Range Status   01/25/2023 103 95 - 110 mmol/L Final   01/25/2023 103 95 - 110 mmol/L Final     CO2   Date Value Ref Range Status   01/25/2023 20 (L) 23 - 29 mmol/L Final   01/25/2023 20 (L) 23 - 29 mmol/L Final     Glucose   Date Value Ref Range Status   01/25/2023 81  70 - 110 mg/dL Final   01/25/2023 81 70 - 110 mg/dL Final     BUN   Date Value Ref Range Status   01/25/2023 5 (L) 8 - 23 mg/dL Final   01/25/2023 5 (L) 8 - 23 mg/dL Final     Creatinine   Date Value Ref Range Status   01/25/2023 0.6 0.5 - 1.4 mg/dL Final   01/25/2023 0.6 0.5 - 1.4 mg/dL Final     Calcium   Date Value Ref Range Status   01/25/2023 8.5 (L) 8.7 - 10.5 mg/dL Final   01/25/2023 8.5 (L) 8.7 - 10.5 mg/dL Final     Total Protein   Date Value Ref Range Status   01/25/2023 4.4 (L) 6.0 - 8.4 g/dL Final   01/25/2023 4.4 (L) 6.0 - 8.4 g/dL Final     Albumin   Date Value Ref Range Status   01/25/2023 2.4 (L) 3.5 - 5.2 g/dL Final   01/25/2023 2.4 (L) 3.5 - 5.2 g/dL Final     Total Bilirubin   Date Value Ref Range Status   01/25/2023 1.0 0.1 - 1.0 mg/dL Final     Comment:     For infants and newborns, interpretation of results should be based  on gestational age, weight and in agreement with clinical  observations.    Premature Infant recommended reference ranges:  Up to 24 hours.............<8.0 mg/dL  Up to 48 hours............<12.0 mg/dL  3-5 days..................<15.0 mg/dL  6-29 days.................<15.0 mg/dL     01/25/2023 1.0 0.1 - 1.0 mg/dL Final     Comment:     For infants and newborns, interpretation of results should be based  on gestational age, weight and in agreement with clinical  observations.    Premature Infant recommended reference ranges:  Up to 24 hours.............<8.0 mg/dL  Up to 48 hours............<12.0 mg/dL  3-5 days..................<15.0 mg/dL  6-29 days.................<15.0 mg/dL       Alkaline Phosphatase   Date Value Ref Range Status   01/25/2023 174 (H) 55 - 135 U/L Final   01/25/2023 174 (H) 55 - 135 U/L Final     AST   Date Value Ref Range Status   01/25/2023 1,776 (H) 10 - 40 U/L Final   01/25/2023 1,776 (H) 10 - 40 U/L Final     ALT   Date Value Ref Range Status   01/25/2023 2,215 (H) 10 - 44 U/L Final   01/25/2023 2,215 (H) 10 - 44 U/L Final     Anion Gap   Date Value  Ref Range Status   01/25/2023 8 8 - 16 mmol/L Final   01/25/2023 8 8 - 16 mmol/L Final     eGFR   Date Value Ref Range Status   01/25/2023 >60 >60 mL/min/1.73 m^2 Final   01/25/2023 >60 >60 mL/min/1.73 m^2 Final   10/04/2022 96 > OR = 60 mL/min/1.73m2 Final     Comment:     The eGFR is based on the CKD-EPI 2021 equation. To calculate   the new eGFR from a previous Creatinine or Cystatin C  result, go to https://www.kidney.org/professionals/  kdoqi/gfr%5Fcalculator         Lab Results   Component Value Date    INR 1.4 (H) 01/25/2023    INR 1.4 (H) 01/25/2023    INR 1.8 (H) 01/24/2023         Assessment:   Recent acetaminophen ingestion  Sepsis  Gram positive bacteremia  Elevated LFTs - likely multifactorial     Plan:  Continue acetadote to complete protocol  Diet as tolerated  Agree with antibiotics  Trend LFTs and INR.  If any evidence of decompensation, will need transfer to [facility] for hepatology evaluation.  I suspect her transaminases simply have not peaked yet, but INR is now trending in the right direction.  Further recommendations to follow after above.      Mal Negron MD  [facility]  [address], [address]  [place] [address]  Office: [phone]  Fax: [phone]

## 2023-01-26 PROBLEM — E87.1 HYPONATREMIA: Status: RESOLVED | Noted: 2023-01-24 | Resolved: 2023-01-26

## 2023-01-26 LAB
ALBUMIN SERPL BCP-MCNC: 2.4 G/DL (ref 3.5–5.2)
ALBUMIN SERPL ELPH-MCNC: 2.42 G/DL (ref 3.35–5.55)
ALP SERPL-CCNC: 224 U/L (ref 55–135)
ALPHA1 GLOB SERPL ELPH-MCNC: 0.56 G/DL (ref 0.17–0.41)
ALPHA2 GLOB SERPL ELPH-MCNC: 0.54 G/DL (ref 0.43–0.99)
ALT SERPL W/O P-5'-P-CCNC: 1785 U/L (ref 10–44)
ANION GAP SERPL CALC-SCNC: 10 MMOL/L (ref 8–16)
AST SERPL-CCNC: 697 U/L (ref 10–40)
B-GLOBULIN SERPL ELPH-MCNC: 0.32 G/DL (ref 0.5–1.1)
BACTERIA BLD CULT: ABNORMAL
BASOPHILS # BLD AUTO: ABNORMAL K/UL (ref 0–0.2)
BASOPHILS NFR BLD: 0 % (ref 0–1.9)
BILIRUB SERPL-MCNC: 0.9 MG/DL (ref 0.1–1)
BUN SERPL-MCNC: 8 MG/DL (ref 8–23)
CALCIUM SERPL-MCNC: 9.1 MG/DL (ref 8.7–10.5)
CHLORIDE SERPL-SCNC: 109 MMOL/L (ref 95–110)
CO2 SERPL-SCNC: 21 MMOL/L (ref 23–29)
CREAT SERPL-MCNC: 0.7 MG/DL (ref 0.5–1.4)
DIFFERENTIAL METHOD: ABNORMAL
EOSINOPHIL # BLD AUTO: ABNORMAL K/UL (ref 0–0.5)
EOSINOPHIL NFR BLD: 0 % (ref 0–8)
ERYTHROCYTE [DISTWIDTH] IN BLOOD BY AUTOMATED COUNT: 14 % (ref 11.5–14.5)
EST. GFR  (NO RACE VARIABLE): >60 ML/MIN/1.73 M^2
FLOW CYTOMETRY ANTIBODIES ANALYZED - BLOOD: NORMAL
FLOW CYTOMETRY COMMENT - BLOOD: NORMAL
FLOW CYTOMETRY INTERPRETATION - BLOOD: NORMAL
GAMMA GLOB SERPL ELPH-MCNC: 0.16 G/DL (ref 0.67–1.58)
GLUCOSE SERPL-MCNC: 114 MG/DL (ref 70–110)
HCT VFR BLD AUTO: 28.4 % (ref 37–48.5)
HGB BLD-MCNC: 9.6 G/DL (ref 12–16)
HYPOCHROMIA BLD QL SMEAR: ABNORMAL
IMM GRANULOCYTES # BLD AUTO: ABNORMAL K/UL (ref 0–0.04)
IMM GRANULOCYTES NFR BLD AUTO: ABNORMAL % (ref 0–0.5)
INR PPP: 1.2 (ref 0.8–1.2)
LYMPHOCYTES # BLD AUTO: ABNORMAL K/UL (ref 1–4.8)
LYMPHOCYTES NFR BLD: 29 % (ref 18–48)
MAGNESIUM SERPL-MCNC: 1.8 MG/DL (ref 1.6–2.6)
MCH RBC QN AUTO: 29.3 PG (ref 27–31)
MCHC RBC AUTO-ENTMCNC: 33.8 G/DL (ref 32–36)
MCV RBC AUTO: 87 FL (ref 82–98)
MONOCYTES # BLD AUTO: ABNORMAL K/UL (ref 0.3–1)
MONOCYTES NFR BLD: 5 % (ref 4–15)
NEUTROPHILS NFR BLD: 64 % (ref 38–73)
NEUTS BAND NFR BLD MANUAL: 2 %
NRBC BLD-RTO: 0 /100 WBC
OVALOCYTES BLD QL SMEAR: ABNORMAL
PATH REV BLD -IMP: NORMAL
PATH REV BLD -IMP: NORMAL
PHOSPHATE SERPL-MCNC: 3.4 MG/DL (ref 2.7–4.5)
PLATELET # BLD AUTO: 207 K/UL (ref 150–450)
PLATELET BLD QL SMEAR: ABNORMAL
PMV BLD AUTO: 9.5 FL (ref 9.2–12.9)
POIKILOCYTOSIS BLD QL SMEAR: SLIGHT
POTASSIUM SERPL-SCNC: 4.1 MMOL/L (ref 3.5–5.1)
PROT SERPL-MCNC: 4 G/DL (ref 6–8.4)
PROT SERPL-MCNC: 4.6 G/DL (ref 6–8.4)
PROTHROMBIN TIME: 13.3 SEC (ref 9–12.5)
RBC # BLD AUTO: 3.28 M/UL (ref 4–5.4)
SODIUM SERPL-SCNC: 140 MMOL/L (ref 136–145)
WBC # BLD AUTO: 11.06 K/UL (ref 3.9–12.7)

## 2023-01-26 PROCEDURE — 83735 ASSAY OF MAGNESIUM: CPT | Performed by: STUDENT IN AN ORGANIZED HEALTH CARE EDUCATION/TRAINING PROGRAM

## 2023-01-26 PROCEDURE — 63600175 PHARM REV CODE 636 W HCPCS: Performed by: INTERNAL MEDICINE

## 2023-01-26 PROCEDURE — 25000003 PHARM REV CODE 250: Performed by: STUDENT IN AN ORGANIZED HEALTH CARE EDUCATION/TRAINING PROGRAM

## 2023-01-26 PROCEDURE — 99900035 HC TECH TIME PER 15 MIN (STAT)

## 2023-01-26 PROCEDURE — 63600175 PHARM REV CODE 636 W HCPCS: Performed by: STUDENT IN AN ORGANIZED HEALTH CARE EDUCATION/TRAINING PROGRAM

## 2023-01-26 PROCEDURE — 80053 COMPREHEN METABOLIC PANEL: CPT | Performed by: STUDENT IN AN ORGANIZED HEALTH CARE EDUCATION/TRAINING PROGRAM

## 2023-01-26 PROCEDURE — 97116 GAIT TRAINING THERAPY: CPT

## 2023-01-26 PROCEDURE — 99232 SBSQ HOSP IP/OBS MODERATE 35: CPT | Mod: ,,, | Performed by: INTERNAL MEDICINE

## 2023-01-26 PROCEDURE — 12000002 HC ACUTE/MED SURGE SEMI-PRIVATE ROOM

## 2023-01-26 PROCEDURE — 99233 SBSQ HOSP IP/OBS HIGH 50: CPT | Mod: ,,, | Performed by: STUDENT IN AN ORGANIZED HEALTH CARE EDUCATION/TRAINING PROGRAM

## 2023-01-26 PROCEDURE — 85610 PROTHROMBIN TIME: CPT | Performed by: STUDENT IN AN ORGANIZED HEALTH CARE EDUCATION/TRAINING PROGRAM

## 2023-01-26 PROCEDURE — 83521 IG LIGHT CHAINS FREE EACH: CPT | Mod: 59 | Performed by: NURSE PRACTITIONER

## 2023-01-26 PROCEDURE — 94761 N-INVAS EAR/PLS OXIMETRY MLT: CPT

## 2023-01-26 PROCEDURE — 84100 ASSAY OF PHOSPHORUS: CPT | Performed by: STUDENT IN AN ORGANIZED HEALTH CARE EDUCATION/TRAINING PROGRAM

## 2023-01-26 PROCEDURE — 99233 SBSQ HOSP IP/OBS HIGH 50: CPT | Mod: ,,, | Performed by: INTERNAL MEDICINE

## 2023-01-26 PROCEDURE — 36415 COLL VENOUS BLD VENIPUNCTURE: CPT | Performed by: NURSE PRACTITIONER

## 2023-01-26 PROCEDURE — 99233 PR SUBSEQUENT HOSPITAL CARE,LEVL III: ICD-10-PCS | Mod: ,,, | Performed by: STUDENT IN AN ORGANIZED HEALTH CARE EDUCATION/TRAINING PROGRAM

## 2023-01-26 PROCEDURE — 85007 BL SMEAR W/DIFF WBC COUNT: CPT | Performed by: STUDENT IN AN ORGANIZED HEALTH CARE EDUCATION/TRAINING PROGRAM

## 2023-01-26 PROCEDURE — 85027 COMPLETE CBC AUTOMATED: CPT | Performed by: STUDENT IN AN ORGANIZED HEALTH CARE EDUCATION/TRAINING PROGRAM

## 2023-01-26 PROCEDURE — 36415 COLL VENOUS BLD VENIPUNCTURE: CPT | Performed by: STUDENT IN AN ORGANIZED HEALTH CARE EDUCATION/TRAINING PROGRAM

## 2023-01-26 PROCEDURE — 99233 PR SUBSEQUENT HOSPITAL CARE,LEVL III: ICD-10-PCS | Mod: ,,, | Performed by: INTERNAL MEDICINE

## 2023-01-26 PROCEDURE — 99232 PR SUBSEQUENT HOSPITAL CARE,LEVL II: ICD-10-PCS | Mod: ,,, | Performed by: INTERNAL MEDICINE

## 2023-01-26 RX ORDER — DEXAMETHASONE SODIUM PHOSPHATE 4 MG/ML
4 INJECTION, SOLUTION INTRA-ARTICULAR; INTRALESIONAL; INTRAMUSCULAR; INTRAVENOUS; SOFT TISSUE EVERY 12 HOURS
Status: COMPLETED | OUTPATIENT
Start: 2023-01-26 | End: 2023-01-28

## 2023-01-26 RX ORDER — MAGNESIUM SULFATE 1 G/100ML
1 INJECTION INTRAVENOUS ONCE
Status: COMPLETED | OUTPATIENT
Start: 2023-01-26 | End: 2023-01-26

## 2023-01-26 RX ADMIN — THIAMINE HCL TAB 100 MG 100 MG: 100 TAB at 08:01

## 2023-01-26 RX ADMIN — LEVOTHYROXINE SODIUM 75 MCG: 0.03 TABLET ORAL at 05:01

## 2023-01-26 RX ADMIN — DEXAMETHASONE SODIUM PHOSPHATE 4 MG: 4 INJECTION, SOLUTION INTRA-ARTICULAR; INTRALESIONAL; INTRAMUSCULAR; INTRAVENOUS; SOFT TISSUE at 10:01

## 2023-01-26 RX ADMIN — METOCLOPRAMIDE 5 MG: 5 INJECTION, SOLUTION INTRAMUSCULAR; INTRAVENOUS at 06:01

## 2023-01-26 RX ADMIN — METOCLOPRAMIDE 5 MG: 5 INJECTION, SOLUTION INTRAMUSCULAR; INTRAVENOUS at 10:01

## 2023-01-26 RX ADMIN — PANTOPRAZOLE SODIUM 40 MG: 40 TABLET, DELAYED RELEASE ORAL at 08:01

## 2023-01-26 RX ADMIN — POTASSIUM, SODIUM PHOSPHATES 280 MG-160 MG-250 MG ORAL POWDER PACKET 1 PACKET: POWDER IN PACKET at 08:01

## 2023-01-26 RX ADMIN — POTASSIUM, SODIUM PHOSPHATES 280 MG-160 MG-250 MG ORAL POWDER PACKET 1 PACKET: POWDER IN PACKET at 06:01

## 2023-01-26 RX ADMIN — ASPIRIN 81 MG CHEWABLE TABLET 81 MG: 81 TABLET CHEWABLE at 08:01

## 2023-01-26 RX ADMIN — ACETYLCYSTEINE 5400 MG: 200 INJECTION, SOLUTION INTRAVENOUS at 12:01

## 2023-01-26 RX ADMIN — CEFTRIAXONE SODIUM 2 G: 2 INJECTION, POWDER, FOR SOLUTION INTRAMUSCULAR; INTRAVENOUS at 04:01

## 2023-01-26 RX ADMIN — POTASSIUM, SODIUM PHOSPHATES 280 MG-160 MG-250 MG ORAL POWDER PACKET 1 PACKET: POWDER IN PACKET at 12:01

## 2023-01-26 RX ADMIN — LOSARTAN POTASSIUM 25 MG: 25 TABLET, FILM COATED ORAL at 08:01

## 2023-01-26 RX ADMIN — DIPHENHYDRAMINE HYDROCHLORIDE 25 MG: 50 INJECTION INTRAMUSCULAR; INTRAVENOUS at 06:01

## 2023-01-26 RX ADMIN — FOLIC ACID 1 MG: 1 TABLET ORAL at 08:01

## 2023-01-26 RX ADMIN — METOCLOPRAMIDE 5 MG: 5 INJECTION, SOLUTION INTRAMUSCULAR; INTRAVENOUS at 04:01

## 2023-01-26 RX ADMIN — THERA TABS 1 TABLET: TAB at 08:01

## 2023-01-26 RX ADMIN — MAGNESIUM SULFATE 1 G: 1 INJECTION INTRAVENOUS at 10:01

## 2023-01-26 NOTE — PLAN OF CARE
Plan of care reviewed with pt. Pt verbalized understanding. Patient is alert and oriented x 4, able to make needs known. Continuous cardiac monitoring in place as ordered. A-febrile throughout the shift. Meds given per MAR. IVF infusing as ordered. Ambulated to bathroom independently with standby assist. Repositions self independently. Complaints of pain and discomfort, PRN medication given. Purposeful hourly/q2hr rounding done during shift to promote patient safety. NAD noted. Safety maintained with side rails up x3, bed wheels locked, bed in lowest positioned, call light in reach. Patient educated to call for assistance with ambulation if needed, verbalized understanding. Pt remains free of falls. No further needs expressed at this time. Will continue to monitor.

## 2023-01-26 NOTE — PLAN OF CARE
Problem: Adjustment to Illness (Sepsis/Septic Shock)  Goal: Optimal Coping  Outcome: Ongoing, Progressing     Problem: Bleeding (Sepsis/Septic Shock)  Goal: Absence of Bleeding  Outcome: Ongoing, Progressing     Problem: Glycemic Control Impaired (Sepsis/Septic Shock)  Goal: Blood Glucose Level Within Desired Range  Outcome: Ongoing, Progressing     Problem: Infection Progression (Sepsis/Septic Shock)  Goal: Absence of Infection Signs and Symptoms  Outcome: Ongoing, Progressing     Problem: Nutrition Impaired (Sepsis/Septic Shock)  Goal: Optimal Nutrition Intake  Outcome: Ongoing, Progressing     Problem: Adult Inpatient Plan of Care  Goal: Plan of Care Review  Outcome: Ongoing, Progressing  Goal: Patient-Specific Goal (Individualized)  Outcome: Ongoing, Progressing  Goal: Absence of Hospital-Acquired Illness or Injury  Outcome: Ongoing, Progressing  Goal: Optimal Comfort and Wellbeing  Outcome: Ongoing, Progressing  Goal: Readiness for Transition of Care  Outcome: Ongoing, Progressing     Problem: Infection  Goal: Absence of Infection Signs and Symptoms  Outcome: Ongoing, Progressing     Problem: Fluid Imbalance (Pneumonia)  Goal: Fluid Balance  Outcome: Ongoing, Progressing     Problem: Infection (Pneumonia)  Goal: Resolution of Infection Signs and Symptoms  Outcome: Ongoing, Progressing     Problem: Respiratory Compromise (Pneumonia)  Goal: Effective Oxygenation and Ventilation  Outcome: Ongoing, Progressing     Problem: Oral Intake Inadequate  Goal: Improved Oral Intake  Outcome: Ongoing, Progressing

## 2023-01-26 NOTE — ASSESSMENT & PLAN NOTE
Patient endorses cough. Procalcitonin 143.94 on admission. CXR and CT chest shows consolidation. Blood cultures show Strep pneumo.  -Continue Rocephin  -Follow up respiratory cultures and blood culture sensitivities  -PRN Duonebs

## 2023-01-26 NOTE — PLAN OF CARE
Problem: Physical Therapy  Goal: Physical Therapy Goal  Description: Goals to be met by: 2023     Patient will increase functional independence with mobility by performin. Supine to sit with Contact Guard Assistance  2. Sit to stand transfer with Contact Guard Assistance  3. Bed to chair transfer with Contact Guard Assistance using Rolling Walker  4. Gait  x 250x2 feet with Contact Guard Assistance using Rolling Walker.   5. Lower extremity exercise program x20 reps  Outcome: Ongoing, Progressing     PT treat- EOB Sarah. Sit to stand Sarah using RW. pt ambulated 250 ft x2 using RW Sarah. Pt would benefit from HHPT.

## 2023-01-26 NOTE — ASSESSMENT & PLAN NOTE
History of EtOH use. Possible Tylenol toxicity. Hepatic steatosis seen on CT. Improving.  -Trend LFTs, albumin and INR  -Continue NAC repeat protocol  -Patient should avoid EtOH as well as Tylenol  -GI consulted

## 2023-01-26 NOTE — PROGRESS NOTES
[facility] Medicine  Progress Note    Patient Name: Vivien Burton  MRN: [id number]  Patient Class: IP- Inpatient   Admission Date: 1/23/2023  Length of Stay: 3 days  Attending Physician: Marek Escamilla MD  Primary Care Provider: Ellen Robert III, MD        Subjective:     Principal Problem:Pneumonia        HPI:  Vivien Burton is a 77-year-old female who presents in transfer from [facility].  Patient presented [facility] for evaluation of headache and cough.  She reports headache and cough onset approximately 1 week ago.  She endorses taking 4 tablets of Tylenol every 4 hours for least 3-5 days for headache.  She describes the headache as a stabbing and piercing sensation to the occipital region.  She denies any fever or chills.  She denies any known sick contacts or travel.  No aggravating or alleviating factors.  Previous medical history includes anemia, breast cancer, systolic/diastolic heart failure, coronary artery disease, heart murmur, TAVR, hypothyroidism, hypertension.  ER workup at outside facility:  CBC with leukocytosis of 43,000 in H&H of 10 and 30.  CMP with hypokalemia 3.3, bilirubin of 1.1, alk-phos 161, , .  Magnesium 1.2 (repleted).  Lactic acid elevated at 2.4.  Urinalysis with 8 red blood cells, 10 white blood cells, few bacteria.  INR was elevated 1.8, and sed rate was elevated 37.  Blood urine cultures are pending.  CT the head demonstrated no acute findings.  CT the abdomen and pelvis demonstrated right lower lobe air bronchograms concerning for pneumonia with hepatomegaly, diverticulosis, and aortic valve replacement.  Chest x-ray with a dense masslike suspicious lesion/ infiltrate of the right lower lobe.  Patient admitted to Hospital Medicine for treatment management.  Infectious Disease, Neurology, and Pulmonary consultations made.  Patient will be empirically started on meningitis coverage including ampicillin,  "Rocephin, vancomycin.         Overview/Hospital Course:  Vivien Burton is a 77 year old female with a past medical history of CAD s/p PCI to LAD 1/2022, combined CHF, aortic stenosis s/p TAVR, anemia, hypothyroidism, HLD, HTN, breast cancer, and GERD who presented with one week of headache and cough secondary to a severe sepsis secondary to Strep pneumo pneumonia and bacteremia. She was started on empiric bacterial meningitis therapy initially with ampicillin, ceftriaxone and vancomycin as well as azithromycin. An LP was ordered and preliminary data was not consistent with an acute meningitis. Ampicillin and azithromycin were discontinued per ID. CT of the chest shows consolidation at the RLL with air bronchograms and ground glass opacities concerning for pneumonia. Blood cultures on admission also showed Strep pneumo. Respiratory cultures are pending. Vancomycin was also discontinued per ID. Neurology and Pulmonary were also consulted. Her sepsis eventually resolved. Of note, the patient endorsed taking four tablets of Tylenol every four hours for roughly three to five days. Her LFTs were elevated as well as INR on admission. Her albumin is also low and CT of the abdomen shows hepatic steatosis. She was started on NAC therapy while at [facility] and has started another NAC protocol on 1/25. GI has also been consulted. Her LFTs are now improving as well as the INR. An MRI of the brain shows enhancement of the skull for which Oncology was consulted 1/25; a peripheral smear and flow cytometry are pending.      Interval History: see "Hospital Course"    Review of Systems   Constitutional:  Positive for activity change and appetite change. Negative for chills, diaphoresis and fever.   HENT:  Negative for congestion, nosebleeds and tinnitus.    Eyes:  Negative for photophobia and visual disturbance.   Respiratory:  Positive for cough, shortness of breath and wheezing. Negative for chest tightness.    Cardiovascular:  " Negative for chest pain, palpitations and leg swelling.   Gastrointestinal:  Negative for abdominal distention, abdominal pain, constipation, diarrhea, nausea and vomiting.   Endocrine: Negative for cold intolerance and heat intolerance.   Genitourinary:  Negative for difficulty urinating, dysuria, frequency, hematuria and urgency.   Musculoskeletal:  Negative for arthralgias, back pain and myalgias.   Skin:  Negative for pallor, rash and wound.   Allergic/Immunologic: Negative for immunocompromised state.   Neurological:  Positive for headaches. Negative for dizziness, tremors, facial asymmetry, speech difficulty and weakness.   Hematological:  Negative for adenopathy. Does not bruise/bleed easily.   Psychiatric/Behavioral:  Negative for confusion and sleep disturbance. The patient is not nervous/anxious.    Objective:     Vital Signs (Most Recent):  Temp: 98.8 °F (37.1 °C) (01/26/23 0825)  Pulse: 86 (01/26/23 0825)  Resp: 17 (01/26/23 0825)  BP: (!) 150/84 (01/26/23 0835)  SpO2: 96 % (01/26/23 0812)   Vital Signs (24h Range):  Temp:  [96.5 °F (35.8 °C)-98.8 °F (37.1 °C)] 98.8 °F (37.1 °C)  Pulse:  [68-89] 86  Resp:  [16-20] 17  SpO2:  [96 %-98 %] 96 %  BP: (139-217)/(63-88) 150/84     Weight: 53.8 kg (118 lb 9.7 oz)  Body mass index is 23.16 kg/m².    Intake/Output Summary (Last 24 hours) at 1/26/2023 0923  Last data filed at 1/26/2023 0650  Gross per 24 hour   Intake 2684.77 ml   Output 3200 ml   Net -515.23 ml      Physical Exam  Vitals and nursing note reviewed.   Constitutional:       General: She is not in acute distress.     Appearance: She is well-developed. She is not ill-appearing or diaphoretic.   HENT:      Head: Normocephalic and atraumatic.      Right Ear: External ear normal.      Left Ear: External ear normal.      Nose: Nose normal.      Mouth/Throat:      Mouth: Mucous membranes are moist.      Pharynx: Oropharynx is clear.   Eyes:      General: No scleral icterus.     Extraocular Movements:  Extraocular movements intact.      Conjunctiva/sclera: Conjunctivae normal.   Neck:      Vascular: No JVD.   Cardiovascular:      Rate and Rhythm: Normal rate and regular rhythm.      Heart sounds: Murmur heard.     No friction rub. No gallop.   Pulmonary:      Effort: Pulmonary effort is normal. No respiratory distress.      Breath sounds: No wheezing or rales.   Abdominal:      General: Bowel sounds are normal. There is no distension.      Palpations: Abdomen is soft.      Tenderness: There is no abdominal tenderness. There is no guarding or rebound.   Musculoskeletal:         General: No tenderness. Normal range of motion.      Cervical back: Normal range of motion and neck supple.   Lymphadenopathy:      Cervical: No cervical adenopathy.   Skin:     General: Skin is warm and dry.      Coloration: Skin is not pale.      Findings: No erythema or rash.   Neurological:      General: No focal deficit present.      Mental Status: She is alert and oriented to person, place, and time. Mental status is at baseline.      Cranial Nerves: No cranial nerve deficit.      Sensory: No sensory deficit.      Coordination: Coordination normal.      Deep Tendon Reflexes: Reflexes normal.   Psychiatric:         Behavior: Behavior normal.         Thought Content: Thought content normal.         Judgment: Judgment normal.       Significant Labs: All pertinent labs within the past 24 hours have been reviewed.    Significant Imaging: I have reviewed all pertinent imaging results/findings within the past 24 hours.      Assessment/Plan:      * Pneumonia  Patient endorses cough. Procalcitonin 143.94 on admission. CXR and CT chest shows consolidation. Blood cultures show Strep pneumo.  -Continue Rocephin  -Follow up respiratory cultures and blood culture sensitivities  -PRN Duonebs      Streptococcal bacteremia  TTE shows no vegetations. Likely from pneumonia.  -Follow up sensitivities  -Follow up repeat blood cultures  -Continue  Rocephin  -ID following      Transaminitis  History of EtOH use. Possible Tylenol toxicity. Hepatic steatosis seen on CT. Improving.  -Trend LFTs, albumin and INR  -Continue NAC repeat protocol  -Patient should avoid EtOH as well as Tylenol  -GI consulted      Acetaminophen overdose  Patient endorses taking four tablets of Tylenol every four ours for three to five days. Improving.  -GI consulted  -Monitor LFTs, albumin, and INR  -NAC protocol repeated 1/25  -Poison control aware of case  -If patient's liver failure decompensates, will need transfer to [facility] for Hepatology consultation      Headache(784.0)  CT head unremarkable. MRI brain shows enhancement of skull. LP studies not consistent with meningoencephalitis.  -PRN ibuprofen  -Oncology consulted  -Follow up peripheral smear and flow cytometry      Coronary artery disease  -Continue home ASA    Chronic combined systolic and diastolic heart failure, HFimpEF  History of CAD. Not observed on 1/24 TTE.  -Continue cardiac telemetry  -Strict I's and O's        S/P TAVR (transcatheter aortic valve replacement), 26 mm  Stable.      Anemia  Stable.  -Trend Hgb with CBC  -Hold home iron in setting of acute infection      Excessive drinking alcohol  -Monitor for signs and symptoms of withdrawal  -Folic acid, thiamine and MVI  -Patient to be counseled on cessation as well as avoidance of Tylenol      Unspecified essential hypertension  -Losartan  -PRN hydralazine  -Continue to monitor        Unspecified hypothyroidism  -Continue Synthroid      GERD (gastroesophageal reflux disease)  -Continue PPI      VTE Risk Mitigation (From admission, onward)         Ordered     Place LAMBERT hose  Until discontinued         01/23/23 2322     IP VTE HIGH RISK PATIENT  Once         01/23/23 2322     Place sequential compression device  Until discontinued         01/23/23 2322                Discharge Planning   MIRELLA: 1/27/2023     Code Status: Full Code   Is the patient medically ready for  discharge?:     Reason for patient still in hospital (select all that apply): Patient trending condition, Laboratory test, Treatment, Consult recommendations, PT / OT recommendations and Pending disposition  Discharge Plan A: Home                  Marek Escamilla MD  Department of Hospital Medicine   [facility]

## 2023-01-26 NOTE — PROGRESS NOTES
[facility] Note    CC: Elevated LFTs    HPI 77 y.o. female who presents in transfer from [facility].  Patient presented [facility] for evaluation of headache and cough.  She reports headache and cough onset approximately 1 week ago.  She endorses taking 4 tablets of Tylenol every 4 hours for least 3-5 days for headache.  She describes the headache as a stabbing and piercing sensation to the occipital region.  She denies any fever or chills.  She denies any known sick contacts or travel.  No aggravating or alleviating factors.  Previous medical history includes anemia, breast cancer, systolic/diastolic heart failure, coronary artery disease, heart murmur, TAVR, hypothyroidism, hypertension.  ER workup at outside facility:  CBC with leukocytosis of 43,000 in H&H of 10 and 30.  CMP with hypokalemia 3.3, bilirubin of 1.1, alk-phos 161, , .  Magnesium 1.2 (repleted).  Lactic acid elevated at 2.4.  Urinalysis with 8 red blood cells, 10 white blood cells, few bacteria.  INR was elevated 1.8, and sed rate was elevated 37.  Blood urine cultures are pending.  CT the head demonstrated no acute findings.  CT the abdomen and pelvis demonstrated right lower lobe air bronchograms concerning for pneumonia with hepatomegaly, diverticulosis, and aortic valve replacement.  Chest x-ray with a dense masslike suspicious lesion/ infiltrate of the right lower lobe.  Patient admitted to Hospital Medicine for treatment management.  Infectious Disease, Neurology, and Pulmonary consultations made.  Patient will be empirically started on meningitis coverage including ampicillin, Rocephin, vancomycin.           Overview/Hospital Course:  Vivien Burton is a 77 year old female with a past medical history of CAD s/p PCI to LAD 1/2022, combined CHF, aortic stenosis s/p TAVR, anemia, hypothyroidism, HLD, HTN, and GERD who presented with one week of headache and cough secondary to a severe sepsis  with potential etiologies including CAP and/or meningitis. She has been started on empiric bacterial meningitis therapy with ampicillin, ceftriaxone and vancomycin. She is also on azithromycin. An LP has been ordered and is pending. CT of the chest shows consolidation at the RLL with air bronchograms and ground glass opacities concerning for pneumonia. Respiratory and blood cultures are pending. Pulmonary and Neurology have been consulted. Of note, the patient endorses taking four tablets of Tylenol every four hours for roughly three to five days. Her LFTs are elevated as well as INR. Her albumin is also low and CT of the abdomen shows hepatic steatosis. She was started on NAC therapy while at [place]. GI has also been consulted.      FURTHER HISTORY:  Above obtained from independent review of records from admitting provider as well as from direct discussion with family member at bedside who states that patient took excessive acetaminophen for her symptoms.  In addition, on my interview, I note the following:  She has complaint of cough and generalized malaise.  Currently with sepsis workup ongoing and ID following.  She has strep bacteremia/infection without evidence of meningitis on LP.  No known history of liver disease.  She states that she took 3-4 acetaminophen every 4 hours for the last few days.  INR currently mildly/moderately elevated and acetadote protocol in progress.    INTERVAL HISTORY:  Patient comfortable and tolerating PO.  No evidence of jaundice or encephalopathy.  Transaminases beginning to trend downward and INR now normal  No new acute issues.  Acetadote on going.    Past Medical History:   Diagnosis Date    Acute on chronic combined systolic and diastolic heart failure 2/23/2022    Anemia     Basal cell carcinoma 1999    Breast cancer     Breast cancer     Cancer     CHF (congestive heart failure)     Coronary artery disease     GERD (gastroesophageal reflux disease)     Hyperlipidemia      Hypertension     Hypothyroidism     Nonrheumatic aortic (valve) stenosis 6/5/2018    Osteoporosis 02/05/2019    S/P TAVR (transcatheter aortic valve replacement) 2/22/2022    Squamous cell carcinoma of skin 2018    Thyroid disease          Review of Systems  General ROS: negative for - chills, fever or weight loss  Cardiovascular ROS: no chest pain or dyspnea on exertion  Gastrointestinal ROS: as above    Physical Examination  BP (!) 150/84 (BP Location: Left arm, Patient Position: Sitting)   Pulse 86   Temp 98.8 °F (37.1 °C) (Oral)   Resp 17   Ht 5' (1.524 m)   Wt 53.8 kg (118 lb 9.7 oz)   SpO2 96%   Breastfeeding No   BMI 23.16 kg/m²   General appearance: alert, cooperative, no distress  HENT: Normocephalic, atraumatic, neck symmetrical, no nasal discharge, sclera anicteric   Lungs: clear to auscultation bilaterally, symmetric chest wall expansion bilaterally  Heart: regular rate and rhythm without rub; no displacement of the PMI   Abdomen: soft, NT  Extremities: extremities symmetric; no clubbing, cyanosis, or edema  Neurologic: Alert and oriented X 3, no sensory or motor neurologic deficits      Labs:  Lab Results   Component Value Date    WBC 11.06 01/26/2023    HGB 9.6 (L) 01/26/2023    HCT 28.4 (L) 01/26/2023    MCV 87 01/26/2023     01/26/2023         CMP  Sodium   Date Value Ref Range Status   01/26/2023 140 136 - 145 mmol/L Final     Potassium   Date Value Ref Range Status   01/26/2023 4.1 3.5 - 5.1 mmol/L Final     Chloride   Date Value Ref Range Status   01/26/2023 109 95 - 110 mmol/L Final     CO2   Date Value Ref Range Status   01/26/2023 21 (L) 23 - 29 mmol/L Final     Glucose   Date Value Ref Range Status   01/26/2023 114 (H) 70 - 110 mg/dL Final     BUN   Date Value Ref Range Status   01/26/2023 8 8 - 23 mg/dL Final     Creatinine   Date Value Ref Range Status   01/26/2023 0.7 0.5 - 1.4 mg/dL Final     Calcium   Date Value Ref Range Status   01/26/2023 9.1 8.7 - 10.5 mg/dL Final      Total Protein   Date Value Ref Range Status   01/26/2023 4.6 (L) 6.0 - 8.4 g/dL Final     Albumin   Date Value Ref Range Status   01/26/2023 2.4 (L) 3.5 - 5.2 g/dL Final     Total Bilirubin   Date Value Ref Range Status   01/26/2023 0.9 0.1 - 1.0 mg/dL Final     Comment:     For infants and newborns, interpretation of results should be based  on gestational age, weight and in agreement with clinical  observations.    Premature Infant recommended reference ranges:  Up to 24 hours.............<8.0 mg/dL  Up to 48 hours............<12.0 mg/dL  3-5 days..................<15.0 mg/dL  6-29 days.................<15.0 mg/dL       Alkaline Phosphatase   Date Value Ref Range Status   01/26/2023 224 (H) 55 - 135 U/L Final     AST   Date Value Ref Range Status   01/26/2023 697 (H) 10 - 40 U/L Final     ALT   Date Value Ref Range Status   01/26/2023 1,785 (H) 10 - 44 U/L Final     Anion Gap   Date Value Ref Range Status   01/26/2023 10 8 - 16 mmol/L Final     eGFR   Date Value Ref Range Status   01/26/2023 >60 >60 mL/min/1.73 m^2 Final   10/04/2022 96 > OR = 60 mL/min/1.73m2 Final     Comment:     The eGFR is based on the CKD-EPI 2021 equation. To calculate   the new eGFR from a previous Creatinine or Cystatin C  result, go to https://www.kidney.org/professionals/  kdoqi/gfr%5Fcalculator         Lab Results   Component Value Date    INR 1.2 01/26/2023    INR 1.4 (H) 01/25/2023    INR 1.4 (H) 01/25/2023         Assessment:   Recent acetaminophen ingestion  Sepsis  Gram positive bacteremia  Elevated LFTs - likely multifactorial     Plan:  Continue acetadote to complete protocol  Diet as tolerated  Agree with antibiotics  Trend LFTs and INR.  Would also recommend repeat CMP and INR as outpatient 1-2 weeks after discharge.    Follow up in GI clinic as outpatient.  GI to sign off.  Call for questions.      Mal Negron MD  [facility]  [address], [address]  [place] [address]  Office: [phone]  Fax:  [phone]

## 2023-01-26 NOTE — ASSESSMENT & PLAN NOTE
Patient endorses taking four tablets of Tylenol every four ours for three to five days. Improving.  -GI consulted  -Monitor LFTs, albumin, and INR  -NAC protocol repeated 1/25  -Poison control aware of case  -If patient's liver failure decompensates, will need transfer to [facility] for Hepatology consultation

## 2023-01-26 NOTE — PROGRESS NOTES
[facility]  Progress Note  Date: 2023 9:04 AM            Patient Name: Vivien Burton   MRN: [id number]   : 1945    AGE: 77 y.o.    LOS: 3 days Hospital Day: 4  Admit date: 2023 10:37 PM         HPI per EMR: Vivien Burton is a 77 y.o. female with a history of  presents in transfer from [facility].  Patient presented [facility] for evaluation of headache and cough.  She reports headache and cough onset approximately 1 week ago.  She endorses taking 4 tablets of Tylenol every 4 hours for least 3-5 days for headache.  She describes the headache as a stabbing and piercing sensation to the occipital region.  She denies any fever or chills.  She denies any known sick contacts or travel.  No aggravating or alleviating factors.  Previous medical history includes anemia, breast cancer, systolic/diastolic heart failure, coronary artery disease, heart murmur, TAVR, hypothyroidism, hypertension.  ER workup at outside facility:  CBC with leukocytosis of 43,000 in H&H of 10 and 30.  CMP with hypokalemia 3.3, bilirubin of 1.1, alk-phos 161, , .  Magnesium 1.2 (repleted).  Lactic acid elevated at 2.4.  Urinalysis with 8 red blood cells, 10 white blood cells, few bacteria.  INR was elevated 1.8, and sed rate was elevated 37.  Blood urine cultures are pending.  CT the head demonstrated no acute findings.  CT the abdomen and pelvis demonstrated right lower lobe air bronchograms concerning for pneumonia with hepatomegaly, diverticulosis, and aortic valve replacement.  Chest x-ray with a dense masslike suspicious lesion/ infiltrate of the right lower lobe.  Patient admitted to Hospital Medicine for treatment management.     Neurology consult:  Patient was seen examined me.  She is alert and oriented x3.  She states that since Wednesday she is been having intractable headache which she describes as ice pricking headache.  She states that the  headache comes in episodes of 15-20 seconds with intermittent relief for 1 minute.  She was taking around the clock Tylenol and ibuprofen with symptomatic relief however headache would come back.  She presented to [facility] with these symptoms. ER workup at outside facility:  CBC with leukocytosis of 43,000 in H&H of 10 and 30.  CMP with hypokalemia 3.3, bilirubin of 1.1, alk-phos 161, , .  Magnesium 1.2 (repleted).  Lactic acid elevated at 2.4.  Urinalysis with 8 red blood cells, 10 white blood cells, few bacteria.     Patient was then transferred here for further workup and management for headache.     On arrival here, she was started on broad-spectrum antibiotics with concern for meningitis due to elevated white count however she was afebrile.  She was also given Tylenol and ibuprofen for headache with improvement in pain.  Since morning, she is not had headache like she did at home.     Patient denies any associated symptoms with the headache including vision changes, unilateral weakness or sensory changes, nausea vomiting, dizziness.    01/25/2023: No acute events overnight. Patient was seen and examined by me this morning. Neuro exam is normal.  She continues to have headache and she states it is a dull headache mostly in the occipital region on the right side.  She denies any focal deficits, denies conjunctival injection or tearing or nausea/vomiting.  Patient's sons were at bedside and discussed plan of care with them.    1/26:  Patient was seen examined by me this morning.  She states the Benadryl only help with sleep however headache persisted and she states headache is about 5/10 in the posterior region.       Vitals:  Patient Vitals for the past 24 hrs:   BP Temp Temp src Pulse Resp SpO2   01/26/23 0835 (!) 150/84 -- -- -- -- --   01/26/23 0826 (!) 217/88 -- -- -- -- --   01/26/23 0825 -- 98.8 °F (37.1 °C) Oral 86 17 --   01/26/23 0812 -- -- -- 79 16 96 %   01/26/23 0401 (!)  143/69 98.5 °F (36.9 °C) -- 81 18 96 %   01/25/23 2350 139/65 97.6 °F (36.4 °C) -- 87 20 98 %   01/25/23 2032 -- -- -- 86 18 97 %   01/25/23 1950 -- -- -- -- -- 97 %   01/25/23 1928 (!) 152/66 98.2 °F (36.8 °C) -- 89 20 97 %   01/25/23 1513 (!) 144/63 96.5 °F (35.8 °C) Oral 79 17 96 %   01/25/23 1105 (!) 187/76 98.3 °F (36.8 °C) Oral 68 16 98 %     PHYSICAL EXAM:     GENERAL APPEARANCE: Well-developed, well-nourished female in no acute distress.  HEENT: Normocephalic and atraumatic. PERRL. Oropharynx unremarkable.  PULM: Comfortable on room air.  CV: RRR.  ABDOMEN: Soft, nontender.  EXTREMITIES: No signs of vascular compromise. Pulses present. No cyanosis, clubbing or edema.  SKIN: Clear; no rashes, lesions or skin breaks in exposed areas.      NEURO:   MENTAL STATUS: Patient awake and oriented to time, place, and person. Affect normal.  CRANIAL NERVES II-XII: Pupils equal, round and reactive to light. Extraocular movements full and intact. No facial asymmetry.  MOTOR: Normal bulk. Tone normal and symmetrical throughout.  No abnormal movements. No tremor.   Strength 5/5 throughout unless specified below.  REFLEXES: DTRs 2+; normal and symmetric throughout.   SENSATION: Sensation grossly intact to fine touch.  COORDINATION: Finger-to-nose normal for age and symmetric.  STATION: Romberg deferred.  GAIT: Deferred.    CURRENT SCHEDULED MEDICATIONS:   acetylcysteine  100 mg/kg Intravenous Once    aspirin  81 mg Oral Daily    cefTRIAXone (ROCEPHIN) IVPB  2 g Intravenous Q24H    dexAMETHasone  4 mg Intravenous Q12H    diphenhydrAMINE  25 mg Intravenous Q8H    folic acid  1 mg Oral Daily    levothyroxine  75 mcg Oral Before breakfast    losartan  25 mg Oral Daily    magnesium sulfate IVPB  1 g Intravenous Once    metoclopramide HCl  5 mg Intravenous Q8H    multivitamin  1 tablet Oral Daily    pantoprazole  40 mg Oral Daily    potassium, sodium phosphates  1 packet Oral QID (WM & HS)    thiamine  100 mg Oral Daily      CURRENT INFUSIONS:    DATA:  Recent Labs   Lab 01/23/23  1546 01/23/23 2014 01/24/23  0445 01/24/23  1655 01/25/23  0501 01/25/23  1717 01/26/23  0357   * 133* 130* 123* 131*  131* 134* 140   K 3.3* 3.5 2.4* 3.8 4.3  4.3 3.9 4.1   CL 99 103 100 98 103  103 105 109   CO2 20* 20* 19* 18* 20*  20* 21* 21*   BUN 13 13 11 7* 5*  5* 8 8   CREATININE 0.7 0.7 0.8 0.6 0.6  0.6 0.6 0.7   * 151* 292* 431* 81  81 117* 114*   CALCIUM 9.5 8.6* 7.7* 7.4* 8.5*  8.5* 8.6* 9.1   PHOS  --   --   --   --  1.6*  --  3.4   MG 1.2*  --  1.9  --  1.9  --  1.8   * 280* 392*  --  1,776*  1,776* 931* 697*   * 279* 415*  --  2,215*  2,215* 1,915* 1,785*     Recent Labs   Lab 01/23/23  1546 01/24/23 0445 01/25/23  0501 01/26/23  0357   WBC 43.13* 33.73* 19.47*  19.47* 11.06   HGB 10.7* 9.3* 9.6*  9.6* 9.6*   HCT 30.7* 27.8* 27.7*  27.7* 28.4*    174 199  199 207     Lab Results   Component Value Date    PROTEINCSF 29 01/24/2023    GLUCCSF 78 (H) 01/24/2023     Hemoglobin A1C   Date Value Ref Range Status   01/25/2023 4.7 4.0 - 5.6 % Final     Comment:     ADA Screening Guidelines:  5.7-6.4%  Consistent with prediabetes  >or=6.5%  Consistent with diabetes    High levels of fetal hemoglobin interfere with the HbA1C  assay. Heterozygous hemoglobin variants (HbS, HgC, etc)do  not significantly interfere with this assay.   However, presence of multiple variants may affect accuracy.     10/04/2022 4.3 <5.7 % of total Hgb Final     Comment:     For the purpose of screening for the presence of  diabetes:     <5.7%       Consistent with the absence of diabetes  5.7-6.4%    Consistent with increased risk for diabetes              (prediabetes)  > or =6.5%  Consistent with diabetes     This assay result is consistent with a decreased risk  of diabetes.     Currently, no consensus exists regarding use of  hemoglobin A1c for diagnosis of diabetes in children.     According to American Diabetes  Association (ADA)  guidelines, hemoglobin A1c <7.0% represents optimal  control in non-pregnant diabetic patients. Different  metrics may apply to specific patient populations.   Standards of Medical Care in Diabetes(ADA).         04/13/2022 4.5 <5.7 % of total Hgb Final     Comment:     For the purpose of screening for the presence of  diabetes:     <5.7%       Consistent with the absence of diabetes  5.7-6.4%    Consistent with increased risk for diabetes              (prediabetes)  > or =6.5%  Consistent with diabetes     This assay result is consistent with a decreased risk  of diabetes.     Currently, no consensus exists regarding use of  hemoglobin A1c for diagnosis of diabetes in children.     According to American Diabetes Association (ADA)  guidelines, hemoglobin A1c <7.0% represents optimal  control in non-pregnant diabetic patients. Different  metrics may apply to specific patient populations.   Standards of Medical Care in Diabetes(ADA).                  I have personally reviewed and interpreted the pertinent imaging and lab results.              ASSESSMENT AND PLAN:    Intractable headache  Leukocytosis  Transaminitis  Sepsis     Plan  Etiology of patient's intractable headache is unknown.  She has mostly headache in the right side with radiation to the back.  There is no conjunctival injection or lacrimation associated with the headache.  Concern for trigeminal autonomic cephalgias is low however can not rule out. 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  There is diffuse heterogeneous marrow signal and enhancement throughout the calvarium with suspicious for hematopoietic disorder.  Lumbar puncture was performed and CSF analysis showed WBC of 1 protein 29 and glucose 78.  Concern for intracranial infection is low.  Oncology was consulted for about MRI findings.  Checking flow cytometry, SPEP and free  light chains in the serum.  Blood cultures were positive for Gram-positive cocci-Streptococcus pneumoniae  Infectious Disease on board.  Patient had elevated white count, elevated procalcitonin, labs showed transaminitis. Appreciate ID recommendations regarding antibiotic management  Hold Tylenol- concern for transaminitis from acetaminophen overdose  Trend LFTs  Neuro checks per unit protocol  Started Benadryl, Reglan round the clock to help with headache.  Continue IV fluids.  No significant improvement in headache.  Will start on 1 dose of Mag sulfate 1 mg and Decadron 4 mg b.i.d. for headache.  Will follow         42 minutes of care time has been spent evaluating with the patient. Time includes chart review not limited to diagnostic imaging, labs, and vitals, patient assessment, discussion with family and nursing, current order evaluations, and new order entries.      Car Tucker MD  Neurology/vascular Neurology  Date of Service: 01/26/2023  9:04 AM    Please note: This note was transcribed using voice recognition software. Because of this technology there are often uinintended grammatical, spelling, and other transcription errors. Please disregard these errors.

## 2023-01-26 NOTE — NURSING
Spoke with poison control and gave pt's results for ALT and AST.  Said pt's ALT was still too high even though trending down.  Recommended another 16hr bag of acetylcysteine as soon as the other one is done.  Will continue to monitor.

## 2023-01-26 NOTE — PROGRESS NOTES
[facility]  Adult Nutrition  Progress Note    SUMMARY     Recommendations  1) Continue Cardiac diet  2) weigh weekly     Goals: 1) PO intakes > 50% of meals and supplements at f/u 2) PO intakes > 75% of meals at f/u  Nutrition Goal Status: met/ new  Communication of RD Recs:  (POC, sticky note)    Assessment and Plan    No nutrition problem at this time  Pt does not meet 2 ASPEN criteria for malnutrition  .LASTWT[30    Malnutrition Assessment     Skin (Micronutrient):  (Deo = 20, groin incision)   Micronutrient Evaluation: suspected deficiency  Micronutrient Evaluation Comments: check iron, Na, K           Edema (Fluid Accumulation): 2  NFPE 1/26/23 WDL            Reason for Assessment    Reason For Assessment: follow up  Diagnosis:  (severe sepsis)  Relevant Medical History: CAD, HTN, TAVR, anemia, GERD, CHF, skin CA, breast CA  Interdisciplinary Rounds: not attended    General Information Comments: 78 y/o female admited with pneumonia, transaminitis and tylenol OD. Down for lumba puncture today, NPO x 1 day. Per pt's son, who does not live with her, she typically eats 2-3 small balanced meals/day. Upset stomach this morning prior to procedure. NFPE to be completed at f/u, will send boost for now. Wt gain per chart review.  1/26/23 Pt is tolerating PO well, says she has a good appetite and typically eats 1 big and 2 smaller meals. She is noted with ETOH abuse. Per MD watching liver function and watching for signs of withdrawal. Pt feeling well at visit. Denies GI issues.    Nutrition Discharge Planning: To be determined- Cardiac diet + Boost plus 1-2 x daily if needed    Nutrition Risk Screen    Nutrition Risk Screen: no indicators present    Nutrition/Diet History    Spiritual, Cultural Beliefs, Oriental orthodox Practices, Values that Affect Care: no  Food Allergies: NKFA  Factors Affecting Nutritional Intake: None    Anthropometrics    Temp: 98.8 °F (37.1 °C)  Height Method: Measured (office  22)  Height: 5'  Height (inches): 60 in  Weight Method: Bed Scale  Weight: 53.8 kg (118 lb 9.7 oz)  Weight (lb): 118.61 lb  Ideal Body Weight (IBW), Female: 100 lb  % Ideal Body Weight, Female (lb): 118 %  BMI (Calculated): 23.2  BMI Grade: 18.5-24.9 - normal  Usual Body Weight (UBW), k.8 kg (22)  % Usual Body Weight: 110.48  % Weight Change From Usual Weight: 10.24 %       Lab/Procedures/Meds    Pertinent Labs Reviewed: reviewed  BMP  Lab Results   Component Value Date     2023    K 4.1 2023     2023    CO2 21 (L) 2023    BUN 8 2023    CREATININE 0.7 2023    CALCIUM 9.1 2023    ANIONGAP 10 2023    EGFRNORACEVR >60 2023     Lab Results   Component Value Date    ALBUMIN 2.4 (L) 2023       Pertinent Medications Reviewed: reviewed  Dexamethasone, folic acid, Mg sulfate, MVI, thiamine, KNaPhos    Estimated/Assessed Needs    Weight Used For Calorie Calculations: 53.8 kg (118 lb 9.7 oz)  Energy Calorie Requirements (kcal): MSJ ( x 1.2-1.4) = 1496-8534 kcal  Energy Need Method: [place]- [place]  Protein Requirements: 1-1.2 g protein/kg ( 53-64 g)  Weight Used For Protein Calculations: 53.8 kg (118 lb 9.7 oz)  Fluid Requirements (mL): 1350 ml or per MD  Estimated Fluid Requirement Method: RDA Method  CHO Requirement: N/A      Nutrition Prescription Ordered    Current Diet Order: Cardiac Diet      Evaluation of Received Nutrient/Fluid Intake    Energy Calories Required: meeting needs  Protein Required: meeting needs  Fluid Required: meeting needs  Tolerance: tolerating    Intake/Output Summary (Last 24 hours) at 2023 1531  Last data filed at 2023 0650  Gross per 24 hour   Intake 2444.77 ml   Output 3200 ml   Net -755.23 ml         % Intake of Estimated Energy Needs: 75%  % Meal Intake: 75%    Nutrition Risk    Level of Risk/Frequency of Follow-up: moderate 2 x weekly      Monitor and Evaluation    Food and Nutrient Intake: energy  intake, food and beverage intake  Food and Nutrient Adminstration: diet order  Anthropometric Measurements: weight  Biochemical Data, Medical Tests and Procedures: electrolyte and renal panel, gastrointestinal profile  Nutrition-Focused Physical Findings: overall appearance, extremities, muscles and bones       Nutrition Follow-Up    RD Follow-up?: Yes

## 2023-01-26 NOTE — ASSESSMENT & PLAN NOTE
TTE shows no vegetations. Likely from pneumonia.  -Follow up sensitivities  -Follow up repeat blood cultures  -Continue Rocephin  -ID following

## 2023-01-26 NOTE — PLAN OF CARE
Pt not medically cleared.  PT recommended HH and RW upon discharge.     01/26/23 1427   Discharge Reassessment   Assessment Type Discharge Planning Brief Assessment   Did the patient's condition or plan change since previous assessment? Yes   Discharge Plan A Home Health   Discharge Plan B Home Health   DME Needed Upon Discharge  walker, rolling

## 2023-01-26 NOTE — NURSING
Notified MD about poison control's recommendation.  MD said will order.  Will continue to monitor.

## 2023-01-26 NOTE — CARE UPDATE
01/25/23 1950   Patient Assessment/Suction   Level of Consciousness (AVPU) alert   Respiratory Effort Normal;Unlabored   Expansion/Accessory Muscles/Retractions expansion symmetric;no retractions;no use of accessory muscles   All Lung Fields Breath Sounds clear   PRE-TX-O2   Device (Oxygen Therapy) room air   SpO2 97 %   Pulse Oximetry Type Intermittent   Aerosol Therapy   $ Aerosol Therapy Charges PRN treatment not required   Respiratory Treatment Status (SVN) PRN treatment not required

## 2023-01-26 NOTE — PT/OT/SLP PROGRESS
"Physical Therapy Treatment    Patient Name:  Vivien Burton   MRN:  [id number]    Recommendations:     Discharge Recommendations: home health PT  Discharge Equipment Recommendations: walker, rolling  Barriers to discharge: None    Assessment:     Vivien Burton is a 77 y.o. female admitted with a medical diagnosis of Pneumonia.  She presents with the following impairments/functional limitations: weakness, impaired endurance, impaired functional mobility, gait instability, impaired balance, decreased lower extremity function, pain     PT treat- pt found awake with son by bedside. Eager and agreeable for therapy. [place] better today than yesterday. Pt has persistent R sided headache that hurts consistently with rest or walking but is exacerbated by talking. EOB Sarah. Sit to stand Sarah using RW. Ambulated 250 ft x2 Sarah using RW. Pt would benefit from HHPT.     Rehab Prognosis: Good; patient would benefit from acute skilled PT services to address these deficits and reach maximum level of function.    Recent Surgery: * No surgery found *      Plan:     During this hospitalization, patient to be seen 6 x/week to address the identified rehab impairments via gait training, therapeutic activities, therapeutic exercises and progress toward the following goals:    Plan of Care Expires:  01/31/23    Subjective   Pt stated that the right side of her head hurts whether she is resting or walking, but said "it hurts more when I talk too much".   Pt son in room and stated that they just wanted to figure out what is causing headaches.   Has lots of family support.   Chief Complaint: right sided headache  Patient/Family Comments/goals: to get a diagnosis   Pain/Comfort:  Pain Rating 1: other (see comments) (pt did not rate)  Location - Side 1: Right  Location 1: head  Pain Addressed 1: Distraction, Cessation of Activity, Reposition      Objective:     Communicated with nurse Carrillo prior to session.  Patient found HOB " elevated with telemetry, peripheral IV upon PT entry to room.     General Precautions: Standard, fall  Orthopedic Precautions: N/A  Braces: N/A  Respiratory Status: Room air     Functional Mobility:  Bed Mobility:     Rolling Left:  minimum assistance  Transfers:     Sit to Stand:  minimum assistance with rolling walker  Gait: pt ambulated 250 ft x 2 Sarah using RW. Pace was consistent. No breaks taken.       AM-PAC 6 CLICK MOBILITY  Turning over in bed (including adjusting bedclothes, sheets and blankets)?: 4  Sitting down on and standing up from a chair with arms (e.g., wheelchair, bedside commode, etc.): 3  Moving from lying on back to sitting on the side of the bed?: 3  Moving to and from a bed to a chair (including a wheelchair)?: 3  Need to walk in hospital room?: 3  Climbing 3-5 steps with a railing?: 1  Basic Mobility Total Score: 17       Treatment & Education:  Pt educated on the importance of OOB activity and safety to prevent physical deconditioning. Encouraged to perform LE exercises for strengthening and increasing ROM.     Patient left up in chair with all lines intact, call button in reach, chair alarm on, and son present..    GOALS:   Multidisciplinary Problems       Physical Therapy Goals          Problem: Physical Therapy    Goal Priority Disciplines Outcome Goal Variances Interventions   Physical Therapy Goal     PT, PT/OT Ongoing, Progressing     Description: Goals to be met by: 2023     Patient will increase functional independence with mobility by performin. Supine to sit with Contact Guard Assistance  2. Sit to stand transfer with Contact Guard Assistance  3. Bed to chair transfer with Contact Guard Assistance using Rolling Walker  4. Gait  x 250x2 feet with Contact Guard Assistance using Rolling Walker.   5. Lower extremity exercise program x20 reps                       Time Tracking:     PT Received On: 23  PT Start Time: 954     PT Stop Time: 1008  PT Total Time (min):  14 min     Billable Minutes: Gait Training 14    Treatment Type: Treatment  PT/PTA: PT     PTA Visit Number: 0     01/26/2023

## 2023-01-26 NOTE — PLAN OF CARE
Recommendations  1) Continue Cardiac diet  2) weigh weekly      Goals: 1) PO intakes > 50% of meals and supplements at f/u 2) PO intakes > 75% of meals at f/u  Nutrition Goal Status: met/ new  Communication of RD Recs:  (POC, sticky note)

## 2023-01-26 NOTE — SUBJECTIVE & OBJECTIVE
"Interval History: see "Hospital Course"    Review of Systems   Constitutional:  Positive for activity change and appetite change. Negative for chills, diaphoresis and fever.   HENT:  Negative for congestion, nosebleeds and tinnitus.    Eyes:  Negative for photophobia and visual disturbance.   Respiratory:  Positive for cough, shortness of breath and wheezing. Negative for chest tightness.    Cardiovascular:  Negative for chest pain, palpitations and leg swelling.   Gastrointestinal:  Negative for abdominal distention, abdominal pain, constipation, diarrhea, nausea and vomiting.   Endocrine: Negative for cold intolerance and heat intolerance.   Genitourinary:  Negative for difficulty urinating, dysuria, frequency, hematuria and urgency.   Musculoskeletal:  Negative for arthralgias, back pain and myalgias.   Skin:  Negative for pallor, rash and wound.   Allergic/Immunologic: Negative for immunocompromised state.   Neurological:  Positive for headaches. Negative for dizziness, tremors, facial asymmetry, speech difficulty and weakness.   Hematological:  Negative for adenopathy. Does not bruise/bleed easily.   Psychiatric/Behavioral:  Negative for confusion and sleep disturbance. The patient is not nervous/anxious.    Objective:     Vital Signs (Most Recent):  Temp: 98.8 °F (37.1 °C) (01/26/23 0825)  Pulse: 86 (01/26/23 0825)  Resp: 17 (01/26/23 0825)  BP: (!) 150/84 (01/26/23 0835)  SpO2: 96 % (01/26/23 0812)   Vital Signs (24h Range):  Temp:  [96.5 °F (35.8 °C)-98.8 °F (37.1 °C)] 98.8 °F (37.1 °C)  Pulse:  [68-89] 86  Resp:  [16-20] 17  SpO2:  [96 %-98 %] 96 %  BP: (139-217)/(63-88) 150/84     Weight: 53.8 kg (118 lb 9.7 oz)  Body mass index is 23.16 kg/m².    Intake/Output Summary (Last 24 hours) at 1/26/2023 0923  Last data filed at 1/26/2023 0650  Gross per 24 hour   Intake 2684.77 ml   Output 3200 ml   Net -515.23 ml      Physical Exam  Vitals and nursing note reviewed.   Constitutional:       General: She is not " in acute distress.     Appearance: She is well-developed. She is not ill-appearing or diaphoretic.   HENT:      Head: Normocephalic and atraumatic.      Right Ear: External ear normal.      Left Ear: External ear normal.      Nose: Nose normal.      Mouth/Throat:      Mouth: Mucous membranes are moist.      Pharynx: Oropharynx is clear.   Eyes:      General: No scleral icterus.     Extraocular Movements: Extraocular movements intact.      Conjunctiva/sclera: Conjunctivae normal.   Neck:      Vascular: No JVD.   Cardiovascular:      Rate and Rhythm: Normal rate and regular rhythm.      Heart sounds: Murmur heard.     No friction rub. No gallop.   Pulmonary:      Effort: Pulmonary effort is normal. No respiratory distress.      Breath sounds: No wheezing or rales.   Abdominal:      General: Bowel sounds are normal. There is no distension.      Palpations: Abdomen is soft.      Tenderness: There is no abdominal tenderness. There is no guarding or rebound.   Musculoskeletal:         General: No tenderness. Normal range of motion.      Cervical back: Normal range of motion and neck supple.   Lymphadenopathy:      Cervical: No cervical adenopathy.   Skin:     General: Skin is warm and dry.      Coloration: Skin is not pale.      Findings: No erythema or rash.   Neurological:      General: No focal deficit present.      Mental Status: She is alert and oriented to person, place, and time. Mental status is at baseline.      Cranial Nerves: No cranial nerve deficit.      Sensory: No sensory deficit.      Coordination: Coordination normal.      Deep Tendon Reflexes: Reflexes normal.   Psychiatric:         Behavior: Behavior normal.         Thought Content: Thought content normal.         Judgment: Judgment normal.       Significant Labs: All pertinent labs within the past 24 hours have been reviewed.    Significant Imaging: I have reviewed all pertinent imaging results/findings within the past 24 hours.

## 2023-01-26 NOTE — PROGRESS NOTES
[facility]  Hematology/Oncology  Consult Note    Patient Name: Vivien Burton  MRN: [id number]  Admission Date: 1/23/2023  Hospital Length of Stay: 3 days  Code Status: Full Code   Attending Provider: Marek Escamilla MD  Consulting Provider: Priya Glover NP  Primary Care Physician: Ellen Robert III, MD  Principal Problem:Pneumonia    Consults  Subjective:     HPI: Vivien Burton is a 77 year old female with a past medical history of CAD s/p PCI to LAD 1/2022, combined CHF, aortic stenosis s/p TAVR, anemia, hypothyroidism, HLD, HTN, breast cancer, and GERD who presented with one week of headache and cough secondary to a severe sepsis with potential etiologies including CAP and/or meningitis. She was started on empiric bacterial meningitis therapy with ampicillin, ceftriaxone and vancomycin as well as azithromycin. An LP was ordered and preliminary data was not consistent with an acute meningitis. Ampicillin and azithromycin were discontinued per ID. CT of the chest shows consolidation at the RLL with air bronchograms and ground glass opacities concerning for pneumonia. Blood cultures on admission also showed organisms resembling Strep. Respiratory cultures are pending. Neurology and Pulmonary were also consulted. Of note, the patient endorses taking four tablets of Tylenol every four hours for roughly three to five days. Her LFTs were elevated as well as INR on admission. Her albumin is also low and CT of the abdomen shows hepatic steatosis. She was started on NAC therapy while at [place] and has started another NAC protocol on 1/25. GI has also been consulted. Her LFTs continue to rise, yet her liver function remains compensated. An MRI of the brain shows enhancement of the skull for which Oncology was consulted 1/25; a peripheral smear is pending.  Per medical record  We have been consulted for abnormal enhancement of the skull concerning for lymphoma/leukemia or multiple  myeloma    Her breast cancer was treated with chemotherapy followed by radiation 20 years.  Oncologist Dr. Marie at [facility]    2023:  Patient is sitting up in chair eating lunch.  She continues to complain of dull headache.  Oncology Treatment Plan:   [No matching plan found]    Medications:  Continuous Infusions:  Scheduled Meds:   acetylcysteine  100 mg/kg Intravenous Once    aspirin  81 mg Oral Daily    cefTRIAXone (ROCEPHIN) IVPB  2 g Intravenous Q24H    dexAMETHasone  4 mg Intravenous Q12H    diphenhydrAMINE  25 mg Intravenous Q8H    folic acid  1 mg Oral Daily    levothyroxine  75 mcg Oral Before breakfast    losartan  25 mg Oral Daily    metoclopramide HCl  5 mg Intravenous Q8H    multivitamin  1 tablet Oral Daily    pantoprazole  40 mg Oral Daily    potassium, sodium phosphates  1 packet Oral QID (WM & HS)    thiamine  100 mg Oral Daily     PRN Meds:albuterol-ipratropium, hydrALAZINE, ondansetron, sodium chloride 0.9%     Review of patient's allergies indicates:  No Known Allergies     Past Medical History:   Diagnosis Date    Acute on chronic combined systolic and diastolic heart failure 2022    Anemia     Basal cell carcinoma     Breast cancer     Breast cancer     Cancer     CHF (congestive heart failure)     Coronary artery disease     GERD (gastroesophageal reflux disease)     Hyperlipidemia     Hypertension     Hypothyroidism     Nonrheumatic aortic (valve) stenosis 2018    Osteoporosis 2019    S/P TAVR (transcatheter aortic valve replacement) 2022    Squamous cell carcinoma of skin 2018    Thyroid disease      Past Surgical History:   Procedure Laterality Date    BREAST SURGERY      CARDIAC CATH COSURGEON N/A 2022    Procedure: Cardiac Cath Cosurgeon;  Surgeon: Dontae Wooten MD;  Location: [facility] CATH LAB;  Service: Cardiovascular;  Laterality: N/A;     SECTION, CLASSIC      COLONOSCOPY N/A 2018    Procedure: COLONOSCOPY;  Surgeon:  Precious Castorena MD;  Location: [facility] ENDO;  Service: Endoscopy;  Laterality: N/A;    HYSTERECTOMY      LEFT HEART CATHETERIZATION Left 2022    Procedure: Left heart cath;  Surgeon: Dontae Johns MD;  Location: [facility] CATH LAB;  Service: Cardiology;  Laterality: Left;    LEFT HEART CATHETERIZATION Left 2022    Procedure: Left heart cath;  Surgeon: Dontae Johns MD;  Location: [facility] CATH LAB;  Service: Cardiology;  Laterality: Left;    MASTECTOMY Right 2000    right breast      TONSILLECTOMY, ADENOIDECTOMY      TRANSCATHETER AORTIC VALVE REPLACEMENT (TAVR) N/A 2022    Procedure: REPLACEMENT, AORTIC VALVE, TRANSCATHETER (TAVR);  Surgeon: Dontae Johns MD;  Location: [facility] CATH LAB;  Service: Cardiology;  Laterality: N/A;     Family History       Problem Relation (Age of Onset)    Cancer Sister, Brother    Liver cancer Sister    Melanoma Sister          Tobacco Use    Smoking status: Former     Packs/day: 1.00     Types: Cigarettes     Quit date: 2000     Years since quittin.9    Smokeless tobacco: Never    Tobacco comments:     quit 20 years ago   Substance and Sexual Activity    Alcohol use: Yes     Alcohol/week: 2.0 standard drinks     Types: 2 Shots of liquor per week     Comment: per pt 2 burbon drinks per night however none in last month    Drug use: No    Sexual activity: Not Currently       Review of Systems  As per mentioned in HPI; all other systems reviewed and negative  Objective:     Vital Signs (Most Recent):  Temp: 98.8 °F (37.1 °C) (23 08)  Pulse: 86 (23 08)  Resp: 17 (23 08)  BP: (!) 150/84 (23 0835)  SpO2: 96 % (23 08)   Vital Signs (24h Range):  Temp:  [96.5 °F (35.8 °C)-98.8 °F (37.1 °C)] 98.8 °F (37.1 °C)  Pulse:  [79-89] 86  Resp:  [16-20] 17  SpO2:  [96 %-98 %] 96 %  BP: (139-217)/(63-88) 150/84     Weight: 53.8 kg (118 lb 9.7 oz)  Body mass index is 23.16 kg/m².  Body surface area is 1.51 meters squared.      Intake/Output  Summary (Last 24 hours) at 1/26/2023 1454  Last data filed at 1/26/2023 0650  Gross per 24 hour   Intake 2444.77 ml   Output 3200 ml   Net -755.23 ml         Physical Exam  PHYSICAL EXAM:     Vitals:    01/26/23 0835   BP: (!) 150/84   Pulse:    Resp:    Temp:        GENERAL: Comfortable looking patient. Patient is in no distress.  Awake, alert and oriented to time, person and place.  No anxiety, or agitation.      HEENT: Normal conjunctivae and eyelids. WNL.  PERRLA 3 to 4 mm. No icterus, no pallor, no congestion, and no discharge noted.     NECK:  Supple. Trachea is central.  No crepitus.  No JVD or masses.    RESPIRATORY:  No intercostal retractions.  No dullness to percussion.  Chest is clear to auscultation.  No rales, rhonchi or wheezes.  No crepitus.  Good air entry bilaterally.    CARDIOVASCULAR:  S1 and S2 are normally heard without murmurs or gallops.  All peripheral pulses are present.    ABDOMEN:  Normal abdomen.  No hepatosplenomegaly.  No free fluid.  Bowel sounds are present.  No hernia noted. No masses.  No rebound or tenderness.  No guarding or rigidity.  Umbilicus is midline.    LYMPHATICS:  No axillary, cervical, supraclavicular, submental, or inguinal lymphadenopathy.    SKIN/MUSCULOSKELETAL:  There is no evidence of excoriation marks or ecchmosis.  No rashes.  No cyanosis.  No clubbing.  No joint or skeletal deformities noted.  Normal range of motion.    NEUROLOGIC:  Higher functions are appropriate.  No cranial nerve deficits.  Normal keegan.  Normal strength.  Motor and sensory functions are normal.  Deep tendon reflexes are normal.    GENITAL/RECTAL:  Exams are deferred.   Significant Labs:   CBC:   Recent Labs   Lab 01/25/23  0501 01/26/23  0357   WBC 19.47*  19.47* 11.06   HGB 9.6*  9.6* 9.6*   HCT 27.7*  27.7* 28.4*     199 207      and CMP:   Recent Labs   Lab 01/25/23  0501 01/25/23  1717 01/26/23  0357   *  131* 134* 140   K 4.3  4.3 3.9 4.1     103 105 109   CO2  20*  20* 21* 21*   GLU 81  81 117* 114*   BUN 5*  5* 8 8   CREATININE 0.6  0.6 0.6 0.7   CALCIUM 8.5*  8.5* 8.6* 9.1   PROT 4.4*  4.4* 4.4* 4.6*   ALBUMIN 2.4*  2.4* 2.3* 2.4*   BILITOT 1.0  1.0 0.9 0.9   ALKPHOS 174*  174* 188* 224*   AST 1,776*  1,776* 931* 697*   ALT 2,215*  2,215* 1,915* 1,785*   ANIONGAP 8  8 8 10         Diagnostic Results:  I have reviewed all pertinent imaging results/findings within the past 24 hours.    Assessment/Plan:     Active Diagnoses:    Diagnosis Date Noted POA    PRINCIPAL PROBLEM:  Pneumonia [J18.9] 01/24/2023 Yes    Streptococcal bacteremia [R78.81, B95.5] 01/25/2023 Yes    Transaminitis [R74.01] 01/23/2023 Yes    Acetaminophen overdose [T39.1X1A] 01/23/2023 Yes    Chronic combined systolic and diastolic heart failure, HFimpEF [I50.42] 02/23/2022 Yes    Coronary artery disease [I25.10] 02/23/2022 Yes    S/P TAVR (transcatheter aortic valve replacement), 26 mm [Z95.2] 02/22/2022 Not Applicable    Anemia [D64.9] 12/10/2021 Yes    Excessive drinking alcohol [F10.10] 06/05/2018 Yes    Headache(784.0) [R51.9] 04/12/2017 Yes    Unspecified hypothyroidism [E03.9] 07/22/2015 Yes    Unspecified essential hypertension [I10] 07/22/2015 Yes    GERD (gastroesophageal reflux disease) [K21.9]  Yes      Problems Resolved During this Admission:    Diagnosis Date Noted Date Resolved POA    Hyponatremia [E87.1] 01/24/2023 01/26/2023 Yes    Hypokalemia [E87.6] 01/24/2023 01/25/2023 Yes    Severe sepsis [A41.9, R65.20] 01/23/2023 01/25/2023 Yes       Abnormal MRI:   -her leukocytosis has spontaneously resolve.  However, I will follow-up on pending flow cytometry  -her SPEP and serum free light chains are pending.  I will follow-up on those results    Thank you for your consult. I will follow-up with patient. Please contact us if you have any additional questions.    Priya Glover NP  Hematology/Oncology  [facility]

## 2023-01-26 NOTE — PROGRESS NOTES
Progress Note  Infectious Disease    Reason for Consult:  PNA/rule out meningitis     HPI: Vivien Burton is a 77 y.o. female very nice lady, former smoker, with past medical history of CAD status post TAVR, hypertension, hyperlipidemia, right breast cancer status post mastectomy, GERD, hypothyroidism, who presents with severe, 10/10, stabbing in nature occipital right headache for the last week.  She was taking 4 tablets of Tylenol every 4 hours for the last couple of days with partial improvement of symptoms.  She also has had persistent nonproductive cough, no SOB.  Denies fever or chills, no nausea or vomit, no abdominal pain, no dysuria increased urinary frequency, no change in bowel movement.  She states she has baseline macular degeneration, has near vision impairment.    She presented to [facility] yesterday, Lab stairs significant for leukemoid reaction on CBC 16381 WBC, left shift 80%, bands 8%, H&H 10.7/30.7, platelet count 214.  ESR 37   INR 1.8   Hyponatremia 133, normal kidney function   LFTs; /, transaminitis likely from acute Tylenol toxicity   Procalcitonin 143.9, extremely high   Tylenol level less than 3   U tox negative   Group a strep throat negative   Flu a, B, COVID negative   UA with pyuria 10, few bacteria, asymptomatic-negative for UTI   CT chest revealed right lower lobe pneumonia with air bronchogram.  Scattered ground-glass opacities and tree-in-bud micro nodules within the inferior lingula and bilateral lower lobes suspicious for infectious/inflammatory bronchiolitis.  Hepatomegaly, hepatic steatosis.  Aortic valve replacement.  Right mastectomy.    Patient admitted for severe sepsis likely secondary to multifocal pneumonia, rule out meningitis.    She was empirically treated with Zosyn at [place], switched to vancomycin/ceftriaxone/ampicillin on arrival to [facility].    ID consult for severe sepsis.    INTERVAL HISTORY:  1/25: Interim reviewed,  patient seen and examined at bedside. Hypertensive, afebrile. Family at bedside. She states she is feeling better today although still c/o stabbing/needle-like R occipital headache. LP performed yesterday, negative for meningitis, CSF sample sent for PCR, not detected as expected.  Reviewed, leukocytosis down to 19.4, left shift 84%, bands 1%, H&H 9.6/27.7, platelet count 199.  Stable kidney function, shock liver; AST 1700 ALT 2200 procalcitonin trending down to 56.  MRI brain with diffusely heterogenous marrow signal and enhancement throughout the calvarium without obvious cortical destruction or extraosseous extension, nonspecific but suspicions for hematopoietic disorder.  Heme-Onc consult requested.    1/26:  Reviewed, patient seen examined at bedside.  Blood pressure improved, afebrile.  Patient still complaining of severe right occipital headache.  Heme-Onc note reviewed, plan for further workup, possible biopsy in case tests are unrevealing.  Patient's appetite is improved.  Still complaining of cough although unable to spit anything up.  Labs reviewed, white count down to 11, bands 2%, improved, H&H and platelet count stable.  Stable kidney function, LFTs slowly trending down, /ALT 1785.  Micro reviewed, as expected blood cultures on admission with strep pneumo, repeat blood cultures x2 sets no growth to date, pending final.  Strep urine antigen and Legionella antigen still in process.      Review of patient's allergies indicates:  No Known Allergies  Past Medical History:   Diagnosis Date    Acute on chronic combined systolic and diastolic heart failure 2/23/2022    Anemia     Basal cell carcinoma 1999    Breast cancer     Breast cancer     Cancer     CHF (congestive heart failure)     Coronary artery disease     GERD (gastroesophageal reflux disease)     Hyperlipidemia     Hypertension     Hypothyroidism     Nonrheumatic aortic (valve) stenosis 6/5/2018    Osteoporosis 02/05/2019    S/P TAVR  (transcatheter aortic valve replacement) 2022    Squamous cell carcinoma of skin 2018    Thyroid disease      Past Surgical History:   Procedure Laterality Date    BREAST SURGERY      CARDIAC CATH COSURGEON N/A 2022    Procedure: Cardiac Cath Cosurgeon;  Surgeon: Dontae Wooten MD;  Location: [facility] CATH LAB;  Service: Cardiovascular;  Laterality: N/A;     SECTION, CLASSIC      COLONOSCOPY N/A 2018    Procedure: COLONOSCOPY;  Surgeon: Precious Castorena MD;  Location: [facility] ENDO;  Service: Endoscopy;  Laterality: N/A;    HYSTERECTOMY      LEFT HEART CATHETERIZATION Left 2022    Procedure: Left heart cath;  Surgeon: Dontae Johns MD;  Location: [facility] CATH LAB;  Service: Cardiology;  Laterality: Left;    LEFT HEART CATHETERIZATION Left 2022    Procedure: Left heart cath;  Surgeon: Dontae Johns MD;  Location: [facility] CATH LAB;  Service: Cardiology;  Laterality: Left;    MASTECTOMY Right 2000    right breast      TONSILLECTOMY, ADENOIDECTOMY      TRANSCATHETER AORTIC VALVE REPLACEMENT (TAVR) N/A 2022    Procedure: REPLACEMENT, AORTIC VALVE, TRANSCATHETER (TAVR);  Surgeon: Dontae Johns MD;  Location: [facility] CATH LAB;  Service: Cardiology;  Laterality: N/A;     Social History     Tobacco Use    Smoking status: Former     Packs/day: 1.00     Types: Cigarettes     Quit date: 2000     Years since quittin.9    Smokeless tobacco: Never    Tobacco comments:     quit 20 years ago   Substance Use Topics    Alcohol use: Yes     Alcohol/week: 2.0 standard drinks     Types: 2 Shots of liquor per week     Comment: per pt 2 burbon drinks per night however none in last month        Family History   Problem Relation Age of Onset    Cancer Sister     Liver cancer Sister     Melanoma Sister     Cancer Brother     Breast cancer Neg Hx     Psoriasis Neg Hx     Lupus Neg Hx     Eczema Neg Hx        Review of Systems:   No chills, fever, +night sweats, no weight loss  Baseline  macular degeneration/near vision impairment - no diplopia  Minimal sinus congestion, no purulent nasal discharge, post nasal drip or facial pain  No pain in mouth or throat. No problems with teeth, gums.  No chest pain, palpitations, syncope  Persistent nonproductive cough, no shortness of breath, on exertion or at rest, no pleurisy, no hemoptysis.   No dysphagia, odynophagia  No nausea, vomiting, diarrhea, constipation, blood in stool, or focal abd pain,    No dysuria, hesitancy, hematuria, retention, incontinence  No swelling of joints, redness of joints, injuries, or new focal pain  Significant severe 10/10 right occipital headache, no dizziness, vertigo, numbness, paresthesias, neuropathy, falls  No anxiety, depression, substance abuse, sleep disturbance  No bleeding, lymphadenopathy  No new rashes, lesions, or wounds    Outdoor activities:  Retired 4th , lives at home.  Former smoker.  Drinks 2-3 bourbons/day.  No drugs.  Travel: none recent.  States she was recently at a , does not remember prior sick contacts.  Daughter-in-law had recent strep facial cellulitis.  Implants:  TAVR  Antibiotic History:  See HPI    EXAM & DIAGNOSTICS REVIEWED:   Vitals:     Temp:  [96.5 °F (35.8 °C)-98.8 °F (37.1 °C)]   Temp: 98.8 °F (37.1 °C) (23 08)  Pulse: 86 (23 08)  Resp: 17 (23 08)  BP: (!) 143/69 (23 0401)  SpO2: 96 % (23 0812)    Intake/Output Summary (Last 24 hours) at 2023 0825  Last data filed at 2023 0650  Gross per 24 hour   Intake 2684.77 ml   Output 3200 ml   Net -515.23 ml       General:  In NAD. Alert and attentive, cooperative, comfortable on room air  Eyes:  Anicteric, PERRL  ENT:  No ulcers, exudates, thrush, nares patent, dentition is fair  Neck:  Supple  Lungs: Better air entry bilaterally, scattered rales R>L     S/p R mastectomy   Heart:  S1/S2+, regular rhythm, soft systolic murmur+  Abd:  +BS, soft, non tender, non distended, no  rebound  :  Voids, urine clear, no flank tenderness  Musc:  Joints without effusion, swelling,  erythema, synovitis, ambulatory  Skin:  Warm, no rash  Wound:   Neuro:  Persistent R occipital headache, not improved this current meds    Following commands, no acute focal deficit   Psych:  Calm, cooperative  Lymphatic:       Extrem: No LE edema b/l  VAD:  Peripheral IVs          Isolation: None       General Labs reviewed:  Recent Labs   Lab 01/24/23  0445 01/25/23  0501 01/26/23  0357   WBC 33.73* 19.47*  19.47* 11.06   HGB 9.3* 9.6*  9.6* 9.6*   HCT 27.8* 27.7*  27.7* 28.4*    199  199 207       Recent Labs   Lab 01/25/23  0501 01/25/23  1717 01/26/23  0357   *  131* 134* 140   K 4.3  4.3 3.9 4.1     103 105 109   CO2 20*  20* 21* 21*   BUN 5*  5* 8 8   CREATININE 0.6  0.6 0.6 0.7   CALCIUM 8.5*  8.5* 8.6* 9.1   PROT 4.4*  4.4* 4.4* 4.6*   BILITOT 1.0  1.0 0.9 0.9   ALKPHOS 174*  174* 188* 224*   ALT 2,215*  2,215* 1,915* 1,785*   AST 1,776*  1,776* 931* 697*     No results for input(s): CRP in the last 168 hours.  Recent Labs   Lab 01/23/23  1637   SEDRATE 37*       Estimated Creatinine Clearance: 49.9 mL/min (based on SCr of 0.7 mg/dL).     Micro:  Microbiology Results (last 7 days)       Procedure Component Value Units Date/Time    CSF culture [[id number]] Collected: 01/24/23 1136    Order Status: Completed Specimen: CSF (Spinal Fluid) from CSF Tap, Tube 2 Updated: 01/26/23 0702     CSF CULTURE No Growth to date     Gram Stain Result No WBC's      No epithelial cells      No organisms seen      01/24/2023  14:48 TN3    Blood culture [[id number]] Collected: 01/24/23 1655    Order Status: Completed Specimen: Blood Updated: 01/26/23 0613     Blood Culture, Routine No Growth to date      No Growth to date    Blood culture [[id number]] Collected: 01/24/23 1716    Order Status: Completed Specimen: Blood Updated: 01/26/23 0613     Blood Culture, Routine No Growth to date      No Growth  to date    Culture, MRSA [[id number]] Collected: 01/25/23 1000    Order Status: Sent Specimen: MRSA source from Nares, Right Updated: 01/25/23 1354    Culture, Respiratory with Gram Stain [[id number]]     Order Status: Canceled Specimen: Respiratory from Sputum, Expectorated     Culture, Respiratory with Gram Stain [[id number]]     Order Status: No result Specimen: Respiratory from Sputum     Group A Strep, Molecular [[id number]] Collected: 01/24/23 0130    Order Status: Completed Updated: 01/24/23 0213     Group A Strep, Molecular Negative     Comment: Arcanobacterium haemolyticum and Beta Streptococcus group C   and G will not be detected by this test method.  Please order   Throat Culture ([id number]) if suspected.         Throat Screen, Rapid [[id number]] Collected: 01/24/23 0130    Order Status: Sent Specimen: Throat              Latest Reference Range & Units 01/24/23 11:33   COLOR CSF Colorless  Colorless   Heme Aliquot mL 2.0   Appearance, CSF Clear  Clear   RBC, CSF 0 /cu mm 3 !   WBC, CSF 0 - 5 /cu mm 1   Glucose, CSF 40 - 70 mg/dL 78 (H)   Protein, CSF 15 - 40 mg/dL 29   CSF Tube Number  3  2   Neisseria meningtidis Not Detected  Not Detected   Eschericia coli K1 Not Detected  Not Detected   Haemophilus influenzae Not Detected  Not Detected   Listeria Monocytogenes Not Detected  Not Detected   Streptococcus agalactiae Not Detected  Not Detected   Streptococcus pneumoniae Not Detected  Not Detected   Cytomegalovirus Not Detected  Not Detected   Enterovirus Not Detected  Not Detected   Herpes Simplex Virus 1 Not Detected  Not Detected   Herpes Simplex Virus 2 Not Detected  Not Detected   Human Herpes Virus 6 Not Detected  Not Detected   Human Parechovirus Not Detected  Not Detected   Varicella Zoster Virus Not Detected  Not Detected   Cryptococcus neoformans/gattii Not Detected  Not Detected   !: Data is abnormal  (H): Data is abnormally high    Imaging Reviewed:  CXR  CT head  Cortical atrophy with periventricular  deep white matter change consistent with chronic small vessel ischemic disease.  Mild paranasal sinus disease.    CT chest/abdomen/pelvis:   1. Right lower lobe consolidation with air bronchograms, concerning for pneumonia.  Superimposed scattered centrilobular ground-glass opacities and tree-in-bud micro nodules within the inferior lingula and bilateral lower lobes suspicious for infectious or inflammatory bronchiolitis.  2. Hepatomegaly and hepatic steatosis.  3. Diverticulosis coli without evidence of diverticulitis.  4. Right mastectomy.  5. Aortic valve replacement.  Coronary artery calcification.  6. Additional findings, as above.    MRI Brain:   1. No evidence of acute intracranial abnormality.   2. Diffusely heterogeneous marrow signal and enhancement throughout the calvarium without obvious cortical destruction or extraosseous extension, nonspecific but suspicious for a hematopoietic disorder, to include a malignant process, such as lymphoma/leukemia or multiple myeloma.  Metastasis less likely, given negative CT evaluation of the chest, abdomen and pelvis.   3. Pansinusitis.   This report was flagged in Epic as abnormal.     Cardiology:   ECHO 1/24/23:  The left ventricle is normal in size with normal systolic function.  The estimated ejection fraction is 55%.  Indeterminate left ventricular diastolic function with elevated left atrial pressure.  Atrial fibrillation not observed.  Normal right ventricular size with normal right ventricular systolic function.  There is a 26 mm transcutaneously-placed aortic bioprosthesis present. There is no aortic insufficiency present. Prosthetic aortic valve is normal.  The aortic valve mean gradient is 9 mmHg with a dimensionless index of 0.74.  Mild mitral regurgitation.  There is mild pulmonary hypertension.  Mild to moderate tricuspid regurgitation.  Normal central venous pressure (3 mmHg).  The estimated PA systolic pressure is 48 mmHg.      LAST ECHO 3/29/22  The  left ventricle is normal in size with eccentric hypertrophy and low normal systolic function. The estimated ejection fraction is 50%.  The quantitatively derived ejection fraction is 48%.  Normal right ventricular size with normal right ventricular systolic function.  Grade II left ventricular diastolic dysfunction.  Moderate left atrial enlargement.  There is a 26 mm Evolut transcutaneously-placed aortic bioprosthesis present. There is trivial paravalvular aortic insufficiency present.  The aortic valve mean gradient is 9 mmHg with a dimensionless index of 0.46.  The estimated PA systolic pressure is 24 mmHg.  Normal central venous pressure (3 mmHg).          IMPRESSION & PLAN     Severe sepsis resolved secondary to Strep pneumonia bacteremia in the setting of multifocal pneumonia, meningitis ruled out  WBC improved  Procal 143.94 -->58, trending down    Blood cultures x2 1/24 no growth to date, pending final             Group A strep 1/24 negative    Transaminitis/Shock liver secondary to Tylenol toxicity, s/p NAC   GI following   AST 1776-->697/ ALT 2215-->1,785   INR 1.8-->1.4-->1.2, improved    Severe headache with abnormal MRI     PMHx:  CAD status post TAVR, hypertension, hyperlipidemia, right breast cancer status post mastectomy, GERD, hypothyroidism    Recommendations:  Follow cultures, Strep urine Ag and Legionalla Ag still in process   Continue Ceftriaxone 2 g IV daily   OK for steroids for migraines, dose as per Neurology  Appreciate neurology and Heme/onc recommendations   Trend LFTs  Aspiration precautions   Incentive spirometry   PT/OT as tolerated     D/w patient, nursing, Dr Escamilla, Dr Tucker     Medical Decision Making during this encounter was  [_] Low Complexity  [_] Moderate Complexity  [xx] High Complexity

## 2023-01-26 NOTE — PHYSICIAN QUERY
PT Name: Vivien Burton  MR #: [id number]     DOCUMENTATION CLARIFICATION      CDS: Sheila Baker RN         Contact information:[email or web address] or (cell) [phone]     This form is a permanent document in the medical record.    Query Date: January 26, 2023    By submitting this query, we are merely seeking further clarification of documentation to reflect the severity of illness of your patient. Please utilize your independent clinical judgment when addressing the question(s) below.     The Medical Record contains the following:   Indicators   Supporting Clinical Findings Location in Medical Record   x Documentation of Sepsis, Septic Shock Severe sepsis  This patient does have evidence of infective focus  My overall impression is sepsis. Vital signs were reviewed and noted in progress note.  Organ dysfunction indicated by Acute liver injury  Source- Lung    DCS 1/25   x Blood Culture STREPTOCOCCUS PNEUMONIAE   Micro Blood Cx's 1/23    Respiratory Culture      Urine Culture      Other Culture      Acute/Chronic Illness     x Medication/Treatment Source control Achieved by- Abx  CAP(unknown organism)  F/U cultures sputum and blood  Urinary legionella and strep pneumo pending  Azithromycin added to cover for atypical pathogens such as legionella or mycoplasma  F/U MRSA nares- Deescalate abx if negative  COVID and Flu negative  Repeat lactate ordered   DCS 1/25    Other        Provider, please further specify the Sepsis diagnosis:     [   ] Due to Streptococcus Pneumoniae     [   ] Due to (please specify): __________________________________     [   ] Other specification (please specify): ____________________     [ x  ] Clinically Undetermined     Form No. 50509    These cultures resulted when patient was at another hospital and this information was not available at time of diagnosis of sepsis

## 2023-01-26 NOTE — ASSESSMENT & PLAN NOTE
CT head unremarkable. MRI brain shows enhancement of skull. LP studies not consistent with meningoencephalitis.  -PRN ibuprofen  -Oncology consulted  -Follow up peripheral smear and flow cytometry

## 2023-01-26 NOTE — ASSESSMENT & PLAN NOTE
-Monitor for signs and symptoms of withdrawal  -Folic acid, thiamine and MVI  -Patient to be counseled on cessation as well as avoidance of Tylenol

## 2023-01-27 ENCOUNTER — TELEPHONE (OUTPATIENT)
Dept: HEMATOLOGY/ONCOLOGY | Facility: CLINIC | Age: 78
End: 2023-01-27
Payer: MEDICARE

## 2023-01-27 LAB
ALBUMIN SERPL BCP-MCNC: 2.7 G/DL (ref 3.5–5.2)
ALP SERPL-CCNC: 217 U/L (ref 55–135)
ALT SERPL W/O P-5'-P-CCNC: 1432 U/L (ref 10–44)
ANION GAP SERPL CALC-SCNC: 10 MMOL/L (ref 8–16)
AST SERPL-CCNC: 308 U/L (ref 10–40)
BACTERIA BLD CULT: ABNORMAL
BASOPHILS # BLD AUTO: ABNORMAL K/UL (ref 0–0.2)
BASOPHILS NFR BLD: 0 % (ref 0–1.9)
BILIRUB SERPL-MCNC: 0.8 MG/DL (ref 0.1–1)
BUN SERPL-MCNC: 9 MG/DL (ref 8–23)
CALCIUM SERPL-MCNC: 9 MG/DL (ref 8.7–10.5)
CHLORIDE SERPL-SCNC: 104 MMOL/L (ref 95–110)
CO2 SERPL-SCNC: 22 MMOL/L (ref 23–29)
CREAT SERPL-MCNC: 0.6 MG/DL (ref 0.5–1.4)
DIFFERENTIAL METHOD: ABNORMAL
EOSINOPHIL # BLD AUTO: ABNORMAL K/UL (ref 0–0.5)
EOSINOPHIL NFR BLD: 0 % (ref 0–8)
ERYTHROCYTE [DISTWIDTH] IN BLOOD BY AUTOMATED COUNT: 13.8 % (ref 11.5–14.5)
EST. GFR  (NO RACE VARIABLE): >60 ML/MIN/1.73 M^2
GLUCOSE SERPL-MCNC: 184 MG/DL (ref 70–110)
HCT VFR BLD AUTO: 28.4 % (ref 37–48.5)
HGB BLD-MCNC: 9.7 G/DL (ref 12–16)
IMM GRANULOCYTES # BLD AUTO: ABNORMAL K/UL (ref 0–0.04)
IMM GRANULOCYTES NFR BLD AUTO: ABNORMAL % (ref 0–0.5)
INR PPP: 1.1 (ref 0.8–1.2)
KAPPA LC SER QL IA: 0.56 MG/DL (ref 0.33–1.94)
KAPPA LC/LAMBDA SER IA: 1.4 (ref 0.26–1.65)
L PNEUMO AG UR QL IA: NEGATIVE
L PNEUMO AG UR QL IA: NEGATIVE
LAMBDA LC SER QL IA: 0.4 MG/DL (ref 0.57–2.63)
LYMPHOCYTES # BLD AUTO: ABNORMAL K/UL (ref 1–4.8)
LYMPHOCYTES NFR BLD: 9 % (ref 18–48)
MAGNESIUM SERPL-MCNC: 1.8 MG/DL (ref 1.6–2.6)
MCH RBC QN AUTO: 29.3 PG (ref 27–31)
MCHC RBC AUTO-ENTMCNC: 34.2 G/DL (ref 32–36)
MCV RBC AUTO: 86 FL (ref 82–98)
METAMYELOCYTES NFR BLD MANUAL: 2 %
MONOCYTES # BLD AUTO: ABNORMAL K/UL (ref 0.3–1)
MONOCYTES NFR BLD: 3 % (ref 4–15)
MRSA SPEC QL CULT: NORMAL
MYELOCYTES NFR BLD MANUAL: 2 %
NEUTROPHILS NFR BLD: 83 % (ref 38–73)
NEUTS BAND NFR BLD MANUAL: 1 %
NRBC BLD-RTO: 0 /100 WBC
OVALOCYTES BLD QL SMEAR: ABNORMAL
PATHOLOGIST INTERPRETATION SPE: NORMAL
PHOSPHATE SERPL-MCNC: 3.7 MG/DL (ref 2.7–4.5)
PLATELET # BLD AUTO: 216 K/UL (ref 150–450)
PLATELET BLD QL SMEAR: ABNORMAL
PMV BLD AUTO: 9.6 FL (ref 9.2–12.9)
POIKILOCYTOSIS BLD QL SMEAR: SLIGHT
POLYCHROMASIA BLD QL SMEAR: ABNORMAL
POTASSIUM SERPL-SCNC: 3.9 MMOL/L (ref 3.5–5.1)
PROCALCITONIN SERPL IA-MCNC: 10.27 NG/ML
PROT SERPL-MCNC: 5.2 G/DL (ref 6–8.4)
PROTHROMBIN TIME: 12.3 SEC (ref 9–12.5)
RBC # BLD AUTO: 3.31 M/UL (ref 4–5.4)
SODIUM SERPL-SCNC: 136 MMOL/L (ref 136–145)
WBC # BLD AUTO: 14.13 K/UL (ref 3.9–12.7)

## 2023-01-27 PROCEDURE — 99233 PR SUBSEQUENT HOSPITAL CARE,LEVL III: ICD-10-PCS | Mod: ,,, | Performed by: STUDENT IN AN ORGANIZED HEALTH CARE EDUCATION/TRAINING PROGRAM

## 2023-01-27 PROCEDURE — 85007 BL SMEAR W/DIFF WBC COUNT: CPT | Performed by: STUDENT IN AN ORGANIZED HEALTH CARE EDUCATION/TRAINING PROGRAM

## 2023-01-27 PROCEDURE — 63600175 PHARM REV CODE 636 W HCPCS: Performed by: STUDENT IN AN ORGANIZED HEALTH CARE EDUCATION/TRAINING PROGRAM

## 2023-01-27 PROCEDURE — 25000003 PHARM REV CODE 250: Performed by: STUDENT IN AN ORGANIZED HEALTH CARE EDUCATION/TRAINING PROGRAM

## 2023-01-27 PROCEDURE — 99900035 HC TECH TIME PER 15 MIN (STAT)

## 2023-01-27 PROCEDURE — 99233 SBSQ HOSP IP/OBS HIGH 50: CPT | Mod: ,,, | Performed by: STUDENT IN AN ORGANIZED HEALTH CARE EDUCATION/TRAINING PROGRAM

## 2023-01-27 PROCEDURE — 36415 COLL VENOUS BLD VENIPUNCTURE: CPT | Performed by: STUDENT IN AN ORGANIZED HEALTH CARE EDUCATION/TRAINING PROGRAM

## 2023-01-27 PROCEDURE — 80053 COMPREHEN METABOLIC PANEL: CPT | Performed by: STUDENT IN AN ORGANIZED HEALTH CARE EDUCATION/TRAINING PROGRAM

## 2023-01-27 PROCEDURE — 99233 PR SUBSEQUENT HOSPITAL CARE,LEVL III: ICD-10-PCS | Mod: ,,, | Performed by: INTERNAL MEDICINE

## 2023-01-27 PROCEDURE — 94761 N-INVAS EAR/PLS OXIMETRY MLT: CPT

## 2023-01-27 PROCEDURE — 12000002 HC ACUTE/MED SURGE SEMI-PRIVATE ROOM

## 2023-01-27 PROCEDURE — 84145 PROCALCITONIN (PCT): CPT | Performed by: STUDENT IN AN ORGANIZED HEALTH CARE EDUCATION/TRAINING PROGRAM

## 2023-01-27 PROCEDURE — 85027 COMPLETE CBC AUTOMATED: CPT | Performed by: STUDENT IN AN ORGANIZED HEALTH CARE EDUCATION/TRAINING PROGRAM

## 2023-01-27 PROCEDURE — 63600175 PHARM REV CODE 636 W HCPCS: Performed by: INTERNAL MEDICINE

## 2023-01-27 PROCEDURE — 97116 GAIT TRAINING THERAPY: CPT | Mod: CQ

## 2023-01-27 PROCEDURE — 36415 COLL VENOUS BLD VENIPUNCTURE: CPT | Performed by: NURSE PRACTITIONER

## 2023-01-27 PROCEDURE — 84100 ASSAY OF PHOSPHORUS: CPT | Performed by: STUDENT IN AN ORGANIZED HEALTH CARE EDUCATION/TRAINING PROGRAM

## 2023-01-27 PROCEDURE — 99233 SBSQ HOSP IP/OBS HIGH 50: CPT | Mod: ,,, | Performed by: INTERNAL MEDICINE

## 2023-01-27 PROCEDURE — 83735 ASSAY OF MAGNESIUM: CPT | Performed by: STUDENT IN AN ORGANIZED HEALTH CARE EDUCATION/TRAINING PROGRAM

## 2023-01-27 PROCEDURE — 80053 COMPREHEN METABOLIC PANEL: CPT | Mod: 91 | Performed by: NURSE PRACTITIONER

## 2023-01-27 PROCEDURE — 85610 PROTHROMBIN TIME: CPT | Performed by: STUDENT IN AN ORGANIZED HEALTH CARE EDUCATION/TRAINING PROGRAM

## 2023-01-27 RX ORDER — METOCLOPRAMIDE HYDROCHLORIDE 5 MG/ML
5 INJECTION INTRAMUSCULAR; INTRAVENOUS EVERY 8 HOURS
Status: COMPLETED | OUTPATIENT
Start: 2023-01-27 | End: 2023-01-27

## 2023-01-27 RX ADMIN — POTASSIUM, SODIUM PHOSPHATES 280 MG-160 MG-250 MG ORAL POWDER PACKET 1 PACKET: POWDER IN PACKET at 01:01

## 2023-01-27 RX ADMIN — DEXAMETHASONE SODIUM PHOSPHATE 4 MG: 4 INJECTION, SOLUTION INTRA-ARTICULAR; INTRALESIONAL; INTRAMUSCULAR; INTRAVENOUS; SOFT TISSUE at 09:01

## 2023-01-27 RX ADMIN — ACETYLCYSTEINE 2700 MG: 200 INJECTION, SOLUTION INTRAVENOUS at 01:01

## 2023-01-27 RX ADMIN — POTASSIUM, SODIUM PHOSPHATES 280 MG-160 MG-250 MG ORAL POWDER PACKET 1 PACKET: POWDER IN PACKET at 10:01

## 2023-01-27 RX ADMIN — CEFTRIAXONE SODIUM 2 G: 2 INJECTION, POWDER, FOR SOLUTION INTRAMUSCULAR; INTRAVENOUS at 05:01

## 2023-01-27 RX ADMIN — PANTOPRAZOLE SODIUM 40 MG: 40 TABLET, DELAYED RELEASE ORAL at 09:01

## 2023-01-27 RX ADMIN — ASPIRIN 81 MG CHEWABLE TABLET 81 MG: 81 TABLET CHEWABLE at 09:01

## 2023-01-27 RX ADMIN — DEXAMETHASONE SODIUM PHOSPHATE 4 MG: 4 INJECTION, SOLUTION INTRA-ARTICULAR; INTRALESIONAL; INTRAMUSCULAR; INTRAVENOUS; SOFT TISSUE at 10:01

## 2023-01-27 RX ADMIN — THIAMINE HCL TAB 100 MG 100 MG: 100 TAB at 09:01

## 2023-01-27 RX ADMIN — LEVOTHYROXINE SODIUM 75 MCG: 0.03 TABLET ORAL at 05:01

## 2023-01-27 RX ADMIN — ACETYLCYSTEINE 8100 MG: 200 INJECTION, SOLUTION INTRAVENOUS at 12:01

## 2023-01-27 RX ADMIN — LOSARTAN POTASSIUM 25 MG: 25 TABLET, FILM COATED ORAL at 09:01

## 2023-01-27 RX ADMIN — POTASSIUM, SODIUM PHOSPHATES 280 MG-160 MG-250 MG ORAL POWDER PACKET 1 PACKET: POWDER IN PACKET at 09:01

## 2023-01-27 RX ADMIN — POTASSIUM, SODIUM PHOSPHATES 280 MG-160 MG-250 MG ORAL POWDER PACKET 1 PACKET: POWDER IN PACKET at 05:01

## 2023-01-27 RX ADMIN — HYDRALAZINE HYDROCHLORIDE 10 MG: 20 INJECTION INTRAMUSCULAR; INTRAVENOUS at 03:01

## 2023-01-27 RX ADMIN — POTASSIUM, SODIUM PHOSPHATES 280 MG-160 MG-250 MG ORAL POWDER PACKET 1 PACKET: POWDER IN PACKET at 12:01

## 2023-01-27 RX ADMIN — FOLIC ACID 1 MG: 1 TABLET ORAL at 09:01

## 2023-01-27 RX ADMIN — METOCLOPRAMIDE 5 MG: 5 INJECTION, SOLUTION INTRAMUSCULAR; INTRAVENOUS at 10:01

## 2023-01-27 RX ADMIN — METOCLOPRAMIDE 5 MG: 5 INJECTION, SOLUTION INTRAMUSCULAR; INTRAVENOUS at 05:01

## 2023-01-27 RX ADMIN — THERA TABS 1 TABLET: TAB at 09:01

## 2023-01-27 RX ADMIN — METOCLOPRAMIDE 5 MG: 5 INJECTION, SOLUTION INTRAMUSCULAR; INTRAVENOUS at 01:01

## 2023-01-27 RX ADMIN — ACETYLCYSTEINE 5400 MG: 200 INJECTION, SOLUTION INTRAVENOUS at 07:01

## 2023-01-27 NOTE — SUBJECTIVE & OBJECTIVE
"Interval History: see "Hospital Course"    Review of Systems   Constitutional:  Negative for activity change, appetite change, chills, diaphoresis and fever.   HENT:  Negative for congestion, nosebleeds and tinnitus.    Eyes:  Negative for photophobia and visual disturbance.   Respiratory:  Negative for cough, chest tightness, shortness of breath and wheezing.    Cardiovascular:  Negative for chest pain, palpitations and leg swelling.   Gastrointestinal:  Negative for abdominal distention, abdominal pain, constipation, diarrhea, nausea and vomiting.   Endocrine: Negative for cold intolerance and heat intolerance.   Genitourinary:  Negative for difficulty urinating, dysuria, frequency, hematuria and urgency.   Musculoskeletal:  Negative for arthralgias, back pain and myalgias.   Skin:  Negative for pallor, rash and wound.   Allergic/Immunologic: Negative for immunocompromised state.   Neurological:  Negative for dizziness, tremors, facial asymmetry, speech difficulty, weakness and headaches.   Hematological:  Negative for adenopathy. Does not bruise/bleed easily.   Psychiatric/Behavioral:  Negative for confusion and sleep disturbance. The patient is not nervous/anxious.    Objective:     Vital Signs (Most Recent):  Temp: 97.6 °F (36.4 °C) (01/27/23 0851)  Pulse: 93 (01/27/23 0851)  Resp: 18 (01/27/23 0851)  BP: (!) 148/65 (01/27/23 0851)  SpO2: 97 % (01/27/23 0851)   Vital Signs (24h Range):  Temp:  [97.6 °F (36.4 °C)-98 °F (36.7 °C)] 97.6 °F (36.4 °C)  Pulse:  [74-93] 93  Resp:  [16-18] 18  SpO2:  [97 %-98 %] 97 %  BP: (128-196)/(65-84) 148/65     Weight: 53.8 kg (118 lb 9.7 oz)  Body mass index is 23.16 kg/m².    Intake/Output Summary (Last 24 hours) at 1/27/2023 0920  Last data filed at 1/27/2023 0510  Gross per 24 hour   Intake 1585.47 ml   Output 600 ml   Net 985.47 ml      Physical Exam  Vitals and nursing note reviewed.   Constitutional:       General: She is not in acute distress.     Appearance: She is " well-developed. She is not ill-appearing or diaphoretic.   HENT:      Head: Normocephalic and atraumatic.      Right Ear: External ear normal.      Left Ear: External ear normal.      Nose: Nose normal.      Mouth/Throat:      Mouth: Mucous membranes are moist.      Pharynx: Oropharynx is clear.   Eyes:      General: No scleral icterus.     Extraocular Movements: Extraocular movements intact.      Conjunctiva/sclera: Conjunctivae normal.   Neck:      Vascular: No JVD.   Cardiovascular:      Rate and Rhythm: Normal rate and regular rhythm.      Heart sounds: Murmur heard.     No friction rub. No gallop.   Pulmonary:      Effort: Pulmonary effort is normal. No respiratory distress.      Breath sounds: No wheezing or rales.   Abdominal:      General: Bowel sounds are normal. There is no distension.      Palpations: Abdomen is soft.      Tenderness: There is no abdominal tenderness. There is no guarding or rebound.   Musculoskeletal:         General: No tenderness. Normal range of motion.      Cervical back: Normal range of motion and neck supple.   Lymphadenopathy:      Cervical: No cervical adenopathy.   Skin:     General: Skin is warm and dry.      Coloration: Skin is not pale.      Findings: No erythema or rash.   Neurological:      General: No focal deficit present.      Mental Status: She is alert and oriented to person, place, and time. Mental status is at baseline.      Cranial Nerves: No cranial nerve deficit.      Sensory: No sensory deficit.      Coordination: Coordination normal.      Deep Tendon Reflexes: Reflexes normal.   Psychiatric:         Behavior: Behavior normal.         Thought Content: Thought content normal.         Judgment: Judgment normal.       Significant Labs: All pertinent labs within the past 24 hours have been reviewed.    Significant Imaging: I have reviewed all pertinent imaging results/findings within the past 24 hours.

## 2023-01-27 NOTE — PROGRESS NOTES
[facility]  Hematology/Oncology  Progress Note    Patient Name: Vivien Burton  Admission Date: 1/23/2023  Hospital Length of Stay: 4 days  Code Status: Full Code     Subjective:     Interval History:   Vivien Burton is a 77 year old female with a past medical history of CAD s/p PCI to LAD 1/2022, combined CHF, aortic stenosis s/p TAVR, anemia, hypothyroidism, HLD, HTN, breast cancer, and GERD who presented with one week of headache and cough secondary to a severe sepsis with potential etiologies including CAP and/or meningitis. She was started on empiric bacterial meningitis therapy with ampicillin, ceftriaxone and vancomycin as well as azithromycin. An LP was ordered and preliminary data was not consistent with an acute meningitis. Ampicillin and azithromycin were discontinued per ID. CT of the chest shows consolidation at the RLL with air bronchograms and ground glass opacities concerning for pneumonia. Blood cultures on admission also showed organisms resembling Strep. . Neurology and Pulmonary were also consulted. Of note, the patient endorses taking four tablets of Tylenol every four hours for roughly three to five days. Her LFTs were elevated as well as INR on admission. Her albumin is also low and CT of the abdomen shows hepatic steatosis. She was started on NAC therapy while at [facility] and has started another NAC protocol on 1/25.  Her LFTs continue to rise, yet her liver function remains compensated. An MRI of the brain shows enhancement of the skull for which Oncology was consulted 1/25; a peripheral smear is pending.  Per medical record  We have been consulted for abnormal enhancement of the skull concerning for lymphoma/leukemia or multiple myeloma     Her breast cancer was treated with chemotherapy followed by radiation 20 years.  Oncologist Dr. Marie at [facility]        Medications:  Continuous Infusions:  Scheduled Meds:   acetylcysteine  50 mg/kg  Intravenous Once    acetylcysteine  100 mg/kg Intravenous Once    acetylcysteine  150 mg/kg Intravenous Once    aspirin  81 mg Oral Daily    cefTRIAXone (ROCEPHIN) IVPB  2 g Intravenous Q24H    dexAMETHasone  4 mg Intravenous Q12H    diphenhydrAMINE  25 mg Intravenous Q8H    folic acid  1 mg Oral Daily    levothyroxine  75 mcg Oral Before breakfast    losartan  25 mg Oral Daily    metoclopramide HCl  5 mg Intravenous Q8H    multivitamin  1 tablet Oral Daily    pantoprazole  40 mg Oral Daily    potassium, sodium phosphates  1 packet Oral QID (WM & HS)    thiamine  100 mg Oral Daily     PRN Meds:albuterol-ipratropium, hydrALAZINE, ondansetron, sodium chloride 0.9%     Review of Systems   headaches  Objective:     Vital Signs (Most Recent):  Temp: 97.6 °F (36.4 °C) (01/27/23 0851)  Pulse: 93 (01/27/23 0851)  Resp: 18 (01/27/23 0851)  BP: (!) 148/65 (01/27/23 0851)  SpO2: 97 % (01/27/23 0851)   Vital Signs (24h Range):  Temp:  [97.6 °F (36.4 °C)-98 °F (36.7 °C)] 97.6 °F (36.4 °C)  Pulse:  [74-93] 93  Resp:  [18] 18  SpO2:  [96 %-98 %] 97 %  BP: (128-196)/(65-84) 148/65     Weight: 53.8 kg (118 lb 9.7 oz)  Body mass index is 23.16 kg/m².  Body surface area is 1.51 meters squared.      Intake/Output Summary (Last 24 hours) at 1/27/2023 1213  Last data filed at 1/27/2023 0510  Gross per 24 hour   Intake 1585.47 ml   Output 600 ml   Net 985.47 ml       Physical Exam  GENERAL: Comfortable looking patient. Patient is in no distress.  Awake, alert and oriented to time, person and place.  No anxiety, or agitation.       HEENT: Normal conjunctivae and eyelids. WNL.  PERRLA 3 to 4 mm. No icterus, no pallor, no congestion, and no discharge noted.      NECK:  Supple. Trachea is central.  No crepitus.  No JVD or masses.     RESPIRATORY:  No intercostal retractions.  No dullness to percussion.  Chest is clear to auscultation.  No rales, rhonchi or wheezes.  No crepitus.  Good air entry bilaterally.     CARDIOVASCULAR:  S1 and S2 are  normally heard without murmurs or gallops.  All peripheral pulses are present.     ABDOMEN:  Normal abdomen.  No hepatosplenomegaly.  No free fluid.  Bowel sounds are present.  No hernia noted. No masses.  No rebound or tenderness.  No guarding or rigidity.  Umbilicus is midline.     LYMPHATICS:  No axillary, cervical, supraclavicular, submental, or inguinal lymphadenopathy.     SKIN/MUSCULOSKELETAL:  There is no evidence of excoriation marks or ecchmosis.  No rashes.  No cyanosis.  No clubbing.  No joint or skeletal deformities noted.  Normal range of motion.     NEUROLOGIC:  Higher functions are appropriate.  No cranial nerve deficits.  Normal keegan.  Normal strength.  Motor and sensory functions are normal.  Deep tendon reflexes are normal.     GENITAL/RECTAL:  Exams are deferred.   Significant Labs:     Lab Results   Component Value Date    WBC 14.13 (H) 01/27/2023    HGB 9.7 (L) 01/27/2023    HCT 28.4 (L) 01/27/2023    MCV 86 01/27/2023     01/27/2023     CMP  Sodium   Date Value Ref Range Status   01/27/2023 136 136 - 145 mmol/L Final     Potassium   Date Value Ref Range Status   01/27/2023 3.9 3.5 - 5.1 mmol/L Final     Chloride   Date Value Ref Range Status   01/27/2023 104 95 - 110 mmol/L Final     CO2   Date Value Ref Range Status   01/27/2023 22 (L) 23 - 29 mmol/L Final     Glucose   Date Value Ref Range Status   01/27/2023 184 (H) 70 - 110 mg/dL Final     BUN   Date Value Ref Range Status   01/27/2023 9 8 - 23 mg/dL Final     Creatinine   Date Value Ref Range Status   01/27/2023 0.6 0.5 - 1.4 mg/dL Final     Calcium   Date Value Ref Range Status   01/27/2023 9.0 8.7 - 10.5 mg/dL Final     Total Protein   Date Value Ref Range Status   01/27/2023 5.2 (L) 6.0 - 8.4 g/dL Final     Albumin   Date Value Ref Range Status   01/27/2023 2.7 (L) 3.5 - 5.2 g/dL Final     Total Bilirubin   Date Value Ref Range Status   01/27/2023 0.8 0.1 - 1.0 mg/dL Final     Comment:     For infants and newborns,  interpretation of results should be based  on gestational age, weight and in agreement with clinical  observations.    Premature Infant recommended reference ranges:  Up to 24 hours.............<8.0 mg/dL  Up to 48 hours............<12.0 mg/dL  3-5 days..................<15.0 mg/dL  6-29 days.................<15.0 mg/dL       Alkaline Phosphatase   Date Value Ref Range Status   01/27/2023 217 (H) 55 - 135 U/L Final     AST   Date Value Ref Range Status   01/27/2023 308 (H) 10 - 40 U/L Final     ALT   Date Value Ref Range Status   01/27/2023 1,432 (H) 10 - 44 U/L Final     Anion Gap   Date Value Ref Range Status   01/27/2023 10 8 - 16 mmol/L Final     eGFR   Date Value Ref Range Status   01/27/2023 >60 >60 mL/min/1.73 m^2 Final   10/04/2022 96 > OR = 60 mL/min/1.73m2 Final     Comment:     The eGFR is based on the CKD-EPI 2021 equation. To calculate   the new eGFR from a previous Creatinine or Cystatin C  result, go to https://www.kidney.org/professionals/  kdoqi/gfr%5Fcalculator        Increased CD4 to CD8 T-cell ratio without loss of pan T-cell antigens.  This   is a nonspecific finding.  Correlation with clinical findings is required.     Comment: Interp By Aurora Allen MD, Signed on 01/26/2023 at 15:17   Blood Comment Flow cytometric analysis of  peripheral blood shows small populations of   polyclonal B lymphocytes and T lymphocytes that are immunophenotypically   unremarkable other than an increased CD4 to CD8 ratio, nonspecific.  The   blast gate is not increased.   Flow differential:  Lymphocytes 7.0%, Monocytes 3.1%, Granulocytes  79.7%,   Blast  0.3%, Debris/nRBC 0.4%,  Viability 99.6%       Assessment/Plan:     Active Diagnoses:    Diagnosis Date Noted POA    PRINCIPAL PROBLEM:  Pneumonia [J18.9] 01/24/2023 Yes    Streptococcal bacteremia [R78.81, B95.5] 01/25/2023 Yes    Transaminitis [R74.01] 01/23/2023 Yes    Acetaminophen overdose [T39.1X1A] 01/23/2023 Yes    Chronic combined systolic and  diastolic heart failure, HFimpEF [I50.42] 02/23/2022 Yes    Coronary artery disease [I25.10] 02/23/2022 Yes    S/P TAVR (transcatheter aortic valve replacement), 26 mm [Z95.2] 02/22/2022 Not Applicable    Anemia [D64.9] 12/10/2021 Yes    Excessive drinking alcohol [F10.10] 06/05/2018 Yes    Headache(784.0) [R51.9] 04/12/2017 Yes    Unspecified hypothyroidism [E03.9] 07/22/2015 Yes    Unspecified essential hypertension [I10] 07/22/2015 Yes    GERD (gastroesophageal reflux disease) [K21.9]  Yes      Problems Resolved During this Admission:    Diagnosis Date Noted Date Resolved POA    Hyponatremia [E87.1] 01/24/2023 01/26/2023 Yes    Hypokalemia [E87.6] 01/24/2023 01/25/2023 Yes    Severe sepsis [A41.9, R65.20] 01/23/2023 01/25/2023 Yes     Abnormal MRI:   -her leukocytosis has spontaneously resolved.   -her SPEP and serum free light chains acceptable.    We will sign off would like to follow pt in OP clinical discharge   or pt can follow with oncologist in [place]  Thank you for your consult.      Diana Laureano MD  Hematology/Oncology  [facility]

## 2023-01-27 NOTE — PT/OT/SLP PROGRESS
"Physical Therapy Treatment    Patient Name:  Vivien Burton   MRN:  [id number]    Recommendations:     Discharge Recommendations: home health PT  Discharge Equipment Recommendations: walker, rolling  Barriers to discharge: None    Assessment:     Vivien Burton is a 77 y.o. female admitted with a medical diagnosis of Pneumonia.  She presents with the following impairments/functional limitations: weakness, impaired endurance, impaired functional mobility, gait instability . Awake, alert, supine in bed. Agreed to participate in therapy, requested to use restroom first.  Ambulated to restroom with rw and Min A , reported dizziness. Returned to sit in chair, BP assessed 193/ 81. Sat briefly , stood with rw /74. Ambulated 250' with rw and Min A with chair follow. Sat up in chair , /78 ( "no dizziness/ wooziness"). Nurse informed of activity and BP values.     Rehab Prognosis: Good; patient would benefit from acute skilled PT services to address these deficits and reach maximum level of function.    Recent Surgery: * No surgery found *      Plan:     During this hospitalization, patient to be seen 6 x/week to address the identified rehab impairments via gait training, therapeutic activities, therapeutic exercises and progress toward the following goals:    Plan of Care Expires:  01/26/23    Subjective     Chief Complaint: occasional dizziness.   Patient/Family Comments/goals: to return home to continue her normal activities.   Pain/Comfort:  Pain Rating 1: 0/10      Objective:     Communicated with nurse Claros / Sabra prior to session.  Patient found supine with bed alarm, telemetry upon PT entry to room.     General Precautions: Standard, fall  Orthopedic Precautions: N/A  Braces: N/A  Respiratory Status: Room air     Functional Mobility:  Bed Mobility:     Rolling Left:  contact guard assistance  Supine to Sit: contact guard assistance  Transfers:     Sit to Stand:  contact guard assistance " with rolling walker  Gait: 10' to restroom , 250' additional in hallway with rw and Min A.       AM-PAC 6 CLICK MOBILITY          Treatment & Education:  Ambulated to restroom, on / off commode with SBA.  Ambulated to sink to wash hands .  Ambulated 250' with rw and Min A with chair follow for safety.     Patient left up in chair with all lines intact, call button in reach, chair alarm on, nurses Hyacinth / Sabra notified, and caregiver present..    GOALS:   Multidisciplinary Problems       Physical Therapy Goals          Problem: Physical Therapy    Goal Priority Disciplines Outcome Goal Variances Interventions   Physical Therapy Goal     PT, PT/OT Ongoing, Progressing     Description: Goals to be met by: 2023     Patient will increase functional independence with mobility by performin. Supine to sit with Contact Guard Assistance  2. Sit to stand transfer with Contact Guard Assistance  3. Bed to chair transfer with Contact Guard Assistance using Rolling Walker  4. Gait  x 250x2 feet with Contact Guard Assistance using Rolling Walker.   5. Lower extremity exercise program x20 reps                       Time Tracking:     PT Received On: 23  PT Start Time: 1020     PT Stop Time: 1036  PT Total Time (min): 16 min     Billable Minutes: Gait Training 10min and Therapeutic Activity 6min    Treatment Type: Treatment  PT/PTA: PTA     PTA Visit Number: 1     2023

## 2023-01-27 NOTE — PLAN OF CARE
POC discussed with patient, verbalized understanding. Patient with uneventful night, slept well between care. VS stable.  Acetylcystein  infusing. Patient without complaints, states no longer has a headache. Hydralazine given for elevated blood pressure. Up to bathroom multiple times to void, tolerated well. NSR. Call light at bedsid.e

## 2023-01-27 NOTE — TELEPHONE ENCOUNTER
Scheduled patient per Dr. Laureano's request.    ----- Message from Diana Laureano MD sent at 1/27/2023 12:46 PM CST -----  Must get follow up in a couple of weeks wit consulting [facility] f/u

## 2023-01-27 NOTE — PROGRESS NOTES
[facility]  Progress Note  Date: 2023 9:04 AM            Patient Name: Vivien Burton   MRN: [id number]   : 1945    AGE: 77 y.o.    LOS: 4 days Hospital Day: 5  Admit date: 2023 10:37 PM         HPI per EMR: Vivien Burton is a 77 y.o. female with a history of  presents in transfer from [facility].  Patient presented [facility] for evaluation of headache and cough.  She reports headache and cough onset approximately 1 week ago.  She endorses taking 4 tablets of Tylenol every 4 hours for least 3-5 days for headache.  She describes the headache as a stabbing and piercing sensation to the occipital region.  She denies any fever or chills.  She denies any known sick contacts or travel.  No aggravating or alleviating factors.  Previous medical history includes anemia, breast cancer, systolic/diastolic heart failure, coronary artery disease, heart murmur, TAVR, hypothyroidism, hypertension.  ER workup at outside facility:  CBC with leukocytosis of 43,000 in H&H of 10 and 30.  CMP with hypokalemia 3.3, bilirubin of 1.1, alk-phos 161, , .  Magnesium 1.2 (repleted).  Lactic acid elevated at 2.4.  Urinalysis with 8 red blood cells, 10 white blood cells, few bacteria.  INR was elevated 1.8, and sed rate was elevated 37.  Blood urine cultures are pending.  CT the head demonstrated no acute findings.  CT the abdomen and pelvis demonstrated right lower lobe air bronchograms concerning for pneumonia with hepatomegaly, diverticulosis, and aortic valve replacement.  Chest x-ray with a dense masslike suspicious lesion/ infiltrate of the right lower lobe.  Patient admitted to Hospital Medicine for treatment management.     Neurology consult:  Patient was seen examined me.  She is alert and oriented x3.  She states that since Wednesday she is been having intractable headache which she describes as ice pricking headache.  She states that the  headache comes in episodes of 15-20 seconds with intermittent relief for 1 minute.  She was taking around the clock Tylenol and ibuprofen with symptomatic relief however headache would come back.  She presented to [facility] with these symptoms. ER workup at outside facility:  CBC with leukocytosis of 43,000 in H&H of 10 and 30.  CMP with hypokalemia 3.3, bilirubin of 1.1, alk-phos 161, , .  Magnesium 1.2 (repleted).  Lactic acid elevated at 2.4.  Urinalysis with 8 red blood cells, 10 white blood cells, few bacteria.     Patient was then transferred here for further workup and management for headache.     On arrival here, she was started on broad-spectrum antibiotics with concern for meningitis due to elevated white count however she was afebrile.  She was also given Tylenol and ibuprofen for headache with improvement in pain.  Since morning, she is not had headache like she did at home.     Patient denies any associated symptoms with the headache including vision changes, unilateral weakness or sensory changes, nausea vomiting, dizziness.    01/25/2023: No acute events overnight. Patient was seen and examined by me this morning. Neuro exam is normal.  She continues to have headache and she states it is a dull headache mostly in the occipital region on the right side.  She denies any focal deficits, denies conjunctival injection or tearing or nausea/vomiting.  Patient's sons were at bedside and discussed plan of care with them.    1/26:  Patient was seen examined by me this morning.  She states the Benadryl only help with sleep however headache persisted and she states headache is about 5/10 in the posterior region.    1/27: Patient was seen and examined by me this morning.  No acute overnight events, no new neurological complaints.  Vitals have been stable. headache is improved.  He denies any other new complaints this morning.       Vitals:  Patient Vitals for the past 24 hrs:   BP Temp  Temp src Pulse Resp SpO2   01/27/23 0851 (!) 148/65 97.6 °F (36.4 °C) Tympanic 93 18 97 %   01/27/23 0409 (!) 149/68 -- -- -- -- --   01/27/23 0353 (!) 188/84 -- -- -- -- --   01/27/23 0344 (!) 196/83 98 °F (36.7 °C) Oral 74 18 98 %   01/26/23 1917 128/72 98 °F (36.7 °C) -- 74 18 97 %   01/26/23 1627 -- -- -- -- -- 97 %   01/26/23 1200 130/82 -- -- 84 16 97 %     PHYSICAL EXAM:     GENERAL APPEARANCE: Well-developed, well-nourished female in no acute distress.  HEENT: Normocephalic and atraumatic. PERRL. Oropharynx unremarkable.  PULM: Comfortable on room air.  CV: RRR.  ABDOMEN: Soft, nontender.  EXTREMITIES: No signs of vascular compromise. Pulses present. No cyanosis, clubbing or edema.  SKIN: Clear; no rashes, lesions or skin breaks in exposed areas.      NEURO:   MENTAL STATUS: Patient awake and oriented to time, place, and person. Affect normal.  CRANIAL NERVES II-XII: Pupils equal, round and reactive to light. Extraocular movements full and intact. No facial asymmetry.  MOTOR: Normal bulk. Tone normal and symmetrical throughout.  No abnormal movements. No tremor.   Strength 5/5 throughout unless specified below.  REFLEXES: DTRs 2+; normal and symmetric throughout.   SENSATION: Sensation grossly intact to fine touch.  COORDINATION: Finger-to-nose normal for age and symmetric.  STATION: Romberg deferred.  GAIT: Deferred.    CURRENT SCHEDULED MEDICATIONS:   aspirin  81 mg Oral Daily    cefTRIAXone (ROCEPHIN) IVPB  2 g Intravenous Q24H    dexAMETHasone  4 mg Intravenous Q12H    diphenhydrAMINE  25 mg Intravenous Q8H    folic acid  1 mg Oral Daily    levothyroxine  75 mcg Oral Before breakfast    losartan  25 mg Oral Daily    metoclopramide HCl  5 mg Intravenous Q8H    multivitamin  1 tablet Oral Daily    pantoprazole  40 mg Oral Daily    potassium, sodium phosphates  1 packet Oral QID (WM & HS)    thiamine  100 mg Oral Daily     CURRENT INFUSIONS:    DATA:  Recent Labs   Lab 01/23/23  1546 01/23/23  2014  01/24/23  0445 01/24/23  1655 01/25/23  0501 01/25/23  1717 01/26/23  0357 01/27/23  0406   * 133* 130* 123* 131*  131* 134* 140 136   K 3.3* 3.5 2.4* 3.8 4.3  4.3 3.9 4.1 3.9   CL 99 103 100 98 103  103 105 109 104   CO2 20* 20* 19* 18* 20*  20* 21* 21* 22*   BUN 13 13 11 7* 5*  5* 8 8 9   CREATININE 0.7 0.7 0.8 0.6 0.6  0.6 0.6 0.7 0.6   * 151* 292* 431* 81  81 117* 114* 184*   CALCIUM 9.5 8.6* 7.7* 7.4* 8.5*  8.5* 8.6* 9.1 9.0   PHOS  --   --   --   --  1.6*  --  3.4 3.7   MG 1.2*  --  1.9  --  1.9  --  1.8 1.8   * 280* 392*  --  1,776*  1,776* 931* 697* 308*   * 279* 415*  --  2,215*  2,215* 1,915* 1,785* 1,432*     Recent Labs   Lab 01/23/23  1546 01/24/23  0445 01/25/23  0501 01/26/23  0357 01/27/23  0406   WBC 43.13* 33.73* 19.47*  19.47* 11.06 14.13*   HGB 10.7* 9.3* 9.6*  9.6* 9.6* 9.7*   HCT 30.7* 27.8* 27.7*  27.7* 28.4* 28.4*    174 199  199 207 216     Lab Results   Component Value Date    PROTEINCSF 29 01/24/2023    GLUCCSF 78 (H) 01/24/2023     Hemoglobin A1C   Date Value Ref Range Status   01/25/2023 4.7 4.0 - 5.6 % Final     Comment:     ADA Screening Guidelines:  5.7-6.4%  Consistent with prediabetes  >or=6.5%  Consistent with diabetes    High levels of fetal hemoglobin interfere with the HbA1C  assay. Heterozygous hemoglobin variants (HbS, HgC, etc)do  not significantly interfere with this assay.   However, presence of multiple variants may affect accuracy.     10/04/2022 4.3 <5.7 % of total Hgb Final     Comment:     For the purpose of screening for the presence of  diabetes:     <5.7%       Consistent with the absence of diabetes  5.7-6.4%    Consistent with increased risk for diabetes              (prediabetes)  > or =6.5%  Consistent with diabetes     This assay result is consistent with a decreased risk  of diabetes.     Currently, no consensus exists regarding use of  hemoglobin A1c for diagnosis of diabetes in children.     According to  American Diabetes Association (ADA)  guidelines, hemoglobin A1c <7.0% represents optimal  control in non-pregnant diabetic patients. Different  metrics may apply to specific patient populations.   Standards of Medical Care in Diabetes(ADA).         04/13/2022 4.5 <5.7 % of total Hgb Final     Comment:     For the purpose of screening for the presence of  diabetes:     <5.7%       Consistent with the absence of diabetes  5.7-6.4%    Consistent with increased risk for diabetes              (prediabetes)  > or =6.5%  Consistent with diabetes     This assay result is consistent with a decreased risk  of diabetes.     Currently, no consensus exists regarding use of  hemoglobin A1c for diagnosis of diabetes in children.     According to American Diabetes Association (ADA)  guidelines, hemoglobin A1c <7.0% represents optimal  control in non-pregnant diabetic patients. Different  metrics may apply to specific patient populations.   Standards of Medical Care in Diabetes(ADA).                  I have personally reviewed and interpreted the pertinent imaging and lab results.              ASSESSMENT AND PLAN:    Intractable headache  Leukocytosis  Transaminitis  Sepsis     Plan  Etiology of patient's intractable headache is unknown.  She has mostly headache in the right side with radiation to the back.  There is no conjunctival injection or lacrimation associated with the headache.  Concern for trigeminal autonomic cephalgias is low however can not rule out. 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  There is diffuse heterogeneous marrow signal and enhancement throughout the calvarium with suspicious for hematopoietic disorder.  Lumbar puncture was performed and CSF analysis showed WBC of 1 protein 29 and glucose 78.  Concern for intracranial infection is low.  Oncology was consulted for about MRI findings.  Checking flow cytometry,  SPEP and free light chains in the serum.  Blood cultures were positive for Gram-positive cocci-Streptococcus pneumoniae  Infectious Disease on board.  Patient had elevated white count, elevated procalcitonin, labs showed transaminitis. Appreciate ID recommendations regarding antibiotic management  Hold Tylenol- concern for transaminitis from acetaminophen overdose. Management per primary team and GI  Trend LFTs  Neuro checks per unit protocol  Decadron 4 mg b.i.d. for headache for 3 days and stop. Discussed with ID and cleared for steroid treatment for short term to the break the headache cycle. Headache improving  Will follow as needed.  Please call with any questions         36 minutes of care time has been spent evaluating with the patient. Time includes chart review not limited to diagnostic imaging, labs, and vitals, patient assessment, discussion with family and nursing, current order evaluations, and new order entries.      Car Tucker MD  Neurology/vascular Neurology  Date of Service: 01/27/2023  9:04 AM    Please note: This note was transcribed using voice recognition software. Because of this technology there are often uinintended grammatical, spelling, and other transcription errors. Please disregard these errors.

## 2023-01-27 NOTE — ASSESSMENT & PLAN NOTE
Patient endorses taking four tablets of Tylenol every four ours for three to five days. Improving.  -GI consulted  -Monitor LFTs, albumin, and INR  -NAC protocol repeated 1/25 ending 1/27  -Poison control aware of case  -If patient's liver failure decompensates, will need transfer to [facility] for Hepatology consultation

## 2023-01-27 NOTE — PROGRESS NOTES
[facility]  Progress Note    Patient Name: Vivien Burton  MRN: [id number]  Patient Class: IP- Inpatient   Admission Date: 1/23/2023  Length of Stay: 4 days  Attending Physician: Marek Escamilla MD  Primary Care Provider: Ellen Robert III, MD        Subjective:     Principal Problem:Pneumonia        HPI:  Vivien Burton is a 77-year-old female who presents in transfer from [facility].  Patient presented [facility] for evaluation of headache and cough.  She reports headache and cough onset approximately 1 week ago.  She endorses taking 4 tablets of Tylenol every 4 hours for least 3-5 days for headache.  She describes the headache as a stabbing and piercing sensation to the occipital region.  She denies any fever or chills.  She denies any known sick contacts or travel.  No aggravating or alleviating factors.  Previous medical history includes anemia, breast cancer, systolic/diastolic heart failure, coronary artery disease, heart murmur, TAVR, hypothyroidism, hypertension.  ER workup at outside facility:  CBC with leukocytosis of 43,000 in H&H of 10 and 30.  CMP with hypokalemia 3.3, bilirubin of 1.1, alk-phos 161, , .  Magnesium 1.2 (repleted).  Lactic acid elevated at 2.4.  Urinalysis with 8 red blood cells, 10 white blood cells, few bacteria.  INR was elevated 1.8, and sed rate was elevated 37.  Blood urine cultures are pending.  CT the head demonstrated no acute findings.  CT the abdomen and pelvis demonstrated right lower lobe air bronchograms concerning for pneumonia with hepatomegaly, diverticulosis, and aortic valve replacement.  Chest x-ray with a dense masslike suspicious lesion/ infiltrate of the right lower lobe.  Patient admitted to Hospital Medicine for treatment management.  Infectious Disease, Neurology, and Pulmonary consultations made.  Patient will be empirically started on meningitis coverage including ampicillin,  "Rocephin, vancomycin.         Overview/Hospital Course:  Vivien Burton is a 77 year old female with a past medical history of CAD s/p PCI to LAD 1/2022, combined CHF, aortic stenosis s/p TAVR, anemia, hypothyroidism, HLD, HTN, breast cancer, and GERD who presented with one week of headache and cough secondary to a severe sepsis secondary to Strep pneumo pneumonia and bacteremia. She was started on empiric bacterial meningitis therapy initially with ampicillin, ceftriaxone and vancomycin as well as azithromycin. An LP was ordered and preliminary data was not consistent with an acute meningitis. Ampicillin and azithromycin were discontinued per ID. CT of the chest shows consolidation at the RLL with air bronchograms and ground glass opacities concerning for pneumonia. Blood cultures on admission also showed Strep pneumo. Vancomycin was also discontinued per ID. Neurology and Pulmonary were also consulted. Her sepsis eventually resolved. Of note, the patient endorsed taking four tablets of Tylenol every four hours for roughly three to five days. Her LFTs were elevated as well as INR on admission. Her albumin is also low and CT of the abdomen shows hepatic steatosis. She was started on NAC therapy while at [place] and has started another NAC protocol on 1/25 ending 1/27. GI has also been consulted. Her LFTs are now improving as well as the INR. An MRI of the brain shows enhancement of the skull for which Oncology was consulted 1/25; flow cytometry is unremarkable. Her headache has improved during her course with initiation of Benadryl, Reglan and Decadron per Neurology recommendations.      Interval History: see "Hospital Course"    Review of Systems   Constitutional:  Negative for activity change, appetite change, chills, diaphoresis and fever.   HENT:  Negative for congestion, nosebleeds and tinnitus.    Eyes:  Negative for photophobia and visual disturbance.   Respiratory:  Negative for cough, chest tightness, " shortness of breath and wheezing.    Cardiovascular:  Negative for chest pain, palpitations and leg swelling.   Gastrointestinal:  Negative for abdominal distention, abdominal pain, constipation, diarrhea, nausea and vomiting.   Endocrine: Negative for cold intolerance and heat intolerance.   Genitourinary:  Negative for difficulty urinating, dysuria, frequency, hematuria and urgency.   Musculoskeletal:  Negative for arthralgias, back pain and myalgias.   Skin:  Negative for pallor, rash and wound.   Allergic/Immunologic: Negative for immunocompromised state.   Neurological:  Negative for dizziness, tremors, facial asymmetry, speech difficulty, weakness and headaches.   Hematological:  Negative for adenopathy. Does not bruise/bleed easily.   Psychiatric/Behavioral:  Negative for confusion and sleep disturbance. The patient is not nervous/anxious.    Objective:     Vital Signs (Most Recent):  Temp: 97.6 °F (36.4 °C) (01/27/23 0851)  Pulse: 93 (01/27/23 0851)  Resp: 18 (01/27/23 0851)  BP: (!) 148/65 (01/27/23 0851)  SpO2: 97 % (01/27/23 0851)   Vital Signs (24h Range):  Temp:  [97.6 °F (36.4 °C)-98 °F (36.7 °C)] 97.6 °F (36.4 °C)  Pulse:  [74-93] 93  Resp:  [16-18] 18  SpO2:  [97 %-98 %] 97 %  BP: (128-196)/(65-84) 148/65     Weight: 53.8 kg (118 lb 9.7 oz)  Body mass index is 23.16 kg/m².    Intake/Output Summary (Last 24 hours) at 1/27/2023 0920  Last data filed at 1/27/2023 0510  Gross per 24 hour   Intake 1585.47 ml   Output 600 ml   Net 985.47 ml      Physical Exam  Vitals and nursing note reviewed.   Constitutional:       General: She is not in acute distress.     Appearance: She is well-developed. She is not ill-appearing or diaphoretic.   HENT:      Head: Normocephalic and atraumatic.      Right Ear: External ear normal.      Left Ear: External ear normal.      Nose: Nose normal.      Mouth/Throat:      Mouth: Mucous membranes are moist.      Pharynx: Oropharynx is clear.   Eyes:      General: No scleral  icterus.     Extraocular Movements: Extraocular movements intact.      Conjunctiva/sclera: Conjunctivae normal.   Neck:      Vascular: No JVD.   Cardiovascular:      Rate and Rhythm: Normal rate and regular rhythm.      Heart sounds: Murmur heard.     No friction rub. No gallop.   Pulmonary:      Effort: Pulmonary effort is normal. No respiratory distress.      Breath sounds: No wheezing or rales.   Abdominal:      General: Bowel sounds are normal. There is no distension.      Palpations: Abdomen is soft.      Tenderness: There is no abdominal tenderness. There is no guarding or rebound.   Musculoskeletal:         General: No tenderness. Normal range of motion.      Cervical back: Normal range of motion and neck supple.   Lymphadenopathy:      Cervical: No cervical adenopathy.   Skin:     General: Skin is warm and dry.      Coloration: Skin is not pale.      Findings: No erythema or rash.   Neurological:      General: No focal deficit present.      Mental Status: She is alert and oriented to person, place, and time. Mental status is at baseline.      Cranial Nerves: No cranial nerve deficit.      Sensory: No sensory deficit.      Coordination: Coordination normal.      Deep Tendon Reflexes: Reflexes normal.   Psychiatric:         Behavior: Behavior normal.         Thought Content: Thought content normal.         Judgment: Judgment normal.       Significant Labs: All pertinent labs within the past 24 hours have been reviewed.    Significant Imaging: I have reviewed all pertinent imaging results/findings within the past 24 hours.      Assessment/Plan:      * Pneumonia  Patient endorses cough. Procalcitonin 143.94 on admission. CXR and CT chest shows consolidation. Blood cultures show Strep pneumo.  -Continue Rocephin  -ID following  -PRN Duonebs      Streptococcal bacteremia  TTE shows no vegetations. Likely from pneumonia. Repeat blood cultures negative.  -Continue Rocephin  -ID following      Transaminitis  History  of EtOH use. Possible Tylenol toxicity. Hepatic steatosis seen on CT. Improving.  -Trend LFTs, albumin and INR  -Continue NAC repeat protocol  -Patient should avoid EtOH as well as Tylenol  -GI consulted; since signed off      Acetaminophen overdose  Patient endorses taking four tablets of Tylenol every four ours for three to five days. Improving.  -GI consulted  -Monitor LFTs, albumin, and INR  -NAC protocol repeated 1/25 ending 1/27  -Poison control aware of case  -If patient's liver failure decompensates, will need transfer to [facility] [place] for Hepatology consultation      Headache(784.0)  Flow cytometry unremarkable. CT head unremarkable. MRI brain shows enhancement of skull. LP studies not consistent with meningoencephalitis.  -PRN ibuprofen  -Scheduled Reglan, Bendaryl and steroids per Neurology  -Oncology consulted    Coronary artery disease  -Continue home ASA    Chronic combined systolic and diastolic heart failure, HFimpEF  History of CAD. Not observed on 1/24 TTE.  -Continue cardiac telemetry  -Strict I's and O's        S/P TAVR (transcatheter aortic valve replacement), 26 mm  Stable.      Anemia  Stable.  -Trend Hgb with CBC  -Hold home iron in setting of acute infection      Excessive drinking alcohol  -Monitor for signs and symptoms of withdrawal  -Folic acid, thiamine and MVI  -Patient counseled on cessation as well as avoidance of Tylenol      Unspecified essential hypertension  -Losartan  -PRN hydralazine  -Continue to monitor        Unspecified hypothyroidism  -Continue Synthroid      GERD (gastroesophageal reflux disease)  -Continue PPI      VTE Risk Mitigation (From admission, onward)         Ordered     Place LAMBERT hose  Until discontinued         01/23/23 2322     IP VTE HIGH RISK PATIENT  Once         01/23/23 2322     Place sequential compression device  Until discontinued         01/23/23 2322                Discharge Planning   MIRELLA: 1/30/2023     Code Status: Full Code   Is the patient medically ready  for discharge?:     Reason for patient still in hospital (select all that apply): Patient trending condition, Laboratory test, Treatment and Consult recommendations  Discharge Plan A: Home Health                  Marek Escamilla MD  Department of Hospital Medicine   [facility]

## 2023-01-27 NOTE — ASSESSMENT & PLAN NOTE
-Monitor for signs and symptoms of withdrawal  -Folic acid, thiamine and MVI  -Patient counseled on cessation as well as avoidance of Tylenol

## 2023-01-27 NOTE — PLAN OF CARE
Problem: Physical Therapy  Goal: Physical Therapy Goal  Description: Goals to be met by: 2023     Patient will increase functional independence with mobility by performin. Supine to sit with Contact Guard Assistance  2. Sit to stand transfer with Contact Guard Assistance  3. Bed to chair transfer with Contact Guard Assistance using Rolling Walker  4. Gait  x 250x2 feet with Contact Guard Assistance using Rolling Walker.   5. Lower extremity exercise program x20 reps  Outcome: Ongoing, Progressing   Ambulate with rw and assistance for safety.

## 2023-01-27 NOTE — ASSESSMENT & PLAN NOTE
TTE shows no vegetations. Likely from pneumonia. Repeat blood cultures negative.  -Continue Rocephin  -ID following

## 2023-01-27 NOTE — PLAN OF CARE
01/27/23 0849   Patient Assessment/Suction   Level of Consciousness (AVPU) alert   Respiratory Effort Unlabored;Normal   Expansion/Accessory Muscles/Retractions no use of accessory muscles   All Lung Fields Breath Sounds clear   Rhythm/Pattern, Respiratory no shortness of breath reported;unlabored;pattern regular;depth regular   PRE-TX-O2   Device (Oxygen Therapy) room air   SpO2 96 %   Pulse Oximetry Type Intermittent   $ Pulse Oximetry - Multiple Charge Pulse Oximetry - Multiple   Pulse 88   Resp 18   Aerosol Therapy   $ Aerosol Therapy Charges PRN treatment not required   Respiratory Treatment Status (SVN) PRN treatment not required

## 2023-01-27 NOTE — ASSESSMENT & PLAN NOTE
Flow cytometry unremarkable. CT head unremarkable. MRI brain shows enhancement of skull. LP studies not consistent with meningoencephalitis.  -PRN ibuprofen  -Scheduled Reglan, Bendaryl and steroids per Neurology  -Oncology consulted

## 2023-01-27 NOTE — PROGRESS NOTES
Progress Note  Infectious Disease    Reason for Consult:  PNA/rule out meningitis     HPI: Vivien Burton is a 77 y.o. female very nice lady, former smoker, with past medical history of CAD status post TAVR, hypertension, hyperlipidemia, right breast cancer status post mastectomy, GERD, hypothyroidism, who presents with severe, 10/10, stabbing in nature occipital right headache for the last week.  She was taking 4 tablets of Tylenol every 4 hours for the last couple of days with partial improvement of symptoms.  She also has had persistent nonproductive cough, no SOB.  Denies fever or chills, no nausea or vomit, no abdominal pain, no dysuria increased urinary frequency, no change in bowel movement.  She states she has baseline macular degeneration, has near vision impairment.    She presented to [facility] yesterday, Lab stairs significant for leukemoid reaction on CBC 79191 WBC, left shift 80%, bands 8%, H&H 10.7/30.7, platelet count 214.  ESR 37   INR 1.8   Hyponatremia 133, normal kidney function   LFTs; /, transaminitis likely from acute Tylenol toxicity   Procalcitonin 143.9, extremely high   Tylenol level less than 3   U tox negative   Group a strep throat negative   Flu a, B, COVID negative   UA with pyuria 10, few bacteria, asymptomatic-negative for UTI   CT chest revealed right lower lobe pneumonia with air bronchogram.  Scattered ground-glass opacities and tree-in-bud micro nodules within the inferior lingula and bilateral lower lobes suspicious for infectious/inflammatory bronchiolitis.  Hepatomegaly, hepatic steatosis.  Aortic valve replacement.  Right mastectomy.    Patient admitted for severe sepsis likely secondary to multifocal pneumonia, rule out meningitis.    She was empirically treated with Zosyn at [place], switched to vancomycin/ceftriaxone/ampicillin on arrival to [facility].    ID consult for severe sepsis.    INTERVAL HISTORY:  1/25: Interim reviewed,  patient seen and examined at bedside. Hypertensive, afebrile. Family at bedside. She states she is feeling better today although still c/o stabbing/needle-like R occipital headache. LP performed yesterday, negative for meningitis, CSF sample sent for PCR, not detected as expected.  Reviewed, leukocytosis down to 19.4, left shift 84%, bands 1%, H&H 9.6/27.7, platelet count 199.  Stable kidney function, shock liver; AST 1700 ALT 2200 procalcitonin trending down to 56.  MRI brain with diffusely heterogenous marrow signal and enhancement throughout the calvarium without obvious cortical destruction or extraosseous extension, nonspecific but suspicions for hematopoietic disorder.  Heme-Onc consult requested.    1/26:  interim reviewed, patient seen examined at bedside.  Blood pressure improved, afebrile.  Patient still complaining of severe right occipital headache.  Heme-Onc note reviewed, plan for further workup, possible biopsy in case tests are unrevealing.  Patient's appetite is improved.  Still complaining of cough although unable to spit anything up.  Labs reviewed, white count down to 11, bands 2%, improved, H&H and platelet count stable.  Stable kidney function, LFTs slowly trending down, /ALT 1785.  Micro reviewed, as expected blood cultures on admission with strep pneumo, repeat blood cultures x2 sets no growth to date, pending final.  Strep urine antigen and Legionella antigen still in process.    1/27:  Interim reviewed, patient seen examined at bedside, sitting in bed, states steroids macerated her headache, mentions her right occipital areas no longer tender, describes pressure-like pain on her right frontal area, no sinus tenderness.  Hemodynamically stable, afebrile.  Labs reviewed, leukocytosis 14.1, left shift 83%, likely from dexamethasone for migraines, H&H 9.7/28.4, platelet count 216.  Stable electrolytes, normal kidney function, /ALT 1432, trending down.  Procalcitonin down to 10.2,  improving.  Micro reviewed, lab called for Levaquin sensitivity, sensitive.    Review of patient's allergies indicates:  No Known Allergies  Past Medical History:   Diagnosis Date    Acute on chronic combined systolic and diastolic heart failure 2022    Anemia     Basal cell carcinoma     Breast cancer     Breast cancer     Cancer     CHF (congestive heart failure)     Coronary artery disease     GERD (gastroesophageal reflux disease)     Hyperlipidemia     Hypertension     Hypothyroidism     Nonrheumatic aortic (valve) stenosis 2018    Osteoporosis 2019    S/P TAVR (transcatheter aortic valve replacement) 2022    Squamous cell carcinoma of skin 2018    Thyroid disease      Past Surgical History:   Procedure Laterality Date    BREAST SURGERY      CARDIAC CATH COSURGEON N/A 2022    Procedure: Cardiac Cath Cosurgeon;  Surgeon: Dontae Wooten MD;  Location: [facility] CATH LAB;  Service: Cardiovascular;  Laterality: N/A;     SECTION, CLASSIC      COLONOSCOPY N/A 2018    Procedure: COLONOSCOPY;  Surgeon: Precious Castorena MD;  Location: [facility] ENDO;  Service: Endoscopy;  Laterality: N/A;    HYSTERECTOMY      LEFT HEART CATHETERIZATION Left 2022    Procedure: Left heart cath;  Surgeon: Dontae Johns MD;  Location: [facility] CATH LAB;  Service: Cardiology;  Laterality: Left;    LEFT HEART CATHETERIZATION Left 2022    Procedure: Left heart cath;  Surgeon: Dontae Johns MD;  Location: [facility] CATH LAB;  Service: Cardiology;  Laterality: Left;    MASTECTOMY Right 2000    right breast      TONSILLECTOMY, ADENOIDECTOMY      TRANSCATHETER AORTIC VALVE REPLACEMENT (TAVR) N/A 2022    Procedure: REPLACEMENT, AORTIC VALVE, TRANSCATHETER (TAVR);  Surgeon: Donate Johns MD;  Location: [facility] CATH LAB;  Service: Cardiology;  Laterality: N/A;     Social History     Tobacco Use    Smoking status: Former     Packs/day: 1.00     Types: Cigarettes     Quit date: 2000     Years  since quittin.9    Smokeless tobacco: Never    Tobacco comments:     quit 20 years ago   Substance Use Topics    Alcohol use: Yes     Alcohol/week: 2.0 standard drinks     Types: 2 Shots of liquor per week     Comment: per pt 2 burbon drinks per night however none in last month        Family History   Problem Relation Age of Onset    Cancer Sister     Liver cancer Sister     Melanoma Sister     Cancer Brother     Breast cancer Neg Hx     Psoriasis Neg Hx     Lupus Neg Hx     Eczema Neg Hx        Outdoor activities:  Retired 4th , lives at home.  Former smoker.  Drinks 2-3 bourbons/day.  No drugs.  Travel: none recent.  States she was recently at a , does not remember prior sick contacts.  Daughter-in-law had recent strep facial cellulitis.  Implants:  TAVR  Antibiotic History:  See HPI    EXAM & DIAGNOSTICS REVIEWED:   Vitals:     Temp:  [98 °F (36.7 °C)-98.8 °F (37.1 °C)]   Temp: 98 °F (36.7 °C) (23 034)  Pulse: 74 (23 034)  Resp: 18 (23)  BP: (!) 149/68 (23 0409)  SpO2: 98 % (23 034)    Intake/Output Summary (Last 24 hours) at 2023 0824  Last data filed at 2023 0510  Gross per 24 hour   Intake 1585.47 ml   Output 600 ml   Net 985.47 ml       General:  In NAD. Alert and attentive, cooperative, comfortable on room air  Eyes:  Anicteric, PERRL  ENT:  No ulcers, exudates, thrush, nares patent, dentition is fair  Neck:  Supple  Lungs: Better air entry bilaterally, scattered rales R>L     S/p R mastectomy   Heart:  S1/S2+, regular rhythm, soft systolic murmur+  Abd:  +BS, soft, non tender, non distended, no rebound  :  Voids, urine clear, no flank tenderness  Musc:  Joints without effusion, swelling,  erythema, synovitis, ambulatory  Skin:  Warm, no rash  Wound:   Neuro:  R occipital headache is improved today, alert, awake, speech fluent     Following commands, no acute focal deficit   Psych:  Calm, cooperative  Lymphatic:        Extrem: Right arm lymphedema  No LE edema b/l  VAD:  Peripheral IVs          Isolation: None       General Labs reviewed:  Recent Labs   Lab 01/25/23  0501 01/26/23  0357 01/27/23  0406   WBC 19.47*  19.47* 11.06 14.13*   HGB 9.6*  9.6* 9.6* 9.7*   HCT 27.7*  27.7* 28.4* 28.4*     199 207 216       Recent Labs   Lab 01/25/23  1717 01/26/23  0357 01/27/23  0406   * 140 136   K 3.9 4.1 3.9    109 104   CO2 21* 21* 22*   BUN 8 8 9   CREATININE 0.6 0.7 0.6   CALCIUM 8.6* 9.1 9.0   PROT 4.4* 4.6* 5.2*   BILITOT 0.9 0.9 0.8   ALKPHOS 188* 224* 217*   ALT 1,915* 1,785* 1,432*   * 697* 308*     No results for input(s): CRP in the last 168 hours.  Recent Labs   Lab 01/23/23  1637   SEDRATE 37*       Estimated Creatinine Clearance: 58.2 mL/min (based on SCr of 0.6 mg/dL).     Micro:  Microbiology Results (last 7 days)       Procedure Component Value Units Date/Time    Culture, MRSA [[id number]] Collected: 01/25/23 1000    Order Status: Completed Specimen: MRSA source from Nares, Right Updated: 01/27/23 0708     MRSA Surveillance Screen No MRSA isolated    CSF culture [[id number]] Collected: 01/24/23 1136    Order Status: Completed Specimen: CSF (Spinal Fluid) from CSF Tap, Tube 2 Updated: 01/27/23 0702     CSF CULTURE No Growth to date     Gram Stain Result No WBC's      No epithelial cells      No organisms seen      01/24/2023  14:48 TN3    Blood culture [[id number]] Collected: 01/24/23 1655    Order Status: Completed Specimen: Blood Updated: 01/27/23 0612     Blood Culture, Routine No Growth to date      No Growth to date      No Growth to date    Blood culture [[id number]] Collected: 01/24/23 1716    Order Status: Completed Specimen: Blood Updated: 01/27/23 0612     Blood Culture, Routine No Growth to date      No Growth to date      No Growth to date    Culture, Respiratory with Gram Stain [[id number]]     Order Status: Canceled Specimen: Respiratory from Sputum, Expectorated     Culture,  Respiratory with Gram Stain [[id number]]     Order Status: No result Specimen: Respiratory from Sputum     Group A Strep, Molecular [[id number]] Collected: 01/24/23 0130    Order Status: Completed Updated: 01/24/23 0213     Group A Strep, Molecular Negative     Comment: Arcanobacterium haemolyticum and Beta Streptococcus group C   and G will not be detected by this test method.  Please order   Throat Culture ([id number]) if suspected.         Throat Screen, Rapid [[id number]] Collected: 01/24/23 0130    Order Status: Sent Specimen: Throat              Latest Reference Range & Units 01/24/23 11:33   COLOR CSF Colorless  Colorless   Heme Aliquot mL 2.0   Appearance, CSF Clear  Clear   RBC, CSF 0 /cu mm 3 !   WBC, CSF 0 - 5 /cu mm 1   Glucose, CSF 40 - 70 mg/dL 78 (H)   Protein, CSF 15 - 40 mg/dL 29   CSF Tube Number  3  2   Neisseria meningtidis Not Detected  Not Detected   Eschericia coli K1 Not Detected  Not Detected   Haemophilus influenzae Not Detected  Not Detected   Listeria Monocytogenes Not Detected  Not Detected   Streptococcus agalactiae Not Detected  Not Detected   Streptococcus pneumoniae Not Detected  Not Detected   Cytomegalovirus Not Detected  Not Detected   Enterovirus Not Detected  Not Detected   Herpes Simplex Virus 1 Not Detected  Not Detected   Herpes Simplex Virus 2 Not Detected  Not Detected   Human Herpes Virus 6 Not Detected  Not Detected   Human Parechovirus Not Detected  Not Detected   Varicella Zoster Virus Not Detected  Not Detected   Cryptococcus neoformans/gattii Not Detected  Not Detected   !: Data is abnormal  (H): Data is abnormally high    Imaging Reviewed:  CXR  CT head  Cortical atrophy with periventricular deep white matter change consistent with chronic small vessel ischemic disease.  Mild paranasal sinus disease.    CT chest/abdomen/pelvis:   1. Right lower lobe consolidation with air bronchograms, concerning for pneumonia.  Superimposed scattered centrilobular ground-glass opacities  and tree-in-bud micro nodules within the inferior lingula and bilateral lower lobes suspicious for infectious or inflammatory bronchiolitis.  2. Hepatomegaly and hepatic steatosis.  3. Diverticulosis coli without evidence of diverticulitis.  4. Right mastectomy.  5. Aortic valve replacement.  Coronary artery calcification.  6. Additional findings, as above.    MRI Brain:   1. No evidence of acute intracranial abnormality.   2. Diffusely heterogeneous marrow signal and enhancement throughout the calvarium without obvious cortical destruction or extraosseous extension, nonspecific but suspicious for a hematopoietic disorder, to include a malignant process, such as lymphoma/leukemia or multiple myeloma.  Metastasis less likely, given negative CT evaluation of the chest, abdomen and pelvis.   3. Pansinusitis.   This report was flagged in Epic as abnormal.     Cardiology:   ECHO 1/24/23:  The left ventricle is normal in size with normal systolic function.  The estimated ejection fraction is 55%.  Indeterminate left ventricular diastolic function with elevated left atrial pressure.  Atrial fibrillation not observed.  Normal right ventricular size with normal right ventricular systolic function.  There is a 26 mm transcutaneously-placed aortic bioprosthesis present. There is no aortic insufficiency present. Prosthetic aortic valve is normal.  The aortic valve mean gradient is 9 mmHg with a dimensionless index of 0.74.  Mild mitral regurgitation.  There is mild pulmonary hypertension.  Mild to moderate tricuspid regurgitation.  Normal central venous pressure (3 mmHg).  The estimated PA systolic pressure is 48 mmHg.      LAST ECHO 3/29/22  The left ventricle is normal in size with eccentric hypertrophy and low normal systolic function. The estimated ejection fraction is 50%.  The quantitatively derived ejection fraction is 48%.  Normal right ventricular size with normal right ventricular systolic function.  Grade II left  ventricular diastolic dysfunction.  Moderate left atrial enlargement.  There is a 26 mm Evolut transcutaneously-placed aortic bioprosthesis present. There is trivial paravalvular aortic insufficiency present.  The aortic valve mean gradient is 9 mmHg with a dimensionless index of 0.46.  The estimated PA systolic pressure is 24 mmHg.  Normal central venous pressure (3 mmHg).          IMPRESSION & PLAN     Severe sepsis resolved secondary to Strep pneumonia bacteremia in the setting of multifocal pneumonia, meningitis ruled out  WBC improved, 14 today likely from steroids   Procal 143.94 -->58-->10.2, trending down    Blood cultures x2 1/24 no growth to date, pending final             Group A strep 1/24 negative   MRSA nasal swab not detected     Transaminitis/Shock liver secondary to Tylenol toxicity, s/p NAC   GI following   AST 1776-->697-->308/ ALT 2215-->1,785---1,432   INR 1.8-->1.4-->1.2-->1.1, improved    Severe headache with abnormal MRI, improved after steroids   Heme/onc following     PMHx:  CAD status post TAVR, hypertension, hyperlipidemia, right breast cancer status post mastectomy, GERD, hypothyroidism    Recommendations:  Follow cultures, Strep urine Ag and Legionalla Ag still in process   Continue Ceftriaxone 2 g IV daily while inpatient   Upon discharge, Levaquin 750mg PO daily, end date 2/2/2023  To complete IV steroids, 3 days   Appreciate neurology and Heme/onc recommendations   Trend LFTs  She will need further work-up outpatient regarding MRI findings  Aspiration precautions   Incentive spirometry   PT/OT as tolerated     Dr Stevenson will be available over the weekend if needed    D/w patient, nursing, Dr Escamilla, Dr Tucker     Medical Decision Making during this encounter was  [_] Low Complexity  [_] Moderate Complexity  [xx] High Complexity

## 2023-01-27 NOTE — CARE UPDATE
01/26/23 1917   Patient Assessment/Suction   Level of Consciousness (AVPU) alert   Respiratory Effort Normal;Unlabored   Expansion/Accessory Muscles/Retractions expansion symmetric;no retractions   All Lung Fields Breath Sounds clear   Rhythm/Pattern, Respiratory no shortness of breath reported   Cough Frequency no cough   PRE-TX-O2   Device (Oxygen Therapy) room air   SpO2 97 %   Aerosol Therapy   $ Aerosol Therapy Charges PRN treatment not required

## 2023-01-27 NOTE — ASSESSMENT & PLAN NOTE
Patient endorses cough. Procalcitonin 143.94 on admission. CXR and CT chest shows consolidation. Blood cultures show Strep pneumo.  -Continue Rocephin  -ID following  -PRN Dabs

## 2023-01-27 NOTE — ASSESSMENT & PLAN NOTE
History of EtOH use. Possible Tylenol toxicity. Hepatic steatosis seen on CT. Improving.  -Trend LFTs, albumin and INR  -Continue NAC repeat protocol  -Patient should avoid EtOH as well as Tylenol  -GI consulted; since signed off

## 2023-01-28 LAB
ALBUMIN SERPL BCP-MCNC: 2.7 G/DL (ref 3.5–5.2)
ALBUMIN SERPL BCP-MCNC: 2.7 G/DL (ref 3.5–5.2)
ALP SERPL-CCNC: 175 U/L (ref 55–135)
ALP SERPL-CCNC: 180 U/L (ref 55–135)
ALT SERPL W/O P-5'-P-CCNC: 1043 U/L (ref 10–44)
ALT SERPL W/O P-5'-P-CCNC: 973 U/L (ref 10–44)
ANION GAP SERPL CALC-SCNC: 5 MMOL/L (ref 8–16)
ANION GAP SERPL CALC-SCNC: 6 MMOL/L (ref 8–16)
ANISOCYTOSIS BLD QL SMEAR: SLIGHT
AST SERPL-CCNC: 106 U/L (ref 10–40)
AST SERPL-CCNC: 89 U/L (ref 10–40)
BASOPHILS # BLD AUTO: ABNORMAL K/UL (ref 0–0.2)
BASOPHILS NFR BLD: 0 % (ref 0–1.9)
BILIRUB SERPL-MCNC: 0.6 MG/DL (ref 0.1–1)
BILIRUB SERPL-MCNC: 0.6 MG/DL (ref 0.1–1)
BUN SERPL-MCNC: 12 MG/DL (ref 8–23)
BUN SERPL-MCNC: 13 MG/DL (ref 8–23)
CALCIUM SERPL-MCNC: 9.2 MG/DL (ref 8.7–10.5)
CALCIUM SERPL-MCNC: 9.4 MG/DL (ref 8.7–10.5)
CHLORIDE SERPL-SCNC: 103 MMOL/L (ref 95–110)
CHLORIDE SERPL-SCNC: 104 MMOL/L (ref 95–110)
CO2 SERPL-SCNC: 25 MMOL/L (ref 23–29)
CO2 SERPL-SCNC: 25 MMOL/L (ref 23–29)
CREAT SERPL-MCNC: 0.6 MG/DL (ref 0.5–1.4)
CREAT SERPL-MCNC: 0.6 MG/DL (ref 0.5–1.4)
DIFFERENTIAL METHOD: ABNORMAL
EOSINOPHIL # BLD AUTO: ABNORMAL K/UL (ref 0–0.5)
EOSINOPHIL NFR BLD: 0 % (ref 0–8)
ERYTHROCYTE [DISTWIDTH] IN BLOOD BY AUTOMATED COUNT: 14 % (ref 11.5–14.5)
EST. GFR  (NO RACE VARIABLE): >60 ML/MIN/1.73 M^2
EST. GFR  (NO RACE VARIABLE): >60 ML/MIN/1.73 M^2
GLUCOSE SERPL-MCNC: 166 MG/DL (ref 70–110)
GLUCOSE SERPL-MCNC: 166 MG/DL (ref 70–110)
HCT VFR BLD AUTO: 29.7 % (ref 37–48.5)
HGB BLD-MCNC: 10 G/DL (ref 12–16)
IMM GRANULOCYTES # BLD AUTO: ABNORMAL K/UL (ref 0–0.04)
IMM GRANULOCYTES NFR BLD AUTO: ABNORMAL % (ref 0–0.5)
INR PPP: 1.1 (ref 0.8–1.2)
LYMPHOCYTES # BLD AUTO: ABNORMAL K/UL (ref 1–4.8)
LYMPHOCYTES NFR BLD: 11 % (ref 18–48)
MAGNESIUM SERPL-MCNC: 1.9 MG/DL (ref 1.6–2.6)
MCH RBC QN AUTO: 29.3 PG (ref 27–31)
MCHC RBC AUTO-ENTMCNC: 33.7 G/DL (ref 32–36)
MCV RBC AUTO: 87 FL (ref 82–98)
METAMYELOCYTES NFR BLD MANUAL: 1 %
MONOCYTES # BLD AUTO: ABNORMAL K/UL (ref 0.3–1)
MONOCYTES NFR BLD: 1 % (ref 4–15)
MYELOCYTES NFR BLD MANUAL: 1 %
NEUTROPHILS NFR BLD: 85 % (ref 38–73)
NEUTS BAND NFR BLD MANUAL: 1 %
NRBC BLD-RTO: 0 /100 WBC
OVALOCYTES BLD QL SMEAR: ABNORMAL
PHOSPHATE SERPL-MCNC: 3.6 MG/DL (ref 2.7–4.5)
PLATELET # BLD AUTO: 262 K/UL (ref 150–450)
PLATELET BLD QL SMEAR: ABNORMAL
PMV BLD AUTO: 9.5 FL (ref 9.2–12.9)
POIKILOCYTOSIS BLD QL SMEAR: SLIGHT
POLYCHROMASIA BLD QL SMEAR: ABNORMAL
POTASSIUM SERPL-SCNC: 3.9 MMOL/L (ref 3.5–5.1)
POTASSIUM SERPL-SCNC: 4 MMOL/L (ref 3.5–5.1)
PROT SERPL-MCNC: 5.1 G/DL (ref 6–8.4)
PROT SERPL-MCNC: 5.1 G/DL (ref 6–8.4)
PROTHROMBIN TIME: 12 SEC (ref 9–12.5)
RBC # BLD AUTO: 3.41 M/UL (ref 4–5.4)
SODIUM SERPL-SCNC: 133 MMOL/L (ref 136–145)
SODIUM SERPL-SCNC: 135 MMOL/L (ref 136–145)
WBC # BLD AUTO: 25.82 K/UL (ref 3.9–12.7)

## 2023-01-28 PROCEDURE — 12000002 HC ACUTE/MED SURGE SEMI-PRIVATE ROOM

## 2023-01-28 PROCEDURE — 85610 PROTHROMBIN TIME: CPT | Performed by: STUDENT IN AN ORGANIZED HEALTH CARE EDUCATION/TRAINING PROGRAM

## 2023-01-28 PROCEDURE — 97116 GAIT TRAINING THERAPY: CPT | Mod: CQ

## 2023-01-28 PROCEDURE — 99900035 HC TECH TIME PER 15 MIN (STAT)

## 2023-01-28 PROCEDURE — 83735 ASSAY OF MAGNESIUM: CPT | Performed by: STUDENT IN AN ORGANIZED HEALTH CARE EDUCATION/TRAINING PROGRAM

## 2023-01-28 PROCEDURE — 85027 COMPLETE CBC AUTOMATED: CPT | Performed by: STUDENT IN AN ORGANIZED HEALTH CARE EDUCATION/TRAINING PROGRAM

## 2023-01-28 PROCEDURE — 84100 ASSAY OF PHOSPHORUS: CPT | Performed by: STUDENT IN AN ORGANIZED HEALTH CARE EDUCATION/TRAINING PROGRAM

## 2023-01-28 PROCEDURE — 63600175 PHARM REV CODE 636 W HCPCS: Performed by: INTERNAL MEDICINE

## 2023-01-28 PROCEDURE — 85007 BL SMEAR W/DIFF WBC COUNT: CPT | Performed by: STUDENT IN AN ORGANIZED HEALTH CARE EDUCATION/TRAINING PROGRAM

## 2023-01-28 PROCEDURE — 63600175 PHARM REV CODE 636 W HCPCS: Performed by: STUDENT IN AN ORGANIZED HEALTH CARE EDUCATION/TRAINING PROGRAM

## 2023-01-28 PROCEDURE — 36415 COLL VENOUS BLD VENIPUNCTURE: CPT | Performed by: STUDENT IN AN ORGANIZED HEALTH CARE EDUCATION/TRAINING PROGRAM

## 2023-01-28 PROCEDURE — 25000003 PHARM REV CODE 250: Performed by: STUDENT IN AN ORGANIZED HEALTH CARE EDUCATION/TRAINING PROGRAM

## 2023-01-28 PROCEDURE — 94761 N-INVAS EAR/PLS OXIMETRY MLT: CPT

## 2023-01-28 PROCEDURE — 80053 COMPREHEN METABOLIC PANEL: CPT | Performed by: STUDENT IN AN ORGANIZED HEALTH CARE EDUCATION/TRAINING PROGRAM

## 2023-01-28 RX ADMIN — ASPIRIN 81 MG CHEWABLE TABLET 81 MG: 81 TABLET CHEWABLE at 08:01

## 2023-01-28 RX ADMIN — DEXAMETHASONE SODIUM PHOSPHATE 4 MG: 4 INJECTION, SOLUTION INTRA-ARTICULAR; INTRALESIONAL; INTRAMUSCULAR; INTRAVENOUS; SOFT TISSUE at 08:01

## 2023-01-28 RX ADMIN — POTASSIUM, SODIUM PHOSPHATES 280 MG-160 MG-250 MG ORAL POWDER PACKET 1 PACKET: POWDER IN PACKET at 05:01

## 2023-01-28 RX ADMIN — FOLIC ACID 1 MG: 1 TABLET ORAL at 08:01

## 2023-01-28 RX ADMIN — THERA TABS 1 TABLET: TAB at 08:01

## 2023-01-28 RX ADMIN — POTASSIUM, SODIUM PHOSPHATES 280 MG-160 MG-250 MG ORAL POWDER PACKET 1 PACKET: POWDER IN PACKET at 11:01

## 2023-01-28 RX ADMIN — LOSARTAN POTASSIUM 25 MG: 25 TABLET, FILM COATED ORAL at 08:01

## 2023-01-28 RX ADMIN — CEFTRIAXONE SODIUM 2 G: 2 INJECTION, POWDER, FOR SOLUTION INTRAMUSCULAR; INTRAVENOUS at 04:01

## 2023-01-28 RX ADMIN — LEVOTHYROXINE SODIUM 75 MCG: 0.03 TABLET ORAL at 05:01

## 2023-01-28 RX ADMIN — POTASSIUM, SODIUM PHOSPHATES 280 MG-160 MG-250 MG ORAL POWDER PACKET 1 PACKET: POWDER IN PACKET at 09:01

## 2023-01-28 RX ADMIN — PANTOPRAZOLE SODIUM 40 MG: 40 TABLET, DELAYED RELEASE ORAL at 08:01

## 2023-01-28 RX ADMIN — POTASSIUM, SODIUM PHOSPHATES 280 MG-160 MG-250 MG ORAL POWDER PACKET 1 PACKET: POWDER IN PACKET at 07:01

## 2023-01-28 RX ADMIN — THIAMINE HCL TAB 100 MG 100 MG: 100 TAB at 08:01

## 2023-01-28 NOTE — PT/OT/SLP PROGRESS
Physical Therapy Treatment    Patient Name:  Vivien Burton   MRN:  [id number]    Recommendations:     Discharge Recommendations: home health PT  Discharge Equipment Recommendations: walker, rolling  Barriers to discharge: None    Assessment:     Vivien Burton is a 77 y.o. female admitted with a medical diagnosis of Pneumonia.  She presents with the following impairments/functional limitations: weakness, impaired functional mobility, gait instability . Awake,alert, supine in bed. Reports feeling good in spite of increased BP, nurse aware. Seated prior to ambulation 180/ 75. Ambulated 250' x 2 with rw and CGA. Taken to restroom, stood at sink to wash hands, ambulated to chair at bedside. BP seated 169/ 100, HR 89.    Rehab Prognosis: Good; patient would benefit from acute skilled PT services to address these deficits and reach maximum level of function.    Recent Surgery: * No surgery found *      Plan:     During this hospitalization, patient to be seen 6 x/week to address the identified rehab impairments via gait training, therapeutic activities, therapeutic exercises and progress toward the following goals:    Plan of Care Expires:  01/26/23    Subjective     Chief Complaint: none stated  Patient/Family Comments/goals: to return home soon.  Pain/Comfort:  Pain Rating 1: 0/10      Objective:     Communicated with nurse Sims prior to session.  Patient found supine with bed alarm, telemetry upon PT entry to room.     General Precautions: Standard, fall  Orthopedic Precautions: N/A  Braces: N/A  Respiratory Status: Room air     Functional Mobility:  Bed Mobility:     Rolling Left:  stand by assistance  Supine to Sit: stand by assistance  Transfers:     Sit to Stand:  contact guard assistance with rolling walker  Gait: 250' x 2 with rw and CGA with IV in tow.       AM-PAC 6 CLICK MOBILITY          Treatment & Education:  Ambulated slowly / steadily with rw and CGA with IV in tow.     Patient left up in  chair with all lines intact, call button in reach, chair alarm on, and nurse Levi notified..    GOALS:   Multidisciplinary Problems       Physical Therapy Goals          Problem: Physical Therapy    Goal Priority Disciplines Outcome Goal Variances Interventions   Physical Therapy Goal     PT, PT/OT Ongoing, Progressing     Description: Goals to be met by: 2023     Patient will increase functional independence with mobility by performin. Supine to sit with Contact Guard Assistance  2. Sit to stand transfer with Contact Guard Assistance  3. Bed to chair transfer with Contact Guard Assistance using Rolling Walker  4. Gait  x 250x2 feet with Contact Guard Assistance using Rolling Walker.   5. Lower extremity exercise program x20 reps                       Time Tracking:     PT Received On: 23  PT Start Time: 0950     PT Stop Time: 1005  PT Total Time (min): 15 min     Billable Minutes: Gait Training 15min    Treatment Type: Treatment  PT/PTA: PTA     PTA Visit Number: 2     2023

## 2023-01-28 NOTE — CARE UPDATE
01/28/23 0805   Patient Assessment/Suction   Level of Consciousness (AVPU) alert   Respiratory Effort Unlabored   Expansion/Accessory Muscles/Retractions no use of accessory muscles;expansion symmetric;no retractions   All Lung Fields Breath Sounds Anterior:;Lateral:;clear;diminished   Rhythm/Pattern, Respiratory unlabored;pattern regular;depth regular;no shortness of breath reported   Aerosol Therapy   $ Aerosol Therapy Charges PRN treatment not required   Respiratory Treatment Status (SVN) PRN treatment not required     Room Air  HR 80  97% SPO2  RR 18

## 2023-01-28 NOTE — ASSESSMENT & PLAN NOTE
Flow cytometry unremarkable. CT head unremarkable. MRI brain shows enhancement of skull. LP studies not consistent with meningoencephalitis. Acute posterior headache much improved with steroids.  -PRN ibuprofen  -Scheduled Reglan, Bendaryl and steroids per Neurology  -Oncology consulted; follow up in outpatient setting  -Follow up Neurology in outpatient setting

## 2023-01-28 NOTE — PROGRESS NOTES
[facility] Medicine  Progress Note    Patient Name: Vivien Burton  MRN: [id number]  Patient Class: IP- Inpatient   Admission Date: 1/23/2023  Length of Stay: 5 days  Attending Physician: Marek Escamilla MD  Primary Care Provider: Ellen Robert III, MD        Subjective:     Principal Problem:Pneumonia        HPI:  Vivien Burton is a 77-year-old female who presents in transfer from [facility].  Patient presented [facility] for evaluation of headache and cough.  She reports headache and cough onset approximately 1 week ago.  She endorses taking 4 tablets of Tylenol every 4 hours for least 3-5 days for headache.  She describes the headache as a stabbing and piercing sensation to the occipital region.  She denies any fever or chills.  She denies any known sick contacts or travel.  No aggravating or alleviating factors.  Previous medical history includes anemia, breast cancer, systolic/diastolic heart failure, coronary artery disease, heart murmur, TAVR, hypothyroidism, hypertension.  ER workup at outside facility:  CBC with leukocytosis of 43,000 in H&H of 10 and 30.  CMP with hypokalemia 3.3, bilirubin of 1.1, alk-phos 161, , .  Magnesium 1.2 (repleted).  Lactic acid elevated at 2.4.  Urinalysis with 8 red blood cells, 10 white blood cells, few bacteria.  INR was elevated 1.8, and sed rate was elevated 37.  Blood urine cultures are pending.  CT the head demonstrated no acute findings.  CT the abdomen and pelvis demonstrated right lower lobe air bronchograms concerning for pneumonia with hepatomegaly, diverticulosis, and aortic valve replacement.  Chest x-ray with a dense masslike suspicious lesion/ infiltrate of the right lower lobe.  Patient admitted to Hospital Medicine for treatment management.  Infectious Disease, Neurology, and Pulmonary consultations made.  Patient will be empirically started on meningitis coverage including ampicillin,  "Rocephin, vancomycin.         Overview/Hospital Course:  Vivien Burton is a 77 year old female with a past medical history of CAD s/p PCI to LAD 1/2022, combined CHF, aortic stenosis s/p TAVR, anemia, hypothyroidism, HLD, HTN, breast cancer, and GERD who presented with one week of headache and cough secondary to a severe sepsis secondary to Strep pneumo pneumonia and bacteremia. She was started on empiric bacterial meningitis therapy initially with ampicillin, ceftriaxone and vancomycin as well as azithromycin. An LP was ordered and preliminary data was not consistent with an acute meningitis. Ampicillin and azithromycin were discontinued per ID. CT of the chest shows consolidation at the RLL with air bronchograms and ground glass opacities concerning for pneumonia. Blood cultures on admission also showed Strep pneumo. Vancomycin was also discontinued per ID. Neurology and Pulmonary were also consulted. Her sepsis eventually resolved. Of note, the patient endorsed taking four tablets of Tylenol every four hours for roughly three to five days. Her LFTs were elevated as well as INR on admission. Her albumin is also low and CT of the abdomen shows hepatic steatosis. She was started on NAC therapy while at [place] and has started another NAC protocol on 1/25 ending 1/27. GI has also been consulted. Her LFTs are now improving as well as the INR. An MRI of the brain shows enhancement of the skull for which Oncology was consulted 1/25; flow cytometry is unremarkable. Her headache has improved during her course with initiation of Benadryl, Reglan and Decadron per Neurology recommendations.      Interval History: see "Hospital Course"    Review of Systems   Constitutional:  Negative for activity change, appetite change, chills, diaphoresis and fever.   HENT:  Negative for congestion, nosebleeds and tinnitus.    Eyes:  Negative for photophobia and visual disturbance.   Respiratory:  Negative for cough, chest tightness, " shortness of breath and wheezing.    Cardiovascular:  Negative for chest pain, palpitations and leg swelling.   Gastrointestinal:  Negative for abdominal distention, abdominal pain, constipation, diarrhea, nausea and vomiting.   Endocrine: Negative for cold intolerance and heat intolerance.   Genitourinary:  Negative for difficulty urinating, dysuria, frequency, hematuria and urgency.   Musculoskeletal:  Negative for arthralgias, back pain and myalgias.   Skin:  Negative for pallor, rash and wound.   Allergic/Immunologic: Negative for immunocompromised state.   Neurological:  Negative for dizziness, tremors, facial asymmetry, speech difficulty, weakness and headaches.   Hematological:  Negative for adenopathy. Does not bruise/bleed easily.   Psychiatric/Behavioral:  Negative for confusion and sleep disturbance. The patient is not nervous/anxious.    Objective:     Vital Signs (Most Recent):  Temp: 98.1 °F (36.7 °C) (01/28/23 0751)  Pulse: 77 (01/28/23 0751)  Resp: 18 (01/28/23 0751)  BP: (!) 187/73 (01/28/23 0751)  SpO2: 98 % (01/28/23 0751)   Vital Signs (24h Range):  Temp:  [96.3 °F (35.7 °C)-98.1 °F (36.7 °C)] 98.1 °F (36.7 °C)  Pulse:  [75-93] 77  Resp:  [17-18] 18  SpO2:  [95 %-99 %] 98 %  BP: (148-192)/(65-80) 187/73     Weight: 53.8 kg (118 lb 9.7 oz)  Body mass index is 23.16 kg/m².    Intake/Output Summary (Last 24 hours) at 1/28/2023 0833  Last data filed at 1/28/2023 0647  Gross per 24 hour   Intake 2470.74 ml   Output 1350 ml   Net 1120.74 ml      Physical Exam  Vitals and nursing note reviewed.   Constitutional:       General: She is not in acute distress.     Appearance: She is well-developed. She is not ill-appearing or diaphoretic.   HENT:      Head: Normocephalic and atraumatic.      Right Ear: External ear normal.      Left Ear: External ear normal.      Nose: Nose normal.      Mouth/Throat:      Mouth: Mucous membranes are moist.      Pharynx: Oropharynx is clear.   Eyes:      General: No scleral  icterus.     Extraocular Movements: Extraocular movements intact.      Conjunctiva/sclera: Conjunctivae normal.   Neck:      Vascular: No JVD.   Cardiovascular:      Rate and Rhythm: Normal rate and regular rhythm.      Heart sounds: Murmur heard.     No friction rub. No gallop.   Pulmonary:      Effort: Pulmonary effort is normal. No respiratory distress.      Breath sounds: No wheezing or rales.   Abdominal:      General: Bowel sounds are normal. There is no distension.      Palpations: Abdomen is soft.      Tenderness: There is no abdominal tenderness. There is no guarding or rebound.   Musculoskeletal:         General: No tenderness. Normal range of motion.      Cervical back: Normal range of motion and neck supple.   Lymphadenopathy:      Cervical: No cervical adenopathy.   Skin:     General: Skin is warm and dry.      Coloration: Skin is not pale.      Findings: No erythema or rash.   Neurological:      General: No focal deficit present.      Mental Status: She is alert and oriented to person, place, and time. Mental status is at baseline.      Cranial Nerves: No cranial nerve deficit.      Sensory: No sensory deficit.      Coordination: Coordination normal.      Deep Tendon Reflexes: Reflexes normal.   Psychiatric:         Behavior: Behavior normal.         Thought Content: Thought content normal.         Judgment: Judgment normal.       Significant Labs: All pertinent labs within the past 24 hours have been reviewed.    Significant Imaging: I have reviewed all pertinent imaging results/findings within the past 24 hours.      Assessment/Plan:      * Pneumonia  Patient endorses cough. Procalcitonin 143.94 on admission. CXR and CT chest shows consolidation. Blood cultures show Strep pneumo.  -Continue Rocephin  -ID following  -PRN Duonebs      Streptococcal bacteremia  TTE shows no vegetations. Likely from pneumonia. Repeat blood cultures negative.  -Continue Rocephin  -ID following      Transaminitis  History  of EtOH use. Possible Tylenol toxicity. Hepatic steatosis seen on CT. Improving.  -Trend LFTs, albumin and INR  -Continue NAC repeat protocol  -Patient should avoid EtOH as well as Tylenol  -GI consulted; since signed off      Acetaminophen overdose  Patient endorses taking four tablets of Tylenol every four ours for three to five days. Improving.  -GI consulted  -Monitor LFTs, albumin, and INR  -NAC protocol repeated 1/25 ending 1/28  -Poison control aware of case  -If patient's liver failure decompensates, will need transfer to [facility] for Hepatology consultation      Headache(784.0)  Flow cytometry unremarkable. CT head unremarkable. MRI brain shows enhancement of skull. LP studies not consistent with meningoencephalitis. Acute posterior headache much improved with steroids.  -PRN ibuprofen  -Scheduled Reglan, Bendaryl and steroids per Neurology  -Oncology consulted; follow up in outpatient setting  -Follow up Neurology in outpatient setting    Coronary artery disease  -Continue home ASA    Chronic combined systolic and diastolic heart failure, HFimpEF  History of CAD. Not observed on 1/24 TTE.  -Continue cardiac telemetry  -Strict I's and O's        S/P TAVR (transcatheter aortic valve replacement), 26 mm  Stable.      Anemia  Stable.  -Trend Hgb with CBC  -Hold home iron in setting of acute infection      Excessive drinking alcohol  -Monitor for signs and symptoms of withdrawal  -Folic acid, thiamine and MVI  -Patient counseled on cessation as well as avoidance of Tylenol      Unspecified essential hypertension  -Losartan  -PRN hydralazine  -Continue to monitor        Unspecified hypothyroidism  -Continue Synthroid      GERD (gastroesophageal reflux disease)  -Continue PPI      VTE Risk Mitigation (From admission, onward)         Ordered     Place LAMBERT hose  Until discontinued         01/23/23 2322     IP VTE HIGH RISK PATIENT  Once         01/23/23 2322     Place sequential compression device  Until discontinued          01/23/23 2322                Discharge Planning   MIRELLA: 1/29/2023     Code Status: Full Code   Is the patient medically ready for discharge?:     Reason for patient still in hospital (select all that apply): Patient trending condition, Laboratory test, Treatment and Consult recommendations  Discharge Plan A: Home Health                  Marek Escamilla MD  Department of Hospital Medicine   [facility]

## 2023-01-28 NOTE — ASSESSMENT & PLAN NOTE
Patient endorses taking four tablets of Tylenol every four ours for three to five days. Improving.  -GI consulted  -Monitor LFTs, albumin, and INR  -NAC protocol repeated 1/25 ending 1/28  -Poison control aware of case  -If patient's liver failure decompensates, will need transfer to [facility] for Hepatology consultation

## 2023-01-28 NOTE — PLAN OF CARE
Problem: Adult Inpatient Plan of Care  Goal: Plan of Care Review  Outcome: Ongoing, Progressing     Problem: Adult Inpatient Plan of Care  Goal: Optimal Comfort and Wellbeing  Outcome: Ongoing, Progressing     Problem: Adult Inpatient Plan of Care  Goal: Readiness for Transition of Care  Outcome: Ongoing, Progressing    POC and medications reviewed with pt, verbalized understanding. Pt AAO x 4, able to make needs known. Meds given per MAR. IVF infusing as ordered. Purposeful hourly/q2hr rounding done during shift to promote patient safety. Safety maintained with side rails up x2, bed locked and in lowest position, call light in reach. Pt educated to call for assistance with ambulation, verbalized understanding. Pt remains free of falls. No further needs expressed at this time.

## 2023-01-28 NOTE — SUBJECTIVE & OBJECTIVE
"Interval History: see "Hospital Course"    Review of Systems   Constitutional:  Negative for activity change, appetite change, chills, diaphoresis and fever.   HENT:  Negative for congestion, nosebleeds and tinnitus.    Eyes:  Negative for photophobia and visual disturbance.   Respiratory:  Negative for cough, chest tightness, shortness of breath and wheezing.    Cardiovascular:  Negative for chest pain, palpitations and leg swelling.   Gastrointestinal:  Negative for abdominal distention, abdominal pain, constipation, diarrhea, nausea and vomiting.   Endocrine: Negative for cold intolerance and heat intolerance.   Genitourinary:  Negative for difficulty urinating, dysuria, frequency, hematuria and urgency.   Musculoskeletal:  Negative for arthralgias, back pain and myalgias.   Skin:  Negative for pallor, rash and wound.   Allergic/Immunologic: Negative for immunocompromised state.   Neurological:  Negative for dizziness, tremors, facial asymmetry, speech difficulty, weakness and headaches.   Hematological:  Negative for adenopathy. Does not bruise/bleed easily.   Psychiatric/Behavioral:  Negative for confusion and sleep disturbance. The patient is not nervous/anxious.    Objective:     Vital Signs (Most Recent):  Temp: 98.1 °F (36.7 °C) (01/28/23 0751)  Pulse: 77 (01/28/23 0751)  Resp: 18 (01/28/23 0751)  BP: (!) 187/73 (01/28/23 0751)  SpO2: 98 % (01/28/23 0751)   Vital Signs (24h Range):  Temp:  [96.3 °F (35.7 °C)-98.1 °F (36.7 °C)] 98.1 °F (36.7 °C)  Pulse:  [75-93] 77  Resp:  [17-18] 18  SpO2:  [95 %-99 %] 98 %  BP: (148-192)/(65-80) 187/73     Weight: 53.8 kg (118 lb 9.7 oz)  Body mass index is 23.16 kg/m².    Intake/Output Summary (Last 24 hours) at 1/28/2023 0833  Last data filed at 1/28/2023 0647  Gross per 24 hour   Intake 2470.74 ml   Output 1350 ml   Net 1120.74 ml      Physical Exam  Vitals and nursing note reviewed.   Constitutional:       General: She is not in acute distress.     Appearance: She is " well-developed. She is not ill-appearing or diaphoretic.   HENT:      Head: Normocephalic and atraumatic.      Right Ear: External ear normal.      Left Ear: External ear normal.      Nose: Nose normal.      Mouth/Throat:      Mouth: Mucous membranes are moist.      Pharynx: Oropharynx is clear.   Eyes:      General: No scleral icterus.     Extraocular Movements: Extraocular movements intact.      Conjunctiva/sclera: Conjunctivae normal.   Neck:      Vascular: No JVD.   Cardiovascular:      Rate and Rhythm: Normal rate and regular rhythm.      Heart sounds: Murmur heard.     No friction rub. No gallop.   Pulmonary:      Effort: Pulmonary effort is normal. No respiratory distress.      Breath sounds: No wheezing or rales.   Abdominal:      General: Bowel sounds are normal. There is no distension.      Palpations: Abdomen is soft.      Tenderness: There is no abdominal tenderness. There is no guarding or rebound.   Musculoskeletal:         General: No tenderness. Normal range of motion.      Cervical back: Normal range of motion and neck supple.   Lymphadenopathy:      Cervical: No cervical adenopathy.   Skin:     General: Skin is warm and dry.      Coloration: Skin is not pale.      Findings: No erythema or rash.   Neurological:      General: No focal deficit present.      Mental Status: She is alert and oriented to person, place, and time. Mental status is at baseline.      Cranial Nerves: No cranial nerve deficit.      Sensory: No sensory deficit.      Coordination: Coordination normal.      Deep Tendon Reflexes: Reflexes normal.   Psychiatric:         Behavior: Behavior normal.         Thought Content: Thought content normal.         Judgment: Judgment normal.       Significant Labs: All pertinent labs within the past 24 hours have been reviewed.    Significant Imaging: I have reviewed all pertinent imaging results/findings within the past 24 hours.

## 2023-01-29 VITALS
SYSTOLIC BLOOD PRESSURE: 128 MMHG | WEIGHT: 118.63 LBS | DIASTOLIC BLOOD PRESSURE: 64 MMHG | TEMPERATURE: 97 F | HEIGHT: 60 IN | HEART RATE: 86 BPM | RESPIRATION RATE: 18 BRPM | OXYGEN SATURATION: 98 % | BODY MASS INDEX: 23.29 KG/M2

## 2023-01-29 LAB
ALBUMIN SERPL BCP-MCNC: 3 G/DL (ref 3.5–5.2)
ALP SERPL-CCNC: 159 U/L (ref 55–135)
ALT SERPL W/O P-5'-P-CCNC: 675 U/L (ref 10–44)
ANION GAP SERPL CALC-SCNC: 11 MMOL/L (ref 8–16)
AST SERPL-CCNC: 48 U/L (ref 10–40)
BASOPHILS # BLD AUTO: ABNORMAL K/UL (ref 0–0.2)
BASOPHILS NFR BLD: 0 % (ref 0–1.9)
BILIRUB SERPL-MCNC: 0.6 MG/DL (ref 0.1–1)
BUN SERPL-MCNC: 18 MG/DL (ref 8–23)
CALCIUM SERPL-MCNC: 9.3 MG/DL (ref 8.7–10.5)
CHLORIDE SERPL-SCNC: 103 MMOL/L (ref 95–110)
CO2 SERPL-SCNC: 22 MMOL/L (ref 23–29)
CREAT SERPL-MCNC: 0.6 MG/DL (ref 0.5–1.4)
DIFFERENTIAL METHOD: ABNORMAL
EOSINOPHIL # BLD AUTO: ABNORMAL K/UL (ref 0–0.5)
EOSINOPHIL NFR BLD: 0 % (ref 0–8)
ERYTHROCYTE [DISTWIDTH] IN BLOOD BY AUTOMATED COUNT: 14 % (ref 11.5–14.5)
EST. GFR  (NO RACE VARIABLE): >60 ML/MIN/1.73 M^2
GLUCOSE SERPL-MCNC: 138 MG/DL (ref 70–110)
HCT VFR BLD AUTO: 32.6 % (ref 37–48.5)
HGB BLD-MCNC: 10.7 G/DL (ref 12–16)
IMM GRANULOCYTES # BLD AUTO: ABNORMAL K/UL (ref 0–0.04)
IMM GRANULOCYTES NFR BLD AUTO: ABNORMAL % (ref 0–0.5)
LYMPHOCYTES # BLD AUTO: ABNORMAL K/UL (ref 1–4.8)
LYMPHOCYTES NFR BLD: 11 % (ref 18–48)
MAGNESIUM SERPL-MCNC: 2 MG/DL (ref 1.6–2.6)
MCH RBC QN AUTO: 29.4 PG (ref 27–31)
MCHC RBC AUTO-ENTMCNC: 32.8 G/DL (ref 32–36)
MCV RBC AUTO: 90 FL (ref 82–98)
METAMYELOCYTES NFR BLD MANUAL: 5 %
MONOCYTES # BLD AUTO: ABNORMAL K/UL (ref 0.3–1)
MONOCYTES NFR BLD: 3 % (ref 4–15)
MYELOCYTES NFR BLD MANUAL: 3 %
NEUTROPHILS NFR BLD: 76 % (ref 38–73)
NEUTS BAND NFR BLD MANUAL: 2 %
NRBC BLD-RTO: 0 /100 WBC
OVALOCYTES BLD QL SMEAR: ABNORMAL
PHOSPHATE SERPL-MCNC: 4.3 MG/DL (ref 2.7–4.5)
PLATELET # BLD AUTO: 292 K/UL (ref 150–450)
PLATELET BLD QL SMEAR: ABNORMAL
PMV BLD AUTO: 9.5 FL (ref 9.2–12.9)
POIKILOCYTOSIS BLD QL SMEAR: SLIGHT
POTASSIUM SERPL-SCNC: 4.4 MMOL/L (ref 3.5–5.1)
PROT SERPL-MCNC: 5.3 G/DL (ref 6–8.4)
RBC # BLD AUTO: 3.64 M/UL (ref 4–5.4)
SODIUM SERPL-SCNC: 136 MMOL/L (ref 136–145)
WBC # BLD AUTO: 19.88 K/UL (ref 3.9–12.7)

## 2023-01-29 PROCEDURE — 36415 COLL VENOUS BLD VENIPUNCTURE: CPT | Performed by: STUDENT IN AN ORGANIZED HEALTH CARE EDUCATION/TRAINING PROGRAM

## 2023-01-29 PROCEDURE — 84100 ASSAY OF PHOSPHORUS: CPT | Performed by: STUDENT IN AN ORGANIZED HEALTH CARE EDUCATION/TRAINING PROGRAM

## 2023-01-29 PROCEDURE — 94761 N-INVAS EAR/PLS OXIMETRY MLT: CPT

## 2023-01-29 PROCEDURE — 99900035 HC TECH TIME PER 15 MIN (STAT)

## 2023-01-29 PROCEDURE — 80053 COMPREHEN METABOLIC PANEL: CPT | Performed by: STUDENT IN AN ORGANIZED HEALTH CARE EDUCATION/TRAINING PROGRAM

## 2023-01-29 PROCEDURE — 83735 ASSAY OF MAGNESIUM: CPT | Performed by: STUDENT IN AN ORGANIZED HEALTH CARE EDUCATION/TRAINING PROGRAM

## 2023-01-29 PROCEDURE — 85027 COMPLETE CBC AUTOMATED: CPT | Performed by: STUDENT IN AN ORGANIZED HEALTH CARE EDUCATION/TRAINING PROGRAM

## 2023-01-29 PROCEDURE — 85007 BL SMEAR W/DIFF WBC COUNT: CPT | Performed by: STUDENT IN AN ORGANIZED HEALTH CARE EDUCATION/TRAINING PROGRAM

## 2023-01-29 PROCEDURE — 25000003 PHARM REV CODE 250: Performed by: STUDENT IN AN ORGANIZED HEALTH CARE EDUCATION/TRAINING PROGRAM

## 2023-01-29 RX ORDER — LEVOFLOXACIN 250 MG/1
750 TABLET ORAL ONCE
Status: COMPLETED | OUTPATIENT
Start: 2023-01-29 | End: 2023-01-29

## 2023-01-29 RX ORDER — LEVOFLOXACIN 750 MG/1
750 TABLET ORAL DAILY
Qty: 4 TABLET | Refills: 0 | Status: SHIPPED | OUTPATIENT
Start: 2023-01-29 | End: 2023-02-02

## 2023-01-29 RX ADMIN — THIAMINE HCL TAB 100 MG 100 MG: 100 TAB at 08:01

## 2023-01-29 RX ADMIN — LEVOFLOXACIN 750 MG: 250 TABLET, FILM COATED ORAL at 01:01

## 2023-01-29 RX ADMIN — POTASSIUM, SODIUM PHOSPHATES 280 MG-160 MG-250 MG ORAL POWDER PACKET 1 PACKET: POWDER IN PACKET at 12:01

## 2023-01-29 RX ADMIN — LOSARTAN POTASSIUM 25 MG: 25 TABLET, FILM COATED ORAL at 08:01

## 2023-01-29 RX ADMIN — PANTOPRAZOLE SODIUM 40 MG: 40 TABLET, DELAYED RELEASE ORAL at 08:01

## 2023-01-29 RX ADMIN — POTASSIUM, SODIUM PHOSPHATES 280 MG-160 MG-250 MG ORAL POWDER PACKET 1 PACKET: POWDER IN PACKET at 08:01

## 2023-01-29 RX ADMIN — ASPIRIN 81 MG CHEWABLE TABLET 81 MG: 81 TABLET CHEWABLE at 08:01

## 2023-01-29 RX ADMIN — THERA TABS 1 TABLET: TAB at 08:01

## 2023-01-29 RX ADMIN — LEVOTHYROXINE SODIUM 75 MCG: 0.03 TABLET ORAL at 06:01

## 2023-01-29 RX ADMIN — FOLIC ACID 1 MG: 1 TABLET ORAL at 08:01

## 2023-01-29 NOTE — PLAN OF CARE
SW met with patient at bedside regarding home health and rolling walker.  Patient signed patient choice disclosure choosing [facility] [place].  EM sent patient information to [facility] via [employer] system.  EM sent secure message to Thierno with [facility] for approval to deliver rolling walker.       01/29/23 1045   Post-Acute Status   Post-Acute Authorization Home Health;HME   HME Status Referrals Sent   Home Health Status Referrals Sent   Patient choice form signed by patient/caregiver List with quality metrics by geographic area provided

## 2023-01-29 NOTE — PLAN OF CARE
Patient accepted by [facility].  Ok to pull rolling walker from [facility] per Thierno with [facility].  EM delivery listed equipment to patient's hospital room.  Patient signed delivery ticket.       01/29/23 1050   Post-Acute Status   Post-Acute Authorization Home Health;HME   HME Status Set-up Complete/Auth obtained   Home Health Status Set-up Complete/Auth obtained

## 2023-01-29 NOTE — PLAN OF CARE
Uneventful night. Pt denies needs. Ambulatory to restroom. Denies pain. Safety maintained.      Problem: Adult Inpatient Plan of Care  Goal: Plan of Care Review  Outcome: Ongoing, Progressing

## 2023-01-29 NOTE — CARE UPDATE
01/29/23 0916   Patient Assessment/Suction   Level of Consciousness (AVPU) alert   Respiratory Effort Unlabored   Expansion/Accessory Muscles/Retractions no use of accessory muscles;no retractions;expansion symmetric   Rhythm/Pattern, Respiratory unlabored;pattern regular;depth regular;no shortness of breath reported   PRE-TX-O2   Device (Oxygen Therapy) room air   SpO2 98 %   Pulse Oximetry Type Intermittent   $ Pulse Oximetry - Multiple Charge Pulse Oximetry - Multiple   Pulse 86   Resp 18   Aerosol Therapy   $ Aerosol Therapy Charges PRN treatment not required   Respiratory Treatment Status (SVN) PRN treatment not required

## 2023-01-29 NOTE — ASSESSMENT & PLAN NOTE
TTE shows no vegetations. Likely from pneumonia. Repeat blood cultures negative.  -Continue Rocephin; transitioned to Levaquin upon discharge  -ID following

## 2023-01-29 NOTE — DISCHARGE SUMMARY
[facility] Hospital Medicine  Discharge Summary      Patient Name: Vivien Burton  MRN: [id number]  JYOTSNA: [id number]  Patient Class: IP- Inpatient  Admission Date: 1/23/2023  Hospital Length of Stay: 6 days  Discharge Date and Time: No discharge date for patient encounter.  Attending Physician: Marek Escamilla MD   Discharging Provider: Marek Escamilla MD  Primary Care Provider: Ellen Robert III, MD    Primary Care Team: Networked reference to record PCT     HPI:   Vivien Burton is a 77-year-old female who presents in transfer from [facility].  Patient presented [facility] for evaluation of headache and cough.  She reports headache and cough onset approximately 1 week ago.  She endorses taking 4 tablets of Tylenol every 4 hours for least 3-5 days for headache.  She describes the headache as a stabbing and piercing sensation to the occipital region.  She denies any fever or chills.  She denies any known sick contacts or travel.  No aggravating or alleviating factors.  Previous medical history includes anemia, breast cancer, systolic/diastolic heart failure, coronary artery disease, heart murmur, TAVR, hypothyroidism, hypertension.  ER workup at outside facility:  CBC with leukocytosis of 43,000 in H&H of 10 and 30.  CMP with hypokalemia 3.3, bilirubin of 1.1, alk-phos 161, , .  Magnesium 1.2 (repleted).  Lactic acid elevated at 2.4.  Urinalysis with 8 red blood cells, 10 white blood cells, few bacteria.  INR was elevated 1.8, and sed rate was elevated 37.  Blood urine cultures are pending.  CT the head demonstrated no acute findings.  CT the abdomen and pelvis demonstrated right lower lobe air bronchograms concerning for pneumonia with hepatomegaly, diverticulosis, and aortic valve replacement.  Chest x-ray with a dense masslike suspicious lesion/ infiltrate of the right lower lobe.  Patient admitted to Hospital Medicine for treatment management.   Infectious Disease, Neurology, and Pulmonary consultations made.  Patient will be empirically started on meningitis coverage including ampicillin, Rocephin, vancomycin.         * No surgery found *      Hospital Course:   Vivien Burton is a 77 year old female with a past medical history of CAD s/p PCI to LAD 1/2022, combined CHF, aortic stenosis s/p TAVR, anemia, hypothyroidism, HLD, HTN, breast cancer, and GERD who presented with one week of headache and cough secondary to a severe sepsis secondary to Strep pneumo pneumonia and bacteremia. She was started on empiric bacterial meningitis therapy initially with ampicillin, ceftriaxone and vancomycin as well as azithromycin. An LP was ordered and preliminary data was not consistent with an acute meningitis. Ampicillin and azithromycin were discontinued per ID. CT of the chest shows consolidation at the RLL with air bronchograms and ground glass opacities concerning for pneumonia. Blood cultures on admission also showed Strep pneumo. Vancomycin was also discontinued per ID. Neurology and Pulmonary were also consulted. Her sepsis eventually resolved. Of note, the patient endorsed taking four tablets of Tylenol every four hours for roughly three to five days. Her LFTs were elevated as well as INR on admission. Her albumin was also low and CT of the abdomen showed hepatic steatosis. She was started on NAC therapy while at [place] and has started another NAC protocol on 1/25 ending 1/28. GI was also been consulted. Her LFTs improved as well as the INR. An MRI of the brain showed enhancement of the skull for which Oncology was consulted 1/25; flow cytometry is unremarkable. Her headache has improved during her course with initiation of Benadryl, Reglan and Decadron per Neurology recommendations. She was discharged 1/29 and will complete a short course of Levaquin per ID recommendations. She was counseled on EtOH cessation and to stop using Tylenol. She will follow up  with her PCP, Neurology, and Oncology in the outpatient and will need a repeat CMP to reassess her LFTs.       Goals of Care Treatment Preferences:  Code Status: Full Code    Living Will: Yes              Consults:   Consults (From admission, onward)        Status Ordering Provider     Inpatient consult to Oncology  Once        Provider:  (Not yet assigned)    ANGELIQUE Crowder     Inpatient consult to Infectious Diseases  Once        Provider:  Tiffany Walker MD    Completed MARLIN MOHAN     Inpatient consult to Gastroenterology  Once        Provider:  Mal Abrams MD    Completed ANDRWE STEWART     Inpatient consult to Neurology  Once        Provider:  Angelique Tucker MD    Completed ANDREW STEWART     Inpatient consult to Pulmonology  Once        Provider:  Dottie Cartwright NP    Completed ANDREW STEWART     IP consult to dietary  Once        Provider:  (Not yet assigned)    Completed ANDREW STEWART          * Pneumonia  Patient endorses cough. Procalcitonin 143.94 on admission. CXR and CT chest shows consolidation. Blood cultures show Strep pneumo.  -Continue Rocephin; transitioned to Levaquin upon discharge  -ID following  -PRN Duonebs      Streptococcal bacteremia  TTE shows no vegetations. Likely from pneumonia. Repeat blood cultures negative.  -Continue Rocephin; transitioned to Levaquin upon discharge  -ID following      Transaminitis  History of EtOH use. Possible Tylenol toxicity. Hepatic steatosis seen on CT. Improving.  -Trend LFTs, albumin and INR  -S/p NAC protocol  -Patient should avoid EtOH as well as Tylenol  -GI consulted; since signed off      Acetaminophen overdose  Patient endorses taking four tablets of Tylenol every four ours for three to five days. Improving.  -GI consulted  -Monitor LFTs, albumin, and INR  -NAC protocol repeated 1/25 ending 1/28  -Poison control aware of case    Headache(784.0)  Flow cytometry unremarkable. CT head  unremarkable. MRI brain shows enhancement of skull. LP studies not consistent with meningoencephalitis. Acute posterior headache much improved with steroids.  -PRN ibuprofen  -S/p scheduled Reglan, Bendaryl and steroids per Neurology  -Oncology consulted; follow up in outpatient setting  -Follow up Neurology in outpatient setting    Coronary artery disease  -Continue home ASA    Chronic combined systolic and diastolic heart failure, HFimpEF  History of CAD. Not observed on 1/24 TTE.  -Continue cardiac telemetry  -Strict I's and O's        S/P TAVR (transcatheter aortic valve replacement), 26 mm  Stable.      Anemia  Stable.  -Trended Hgb with CBC      Excessive drinking alcohol  -Monitor for signs and symptoms of withdrawal  -Folic acid, thiamine and MVI  -Patient counseled on cessation as well as avoidance of Tylenol      Unspecified essential hypertension  -Losartan  -PRN hydralazine  -Continue to monitor        Unspecified hypothyroidism  -Continue Synthroid      GERD (gastroesophageal reflux disease)  -Continue PPI      Final Active Diagnoses:    Diagnosis Date Noted POA    PRINCIPAL PROBLEM:  Pneumonia [J18.9] 01/24/2023 Yes    Streptococcal bacteremia [R78.81, B95.5] 01/25/2023 Yes    Transaminitis [R74.01] 01/23/2023 Yes    Acetaminophen overdose [T39.1X1A] 01/23/2023 Yes    Headache(784.0) [R51.9] 04/12/2017 Yes    Chronic combined systolic and diastolic heart failure, HFimpEF [I50.42] 02/23/2022 Yes    Coronary artery disease [I25.10] 02/23/2022 Yes    S/P TAVR (transcatheter aortic valve replacement), 26 mm [Z95.2] 02/22/2022 Not Applicable    Anemia [D64.9] 12/10/2021 Yes    Excessive drinking alcohol [F10.10] 06/05/2018 Yes    Unspecified hypothyroidism [E03.9] 07/22/2015 Yes    Unspecified essential hypertension [I10] 07/22/2015 Yes    GERD (gastroesophageal reflux disease) [K21.9]  Yes      Problems Resolved During this Admission:    Diagnosis Date Noted Date Resolved POA    Severe sepsis  "[A41.9, R65.20] 01/23/2023 01/25/2023 Yes    Hyponatremia [E87.1] 01/24/2023 01/26/2023 Yes    Hypokalemia [E87.6] 01/24/2023 01/25/2023 Yes       Discharged Condition: stable    Disposition: Home or Self Care    Follow Up:   Follow-up Information     Ellen Robert III, MD Follow up in 2 week(s).    Specialties: Internal Medicine, Cardiology  Contact information:  [address]  [place] [address] [phone]             Car Tucker MD Follow up in 1 week(s).    Specialty: Neurology  Contact information:  [address]  [place] [address]  [phone]             Diana Laureano MD Follow up in 2 week(s).    Specialties: Hematology and Oncology, Hematology, Oncology  Contact information:  [address]  [place] [address]  [phone]                       Patient Instructions:      WALKER FOR HOME USE     Order Specific Question Answer Comments   Type of Walker: Adult (5'4"-6'6")    With wheels? Yes    Height: 5' (1.524 m)    Weight: 53.8 kg (118 lb 9.7 oz)    Length of need (1-99 months): 1    Does patient have medical equipment at home? none    Please check all that apply: Patient's condition impairs ambulation.    Please check all that apply: Patient is unable to safely ambulate without equipment.      Ambulatory referral/consult to Outpatient Case Management   Referral Priority: Routine Referral Type: Consultation   Referral Reason: Specialty Services Required   Number of Visits Requested: 1     Ambulatory referral/consult to Home Health   Standing Status: Future   Referral Priority: Routine Referral Type: Home Health   Referral Reason: Specialty Services Required   Requested Specialty: Home Health Services   Number of Visits Requested: 1     Diet Cardiac     Notify your health care provider if you experience any of the following:  increased confusion or weakness     Notify your health care provider if you experience any of the following:  persistent dizziness, light-headedness, " or visual disturbances     Notify your health care provider if you experience any of the following:  severe persistent headache     Notify your health care provider if you experience any of the following:  difficulty breathing or increased cough     Notify your health care provider if you experience any of the following:  persistent nausea and vomiting or diarrhea     Notify your health care provider if you experience any of the following:  severe uncontrolled pain     Notify your health care provider if you experience any of the following:  temperature >100.4     Activity as tolerated       Significant Diagnostic Studies: Labs: All labs within the past 24 hours have been reviewed    Pending Diagnostic Studies:     Procedure Component Value Units Date/Time    Paraneoplastic Autoantibody Eval, CSF [[id number]] Collected: 01/24/23 1133    Order Status: Sent Lab Status: In process Updated: 01/24/23 1211    Specimen: CSF (Spinal Fluid) from Cerebrospinal Fluid          Medications:  Reconciled Home Medications:      Medication List      START taking these medications    levoFLOXacin 750 MG tablet  Commonly known as: LEVAQUIN  Take 1 tablet (750 mg total) by mouth once daily. for 4 days        CONTINUE taking these medications    aspirin 81 MG EC tablet  Commonly known as: ECOTRIN  Take 81 mg by mouth once daily.     ferrous sulfate Tab tablet  Commonly known as: FEOSOL  Take 1 tablet by mouth daily with breakfast. 27mg     ibandronate 150 mg tablet  Commonly known as: BONIVA  Take 1 tablet (150 mg total) by mouth every 30 days.     levothyroxine 75 MCG tablet  Commonly known as: SYNTHROID  TAKE 1 TABLET BY MOUTH EVERY DAY     losartan 25 MG tablet  Commonly known as: COZAAR  Take 1 tablet (25 mg total) by mouth once daily.     omeprazole 20 MG capsule  Commonly known as: PRILOSEC  Take 20 mg by mouth once daily.     simvastatin 40 MG tablet  Commonly known as: ZOCOR  Take 1 tablet (40 mg total) by mouth every evening.      ZyrTEC 10 mg Cap  Generic drug: cetirizine  Take by mouth.            Indwelling Lines/Drains at time of discharge:   Lines/Drains/Airways     None                 Time spent on the discharge of patient: 32 minutes         Marek Escamilla MD  Department of Hospital Medicine  [facility]

## 2023-01-29 NOTE — ASSESSMENT & PLAN NOTE
Patient endorses taking four tablets of Tylenol every four ours for three to five days. Improving.  -GI consulted  -Monitor LFTs, albumin, and INR  -NAC protocol repeated 1/25 ending 1/28  -Poison control aware of case

## 2023-01-29 NOTE — ASSESSMENT & PLAN NOTE
History of EtOH use. Possible Tylenol toxicity. Hepatic steatosis seen on CT. Improving.  -Trend LFTs, albumin and INR  -S/p NAC protocol  -Patient should avoid EtOH as well as Tylenol  -GI consulted; since signed off

## 2023-01-29 NOTE — ASSESSMENT & PLAN NOTE
Flow cytometry unremarkable. CT head unremarkable. MRI brain shows enhancement of skull. LP studies not consistent with meningoencephalitis. Acute posterior headache much improved with steroids.  -PRN ibuprofen  -S/p scheduled Reglan, Bendaryl and steroids per Neurology  -Oncology consulted; follow up in outpatient setting  -Follow up Neurology in outpatient setting

## 2023-01-29 NOTE — PLAN OF CARE
Patient cleared for discharge from case management standpoint.         01/29/23 1051   Final Note   Assessment Type Final Discharge Note   Anticipated Discharge Disposition Home-Health   What phone number can be called within the next 1-3 days to see how you are doing after discharge? [phone]   Hospital Resources/Appts/Education Provided Post-Acute resouces added to AVS;Provided education on problems/symptoms using teachback;Appointments scheduled and added to AVS;Community resources provided;Provided patient/caregiver with written discharge plan information

## 2023-01-29 NOTE — ASSESSMENT & PLAN NOTE
Patient endorses cough. Procalcitonin 143.94 on admission. CXR and CT chest shows consolidation. Blood cultures show Strep pneumo.  -Continue Rocephin; transitioned to Levaquin upon discharge  -ID following  -DANIEL Go

## 2023-01-30 ENCOUNTER — PATIENT OUTREACH (OUTPATIENT)
Dept: ADMINISTRATIVE | Facility: CLINIC | Age: 78
End: 2023-01-30
Payer: MEDICARE

## 2023-01-30 ENCOUNTER — TELEPHONE (OUTPATIENT)
Dept: MEDSURG UNIT | Facility: HOSPITAL | Age: 78
End: 2023-01-30
Payer: MEDICARE

## 2023-01-30 LAB
BACTERIA BLD CULT: NORMAL
BACTERIA BLD CULT: NORMAL
S PNEUM AG UR QL: DETECTED

## 2023-01-30 NOTE — PROGRESS NOTES
C3 nurse attempted to contact Vivien Burton for a TCC post hospital discharge follow up call. No answer. Left voicemail with callback information. The patient does not have a scheduled HOSFU appointment. Message sent to PCP staff for assistance with scheduling visit with patient.

## 2023-01-31 ENCOUNTER — TELEPHONE (OUTPATIENT)
Dept: HEMATOLOGY/ONCOLOGY | Facility: CLINIC | Age: 78
End: 2023-01-31
Payer: MEDICARE

## 2023-01-31 NOTE — PROGRESS NOTES
3rd Attempt made to reach patient for TCC call. Left voicemail please call [phone] leave first name, last name, and  Domi will return your call.

## 2023-01-31 NOTE — TELEPHONE ENCOUNTER
Returned call as requested. Pt's appt rescheduled with Dr Khoobehi in [place].      ----- Message from Anna Valera sent at 1/31/2023  1:25 PM CST -----  Regarding: APPT RESCHED REQUEST  Contact: Patient  Type: Patient Call Back         Who called: Patient         What is the request in detail: calling to get appt on 2/10 moved to [facility]; please advise          Best call back number: [phone]         Additional Information:            Thank You

## 2023-01-31 NOTE — PROGRESS NOTES
2nd Attempt made to reach patient for TCC call. Left voicemail please call [phone] leave first name, last name, and  Domi will return your call.

## 2023-02-02 ENCOUNTER — HOSPITAL ENCOUNTER (OUTPATIENT)
Dept: RADIOLOGY | Facility: HOSPITAL | Age: 78
Discharge: HOME | End: 2023-02-02
Attending: INTERNAL MEDICINE
Payer: MEDICARE

## 2023-02-02 LAB
AMPHIPHYSIN AB TITR CSF: NEGATIVE TITER
CV2 IGG TITR CSF: NEGATIVE TITER
GLIAL NUC TYPE 1 AB TITR CSF: NEGATIVE TITER
HU1 AB TITR CSF IF: NEGATIVE TITER
HU2 AB TITR CSF IF: NEGATIVE TITER
HU3 AB TITR CSF: NEGATIVE TITER
PARANEOPLASTIC INTERPRETATION,CSF: NORMAL
PCA-2 AB TITR CSF: NEGATIVE TITER
PCA-TR AB TITR CSF: NEGATIVE TITER
PNEOE REFLEX TEST ADDED: NORMAL
PURKINJE CELLS AB TITR CSF IF: NEGATIVE TITER

## 2023-02-02 PROCEDURE — 71046 X-RAY EXAM CHEST 2 VIEWS: CPT | Mod: 26,,, | Performed by: RADIOLOGY

## 2023-02-02 PROCEDURE — 71046 XR CHEST PA AND LATERAL: ICD-10-PCS | Mod: 26,,, | Performed by: RADIOLOGY

## 2023-02-02 PROCEDURE — 71046 X-RAY EXAM CHEST 2 VIEWS: CPT | Mod: TC

## 2023-02-05 LAB
BACTERIA CSF CULT: NO GROWTH
GRAM STN SPEC: NORMAL

## 2023-02-06 ENCOUNTER — OUTPATIENT CASE MANAGEMENT (OUTPATIENT)
Dept: ADMINISTRATIVE | Facility: OTHER | Age: 78
End: 2023-02-06
Payer: MEDICARE

## 2023-02-06 NOTE — PROGRESS NOTES
This SW attempted to reach patient/caregiver to provide resource and left a message requesting a return call.         Outpatient Care Management   - Low Risk Patient Assessment    Patient: Vivien Burton  MRN:  [id number]  Date of Service:  2/6/2023  Completed by:  Shi Andres LMSW  Referral Date: 01/25/2023    Reason for Visit   Patient presents with    OPCM SW First Assessment Attempt     02/06/2023    Social Work Assessment - Low/Mod Risk    Case Closure       Brief Summary:  received a r return call from patient. SW completed assessment with patent. Patient resides alone. Patient reports family and or friends check in on her daily. Patient reports being independent with ADL's and IADL's. Patient denied using any assisted devices to ambulate.  Patient reports HH in the home providing care. Patient denied needing assistance with food, shelter, medication or medical. Patient drives herself to and from appointments. Per chart review ACP on file. Patient denied any social needs. SW will close case as no needs.

## 2023-02-07 ENCOUNTER — OFFICE VISIT (OUTPATIENT)
Dept: HEMATOLOGY/ONCOLOGY | Facility: CLINIC | Age: 78
End: 2023-02-07
Payer: MEDICARE

## 2023-02-07 VITALS
BODY MASS INDEX: 20.42 KG/M2 | HEART RATE: 73 BPM | TEMPERATURE: 100 F | WEIGHT: 104 LBS | SYSTOLIC BLOOD PRESSURE: 131 MMHG | HEIGHT: 60 IN | DIASTOLIC BLOOD PRESSURE: 62 MMHG

## 2023-02-07 DIAGNOSIS — R93.89 ABNORMAL MRI: Primary | ICD-10-CM

## 2023-02-07 PROCEDURE — 99999 PR PBB SHADOW E&M-EST. PATIENT-LVL IV: ICD-10-PCS | Mod: PBBFAC,,, | Performed by: INTERNAL MEDICINE

## 2023-02-07 PROCEDURE — 99213 PR OFFICE/OUTPT VISIT, EST, LEVL III, 20-29 MIN: ICD-10-PCS | Mod: S$PBB,,, | Performed by: INTERNAL MEDICINE

## 2023-02-07 PROCEDURE — 99214 OFFICE O/P EST MOD 30 MIN: CPT | Mod: PBBFAC | Performed by: INTERNAL MEDICINE

## 2023-02-07 PROCEDURE — 99213 OFFICE O/P EST LOW 20 MIN: CPT | Mod: S$PBB,,, | Performed by: INTERNAL MEDICINE

## 2023-02-07 PROCEDURE — 99999 PR PBB SHADOW E&M-EST. PATIENT-LVL IV: CPT | Mod: PBBFAC,,, | Performed by: INTERNAL MEDICINE

## 2023-02-07 NOTE — PROGRESS NOTES
Service Date:  2/7/23    Chief Complaint:  Hospital follow-up for abnormal MRI     Vivien Burton is a 77 y.o. female recently admitted in the hospital for headache had MRI of the head which showed an abnormal bone signal which was concerning for a hematologic malignancy, although nonspecific.  My colleague Dr. Laureano saw her in the hospital and workup for myeloma with SPEP and light chains were negative.  Workup for leukemia with a flow cytometry was negative as well.  During hospitalization patient had leukocytosis which also had improved by the time of discharge, likely reactive in nature.  She denies any unintentional weight loss or night sweats.  She is doing well.  She has a remote history of breast cancer about 20 years ago.  She has no complaints me.    Review of Systems   Constitutional: Negative.    HENT: Negative.     Eyes: Negative.    Respiratory: Negative.     Cardiovascular: Negative.    Gastrointestinal: Negative.    Endocrine: Negative.    Genitourinary: Negative.    Musculoskeletal: Negative.    Integumentary:  Negative.   Neurological: Negative.    Hematological: Negative.    Psychiatric/Behavioral: Negative.        Current Outpatient Medications   Medication Instructions    aspirin (ECOTRIN) 81 mg, Oral, Daily    cetirizine (ZYRTEC) 10 mg Cap Oral    ferrous sulfate (FEOSOL) Tab tablet 1 tablet, Oral, With breakfast, 27mg    ibandronate (BONIVA) 150 mg, Oral, Every 30 days    levothyroxine (SYNTHROID) 75 MCG tablet TAKE 1 TABLET BY MOUTH EVERY DAY    lisinopriL (PRINIVIL,ZESTRIL) 5 mg, Oral, Daily    losartan (COZAAR) 25 mg, Oral, Daily    omeprazole (PRILOSEC) 20 mg, Oral, Daily    simvastatin (ZOCOR) 40 mg, Oral, Nightly        Past Medical History:   Diagnosis Date    Acute on chronic combined systolic and diastolic heart failure 2/23/2022    Anemia     Basal cell carcinoma 1999    Breast cancer     Breast cancer     Cancer     CHF (congestive heart failure)     Coronary artery disease      GERD (gastroesophageal reflux disease)     Hyperlipidemia     Hypertension     Hypothyroidism     Nonrheumatic aortic (valve) stenosis 2018    Osteoporosis 2019    S/P TAVR (transcatheter aortic valve replacement) 2022    Squamous cell carcinoma of skin 2018    Thyroid disease         Past Surgical History:   Procedure Laterality Date    BREAST SURGERY      CARDIAC CATH COSURGEON N/A 2022    Procedure: Cardiac Cath Cosurgeon;  Surgeon: Dontae Wooten MD;  Location: [facility] CATH LAB;  Service: Cardiovascular;  Laterality: N/A;     SECTION, CLASSIC      COLONOSCOPY N/A 2018    Procedure: COLONOSCOPY;  Surgeon: Precious Castorena MD;  Location: [facility] ENDO;  Service: Endoscopy;  Laterality: N/A;    HYSTERECTOMY      LEFT HEART CATHETERIZATION Left 2022    Procedure: Left heart cath;  Surgeon: Dontae Johns MD;  Location: [facility] CATH LAB;  Service: Cardiology;  Laterality: Left;    LEFT HEART CATHETERIZATION Left 2022    Procedure: Left heart cath;  Surgeon: Dontae Johns MD;  Location: [facility] CATH LAB;  Service: Cardiology;  Laterality: Left;    MASTECTOMY Right 2000    right breast      TONSILLECTOMY, ADENOIDECTOMY      TRANSCATHETER AORTIC VALVE REPLACEMENT (TAVR) N/A 2022    Procedure: REPLACEMENT, AORTIC VALVE, TRANSCATHETER (TAVR);  Surgeon: Dontae Johns MD;  Location: [facility] CATH LAB;  Service: Cardiology;  Laterality: N/A;        Family History   Problem Relation Age of Onset    Cancer Sister     Liver cancer Sister     Melanoma Sister     Cancer Brother     Breast cancer Neg Hx     Psoriasis Neg Hx     Lupus Neg Hx     Eczema Neg Hx        Social History     Tobacco Use    Smoking status: Former     Packs/day: 1.00     Types: Cigarettes     Quit date: 2000     Years since quittin.9    Smokeless tobacco: Never    Tobacco comments:     quit 20 years ago   Substance Use Topics    Alcohol use: Yes     Alcohol/week: 2.0 standard drinks     Types:  2 Shots of liquor per week     Comment: per pt 2 burbon drinks per night however none in last month    Drug use: No         Vitals:    02/07/23 1058   BP: 131/62   Pulse: 73   Temp: 99.9 °F (37.7 °C)        Physical Exam:  /62   Pulse 73   Temp 99.9 °F (37.7 °C) (Temporal)   Ht 5' (1.524 m)   Wt 47.2 kg (104 lb)   BMI 20.31 kg/m²     Physical Exam  Constitutional:       Appearance: Normal appearance.   HENT:      Head: Normocephalic and atraumatic.      Nose: Nose normal.      Mouth/Throat:      Mouth: Mucous membranes are moist.      Pharynx: Oropharynx is clear.   Eyes:      Conjunctiva/sclera: Conjunctivae normal.   Cardiovascular:      Rate and Rhythm: Normal rate and regular rhythm.      Heart sounds: Normal heart sounds.   Pulmonary:      Effort: Pulmonary effort is normal.      Breath sounds: Normal breath sounds.   Abdominal:      General: Abdomen is flat. Bowel sounds are normal.      Palpations: Abdomen is soft.   Musculoskeletal:         General: Normal range of motion.      Cervical back: Normal range of motion and neck supple.   Skin:     General: Skin is warm and dry.   Neurological:      General: No focal deficit present.      Mental Status: She is alert and oriented to person, place, and time. Mental status is at baseline.   Psychiatric:         Mood and Affect: Mood normal.        Labs:  Lab Results   Component Value Date    WBC 12.8 (H) 02/01/2023    RBC 3.49 (L) 02/01/2023    HGB 10.4 (L) 02/01/2023    HCT 30.4 (L) 02/01/2023    MCV 87.1 02/01/2023    MCH 29.8 02/01/2023    MCHC 34.2 02/01/2023    RDW 13.8 02/01/2023     02/01/2023    MPV 9.1 02/01/2023    GRAN 76.0 (H) 01/29/2023    LYMPH 2,752 02/01/2023    LYMPH 21.5 02/01/2023    MONO 986 (H) 02/01/2023    MONO 7.7 02/01/2023     02/01/2023    BASO 64 02/01/2023    EOSINOPHIL 3.4 02/01/2023    BASOPHIL 0.5 02/01/2023     Sodium   Date Value Ref Range Status   02/01/2023 133 (L) 135 - 146 mmol/L Final     Potassium    Date Value Ref Range Status   02/01/2023 4.5 3.5 - 5.3 mmol/L Final     Chloride   Date Value Ref Range Status   02/01/2023 101 98 - 110 mmol/L Final     CO2   Date Value Ref Range Status   02/01/2023 26 20 - 32 mmol/L Final     Glucose   Date Value Ref Range Status   02/01/2023 96 65 - 139 mg/dL Final     Comment:               Non-fasting reference interval          BUN   Date Value Ref Range Status   02/01/2023 17 7 - 25 mg/dL Final     Creatinine   Date Value Ref Range Status   02/01/2023 0.64 0.60 - 1.00 mg/dL Final     Calcium   Date Value Ref Range Status   02/01/2023 8.9 8.6 - 10.4 mg/dL Final     Total Protein   Date Value Ref Range Status   02/01/2023 5.2 (L) 6.1 - 8.1 g/dL Final     Albumin   Date Value Ref Range Status   02/01/2023 3.5 (L) 3.6 - 5.1 g/dL Final     Total Bilirubin   Date Value Ref Range Status   02/01/2023 0.6 0.2 - 1.2 mg/dL Final     Alkaline Phosphatase   Date Value Ref Range Status   01/29/2023 159 (H) 55 - 135 U/L Final     AST   Date Value Ref Range Status   02/01/2023 13 10 - 35 U/L Final     ALT   Date Value Ref Range Status   02/01/2023 170 (H) 6 - 29 U/L Final     Anion Gap   Date Value Ref Range Status   01/29/2023 11 8 - 16 mmol/L Final     eGFR if    Date Value Ref Range Status   04/13/2022 99 > OR = 60 mL/min/1.73m2 Final     eGFR if non    Date Value Ref Range Status   04/13/2022 86 > OR = 60 mL/min/1.73m2 Final       A/P:    Abnormal bone MRI  -abnormal signal seen on MRI but workup with flow cytometry, SPEP, and light chains all been negative.  Leukocytosis has improved.  I do not believe that this abnormality is a sign of any type of hematologic malignancy.  I explained this to her and she agrees with me.  She can return to clinic p.r.n..  I recommend following up on her CBCs which she wants to do with her primary care physician.      Aurash Khoobehi, MD  Hematology and Oncology

## 2023-02-27 NOTE — PROGRESS NOTES
Subjective:    Patient ID:  Vivien Burton is a 78 y.o. female who presents for follow-up of No chief complaint on file.      Referring Physician: Dr Mccoy    HPI  Vivien Burton is a 78 y.o. female with a past medical history of anxiety, HTN, HLD, breast cancer, GERD, and hypothyroidism who is now s/p TAVR. She was seen by Dr Mccoy reporting SOB. She had labs done which showed a significantly elevated BNP of 2000. TTE done showed decreased EF at 21% as well as low flow aortic stenosis. Labs also showed WBC count elevated. She is a former smoker.      TAVR Hospital Course:  Vivien Burton was admitted and underwent successful placement of a 26 mm Evolut TAVR via RTF access under MAC sedation on 2/22/22. Please see full cath report for details. A transthoracic echo was performed immediately post procedure which showed no paravalvular leak. An aortic valve maximum velocity through the aortic valve of 1.0 m/s. She was transported to the CCU in stable condition with a TVP and arterial line in place. She remained hemodynamically stable overnight. EKG remained stable post TAVR therefore no EP consult was warranted. This morning, she ambulated without difficulty and was eager to go home. It was felt she was stable for discharge and will go home on ASA/Plavix for antithrombotic therapy post TAVR.     Interval History  She is doing very well today with no complaints. She was admitted on the [facility] with pneumonia which she has recovered from. Denies SOB, CLIFFORD, LE swelling, weight gain, and orthopnea. She continues to follow with Dr Mccoy.       NYHA: I CCS: 0    Review of Systems   Constitutional: Negative for chills and fever.   HENT:  Negative for sore throat.    Eyes:  Negative for blurred vision.   Cardiovascular:  Negative for chest pain, claudication, cyanosis, dyspnea on exertion, irregular heartbeat, leg swelling, near-syncope, orthopnea, palpitations, paroxysmal nocturnal dyspnea and syncope.    Respiratory:  Negative for cough and sputum production.    Hematologic/Lymphatic: Does not bruise/bleed easily.   Skin:  Negative for itching, rash and suspicious lesions.   Musculoskeletal:  Negative for falls.   Gastrointestinal:  Negative for abdominal pain and change in bowel habit.   Genitourinary:  Negative for dysuria.   Neurological:  Negative for disturbances in coordination, dizziness and loss of balance.   Psychiatric/Behavioral:  Negative for altered mental status.         Past Medical History:   Diagnosis Date    Acute on chronic combined systolic and diastolic heart failure 2/23/2022    Anemia     Basal cell carcinoma 1999    Breast cancer     Breast cancer     Cancer     CHF (congestive heart failure)     Coronary artery disease     GERD (gastroesophageal reflux disease)     Hyperlipidemia     Hypertension     Hypothyroidism     Nonrheumatic aortic (valve) stenosis 6/5/2018    Osteoporosis 02/05/2019    S/P TAVR (transcatheter aortic valve replacement) 2/22/2022    Squamous cell carcinoma of skin 2018    Thyroid disease        Current Outpatient Medications:     aspirin (ECOTRIN) 81 MG EC tablet, Take 81 mg by mouth once daily., Disp: , Rfl:     cetirizine (ZYRTEC) 10 mg Cap, Take by mouth., Disp: , Rfl:     ferrous sulfate (FEOSOL) Tab tablet, Take 1 tablet by mouth daily with breakfast. 27mg, Disp: , Rfl:     ibandronate (BONIVA) 150 mg tablet, Take 1 tablet (150 mg total) by mouth every 30 days., Disp: 3 tablet, Rfl: 3    levothyroxine (SYNTHROID) 75 MCG tablet, TAKE 1 TABLET BY MOUTH EVERY DAY, Disp: 90 tablet, Rfl: 4    lisinopriL (PRINIVIL,ZESTRIL) 5 MG tablet, Take 5 mg by mouth once daily., Disp: , Rfl:     losartan (COZAAR) 25 MG tablet, Take 1 tablet (25 mg total) by mouth once daily., Disp: 90 tablet, Rfl: 3    omeprazole (PRILOSEC) 20 MG capsule, Take 20 mg by mouth once daily., Disp: , Rfl:     simvastatin (ZOCOR) 40 MG tablet, Take 1 tablet (40 mg total) by mouth every evening., Disp:  90 tablet, Rfl: 3    Objective:    Physical Exam  Vitals reviewed.   Constitutional:       General: She is not in acute distress.     Appearance: She is well-developed. She is not diaphoretic.   HENT:      Head: Normocephalic and atraumatic.   Neck:      Vascular: No JVD.   Cardiovascular:      Rate and Rhythm: Normal rate and regular rhythm.      Pulses: Intact distal pulses.      Heart sounds: No murmur heard.  Pulmonary:      Effort: Pulmonary effort is normal. No respiratory distress.      Breath sounds: Normal breath sounds.   Musculoskeletal:      Cervical back: Normal range of motion.      Right lower leg: No edema.      Left lower leg: No edema.   Skin:     General: Skin is warm and dry.   Neurological:      Mental Status: She is alert and oriented to person, place, and time.           Vitals:    02/28/23 1352   BP: (!) 146/64   BP Location: Left arm   Patient Position: Sitting   BP Method: Large (Automatic)   Pulse: 77   SpO2: 98%   Weight: 48.9 kg (107 lb 12.9 oz)   Height: 5' (1.524 m)       Body mass index is 21.05 kg/m².    Test(s) Reviewed  I have reviewed the following in detail:  [] Stress test   [] Angiography   [] Echocardiogram   [] Labs:   [] Other:     TTE 2/28/23  The left ventricle is mildly enlarged with normal systolic function. The estimated ejection fraction is 60%.  Normal right ventricular size with normal right ventricular systolic function.  Indeterminate left ventricular diastolic function.  There is a 26 mm Evolut Pro transcutaneously-placed aortic bioprosthesis present. There is no aortic insufficiency present. Prosthetic aortic valve is normal.  The aortic valve mean gradient is 4 mmHg with a dimensionless index of 0.53.  Normal central venous pressure (3 mmHg).       Assessment:   S/P TAVR (transcatheter aortic valve replacement), 26 mm  Successful right transfemoral 26 mm Evolut Pro TAVR. No PVL reviewed on TTE today.     Coronary artery disease  S/p LAD PCI with [facility] on 1/7/22.  DAPT for 1 year. Continue statin.     Chronic combined systolic and diastolic heart failure, HFimpEF  Chronic. Controlled. Follow up with Cardiology/PCP.    Aortic atherosclerosis  See imaging. Follow up with Cardiology.       Plan:       Follow up with [facility] as needed.   ASA indefinitely.   SBE prophylaxis for life.           Marley Rudolph PA-C  Valve and Structural Heart Disease  [facility]

## 2023-02-28 ENCOUNTER — HOSPITAL ENCOUNTER (OUTPATIENT)
Dept: CARDIOLOGY | Facility: HOSPITAL | Age: 78
Discharge: HOME | End: 2023-02-28
Attending: PHYSICIAN ASSISTANT
Payer: MEDICARE

## 2023-02-28 ENCOUNTER — OFFICE VISIT (OUTPATIENT)
Dept: CARDIOLOGY | Facility: CLINIC | Age: 78
End: 2023-02-28
Payer: MEDICARE

## 2023-02-28 VITALS
BODY MASS INDEX: 20.62 KG/M2 | SYSTOLIC BLOOD PRESSURE: 130 MMHG | WEIGHT: 107.81 LBS | BODY MASS INDEX: 21.17 KG/M2 | WEIGHT: 105 LBS | HEART RATE: 77 BPM | DIASTOLIC BLOOD PRESSURE: 64 MMHG | HEIGHT: 60 IN | HEART RATE: 64 BPM | DIASTOLIC BLOOD PRESSURE: 70 MMHG | SYSTOLIC BLOOD PRESSURE: 146 MMHG | OXYGEN SATURATION: 98 % | HEIGHT: 60 IN

## 2023-02-28 DIAGNOSIS — Z95.2 S/P TAVR (TRANSCATHETER AORTIC VALVE REPLACEMENT): ICD-10-CM

## 2023-02-28 DIAGNOSIS — I70.0 AORTIC ATHEROSCLEROSIS: ICD-10-CM

## 2023-02-28 DIAGNOSIS — I50.42 CHRONIC COMBINED SYSTOLIC AND DIASTOLIC HEART FAILURE: ICD-10-CM

## 2023-02-28 DIAGNOSIS — I25.10 CORONARY ARTERY DISEASE, UNSPECIFIED VESSEL OR LESION TYPE, UNSPECIFIED WHETHER ANGINA PRESENT, UNSPECIFIED WHETHER NATIVE OR TRANSPLANTED HEART: ICD-10-CM

## 2023-02-28 LAB
ASCENDING AORTA: 2.9 CM
AV INDEX (PROSTH): 0.53
AV MEAN GRADIENT: 4 MMHG
AV PEAK GRADIENT: 7 MMHG
AV VALVE AREA: 1.07 CM2
AV VELOCITY RATIO: 0.46
BSA FOR ECHO PROCEDURE: 1.42 M2
CV ECHO LV RWT: 0.32 CM
DOP CALC AO PEAK VEL: 1.36 M/S
DOP CALC AO VTI: 26.72 CM
DOP CALC LVOT AREA: 2 CM2
DOP CALC LVOT DIAMETER: 1.6 CM
DOP CALC LVOT PEAK VEL: 0.63 M/S
DOP CALC LVOT STROKE VOLUME: 28.68 CM3
DOP CALCLVOT PEAK VEL VTI: 14.27 CM
E WAVE DECELERATION TIME: 254.06 MSEC
E/A RATIO: 0.75
E/E' RATIO: 15.2 M/S
ECHO LV POSTERIOR WALL: 0.73 CM (ref 0.6–1.1)
EJECTION FRACTION: 60 %
FRACTIONAL SHORTENING: 32 % (ref 28–44)
INTERVENTRICULAR SEPTUM: 0.95 CM (ref 0.6–1.1)
LA MAJOR: 3.95 CM
LA MINOR: 3.7 CM
LA WIDTH: 3.41 CM
LEFT ATRIUM SIZE: 3.63 CM
LEFT ATRIUM VOLUME INDEX MOD: 28.5 ML/M2
LEFT ATRIUM VOLUME INDEX: 28.3 ML/M2
LEFT ATRIUM VOLUME MOD: 40.46 CM3
LEFT ATRIUM VOLUME: 40.2 CM3
LEFT INTERNAL DIMENSION IN SYSTOLE: 3.1 CM (ref 2.1–4)
LEFT VENTRICLE DIASTOLIC VOLUME INDEX: 66.6 ML/M2
LEFT VENTRICLE DIASTOLIC VOLUME: 94.57 ML
LEFT VENTRICLE MASS INDEX: 87 G/M2
LEFT VENTRICLE SYSTOLIC VOLUME INDEX: 26.8 ML/M2
LEFT VENTRICLE SYSTOLIC VOLUME: 38.05 ML
LEFT VENTRICULAR INTERNAL DIMENSION IN DIASTOLE: 4.54 CM (ref 3.5–6)
LEFT VENTRICULAR MASS: 122.96 G
LV LATERAL E/E' RATIO: 15.2 M/S
LV SEPTAL E/E' RATIO: 15.2 M/S
MV A" WAVE DURATION": 6.57 MSEC
MV PEAK A VEL: 1.02 M/S
MV PEAK E VEL: 0.76 M/S
MV STENOSIS PRESSURE HALF TIME: 73.68 MS
MV VALVE AREA P 1/2 METHOD: 2.99 CM2
PULM VEIN S/D RATIO: 1.07
PV PEAK D VEL: 0.28 M/S
PV PEAK S VEL: 0.3 M/S
RA MAJOR: 3.72 CM
RA PRESSURE: 3 MMHG
RA WIDTH: 2.56 CM
RIGHT VENTRICULAR END-DIASTOLIC DIMENSION: 3.28 CM
RV TISSUE DOPPLER FREE WALL SYSTOLIC VELOCITY 1 (APICAL 4 CHAMBER VIEW): 10.75 CM/S
SINUS: 2.04 CM
STJ: 1.97 CM
TDI LATERAL: 0.05 M/S
TDI SEPTAL: 0.05 M/S
TDI: 0.05 M/S
TRICUSPID ANNULAR PLANE SYSTOLIC EXCURSION: 1.87 CM

## 2023-02-28 PROCEDURE — 99213 OFFICE O/P EST LOW 20 MIN: CPT | Mod: PBBFAC,25 | Performed by: PHYSICIAN ASSISTANT

## 2023-02-28 PROCEDURE — 93306 TTE W/DOPPLER COMPLETE: CPT | Mod: 26,,, | Performed by: INTERNAL MEDICINE

## 2023-02-28 PROCEDURE — 93306 TTE W/DOPPLER COMPLETE: CPT

## 2023-02-28 PROCEDURE — 99999 PR PBB SHADOW E&M-EST. PATIENT-LVL III: ICD-10-PCS | Mod: PBBFAC,,, | Performed by: PHYSICIAN ASSISTANT

## 2023-02-28 PROCEDURE — 99999 PR PBB SHADOW E&M-EST. PATIENT-LVL III: CPT | Mod: PBBFAC,,, | Performed by: PHYSICIAN ASSISTANT

## 2023-02-28 PROCEDURE — 99214 PR OFFICE/OUTPT VISIT, EST, LEVL IV, 30-39 MIN: ICD-10-PCS | Mod: S$PBB,,, | Performed by: PHYSICIAN ASSISTANT

## 2023-02-28 PROCEDURE — 93306 ECHO (CUPID ONLY): ICD-10-PCS | Mod: 26,,, | Performed by: INTERNAL MEDICINE

## 2023-02-28 PROCEDURE — 99214 OFFICE O/P EST MOD 30 MIN: CPT | Mod: S$PBB,,, | Performed by: PHYSICIAN ASSISTANT

## 2023-03-20 ENCOUNTER — TELEPHONE (OUTPATIENT)
Dept: GASTROENTEROLOGY | Facility: CLINIC | Age: 78
End: 2023-03-20
Payer: MEDICARE

## 2023-03-20 NOTE — TELEPHONE ENCOUNTER
----- Message from Mal Abrams MD sent at 3/20/2023  2:24 PM CDT -----  Regarding: FW: My MIL   This is Brandon mother in law.  She needs colonoscopy for history of polyps, family history of colon cancer.  Please get her scheduled.  I think Dr. Burton wants to try for a Friday at some point.          ----- Message -----  From: Dottie Burton MD  Sent: 3/20/2023   9:30 AM CDT  To: Mal Abrams MD  Subject: My MIL

## 2023-03-21 ENCOUNTER — TELEPHONE (OUTPATIENT)
Dept: GASTROENTEROLOGY | Facility: CLINIC | Age: 78
End: 2023-03-21
Payer: MEDICARE

## 2023-03-21 DIAGNOSIS — Z80.0 FAMILY HISTORY OF COLON CANCER: Primary | ICD-10-CM

## 2023-03-21 DIAGNOSIS — Z86.010 HISTORY OF COLON POLYPS: ICD-10-CM

## 2023-03-31 ENCOUNTER — ANESTHESIA (OUTPATIENT)
Dept: ENDOSCOPY | Facility: HOSPITAL | Age: 78
End: 2023-03-31
Payer: MEDICARE

## 2023-03-31 ENCOUNTER — ANESTHESIA EVENT (OUTPATIENT)
Dept: ENDOSCOPY | Facility: HOSPITAL | Age: 78
End: 2023-03-31
Payer: MEDICARE

## 2023-03-31 ENCOUNTER — HOSPITAL ENCOUNTER (OUTPATIENT)
Facility: HOSPITAL | Age: 78
Discharge: HOME | End: 2023-03-31
Attending: INTERNAL MEDICINE | Admitting: INTERNAL MEDICINE
Payer: MEDICARE

## 2023-03-31 VITALS
RESPIRATION RATE: 20 BRPM | OXYGEN SATURATION: 100 % | TEMPERATURE: 97 F | DIASTOLIC BLOOD PRESSURE: 70 MMHG | BODY MASS INDEX: 21.01 KG/M2 | HEIGHT: 60 IN | HEART RATE: 64 BPM | SYSTOLIC BLOOD PRESSURE: 165 MMHG | WEIGHT: 107 LBS

## 2023-03-31 DIAGNOSIS — K63.5 POLYP OF COLON, UNSPECIFIED PART OF COLON, UNSPECIFIED TYPE: Primary | ICD-10-CM

## 2023-03-31 DIAGNOSIS — Z80.0 FAMILY HISTORY OF COLON CANCER: ICD-10-CM

## 2023-03-31 DIAGNOSIS — K64.8 INTERNAL HEMORRHOIDS: ICD-10-CM

## 2023-03-31 DIAGNOSIS — K57.90 DIVERTICULOSIS: ICD-10-CM

## 2023-03-31 DIAGNOSIS — Z86.010 HISTORY OF COLON POLYPS: ICD-10-CM

## 2023-03-31 PROCEDURE — 88305 TISSUE EXAM BY PATHOLOGIST: ICD-10-PCS | Mod: 26,,, | Performed by: PATHOLOGY

## 2023-03-31 PROCEDURE — D9220A PRA ANESTHESIA: ICD-10-PCS | Mod: PT,CRNA,, | Performed by: STUDENT IN AN ORGANIZED HEALTH CARE EDUCATION/TRAINING PROGRAM

## 2023-03-31 PROCEDURE — 27201089 HC SNARE, DISP (ANY): Performed by: INTERNAL MEDICINE

## 2023-03-31 PROCEDURE — 88305 TISSUE EXAM BY PATHOLOGIST: CPT | Mod: 26,,, | Performed by: PATHOLOGY

## 2023-03-31 PROCEDURE — D9220A PRA ANESTHESIA: Mod: PT,CRNA,, | Performed by: STUDENT IN AN ORGANIZED HEALTH CARE EDUCATION/TRAINING PROGRAM

## 2023-03-31 PROCEDURE — D9220A PRA ANESTHESIA: ICD-10-PCS | Mod: PT,ANES,, | Performed by: ANESTHESIOLOGY

## 2023-03-31 PROCEDURE — 45385 COLONOSCOPY W/LESION REMOVAL: CPT | Mod: PT | Performed by: INTERNAL MEDICINE

## 2023-03-31 PROCEDURE — 63600175 PHARM REV CODE 636 W HCPCS: Performed by: STUDENT IN AN ORGANIZED HEALTH CARE EDUCATION/TRAINING PROGRAM

## 2023-03-31 PROCEDURE — 45385 COLONOSCOPY W/LESION REMOVAL: CPT | Mod: PT,,, | Performed by: INTERNAL MEDICINE

## 2023-03-31 PROCEDURE — 25000003 PHARM REV CODE 250: Performed by: STUDENT IN AN ORGANIZED HEALTH CARE EDUCATION/TRAINING PROGRAM

## 2023-03-31 PROCEDURE — 25000003 PHARM REV CODE 250: Performed by: INTERNAL MEDICINE

## 2023-03-31 PROCEDURE — 37000008 HC ANESTHESIA 1ST 15 MINUTES: Performed by: INTERNAL MEDICINE

## 2023-03-31 PROCEDURE — 45385 PR COLONOSCOPY,REMV LESN,SNARE: ICD-10-PCS | Mod: PT,,, | Performed by: INTERNAL MEDICINE

## 2023-03-31 PROCEDURE — D9220A PRA ANESTHESIA: Mod: PT,ANES,, | Performed by: ANESTHESIOLOGY

## 2023-03-31 PROCEDURE — 88305 TISSUE EXAM BY PATHOLOGIST: CPT | Mod: 59 | Performed by: PATHOLOGY

## 2023-03-31 PROCEDURE — 37000009 HC ANESTHESIA EA ADD 15 MINS: Performed by: INTERNAL MEDICINE

## 2023-03-31 RX ORDER — PROPOFOL 10 MG/ML
VIAL (ML) INTRAVENOUS
Status: DISCONTINUED | OUTPATIENT
Start: 2023-03-31 | End: 2023-03-31

## 2023-03-31 RX ORDER — SODIUM CHLORIDE 9 MG/ML
INJECTION, SOLUTION INTRAVENOUS CONTINUOUS
Status: DISCONTINUED | OUTPATIENT
Start: 2023-03-31 | End: 2023-03-31 | Stop reason: HOSPADM

## 2023-03-31 RX ORDER — LIDOCAINE HYDROCHLORIDE 20 MG/ML
INJECTION INTRAVENOUS
Status: DISCONTINUED | OUTPATIENT
Start: 2023-03-31 | End: 2023-03-31

## 2023-03-31 RX ADMIN — SODIUM CHLORIDE: 9 INJECTION, SOLUTION INTRAVENOUS at 10:03

## 2023-03-31 RX ADMIN — PROPOFOL 20 MG: 10 INJECTION, EMULSION INTRAVENOUS at 10:03

## 2023-03-31 RX ADMIN — LIDOCAINE HYDROCHLORIDE 40 MG: 20 INJECTION, SOLUTION INTRAVENOUS at 10:03

## 2023-03-31 RX ADMIN — PROPOFOL 30 MG: 10 INJECTION, EMULSION INTRAVENOUS at 10:03

## 2023-03-31 RX ADMIN — PROPOFOL 50 MG: 10 INJECTION, EMULSION INTRAVENOUS at 10:03

## 2023-03-31 NOTE — ANESTHESIA PREPROCEDURE EVALUATION
03/31/2023  Vivien Burton is a 78 y.o., female.      Pre-op Assessment    I have reviewed the Patient Summary Reports.     I have reviewed the Nursing Notes. I have reviewed the NPO Status.   I have reviewed the Medications.     Review of Systems  Anesthesia Hx:  No problems with previous Anesthesia    Social:  Former Smoker    Cardiovascular:   Hypertension Denies MI. CAD    Denies CABG/stent.   Denies Angina. CHF S/P TAVR (transcatheter aortic valve replacement)    ? The left ventricle is mildly enlarged with normal systolic function. The estimated ejection fraction is 60%.  ? Normal right ventricular size with normal right ventricular systolic function.  ? Indeterminate left ventricular diastolic function.  ? There is a 26 mm Evolut Pro transcutaneously-placed aortic bioprosthesis present. There is no aortic insufficiency present. Prosthetic aortic valve is normal.  ? The aortic valve mean gradient is 4 mmHg with a dimensionless index of 0.53.  ? Normal central venous pressure (3 mmHg).   Pulmonary:   Pneumonia Denies COPD.  Denies Asthma.  Denies Recent URI.    Renal/:   Denies Chronic Renal Disease.     Hepatic/GI:   GERD Denies Liver Disease.    Neurological:   Denies TIA. Denies CVA. Headaches Denies Seizures.    Endocrine:   Denies Diabetes. Hypothyroidism    Psych:   Psychiatric History          Physical Exam  General: Well nourished, Cooperative, Alert and Oriented    Airway:  Mallampati: II / II  Mouth Opening: Normal  TM Distance: 4 - 6 cm  Tongue: Normal    Dental:  Intact    Chest/Lungs:  Clear to auscultation, Normal Respiratory Rate    Heart:  Rate: Normal  Rhythm: Regular Rhythm  Sounds: Normal        Anesthesia Plan  Type of Anesthesia, risks & benefits discussed:    Anesthesia Type: Gen Natural Airway  Intra-op Monitoring Plan: Standard ASA Monitors  Induction:  IV  Informed  Consent: Informed consent signed with the Patient and all parties understand the risks and agree with anesthesia plan.  All questions answered.   ASA Score: 3    Ready For Surgery From Anesthesia Perspective.     .

## 2023-03-31 NOTE — TRANSFER OF CARE
Anesthesia Transfer of Care Note    Patient: Vivien Burton    Procedure(s) Performed: Procedure(s) (LRB):  COLONOSCOPY (N/A)    Patient location: GI    Anesthesia Type: general    Transport from OR: Transported from OR on room air with adequate spontaneous ventilation    Post pain: adequate analgesia    Post assessment: no apparent anesthetic complications    Post vital signs: stable    Level of consciousness: awake and alert    Nausea/Vomiting: no nausea/vomiting    Complications: none    Transfer of care protocol was followed      Last vitals:   Visit Vitals  BP (!) 178/72 (BP Location: Left arm, Patient Position: Lying)   Pulse 70   Temp 36.1 °C (97 °F) (Skin)   Resp 15   Ht 5' (1.524 m)   Wt 48.5 kg (107 lb)   SpO2 99%   Breastfeeding No   BMI 20.90 kg/m²

## 2023-03-31 NOTE — H&P
CC: History of colon polyps - last scope 2018    78 year old female with above. States that symptoms are absent, no alleviating/exacerbating factors. Positive family history of colorectal CA. Positive personal history of polyps. No bleeding or weight loss.     ROS:  No headache, no fever/chills, no chest pain/SOB, no nausea/vomiting/diarrhea/constipation/GI bleeding/abdominal pain, no dysuria/hematuria.    VSSAF   Exam:   Alert and oriented x 3; no apparent distress   PERRLA, sclera anicteric  CV: Regular rate/rhythm, normal PMI   Lungs: Clear bilaterally with no wheeze/rales   Abdomen: Soft, NT/ND, normal bowel sounds   Ext: No cyanosis, clubbing     Impression:   As above    Plan:   Proceed with endoscopy. Further recs to follow.

## 2023-03-31 NOTE — PROVATION PATIENT INSTRUCTIONS
Discharge Summary/Instructions after an Endoscopic Procedure  Patient Name: Vivien Burton  Patient MRN: [id number]  Patient YOB: 1945 Friday, March 31, 2023  Mal Negron MD  Dear patient,  As a result of recent federal legislation (The Federal Cures Act), you may   receive lab or pathology results from your procedure in your [facility]   account before your physician is able to contact you. Your physician or   their representative will relay the results to you with their   recommendations at their soonest availability.  Thank you,  RESTRICTIONS:  During your procedure today, you received medications for sedation.  These   medications may affect your judgment, balance and coordination.  Therefore,   for 24 hours, you have the following restrictions:   - DO NOT drive a car, operate machinery, make legal/financial decisions,   sign important papers or drink alcohol.    ACTIVITY:  Today: no heavy lifting, straining or running due to procedural   sedation/anesthesia.  The following day: return to full activity including work.  DIET:  Eat and drink normally unless instructed otherwise.     TREATMENT FOR COMMON SIDE EFFECTS:  - Mild abdominal pain, nausea, belching, bloating or excessive gas:  rest,   eat lightly and use a heating pad.  - Sore Throat: treat with throat lozenges and/or gargle with warm salt   water.  - Because air was used during the procedure, expelling large amounts of air   from your rectum or belching is normal.  - If a bowel prep was taken, you may not have a bowel movement for 1-3 days.    This is normal.  SYMPTOMS TO WATCH FOR AND REPORT TO YOUR PHYSICIAN:  1. Abdominal pain or bloating, other than gas cramps.  2. Chest pain.  3. Back pain.  4. Signs of infection such as: chills or fever occurring within 24 hours   after the procedure.  5. Rectal bleeding, which would show as bright red, maroon, or black stools.   (A tablespoon of blood from the rectum is not serious, especially  if   hemorrhoids are present.)  6. Vomiting.  7. Weakness or dizziness.  GO DIRECTLY TO THE NEAREST EMERGENCY ROOM IF YOU HAVE ANY OF THE FOLLOWING:      Difficulty breathing              Chills and/or fever over 101 F   Persistent vomiting and/or vomiting blood   Severe abdominal pain   Severe chest pain   Black, tarry stools   Bleeding- more than one tablespoon   Any other symptom or condition that you feel may need urgent attention  Your doctor recommends these additional instructions:  If any biopsies were taken, your doctors clinic will contact you in 1 to 2   weeks with any results.  - Patient has a contact number available for emergencies.  The signs and   symptoms of potential delayed complications were discussed with the   patient.  Return to normal activities tomorrow.  Written discharge   instructions were provided to the patient.   - High fiber diet.   - Continue present medications.   - Await pathology results.   - Repeat colonoscopy in 3 years for surveillance.   - Discharge patient to home (ambulatory).   - Return to GI office after studies are complete.  For questions, problems or results please call your physician - Mal Negron MD at Work:  [phone].  [facility], EMERGENCY ROOM PHONE NUMBER: [phone]  IF A COMPLICATION OR EMERGENCY SITUATION ARISES AND YOU ARE UNABLE TO REACH   YOUR PHYSICIAN - GO DIRECTLY TO THE EMERGENCY ROOM.  Mal Negron MD  3/31/2023 11:05:54 AM  This report has been verified and signed electronically.  Dear patient,  As a result of recent federal legislation (The Federal Cures Act), you may   receive lab or pathology results from your procedure in your [facility]   account before your physician is able to contact you. Your physician or   their representative will relay the results to you with their   recommendations at their soonest availability.  Thank you,  PROVATION

## 2023-03-31 NOTE — ANESTHESIA POSTPROCEDURE EVALUATION
Anesthesia Post Evaluation    Patient: Vivien Burton    Procedure(s) Performed: Procedure(s) (LRB):  COLONOSCOPY (N/A)    Final Anesthesia Type: general      Patient location during evaluation: PACU  Patient participation: Yes- Able to Participate  Level of consciousness: awake and alert and oriented  Post-procedure vital signs: reviewed and stable  Pain management: adequate  Airway patency: patent    PONV status at discharge: No PONV  Anesthetic complications: no      Cardiovascular status: blood pressure returned to baseline  Respiratory status: unassisted, spontaneous ventilation and room air  Hydration status: euvolemic  Follow-up not needed.          Vitals Value Taken Time   /84 03/31/23 1116   Temp 36.3 °C (97.3 °F) 03/31/23 1108   Pulse 74 03/31/23 1117   Resp 20 03/31/23 1108   SpO2 96 % 03/31/23 1117   Vitals shown include unvalidated device data.      No case tracking events are documented in the log.      Pain/Lizy Score: Lizy Score: 10 (3/31/2023 11:08 AM)

## 2023-04-05 LAB
COMMENT: NORMAL
FINAL PATHOLOGIC DIAGNOSIS: NORMAL
GROSS: NORMAL
Lab: NORMAL

## 2023-04-05 NOTE — PROVATION PATIENT INSTRUCTIONS
Discharge Summary/Instructions after an Endoscopic Procedure  Patient Name: Vivien Burton  Patient MRN: [id number]  Patient YOB: 1945 Wednesday, July 11, 2018  Precious Castorena MD  RESTRICTIONS:  During your procedure today, you received medications for sedation.  These   medications may affect your judgment, balance and coordination.  Therefore,   for 24 hours, you have the following restrictions:   - DO NOT drive a car, operate machinery, make legal/financial decisions,   sign important papers or drink alcohol.    ACTIVITY:  Today: no heavy lifting, straining or running due to procedural   sedation/anesthesia.  The following day: return to full activity including work.  DIET:  Eat and drink normally unless instructed otherwise.     TREATMENT FOR COMMON SIDE EFFECTS:  - Mild abdominal pain, nausea, belching, bloating or excessive gas:  rest,   eat lightly and use a heating pad.  - Sore Throat: treat with throat lozenges and/or gargle with warm salt   water.  - Because air was used during the procedure, expelling large amounts of air   from your rectum or belching is normal.  - If a bowel prep was taken, you may not have a bowel movement for 1-3 days.    This is normal.  SYMPTOMS TO WATCH FOR AND REPORT TO YOUR PHYSICIAN:  1. Abdominal pain or bloating, other than gas cramps.  2. Chest pain.  3. Back pain.  4. Signs of infection such as: chills or fever occurring within 24 hours   after the procedure.  5. Rectal bleeding, which would show as bright red, maroon, or black stools.   (A tablespoon of blood from the rectum is not serious, especially if   hemorrhoids are present.)  6. Vomiting.  7. Weakness or dizziness.  GO DIRECTLY TO THE NEAREST EMERGENCY ROOM IF YOU HAVE ANY OF THE FOLLOWING:      Difficulty breathing              Chills and/or fever over 101 F   Persistent vomiting and/or vomiting blood   Severe abdominal pain   Severe chest pain   Black, tarry stools   Bleeding- more than  Initial SW Assessment completed on 3/29 with pt and spouse of more than 61 years, Karina Paula. The couple has two adult children who live locally and are very supportive. Pt and spouse reside at [facility] where Karina Paula reports enjoying having independence and privacy in the private apartment but the benefits of community and support as needed in the [place]. Karina Paula is the primary caregiver but advises that the couple's two children are actively involved and very supportive. He further advised that their son is retiring son and will increase his involvement even further to ensure that Karina Paula is not overwhelmed in the caregiving role. Pt was alert and able to answer basic questions on today's date but deferred to spouse for much of the assessment reporting that she simply does not feel well. Karina Nisa advises that pt chronically feels fatigued and nauseated. Acceptance of prognosis articulated by both. Karina Paula advised that pt has a DNR but could not locate a copy. New DNR signed on today's date which will be forwarded to the physician for signature. Hospice Medicare benefit, hospice philosophy and services, ancillary services, triage process and role of SW reviewed. Plan for Next 2 Weeks: SW visit in April to assess for needs and address pre-planning for final arrangements. one tablespoon   Any other symptom or condition that you feel may need urgent attention  Your doctor recommends these additional instructions:  If any biopsies were taken, your doctors clinic will contact you in 1 to 2   weeks with any results.  - Discharge patient to home (with escort).   - Patient has a contact number available for emergencies.  The signs and   symptoms of potential delayed complications were discussed with the   patient.  Return to normal activities tomorrow.  Written discharge   instructions were provided to the patient.   - Resume previous diet.   - Resume aspirin at prior dose today.  Refer to primary physician for   further adjustment of therapy.   - Await pathology results.   - Repeat colonoscopy in 5 years for surveillance based on pathology results.     - Return to my office PRN.  For questions, problems or results please call your physician - Precious Castorena MD at Work:  [phone].  [facility], EMERGENCY ROOM PHONE NUMBER: [phone]  IF A COMPLICATION OR EMERGENCY SITUATION ARISES AND YOU ARE UNABLE TO REACH   YOUR PHYSICIAN - GO DIRECTLY TO THE EMERGENCY ROOM.  Precious Castorena MD  7/11/2018 9:59:44 AM  This report has been verified and signed electronically.  [employer]OVATION

## 2023-05-01 PROBLEM — J18.9 PNEUMONIA: Status: RESOLVED | Noted: 2023-01-24 | Resolved: 2023-05-01

## 2023-05-29 DIAGNOSIS — Z95.5 STATUS POST INSERTION OF DRUG ELUTING CORONARY ARTERY STENT: ICD-10-CM

## 2023-05-29 DIAGNOSIS — E78.5 DYSLIPIDEMIA: ICD-10-CM

## 2023-05-31 RX ORDER — SIMVASTATIN 40 MG/1
40 TABLET, FILM COATED ORAL NIGHTLY
Qty: 90 TABLET | Refills: 1 | Status: SHIPPED | OUTPATIENT
Start: 2023-05-31 | End: 2023-11-22

## 2023-10-20 ENCOUNTER — PATIENT MESSAGE (OUTPATIENT)
Dept: CARDIOLOGY | Facility: CLINIC | Age: 78
End: 2023-10-20
Payer: MEDICARE

## 2023-10-24 PROBLEM — B95.5 STREPTOCOCCAL BACTEREMIA: Status: RESOLVED | Noted: 2023-01-25 | Resolved: 2023-10-24

## 2023-10-24 PROBLEM — R74.01 TRANSAMINITIS: Status: RESOLVED | Noted: 2023-01-23 | Resolved: 2023-10-24

## 2023-10-24 PROBLEM — T39.1X1A ACETAMINOPHEN OVERDOSE: Status: RESOLVED | Noted: 2023-01-23 | Resolved: 2023-10-24

## 2023-10-24 PROBLEM — R79.89 ELEVATED BRAIN NATRIURETIC PEPTIDE (BNP) LEVEL: Status: RESOLVED | Noted: 2021-12-10 | Resolved: 2023-10-24

## 2023-10-24 PROBLEM — R78.81 STREPTOCOCCAL BACTEREMIA: Status: RESOLVED | Noted: 2023-01-25 | Resolved: 2023-10-24

## 2023-11-22 ENCOUNTER — OFFICE VISIT (OUTPATIENT)
Dept: CARDIOLOGY | Facility: CLINIC | Age: 78
End: 2023-11-22
Payer: MEDICARE

## 2023-11-22 VITALS
DIASTOLIC BLOOD PRESSURE: 77 MMHG | OXYGEN SATURATION: 100 % | SYSTOLIC BLOOD PRESSURE: 173 MMHG | WEIGHT: 116.5 LBS | HEIGHT: 60 IN | BODY MASS INDEX: 22.87 KG/M2 | HEART RATE: 79 BPM

## 2023-11-22 DIAGNOSIS — F10.10 EXCESSIVE DRINKING ALCOHOL: ICD-10-CM

## 2023-11-22 DIAGNOSIS — I50.42 CHRONIC COMBINED SYSTOLIC AND DIASTOLIC HEART FAILURE: ICD-10-CM

## 2023-11-22 DIAGNOSIS — Z95.5 STATUS POST INSERTION OF DRUG ELUTING CORONARY ARTERY STENT: ICD-10-CM

## 2023-11-22 DIAGNOSIS — I10 WHITE COAT SYNDROME WITH DIAGNOSIS OF HYPERTENSION: ICD-10-CM

## 2023-11-22 DIAGNOSIS — Z95.2 S/P TAVR (TRANSCATHETER AORTIC VALVE REPLACEMENT): ICD-10-CM

## 2023-11-22 DIAGNOSIS — E78.5 DYSLIPIDEMIA: ICD-10-CM

## 2023-11-22 DIAGNOSIS — Z01.810 PREOP CARDIOVASCULAR EXAM: Primary | ICD-10-CM

## 2023-11-22 PROCEDURE — 99215 PR OFFICE/OUTPT VISIT, EST, LEVL V, 40-54 MIN: ICD-10-PCS | Mod: S$PBB,,, | Performed by: INTERNAL MEDICINE

## 2023-11-22 PROCEDURE — 93010 ELECTROCARDIOGRAM REPORT: CPT | Mod: S$PBB,,, | Performed by: INTERNAL MEDICINE

## 2023-11-22 PROCEDURE — 93010 EKG 12-LEAD: ICD-10-PCS | Mod: S$PBB,,, | Performed by: INTERNAL MEDICINE

## 2023-11-22 PROCEDURE — 99999 PR PBB SHADOW E&M-EST. PATIENT-LVL III: CPT | Mod: PBBFAC,,, | Performed by: INTERNAL MEDICINE

## 2023-11-22 PROCEDURE — 93005 ELECTROCARDIOGRAM TRACING: CPT | Mod: PBBFAC | Performed by: INTERNAL MEDICINE

## 2023-11-22 PROCEDURE — 99215 OFFICE O/P EST HI 40 MIN: CPT | Mod: S$PBB,,, | Performed by: INTERNAL MEDICINE

## 2023-11-22 PROCEDURE — 99999 PR PBB SHADOW E&M-EST. PATIENT-LVL III: ICD-10-PCS | Mod: PBBFAC,,, | Performed by: INTERNAL MEDICINE

## 2023-11-22 PROCEDURE — 99213 OFFICE O/P EST LOW 20 MIN: CPT | Mod: PBBFAC | Performed by: INTERNAL MEDICINE

## 2023-11-22 RX ORDER — BACITRACIN ZINC AND POLYMYXIN B SULFATE 500; 10000 [USP'U]/G; [USP'U]/G
OINTMENT OPHTHALMIC
COMMUNITY
Start: 2023-10-13

## 2023-11-22 RX ORDER — SIMVASTATIN 40 MG/1
40 TABLET, FILM COATED ORAL NIGHTLY
Qty: 90 TABLET | Refills: 3 | Status: SHIPPED | OUTPATIENT
Start: 2023-11-22

## 2023-11-22 NOTE — PROGRESS NOTES
Subjective:    Patient ID:  Vivien Burton is a 78 y.o. female who presents for evaluation of Pre-op Exam    For critical AS with HFrEF, CAD post LAWRENCE 1/2022, post TAVR 2/2022 on DAPT, HLD on 20 mg of simvastatin, LDL-C greater than 70, for pre-op cataract clearance.  Eye: Dr. Robertson  PCP: Dr. Robert, now see Rosi Del Real NP biannually, in [facility]  Urologist and referred by daughter-in-law Dr. LORRIE Burton for markedly elevated BNP with SOB  Interventional cardiologist: Dontae Johns MD  Valve and Structural Heart Disease: Marley Rudolph PA-C, last seen 3/2022  Lives alone, no pets, son, Damari, comes and goes.   Retired     Health literacy: high   Vaccinations: up-to-date, completed COVID, no infection   Activities: do own housework, yard work and home schooling, no problem, not limited, do not get tired.  Nicotine: quit 1995, 30 years < 1ppd  Alcohol: 2-3 drinks daily, 4.5 oz of whiskey, max 3 shots in any 24 hours.  Illicit drugs: none  Cardiac symptoms: none  Home BP: 135/75, now no longer with [employer]   Medication compliance: yes, do not miss, taking iron only weekly  Diet: regular, low salt  Caffeine: 1 cpd  Labs:   Lab Results   Component Value Date    TSH 0.73 10/17/2023        Lab Results   Component Value Date    LABA1C 4.7 06/21/2018    HGBA1C 4.6 10/17/2023       Lab Results   Component Value Date    WBC 6.6 10/17/2023    HGB 11.8 10/17/2023    HCT 33.9 (L) 10/17/2023    MCV 90.6 10/17/2023     10/17/2023       CMP @CBC  Sodium   Date Value Ref Range Status   10/17/2023 136 135 - 146 mmol/L Final     Potassium   Date Value Ref Range Status   10/17/2023 4.5 3.5 - 5.3 mmol/L Final     Chloride   Date Value Ref Range Status   10/17/2023 101 98 - 110 mmol/L Final     CO2   Date Value Ref Range Status   10/17/2023 28 20 - 32 mmol/L Final     Glucose   Date Value Ref Range Status   10/17/2023 93 65 - 139 mg/dL Final     Comment:                Non-fasting reference interval          BUN   Date Value Ref Range Status   10/17/2023 11 7 - 25 mg/dL Final     Creatinine   Date Value Ref Range Status   10/17/2023 0.61 0.60 - 1.00 mg/dL Final     Calcium   Date Value Ref Range Status   10/17/2023 9.3 8.6 - 10.4 mg/dL Final     Total Protein   Date Value Ref Range Status   10/17/2023 5.6 (L) 6.1 - 8.1 g/dL Final     Albumin   Date Value Ref Range Status   10/17/2023 4.1 3.6 - 5.1 g/dL Final     Total Bilirubin   Date Value Ref Range Status   10/17/2023 0.4 0.2 - 1.2 mg/dL Final     Alkaline Phosphatase   Date Value Ref Range Status   01/29/2023 159 (H) 55 - 135 U/L Final     AST   Date Value Ref Range Status   10/17/2023 14 10 - 35 U/L Final     ALT   Date Value Ref Range Status   10/17/2023 12 6 - 29 U/L Final     Anion Gap   Date Value Ref Range Status   02/28/2023 6 (L) 8 - 16 mmol/L Final     eGFR if    Date Value Ref Range Status   04/13/2022 99 > OR = 60 mL/min/1.73m2 Final     eGFR if non    Date Value Ref Range Status   04/13/2022 86 > OR = 60 mL/min/1.73m2 Final     @labrcntip(troponini)@    BNP   Date Value Ref Range Status   04/10/2023 89 <100 pg/mL Final     Comment:        BNP levels increase with age in the general  population with the highest values seen in  individuals greater than 75 years of age.  Reference: J. Am. Cynthia. Cardiol. 2002; 40:976-982.        }   Lab Results   Component Value Date    CHOL 141 10/17/2023    CHOL 144 10/04/2022    CHOL 185 04/13/2022     Lab Results   Component Value Date    HDL 75 10/17/2023    HDL 71 10/04/2022    HDL 75 04/13/2022     Lab Results   Component Value Date    LDLCALC 50 10/17/2023    LDLCALC 57 10/04/2022    LDLCALC 89 04/13/2022     Lab Results   Component Value Date    TRIG 76 10/17/2023    TRIG 77 10/04/2022    TRIG 110 04/13/2022     Lab Results   Component Value Date    CHOLHDL 1.9 10/17/2023    CHOLHDL 2.0 10/04/2022    CHOLHDL 2.5 04/13/2022     Lab Results  "  Component Value Date    IRON 95 04/10/2023    TIBC 327 04/10/2023    FERRITIN 81 04/13/2022      UA 8/2021 no protein.    Last Echo: 2/2023  Last stress test: 6/2018  Cardiovascular angiogram: 1/2022, with PCI  ECG: NSR, rate 75, normal  Fundoscopic exam: within the past year, negative for retinopathy    In 6/2018:  WF with history of HTN around the time breast CA diagnosis, treated with radiation to the right chest and chemo. Stopped HTN Rx about a year ago with normal BP. Here due to heart murmur, first detected in childhood and then no mention until recent examination. Active, own housework, own yard work, caring to grandchildren, no problem, occasional soreness. Quit smoking in 1998, after 15 pack-years, admit to drinking [place]nn about 7.5 oz daily. Get sleepy not drunk, do not drive after. ECG is normal, rate 87. Labs reviewed: LDL 57, ASCVD 10-year event risk of 11.3%, intermediate.     Echo read by Dr. Koehler - Conclusion   Left ventricle shows mild concentric hypertrophy.  Mitral valve shows mild regurgitation.  Tricuspid valve shows trace regurgitation.  There is mild valvular aortic stenosis.  Mild regurgitation is present in the aortic valve.  Left ventricular diastolic filling is abnormal.     In 12/2021, return at advise of daughter for severely elevated BNP. SOB with first pneumonia about a month ago and then second pneumonia this week. No CP. Continue with excess alcohol use.     Dr. BRETT Robert III noted 12/8/2021 "2 week f/u pt states she isn't feeling good. She states she is sob and it makes her very fatigue. She has abd pain, sore throat)"    CXR - Impression:     1. Bibasilar pulmonary infiltrates with small bilateral pleural effusions and bibasilar dependent atelectasis.  Correlate clinically with possible fever and/or elevated white count.  2. Underlying COPD with mild chronic changes of interstitial fibrosis.  3. Bone demineralization.    Stress test 6/2018 - Arrhythmias during stress: rare " "PACs.  The EKG portion of this study is negative for myocardial ischemia.  The test was stopped because and the patient complained of fatigue.  The patient reported shortness of breath during the stress test.  Negative for ischemia with good exercise tolerance, 7 METS    Carotid US 6/2018 - No significant stenosis noted  Homogenous plaques in the ICAs  Antegrade flows in the vertebral arteries    In 6/2022, return for CV follow up. Complex recent history with critical AS and HFrEF. Now no symptoms, able to do everything she wants to do. Remain with excess alcohol intake.     Marley Rudolph PA-C noted "TAVR Hospital Course:  Vivien Burton was admitted and underwent successful placement of a 26 mm Evolut TAVR via RTF access under MAC sedation on 2/22/22. Please see full cath report for details. A transthoracic echo was performed immediately post procedure which showed no paravalvular leak. An aortic valve maximum velocity through the aortic valve of 1.0 m/s. She was transported to the CCU in stable condition with a TVP and arterial line in place. She remained hemodynamically stable overnight. EKG remained stable post TAVR therefore no EP consult was warranted. This morning, she ambulated without difficulty and was eager to go home. It was felt she was stable for discharge and will go home on ASA/Plavix for antithrombotic therapy post TAVR.      Interval History  She is doing very well today with no complaint. Was able to ride in a [employer] parade 1 week after TAVR. Denies SOB, CLIFFORD, LE swelling, weight gain, and orthopnea.     S/p LAD PCI with LAWRENCE on 1/7/22. DAPT for 1 year. Continue statin.  [facility] 1/2022 - The Prox LAD lesion was 99% stenosed with 0% stenosis post-intervention.  A STENT RESOLUTE DONTRELL [id number] stent was successfully placed at 14 LORRI for 10 sec.    Follow up with [facility] in 1 year with labs and Echo.  ASA indefinitely.   Plavix for 1 year post PCI (OK to discontinue 1/7/23).   No " "non sterile procedures which could cause endocarditis for 6 months post TAVR including dental work, endoscopy, colonoscopy, and  procedures.   SBE prophylaxis for life."    Echo 3/2022 - The left ventricle is normal in size with eccentric hypertrophy and low normal systolic function. The estimated ejection fraction is 50%.  The quantitatively derived ejection fraction is 48%.  Normal right ventricular size with normal right ventricular systolic function.  Grade II left ventricular diastolic dysfunction.  Moderate left atrial enlargement.  There is a 26 mm Evolut transcutaneously-placed aortic bioprosthesis present. There is trivial paravalvular aortic insufficiency present.  The aortic valve mean gradient is 9 mmHg with a dimensionless index of 0.46.  The estimated PA systolic pressure is 24 mmHg.  Normal central venous pressure (3 mmHg).    In 11/2022 for pre-endoscopy clearance. Feeling fine, do not tire anymore. Continue with excess alcohol and home [facility] always shows high reading which are way off.     Rosi Del Real NP noted 10/12/2022 "Pt to make f/u with Dr. Mccoy for cardiac clearance  If cleared, schedule EGD and c-scope    HPI comments: in 11/2023, return for annual review and cataract clearance. No heart issues, been remaining active without problem. Continue with excess alcohol use.    Marley Rudolph PA-C, Valve and Structural Heart Disease, noted in 2/2023  TTE 2/28/23  The left ventricle is mildly enlarged with normal systolic function. The estimated ejection fraction is 60%.  Normal right ventricular size with normal right ventricular systolic function.  Indeterminate left ventricular diastolic function.  There is a 26 mm Evolut Pro transcutaneously-placed aortic bioprosthesis present. There is no aortic insufficiency present. Prosthetic aortic valve is normal.  The aortic valve mean gradient is 4 mmHg with a dimensionless index of 0.53.  Normal central venous pressure (3 " mmHg).    S/P TAVR (transcatheter aortic valve replacement), 26 mm  Successful right transfemoral 26 mm Evolut Pro TAVR. No PVL reviewed on TTE today.      Coronary artery disease  S/p LAD PCI with LAWRENCE on 1/7/22. DAPT for 1 year. Continue statin.      Chronic combined systolic and diastolic heart failure, HFimpEF  Chronic. Controlled. Follow up with Cardiology/PCP.    Follow up with [facility] as needed.   ASA indefinitely.   SBE prophylaxis for life.    Review of Systems   Constitutional: Positive for weight gain (up 8 lbs, diet). Negative for diaphoresis, fever, malaise/fatigue and night sweats.        Weight stable from 6/2018   HENT:  Positive for congestion. Negative for hearing loss, nosebleeds and tinnitus.    Eyes:  Negative for discharge and visual disturbance.   Cardiovascular:  Negative for chest pain, claudication, cyanosis, irregular heartbeat, leg swelling, near-syncope, orthopnea, palpitations and paroxysmal nocturnal dyspnea. Dyspnea on exertion: with grass work.  Respiratory:  Negative for cough, shortness of breath, sleep disturbances due to breathing, snoring and wheezing.         Sturgeon score 0, today a 3, awaken refreshed.   Endocrine: Negative for polydipsia and polyuria.   Hematologic/Lymphatic: Bruises/bleeds easily.   Skin:  Positive for dry skin. Negative for color change, flushing, nail changes, poor wound healing and suspicious lesions.   Musculoskeletal:  Negative for arthritis, falls, gout, joint pain, joint swelling, muscle cramps, muscle weakness, myalgias and neck pain.   Gastrointestinal:  Negative for bloating, constipation, diarrhea, heartburn, hematemesis, hematochezia, melena, nausea and vomiting.   Genitourinary:  Negative for dysuria and hematuria.   Neurological:  Negative for disturbances in coordination, excessive daytime sleepiness, dizziness, focal weakness, light-headedness, loss of balance, numbness, vertigo and weakness. Headaches:  "sinus.  Psychiatric/Behavioral:  Negative for depression and substance abuse. The patient does not have insomnia and is not nervous/anxious.         Some claustrophobia   Allergic/Immunologic: Positive for environmental allergies.     Answers submitted by the patient for this visit:  Review of Systems Questionnaire (Submitted on 11/20/2023)  activity change: No  unexpected weight change: No  rhinorrhea: No  trouble swallowing: No  chest tightness: No  blood in stool: No  difficulty urinating: No  menstrual problem: No  arthralgias: No  confusion: No  dysphoric mood: No       Objective:    Physical Exam  Constitutional:       Appearance: She is well-developed.      Comments: RA O2 sat 100%  Orthostatic VS: sitting 183/78, standing 173/77   HENT:      Head: Normocephalic.   Eyes:      Conjunctiva/sclera: Conjunctivae normal.      Pupils: Pupils are equal, round, and reactive to light.   Neck:      Thyroid: No thyromegaly.      Vascular: No JVD.      Comments: Circumference 12"  Cardiovascular:      Rate and Rhythm: Normal rate and regular rhythm.      Pulses: Intact distal pulses.           Carotid pulses are 2+ on the right side and 2+ on the left side.       Radial pulses are 2+ on the right side and 2+ on the left side.        Dorsalis pedis pulses are 1+ on the right side and 1+ on the left side.        Posterior tibial pulses are 1+ on the right side and 1+ on the left side.      Heart sounds: Normal heart sounds. No murmur heard.     No friction rub. No gallop.   Pulmonary:      Effort: Pulmonary effort is normal.      Breath sounds: Rales: left basiler.      Comments: Diminished breath sounds and prolong expiration.  Chest:      Chest wall: No tenderness.   Abdominal:      General: Bowel sounds are normal.      Palpations: Abdomen is soft.      Tenderness: There is no abdominal tenderness.      Comments: Waist 31.5", hip 35"   Musculoskeletal:         General: Normal range of motion.      Cervical back: Normal " range of motion and neck supple.   Lymphadenopathy:      Cervical: No cervical adenopathy.   Skin:     General: Skin is warm and dry.      Findings: Rash: mild stasis dermatitis.      Comments: Decreased skin turgor    Neurological:      Mental Status: She is alert and oriented to person, place, and time.           Assessment:       1. Preop cardiovascular exam    2. Status post insertion of drug eluting coronary artery stent    3. S/P TAVR (transcatheter aortic valve replacement), 26 mm    4. Chronic combined systolic and diastolic heart failure, HFimpEF    5. Excessive drinking alcohol    6. White coat syndrome with diagnosis of hypertension           Plan:       Vivien was seen today for pre-op exam.    Diagnoses and all orders for this visit:    Preop cardiovascular exam  -     IN OFFICE EKG 12-LEAD (to Muse)    Status post insertion of drug eluting coronary artery stent  -     IN OFFICE EKG 12-LEAD (to Channing)    S/P TAVR (transcatheter aortic valve replacement), 26 mm    Chronic combined systolic and diastolic heart failure, HFimpEF    Excessive drinking alcohol    White coat syndrome with diagnosis of hypertension      - All medical issues reviewed, continue current Rx.  - Clear for Surgery and anesthesia with acceptable risk from the cardiac standpoint. Low risk procedure. Clearance form completed.  - Warning signs of MI and stroke given, if symptoms last more than 5 minutes, stop immediately and call 911, then chew 2-4 low-dose ASA (81 mg).  - Instruction for Mediterranean, high potassium diet and heart healthy exercise given.  - Check home blood pressure, 2 days weekly, do 2 readings within 5 minutes in AM and PM, keep log for review. Target resting BP is less than 130/80.  - Need good exercise program, 4 key elements: 1. Aerobic (walking, swimming, dancing, jogging, biking, etc, 2. Muscle strengthening / resistance exercise, need to do 2-3 times weekly, 3. Stretching daily, good stretch takes a whole  total  minute. 4. Balance exercise daily.  - Highly recommend 30-60 minutes of exercise daily, can have Sunday off, with 2-3 sessions of muscle strengthening weekly. A  would be very helpful.  - Discussed healthy daily limit of 0.5 oz of pure alcohol in any 24 hours (roughly 1 12-oz beers or 0.75 oz of liquor (80 proof)), can not save up.  - Recommend healthy living: moderate alcohol, healthy diet and regular exercise aiming for fitness, restorative sleep and weight control  - Consider Yoga, Siddhartha-Chi and or meditation  - Recommend at least annual cardiovascular evaluation in view of her significant risk factors. Patient's preference.        Patient Active Problem List   Diagnosis    GERD (gastroesophageal reflux disease)    Breast cancer    Unspecified hypothyroidism    Breast cancer, post right chest radiation    White coat syndrome with diagnosis of hypertension    Malignant neoplasm of female breast    Encounter for long-term (current) use of medications    Headache(784.0)    Excessive drinking alcohol    Heart murmur, since childhood    Dyslipidemia, baseline     Cardiovascular event risk, ASCVD 10-year risk 11.3%, on simvastatin 20 mg, 2017    Bilateral carotid bruits    Nonrheumatic aortic (valve) stenosis    ISIS (generalized anxiety disorder)    Family history of colon cancer    Anemia    LVH (left ventricular hypertrophy) due to hypertensive disease, without heart failure    S/P TAVR (transcatheter aortic valve replacement), 26 mm    Chronic combined systolic and diastolic heart failure, HFimpEF    Coronary artery disease    Aortic atherosclerosis    Status post insertion of drug eluting coronary artery stent    Iron deficiency     Total time spend including review of record prior to face-to-face visit is 40 minutes. Greater than 50% of the time was spent in counseling and coordination of care. The above assessment and plan have been discussed at length. Referring provider's note reviewed.  Labs and procedure over the last 6 months reviewed. Problem List updated. Asked to bring in all active medications / pills bottles with next visit. Will send note to referring / PCP.

## 2023-11-22 NOTE — LETTER
November 22, 2023      Hussein Robertson MD  [address]  [place] [address]           [facility] - Cardiology  [address] [facility] [place] [address]  Phone: [phone]  Fax: [phone]          Patient: Vivien Burton   MR Number: [id number]   YOB: 1945   Date of Visit: 11/22/2023       Dear Dr. Ellen Robert III:    Thank you for referring Vivien Burton to me for evaluation. Attached you will find relevant portions of my assessment and plan of care.    If you have questions, please do not hesitate to call me. I look forward to following Vivien Burton along with you.    Sincerely,    Rick Mccoy MD    Enclosure  CC:    No Recipients    If you would like to receive this communication electronically, please contact [email or web address] or [phone] to request more information on [employer] access.    For providers and/or their staff who would like to refer a patient to [facility], please contact us through our one-stop-shop provider referral line, [facility], at [phone].    If you feel you have received this communication in error or would no longer like to receive these types of communications, please e-mail [email or web address]

## 2023-11-30 ENCOUNTER — HOSPITAL ENCOUNTER (OUTPATIENT)
Dept: RADIOLOGY | Facility: HOSPITAL | Age: 78
Discharge: HOME | End: 2023-11-30
Attending: NURSE PRACTITIONER
Payer: MEDICARE

## 2023-11-30 DIAGNOSIS — Z12.31 ENCOUNTER FOR SCREENING MAMMOGRAM FOR BREAST CANCER: ICD-10-CM

## 2023-11-30 PROCEDURE — 77067 SCR MAMMO BI INCL CAD: CPT | Mod: TC,52

## 2023-11-30 PROCEDURE — 77063 BREAST TOMOSYNTHESIS BI: CPT | Mod: 26,52,, | Performed by: RADIOLOGY

## 2023-11-30 PROCEDURE — 77067 MAMMO DIGITAL SCREENING LEFT WITH TOMO: ICD-10-PCS | Mod: 26,52,, | Performed by: RADIOLOGY

## 2023-11-30 PROCEDURE — 77063 MAMMO DIGITAL SCREENING LEFT WITH TOMO: ICD-10-PCS | Mod: 26,52,, | Performed by: RADIOLOGY

## 2023-11-30 PROCEDURE — 77067 SCR MAMMO BI INCL CAD: CPT | Mod: 26,52,, | Performed by: RADIOLOGY

## 2024-02-12 NOTE — TELEPHONE ENCOUNTER
Phoned patient no answer left message on voicemail to give the office a call back.  
Spoke with patient informed her of results and recommendations. Patient verbally voiced understanding. All appointments scheduled  
Voided ua with leukocytes and blood  Cath culture shows skin contaminant  Please have pt return for cath ua, culture and cytology      Ctu in 2015 showed 6mm LLP complex cyst vs mass  Will obtain a rbus and see if it has changed in size and confirm not solid  MH w/u negative in 2015    Also placing referral for colonscopy with   Had LLQ pain 5/3/18, has not had colonscopy in over 13 years, strong family hx    Also checking to see when she has f/u with cards () not sure if she'll need clearance before colonoscopy. appt is July 20th but trying to see if she can be seen sooner.       
self

## 2024-03-22 ENCOUNTER — TELEPHONE (OUTPATIENT)
Dept: CARDIOLOGY | Facility: CLINIC | Age: 79
End: 2024-03-22
Payer: MEDICARE

## 2024-03-22 NOTE — TELEPHONE ENCOUNTER
Spoke to pt need Cardiac clearance for Cataracts scheduled 4/18/2024 was last seen November 22,2023 for Cardiac clearance for cataracts.     Pt inquiring do she require another visit or can you clear her without being seen, using the same clearance from November,   Please advise.

## 2024-03-25 ENCOUNTER — TELEPHONE (OUTPATIENT)
Dept: CARDIOLOGY | Facility: CLINIC | Age: 79
End: 2024-03-25
Payer: MEDICARE

## 2024-04-16 ENCOUNTER — HOSPITAL ENCOUNTER (OUTPATIENT)
Dept: RADIOLOGY | Facility: HOSPITAL | Age: 79
Discharge: HOME | End: 2024-04-16
Attending: NURSE PRACTITIONER
Payer: MEDICARE

## 2024-04-16 DIAGNOSIS — M79.89 SWELLING OF RIGHT UPPER EXTREMITY: ICD-10-CM

## 2024-04-16 DIAGNOSIS — R22.31 LOCALIZED SWELLING, MASS AND LUMP, RIGHT UPPER LIMB: ICD-10-CM

## 2024-04-23 ENCOUNTER — HOSPITAL ENCOUNTER (OUTPATIENT)
Dept: RADIOLOGY | Facility: HOSPITAL | Age: 79
Discharge: HOME | End: 2024-04-23
Attending: NURSE PRACTITIONER
Payer: MEDICARE

## 2024-04-23 DIAGNOSIS — R10.9 ABDOMINAL PAIN, UNSPECIFIED ABDOMINAL LOCATION: ICD-10-CM

## 2024-04-23 DIAGNOSIS — D64.9 ANEMIA, UNSPECIFIED TYPE: ICD-10-CM

## 2024-04-23 DIAGNOSIS — R19.00 ABDOMINAL WALL BULGE: ICD-10-CM

## 2024-04-23 PROCEDURE — 76700 US EXAM ABDOM COMPLETE: CPT | Mod: 26,,, | Performed by: RADIOLOGY

## 2024-04-23 PROCEDURE — 76700 US EXAM ABDOM COMPLETE: CPT | Mod: TC

## 2024-05-08 ENCOUNTER — TELEPHONE (OUTPATIENT)
Dept: HEMATOLOGY/ONCOLOGY | Facility: CLINIC | Age: 79
End: 2024-05-08
Payer: MEDICARE

## 2024-05-10 NOTE — Clinical Note
WORKER'S COMPENSATION RETURN TO WORK REPORT   [facility]   [address]  [facility] [address]    May 10, 2024  EMPLOYEE INFORMATION:   EMPLOYER INFORMATION:  NAME: Callum Holloway    NEXT LEVEL STAFFING DONNIE  : 2002     [phone]  DATE OF INJURY/EVENT: No linked episodes WC Contact:           APPOINTMENT INFORMATION:  Date: 5/10/2024.       Time out: 10:26 AM.   Location: [facility]   Treating Provider: Kelsey Coronel NP  .  Screening: [] Drug Screen performed    [] Breath Alcohol Test performed    DIAGNOSIS:   1. Abrasion of right cornea, initial encounter    2. Foreign body, eye, left, initial encounter    .     STATUS:    [x] WORK RELATED   [] NON WORK RELATED   [] UNDETERMINED     RETURN TO WORK  [] Today   [x]   [] Return to work WITH limitations (see below)  [x] Return to work WITH NO limitations   [] Unable to return to work at this time    Reason: [] Hospitalized [] Medication [] Pain [] Other:        For any potentially work related injury/condition, it is important to follow up with the Occupational clinic listed below as soon as possible.  Please call to make an appointment at one of the clinics below.      [place] [facility]      [place]  Occupational Medicine at [facility] - [address]  [address] [place] [address]  Tel: [phone]  Fax: [phone]  Mon. - Fri., 8:30 AM to 5:00 PM             [address]  [address]  Tel: [phone]   Fax: [phone]   Hours: Mon-Fri (8:00am - 5:00pm)    [place]   Occupational Medicine  [address] [facility] [address]  [place] [address]  [phone]  Fax: [phone]  Hours: Mon- Fri (8:00-4:30pm)    [place]  [facility]  [facility] [address]  [place] [address]   Tel: [phone]   Hours: Mon-Fri (8:30am - 5:00pm)                FOLLOW-UP VISIT   Return if symptoms  The wire was removed from the left ventricle. worsen or fail to improve.   Thank you for the privilege of providing medical care for this injury/condition.  If there are any questions, please call the clinic at Dept: [phone].

## 2024-05-15 ENCOUNTER — TELEPHONE (OUTPATIENT)
Dept: HEMATOLOGY/ONCOLOGY | Facility: CLINIC | Age: 79
End: 2024-05-15

## 2024-05-15 ENCOUNTER — OFFICE VISIT (OUTPATIENT)
Dept: HEMATOLOGY/ONCOLOGY | Facility: CLINIC | Age: 79
End: 2024-05-15
Payer: MEDICARE

## 2024-05-15 VITALS
RESPIRATION RATE: 12 BRPM | DIASTOLIC BLOOD PRESSURE: 81 MMHG | HEIGHT: 60 IN | OXYGEN SATURATION: 100 % | WEIGHT: 119.69 LBS | SYSTOLIC BLOOD PRESSURE: 190 MMHG | HEART RATE: 100 BPM | TEMPERATURE: 98 F | BODY MASS INDEX: 23.5 KG/M2

## 2024-05-15 DIAGNOSIS — D69.6 THROMBOCYTOPENIA: Primary | ICD-10-CM

## 2024-05-15 DIAGNOSIS — C50.919 MALIGNANT NEOPLASM OF FEMALE BREAST, UNSPECIFIED ESTROGEN RECEPTOR STATUS, UNSPECIFIED LATERALITY, UNSPECIFIED SITE OF BREAST: ICD-10-CM

## 2024-05-15 DIAGNOSIS — D64.9 ANEMIA, UNSPECIFIED TYPE: ICD-10-CM

## 2024-05-15 PROCEDURE — 99213 OFFICE O/P EST LOW 20 MIN: CPT | Mod: PBBFAC,PN | Performed by: INTERNAL MEDICINE

## 2024-05-15 PROCEDURE — 99999 PR PBB SHADOW E&M-EST. PATIENT-LVL III: CPT | Mod: PBBFAC,,, | Performed by: INTERNAL MEDICINE

## 2024-05-15 PROCEDURE — 99214 OFFICE O/P EST MOD 30 MIN: CPT | Mod: S$PBB,,, | Performed by: INTERNAL MEDICINE

## 2024-05-15 NOTE — PROGRESS NOTES
HPI    79 years old female was referred to [facility] for evaluation of anemia and thrombocytopenia.    Stool occult study was negative    Normal iron studies and ferritin level      Past Medical History:   Diagnosis Date    Acute on chronic combined systolic and diastolic heart failure 2022    Anemia     Basal cell carcinoma     Breast cancer     Breast cancer     Cancer     CHF (congestive heart failure)     Colon polyps     Coronary artery disease     GERD (gastroesophageal reflux disease)     Hyperlipidemia     Hypertension     Hypothyroidism     Nonrheumatic aortic (valve) stenosis 2018    Osteoporosis 2019    S/P TAVR (transcatheter aortic valve replacement) 2022    Squamous cell carcinoma of skin     Streptococcal bacteremia 2023    Thyroid disease     Transaminitis 2023     Social History     Socioeconomic History    Marital status:    Tobacco Use    Smoking status: Former     Current packs/day: 0.00     Types: Cigarettes     Quit date: 2000     Years since quittin.2    Smokeless tobacco: Never    Tobacco comments:     quit 20 years ago   Substance and Sexual Activity    Alcohol use: Not Currently     Alcohol/week: 2.0 standard drinks of alcohol     Types: 2 Shots of liquor per week     Comment: per pt 2 burbon drinks per night however none in last month    Drug use: No    Sexual activity: Not Currently     Social Determinants of Health     Financial Resource Strain: Low Risk  (2024)    Overall Financial Resource Strain (CARDIA)     Difficulty of Paying Living Expenses: Not hard at all   Food Insecurity: No Food Insecurity (2024)    Hunger Vital Sign     Worried About Running Out of Food in the Last Year: Never true     Ran Out of Food in the Last Year: Never true   Transportation Needs: No Transportation Needs (2024)    PRAPARE - Transportation     Lack of Transportation (Medical): No     Lack of Transportation  (Non-Medical): No   Physical Activity: Sufficiently Active (4/21/2024)    Exercise Vital Sign     Days of Exercise per Week: 6 days     Minutes of Exercise per Session: 60 min   Stress: No Stress Concern Present (4/21/2024)    [employer] [place] [employer] - Occupational Stress Questionnaire     Feeling of Stress : Only a little   Housing Stability: Low Risk  (10/21/2023)    Housing Stability Vital Sign     Unable to Pay for Housing in the Last Year: No     Number of Places Lived in the Last Year: 1     Unstable Housing in the Last Year: No         Subjective      Review of Systems   Constitutional: Negative for appetite change, fatigue and unexpected weight change.   HENT: Negative for mouth sores.   Eyes: Negative for visual disturbance.   Respiratory: Negative for cough and shortness of breath.   Cardiovascular: Negative for chest pain.   Gastrointestinal: Negative for diarrhea.   Genitourinary: Negative for frequency.   Musculoskeletal: Negative for back pain.   Skin: Negative for rash.   Neurological: Negative for headaches.   Hematological: Negative for adenopathy.   Psychiatric/Behavioral: The patient is not nervous/anxious.   All other systems reviewed and are negative.     Objective    Physical Exam   Vitals:    05/15/24 1023   BP: (!) 190/81   Pulse: 100   Resp: 12   Temp: 97.8 °F (36.6 °C)       Constitutional: patient is oriented to person, place, and time. patient appears well-developed and well-nourished. No distress.   HENT:   Right Ear: External ear normal.   Left Ear: External ear normal.   Nose: Nose normal.   Mouth/Throat: Oropharynx is clear and moist. No oropharyngeal exudate.   Teeth, gums and lips are normal   No sinus tenderness   Palate, tongue, posterior pharynx are normal   Eyes: Conjunctivae and lids are normal.   Neck: Trachea normal and normal range of motion. No thyromegaly   Cardiovascular: Normal rate, regular rhythm, normal heart sounds, intact distal pulses and normal  pulses.   No murmur heard.   No edema, no tenderness in the extremities.   Pulmonary/Chest: Effort normal and breath sounds normal. No accessory muscle usage. patient has no wheezes..   Abdominal: Soft. Normal appearance and bowel sounds are normal. patient exhibits no distension and no mass. There is no hepatosplenomegaly. There is no tenderness.   Musculoskeletal: Normal range of motion.   Gait is normal   No clubbing, cyanosis     Lymphadenopathy:   Head (right side): No submental and no submandibular adenopathy present.   Head (left side): No submental and no submandibular adenopathy present.   patient has no cervical adenopathy.   Right: No supraclavicular adenopathy present.   Left: No supraclavicular adenopathy present.   Neurological: patient is alert and oriented to person, place, and time. patient has normal strength and normal reflexes. No sensory deficit. Gait normal.   Skin: Skin is warm, dry and intact. No bruising, no lesion and no rash noted. No cyanosis. Nails show no clubbing.   No lesions   Psychiatric: patient has a normal mood and affect. patient speech is normal and behavior is normal. Judgment normal. Cognition and memory are normal.   Vitals reviewed.       Lab Results   Component Value Date    WBC 7.2 04/09/2024    HGB 11.5 (L) 04/09/2024    HCT 34.0 (L) 04/09/2024    MCV 92.4 04/09/2024     04/09/2024       CMP  Sodium   Date Value Ref Range Status   04/24/2024 138 135 - 146 mmol/L Final     Potassium   Date Value Ref Range Status   04/24/2024 4.4 3.5 - 5.3 mmol/L Final     Chloride   Date Value Ref Range Status   04/24/2024 101 98 - 110 mmol/L Final     CO2   Date Value Ref Range Status   04/24/2024 28 20 - 32 mmol/L Final     Glucose   Date Value Ref Range Status   04/24/2024 97 65 - 139 mg/dL Final     Comment:               Non-fasting reference interval          BUN   Date Value Ref Range Status   04/24/2024 13 7 - 25 mg/dL Final     Creatinine   Date Value Ref Range Status    04/24/2024 0.80 0.60 - 1.00 mg/dL Final     Calcium   Date Value Ref Range Status   04/24/2024 9.5 8.6 - 10.4 mg/dL Final     Total Protein   Date Value Ref Range Status   04/24/2024 5.9 (L) 6.1 - 8.1 g/dL Final     Albumin   Date Value Ref Range Status   04/24/2024 4.3 3.6 - 5.1 g/dL Final     Total Bilirubin   Date Value Ref Range Status   04/24/2024 0.5 0.2 - 1.2 mg/dL Final     Alkaline Phosphatase   Date Value Ref Range Status   01/29/2023 159 (H) 55 - 135 U/L Final     AST   Date Value Ref Range Status   04/24/2024 13 10 - 35 U/L Final     ALT   Date Value Ref Range Status   04/24/2024 8 6 - 29 U/L Final     Anion Gap   Date Value Ref Range Status   02/28/2023 6 (L) 8 - 16 mmol/L Final     eGFR   Date Value Ref Range Status   04/24/2024 76 > OR = 60 mL/min/1.73m2 Final       Ultrasound 04/23/2024  Mild splenomegaly  No ascites    Ultrasound right upper extremity venous 04/16/2024  No evidence of thrombosis    Fecal occult immuno chemistry not detected    Assessment    Mild anemia normal MCV    Negative fecal occult studies    Mild splenomegaly    Mild thrombocytopenia    Normal iron studies and ferritin level    Previously workup for paraprotein disease which was negative year 2023.  It was recommended follow-up p.r.n. at the time.    > we can keep eye on her blood chemistry.  There is no need for any acute intervention.  Expected thrombocytopenia given splenomegaly finding.  > RTC 4 month with lab     Plan    There are no diagnoses linked to this encounter.

## 2024-05-15 NOTE — TELEPHONE ENCOUNTER
Pt requesting to be seen PRN per Dr. Mcgill.      ----- Message from Dickson Mcgill MD sent at 5/15/2024 10:41 AM CDT -----  RTC 4 month with lab

## 2024-06-18 ENCOUNTER — OFFICE VISIT (OUTPATIENT)
Dept: FAMILY MEDICINE | Facility: CLINIC | Age: 79
End: 2024-06-18
Payer: MEDICARE

## 2024-06-18 VITALS
RESPIRATION RATE: 14 BRPM | SYSTOLIC BLOOD PRESSURE: 152 MMHG | HEIGHT: 60 IN | BODY MASS INDEX: 23.85 KG/M2 | HEART RATE: 88 BPM | DIASTOLIC BLOOD PRESSURE: 74 MMHG | WEIGHT: 121.5 LBS | OXYGEN SATURATION: 98 %

## 2024-06-18 DIAGNOSIS — E03.9 HYPOTHYROIDISM, UNSPECIFIED TYPE: ICD-10-CM

## 2024-06-18 DIAGNOSIS — E78.5 DYSLIPIDEMIA: ICD-10-CM

## 2024-06-18 DIAGNOSIS — I70.0 AORTIC ATHEROSCLEROSIS: ICD-10-CM

## 2024-06-18 DIAGNOSIS — K51.40 PSEUDOPOLYPOSIS OF COLON WITHOUT COMPLICATION, UNSPECIFIED PART OF COLON: Chronic | ICD-10-CM

## 2024-06-18 DIAGNOSIS — K21.9 GASTROESOPHAGEAL REFLUX DISEASE, UNSPECIFIED WHETHER ESOPHAGITIS PRESENT: ICD-10-CM

## 2024-06-18 DIAGNOSIS — M81.0 OSTEOPOROSIS WITHOUT CURRENT PATHOLOGICAL FRACTURE, UNSPECIFIED OSTEOPOROSIS TYPE: ICD-10-CM

## 2024-06-18 DIAGNOSIS — G31.9 DEGENERATIVE DISEASE OF NERVOUS SYSTEM, UNSPECIFIED: ICD-10-CM

## 2024-06-18 DIAGNOSIS — E46 PROTEIN-CALORIE MALNUTRITION, UNSPECIFIED SEVERITY: ICD-10-CM

## 2024-06-18 DIAGNOSIS — Z95.5 STATUS POST INSERTION OF DRUG ELUTING CORONARY ARTERY STENT: ICD-10-CM

## 2024-06-18 DIAGNOSIS — I50.42 CHRONIC COMBINED SYSTOLIC AND DIASTOLIC HEART FAILURE: ICD-10-CM

## 2024-06-18 DIAGNOSIS — Z78.0 ASYMPTOMATIC MENOPAUSAL STATE: ICD-10-CM

## 2024-06-18 DIAGNOSIS — C50.919 HORMONE RECEPTOR POSITIVE BREAST CANCER, UNSPECIFIED LATERALITY: Primary | ICD-10-CM

## 2024-06-18 DIAGNOSIS — Z79.899 OTHER LONG TERM (CURRENT) DRUG THERAPY: ICD-10-CM

## 2024-06-18 DIAGNOSIS — Z12.31 ENCOUNTER FOR SCREENING MAMMOGRAM FOR MALIGNANT NEOPLASM OF BREAST: ICD-10-CM

## 2024-06-18 DIAGNOSIS — I50.23 ACUTE ON CHRONIC HFREF (HEART FAILURE WITH REDUCED EJECTION FRACTION): ICD-10-CM

## 2024-06-18 PROCEDURE — 99214 OFFICE O/P EST MOD 30 MIN: CPT | Mod: S$PBB,,, | Performed by: FAMILY MEDICINE

## 2024-06-18 PROCEDURE — 99214 OFFICE O/P EST MOD 30 MIN: CPT | Mod: PBBFAC | Performed by: FAMILY MEDICINE

## 2024-06-18 PROCEDURE — 99999 PR PBB SHADOW E&M-EST. PATIENT-LVL IV: CPT | Mod: PBBFAC,,, | Performed by: FAMILY MEDICINE

## 2024-06-18 RX ORDER — IBANDRONATE SODIUM 150 MG/1
150 TABLET, FILM COATED ORAL
Qty: 3 TABLET | Refills: 3 | Status: SHIPPED | OUTPATIENT
Start: 2024-06-18

## 2024-06-18 RX ORDER — OMEPRAZOLE 40 MG/1
40 CAPSULE, DELAYED RELEASE ORAL DAILY
Qty: 90 CAPSULE | Refills: 3 | Status: SHIPPED | OUTPATIENT
Start: 2024-06-18

## 2024-06-18 NOTE — PROGRESS NOTES
Subjective:       Patient ID: Vivien Burton is a 79 y.o. female.    Chief Complaint: Establish Care      Past Medical History:   Diagnosis Date    Acute on chronic combined systolic and diastolic heart failure 2022    Anemia     Basal cell carcinoma     Breast cancer     Breast cancer     Cancer     CHF (congestive heart failure)     Colon polyps     Coronary artery disease     GERD (gastroesophageal reflux disease)     Hyperlipidemia     Hypertension     Hypothyroidism     Nonrheumatic aortic (valve) stenosis 2018    Osteoporosis 2019    S/P TAVR (transcatheter aortic valve replacement) 2022    Squamous cell carcinoma of skin     Streptococcal bacteremia 2023    Thyroid disease     Transaminitis 2023       Past Surgical History:   Procedure Laterality Date    BREAST SURGERY      CARDIAC CATH COSURGEON N/A 2022    Procedure: Cardiac Cath Cosurgeon;  Surgeon: Dontae Wooten MD;  Location: [facility] CATH LAB;  Service: Cardiovascular;  Laterality: N/A;     SECTION, CLASSIC      COLONOSCOPY N/A 2018    Procedure: COLONOSCOPY;  Surgeon: Precious Castorena MD;  Location: [facility] ENDO;  Service: Endoscopy;  Laterality: N/A;    COLONOSCOPY N/A 3/31/2023    Procedure: COLONOSCOPY;  Surgeon: Mal Abrams MD;  Location: [facility] ENDO;  Service: Endoscopy;  Laterality: N/A;    HYSTERECTOMY      LEFT HEART CATHETERIZATION Left 2022    Procedure: Left heart cath;  Surgeon: Dontae Johns MD;  Location: [facility] CATH LAB;  Service: Cardiology;  Laterality: Left;    LEFT HEART CATHETERIZATION Left 2022    Procedure: Left heart cath;  Surgeon: Dontae Johns MD;  Location: [facility] CATH LAB;  Service: Cardiology;  Laterality: Left;    MASTECTOMY Right 2000    right breast      TONSILLECTOMY, ADENOIDECTOMY      TRANSCATHETER AORTIC VALVE REPLACEMENT (TAVR) N/A 2022    Procedure: REPLACEMENT, AORTIC VALVE, TRANSCATHETER (TAVR);  Surgeon: Dontae WEST  MD Andreas;  Location: [facility] CATH LAB;  Service: Cardiology;  Laterality: N/A;        Social History     Socioeconomic History    Marital status:    Tobacco Use    Smoking status: Former     Current packs/day: 0.00     Types: Cigarettes     Quit date: 2000     Years since quittin.3    Smokeless tobacco: Never    Tobacco comments:     quit 20 years ago   Substance and Sexual Activity    Alcohol use: Not Currently     Alcohol/week: 2.0 standard drinks of alcohol     Types: 2 Shots of liquor per week     Comment: per pt 2 burbon drinks per night however none in last month    Drug use: No    Sexual activity: Not Currently     Social Determinants of Health     Financial Resource Strain: Low Risk  (2024)    Overall Financial Resource Strain (CARDIA)     Difficulty of Paying Living Expenses: Not hard at all   Food Insecurity: No Food Insecurity (2024)    Hunger Vital Sign     Worried About Running Out of Food in the Last Year: Never true     Ran Out of Food in the Last Year: Never true   Transportation Needs: No Transportation Needs (2024)    PRAPARE - Transportation     Lack of Transportation (Medical): No     Lack of Transportation (Non-Medical): No   Physical Activity: Sufficiently Active (2024)    Exercise Vital Sign     Days of Exercise per Week: 6 days     Minutes of Exercise per Session: 60 min   Stress: No Stress Concern Present (2024)    [employer] [place] [employer] - Occupational Stress Questionnaire     Feeling of Stress : Only a little   Housing Stability: Low Risk  (10/21/2023)    Housing Stability Vital Sign     Unable to Pay for Housing in the Last Year: No     Number of Places Lived in the Last Year: 1     Unstable Housing in the Last Year: No       Family History   Problem Relation Name Age of Onset    Cancer Sister      Liver cancer Sister      Melanoma Sister      Cancer Brother      Breast cancer Neg Hx      Psoriasis Neg Hx      Lupus Neg Hx       Eczema Neg Hx         Review of patient's allergies indicates:  No Known Allergies       Current Outpatient Medications:     ascorbic acid (VITAMIN C ORAL), Take by mouth once daily., Disp: , Rfl:     aspirin (ECOTRIN) 81 MG EC tablet, Take 81 mg by mouth once a week., Disp: , Rfl:     cetirizine HCl (ZYRTEC ORAL), Take by mouth once daily., Disp: , Rfl:     ergocalciferol, vitamin D2, (VITAMIN D2 ORAL), Take by mouth once daily., Disp: , Rfl:     ferrous sulfate (FEOSOL) Tab tablet, Take 1 tablet by mouth daily with breakfast. 27mg, Disp: , Rfl:     Lactobacillus acidophilus (ACIDOPHILUS ORAL), Take by mouth once daily., Disp: , Rfl:     levothyroxine (SYNTHROID) 75 MCG tablet, TAKE 1 TABLET BY MOUTH EVERY DAY, Disp: 90 tablet, Rfl: 4    losartan (COZAAR) 25 MG tablet, Take 1 tablet (25 mg total) by mouth once daily., Disp: 90 tablet, Rfl: 3    simvastatin (ZOCOR) 40 MG tablet, Take 1 tablet (40 mg total) by mouth every evening., Disp: 90 tablet, Rfl: 3    VITAMIN B COMPLEX ORAL, Take by mouth once daily., Disp: , Rfl:     ibandronate (BONIVA) 150 mg tablet, Take 1 tablet (150 mg total) by mouth every 30 days., Disp: 3 tablet, Rfl: 3    omeprazole (PRILOSEC) 40 MG capsule, Take 1 capsule (40 mg total) by mouth once daily., Disp: 90 capsule, Rfl: 3    HPI  Review of Systems   Constitutional:  Negative for activity change, appetite change, chills, diaphoresis and fever.   HENT:  Negative for congestion, ear pain, postnasal drip, rhinorrhea and sore throat.    Eyes:  Negative for pain, discharge, redness and itching.   Respiratory:  Negative for cough and shortness of breath.    Cardiovascular:  Negative for chest pain, palpitations and leg swelling.        Has white coat syndrome, is slightly elevated today.   Gastrointestinal:  Negative for abdominal distention, abdominal pain, constipation, diarrhea and nausea.   Genitourinary:  Negative for difficulty urinating, dysuria, frequency and urgency.   Musculoskeletal:   Negative for arthralgias.   Skin:  Negative for color change, rash and wound.   All other systems reviewed and are negative.      Objective:      Physical Exam  Vitals and nursing note reviewed.   Constitutional:       Appearance: Normal appearance. She is normal weight.   HENT:      Head: Normocephalic.      Nose: Nose normal.   Eyes:      Extraocular Movements: Extraocular movements intact.      Conjunctiva/sclera: Conjunctivae normal.      Pupils: Pupils are equal, round, and reactive to light.   Cardiovascular:      Rate and Rhythm: Normal rate and regular rhythm.      Heart sounds: Normal heart sounds. No murmur heard.  Pulmonary:      Effort: Pulmonary effort is normal. No respiratory distress.      Breath sounds: Normal breath sounds.   Musculoskeletal:         General: Normal range of motion.      Cervical back: Normal range of motion and neck supple.   Skin:     General: Skin is warm and dry.   Neurological:      General: No focal deficit present.      Mental Status: She is alert and oriented to person, place, and time.   Psychiatric:         Mood and Affect: Mood normal.         Behavior: Behavior normal.         Assessment:       1. Hormone receptor positive breast cancer, unspecified laterality    2. Pseudopolyposis of colon without complication, unspecified part of colon    3. Protein-calorie malnutrition, unspecified severity    4. Degenerative disease of nervous system, unspecified    5. Chronic combined systolic and diastolic heart failure    6. Aortic atherosclerosis    7. Acute on chronic HFrEF (heart failure with reduced ejection fraction)    8. Hypothyroidism, unspecified type    9. Encounter for screening mammogram for malignant neoplasm of breast    10. Asymptomatic menopausal state    11. Other long term (current) drug therapy    12. Status post insertion of drug eluting coronary artery stent    13. Dyslipidemia, baseline     14. Osteoporosis without current pathological fracture,  unspecified osteoporosis type    15. Gastroesophageal reflux disease, unspecified whether esophagitis present        Plan:         Hormone receptor positive breast cancer, unspecified laterality  -     Mammo Digital Screening Bilat w/ Krishna; Future; Expected date: 12/02/2024  -     DXA Bone Density Appendicular Skeleton; Future; Expected date: 08/12/2024    Pseudopolyposis of colon without complication, unspecified part of colon  Comments:  Followed by Dr Castorena, GI    Protein-calorie malnutrition, unspecified severity  Comments:  resolving    Degenerative disease of nervous system, unspecified  Comments:  neuropathy from chemo    Chronic combined systolic and diastolic heart failure  Comments:  stable, followed by Dr Mccoy    Aortic atherosclerosis  Comments:  followed by Dr Mccoy    Acute on chronic HFrEF (heart failure with reduced ejection fraction)  Comments:  stable and followed by Dr Mccoy    Hypothyroidism, unspecified type  Comments:  has been Stable, but TSH slightly elevated at last visit. Will repeat TSH today  Orders:  -     TSH; Future; Expected date: 06/18/2024    Encounter for screening mammogram for malignant neoplasm of breast  -     Mammo Digital Screening Bilat w/ Krishna; Future; Expected date: 12/02/2024    Asymptomatic menopausal state  -     DXA Bone Density Appendicular Skeleton; Future; Expected date: 08/12/2024  -     Vitamin D; Future; Expected date: 06/18/2024  -     Vitamin B12; Future; Expected date: 06/18/2024    Other long term (current) drug therapy  -     Vitamin D; Future; Expected date: 06/18/2024  -     Vitamin B12; Future; Expected date: 06/18/2024    Status post insertion of drug eluting coronary artery stent    Dyslipidemia, baseline     Osteoporosis without current pathological fracture, unspecified osteoporosis type  -     ibandronate (BONIVA) 150 mg tablet; Take 1 tablet (150 mg total) by mouth every 30 days.  Dispense: 3 tablet; Refill: 3    Gastroesophageal reflux disease,  unspecified whether esophagitis present  -     omeprazole (PRILOSEC) 40 MG capsule; Take 1 capsule (40 mg total) by mouth once daily.  Dispense: 90 capsule; Refill: 3        Risks, benefits, and side effects were discussed with the patient. All questions were answered to the fullest satisfaction of the patient, and pt verbalized understanding and agreement to treatment plan. Pt was to call with any new or worsening symptoms, or present to the ER.        Jeri Moreira MD

## 2024-06-21 ENCOUNTER — LAB VISIT (OUTPATIENT)
Dept: LAB | Facility: HOSPITAL | Age: 79
End: 2024-06-21
Attending: FAMILY MEDICINE
Payer: MEDICARE

## 2024-06-21 DIAGNOSIS — E03.9 HYPOTHYROIDISM, UNSPECIFIED TYPE: ICD-10-CM

## 2024-06-21 DIAGNOSIS — Z78.0 ASYMPTOMATIC MENOPAUSAL STATE: ICD-10-CM

## 2024-06-21 DIAGNOSIS — Z79.899 OTHER LONG TERM (CURRENT) DRUG THERAPY: ICD-10-CM

## 2024-06-21 LAB
25(OH)D3+25(OH)D2 SERPL-MCNC: 67 NG/ML (ref 30–96)
TSH SERPL DL<=0.005 MIU/L-ACNC: 1.24 UIU/ML (ref 0.4–4)
VIT B12 SERPL-MCNC: 965 PG/ML (ref 210–950)

## 2024-06-21 PROCEDURE — 84443 ASSAY THYROID STIM HORMONE: CPT | Performed by: FAMILY MEDICINE

## 2024-06-21 PROCEDURE — 36415 COLL VENOUS BLD VENIPUNCTURE: CPT | Performed by: FAMILY MEDICINE

## 2024-06-21 PROCEDURE — 82607 VITAMIN B-12: CPT | Performed by: FAMILY MEDICINE

## 2024-06-21 PROCEDURE — 82306 VITAMIN D 25 HYDROXY: CPT | Performed by: FAMILY MEDICINE

## 2024-07-30 DIAGNOSIS — Z00.00 ENCOUNTER FOR MEDICARE ANNUAL WELLNESS EXAM: ICD-10-CM

## 2024-08-07 ENCOUNTER — OFFICE VISIT (OUTPATIENT)
Dept: FAMILY MEDICINE | Facility: CLINIC | Age: 79
End: 2024-08-07
Payer: MEDICARE

## 2024-08-07 VITALS
SYSTOLIC BLOOD PRESSURE: 138 MMHG | OXYGEN SATURATION: 98 % | RESPIRATION RATE: 14 BRPM | HEART RATE: 74 BPM | WEIGHT: 121.38 LBS | BODY MASS INDEX: 23.83 KG/M2 | DIASTOLIC BLOOD PRESSURE: 72 MMHG | HEIGHT: 60 IN

## 2024-08-07 DIAGNOSIS — U07.1 COVID: Primary | ICD-10-CM

## 2024-08-07 PROCEDURE — 99214 OFFICE O/P EST MOD 30 MIN: CPT | Mod: S$PBB,,, | Performed by: FAMILY MEDICINE

## 2024-08-07 PROCEDURE — 99999 PR PBB SHADOW E&M-EST. PATIENT-LVL III: CPT | Mod: PBBFAC,,, | Performed by: FAMILY MEDICINE

## 2024-08-07 PROCEDURE — 99213 OFFICE O/P EST LOW 20 MIN: CPT | Mod: PBBFAC | Performed by: FAMILY MEDICINE

## 2024-08-07 RX ORDER — AZITHROMYCIN 250 MG/1
TABLET, FILM COATED ORAL
Qty: 6 TABLET | Refills: 0 | Status: SHIPPED | OUTPATIENT
Start: 2024-08-07 | End: 2024-08-12

## 2024-08-07 RX ORDER — AZELASTINE 1 MG/ML
2 SPRAY, METERED NASAL 2 TIMES DAILY
COMMUNITY
Start: 2024-08-01

## 2024-08-13 ENCOUNTER — HOSPITAL ENCOUNTER (OUTPATIENT)
Dept: RADIOLOGY | Facility: HOSPITAL | Age: 79
Discharge: HOME | End: 2024-08-13
Attending: FAMILY MEDICINE
Payer: MEDICARE

## 2024-08-13 DIAGNOSIS — Z78.0 ASYMPTOMATIC MENOPAUSAL STATE: ICD-10-CM

## 2024-08-13 DIAGNOSIS — C50.919 HORMONE RECEPTOR POSITIVE BREAST CANCER, UNSPECIFIED LATERALITY: ICD-10-CM

## 2024-08-13 PROCEDURE — 77091 TBS TECHL CALCULATION ONLY: CPT

## 2024-08-13 PROCEDURE — 77080 DXA BONE DENSITY AXIAL: CPT | Mod: 26,,, | Performed by: RADIOLOGY

## 2024-08-13 PROCEDURE — 77080 DXA BONE DENSITY AXIAL: CPT | Mod: TC

## 2024-08-16 ENCOUNTER — TELEPHONE (OUTPATIENT)
Dept: FAMILY MEDICINE | Facility: CLINIC | Age: 79
End: 2024-08-16
Payer: MEDICARE

## 2024-08-16 NOTE — TELEPHONE ENCOUNTER
----- Message from Jeri Moreira MD sent at 8/13/2024 12:39 PM CDT -----  Please continue Boniva.  Osteoporosis is still noted in your DEXA scan

## 2024-09-18 ENCOUNTER — NURSE TRIAGE (OUTPATIENT)
Dept: ADMINISTRATIVE | Facility: CLINIC | Age: 79
End: 2024-09-18
Payer: MEDICARE

## 2024-09-18 ENCOUNTER — OFFICE VISIT (OUTPATIENT)
Dept: FAMILY MEDICINE | Facility: CLINIC | Age: 79
End: 2024-09-18
Payer: MEDICARE

## 2024-09-18 VITALS
DIASTOLIC BLOOD PRESSURE: 88 MMHG | HEIGHT: 60 IN | BODY MASS INDEX: 25.52 KG/M2 | SYSTOLIC BLOOD PRESSURE: 138 MMHG | OXYGEN SATURATION: 98 % | RESPIRATION RATE: 14 BRPM | HEART RATE: 87 BPM | WEIGHT: 130 LBS

## 2024-09-18 DIAGNOSIS — L30.9 DERMATITIS: ICD-10-CM

## 2024-09-18 DIAGNOSIS — I10 HYPERTENSION, UNSPECIFIED TYPE: Primary | ICD-10-CM

## 2024-09-18 PROCEDURE — 99214 OFFICE O/P EST MOD 30 MIN: CPT | Mod: S$PBB,,, | Performed by: FAMILY MEDICINE

## 2024-09-18 PROCEDURE — 96372 THER/PROPH/DIAG INJ SC/IM: CPT | Mod: PBBFAC

## 2024-09-18 PROCEDURE — G2211 COMPLEX E/M VISIT ADD ON: HCPCS | Mod: S$PBB,,, | Performed by: FAMILY MEDICINE

## 2024-09-18 PROCEDURE — 99999PBSHW PR PBB SHADOW TECHNICAL ONLY FILED TO HB: Mod: PBBFAC,,,

## 2024-09-18 PROCEDURE — 99999 PR PBB SHADOW E&M-EST. PATIENT-LVL IV: CPT | Mod: PBBFAC,,, | Performed by: FAMILY MEDICINE

## 2024-09-18 PROCEDURE — 99214 OFFICE O/P EST MOD 30 MIN: CPT | Mod: PBBFAC | Performed by: FAMILY MEDICINE

## 2024-09-18 RX ORDER — DEXAMETHASONE SODIUM PHOSPHATE 4 MG/ML
8 INJECTION, SOLUTION INTRA-ARTICULAR; INTRALESIONAL; INTRAMUSCULAR; INTRAVENOUS; SOFT TISSUE ONCE
Status: COMPLETED | OUTPATIENT
Start: 2024-09-18 | End: 2024-09-18

## 2024-09-18 RX ORDER — DEXAMETHASONE SODIUM PHOSPHATE 4 MG/ML
8 INJECTION, SOLUTION INTRA-ARTICULAR; INTRALESIONAL; INTRAMUSCULAR; INTRAVENOUS; SOFT TISSUE ONCE
Status: DISCONTINUED | OUTPATIENT
Start: 2024-09-18 | End: 2024-09-18

## 2024-09-18 RX ORDER — CAMPHOR AND MENTHOL 5; 5 MG/ML; MG/ML
LOTION TOPICAL
Qty: 222 ML | Refills: 2 | Status: SHIPPED | OUTPATIENT
Start: 2024-09-18

## 2024-09-18 RX ORDER — MONTELUKAST SODIUM 10 MG/1
10 TABLET ORAL NIGHTLY
Qty: 90 TABLET | Refills: 3 | Status: SHIPPED | OUTPATIENT
Start: 2024-09-18

## 2024-09-18 RX ORDER — PREDNISONE 20 MG/1
20 TABLET ORAL DAILY
Qty: 7 TABLET | Refills: 0 | Status: SHIPPED | OUTPATIENT
Start: 2024-09-18

## 2024-09-18 RX ORDER — LIDOCAINE HYDROCHLORIDE AND HYDROCORTISONE ACETATE 30; 5 MG/G; MG/G
1 CREAM TOPICAL 3 TIMES DAILY
Qty: 85 G | Refills: 2 | Status: SHIPPED | OUTPATIENT
Start: 2024-09-18

## 2024-09-18 RX ORDER — CLONIDINE HYDROCHLORIDE 0.1 MG/1
0.1 TABLET ORAL
Status: SHIPPED | OUTPATIENT
Start: 2024-09-18

## 2024-09-18 RX ORDER — CLONIDINE HYDROCHLORIDE 0.1 MG/1
0.1 TABLET ORAL
Status: COMPLETED | OUTPATIENT
Start: 2024-09-18 | End: 2024-09-18

## 2024-09-18 RX ADMIN — CLONIDINE HYDROCHLORIDE 0.1 MG: 0.1 TABLET ORAL at 04:09

## 2024-09-18 RX ADMIN — DEXAMETHASONE SODIUM PHOSPHATE 8 MG: 4 INJECTION, SOLUTION INTRA-ARTICULAR; INTRALESIONAL; INTRAMUSCULAR; INTRAVENOUS; SOFT TISSUE at 05:09

## 2024-09-18 NOTE — PROGRESS NOTES
Subjective:       Patient ID: Vivien Burton is a 79 y.o. female.    Chief Complaint: Urticaria (Arms, legs, abdomen, neck, back. X 3 weeks )      Past Medical History:   Diagnosis Date    Acute on chronic combined systolic and diastolic heart failure 2022    Anemia     Basal cell carcinoma     Breast cancer     Breast cancer     Cancer     CHF (congestive heart failure)     Colon polyps     Coronary artery disease     GERD (gastroesophageal reflux disease)     Hyperlipidemia     Hypertension     Hypothyroidism     Nonrheumatic aortic (valve) stenosis 2018    Osteoporosis 2019    S/P TAVR (transcatheter aortic valve replacement) 2022    Squamous cell carcinoma of skin     Streptococcal bacteremia 2023    Thyroid disease     Transaminitis 2023       Past Surgical History:   Procedure Laterality Date    BREAST SURGERY      CARDIAC CATH COSURGEON N/A 2022    Procedure: Cardiac Cath Cosurgeon;  Surgeon: Dontae Wooten MD;  Location: [facility] CATH LAB;  Service: Cardiovascular;  Laterality: N/A;     SECTION, CLASSIC      COLONOSCOPY N/A 2018    Procedure: COLONOSCOPY;  Surgeon: Precious Castorena MD;  Location: [facility];  Service: Endoscopy;  Laterality: N/A;    COLONOSCOPY N/A 3/31/2023    Procedure: COLONOSCOPY;  Surgeon: Mal Abrams MD;  Location: [facility] ENDO;  Service: Endoscopy;  Laterality: N/A;    HYSTERECTOMY      LEFT HEART CATHETERIZATION Left 2022    Procedure: Left heart cath;  Surgeon: Dontae Johns MD;  Location: [facility] CATH LAB;  Service: Cardiology;  Laterality: Left;    LEFT HEART CATHETERIZATION Left 2022    Procedure: Left heart cath;  Surgeon: Dontae Johns MD;  Location: [facility] CATH LAB;  Service: Cardiology;  Laterality: Left;    MASTECTOMY Right 2000    right breast      TONSILLECTOMY, ADENOIDECTOMY      TRANSCATHETER AORTIC VALVE REPLACEMENT (TAVR) N/A 2022    Procedure: REPLACEMENT, AORTIC VALVE,  TRANSCATHETER (TAVR);  Surgeon: Dontae Johns MD;  Location: [facility] CATH LAB;  Service: Cardiology;  Laterality: N/A;        Social History     Socioeconomic History    Marital status:    Tobacco Use    Smoking status: Former     Current packs/day: 0.00     Types: Cigarettes     Quit date: 2000     Years since quittin.6    Smokeless tobacco: Never    Tobacco comments:     quit 20 years ago   Substance and Sexual Activity    Alcohol use: Not Currently     Alcohol/week: 2.0 standard drinks of alcohol     Types: 2 Shots of liquor per week     Comment: per pt 2 burbon drinks per night however none in last month    Drug use: No    Sexual activity: Not Currently     Social Determinants of Health     Financial Resource Strain: Low Risk  (2024)    Overall Financial Resource Strain (CARDIA)     Difficulty of Paying Living Expenses: Not hard at all   Food Insecurity: No Food Insecurity (2024)    Hunger Vital Sign     Worried About Running Out of Food in the Last Year: Never true     Ran Out of Food in the Last Year: Never true   Transportation Needs: No Transportation Needs (2024)    PRAPARE - Transportation     Lack of Transportation (Medical): No     Lack of Transportation (Non-Medical): No   Physical Activity: Sufficiently Active (2024)    Exercise Vital Sign     Days of Exercise per Week: 6 days     Minutes of Exercise per Session: 60 min   Stress: No Stress Concern Present (2024)    [employer] - Occupational Stress Questionnaire     Feeling of Stress : Only a little   Housing Stability: Low Risk  (10/21/2023)    Housing Stability Vital Sign     Unable to Pay for Housing in the Last Year: No     Number of Places Lived in the Last Year: 1     Unstable Housing in the Last Year: No       Family History   Problem Relation Name Age of Onset    Cancer Sister      Liver cancer Sister      Melanoma Sister      Cancer Brother      Breast cancer Neg Hx       Psoriasis Neg Hx      Lupus Neg Hx      Eczema Neg Hx         Review of patient's allergies indicates:  No Known Allergies       Current Outpatient Medications:     ascorbic acid (VITAMIN C ORAL), Take by mouth once daily., Disp: , Rfl:     aspirin (ECOTRIN) 81 MG EC tablet, Take 81 mg by mouth once a week., Disp: , Rfl:     cetirizine HCl (ZYRTEC ORAL), Take by mouth once daily., Disp: , Rfl:     ferrous sulfate (FEOSOL) Tab tablet, Take 1 tablet by mouth daily with breakfast. 27mg, Disp: , Rfl:     ibandronate (BONIVA) 150 mg tablet, Take 1 tablet (150 mg total) by mouth every 30 days., Disp: 3 tablet, Rfl: 3    Lactobacillus acidophilus (ACIDOPHILUS ORAL), Take by mouth once daily., Disp: , Rfl:     levothyroxine (SYNTHROID) 75 MCG tablet, TAKE 1 TABLET BY MOUTH EVERY DAY, Disp: 90 tablet, Rfl: 4    losartan (COZAAR) 25 MG tablet, Take 1 tablet (25 mg total) by mouth once daily., Disp: 90 tablet, Rfl: 3    omeprazole (PRILOSEC) 40 MG capsule, Take 1 capsule (40 mg total) by mouth once daily., Disp: 90 capsule, Rfl: 3    simvastatin (ZOCOR) 40 MG tablet, Take 1 tablet (40 mg total) by mouth every evening., Disp: 90 tablet, Rfl: 3    camphor-menthol 0.5-0.5% (SARNA ORIGINAL) lotion, Apply topically as needed for Itching., Disp: 222 mL, Rfl: 2    LIDOcaine HCl-hydrocortison ac (LIDAMANTLE-HC) 3-0.5 % topical cream, Apply 1 Application topically 3 (three) times daily., Disp: 85 g, Rfl: 2    montelukast (SINGULAIR) 10 mg tablet, Take 1 tablet (10 mg total) by mouth every evening., Disp: 90 tablet, Rfl: 3    predniSONE (DELTASONE) 20 MG tablet, Take 1 tablet (20 mg total) by mouth once daily., Disp: 7 tablet, Rfl: 0    Current Facility-Administered Medications:     cloNIDine tablet 0.1 mg, 0.1 mg, Oral, 1 time in Clinic/HOD,     Ms Burton is here with a 3 week onset of moderate to severe angry red rash on her extremities and trunk. She does not recall any new meds or any changes in her routine meds. She states it  started a few days before  labor day weekend . She had a steroid shot and a short course of oral steroids on labor day weekend.  I contacted her dermatologist, Dr. Van and she will see Ms. Burton tomorrow in clinic.  We discussed whether or not to give her the steroid injection and although Dr. Kerns and I both would like to wait on the steroid injection Ms. Burton blood pressure is now in malignant stage due to the extreme itching and pain she is experiencing.  Her blood pressure has increased from 146/78 to 250/60.  She states that she wants to claw  her skin off.  I went ahead and gave Ms. Burton a Decadron 8 mg injection and within 10 minutes she was feeling better and her skin was more pink than red.  She already received 1 clonidine for her blood pressure but a 2nd clonidine was given when her blood pressure was 250/60.  She did finally returned to a normal blood pressure  25 minutes after her 2nd clonidine    Urticaria      Review of Systems   Skin:  Positive for rash.       Objective:      Physical Exam  Vitals and nursing note reviewed.   Constitutional:       Appearance: Normal appearance. She is normal weight.   HENT:      Head: Normocephalic.      Nose: Nose normal.   Eyes:      Extraocular Movements: Extraocular movements intact.      Conjunctiva/sclera: Conjunctivae normal.      Pupils: Pupils are equal, round, and reactive to light.   Musculoskeletal:         General: Normal range of motion.      Cervical back: Normal range of motion and neck supple.   Skin:     General: Skin is warm and dry.      Findings: Erythema and rash present.      Comments: See media tab   Neurological:      General: No focal deficit present.      Mental Status: She is alert and oriented to person, place, and time.   Psychiatric:         Mood and Affect: Mood normal.         Behavior: Behavior normal.         Assessment:       1. Hypertension, unspecified type    2. Dermatitis        Plan:         Hypertension,  unspecified type  -     cloNIDine tablet 0.1 mg  -     cloNIDine tablet 0.1 mg    Dermatitis  -     predniSONE (DELTASONE) 20 MG tablet; Take 1 tablet (20 mg total) by mouth once daily.  Dispense: 7 tablet; Refill: 0  -     Discontinue: dexAMETHasone injection 8 mg  -     camphor-menthol 0.5-0.5% (SARNA ORIGINAL) lotion; Apply topically as needed for Itching.  Dispense: 222 mL; Refill: 2  -     LIDOcaine HCl-hydrocortison ac (LIDAMANTLE-HC) 3-0.5 % topical cream; Apply 1 Application topically 3 (three) times daily.  Dispense: 85 g; Refill: 2  -     montelukast (SINGULAIR) 10 mg tablet; Take 1 tablet (10 mg total) by mouth every evening.  Dispense: 90 tablet; Refill: 3  -     dexAMETHasone injection 8 mg    To see Dr. Van, Dermatology tomorrow at 1:30 a.m.    Risks, benefits, and side effects were discussed with the patient. All questions were answered to the fullest satisfaction of the patient, and pt verbalized understanding and agreement to treatment plan. Pt was to call with any new or worsening symptoms, or present to the ER.        Jeri Moreira MD

## 2024-09-18 NOTE — TELEPHONE ENCOUNTER
Spoke with pt who reports that she has been having hives to body for 2-3 weeks that are itchy. States she took steroids in August, and also reports testing positive for COVID in August as well. Pt states that she has used antihistamine with no relief. Advised to be seen today in office. Appointment scheduled     Reason for Disposition   MODERATE-SEVERE hives persist (i.e., hives interfere with normal activities or work) and taking antihistamine (e.g., Benadryl, Claritin) > 24 hours    Additional Information   Negative: Difficulty breathing or wheezing now   Negative: Rapid onset of swollen tongue   Negative: Rapid onset of hoarseness or cough   Negative: Very weak (can't stand)   Negative: Difficult to awaken or acting confused (e.g., disoriented, slurred speech)   Negative: Life-threatening reaction (anaphylaxis) in the past to similar substance (e.g., food, insect bite/sting, chemical, etc.) and < 2 hours since exposure   Negative: Sounds like a life-threatening emergency to the triager   Negative: Swollen tongue   Negative: Widespread hives, itching, or facial swelling and onset < 2 hours of exposure to high-risk allergen (e.g., 1st dose of antibiotic, nuts, sting)   Negative: Patient sounds very sick or weak to the triager    Protocols used: Hives-A-OH

## 2024-09-19 ENCOUNTER — LAB VISIT (OUTPATIENT)
Dept: LAB | Facility: HOSPITAL | Age: 79
End: 2024-09-19
Attending: STUDENT IN AN ORGANIZED HEALTH CARE EDUCATION/TRAINING PROGRAM
Payer: MEDICARE

## 2024-09-19 ENCOUNTER — OFFICE VISIT (OUTPATIENT)
Dept: DERMATOLOGY | Facility: CLINIC | Age: 79
End: 2024-09-19
Payer: MEDICARE

## 2024-09-19 ENCOUNTER — PATIENT MESSAGE (OUTPATIENT)
Dept: FAMILY MEDICINE | Facility: CLINIC | Age: 79
End: 2024-09-19
Payer: MEDICARE

## 2024-09-19 DIAGNOSIS — R21 RASH: ICD-10-CM

## 2024-09-19 DIAGNOSIS — R21 RASH: Primary | ICD-10-CM

## 2024-09-19 DIAGNOSIS — L29.9 PRURITUS: ICD-10-CM

## 2024-09-19 LAB
ALBUMIN SERPL BCP-MCNC: 4 G/DL (ref 3.5–5.2)
ALP SERPL-CCNC: 69 U/L (ref 55–135)
ALT SERPL W/O P-5'-P-CCNC: 11 U/L (ref 10–44)
ANION GAP SERPL CALC-SCNC: 10 MMOL/L (ref 8–16)
AST SERPL-CCNC: 13 U/L (ref 10–40)
BASOPHILS # BLD AUTO: 0.02 K/UL (ref 0–0.2)
BASOPHILS NFR BLD: 0.2 % (ref 0–1.9)
BILIRUB SERPL-MCNC: 0.4 MG/DL (ref 0.1–1)
BUN SERPL-MCNC: 11 MG/DL (ref 8–23)
CALCIUM SERPL-MCNC: 9.3 MG/DL (ref 8.7–10.5)
CHLORIDE SERPL-SCNC: 105 MMOL/L (ref 95–110)
CO2 SERPL-SCNC: 23 MMOL/L (ref 23–29)
CREAT SERPL-MCNC: 0.8 MG/DL (ref 0.5–1.4)
DIFFERENTIAL METHOD BLD: ABNORMAL
EOSINOPHIL # BLD AUTO: 0 K/UL (ref 0–0.5)
EOSINOPHIL NFR BLD: 0.4 % (ref 0–8)
ERYTHROCYTE [DISTWIDTH] IN BLOOD BY AUTOMATED COUNT: 14.1 % (ref 11.5–14.5)
EST. GFR  (NO RACE VARIABLE): >60 ML/MIN/1.73 M^2
GLUCOSE SERPL-MCNC: 113 MG/DL (ref 70–110)
HCT VFR BLD AUTO: 31.7 % (ref 37–48.5)
HGB BLD-MCNC: 10.9 G/DL (ref 12–16)
IMM GRANULOCYTES # BLD AUTO: 0.2 K/UL (ref 0–0.04)
IMM GRANULOCYTES NFR BLD AUTO: 1.9 % (ref 0–0.5)
LYMPHOCYTES # BLD AUTO: 1.6 K/UL (ref 1–4.8)
LYMPHOCYTES NFR BLD: 15.4 % (ref 18–48)
MCH RBC QN AUTO: 31.6 PG (ref 27–31)
MCHC RBC AUTO-ENTMCNC: 34.4 G/DL (ref 32–36)
MCV RBC AUTO: 92 FL (ref 82–98)
MONOCYTES # BLD AUTO: 0.7 K/UL (ref 0.3–1)
MONOCYTES NFR BLD: 6.9 % (ref 4–15)
NEUTROPHILS # BLD AUTO: 7.9 K/UL (ref 1.8–7.7)
NEUTROPHILS NFR BLD: 75.2 % (ref 38–73)
NRBC BLD-RTO: 0 /100 WBC
PLATELET # BLD AUTO: 190 K/UL (ref 150–450)
PMV BLD AUTO: 9.6 FL (ref 9.2–12.9)
POTASSIUM SERPL-SCNC: 4 MMOL/L (ref 3.5–5.1)
PROT SERPL-MCNC: 5.9 G/DL (ref 6–8.4)
RBC # BLD AUTO: 3.45 M/UL (ref 4–5.4)
SODIUM SERPL-SCNC: 138 MMOL/L (ref 136–145)
WBC # BLD AUTO: 10.52 K/UL (ref 3.9–12.7)

## 2024-09-19 PROCEDURE — 85025 COMPLETE CBC W/AUTO DIFF WBC: CPT | Performed by: STUDENT IN AN ORGANIZED HEALTH CARE EDUCATION/TRAINING PROGRAM

## 2024-09-19 PROCEDURE — 36415 COLL VENOUS BLD VENIPUNCTURE: CPT | Performed by: STUDENT IN AN ORGANIZED HEALTH CARE EDUCATION/TRAINING PROGRAM

## 2024-09-19 PROCEDURE — 11104 PUNCH BX SKIN SINGLE LESION: CPT | Mod: AQ,S$GLB,, | Performed by: STUDENT IN AN ORGANIZED HEALTH CARE EDUCATION/TRAINING PROGRAM

## 2024-09-19 PROCEDURE — 88312 SPECIAL STAINS GROUP 1: CPT | Mod: 26,,, | Performed by: PATHOLOGY

## 2024-09-19 PROCEDURE — 88305 TISSUE EXAM BY PATHOLOGIST: CPT | Performed by: PATHOLOGY

## 2024-09-19 PROCEDURE — 80053 COMPREHEN METABOLIC PANEL: CPT | Performed by: STUDENT IN AN ORGANIZED HEALTH CARE EDUCATION/TRAINING PROGRAM

## 2024-09-19 PROCEDURE — 88312 SPECIAL STAINS GROUP 1: CPT | Performed by: PATHOLOGY

## 2024-09-19 PROCEDURE — 99214 OFFICE O/P EST MOD 30 MIN: CPT | Mod: 25,AQ,S$GLB, | Performed by: STUDENT IN AN ORGANIZED HEALTH CARE EDUCATION/TRAINING PROGRAM

## 2024-09-19 PROCEDURE — 88305 TISSUE EXAM BY PATHOLOGIST: CPT | Mod: 26,,, | Performed by: PATHOLOGY

## 2024-09-19 RX ORDER — PREDNISONE 10 MG/1
TABLET ORAL
Qty: 40 TABLET | Refills: 0 | Status: SHIPPED | OUTPATIENT
Start: 2024-09-19

## 2024-09-19 RX ORDER — TRIAMCINOLONE ACETONIDE 1 MG/G
CREAM TOPICAL 2 TIMES DAILY
Qty: 454 G | Refills: 1 | Status: SHIPPED | OUTPATIENT
Start: 2024-09-19

## 2024-09-19 NOTE — PROGRESS NOTES
Subjective:      Patient ID:  Vivien Burton is a 79 y.o. female who presents for   Chief Complaint   Patient presents with    Rash     Entire body     LOV 05/11/2022    Patient is coming in today for a rash over her body x's 4 weeks.  She had covid at beginning of August for about 2 weeks. She took otc. About 1 week after she got better she developed rash. She went to urgent care- given medrol dose pack and a shot. Never fully went went away. She then self- treated with cold compresses. She thought there may be a yeast and treated with lamisil. Then did cortisone 10 and took aleve. Then she took 3 days worth of bendryl. Then took 3 days worth of claritin.   She feels that vaseline and a cold shower help.   She gave     States that the rash it very itchy. Patient has  a steroid shot after the first week and also had a dexamethasone shot yesterday. Patient went to PCP yesterday and was given montelukast 10 mg tab, prednisone 20 mg tab and steroid free anti itch lotion. Patient has not started these medications. Patient states that she has tried OTC topicals with no relief.     Started prilosec in June/ July- takes every other day.   She was given clonidine yesterday in clinic.   Current Outpatient Medications:   ·  ascorbic acid (VITAMIN C ORAL), Take by mouth once daily., Disp: , Rfl:   ·  aspirin (ECOTRIN) 81 MG EC tablet, Take 81 mg by mouth once a week., Disp: , Rfl:   ·  camphor-menthol 0.5-0.5% (SARNA ORIGINAL) lotion, Apply topically as needed for Itching., Disp: 222 mL, Rfl: 2  ·  ferrous sulfate (FEOSOL) Tab tablet, Take 1 tablet by mouth daily with breakfast. 27mg, Disp: , Rfl:   ·  ibandronate (BONIVA) 150 mg tablet, Take 1 tablet (150 mg total) by mouth every 30 days., Disp: 3 tablet, Rfl: 3  ·  Lactobacillus acidophilus (ACIDOPHILUS ORAL), Take by mouth once daily., Disp: , Rfl:   ·  levothyroxine (SYNTHROID) 75 MCG tablet, TAKE 1 TABLET BY MOUTH EVERY DAY, Disp: 90 tablet, Rfl: 4  ·  losartan  (COZAAR) 25 MG tablet, Take 1 tablet (25 mg total) by mouth once daily., Disp: 90 tablet, Rfl: 3  ·  omeprazole (PRILOSEC) 40 MG capsule, Take 1 capsule (40 mg total) by mouth once daily., Disp: 90 capsule, Rfl: 3  ·  predniSONE (DELTASONE) 20 MG tablet, Take 1 tablet (20 mg total) by mouth once daily., Disp: 7 tablet, Rfl: 0  ·  simvastatin (ZOCOR) 40 MG tablet, Take 1 tablet (40 mg total) by mouth every evening., Disp: 90 tablet, Rfl: 3  ·  LIDOcaine HCl-hydrocortison ac (LIDAMANTLE-HC) 3-0.5 % topical cream, Apply 1 Application topically 3 (three) times daily. (Patient not taking: Reported on 9/19/2024), Disp: 85 g, Rfl: 2    Current Facility-Administered Medications:   ·  cloNIDine tablet 0.1 mg, 0.1 mg, Oral, 1 time in Clinic/HOD,         Review of Systems   Constitutional:  Negative for fever, chills and fatigue.   Respiratory:  Negative for cough and shortness of breath.    Gastrointestinal:  Negative for nausea, vomiting and diarrhea.   Skin:  Positive for itching and rash.       Objective:   Physical Exam   Constitutional: She appears well-developed and well-nourished.   Neurological: She is alert and oriented to person, place, and time.   Psychiatric: She has a normal mood and affect.   Skin:   Areas Examined (abnormalities noted in diagram):   Head / Face Inspection Performed  Neck Inspection Performed  Chest / Axilla Inspection Performed  Abdomen Inspection Performed  Genitals / Buttocks / Groin Inspection Performed  Back Inspection Performed  RUE Inspected  LUE Inspection Performed  RLE Inspected  LLE Inspection Performed            Diagram Legend     Erythematous scaling macule/papule c/w actinic keratosis       Vascular papule c/w angioma      Pigmented verrucoid papule/plaque c/w seborrheic keratosis      Yellow umbilicated papule c/w sebaceous hyperplasia      Irregularly shaped tan macule c/w lentigo     1-2 mm smooth white papules consistent with Milia      Movable subcutaneous cyst with punctum  c/w epidermal inclusion cyst      Subcutaneous movable cyst c/w pilar cyst      Firm pink to brown papule c/w dermatofibroma      Pedunculated fleshy papule(s) c/w skin tag(s)      Evenly pigmented macule c/w junctional nevus     Mildly variegated pigmented, slightly irregular-bordered macule c/w mildly atypical nevus      Flesh colored to evenly pigmented papule c/w intradermal nevus       Pink pearly papule/plaque c/w basal cell carcinoma      Erythematous hyperkeratotic cursted plaque c/w SCC      Surgical scar with no sign of skin cancer recurrence      Open and closed comedones      Inflammatory papules and pustules      Verrucoid papule consistent consistent with wart     Erythematous eczematous patches and plaques     Dystrophic onycholytic nail with subungual debris c/w onychomycosis     Umbilicated papule    Erythematous-base heme-crusted tan verrucoid plaque consistent with inflamed seborrheic keratosis     Erythematous Silvery Scaling Plaque c/w Psoriasis     See annotation                              Assessment / Plan:      Pathology Orders:       Normal Orders This Visit    Specimen to Pathology, Dermatology     Comments:    Number of Specimens:->1  ------------------------->-------------------------  Spec 1 Procedure:->Biopsy  Spec 1 Clinical Impression:->diffuse erythematous papules  coalescing into plaques with scaling started \R\ 1 week after  covid infection, also started after new medication  (prilosec) dx viral vs. drug vs other  Spec 1 Source:->left lower back    Questions:    Procedure Type: Dermatology and skin neoplasms    Number of Specimens: 1    ------------------------: -------------------------    Spec 1 Procedure: Biopsy    Spec 1 Clinical Impression: diffuse erythematous papules coalescing into plaques with scaling started ~ 1 week after covid infection, also started after new medication (prilosec) dx viral vs. drug vs other    Spec 1 Source: left lower back    Release to patient:            Rash  -     Specimen to Pathology, Dermatology  Dx: post covid eruption vs. Drug eruption (will hold prilosec) vs. Other  Considered DIF however no active blisters today and multiple recent courses of steroids  Punch biopsy procedure note:  Punch biopsy performed after verbal consent obtained. Area marked and prepped with alcohol. Approximately 1cc of 1% lidocaine with epinephrine injected. 4 mm disposable punch used to remove lesion. Hemostasis obtained and biopsy site closed with 1 - 2 Prolene sutures. Wound care instructions reviewed with patient and handout given.    Pruritus  -     triamcinolone acetonide 0.1% (KENALOG) 0.1 % cream; Apply topically 2 (two) times daily.  Dispense: 454 g; Refill: 1  -     predniSONE (DELTASONE) 10 MG tablet; Take 40mg x 4 days in AM, then 30mg x 4 days in AM, then 20mg x 4 days in AM, then 10mg  x4 in the AM  Dispense: 40 tablet; Refill: 0  -     CBC Auto Differential; Future; Expected date: 09/19/2024  -     Comprehensive Metabolic Panel; Future; Expected date: 09/19/2024  Zyrtec in the morning   Triamcinolone cream twice daily all over  Take prednisone taper  Use vaseline all over  Get labs  Hold prilosec           No follow-ups on file.

## 2024-09-19 NOTE — PATIENT INSTRUCTIONS
Zyrtec in the morning   Triamcinolone cream twice daily all over  Take prednisone taper  Use vaseline all over  Get labs  Hold prilosec  Punch Biopsy Wound Care    Your doctor has performed a punch biopsy today.  A band aid and antibiotic ointment has been placed over the site.  This should remain in place for 24 hours.  It is recommended that you keep the area dry for the first 24 hours.  After 24 hours, you may remove the band aid and wash the area with warm soap and water and apply Vaseline jelly.  Many patients prefer to use Neosporin or Bacitracin ointment.  This is acceptable; however know that you can develop an allergy to this medication even if you have used it safely for years.  It is important to keep the area moist.  Letting it dry out and get air slows healing time, will worsen the scar, and make it more difficult to remove the stitches if they were placed.  Band aid is optional after first 24 hours.      If you notice increasing redness, tenderness, pain, or yellow drainage at the biopsy or surgical site, please notify your doctor.  These are signs of an infection.    If your biopsy/surgical site is bleeding, apply firm pressure for 15 minutes straight.  Repeat for another 15 minutes, if it is still bleeding.   If the surgical site continues to bleed, then please contact your doctor.      [facility] [place] [address]/ [phone] FAX/ [email or web address]

## 2024-09-24 ENCOUNTER — TELEPHONE (OUTPATIENT)
Dept: DERMATOLOGY | Facility: CLINIC | Age: 79
End: 2024-09-24
Payer: MEDICARE

## 2024-09-24 LAB
FINAL PATHOLOGIC DIAGNOSIS: NORMAL
GROSS: NORMAL
Lab: NORMAL
MICROSCOPIC EXAM: NORMAL

## 2024-09-24 NOTE — TELEPHONE ENCOUNTER
----- Message from Bean Tobin sent at 9/24/2024  4:22 PM CDT -----  Type:  Patient Returning Call    Who Called:  pt  Who Left Message for Patient:  PT NOT SURE   Does the patient know what this is regarding?:  NOT SURE  Best Call Back Number:  [phone]  Additional Information:  Please call back to advise. Thanks.

## 2024-09-25 ENCOUNTER — TELEPHONE (OUTPATIENT)
Dept: FAMILY MEDICINE | Facility: CLINIC | Age: 79
End: 2024-09-25
Payer: MEDICARE

## 2024-10-03 ENCOUNTER — CLINICAL SUPPORT (OUTPATIENT)
Dept: DERMATOLOGY | Facility: CLINIC | Age: 79
End: 2024-10-03
Payer: MEDICARE

## 2024-10-03 DIAGNOSIS — R21 RASH: Primary | ICD-10-CM

## 2024-10-03 PROCEDURE — 99024 POSTOP FOLLOW-UP VISIT: CPT | Mod: S$GLB,POP,, | Performed by: STUDENT IN AN ORGANIZED HEALTH CARE EDUCATION/TRAINING PROGRAM

## 2024-10-25 NOTE — DISCHARGE SUMMARY
[facility] - Short Stay Cardiac Unit  Interventional Cardiology  Discharge Summary      Patient Name: Vivien Burton  MRN: [id number]  Admission Date: 1/7/2022  Hospital Length of Stay: 0 days  Discharge Date and Time:  01/07/2022 4:20 PM  Attending Physician: Dontae Johns MD  Discharging Provider: Tal Mckeon MD  Primary Care Physician: Ellen Robert III, MD    HPI:  76 y.o. female who presents for evaluation of Aortic Stenosis        Referring Physician: Dr Mccoy     HPI     Vivien Burton is a 76 y.o. female with a past medical history of anxiety, HTN, HLD, breast cancer, GERD, and hypothyroidism referred by Dr Mccoy for evaluation of severe AS (NYHA Class III sx). She was seen by Dr Mccoy reporting SOB. She had labs done which showed a significantly elevated BNP of 2000. TTE done showed decreased EF at 21% as well as low flow aortic stenosis. Labs also showed WBC count elevated. She is a former smoker. She has never had an angiogram in the past.    Vivien Burton is a 76 y.o. female referred by Dr Mccoy for evaluation of severe AS (NYHA Class III sx).     The patient has undergone the following TAVR work-up:   · ECHO (Date 12/22/2021): BREANNA= 0.36 cm2, MG= 22 mmHg, Peak Ruben= 3.07 m/s, EF= 20%.   · LHC: Needs  · STS: 4.0%   · Frailty: 1/4 (failed )   · Iliacs are needs CTA  · LVOT area by CTA is  · Incidental findings on CT:   · CT Surgery risk assessment: needs CTS  · Rhythm issues: none  · PFTs: needs PFTs (former smoker/pneumonia)  · Comorbidities: history of right breast cancer (s/p mastectomy and radiation), hypothyroidism, HLD        NYHA: III CCS: 0      Procedure(s) (LRB):  Left heart cath (Left)  Stent, Drug Eluting, Single Vessel, Coronary     Indwelling Lines/Drains at time of discharge:  Lines/Drains/Airways     None                 Hospital Course:  Patient underwent coronary angiogram via right CFA access and underwent PCI of proximal LAD stenosis with LAWRENCE x 1(). She  underwent CT aortic valve today to further quantify severity of AS for moderate to severe aortic stenosis with low ejection fraction. Partner CTA was performed simultaneously. Patient was monitored per post cath protocol and was discharged home in a stable condition.          Goals of Care Treatment Preferences:       Living Will: Yes                  Significant Diagnostic Studies: Labs:   BMP:   Recent Labs   Lab 01/07/22  0906   GLU 98      K 3.3*      CO2 26   BUN 11   CREATININE 0.7   CALCIUM 9.7    and CBC   Recent Labs   Lab 01/07/22 0906   WBC 8.84   HGB 12.8   HCT 38.7          Pending Diagnostic Studies:     Procedure Component Value Units Date/Time    CT Cardiac Scoring [[id number]] Resulted: 01/07/22 1558    Order Status: Sent Lab Status: In process Updated: 01/07/22 1558    CTA Cardiac TAVR[facility] (xpd) [[id number]] Resulted: 01/07/22 1057    Order Status: Sent Lab Status: In process Updated: 01/07/22 1058        No new Assessment & Plan notes have been filed under this hospital service since the last note was generated.  Service: Interventional Cardiology      Discharged Condition: good    Follow Up: with Dr Johns in 1 month    Patient Instructions:   No discharge procedures on file.  Medications:  Reconciled Home Medications:      Medication List      CONTINUE taking these medications    aspirin 81 MG EC tablet  Commonly known as: ECOTRIN  Take 81 mg by mouth once daily.     clopidogreL 75 mg tablet  Commonly known as: PLAVIX  Take 4 tablets the night before the procedure and one tablet on day of procedure(coronary angiogram).     doxycycline 100 MG Cap  Commonly known as: VIBRAMYCIN  Take 1 capsule (100 mg total) by mouth 2 (two) times daily with meals.     fluticasone propionate 50 mcg/actuation nasal spray  Commonly known as: FLONASE  1 spray by Each Nostril route once daily.     furosemide 20 MG tablet  Commonly known as: LASIX  Take 1 tablet (20 mg total) by mouth daily as  1-2 drinks needed (For fluid retention).     ibandronate 150 mg tablet  Commonly known as: BONIVA  Take 1 tablet (150 mg total) by mouth every 30 days.     levothyroxine 75 MCG tablet  Commonly known as: SYNTHROID  TAKE 1 TABLET BY MOUTH EVERY DAY     simvastatin 20 MG tablet  Commonly known as: ZOCOR  TAKE ONE (1) TABLET BY MOUTH EVERY EVENING            Time spent on the discharge of patient: 35 minutes    Tal Mckeon MD  Interventional Cardiology  [facility] - Short Stay Cardiac Unit

## 2024-10-27 DIAGNOSIS — Z95.5 STATUS POST INSERTION OF DRUG ELUTING CORONARY ARTERY STENT: ICD-10-CM

## 2024-10-27 DIAGNOSIS — E78.5 DYSLIPIDEMIA: ICD-10-CM

## 2024-10-28 RX ORDER — SIMVASTATIN 40 MG/1
40 TABLET, FILM COATED ORAL NIGHTLY
Qty: 90 TABLET | Refills: 0 | Status: SHIPPED | OUTPATIENT
Start: 2024-10-28

## 2024-10-29 ENCOUNTER — PATIENT MESSAGE (OUTPATIENT)
Dept: FAMILY MEDICINE | Facility: CLINIC | Age: 79
End: 2024-10-29
Payer: MEDICARE

## 2024-10-29 DIAGNOSIS — U07.1 COVID: Primary | ICD-10-CM

## 2024-10-31 ENCOUNTER — LAB VISIT (OUTPATIENT)
Dept: LAB | Facility: HOSPITAL | Age: 79
End: 2024-10-31
Attending: FAMILY MEDICINE
Payer: MEDICARE

## 2024-10-31 DIAGNOSIS — U07.1 COVID: ICD-10-CM

## 2024-10-31 LAB
SARS-COV-2 IGG SERPL IA-ACNC: 6057 AU/ML
SARS-COV-2 IGG SERPL QL IA: POSITIVE

## 2024-10-31 PROCEDURE — 86769 SARS-COV-2 COVID-19 ANTIBODY: CPT | Performed by: FAMILY MEDICINE

## 2024-10-31 PROCEDURE — 36415 COLL VENOUS BLD VENIPUNCTURE: CPT | Performed by: FAMILY MEDICINE

## 2024-11-16 ENCOUNTER — HOSPITAL ENCOUNTER (EMERGENCY)
Facility: HOSPITAL | Age: 79
Discharge: HOME | End: 2024-11-16
Attending: EMERGENCY MEDICINE
Payer: MEDICARE

## 2024-11-16 VITALS
HEIGHT: 60 IN | WEIGHT: 120 LBS | BODY MASS INDEX: 23.56 KG/M2 | OXYGEN SATURATION: 98 % | DIASTOLIC BLOOD PRESSURE: 65 MMHG | HEART RATE: 75 BPM | RESPIRATION RATE: 17 BRPM | TEMPERATURE: 98 F | SYSTOLIC BLOOD PRESSURE: 137 MMHG

## 2024-11-16 DIAGNOSIS — L30.9 DERMATITIS: ICD-10-CM

## 2024-11-16 DIAGNOSIS — I10 ELEVATED BLOOD PRESSURE READING WITH DIAGNOSIS OF HYPERTENSION: ICD-10-CM

## 2024-11-16 DIAGNOSIS — R21 RASH AND NONSPECIFIC SKIN ERUPTION: Primary | ICD-10-CM

## 2024-11-16 DIAGNOSIS — L29.9 PRURITUS: ICD-10-CM

## 2024-11-16 PROCEDURE — 99284 EMERGENCY DEPT VISIT MOD MDM: CPT | Mod: 25

## 2024-11-16 PROCEDURE — 63600175 PHARM REV CODE 636 W HCPCS: Performed by: NURSE PRACTITIONER

## 2024-11-16 PROCEDURE — 96372 THER/PROPH/DIAG INJ SC/IM: CPT | Performed by: NURSE PRACTITIONER

## 2024-11-16 PROCEDURE — 25000003 PHARM REV CODE 250: Performed by: NURSE PRACTITIONER

## 2024-11-16 RX ORDER — MAG HYDROX/ALUMINUM HYD/SIMETH 200-200-20
SUSPENSION, ORAL (FINAL DOSE FORM) ORAL
Status: COMPLETED | OUTPATIENT
Start: 2024-11-16 | End: 2024-11-16

## 2024-11-16 RX ORDER — TRIAMCINOLONE ACETONIDE 1 MG/G
CREAM TOPICAL 2 TIMES DAILY
Qty: 454 G | Refills: 1 | Status: SHIPPED | OUTPATIENT
Start: 2024-11-16

## 2024-11-16 RX ORDER — CAMPHOR AND MENTHOL 5; 5 MG/ML; MG/ML
LOTION TOPICAL
Qty: 222 ML | Refills: 2 | Status: SHIPPED | OUTPATIENT
Start: 2024-11-16

## 2024-11-16 RX ORDER — ACETAMINOPHEN 500 MG
1000 TABLET ORAL
Status: COMPLETED | OUTPATIENT
Start: 2024-11-16 | End: 2024-11-16

## 2024-11-16 RX ORDER — PREDNISONE 10 MG/1
TABLET ORAL
Qty: 40 TABLET | Refills: 0 | Status: SHIPPED | OUTPATIENT
Start: 2024-11-16

## 2024-11-16 RX ORDER — DEXAMETHASONE SODIUM PHOSPHATE 10 MG/ML
10 INJECTION INTRAMUSCULAR; INTRAVENOUS
Status: COMPLETED | OUTPATIENT
Start: 2024-11-16 | End: 2024-11-16

## 2024-11-16 RX ORDER — CLONIDINE HYDROCHLORIDE 0.1 MG/1
0.2 TABLET ORAL
Status: COMPLETED | OUTPATIENT
Start: 2024-11-16 | End: 2024-11-16

## 2024-11-16 RX ADMIN — ACETAMINOPHEN 1000 MG: 500 TABLET ORAL at 03:11

## 2024-11-16 RX ADMIN — HYDROCORTISONE: 10 OINTMENT TOPICAL at 03:11

## 2024-11-16 RX ADMIN — CLONIDINE HYDROCHLORIDE 0.2 MG: 0.1 TABLET ORAL at 03:11

## 2024-11-16 RX ADMIN — DEXAMETHASONE SODIUM PHOSPHATE 10 MG: 10 INJECTION INTRAMUSCULAR; INTRAVENOUS at 03:11

## 2024-11-16 NOTE — DISCHARGE INSTRUCTIONS
Take the medications as prescribed. Return for any worsening or new symptoms. Follow up with Primary Care Provider in the next 2-3 days. See Dr. Van as scheduled

## 2024-11-16 NOTE — ED NOTES
Pt reports hx taking Prilosec daily and developing a rash that developed on generalized body area, so she stopped the medication. Pt reports taking Claritin and nsaid and the rash cleared up. Pt reports recently starting Prilosec again except once weekly with the last dose being 11/8/24. Pt has since developed a raised rash on her forehead, anterior chest, shoulders, upper back and posterior neck described as itching/burning. Pt last took a Claritin and Aleve this morning approximately 8am.

## 2024-11-17 NOTE — ED PROVIDER NOTES
CHIEF COMPLAINT  Chief Complaint   Patient presents with    Urticaria     Patient with hives to chest intermittently for one week.  States using OTC medications to control symptoms but they have not resolved.  States she thinks she may have some difficulty swallowing.  Tolerating secretions without difficulty.  Speech clear and appropriate.          HISTORY OF PRESENT ILLNESS  Vivien Burton is a 79 y.o. female with PMH as below who presents to ER for evaluation of rashes on right upper chest, shoulder. Patient tells me that she had similar rashes but worse 2 months ago, she was referred to dermatologist, DR. ROBBINS, she finished prednisone, topical steroid cream, rashes eventually resolved a month later. She took one dose of omeprazole last week, she developed rashes after that. She c/o pain, itching. There was scratching open wound present.  No other specific aggravating or relieving factors otherwise.      PAST MEDICAL HISTORY  Past Medical History:   Diagnosis Date    Acute on chronic combined systolic and diastolic heart failure 02/23/2022    Anemia     Basal cell carcinoma 1999    Breast cancer     Breast cancer     Cancer     CHF (congestive heart failure)     Colon polyps     Coronary artery disease     GERD (gastroesophageal reflux disease)     Hyperlipidemia     Hypertension     Hypothyroidism     Nonrheumatic aortic (valve) stenosis 06/05/2018    Osteoporosis 02/05/2019    S/P TAVR (transcatheter aortic valve replacement) 02/22/2022    Squamous cell carcinoma of skin 2018    Streptococcal bacteremia 01/25/2023    Thyroid disease     Transaminitis 01/23/2023       CURRENT MEDICATIONS      Current Facility-Administered Medications:     cloNIDine tablet 0.1 mg, 0.1 mg, Oral, 1 time in Clinic/HOD,     Current Outpatient Medications:     ascorbic acid (VITAMIN C ORAL), Take by mouth once daily., Disp: , Rfl:     aspirin (ECOTRIN) 81 MG EC tablet, Take 81 mg by mouth once a week., Disp: , Rfl:      camphor-menthol 0.5-0.5% (SARNA ORIGINAL) lotion, Apply topically as needed for Itching., Disp: 222 mL, Rfl: 2    cetirizine HCl (ZYRTEC ORAL), Take by mouth once daily., Disp: , Rfl:     ferrous sulfate (FEOSOL) Tab tablet, Take 1 tablet by mouth daily with breakfast. 27mg, Disp: , Rfl:     ibandronate (BONIVA) 150 mg tablet, Take 1 tablet (150 mg total) by mouth every 30 days., Disp: 3 tablet, Rfl: 3    Lactobacillus acidophilus (ACIDOPHILUS ORAL), Take by mouth once daily., Disp: , Rfl:     levothyroxine (SYNTHROID) 75 MCG tablet, TAKE 1 TABLET BY MOUTH EVERY DAY, Disp: 90 tablet, Rfl: 4    LIDOcaine HCl-hydrocortison ac (LIDAMANTLE-HC) 3-0.5 % topical cream, Apply 1 Application topically 3 (three) times daily. (Patient not taking: Reported on 2024), Disp: 85 g, Rfl: 2    losartan (COZAAR) 25 MG tablet, Take 1 tablet (25 mg total) by mouth once daily., Disp: 90 tablet, Rfl: 3    montelukast (SINGULAIR) 10 mg tablet, Take 1 tablet (10 mg total) by mouth every evening., Disp: 90 tablet, Rfl: 3    predniSONE (DELTASONE) 10 MG tablet, Take 40mg x 4 days in AM, then 30mg x 4 days in AM, then 20mg x 4 days in AM, then 10mg  x4 in the AM with meals, Disp: 40 tablet, Rfl: 0    simvastatin (ZOCOR) 40 MG tablet, Take 1 tablet (40 mg total) by mouth every evening. Need OFFICE VISIT before next refill, last seen 2023. This will be the LAST Rx if visit is not made., Disp: 90 tablet, Rfl: 0    triamcinolone acetonide 0.1% (KENALOG) 0.1 % cream, Apply topically 2 (two) times daily., Disp: 454 g, Rfl: 1    ALLERGIES    Review of patient's allergies indicates:   Allergen Reactions    Prilosec [omeprazole] Hives       SURGICAL HISTORY    Past Surgical History:   Procedure Laterality Date    BREAST SURGERY      CARDIAC CATH COSURGEON N/A 2022    Procedure: Cardiac Cath Cosurgeon;  Surgeon: Dontae Wooten MD;  Location: [facility] CATH LAB;  Service: Cardiovascular;  Laterality: N/A;     SECTION, CLASSIC       COLONOSCOPY N/A 2018    Procedure: COLONOSCOPY;  Surgeon: Precious Castorena MD;  Location: [facility] ENDO;  Service: Endoscopy;  Laterality: N/A;    COLONOSCOPY N/A 3/31/2023    Procedure: COLONOSCOPY;  Surgeon: Mal Abrams MD;  Location: [facility] ENDO;  Service: Endoscopy;  Laterality: N/A;    HYSTERECTOMY      LEFT HEART CATHETERIZATION Left 2022    Procedure: Left heart cath;  Surgeon: Dontae Johns MD;  Location: [facility] CATH LAB;  Service: Cardiology;  Laterality: Left;    LEFT HEART CATHETERIZATION Left 2022    Procedure: Left heart cath;  Surgeon: Dontae Johns MD;  Location: [facility] CATH LAB;  Service: Cardiology;  Laterality: Left;    MASTECTOMY Right 2000    right breast      TONSILLECTOMY, ADENOIDECTOMY      TRANSCATHETER AORTIC VALVE REPLACEMENT (TAVR) N/A 2022    Procedure: REPLACEMENT, AORTIC VALVE, TRANSCATHETER (TAVR);  Surgeon: Dontae Johns MD;  Location: [facility] CATH LAB;  Service: Cardiology;  Laterality: N/A;       SOCIAL HISTORY    Social History     Socioeconomic History    Marital status:    Tobacco Use    Smoking status: Former     Current packs/day: 0.00     Types: Cigarettes     Quit date: 2000     Years since quittin.7    Smokeless tobacco: Never    Tobacco comments:     quit 20 years ago   Substance and Sexual Activity    Alcohol use: Yes     Alcohol/week: 2.0 standard drinks of alcohol     Types: 2 Shots of liquor per week     Comment: per pt 2 burbon drinks per night however none in last month    Drug use: No    Sexual activity: Not Currently     Social Drivers of Health     Financial Resource Strain: Low Risk  (11/15/2024)    Overall Financial Resource Strain (CARDIA)     Difficulty of Paying Living Expenses: Not hard at all   Food Insecurity: No Food Insecurity (11/15/2024)    Hunger Vital Sign     Worried About Running Out of Food in the Last Year: Never true     Ran Out of Food in the Last Year: Never true   Transportation Needs: No  Transportation Needs (4/21/2024)    PRAPARE - Transportation     Lack of Transportation (Medical): No     Lack of Transportation (Non-Medical): No   Physical Activity: Sufficiently Active (11/15/2024)    Exercise Vital Sign     Days of Exercise per Week: 5 days     Minutes of Exercise per Session: 40 min   Stress: No Stress Concern Present (11/15/2024)    [employer] - Occupational Stress Questionnaire     Feeling of Stress : Not at all   Housing Stability: Low Risk  (10/21/2023)    Housing Stability Vital Sign     Unable to Pay for Housing in the Last Year: No     Number of Places Lived in the Last Year: 1     Unstable Housing in the Last Year: No       FAMILY HISTORY    Family History   Problem Relation Name Age of Onset    Cancer Sister      Liver cancer Sister      Melanoma Sister      Cancer Brother      Breast cancer Neg Hx      Psoriasis Neg Hx      Lupus Neg Hx      Eczema Neg Hx         REVIEW OF SYSTEMS    Review of Systems   Skin:  Positive for itching and rash.     All other systems reviewed and are negative    VITAL SIGNS:   /65   Pulse 75   Temp 97.8 °F (36.6 °C) (Oral)   Resp 17   Ht 5' (1.524 m)   Wt 54.4 kg (120 lb)   SpO2 98%   BMI 23.44 kg/m²      Physical Exam  Constitutional:       Appearance: Normal appearance.   HENT:      Head: Normocephalic.   Cardiovascular:      Rate and Rhythm: Normal rate.   Pulmonary:      Effort: Pulmonary effort is normal. No respiratory distress.      Breath sounds: Normal breath sounds.   Abdominal:      Palpations: Abdomen is soft.   Musculoskeletal:         General: Normal range of motion.   Skin:     General: Skin is warm.      Capillary Refill: Capillary refill takes less than 2 seconds.      Findings: Rash present. Rash is papular.   Neurological:      General: No focal deficit present.      Mental Status: She is alert.      GCS: GCS eye subscore is 4. GCS verbal subscore is 5. GCS motor subscore is 6.   Psychiatric:          Attention and Perception: Attention normal.         Mood and Affect: Mood normal.         Speech: Speech normal.             Vitals and nursing note reviewed.     LABS    Labs Reviewed - No data to display      EKG      RADIOLOGY    No orders to display         PROCEDURES    Procedures    Medications   cloNIDine tablet 0.2 mg (0.2 mg Oral Given 11/16/24 1506)   hydrocortisone 1 % ointment ( Topical (Top) Given 11/16/24 1542)   dexAMETHasone sodium phos (PF) injection 10 mg (10 mg Intramuscular Given 11/16/24 1505)   acetaminophen tablet 1,000 mg (1,000 mg Oral Given 11/16/24 1541)                Medical Decision Making  Vivien Burton is a 79 y.o. female with PMH as below who presents to ER for evaluation of rashes on right upper chest, shoulder. Patient tells me that she had similar rashes but worse 2 months ago, she was referred to dermatologist, DR. ROBBINS, she finished prednisone, topical steroid cream, rashes eventually resolved a month later. She took one dose of omeprazole last week, she developed rashes after that. She c/o pain, itching. There was scratching open wound present.  No other specific aggravating or relieving factors otherwise.    Differential Diagnosis includes but is not limited to: Viral exanthem, dermatitis, Allergic reaction, Cellulitis. Differentials I have considered and consider less likely Kawasaki's disease, toxic shock syndrome, Nolasco-Frank syndrome, toxic epidermal necrolysis, anaphylaxis, Angioedema    No signs of anaphylaxis, angioedema. Rash is not petechial in nature and blanches. There is no obvious mucosal membrane involvement, skin sloughing, or blistering. Rash/exathem could be related to an unspecified contact dermatitis, allergic reaction or other likely non life threatening etiology. No signs or symptoms to indicate anaphylaxis. Rash is not in a dermatomal distribution, and does not involve the tip of the nose or periorbital.  Patient is generally well  appearing with stable vital signs.  I explained to pt that rash may change. And that should things worsen, patient should immediately return to the ER   She was advised to see DR. Van sooner than later.      Problems Addressed:  Elevated blood pressure reading with diagnosis of hypertension: chronic illness or injury with exacerbation, progression, or side effects of treatment     Details: Patient tells me when she went to her PCP 2 months ago, her blood pressure was 200's, took a dose of clonidine, blood pressure went down to normal, her blood pressure has luciana fluctuant throughout the day.  She denies SOB or chest pain, headache or weakness.  After 0.2 mg clonidine for urgently high blood pressure given, blood pressure came down to 's 100's, monitored 30 minutes, last BP reading was 137/65. patient stayed asymptomatic, discharged to home with stable condition  Rash and nonspecific skin eruption: acute illness or injury    Risk  OTC drugs.  Prescription drug management.           Discharge Medication List as of 11/16/2024  3:46 PM        STOP taking these medications       omeprazole (PRILOSEC) 40 MG capsule Comments:   Reason for Stopping:               Discharge Medication List as of 11/16/2024  3:46 PM          I discussed with patient and/or family/caretaker that evaluation in the ED does not suggest any emergent or life threatening medical condition requiring immediate intervention beyond what was provided in the ED, and I believe the patient is safe for discharge.  Regardless, an unremarkable evaluation in the ED does not preclude the development or presence of a serious or life threatening condition. As such, patient was instructed to return immediately for any worsening or change in current symptoms  Patient agrees with plan of care.    DISPOSITION  Patient discharged to home in stable condition.        FINAL IMPRESSION    1. Rash and nonspecific skin eruption    2. Elevated blood pressure reading  with diagnosis of hypertension    3. Pruritus    4. Dermatitis         Osmar Belcher, JAVIER  11/17/24 [id number]

## 2024-11-19 ENCOUNTER — PATIENT OUTREACH (OUTPATIENT)
Dept: EMERGENCY MEDICINE | Facility: HOSPITAL | Age: 79
End: 2024-11-19

## 2024-11-22 ENCOUNTER — HOSPITAL ENCOUNTER (OUTPATIENT)
Dept: CARDIOLOGY | Facility: HOSPITAL | Age: 79
Discharge: HOME | End: 2024-11-22
Attending: INTERNAL MEDICINE
Payer: MEDICARE

## 2024-11-22 ENCOUNTER — OFFICE VISIT (OUTPATIENT)
Dept: CARDIOLOGY | Facility: CLINIC | Age: 79
End: 2024-11-22
Payer: MEDICARE

## 2024-11-22 VITALS — BODY MASS INDEX: 24.54 KG/M2 | WEIGHT: 125 LBS | HEIGHT: 60 IN

## 2024-11-22 VITALS
OXYGEN SATURATION: 98 % | SYSTOLIC BLOOD PRESSURE: 208 MMHG | DIASTOLIC BLOOD PRESSURE: 94 MMHG | HEIGHT: 60 IN | WEIGHT: 125.63 LBS | HEART RATE: 80 BPM | BODY MASS INDEX: 24.66 KG/M2

## 2024-11-22 DIAGNOSIS — Z95.2 S/P TAVR (TRANSCATHETER AORTIC VALVE REPLACEMENT): ICD-10-CM

## 2024-11-22 DIAGNOSIS — I11.9 LVH (LEFT VENTRICULAR HYPERTROPHY) DUE TO HYPERTENSIVE DISEASE, WITHOUT HEART FAILURE: ICD-10-CM

## 2024-11-22 DIAGNOSIS — D69.6 THROMBOCYTOPENIA: ICD-10-CM

## 2024-11-22 DIAGNOSIS — F10.10 ENGAGES IN BINGE CONSUMPTION OF ALCOHOL: ICD-10-CM

## 2024-11-22 DIAGNOSIS — I10 WHITE COAT SYNDROME WITH DIAGNOSIS OF HYPERTENSION: ICD-10-CM

## 2024-11-22 DIAGNOSIS — D64.9 ANEMIA, UNSPECIFIED TYPE: ICD-10-CM

## 2024-11-22 DIAGNOSIS — R05.3 CHRONIC COUGH: ICD-10-CM

## 2024-11-22 DIAGNOSIS — Z91.89 CARDIOVASCULAR EVENT RISK: Primary | ICD-10-CM

## 2024-11-22 DIAGNOSIS — Z88.9 ALLERGY HISTORY, DRUG: ICD-10-CM

## 2024-11-22 DIAGNOSIS — I50.42 CHRONIC COMBINED SYSTOLIC AND DIASTOLIC HEART FAILURE: ICD-10-CM

## 2024-11-22 DIAGNOSIS — E61.1 IRON DEFICIENCY: ICD-10-CM

## 2024-11-22 DIAGNOSIS — E78.1 HYPERTRIGLYCERIDEMIA: ICD-10-CM

## 2024-11-22 LAB
AORTIC ROOT ANNULUS: 1.61 CM
AORTIC VALVE CUSP SEPERATION: 1.18 CM
ASCENDING AORTA: 1.6 CM
AV INDEX (PROSTH): 0.59
AV MEAN GRADIENT: 5.2 MMHG
AV PEAK GRADIENT: 10.2 MMHG
AV VALVE AREA BY VELOCITY RATIO: 1 CM²
AV VALVE AREA: 1.2 CM²
AV VELOCITY RATIO: 0.5
BSA FOR ECHO PROCEDURE: 1.55 M2
CV ECHO LV RWT: 0.65 CM
DOP CALC AO PEAK VEL: 1.6 M/S
DOP CALC AO VTI: 34.3 CM
DOP CALC LVOT AREA: 2 CM2
DOP CALC LVOT DIAMETER: 1.6 CM
DOP CALC LVOT PEAK VEL: 0.8 M/S
DOP CALC LVOT STROKE VOLUME: 40.8 CM3
DOP CALC MV VTI: 27.9 CM
DOP CALCLVOT PEAK VEL VTI: 20.3 CM
E WAVE DECELERATION TIME: 196.5 MSEC
E/A RATIO: 0.7
E/E' RATIO: 12.83 M/S
ECHO LV POSTERIOR WALL: 1.2 CM (ref 0.6–1.1)
EJECTION FRACTION: 60 %
FRACTIONAL SHORTENING: 29.7 % (ref 28–44)
GLOBAL LONGITUIDAL STRAIN: 18 %
INTERVENTRICULAR SEPTUM: 1.2 CM (ref 0.6–1.1)
IVC DIAMETER: 1.13 CM
LA MAJOR: 3.76 CM
LA MINOR: 3.15 CM
LEFT ATRIUM AREA SYSTOLIC (APICAL 2 CHAMBER): 9.86 CM2
LEFT ATRIUM AREA SYSTOLIC (APICAL 4 CHAMBER): 13.14 CM2
LEFT ATRIUM SIZE: 3.5 CM
LEFT ATRIUM VOLUME INDEX MOD: 18.6 ML/M2
LEFT ATRIUM VOLUME MOD: 28.45 ML
LEFT INTERNAL DIMENSION IN SYSTOLE: 2.6 CM (ref 2.1–4)
LEFT VENTRICLE DIASTOLIC VOLUME INDEX: 38.76 ML/M2
LEFT VENTRICLE DIASTOLIC VOLUME: 59.31 ML
LEFT VENTRICLE END SYSTOLIC VOLUME APICAL 2 CHAMBER: 21.73 ML
LEFT VENTRICLE END SYSTOLIC VOLUME APICAL 4 CHAMBER: 27.6 ML
LEFT VENTRICLE MASS INDEX: 96.3 G/M2
LEFT VENTRICLE SYSTOLIC VOLUME INDEX: 16 ML/M2
LEFT VENTRICLE SYSTOLIC VOLUME: 24.41 ML
LEFT VENTRICULAR INTERNAL DIMENSION IN DIASTOLE: 3.7 CM (ref 3.5–6)
LEFT VENTRICULAR MASS: 147.3 G
LV LATERAL E/E' RATIO: 11 M/S
LV SEPTAL E/E' RATIO: 15.4 M/S
LVED V (TEICH): 59.31 ML
LVES V (TEICH): 24.41 ML
LVOT MG: 1.38 MMHG
LVOT MV: 0.57 CM/S
MV MEAN GRADIENT: 2 MMHG
MV PEAK A VEL: 1.1 M/S
MV PEAK E VEL: 0.77 M/S
MV PEAK GRADIENT: 5 MMHG
MV STENOSIS PRESSURE HALF TIME: 61.57 MS
MV VALVE AREA BY CONTINUITY EQUATION: 1.46 CM2
MV VALVE AREA P 1/2 METHOD: 3.57 CM2
OHS CV RV/LV RATIO: 0.59 CM
PISA MRMAX VEL: 3.4 M/S
PISA TR MAX VEL: 2.15 M/S
PV MV: 0.58 M/S
PV PEAK GRADIENT: 3 MMHG
PV PEAK VELOCITY: 0.85 M/S
RA MAJOR: 3.12 CM
RA PRESSURE ESTIMATED: 3 MMHG
RA WIDTH: 2.65 CM
RIGHT VENTRICLE DIASTOLIC BASEL DIMENSION: 2.2 CM
RIGHT VENTRICLE DIASTOLIC LENGTH: 4.7 CM
RIGHT VENTRICLE DIASTOLIC MID DIMENSION: 1.7 CM
RIGHT VENTRICLE FREE WALL STRAIN: 32.6 %
RIGHT VENTRICLE GLOBAL SYSTOLIC STRAIN: 23.8 %
RIGHT VENTRICULAR END-DIASTOLIC DIMENSION: 2.65 CM
RIGHT VENTRICULAR LENGTH IN DIASTOLE (APICAL 4-CHAMBER VIEW): 4.69 CM
RV MID DIAMA: 1.66 CM
RV TB RVSP: 5 MMHG
SINUS: 1.87 CM
STJ: 1.87 CM
TDI LATERAL: 0.07 M/S
TDI SEPTAL: 0.05 M/S
TDI: 0.06 M/S
TR MAX PG: 18 MMHG
TRICUSPID ANNULAR PLANE SYSTOLIC EXCURSION: 1.9 CM
TRICUSPID VALVE PEAK A WAVE VELOCITY: 0.33 M/S
TV PEAK E VEL: 0.5 M/S
TV REST PULMONARY ARTERY PRESSURE: 21 MMHG
Z-SCORE OF LEFT VENTRICULAR DIMENSION IN END DIASTOLE: -1.88
Z-SCORE OF LEFT VENTRICULAR DIMENSION IN END SYSTOLE: -0.5

## 2024-11-22 PROCEDURE — 93306 TTE W/DOPPLER COMPLETE: CPT | Mod: 26,,, | Performed by: INTERNAL MEDICINE

## 2024-11-22 PROCEDURE — 99213 OFFICE O/P EST LOW 20 MIN: CPT | Mod: PBBFAC | Performed by: INTERNAL MEDICINE

## 2024-11-22 PROCEDURE — 93356 MYOCRD STRAIN IMG SPCKL TRCK: CPT | Mod: ,,, | Performed by: INTERNAL MEDICINE

## 2024-11-22 PROCEDURE — 93356 MYOCRD STRAIN IMG SPCKL TRCK: CPT

## 2024-11-22 PROCEDURE — 99999 PR PBB SHADOW E&M-EST. PATIENT-LVL III: CPT | Mod: PBBFAC,,, | Performed by: INTERNAL MEDICINE

## 2024-11-22 RX ORDER — LOSARTAN POTASSIUM 50 MG/1
50 TABLET ORAL DAILY
Qty: 90 TABLET | Refills: 3 | Status: SHIPPED | OUTPATIENT
Start: 2024-11-22 | End: 2025-11-22

## 2024-11-22 NOTE — PROGRESS NOTES
Subjective:    Patient ID:  Vivien Burton is a 79 y.o. female who presents for evaluation of Annual Exam    For critical AS with HFrEF, CAD post LAWRENCE 1/2022, post TAVR 2/2022 on DAPT, HLD on 20 mg of simvastatin, LDL-C greater than 70, HTN on steroid for allergic reaction to Prilosec.  Eye: Dr. Robertson  PCP: Jeri Moreira MD   Urologist and referred by daughter-in-law Willam ANDREW Josephine for markedly elevated BNP with SOB  Interventional cardiologist: Dontae Johns MD  Valve and Structural Heart Disease: Marley Rudolph PA-C, last seen 3/2022  Dermatologist: Arleth Van MD, [place]  Lives alone, no pets, son, Damari, comes and goes.   Retired     Health literacy: high   Vaccinations: up-to-date, completed COVID, had infection in 8/2024, no residual problem  Activities: do own housework, yard work and home schooling, no problem, not limited, do not get tired.  Nicotine: quit 1995, 30 years < 1 ppd  Alcohol: 2-3 shots daily, 4.5 oz of whiskey, max 3 shots in any 24 hours.  Illicit drugs: none  Cardiac symptoms: none  Home BP: 180/98 after starting steroid, now no longer with [facility]   Medication compliance: yes, do not miss, taking iron only weekly  Diet: regular, low salt  Caffeine: 1 cpd  Labs:   Lab Results   Component Value Date    TSH 1.235 06/21/2024        Lab Results   Component Value Date    LABA1C 4.7 06/21/2018    HGBA1C 4.6 04/09/2024       Lab Results   Component Value Date    WBC 10.52 09/19/2024    HGB 10.9 (L) 09/19/2024    HCT 31.7 (L) 09/19/2024    MCV 92 09/19/2024     09/19/2024       CMP @CBC  Sodium   Date Value Ref Range Status   09/19/2024 138 136 - 145 mmol/L Final     Potassium   Date Value Ref Range Status   09/19/2024 4.0 3.5 - 5.1 mmol/L Final     Chloride   Date Value Ref Range Status   09/19/2024 105 95 - 110 mmol/L Final     CO2   Date Value Ref Range Status   09/19/2024 23 23 - 29 mmol/L Final     Glucose   Date Value Ref  Range Status   09/19/2024 113 (H) 70 - 110 mg/dL Final     BUN   Date Value Ref Range Status   09/19/2024 11 8 - 23 mg/dL Final     Creatinine   Date Value Ref Range Status   09/19/2024 0.8 0.5 - 1.4 mg/dL Final     Calcium   Date Value Ref Range Status   09/19/2024 9.3 8.7 - 10.5 mg/dL Final     Total Protein   Date Value Ref Range Status   09/19/2024 5.9 (L) 6.0 - 8.4 g/dL Final     Albumin   Date Value Ref Range Status   09/19/2024 4.0 3.5 - 5.2 g/dL Final     Total Bilirubin   Date Value Ref Range Status   09/19/2024 0.4 0.1 - 1.0 mg/dL Final     Comment:     For infants and newborns, interpretation of results should be based  on gestational age, weight and in agreement with clinical  observations.    Premature Infant recommended reference ranges:  Up to 24 hours.............<8.0 mg/dL  Up to 48 hours............<12.0 mg/dL  3-5 days..................<15.0 mg/dL  6-29 days.................<15.0 mg/dL       Alkaline Phosphatase   Date Value Ref Range Status   09/19/2024 69 55 - 135 U/L Final     AST   Date Value Ref Range Status   09/19/2024 13 10 - 40 U/L Final     ALT   Date Value Ref Range Status   09/19/2024 11 10 - 44 U/L Final     Anion Gap   Date Value Ref Range Status   09/19/2024 10 8 - 16 mmol/L Final     eGFR if    Date Value Ref Range Status   04/13/2022 99 > OR = 60 mL/min/1.73m2 Final     eGFR if non    Date Value Ref Range Status   04/13/2022 86 > OR = 60 mL/min/1.73m2 Final     @labrcntip(troponini)@    BNP   Date Value Ref Range Status   04/10/2023 89 <100 pg/mL Final     Comment:        BNP levels increase with age in the general  population with the highest values seen in  individuals greater than 75 years of age.  Reference: J. Am. Cynthia. Cardiol. 2002; 40:976-982.        }   Lab Results   Component Value Date    CHOL 140 04/09/2024    CHOL 141 10/17/2023    CHOL 144 10/04/2022     Lab Results   Component Value Date    HDL 66 04/09/2024    HDL 75 10/17/2023     "HDL 71 10/04/2022     Lab Results   Component Value Date    LDLCALC 50 04/09/2024    LDLCALC 50 10/17/2023    LDLCALC 57 10/04/2022     Lab Results   Component Value Date    TRIG 154 (H) 04/09/2024    TRIG 76 10/17/2023    TRIG 77 10/04/2022     Lab Results   Component Value Date    CHOLHDL 2.1 04/09/2024    CHOLHDL 1.9 10/17/2023    CHOLHDL 2.0 10/04/2022     Lab Results   Component Value Date    IRON 84 04/09/2024    TIBC 375 04/09/2024    FERRITIN 135 04/09/2024      UA 8/2021 no protein.    Last Echo: 2/2023  Last stress test: 6/2018  Cardiovascular angiogram: 1/2022, with PCI  ECG: NSR, rate 83, normal  Fundoscopic exam: within the past year, negative for retinopathy    In 6/2018:  WF with history of HTN around the time breast CA diagnosis, treated with radiation to the right chest and chemo. Stopped HTN Rx about a year ago with normal BP. Here due to heart murmur, first detected in childhood and then no mention until recent examination. Active, own housework, own yard work, caring to grandchildren, no problem, occasional soreness. Quit smoking in 1998, after 15 pack-years, admit to drinking [place] about 7.5 oz daily. Get sleepy not drunk, do not drive after. ECG is normal, rate 87. Labs reviewed: LDL 57, ASCVD 10-year event risk of 11.3%, intermediate.     Echo read by Dr. Koehler - Conclusion   Left ventricle shows mild concentric hypertrophy.  Mitral valve shows mild regurgitation.  Tricuspid valve shows trace regurgitation.  There is mild valvular aortic stenosis.  Mild regurgitation is present in the aortic valve.  Left ventricular diastolic filling is abnormal.     In 12/2021, return at advise of daughter for severely elevated BNP. SOB with first pneumonia about a month ago and then second pneumonia this week. No CP. Continue with excess alcohol use.     Dr. BRETT Robert III noted 12/8/2021 "2 week f/u pt states she isn't feeling good. She states she is sob and it makes her very fatigue. She has abd pain, " "sore throat)"    CXR - Impression:     1. Bibasilar pulmonary infiltrates with small bilateral pleural effusions and bibasilar dependent atelectasis.  Correlate clinically with possible fever and/or elevated white count.  2. Underlying COPD with mild chronic changes of interstitial fibrosis.  3. Bone demineralization.    Stress test 6/2018 - Arrhythmias during stress: rare PACs.  The EKG portion of this study is negative for myocardial ischemia.  The test was stopped because and the patient complained of fatigue.  The patient reported shortness of breath during the stress test.  Negative for ischemia with good exercise tolerance, 7 METS    Carotid US 6/2018 - No significant stenosis noted  Homogenous plaques in the ICAs  Antegrade flows in the vertebral arteries    In 6/2022, return for CV follow up. Complex recent history with critical AS and HFrEF. Now no symptoms, able to do everything she wants to do. Remain with excess alcohol intake.     Marley Rudolph PA-C noted "TAVR Hospital Course:  Vivien Burton was admitted and underwent successful placement of a 26 mm Evolut TAVR via RTF access under MAC sedation on 2/22/22. Please see full cath report for details. A transthoracic echo was performed immediately post procedure which showed no paravalvular leak. An aortic valve maximum velocity through the aortic valve of 1.0 m/s. She was transported to the CCU in stable condition with a TVP and arterial line in place. She remained hemodynamically stable overnight. EKG remained stable post TAVR therefore no EP consult was warranted. This morning, she ambulated without difficulty and was eager to go home. It was felt she was stable for discharge and will go home on ASA/Plavix for antithrombotic therapy post TAVR.      Interval History  She is doing very well today with no complaint. Was able to ride in a [employer] 1 week after TAVR. Denies SOB, CLIFFORD, LE swelling, weight gain, and orthopnea.     S/p LAD " "PCI with LAWRENCE on 1/7/22. DAPT for 1 year. Continue statin.  [facility] 1/2022 - The Prox LAD lesion was 99% stenosed with 0% stenosis post-intervention.  A STENT RESOLUTE DONTRELL [id number] stent was successfully placed at 14 LORRI for 10 sec.    Follow up with [facility] in 1 year with labs and Echo.  ASA indefinitely.   Plavix for 1 year post PCI (OK to discontinue 1/7/23).   No non sterile procedures which could cause endocarditis for 6 months post TAVR including dental work, endoscopy, colonoscopy, and  procedures.   SBE prophylaxis for life."    Echo 3/2022 - The left ventricle is normal in size with eccentric hypertrophy and low normal systolic function. The estimated ejection fraction is 50%.  The quantitatively derived ejection fraction is 48%.  Normal right ventricular size with normal right ventricular systolic function.  Grade II left ventricular diastolic dysfunction.  Moderate left atrial enlargement.  There is a 26 mm Evolut transcutaneously-placed aortic bioprosthesis present. There is trivial paravalvular aortic insufficiency present.  The aortic valve mean gradient is 9 mmHg with a dimensionless index of 0.46.  The estimated PA systolic pressure is 24 mmHg.  Normal central venous pressure (3 mmHg).    In 11/2022 for pre-endoscopy clearance. Feeling fine, do not tire anymore. Continue with excess alcohol and home [facility] always shows high reading which are way off.     Rosi Del Real NP noted 10/12/2022 "Pt to make f/u with Dr. Mccoy for cardiac clearance  If cleared, schedule EGD and c-scope    In 11/2023, return for annual review and cataract clearance. No heart issues, been remaining active without problem. Continue with excess alcohol use.    Marley Rudolph, PA-C, Valve and Structural Heart Disease, noted in 2/2023  TTE 2/28/23  The left ventricle is mildly enlarged with normal systolic function. The estimated ejection fraction is 60%.  Normal right ventricular size with normal right " ventricular systolic function.  Indeterminate left ventricular diastolic function.  There is a 26 mm Evolut Pro transcutaneously-placed aortic bioprosthesis present. There is no aortic insufficiency present. Prosthetic aortic valve is normal.  The aortic valve mean gradient is 4 mmHg with a dimensionless index of 0.53.  Normal central venous pressure (3 mmHg).    S/P TAVR (transcatheter aortic valve replacement), 26 mm  Successful right transfemoral 26 mm Evolut Pro TAVR. No PVL reviewed on TTE today.      Coronary artery disease  S/p LAD PCI with LAWRENCE on 1/7/22. DAPT for 1 year. Continue statin.      Chronic combined systolic and diastolic heart failure, HFimpEF  Chronic. Controlled. Follow up with Cardiology/PCP.    Follow up with [facility] as needed.   ASA indefinitely.   SBE prophylaxis for life.    HPI comments: in 11/2024, return for annual review. Problem with severe allergy to Prilosec and used Medrol pack and then retry Prilosec and back on steroid. Elevated BP and dizziness, combine vertigo and lightheadedness. No falling. No other heart worries. Still doing binge White.     Review of Systems   Constitutional: Positive for weight gain (up 8 lbs, additional 9 lbs from 11/2023, now on steroid). Negative for diaphoresis, fever, malaise/fatigue and night sweats.        Weight stable from 6/2018   HENT:  Positive for congestion. Negative for hearing loss, nosebleeds and tinnitus.    Eyes:  Negative for discharge and visual disturbance.   Cardiovascular:  Negative for chest pain, claudication, cyanosis, irregular heartbeat, leg swelling, near-syncope, orthopnea, palpitations and paroxysmal nocturnal dyspnea. Dyspnea on exertion: with grass work.  Respiratory:  Negative for cough, shortness of breath, sleep disturbances due to breathing, snoring and wheezing.         Power score 0, today a 3, awaken refreshed.   Endocrine: Negative for polydipsia and polyuria.   Hematologic/Lymphatic: Bruises/bleeds  "easily.   Skin:  Positive for dry skin. Negative for color change, flushing, nail changes, poor wound healing and suspicious lesions.   Musculoskeletal:  Negative for arthritis, falls, gout, joint pain, joint swelling, muscle cramps, muscle weakness, myalgias and neck pain.   Gastrointestinal:  Positive for heartburn. Negative for bloating, constipation, diarrhea, hematemesis, hematochezia, melena, nausea and vomiting.   Genitourinary:  Negative for dysuria and hematuria.   Neurological:  Positive for dizziness, light-headedness and vertigo. Negative for disturbances in coordination, excessive daytime sleepiness, focal weakness, loss of balance, numbness and weakness. Headaches: sinus.  Psychiatric/Behavioral:  Negative for depression and substance abuse. The patient does not have insomnia and is not nervous/anxious.         Some claustrophobia   Allergic/Immunologic: Positive for environmental allergies.     Answers submitted by the patient for this visit:  Review of Systems Questionnaire (Submitted on 11/15/2024)  activity change: No  unexpected weight change: Yes  rhinorrhea: No  trouble swallowing: No  chest tightness: No  blood in stool: No  difficulty urinating: No  menstrual problem: No  arthralgias: No  confusion: No  dysphoric mood: No       Objective:    Physical Exam  Constitutional:       Appearance: She is well-developed.      Comments: RA O2 sat 98%  Orthostatic VS: sitting 211/92 standing not able   HENT:      Head: Normocephalic.   Eyes:      Conjunctiva/sclera: Conjunctivae normal.      Pupils: Pupils are equal, round, and reactive to light.   Neck:      Thyroid: No thyromegaly.      Vascular: No JVD.      Comments: Circumference 12"  Cardiovascular:      Rate and Rhythm: Normal rate and regular rhythm.      Pulses: Intact distal pulses.           Carotid pulses are 2+ on the right side and 2+ on the left side.       Radial pulses are 2+ on the right side and 2+ on the left side.        Dorsalis " "pedis pulses are 1+ on the right side and 1+ on the left side.        Posterior tibial pulses are 1+ on the right side and 1+ on the left side.      Heart sounds: Murmur heard.      Early systolic murmur is present with a grade of 2/6.      No friction rub. No gallop.   Pulmonary:      Effort: Pulmonary effort is normal.      Breath sounds: Rales: left basiler.      Comments: Diminished breath sounds and prolong expiration.  Chest:      Chest wall: No tenderness.   Abdominal:      General: Bowel sounds are normal.      Palpations: Abdomen is soft.      Tenderness: There is no abdominal tenderness.      Comments: Waist 31.5", hip 35"   Musculoskeletal:         General: Normal range of motion.      Cervical back: Normal range of motion and neck supple.   Lymphadenopathy:      Cervical: No cervical adenopathy.   Skin:     General: Skin is warm and dry.      Findings: Rash: mild stasis dermatitis.      Comments: Decreased skin turgor    Neurological:      Mental Status: She is alert and oriented to person, place, and time.           Assessment:       1. Cardiovascular event risk, ASCVD 10-year risk 11.3%, on simvastatin 20 mg, 2017    2. LVH (left ventricular hypertrophy) due to hypertensive disease, without heart failure    3. S/P TAVR (transcatheter aortic valve replacement), 26 mm    4. Chronic combined systolic and diastolic heart failure, HFimpEF    5. Engages in binge consumption of alcohol    6. Iron deficiency    7. Hypertriglyceridemia    8. White coat syndrome with diagnosis of hypertension    9. Thrombocytopenia    10. Anemia, unspecified type    11. Chronic cough    12. Allergy history, drug, Prilosec           Plan:       Vivien was seen today for annual exam.    Diagnoses and all orders for this visit:    Cardiovascular event risk, ASCVD 10-year risk 11.3%, on simvastatin 20 mg, 2017  -     IN OFFICE EKG 12-LEAD (to Muse)  -     losartan (COZAAR) 50 MG tablet; Take 1 tablet (50 mg total) by mouth once " daily.    LVH (left ventricular hypertrophy) due to hypertensive disease, without heart failure  -     IN OFFICE EKG 12-LEAD (to Muse)  -     Echo; Future  -     losartan (COZAAR) 50 MG tablet; Take 1 tablet (50 mg total) by mouth once daily.    S/P TAVR (transcatheter aortic valve replacement), 26 mm  -     Echo; Future    Chronic combined systolic and diastolic heart failure, HFimpEF  -     Echo; Future  -     losartan (COZAAR) 50 MG tablet; Take 1 tablet (50 mg total) by mouth once daily.    Engages in binge consumption of alcohol  -     Echo; Future    Iron deficiency    Hypertriglyceridemia    White coat syndrome with diagnosis of hypertension  -     Echo; Future    Thrombocytopenia  -     FERRITIN  -     Iron and TIBC    Anemia, unspecified type  -     FERRITIN  -     Iron and TIBC    Chronic cough    Allergy history, drug, Prilosec    - All medical issues reviewed, will uptitrate ARB for HF, HTN  - Warning signs of MI and stroke given, if symptoms last more than 5 minutes, stop immediately and call 911, then chew 2-4 low-dose ASA (81 mg).  - Consider use of Potassium chloride salt substitute, Perdomo Nu-Salt.   - Instruction for Mediterranean, high potassium diet and heart healthy exercise given.  - Check home blood pressure, 2 days weekly, do 2 readings within 5 minutes in AM and PM, keep log for review. Target resting BP is less than 130/80.  - Need good exercise program, 4 key elements: 1. Aerobic (walking, swimming, dancing, jogging, biking, etc, 2. Muscle strengthening / resistance exercise, need to do 2-3 times weekly, 3. Stretching daily, good stretch takes a whole  total minute. 4. Balance exercise daily.  - Highly recommend 30-60 minutes of exercise daily, can have Sunday off, with 2-3 sessions of muscle strengthening weekly. A  would be very helpful.  - Discussed healthy daily limit of 0.5 oz of pure alcohol in any 24 hours (roughly 1 12-oz beers, 5 oz of wine, or 0.75 oz of liquor  (80 proof)), can not save up.  - Recommend healthy living: moderate alcohol, healthy diet and regular exercise aiming for fitness, restorative sleep and weight control  - Consider Yoga, Siddhartha-Chi and or meditation  - Recommend at least annual cardiovascular evaluation in view of her significant risk factors. Patient's preference.  - Phone review / encourage use of [facility]         Patient Active Problem List   Diagnosis    GERD (gastroesophageal reflux disease)    Breast cancer    Unspecified hypothyroidism    Breast cancer, post right chest radiation    White coat syndrome with diagnosis of hypertension    Malignant neoplasm of female breast    Encounter for long-term (current) use of medications    Headache(784.0)    Engages in binge consumption of alcohol    Heart murmur, since childhood    Dyslipidemia, baseline     Cardiovascular event risk, ASCVD 10-year risk 11.3%, on simvastatin 20 mg, 2017    Bilateral carotid bruits    Nonrheumatic aortic (valve) stenosis    ISIS (generalized anxiety disorder)    Family history of colon cancer    Anemia    LVH (left ventricular hypertrophy) due to hypertensive disease, without heart failure    Acute on chronic HFrEF (heart failure with reduced ejection fraction)    S/P TAVR (transcatheter aortic valve replacement), 26 mm    Chronic combined systolic and diastolic heart failure, HFimpEF    Coronary artery disease    Aortic atherosclerosis    Status post insertion of drug eluting coronary artery stent    Iron deficiency    Pseudopolyposis of colon without complication, unspecified part of colon    Protein-calorie malnutrition, unspecified severity    Degenerative disease of nervous system, unspecified    Hypertriglyceridemia    Allergy history, drug, Prilosec     Total time spend including review of record prior to face-to-face visit is 50 minutes. Greater than 50% of the time was spent in counseling and coordination of care. The above assessment and plan have been  discussed at length. Referring provider's note reviewed. Labs and procedure over the last 6 months reviewed. Problem List updated. Asked to bring in all active medications / pills bottles with next visit. Will send note to referring / PCP.

## 2024-12-03 ENCOUNTER — HOSPITAL ENCOUNTER (OUTPATIENT)
Dept: RADIOLOGY | Facility: HOSPITAL | Age: 79
Discharge: HOME | End: 2024-12-03
Attending: FAMILY MEDICINE
Payer: MEDICARE

## 2024-12-03 DIAGNOSIS — C50.919 HORMONE RECEPTOR POSITIVE BREAST CANCER, UNSPECIFIED LATERALITY: ICD-10-CM

## 2024-12-03 DIAGNOSIS — Z12.31 ENCOUNTER FOR SCREENING MAMMOGRAM FOR MALIGNANT NEOPLASM OF BREAST: ICD-10-CM

## 2024-12-03 PROCEDURE — 77063 BREAST TOMOSYNTHESIS BI: CPT | Mod: TC,52

## 2024-12-03 PROCEDURE — 77067 SCR MAMMO BI INCL CAD: CPT | Mod: 26,52,, | Performed by: RADIOLOGY

## 2024-12-03 PROCEDURE — 77063 BREAST TOMOSYNTHESIS BI: CPT | Mod: 26,52,, | Performed by: RADIOLOGY

## 2024-12-04 ENCOUNTER — OFFICE VISIT (OUTPATIENT)
Dept: DERMATOLOGY | Facility: CLINIC | Age: 79
End: 2024-12-04
Payer: MEDICARE

## 2024-12-04 DIAGNOSIS — L73.8 SEBACEOUS HYPERPLASIA: ICD-10-CM

## 2024-12-04 DIAGNOSIS — L57.0 AK (ACTINIC KERATOSIS): ICD-10-CM

## 2024-12-04 DIAGNOSIS — L82.1 SEBORRHEIC KERATOSIS: ICD-10-CM

## 2024-12-04 DIAGNOSIS — L27.0 DRUG ERUPTION: Primary | ICD-10-CM

## 2024-12-04 DIAGNOSIS — D48.5 NEOPLASM OF UNCERTAIN BEHAVIOR OF SKIN: ICD-10-CM

## 2024-12-04 DIAGNOSIS — L90.5 SCAR: ICD-10-CM

## 2024-12-04 DIAGNOSIS — Z85.828 HISTORY OF NONMELANOMA SKIN CANCER: ICD-10-CM

## 2024-12-04 DIAGNOSIS — D18.01 CHERRY ANGIOMA: ICD-10-CM

## 2024-12-04 PROCEDURE — 11102 TANGNTL BX SKIN SINGLE LES: CPT | Mod: AQ,S$GLB,, | Performed by: STUDENT IN AN ORGANIZED HEALTH CARE EDUCATION/TRAINING PROGRAM

## 2024-12-04 PROCEDURE — 17003 DESTRUCT PREMALG LES 2-14: CPT | Mod: AQ,S$GLB,, | Performed by: STUDENT IN AN ORGANIZED HEALTH CARE EDUCATION/TRAINING PROGRAM

## 2024-12-04 PROCEDURE — 17000 DESTRUCT PREMALG LESION: CPT | Mod: AQ,XS,S$GLB, | Performed by: STUDENT IN AN ORGANIZED HEALTH CARE EDUCATION/TRAINING PROGRAM

## 2024-12-04 PROCEDURE — 88305 TISSUE EXAM BY PATHOLOGIST: CPT | Performed by: DERMATOLOGY

## 2024-12-04 PROCEDURE — 99213 OFFICE O/P EST LOW 20 MIN: CPT | Mod: 25,AQ,S$GLB, | Performed by: STUDENT IN AN ORGANIZED HEALTH CARE EDUCATION/TRAINING PROGRAM

## 2024-12-04 RX ORDER — TRIAMCINOLONE ACETONIDE 1 MG/G
CREAM TOPICAL 2 TIMES DAILY
Qty: 80 G | Refills: 1 | Status: SHIPPED | OUTPATIENT
Start: 2024-12-04

## 2024-12-04 NOTE — PROGRESS NOTES
Subjective:      Patient ID:  Vivien Burton is a 79 y.o. female who presents for   Chief Complaint   Patient presents with    Spot     LOV 9/19/24    Patient here today for skin check TBSE  Complains of itching around neck. States she had allergic rash approx 2 months ago. Discontinued Prilosec and the rash improved but still has some itching.     CAD w/ stent  Atrial valve TAVR     Derm hx:   SCC KA, right upper arm, E&S, 6/2022  BCC (20 years ago) and SCC (2 years ago?)  Sister with h/o MM           Review of Systems   Constitutional:  Negative for fever, chills and fatigue.   Respiratory:  Negative for cough and shortness of breath.    Gastrointestinal:  Negative for nausea, vomiting and diarrhea.   Skin:  Positive for itching, daily sunscreen use and activity-related sunscreen use. Negative for rash.   Hematologic/Lymphatic: Bruises/bleeds easily.       Objective:   Physical Exam   Constitutional: She appears well-developed and well-nourished. No distress.   Neurological: She is alert and oriented to person, place, and time. She is not disoriented.   Psychiatric: She has a normal mood and affect.   Skin:   Areas Examined (abnormalities noted in diagram):   Scalp / Hair Palpated and Inspected  Head / Face Inspection Performed  Neck Inspection Performed  Chest / Axilla Inspection Performed  Abdomen Inspection Performed  Genitals / Buttocks / Groin Inspection Performed  Back Inspection Performed  RUE Inspected  LUE Inspection Performed  RLE Inspected  LLE Inspection Performed  Nails and Digits Inspection Performed                         Diagram Legend     Erythematous scaling macule/papule c/w actinic keratosis       Vascular papule c/w angioma      Pigmented verrucoid papule/plaque c/w seborrheic keratosis      Yellow umbilicated papule c/w sebaceous hyperplasia      Irregularly shaped tan macule c/w lentigo     1-2 mm smooth white papules consistent with Milia      Movable subcutaneous cyst with  punctum c/w epidermal inclusion cyst      Subcutaneous movable cyst c/w pilar cyst      Firm pink to brown papule c/w dermatofibroma      Pedunculated fleshy papule(s) c/w skin tag(s)      Evenly pigmented macule c/w junctional nevus     Mildly variegated pigmented, slightly irregular-bordered macule c/w mildly atypical nevus      Flesh colored to evenly pigmented papule c/w intradermal nevus       Pink pearly papule/plaque c/w basal cell carcinoma      Erythematous hyperkeratotic cursted plaque c/w SCC      Surgical scar with no sign of skin cancer recurrence      Open and closed comedones      Inflammatory papules and pustules      Verrucoid papule consistent consistent with wart     Erythematous eczematous patches and plaques     Dystrophic onycholytic nail with subungual debris c/w onychomycosis     Umbilicated papule    Erythematous-base heme-crusted tan verrucoid plaque consistent with inflamed seborrheic keratosis     Erythematous Silvery Scaling Plaque c/w Psoriasis     See annotation        Today      Assessment / Plan:      Pathology Orders:       Normal Orders This Visit    Specimen to Pathology, Dermatology     Comments:    Number of Specimens:->1  ------------------------->-------------------------  Spec 1 Procedure:->Biopsy  Spec 1 Clinical Impression:->ISK vs. VV vs. SCCIS  Spec 1 Source:->right wrist    Questions:    Procedure Type: Dermatology and skin neoplasms    Number of Specimens: 1    ------------------------: -------------------------    Spec 1 Procedure: Biopsy    Spec 1 Clinical Impression: ISK vs. VV vs. SCCIS    Spec 1 Source: right wrist    Release to patient:           Drug eruption  -     triamcinolone acetonide 0.1% (KENALOG) 0.1 % cream; Apply topically 2 (two) times daily.  Dispense: 80 g; Refill: 1  Biopsy proven drug eruption, cleared with prednisone and d/c of prilosec. She restarted prilosec and rash came back. She stopped it at end of November and had another course of  steroids  Discussed that rash will continue to come back if she takes prilosec  A few residual patches on chest  Use tac cream BID x 2 weeks then d/c    Neoplasm of uncertain behavior of skin  -     Specimen to Pathology, Dermatology  Shave biopsy procedure note:    Shave biopsy performed after verbal consent including risk of infection, scar, recurrence, need for additional treatment of site. Area prepped with alcohol, anesthetized with approximately 1.0cc of 1% lidocaine with epinephrine. Lesional tissue shaved with razor blade. Hemostasis achieved with application of aluminum chloride followed by hyfrecation. No complications. Dressing applied. Wound care explained.    Betancourt angioma  This is a benign vascular lesion. Reassurance given. No treatment required.     Seborrheic keratosis  These are benign inherited growths without a malignant potential. Reassurance given to patient. No treatment is necessary.     AK (actinic keratosis)  Cryosurgery Procedure Note    Verbal consent from the patient is obtained and the patient is aware of the precancerous quality and need for treatment of these lesions. Liquid nitrogen cryosurgery is applied to the 5 actinic keratoses, as detailed in the physical exam, to produce a freeze injury. The patient is aware that blisters may form and is instructed on wound care with gentle cleansing and use of vaseline ointment to keep moist until healed. The patient is supplied a handout on cryosurgery and is instructed to call if lesions do not completely resolve.    History of nonmelanoma skin cancer  Scar  Area of previous NMSC examined. Site well healed with no signs of recurrence.  Total body skin examination performed today including at least 12 points as noted in physical examination. Suspicious lesions noted.  Patient instructed in importance in daily broad spectrum sun protection of at least spf 30. Mineral sunscreen ingredients preferred (Zinc +/- Titanium) and can be found OTC.    Patient encouraged to wear hat for all outdoor exposure.   Also discussed sun avoidance and use of protective clothing.    Sebaceous hyperplasia  This is a common condition representing benign enlargement of the sebaceous lobule. It typically occurs in adulthood. Reassurance given to patient.            1 year pending biopsy  No follow-ups on file.

## 2024-12-04 NOTE — PATIENT INSTRUCTIONS
CRYOSURGERY      Your doctor has used a method called cryosurgery to treat your skin condition. Cryosurgery refers to the use of very cold substances to treat a variety of skin conditions such as warts, pre-skin cancers, molluscum contagiosum, sun spots, and several benign growths. The substance we use in cryosurgery is liquid nitrogen and is so cold (-195 degrees Celsius) that is burns when administered.     Following treatment in the office, the skin may immediately burn and become red. You may find the area around the lesion is affected as well. It is sometimes necessary to treat not only the lesion, but a small area of the surrounding normal skin to achieve a good response.     A blister, and even a blood filled blister, may form after treatment.   This is a normal response. If the blister is painful, it is acceptable to sterilize a needle and with rubbing alcohol and gently pop the blister. It is important that you gently wash the area with soap and warm water as the blister fluid may contain wart virus if a wart was treated. Do no remove the roof of the blister.     The area treated can take anywhere from 1-3 weeks to heal. Healing time depends on the kind skin lesion treated, the location, and how aggressively the lesion was treated. It is recommended that the areas treated are covered with Vaseline or bacitracin ointment and a band-aid. If a band-aid is not practical, just ointment applied several times per day will do. Keeping these areas moist will speed the healing time.    Treatment with liquid nitrogen can leave a scar. In dark skin, it may be a light or dark scar, in light skin it may be a white or pink scar. These will generally fade with time, but may never go away completely.     If you have any concerns after your treatment, please feel free to call the office.       [address] [place] [address]/ [phone] FAX/ [email or web address]  Shave Biopsy Wound Care    Your  doctor has performed a shave biopsy today.  A band aid and vaseline ointment has been placed over the site.  This should remain in place for 24 hours.  It is recommended that you keep the area dry for the first 24 hours.  After 24 hours, you may remove the band aid and wash the area with warm soap and water and apply Vaseline jelly.  Many patients prefer to use Neosporin or Bacitracin ointment.  This is acceptable; however, know that you can develop an allergy to this medication even if you have used it safely for years.  It is important to keep the area moist.  Letting it dry out and get air slows healing time, and will worsen the scar.  Band aid is optional after first 24 hours.      If you notice increasing redness, tenderness, pain, or yellow drainage at the biopsy site, please notify your doctor.  These are signs of an infection.    If your biopsy site is bleeding, apply firm pressure for 15 minutes straight.  Repeat for another 15 minutes, if it is still bleeding.   If the surgical site continues to bleed, then please contact your doctor.      [address] [place] [address]/ [phone] FAX/ [email or web address]

## 2024-12-06 LAB
FINAL PATHOLOGIC DIAGNOSIS: NORMAL
GROSS: NORMAL
Lab: NORMAL
MICROSCOPIC EXAM: NORMAL

## 2024-12-09 ENCOUNTER — TELEPHONE (OUTPATIENT)
Dept: DERMATOLOGY | Facility: CLINIC | Age: 79
End: 2024-12-09
Payer: MEDICARE

## 2024-12-09 NOTE — TELEPHONE ENCOUNTER
----- Message from Princess sent at 12/9/2024 11:34 AM CST -----  Regarding: Return Call  Type:  Patient Returning Call    Who Called:pt    Who Left Message for Patient:unk    Does the patient know what this is regarding?:no    Would the patient rather a call back or a response via [facility]? Call back    Best Call Back Number:[phone]      Additional Information:    Please advise. Thank you!

## 2024-12-13 ENCOUNTER — HOSPITAL ENCOUNTER (EMERGENCY)
Facility: HOSPITAL | Age: 79
Discharge: HOME | End: 2024-12-13
Attending: STUDENT IN AN ORGANIZED HEALTH CARE EDUCATION/TRAINING PROGRAM
Payer: MEDICARE

## 2024-12-13 VITALS
TEMPERATURE: 98 F | WEIGHT: 120 LBS | HEART RATE: 105 BPM | HEIGHT: 60 IN | DIASTOLIC BLOOD PRESSURE: 64 MMHG | OXYGEN SATURATION: 97 % | BODY MASS INDEX: 23.56 KG/M2 | SYSTOLIC BLOOD PRESSURE: 144 MMHG | RESPIRATION RATE: 16 BRPM

## 2024-12-13 DIAGNOSIS — M62.838 NECK MUSCLE SPASM: Primary | ICD-10-CM

## 2024-12-13 PROCEDURE — 25000003 PHARM REV CODE 250: Performed by: STUDENT IN AN ORGANIZED HEALTH CARE EDUCATION/TRAINING PROGRAM

## 2024-12-13 PROCEDURE — 99284 EMERGENCY DEPT VISIT MOD MDM: CPT | Mod: 25

## 2024-12-13 PROCEDURE — 96372 THER/PROPH/DIAG INJ SC/IM: CPT | Performed by: STUDENT IN AN ORGANIZED HEALTH CARE EDUCATION/TRAINING PROGRAM

## 2024-12-13 PROCEDURE — 63600175 PHARM REV CODE 636 W HCPCS: Performed by: STUDENT IN AN ORGANIZED HEALTH CARE EDUCATION/TRAINING PROGRAM

## 2024-12-13 RX ORDER — LIDOCAINE 50 MG/G
1 PATCH TOPICAL
Status: DISCONTINUED | OUTPATIENT
Start: 2024-12-13 | End: 2024-12-13 | Stop reason: HOSPADM

## 2024-12-13 RX ORDER — KETOROLAC TROMETHAMINE 30 MG/ML
30 INJECTION, SOLUTION INTRAMUSCULAR; INTRAVENOUS
Status: COMPLETED | OUTPATIENT
Start: 2024-12-13 | End: 2024-12-13

## 2024-12-13 RX ORDER — METHOCARBAMOL 500 MG/1
500 TABLET, FILM COATED ORAL
Status: COMPLETED | OUTPATIENT
Start: 2024-12-13 | End: 2024-12-13

## 2024-12-13 RX ORDER — LIDOCAINE 50 MG/G
1 PATCH TOPICAL DAILY PRN
Qty: 15 PATCH | Refills: 0 | Status: SHIPPED | OUTPATIENT
Start: 2024-12-13 | End: 2024-12-28

## 2024-12-13 RX ORDER — METHOCARBAMOL 500 MG/1
500 TABLET, FILM COATED ORAL 3 TIMES DAILY
Qty: 15 TABLET | Refills: 0 | Status: SHIPPED | OUTPATIENT
Start: 2024-12-13 | End: 2024-12-18

## 2024-12-13 RX ADMIN — METHOCARBAMOL 500 MG: 500 TABLET ORAL at 08:12

## 2024-12-13 RX ADMIN — KETOROLAC TROMETHAMINE 30 MG: 30 INJECTION, SOLUTION INTRAMUSCULAR; INTRAVENOUS at 08:12

## 2024-12-13 RX ADMIN — LIDOCAINE 5% 1 PATCH: 700 PATCH TOPICAL at 08:12

## 2024-12-13 NOTE — ED NOTES
See triage notes.  States neck pain when it occurs is stabbing like an ice pick.  When pain is present it does hurt to turn her neck.       Pain:  Rated 8/10.     Psychosocial:  Patient is calm and cooperative.  Patients insight and judgement are appropriate to situation.  Appears clean, well maintained, with clothing appropriate to environment.  No evidence of delusions, hallucinations, or psychosis.     Neuro:  Eyes open spontaneously.  Awake, alert, oriented x 4.  Speech clear and appropriate.  Tolerating saliva secretions well.  Able to follow commands, demonstrating ability to actively and appropriately communicate within context of current conversation.  Symmetrical facial muscles.  Moving all extremities well with no noted weakness.  Adequate muscle tone present.  Movement is purposeful.       Airway:  Bilateral chest rise and fall.  RR regular and non-labored.         Circulatory:  Skin warm, dry, and pink.  Apical and radial pulses strong and regular.  Capillary refill/skin blanching less than 3 seconds to distal of 4 extremities.     Extremities:  No redness, heat, swelling, deformity, or pain.     Skin:  Intact with no bruising/discolorations noted.

## 2024-12-15 NOTE — ED PROVIDER NOTES
"Encounter Date: 12/13/2024       History     Chief Complaint   Patient presents with    Neck Pain     Patient reports intermittent "guillermo horse" in the left posterior neck.  Patient states pain has been present for three days.  Attempted to take OTC medication with no long term relief.  Patient states when pain is present touch increases the pain.     The history is provided by the patient. No  was used.   Neck Pain   This is a recurrent problem. The current episode started several days ago. The problem occurs intermittently. The problem has been unchanged. The pain is associated with nothing. The pain is present in the left side. The quality of the pain is described as cramping. The pain does not radiate. The symptoms are aggravated by twisting. The pain is The same all the time. Pertinent negatives include no chest pain and no weakness. She has tried heat and ice for the symptoms. The treatment provided mild relief.     Review of patient's allergies indicates:   Allergen Reactions    Prilosec [omeprazole] Hives     Past Medical History:   Diagnosis Date    Acute on chronic combined systolic and diastolic heart failure 02/23/2022    Anemia     Basal cell carcinoma 1999    Breast cancer     Breast cancer     Cancer     CHF (congestive heart failure)     Colon polyps     Coronary artery disease     GERD (gastroesophageal reflux disease)     Hyperlipidemia     Hypertension     Hypothyroidism     Nonrheumatic aortic (valve) stenosis 06/05/2018    Osteoporosis 02/05/2019    S/P TAVR (transcatheter aortic valve replacement) 02/22/2022    Squamous cell carcinoma of skin 2018    Streptococcal bacteremia 01/25/2023    Thyroid disease     Transaminitis 01/23/2023     Past Surgical History:   Procedure Laterality Date    BREAST SURGERY      CARDIAC CATH COSURGEON N/A 2/22/2022    Procedure: Cardiac Cath Cosurgeon;  Surgeon: Dontae Wooten MD;  Location: [facility] CATH LAB;  Service: Cardiovascular;  " Laterality: N/A;     SECTION, CLASSIC      COLONOSCOPY N/A 2018    Procedure: COLONOSCOPY;  Surgeon: Precious Castorena MD;  Location: [facility] ENDO;  Service: Endoscopy;  Laterality: N/A;    COLONOSCOPY N/A 3/31/2023    Procedure: COLONOSCOPY;  Surgeon: Mal Abrams MD;  Location: [facility] ENDO;  Service: Endoscopy;  Laterality: N/A;    HYSTERECTOMY      LEFT HEART CATHETERIZATION Left 2022    Procedure: Left heart cath;  Surgeon: Dontae Johns MD;  Location: [facility] CATH LAB;  Service: Cardiology;  Laterality: Left;    LEFT HEART CATHETERIZATION Left 2022    Procedure: Left heart cath;  Surgeon: Dontae Johns MD;  Location: [facility] CATH LAB;  Service: Cardiology;  Laterality: Left;    MASTECTOMY Right 2000    right breast      TONSILLECTOMY, ADENOIDECTOMY      TRANSCATHETER AORTIC VALVE REPLACEMENT (TAVR) N/A 2022    Procedure: REPLACEMENT, AORTIC VALVE, TRANSCATHETER (TAVR);  Surgeon: Dontae Johns MD;  Location: [facility] CATH LAB;  Service: Cardiology;  Laterality: N/A;     Family History   Problem Relation Name Age of Onset    Cancer Sister      Liver cancer Sister      Melanoma Sister      Cancer Brother      Breast cancer Neg Hx      Psoriasis Neg Hx      Lupus Neg Hx      Eczema Neg Hx       Social History     Tobacco Use    Smoking status: Former     Current packs/day: 0.00     Types: Cigarettes     Quit date: 2000     Years since quittin.8    Smokeless tobacco: Never    Tobacco comments:     quit 20 years ago   Substance Use Topics    Alcohol use: Yes     Alcohol/week: 2.0 standard drinks of alcohol     Types: 2 Shots of liquor per week     Comment: per pt 2 burbon drinks per night however none in last month    Drug use: No     Review of Systems   Constitutional:  Negative for chills, fatigue and fever.   HENT:  Negative for congestion and sore throat.    Respiratory:  Negative for cough, shortness of breath and wheezing.    Cardiovascular:  Negative for chest pain.    Gastrointestinal:  Negative for abdominal pain, diarrhea, nausea and vomiting.   Genitourinary:  Negative for dysuria.   Musculoskeletal:  Positive for neck pain. Negative for back pain.   Skin:  Negative for rash.   Neurological:  Negative for weakness.   Hematological:  Does not bruise/bleed easily.       Physical Exam     Initial Vitals [12/13/24 0711]   BP Pulse Resp Temp SpO2   (!) 180/79 (!) 119 16 98 °F (36.7 °C) 97 %      MAP       --         Physical Exam    Constitutional: She appears well-developed and well-nourished. She is not diaphoretic. No distress.   HENT:   Head: Normocephalic and atraumatic.   Right Ear: External ear normal.   Left Ear: External ear normal.   Eyes: EOM are normal. Pupils are equal, round, and reactive to light. No scleral icterus.   Neck: Neck supple.   Normal range of motion.  Cardiovascular:  Normal rate, regular rhythm and normal heart sounds.           Pulmonary/Chest: Breath sounds normal. No stridor. No respiratory distress. She has no wheezes. She has no rales.   Abdominal: Abdomen is soft. She exhibits no distension. There is no abdominal tenderness.   Musculoskeletal:         General: No tenderness or edema. Normal range of motion.      Cervical back: Normal range of motion and neck supple.      Comments: Mild tenderness over the trapezius muscle on the left side     Neurological: She is alert and oriented to person, place, and time. She has normal strength. No cranial nerve deficit. GCS score is 15. GCS eye subscore is 4. GCS verbal subscore is 5. GCS motor subscore is 6.   Skin: Skin is warm and dry. Capillary refill takes less than 2 seconds. No pallor.   Psychiatric: She has a normal mood and affect.         ED Course   Procedures  Labs Reviewed - No data to display       Imaging Results    None          Medications   methocarbamoL tablet 500 mg (500 mg Oral Given 12/13/24 0801)   ketorolac injection 30 mg (30 mg Intramuscular Given 12/13/24 0801)     Medical  Decision Making  79-year-old female presenting with spasm like pain to the left side of her neck.  States he has been intermittent over the last 3 days.  It is worse when she turns her head.  She denies any chest pain or shortness of breath.  Does have mild tenderness over the trapezius muscle.  Is neurologically intact and able to range both upper extremities.  Her symptoms are consistent with a musculoskeletal source pain and have low suspicion for thoracic pathology.  Given pain control and had improvement on repeat evaluation.  Given return precautions and discharged home.    Risk  Prescription drug management.                                      Clinical Impression:  Final diagnoses:  [M62.838] Neck muscle spasm (Primary)          ED Disposition Condition    Discharge Stable          ED Prescriptions       Medication Sig Dispense Start Date End Date Auth. Provider    methocarbamoL (ROBAXIN) 500 MG Tab Take 1 tablet (500 mg total) by mouth 3 (three) times daily. for 5 days 15 tablet 12/13/2024 12/18/2024 Elias Barba MD    LIDOcaine (LIDODERM) 5 % Place 1 patch onto the skin daily as needed (pain). Remove & Discard patch within 12 hours 15 patch 12/13/2024 12/28/2024 Elias Barba MD          Follow-up Information       Follow up With Specialties Details Why Contact Info    Jeri Moreira MD Family Medicine Schedule an appointment as soon as possible for a visit  If symptoms worsen [address] [facility] [place] [address] [phone]               Elias Barba MD  12/15/24 2209

## 2024-12-18 ENCOUNTER — HOSPITAL ENCOUNTER (OUTPATIENT)
Dept: RADIOLOGY | Facility: HOSPITAL | Age: 79
Discharge: HOME | End: 2024-12-18
Attending: FAMILY MEDICINE
Payer: MEDICARE

## 2024-12-18 ENCOUNTER — OFFICE VISIT (OUTPATIENT)
Dept: FAMILY MEDICINE | Facility: CLINIC | Age: 79
End: 2024-12-18
Payer: MEDICARE

## 2024-12-18 VITALS
DIASTOLIC BLOOD PRESSURE: 64 MMHG | SYSTOLIC BLOOD PRESSURE: 146 MMHG | WEIGHT: 124.19 LBS | RESPIRATION RATE: 14 BRPM | BODY MASS INDEX: 24.38 KG/M2 | HEIGHT: 60 IN | OXYGEN SATURATION: 97 % | HEART RATE: 90 BPM

## 2024-12-18 DIAGNOSIS — Z23 ENCOUNTER FOR ADMINISTRATION OF VACCINE: Primary | ICD-10-CM

## 2024-12-18 DIAGNOSIS — M54.2 CERVICAL PAIN (NECK): ICD-10-CM

## 2024-12-18 DIAGNOSIS — K21.9 GASTROESOPHAGEAL REFLUX DISEASE, UNSPECIFIED WHETHER ESOPHAGITIS PRESENT: ICD-10-CM

## 2024-12-18 PROCEDURE — 72040 X-RAY EXAM NECK SPINE 2-3 VW: CPT | Mod: TC

## 2024-12-18 PROCEDURE — 99999PBSHW PR PBB SHADOW TECHNICAL ONLY FILED TO HB: Mod: PBBFAC,,,

## 2024-12-18 PROCEDURE — 72040 X-RAY EXAM NECK SPINE 2-3 VW: CPT | Mod: 26,,, | Performed by: RADIOLOGY

## 2024-12-18 PROCEDURE — 90677 PCV20 VACCINE IM: CPT | Mod: PBBFAC

## 2024-12-18 PROCEDURE — 99999 PR PBB SHADOW E&M-EST. PATIENT-LVL IV: CPT | Mod: PBBFAC,,, | Performed by: FAMILY MEDICINE

## 2024-12-18 PROCEDURE — G0009 ADMIN PNEUMOCOCCAL VACCINE: HCPCS | Mod: PBBFAC

## 2024-12-18 PROCEDURE — G0008 ADMIN INFLUENZA VIRUS VAC: HCPCS | Mod: PBBFAC

## 2024-12-18 PROCEDURE — 99214 OFFICE O/P EST MOD 30 MIN: CPT | Mod: PBBFAC,25 | Performed by: FAMILY MEDICINE

## 2024-12-18 PROCEDURE — 90653 IIV ADJUVANT VACCINE IM: CPT | Mod: PBBFAC

## 2024-12-18 RX ORDER — TIZANIDINE 4 MG/1
4 TABLET ORAL EVERY 8 HOURS
Qty: 30 TABLET | Refills: 1 | Status: SHIPPED | OUTPATIENT
Start: 2024-12-18

## 2024-12-18 RX ORDER — FAMOTIDINE 40 MG/1
40 TABLET, FILM COATED ORAL DAILY
Qty: 30 TABLET | Refills: 11 | Status: SHIPPED | OUTPATIENT
Start: 2024-12-18 | End: 2025-12-18

## 2024-12-18 RX ADMIN — PNEUMOCOCCAL 20-VALENT CONJUGATE VACCINE 0.5 ML
2.2; 2.2; 2.2; 2.2; 2.2; 2.2; 2.2; 2.2; 2.2; 2.2; 2.2; 2.2; 2.2; 2.2; 2.2; 2.2; 4.4; 2.2; 2.2; 2.2 INJECTION, SUSPENSION INTRAMUSCULAR at 09:12

## 2024-12-18 RX ADMIN — INFLUENZA A VIRUS A/VICTORIA/4897/2022 IVR-238 (H1N1) ANTIGEN (FORMALDEHYDE INACTIVATED), INFLUENZA A VIRUS A/THAILAND/8/2022 IVR-237 (H3N2) ANTIGEN (FORMALDEHYDE INACTIVATED), INFLUENZA B VIRUS B/AUSTRIA/1359417/2021 BVR-26 ANTIGEN (FORMALDEHYDE INACTIVATED) 0.5 ML: 15; 15; 15 INJECTION, SUSPENSION INTRAMUSCULAR at 09:12

## 2024-12-18 NOTE — PROGRESS NOTES
"Patient ID: Vivien Burton is a 79 y.o. female.    Chief Complaint: Medication Problem (prilosec) and Neck Pain (Alteranating tylenol and motrin every 4 hrs for pain)    History of Present Illness    CHIEF COMPLAINT:  Vivien presents today for neck pain and Prilosec allergy.    MUSCULOSKELETAL - NECK PAIN:  She presents with neck pain that started 2 weeks ago, manifesting as a muscle spasm described as a "muscle knot" on the left side. The pain is sensitive to touch and radiates downward. The pain is persistent and she has been managing symptoms with Tylenol or ibuprofen every 3-4 hours, which provides temporary relief. Heat therapy has been utilized for the past 10 days. Previous treatment with Robaxin was ineffective. She reports prior successful resolution of similar symptoms with chiropractic treatment.    ALLERGIES:  She reports allergic reaction to Prilosec manifesting as generalized hives. She attempted a reduced dose in November which still triggered the allergic reaction.    MEDICAL HISTORY:  She has a history of Tylenol overdose requiring one week of hospitalization. She also has a lifelong heart murmur.      ROS:  ROS as indicated in HPI.         Physical Exam    Vitals: Blood pressure: 140.  General: No acute distress. Well-developed. Well-nourished.  Eyes: EOMI. Sclerae anicteric.  HENT: Normocephalic. Atraumatic. Nares patent. Moist oral mucosa.  Cardiovascular: Regular rate. Regular rhythm. No murmurs. No rubs. No gallops. Normal S1, S2.  Respiratory: Normal respiratory effort. Clear to auscultation bilaterally. No rales. No rhonchi. No wheezing.  Musculoskeletal: No  obvious deformity.  Extremities: No lower extremity edema.  Neurological: Alert & oriented x3. No slurred speech. Normal gait.  Psychiatric: Normal mood. Normal affect. Good insight. Good judgment.  Skin: Warm. Dry. No rash.         Assessment & Plan    IMPRESSION:  - Considered alternative PPI or H2 blocker for acid reflux due " to Prilosec allergy  - Evaluated neck pain, likely due to trapezius muscle spasm  - Recommend muscle relaxer at night to address possible tensing during sleep  - Evaluated potential causes of elevated blood pressure, including kidney function and ibuprofen use    ELEVATED BLOOD PRESSURE:  - Explained relationship between kidney function and blood pressure control.  - Discussed blood pressure ranges, including normal (120s/70s) and pre-hypertension (130s/80s).  - Blood pressure recheck ordered before patient leaves office.    CERVICALGIA (NECK PAIN):  - Explained trapezius muscle anatomy and its role in neck pain.  - Vivien to perform scapular retraction exercises for neck pain relief.  - Recommend applying alternating heat and ice therapy to affected neck area.  - Started Tizanidine (muscle relaxer) at bedtime for neck pain.  - Continued alternating Tylenol and ibuprofen every 3-4 hours for neck pain, with caution on duration of use.    GASTRO-ESOPHAGEAL REFLUX DISEASE:  - Started Pepcid (H2 blocker) as needed for acid reflux.    FOLLOW-UP:  - Follow up in 3 months for blood pressure and neck pain reassessment.         Plan:          Encounter for administration of vaccine  -     influenza (adjuvanted) (Fluad) 45 mcg/0.5 mL IM vaccine (> or = 64 yo) 0.5 mL  -     Discontinue: pneumoc 20-cecilia conj-dip cr(PF) (PREVNAR-20 (PF)) injection Syrg 0.5 mL  -     pneumoc 20-cecilia conj-dip cr(PF) (PREVNAR-20 (PF)) injection Syrg 0.5 mL    Gastroesophageal reflux disease, unspecified whether esophagitis present  -     famotidine (PEPCID) 40 MG tablet; Take 1 tablet (40 mg total) by mouth once daily.  Dispense: 30 tablet; Refill: 11    Cervical pain (neck)  -     X-Ray Cervical Spine AP And Lateral; Future; Expected date: 12/18/2024  -     tiZANidine (ZANAFLEX) 4 MG tablet; Take 1 tablet (4 mg total) by mouth every 8 (eight) hours.  Dispense: 30 tablet; Refill: 1        Follow up in about 3 months (around 6/18/2025).    This note  was generated with the assistance of ambient listening technology. Verbal consent was obtained by the patient and accompanying visitor(s) for the recording of patient appointment to facilitate this note. I attest to having reviewed and edited the generated note for accuracy, though some syntax or spelling errors may persist. Please contact the author of this note for any clarification.

## 2024-12-19 ENCOUNTER — PATIENT MESSAGE (OUTPATIENT)
Dept: FAMILY MEDICINE | Facility: CLINIC | Age: 79
End: 2024-12-19
Payer: MEDICARE

## 2024-12-29 ENCOUNTER — HOSPITAL ENCOUNTER (INPATIENT)
Facility: HOSPITAL | Age: 79
LOS: 6 days | Discharge: HOME HEALTH | DRG: 871 | End: 2025-01-05
Attending: STUDENT IN AN ORGANIZED HEALTH CARE EDUCATION/TRAINING PROGRAM | Admitting: HOSPITALIST
Payer: MEDICARE

## 2024-12-29 DIAGNOSIS — R07.9 CHEST PAIN: ICD-10-CM

## 2024-12-29 DIAGNOSIS — R51.9 ACUTE NONINTRACTABLE HEADACHE, UNSPECIFIED HEADACHE TYPE: ICD-10-CM

## 2024-12-29 DIAGNOSIS — M54.2 NECK PAIN: ICD-10-CM

## 2024-12-29 DIAGNOSIS — R78.81 BACTEREMIA: ICD-10-CM

## 2024-12-29 DIAGNOSIS — R79.89 ELEVATED TROPONIN: ICD-10-CM

## 2024-12-29 DIAGNOSIS — A41.9 SEPSIS, DUE TO UNSPECIFIED ORGANISM, UNSPECIFIED WHETHER ACUTE ORGAN DYSFUNCTION PRESENT: Primary | ICD-10-CM

## 2024-12-29 DIAGNOSIS — R79.89 ELEVATED BRAIN NATRIURETIC PEPTIDE (BNP) LEVEL: ICD-10-CM

## 2024-12-29 DIAGNOSIS — I38 ENDOCARDITIS: ICD-10-CM

## 2024-12-29 DIAGNOSIS — R65.20 SEVERE SEPSIS: ICD-10-CM

## 2024-12-29 DIAGNOSIS — L28.2 PRURITIC RASH: ICD-10-CM

## 2024-12-29 DIAGNOSIS — J18.9 PNEUMONIA OF LEFT LOWER LOBE DUE TO INFECTIOUS ORGANISM: ICD-10-CM

## 2024-12-29 DIAGNOSIS — A41.9 SEVERE SEPSIS: ICD-10-CM

## 2024-12-29 DIAGNOSIS — I10 HYPERTENSION, UNSPECIFIED TYPE: ICD-10-CM

## 2024-12-29 PROBLEM — Z95.2 HISTORY OF TRANSCATHETER AORTIC VALVE REPLACEMENT (TAVR): Status: ACTIVE | Noted: 2024-12-29

## 2024-12-29 LAB
ALBUMIN SERPL BCP-MCNC: 3 G/DL (ref 3.5–5.2)
ALLENS TEST: NORMAL
ALP SERPL-CCNC: 85 U/L (ref 40–150)
ALT SERPL W/O P-5'-P-CCNC: 38 U/L (ref 10–44)
ANION GAP SERPL CALC-SCNC: 11 MMOL/L (ref 8–16)
AST SERPL-CCNC: 54 U/L (ref 10–40)
BASOPHILS # BLD AUTO: 0.02 K/UL (ref 0–0.2)
BASOPHILS NFR BLD: 0.2 % (ref 0–1.9)
BILIRUB SERPL-MCNC: 0.5 MG/DL (ref 0.1–1)
BNP SERPL-MCNC: 874 PG/ML (ref 0–99)
BUN SERPL-MCNC: 9 MG/DL (ref 8–23)
CALCIUM SERPL-MCNC: 9.2 MG/DL (ref 8.7–10.5)
CHLORIDE SERPL-SCNC: 104 MMOL/L (ref 95–110)
CO2 SERPL-SCNC: 22 MMOL/L (ref 23–29)
CREAT SERPL-MCNC: 0.8 MG/DL (ref 0.5–1.4)
CRP SERPL-MCNC: 110.4 MG/L (ref 0–8.2)
DELSYS: NORMAL
DIFFERENTIAL METHOD BLD: ABNORMAL
EOSINOPHIL # BLD AUTO: 0 K/UL (ref 0–0.5)
EOSINOPHIL NFR BLD: 0 % (ref 0–8)
ERYTHROCYTE [DISTWIDTH] IN BLOOD BY AUTOMATED COUNT: 13.1 % (ref 11.5–14.5)
ERYTHROCYTE [SEDIMENTATION RATE] IN BLOOD BY WESTERGREN METHOD: 18 MM/H
ERYTHROCYTE [SEDIMENTATION RATE] IN BLOOD BY WESTERGREN METHOD: 33 MM/HR (ref 0–20)
EST. GFR  (NO RACE VARIABLE): >60 ML/MIN/1.73 M^2
FIO2: 24
FLOW: 1
GLUCOSE SERPL-MCNC: 113 MG/DL (ref 70–110)
HCT VFR BLD AUTO: 26.4 % (ref 37–48.5)
HGB BLD-MCNC: 9 G/DL (ref 12–16)
IMM GRANULOCYTES # BLD AUTO: 0.07 K/UL (ref 0–0.04)
IMM GRANULOCYTES NFR BLD AUTO: 0.8 % (ref 0–0.5)
INFLUENZA A, MOLECULAR: NEGATIVE
INFLUENZA B, MOLECULAR: NEGATIVE
LACTATE SERPL-SCNC: 0.6 MMOL/L (ref 0.5–2.2)
LDH SERPL L TO P-CCNC: 0.53 MMOL/L (ref 0.5–2.2)
LYMPHOCYTES # BLD AUTO: 0.7 K/UL (ref 1–4.8)
LYMPHOCYTES NFR BLD: 7.8 % (ref 18–48)
MAGNESIUM SERPL-MCNC: 1.5 MG/DL (ref 1.6–2.6)
MCH RBC QN AUTO: 30.9 PG (ref 27–31)
MCHC RBC AUTO-ENTMCNC: 34.1 G/DL (ref 32–36)
MCV RBC AUTO: 91 FL (ref 82–98)
MODE: NORMAL
MONOCYTES # BLD AUTO: 0.4 K/UL (ref 0.3–1)
MONOCYTES NFR BLD: 4.5 % (ref 4–15)
NEUTROPHILS # BLD AUTO: 8.1 K/UL (ref 1.8–7.7)
NEUTROPHILS NFR BLD: 86.7 % (ref 38–73)
NRBC BLD-RTO: 0 /100 WBC
PLATELET # BLD AUTO: 189 K/UL (ref 150–450)
PMV BLD AUTO: 9.6 FL (ref 9.2–12.9)
POTASSIUM SERPL-SCNC: 3.7 MMOL/L (ref 3.5–5.1)
PROCALCITONIN SERPL IA-MCNC: 81.13 NG/ML (ref 0–0.5)
PROT SERPL-MCNC: 5.6 G/DL (ref 6–8.4)
RBC # BLD AUTO: 2.91 M/UL (ref 4–5.4)
RSV AG SPEC QL IA: NEGATIVE
SAMPLE: NORMAL
SARS-COV-2 RDRP RESP QL NAA+PROBE: NEGATIVE
SITE: NORMAL
SODIUM SERPL-SCNC: 137 MMOL/L (ref 136–145)
SP02: 97
SPECIMEN SOURCE: NORMAL
SPECIMEN SOURCE: NORMAL
T4 FREE SERPL-MCNC: 1.3 NG/DL (ref 0.71–1.51)
TROPONIN I SERPL DL<=0.01 NG/ML-MCNC: 0.06 NG/ML (ref 0–0.03)
TROPONIN I SERPL DL<=0.01 NG/ML-MCNC: 0.3 NG/ML (ref 0–0.03)
TSH SERPL DL<=0.005 MIU/L-ACNC: 3.78 UIU/ML (ref 0.4–4)
WBC # BLD AUTO: 9.33 K/UL (ref 3.9–12.7)

## 2024-12-29 PROCEDURE — 83605 ASSAY OF LACTIC ACID: CPT

## 2024-12-29 PROCEDURE — 96374 THER/PROPH/DIAG INJ IV PUSH: CPT

## 2024-12-29 PROCEDURE — 87040 BLOOD CULTURE FOR BACTERIA: CPT | Mod: 59 | Performed by: STUDENT IN AN ORGANIZED HEALTH CARE EDUCATION/TRAINING PROGRAM

## 2024-12-29 PROCEDURE — 99900035 HC TECH TIME PER 15 MIN (STAT)

## 2024-12-29 PROCEDURE — G0378 HOSPITAL OBSERVATION PER HR: HCPCS

## 2024-12-29 PROCEDURE — 83605 ASSAY OF LACTIC ACID: CPT | Performed by: STUDENT IN AN ORGANIZED HEALTH CARE EDUCATION/TRAINING PROGRAM

## 2024-12-29 PROCEDURE — 96375 TX/PRO/DX INJ NEW DRUG ADDON: CPT

## 2024-12-29 PROCEDURE — 87634 RSV DNA/RNA AMP PROBE: CPT | Performed by: STUDENT IN AN ORGANIZED HEALTH CARE EDUCATION/TRAINING PROGRAM

## 2024-12-29 PROCEDURE — 87635 SARS-COV-2 COVID-19 AMP PRB: CPT | Performed by: STUDENT IN AN ORGANIZED HEALTH CARE EDUCATION/TRAINING PROGRAM

## 2024-12-29 PROCEDURE — 25000003 PHARM REV CODE 250: Performed by: STUDENT IN AN ORGANIZED HEALTH CARE EDUCATION/TRAINING PROGRAM

## 2024-12-29 PROCEDURE — 36415 COLL VENOUS BLD VENIPUNCTURE: CPT | Performed by: STUDENT IN AN ORGANIZED HEALTH CARE EDUCATION/TRAINING PROGRAM

## 2024-12-29 PROCEDURE — 93010 ELECTROCARDIOGRAM REPORT: CPT | Mod: ,,, | Performed by: INTERNAL MEDICINE

## 2024-12-29 PROCEDURE — 25000003 PHARM REV CODE 250

## 2024-12-29 PROCEDURE — 87154 CUL TYP ID BLD PTHGN 6+ TRGT: CPT | Performed by: STUDENT IN AN ORGANIZED HEALTH CARE EDUCATION/TRAINING PROGRAM

## 2024-12-29 PROCEDURE — 63600175 PHARM REV CODE 636 W HCPCS: Performed by: STUDENT IN AN ORGANIZED HEALTH CARE EDUCATION/TRAINING PROGRAM

## 2024-12-29 PROCEDURE — 93005 ELECTROCARDIOGRAM TRACING: CPT

## 2024-12-29 PROCEDURE — 80053 COMPREHEN METABOLIC PANEL: CPT | Performed by: STUDENT IN AN ORGANIZED HEALTH CARE EDUCATION/TRAINING PROGRAM

## 2024-12-29 PROCEDURE — 27000221 HC OXYGEN, UP TO 24 HOURS

## 2024-12-29 PROCEDURE — 85025 COMPLETE CBC W/AUTO DIFF WBC: CPT | Performed by: STUDENT IN AN ORGANIZED HEALTH CARE EDUCATION/TRAINING PROGRAM

## 2024-12-29 PROCEDURE — 36415 COLL VENOUS BLD VENIPUNCTURE: CPT

## 2024-12-29 PROCEDURE — 63600175 PHARM REV CODE 636 W HCPCS

## 2024-12-29 PROCEDURE — 96372 THER/PROPH/DIAG INJ SC/IM: CPT

## 2024-12-29 PROCEDURE — 87502 INFLUENZA DNA AMP PROBE: CPT | Performed by: STUDENT IN AN ORGANIZED HEALTH CARE EDUCATION/TRAINING PROGRAM

## 2024-12-29 PROCEDURE — 87186 SC STD MICRODIL/AGAR DIL: CPT | Performed by: STUDENT IN AN ORGANIZED HEALTH CARE EDUCATION/TRAINING PROGRAM

## 2024-12-29 PROCEDURE — 94799 UNLISTED PULMONARY SVC/PX: CPT

## 2024-12-29 PROCEDURE — 84145 PROCALCITONIN (PCT): CPT | Performed by: STUDENT IN AN ORGANIZED HEALTH CARE EDUCATION/TRAINING PROGRAM

## 2024-12-29 PROCEDURE — 84484 ASSAY OF TROPONIN QUANT: CPT | Mod: 91 | Performed by: STUDENT IN AN ORGANIZED HEALTH CARE EDUCATION/TRAINING PROGRAM

## 2024-12-29 PROCEDURE — 83880 ASSAY OF NATRIURETIC PEPTIDE: CPT | Performed by: STUDENT IN AN ORGANIZED HEALTH CARE EDUCATION/TRAINING PROGRAM

## 2024-12-29 PROCEDURE — 94761 N-INVAS EAR/PLS OXIMETRY MLT: CPT

## 2024-12-29 PROCEDURE — 83735 ASSAY OF MAGNESIUM: CPT

## 2024-12-29 PROCEDURE — 84439 ASSAY OF FREE THYROXINE: CPT | Performed by: STUDENT IN AN ORGANIZED HEALTH CARE EDUCATION/TRAINING PROGRAM

## 2024-12-29 PROCEDURE — 85651 RBC SED RATE NONAUTOMATED: CPT

## 2024-12-29 PROCEDURE — 99291 CRITICAL CARE FIRST HOUR: CPT

## 2024-12-29 PROCEDURE — 86140 C-REACTIVE PROTEIN: CPT

## 2024-12-29 PROCEDURE — 84443 ASSAY THYROID STIM HORMONE: CPT | Performed by: STUDENT IN AN ORGANIZED HEALTH CARE EDUCATION/TRAINING PROGRAM

## 2024-12-29 RX ORDER — IPRATROPIUM BROMIDE AND ALBUTEROL SULFATE 2.5; .5 MG/3ML; MG/3ML
3 SOLUTION RESPIRATORY (INHALATION) EVERY 6 HOURS PRN
Status: DISCONTINUED | OUTPATIENT
Start: 2024-12-29 | End: 2025-01-05 | Stop reason: HOSPADM

## 2024-12-29 RX ORDER — PROCHLORPERAZINE EDISYLATE 5 MG/ML
5 INJECTION INTRAMUSCULAR; INTRAVENOUS EVERY 6 HOURS PRN
Status: DISCONTINUED | OUTPATIENT
Start: 2024-12-29 | End: 2025-01-05 | Stop reason: HOSPADM

## 2024-12-29 RX ORDER — VALACYCLOVIR HYDROCHLORIDE 500 MG/1
1000 TABLET, FILM COATED ORAL
Status: COMPLETED | OUTPATIENT
Start: 2024-12-29 | End: 2024-12-29

## 2024-12-29 RX ORDER — ALUMINUM HYDROXIDE, MAGNESIUM HYDROXIDE, AND SIMETHICONE 1200; 120; 1200 MG/30ML; MG/30ML; MG/30ML
30 SUSPENSION ORAL 4 TIMES DAILY PRN
Status: DISCONTINUED | OUTPATIENT
Start: 2024-12-29 | End: 2025-01-05 | Stop reason: HOSPADM

## 2024-12-29 RX ORDER — SODIUM,POTASSIUM PHOSPHATES 280-250MG
2 POWDER IN PACKET (EA) ORAL
Status: DISCONTINUED | OUTPATIENT
Start: 2024-12-29 | End: 2025-01-05 | Stop reason: HOSPADM

## 2024-12-29 RX ORDER — LANOLIN ALCOHOL/MO/W.PET/CERES
800 CREAM (GRAM) TOPICAL
Status: DISCONTINUED | OUTPATIENT
Start: 2024-12-29 | End: 2025-01-05 | Stop reason: HOSPADM

## 2024-12-29 RX ORDER — NALOXONE HCL 0.4 MG/ML
0.02 VIAL (ML) INJECTION
Status: DISCONTINUED | OUTPATIENT
Start: 2024-12-29 | End: 2025-01-05 | Stop reason: HOSPADM

## 2024-12-29 RX ORDER — VALACYCLOVIR HYDROCHLORIDE 500 MG/1
1000 TABLET, FILM COATED ORAL 3 TIMES DAILY
Status: DISCONTINUED | OUTPATIENT
Start: 2024-12-30 | End: 2024-12-30

## 2024-12-29 RX ORDER — LEVOTHYROXINE SODIUM 25 UG/1
75 TABLET ORAL
Status: DISCONTINUED | OUTPATIENT
Start: 2024-12-30 | End: 2025-01-05 | Stop reason: HOSPADM

## 2024-12-29 RX ORDER — FAMOTIDINE 20 MG/1
40 TABLET, FILM COATED ORAL NIGHTLY
Status: DISCONTINUED | OUTPATIENT
Start: 2024-12-29 | End: 2024-12-30

## 2024-12-29 RX ORDER — HYDROMORPHONE HYDROCHLORIDE 1 MG/ML
0.5 INJECTION, SOLUTION INTRAMUSCULAR; INTRAVENOUS; SUBCUTANEOUS EVERY 4 HOURS PRN
Status: DISCONTINUED | OUTPATIENT
Start: 2024-12-29 | End: 2025-01-05 | Stop reason: HOSPADM

## 2024-12-29 RX ORDER — OXYCODONE HYDROCHLORIDE 5 MG/1
5 TABLET ORAL EVERY 4 HOURS PRN
Status: DISCONTINUED | OUTPATIENT
Start: 2024-12-29 | End: 2025-01-05 | Stop reason: HOSPADM

## 2024-12-29 RX ORDER — MAGNESIUM SULFATE 1 G/100ML
1 INJECTION INTRAVENOUS ONCE
Status: COMPLETED | OUTPATIENT
Start: 2024-12-29 | End: 2024-12-30

## 2024-12-29 RX ORDER — KETOROLAC TROMETHAMINE 30 MG/ML
15 INJECTION, SOLUTION INTRAMUSCULAR; INTRAVENOUS
Status: COMPLETED | OUTPATIENT
Start: 2024-12-29 | End: 2024-12-29

## 2024-12-29 RX ORDER — SIMETHICONE 80 MG
1 TABLET,CHEWABLE ORAL 4 TIMES DAILY PRN
Status: DISCONTINUED | OUTPATIENT
Start: 2024-12-29 | End: 2025-01-05 | Stop reason: HOSPADM

## 2024-12-29 RX ORDER — GLUCAGON 1 MG
1 KIT INJECTION
Status: DISCONTINUED | OUTPATIENT
Start: 2024-12-29 | End: 2025-01-05 | Stop reason: HOSPADM

## 2024-12-29 RX ORDER — SODIUM CHLORIDE 0.9 % (FLUSH) 0.9 %
10 SYRINGE (ML) INJECTION EVERY 12 HOURS PRN
Status: DISCONTINUED | OUTPATIENT
Start: 2024-12-29 | End: 2025-01-05 | Stop reason: HOSPADM

## 2024-12-29 RX ORDER — ONDANSETRON 4 MG/1
4 TABLET, ORALLY DISINTEGRATING ORAL
Status: COMPLETED | OUTPATIENT
Start: 2024-12-29 | End: 2024-12-29

## 2024-12-29 RX ORDER — OXYCODONE HYDROCHLORIDE 10 MG/1
10 TABLET ORAL EVERY 4 HOURS PRN
Status: DISCONTINUED | OUTPATIENT
Start: 2024-12-29 | End: 2025-01-05 | Stop reason: HOSPADM

## 2024-12-29 RX ORDER — ATORVASTATIN CALCIUM 20 MG/1
20 TABLET, FILM COATED ORAL NIGHTLY
Status: DISCONTINUED | OUTPATIENT
Start: 2024-12-29 | End: 2025-01-05 | Stop reason: HOSPADM

## 2024-12-29 RX ORDER — ASPIRIN 81 MG/1
81 TABLET ORAL DAILY
Status: DISCONTINUED | OUTPATIENT
Start: 2024-12-30 | End: 2025-01-05 | Stop reason: HOSPADM

## 2024-12-29 RX ORDER — HYDROMORPHONE HYDROCHLORIDE 2 MG/1
2 TABLET ORAL
Status: COMPLETED | OUTPATIENT
Start: 2024-12-29 | End: 2024-12-29

## 2024-12-29 RX ORDER — ACETAMINOPHEN 325 MG/1
650 TABLET ORAL EVERY 4 HOURS PRN
Status: DISCONTINUED | OUTPATIENT
Start: 2024-12-29 | End: 2025-01-05 | Stop reason: HOSPADM

## 2024-12-29 RX ORDER — ONDANSETRON HYDROCHLORIDE 2 MG/ML
4 INJECTION, SOLUTION INTRAVENOUS EVERY 8 HOURS PRN
Status: DISCONTINUED | OUTPATIENT
Start: 2024-12-29 | End: 2025-01-05 | Stop reason: HOSPADM

## 2024-12-29 RX ORDER — FUROSEMIDE 10 MG/ML
20 INJECTION INTRAMUSCULAR; INTRAVENOUS ONCE
Status: COMPLETED | OUTPATIENT
Start: 2024-12-29 | End: 2024-12-29

## 2024-12-29 RX ORDER — LABETALOL HYDROCHLORIDE 5 MG/ML
5 INJECTION, SOLUTION INTRAVENOUS
Status: DISCONTINUED | OUTPATIENT
Start: 2024-12-29 | End: 2024-12-29

## 2024-12-29 RX ORDER — ASPIRIN 325 MG
325 TABLET ORAL DAILY
Status: DISCONTINUED | OUTPATIENT
Start: 2024-12-29 | End: 2024-12-29 | Stop reason: SDUPTHER

## 2024-12-29 RX ORDER — L. ACIDOPHILUS/L.BULGARICUS 100MM CELL
1 GRANULES IN PACKET (EA) ORAL 2 TIMES DAILY
Status: DISCONTINUED | OUTPATIENT
Start: 2024-12-29 | End: 2025-01-05 | Stop reason: HOSPADM

## 2024-12-29 RX ORDER — GABAPENTIN 100 MG/1
200 CAPSULE ORAL
Status: COMPLETED | OUTPATIENT
Start: 2024-12-29 | End: 2024-12-29

## 2024-12-29 RX ORDER — IBUPROFEN 200 MG
16 TABLET ORAL
Status: DISCONTINUED | OUTPATIENT
Start: 2024-12-29 | End: 2025-01-05 | Stop reason: HOSPADM

## 2024-12-29 RX ORDER — DOXYCYCLINE HYCLATE 100 MG
100 TABLET ORAL
Status: COMPLETED | OUTPATIENT
Start: 2024-12-29 | End: 2024-12-29

## 2024-12-29 RX ORDER — CEFEPIME HYDROCHLORIDE 1 G/1
1 INJECTION, POWDER, FOR SOLUTION INTRAMUSCULAR; INTRAVENOUS
Status: COMPLETED | OUTPATIENT
Start: 2024-12-29 | End: 2024-12-29

## 2024-12-29 RX ORDER — IBUPROFEN 200 MG
24 TABLET ORAL
Status: DISCONTINUED | OUTPATIENT
Start: 2024-12-29 | End: 2025-01-05 | Stop reason: HOSPADM

## 2024-12-29 RX ORDER — ENOXAPARIN SODIUM 100 MG/ML
40 INJECTION SUBCUTANEOUS EVERY 24 HOURS
Status: DISCONTINUED | OUTPATIENT
Start: 2024-12-29 | End: 2024-12-30

## 2024-12-29 RX ORDER — ASPIRIN 81 MG/1
81 TABLET ORAL WEEKLY
Status: DISCONTINUED | OUTPATIENT
Start: 2024-12-30 | End: 2024-12-29

## 2024-12-29 RX ORDER — CEFEPIME HYDROCHLORIDE 1 G/1
1 INJECTION, POWDER, FOR SOLUTION INTRAMUSCULAR; INTRAVENOUS
Status: DISCONTINUED | OUTPATIENT
Start: 2024-12-30 | End: 2024-12-30

## 2024-12-29 RX ORDER — TALC
6 POWDER (GRAM) TOPICAL NIGHTLY PRN
Status: DISCONTINUED | OUTPATIENT
Start: 2024-12-29 | End: 2025-01-05 | Stop reason: HOSPADM

## 2024-12-29 RX ORDER — KETOROLAC TROMETHAMINE 30 MG/ML
30 INJECTION, SOLUTION INTRAMUSCULAR; INTRAVENOUS
Status: DISCONTINUED | OUTPATIENT
Start: 2024-12-29 | End: 2024-12-29

## 2024-12-29 RX ADMIN — KETOROLAC TROMETHAMINE 15 MG: 30 INJECTION, SOLUTION INTRAMUSCULAR; INTRAVENOUS at 06:12

## 2024-12-29 RX ADMIN — MAGNESIUM SULFATE IN DEXTROSE 1 G: 10 INJECTION, SOLUTION INTRAVENOUS at 11:12

## 2024-12-29 RX ADMIN — VALACYCLOVIR HYDROCHLORIDE 1000 MG: 500 TABLET, FILM COATED ORAL at 05:12

## 2024-12-29 RX ADMIN — ONDANSETRON 4 MG: 4 TABLET, ORALLY DISINTEGRATING ORAL at 03:12

## 2024-12-29 RX ADMIN — HYDROMORPHONE HYDROCHLORIDE 2 MG: 2 TABLET ORAL at 03:12

## 2024-12-29 RX ADMIN — FAMOTIDINE 40 MG: 20 TABLET, FILM COATED ORAL at 11:12

## 2024-12-29 RX ADMIN — CEFEPIME 1 G: 1 INJECTION, POWDER, FOR SOLUTION INTRAMUSCULAR; INTRAVENOUS at 07:12

## 2024-12-29 RX ADMIN — FUROSEMIDE 20 MG: 10 INJECTION, SOLUTION INTRAMUSCULAR; INTRAVENOUS at 11:12

## 2024-12-29 RX ADMIN — DOXYCYCLINE HYCLATE 100 MG: 100 TABLET, COATED ORAL at 07:12

## 2024-12-29 RX ADMIN — LACTOBACILLUS ACIDOPHILUS / LACTOBACILLUS BULGARICUS 1 EACH: 100 MILLION CFU STRENGTH GRANULES at 11:12

## 2024-12-29 RX ADMIN — ATORVASTATIN CALCIUM 20 MG: 20 TABLET, FILM COATED ORAL at 11:12

## 2024-12-29 RX ADMIN — GABAPENTIN 200 MG: 100 CAPSULE ORAL at 03:12

## 2024-12-29 RX ADMIN — ENOXAPARIN SODIUM 40 MG: 40 INJECTION SUBCUTANEOUS at 11:12

## 2024-12-29 NOTE — ED PROVIDER NOTES
Encounter Date: 2024       History     Chief Complaint   Patient presents with    Neck Pain     Chronic neck pain.  Patient states that she has taken everything she can and nothing helps.      79-year-old female presents for evaluation of neck pain, patient reports stabbing pain to the left side her head left side of her neck.  Intermittently for the last few months, had some associated skin changes over that area at the same time.  Had Tylenol prior to arrival, no associated neurological deficits or infectious symptoms.    The history is provided by the patient.     Review of patient's allergies indicates:   Allergen Reactions    Prilosec [omeprazole] Hives     Past Medical History:   Diagnosis Date    Acute on chronic combined systolic and diastolic heart failure 2022    Anemia     Basal cell carcinoma     Breast cancer     Breast cancer     Cancer     CHF (congestive heart failure)     Colon polyps     Coronary artery disease     GERD (gastroesophageal reflux disease)     Hyperlipidemia     Hypertension     Hypothyroidism     Nonrheumatic aortic (valve) stenosis 2018    Osteoporosis 2019    S/P TAVR (transcatheter aortic valve replacement) 2022    Squamous cell carcinoma of skin     Streptococcal bacteremia 2023    Thyroid disease     Transaminitis 2023     Past Surgical History:   Procedure Laterality Date    BREAST SURGERY      CARDIAC CATH COSURGEON N/A 2022    Procedure: Cardiac Cath Cosurgeon;  Surgeon: Dontae Wooten MD;  Location: [facility] CATH LAB;  Service: Cardiovascular;  Laterality: N/A;     SECTION, CLASSIC      COLONOSCOPY N/A 2018    Procedure: COLONOSCOPY;  Surgeon: Precious Castorena MD;  Location: [facility] ENDO;  Service: Endoscopy;  Laterality: N/A;    COLONOSCOPY N/A 3/31/2023    Procedure: COLONOSCOPY;  Surgeon: Mal Abrams MD;  Location: [facility] ENDO;  Service: Endoscopy;  Laterality: N/A;    HYSTERECTOMY      LEFT HEART  CATHETERIZATION Left 2022    Procedure: Left heart cath;  Surgeon: Dontae Johns MD;  Location: [facility] CATH LAB;  Service: Cardiology;  Laterality: Left;    LEFT HEART CATHETERIZATION Left 2022    Procedure: Left heart cath;  Surgeon: Dontae Johns MD;  Location: [facility] CATH LAB;  Service: Cardiology;  Laterality: Left;    MASTECTOMY Right 2000    right breast      TONSILLECTOMY, ADENOIDECTOMY      TRANSCATHETER AORTIC VALVE REPLACEMENT (TAVR) N/A 2022    Procedure: REPLACEMENT, AORTIC VALVE, TRANSCATHETER (TAVR);  Surgeon: Dontae Johns MD;  Location: [facility] CATH LAB;  Service: Cardiology;  Laterality: N/A;     Family History   Problem Relation Name Age of Onset    Cancer Sister      Liver cancer Sister      Melanoma Sister      Cancer Brother      Breast cancer Neg Hx      Psoriasis Neg Hx      Lupus Neg Hx      Eczema Neg Hx       Social History     Tobacco Use    Smoking status: Former     Current packs/day: 0.00     Types: Cigarettes     Quit date: 2000     Years since quittin.8    Smokeless tobacco: Never    Tobacco comments:     quit 20 years ago   Substance Use Topics    Alcohol use: Yes     Alcohol/week: 2.0 standard drinks of alcohol     Types: 2 Shots of liquor per week     Comment: per pt 2 burbon drinks per night however none in last month    Drug use: No     Review of Systems   All other systems reviewed and are negative.      Physical Exam     Initial Vitals [24 1451]   BP Pulse Resp Temp SpO2   (!) 190/79 (!) 111 18 99.4 °F (37.4 °C) 97 %      MAP       --         Physical Exam    Nursing note and vitals reviewed.  Constitutional: She is not diaphoretic.   HENT:   Head: Normocephalic.   Eyes: No scleral icterus.   Cardiovascular:  Normal rate.           Pulmonary/Chest: Effort normal. No stridor. No respiratory distress.   Abdominal: There is no guarding.     Neurological: She is alert.   Skin: No rash noted. There is erythema.   Erythema around neck with 1  ulcerated lesion left-sided         ED Course   Critical Care    Date/Time: 12/29/2024 2:44 PM    Performed by: Charli Pisano Jr., DO  Authorized by: Charli Pisano Jr., DO  Direct patient critical care time: 10 minutes  Additional history critical care time: 10 minutes  Ordering / reviewing critical care time: 10 minutes  Documentation critical care time: 10 minutes  Consulting other physicians critical care time: 10 minutes  Total critical care time (exclusive of procedural time) : 50 minutes        Labs Reviewed   CBC W/ AUTO DIFFERENTIAL - Abnormal       Result Value    WBC 9.33      RBC 2.91 (*)     Hemoglobin 9.0 (*)     Hematocrit 26.4 (*)     MCV 91      MCH 30.9      MCHC 34.1      RDW 13.1      Platelets 189      MPV 9.6      Immature Granulocytes 0.8 (*)     Gran # (ANC) 8.1 (*)     Immature Grans (Abs) 0.07 (*)     Lymph # 0.7 (*)     Mono # 0.4      Eos # 0.0      Baso # 0.02      nRBC 0      Gran % 86.7 (*)     Lymph % 7.8 (*)     Mono % 4.5      Eosinophil % 0.0      Basophil % 0.2      Differential Method Automated     COMPREHENSIVE METABOLIC PANEL - Abnormal    Sodium 137      Potassium 3.7      Chloride 104      CO2 22 (*)     Glucose 113 (*)     BUN 9      Creatinine 0.8      Calcium 9.2      Total Protein 5.6 (*)     Albumin 3.0 (*)     Total Bilirubin 0.5      Alkaline Phosphatase 85      AST 54 (*)     ALT 38      eGFR >60      Anion Gap 11     TROPONIN I - Abnormal    Troponin I 0.062 (*)     Narrative:     Troponin critical result(s) called and verbal readback obtained from   Gregorio Bustos by SEEMA 12/29/2024 18:20   B-TYPE NATRIURETIC PEPTIDE - Abnormal     (*)    INFLUENZA A & B BY MOLECULAR    Influenza A, Molecular Negative      Influenza B, Molecular Negative      Flu A & B Source NP     CULTURE, BLOOD   CULTURE, BLOOD   CULTURE, RESPIRATORY   TSH    TSH 3.776     T4, FREE    Free T4 1.30     SARS-COV-2 RNA AMPLIFICATION, QUAL    SARS-CoV-2 RNA, Amplification, Qual  Negative     RSV ANTIGEN DETECTION    RSV Source Nasopharyngeal Swab      RSV Ag by Molecular Method Negative     TROPONIN I   URINALYSIS, REFLEX TO URINE CULTURE   PROCALCITONIN   URINALYSIS, REFLEX TO URINE CULTURE   MAGNESIUM   ISTAT LACTATE    POC Lactate 0.53      Rate 18      Sample VENOUS      Site Other      Allens Test N/A      DelSys Nasal Can      Mode SPONT      Flow 1      FiO2 24      Sp02 97            Imaging Results              X-Ray Chest AP Portable (Final result)  Result time 12/29/24 17:58:37      Final result by Anthony Andres DO (12/29/24 17:58:37)                   Impression:      Mild interstitial opacities, suspicious for interstitial edema/CHF.      Electronically signed by: Anthony Andres  Date:    12/29/2024  Time:    17:58               Narrative:    EXAMINATION:  XR CHEST AP PORTABLE    CLINICAL HISTORY:  Chest Pain;    TECHNIQUE:  Single frontal view of the chest was performed.    COMPARISON:  02/02/2023.    FINDINGS:  There are mild interstitial opacities, suspicious for interstitial edema/CHF.  The pleural spaces are clear the cardiac silhouette is borderline.  There are calcifications of the aortic arch.  There is a prosthetic aortic valve stent.  Osseous structures demonstrate degenerative changes.                                       CT Head Without Contrast (Final result)  Result time 12/29/24 16:49:58      Final result by Susana Carrion MD (12/29/24 16:49:58)                   Impression:      There is no evidence acute intracranial abnormality.  There is left maxillary sinus disease.      Electronically signed by: Susana Carrion MD  Date:    12/29/2024  Time:    16:49               Narrative:    EXAMINATION:  CT HEAD WITHOUT CONTRAST    CLINICAL HISTORY:  Headache, new or worsening (Age >= 50y);    TECHNIQUE:  Low dose axial CT images obtained throughout the head without intravenous contrast. Sagittal and coronal reconstructions were  performed.    COMPARISON:  None.    FINDINGS:  Intracranial compartment:    Ventricles and sulci are normal in size for age without evidence of hydrocephalus. No extra-axial blood or fluid collections.    The brain parenchyma appears normal. No parenchymal mass, hemorrhage, edema or major vascular distribution infarct.    Skull/extracranial contents (limited evaluation): No fracture. There is a left maxillary sinus air-fluid level.                                       CT Cervical Spine Without Contrast (Final result)  Result time 12/29/24 15:48:04      Final result by Susana Carrion MD (12/29/24 15:48:04)                   Impression:      There are degenerative changes throughout the cervical spine.      Electronically signed by: Susana Carrion MD  Date:    12/29/2024  Time:    15:48               Narrative:    EXAMINATION:  CT CERVICAL SPINE WITHOUT CONTRAST    CLINICAL HISTORY:  Neck pain, chronic, degenerative changes on xray;Neck pain, acute exacerbation with chronic degenerative changes;    TECHNIQUE:  Low dose axial images, sagittal and coronal reformations were performed though the cervical spine.  Contrast was not administered.    COMPARISON:  12/18/2024    FINDINGS:  There is no evidence fracture or malalignment.  There are degenerative changes throughout the cervical spine most prominent in the mid cervical spine.  There is no prevertebral soft tissue swelling.  The adjacent soft tissues are unremarkable.                                       Medications   sodium chloride 0.9% flush 10 mL (has no administration in time range)   naloxone 0.4 mg/mL injection 0.02 mg (has no administration in time range)   glucose chewable tablet 16 g (has no administration in time range)   glucose chewable tablet 24 g (has no administration in time range)   glucagon (human recombinant) injection 1 mg (has no administration in time range)   enoxaparin injection 40 mg (has no administration in time range)   potassium  bicarbonate disintegrating tablet 50 mEq (has no administration in time range)   potassium bicarbonate disintegrating tablet 35 mEq (has no administration in time range)   potassium bicarbonate disintegrating tablet 60 mEq (has no administration in time range)   magnesium oxide tablet 800 mg (has no administration in time range)   magnesium oxide tablet 800 mg (has no administration in time range)   potassium, sodium phosphates 280-160-250 mg packet 2 packet (has no administration in time range)   potassium, sodium phosphates 280-160-250 mg packet 2 packet (has no administration in time range)   potassium, sodium phosphates 280-160-250 mg packet 2 packet (has no administration in time range)   acetaminophen tablet 650 mg (has no administration in time range)   ondansetron injection 4 mg (has no administration in time range)   prochlorperazine injection Soln 5 mg (has no administration in time range)   albuterol-ipratropium 2.5 mg-0.5 mg/3 mL nebulizer solution 3 mL (has no administration in time range)   simethicone chewable tablet 80 mg (has no administration in time range)   aluminum-magnesium hydroxide-simethicone 200-200-20 mg/5 mL suspension 30 mL (has no administration in time range)   melatonin tablet 6 mg (has no administration in time range)   dextrose 10% bolus 125 mL 125 mL (has no administration in time range)   dextrose 10% bolus 250 mL 250 mL (has no administration in time range)   ceFEPIme injection 1 g (has no administration in time range)   doxycycline 100 mg in D5W 100 mL IVPB (MB+) (has no administration in time range)   ondansetron disintegrating tablet 4 mg (4 mg Oral Given 12/29/24 1534)   gabapentin capsule 200 mg (200 mg Oral Given 12/29/24 1534)   HYDROmorphone tablet 2 mg (2 mg Oral Given 12/29/24 1534)   valACYclovir tablet 1,000 mg (1,000 mg Oral Given 12/29/24 1708)   ketorolac injection 15 mg (15 mg Intravenous Given 12/29/24 1808)   ceFEPIme injection 1 g (1 g Intravenous Given  12/29/24 1922)   doxycycline tablet 100 mg (100 mg Oral Given 12/29/24 1922)     Medical Decision Making  79-year-old female initially presents for management of chronic neck pain.  CT imaging obtained with no significant bony abnormality causing symptoms, do not suspect osteomyelitis or diskitis.  Do not suspect epidural abscess.  Symptoms controlled with interventions here in ED, within differential diagnosis includes shingles although rash appeared to improve here in ED. makes me think less likely shingles.  Patient is started on Valtrex gabapentin and administered narcotics here for pain control.  Patient with multiple comorbidities, while in ED patient hypertensive, tachycardic, hypoxemic and eventually febrile.  Sepsis protocol initiated, patient reports cough.  Concern for pneumonia per my interpretation left lower no pneumonia on chest x-ray.  Patient administered IV cefepime and doxycycline.  Blood pressure improved spontaneously although elevated troponin and BNP.  Heart rate and blood pressure improved and patient placed on nasal cannula 1 L. spoke with cardiologist on-call who was agreeable with patient's stay in here at [facility] as opposed to being transferred to [facility].  No chest pain throughout the visit.  Do not suspect ACS.  Patient and daughter updated and with admission, spoke with hospitalist team    -symptoms maybe related due to sepsis from pneumonia versus volume overload from hypertensive emergency causing end-organ stress    Amount and/or Complexity of Data Reviewed  Labs: ordered. Decision-making details documented in ED Course.  Radiology: ordered and independent interpretation performed.     Details: Concern for pulmonary edema versus infiltrate  ECG/medicine tests: ordered and independent interpretation performed.     Details: Rate 110, sinus tachycardia with PVC, no STEMI, QRS 78  Discussion of management or test interpretation with external provider(s): Spoke with Elsa Coe  NP with hospitalist team will admit for further management and evaluation  Spoke with cardiologist as documented    Risk  Prescription drug management.  Decision regarding hospitalization.               ED Course as of 12/29/24 2122   Sun Dec 29, 2024   1655 CT head no acute space-occupying lesion [KB]   1720 Patient hypertensive and tachycardic, through shared decision-making with patient and daughter we will initiate cardiac workup [KB]   1720 No active chest pain or shortness for breath. [KB]   1737 Possible left-sided pleural effusion per my interpretation. Charli Pisano DO [KB]   1806 WBC: 9.33 [KB]   1806 Hemoglobin(!): 9.0 [KB]   1806 Hematocrit(!): 26.4 [KB]   1808 BNP(!): 874 [KB]   1818 Sodium: 137 [KB]   1818 Potassium: 3.7 [KB]   1818 Chloride: 104 [KB]   1818 CO2(!): 22 [KB]   1818 Glucose(!): 113 [KB]   1818 BUN: 9 [KB]   1818 Creatinine: 0.8 [KB]   1818 Calcium: 9.2 [KB]   1818 PROTEIN TOTAL(!): 5.6 [KB]   1818 Albumin(!): 3.0 [KB]   1818 BILIRUBIN TOTAL: 0.5 [KB]   1818 ALP: 85 [KB]   1818 AST(!): 54 [KB]   1818 ALT: 38 [KB]   1818 eGFR: >60 [KB]   1818 Anion Gap: 11 [KB]   1824 Troponin I(!!): 0.062 [KB]   1827 Concern for sepsis at 6:25 p.m. [KB]   1832 TSH: 3.776 [KB]   1832 Free T4: 1.30 [KB]   1832 Patient and daughter updated and agreeable with admission. [KB]   1841 POC Lactate: 0.53 [KB]   1918 SARS-CoV-2 RNA, Amplification, Qual: Negative [KB]   1918 RSV Source: Nasopharyngeal Swab [KB]   1918 Influenza B, Molecular: Negative [KB]   1918 Influenza A, Molecular: Negative [KB]   1932 This patient does have evidence of infective focus  My overall impression is sepsis.  Source: Respiratory  Antibiotics given- Antibiotics (72h ago, onward)    None      Latest lactate reviewed-  @[email or web address](lactate,poclac)@  Organ dysfunction indicated by Acute respiratory failure and Acute heart failure    Fluid challenge Not needed - patient is not hypotensive      Post- resuscitation assessment No - Post  resuscitation assessment not needed       Will Not start Pressors- Levophed for MAP of 65  Source control achieved by: Cefepime, doxycyline   [KB]   1932 Spoke with cardiologist -agreeable with admission to [facility] and mild heart failure workup [KB]   1957 Spoke with Elsa Coe NP with hospitalist team will admit for further management and evaluation   [KB]      ED Course User Index  [KB] Charli Pisano Jr.,                            Clinical Impression:  Final diagnoses:  [M54.2] Neck pain  [L28.2] Pruritic rash  [R51.9] Acute nonintractable headache, unspecified headache type  [R07.9] Chest pain  [I10] Hypertension, unspecified type  [A41.9] Sepsis, due to unspecified organism, unspecified whether acute organ dysfunction present (Primary)  [J18.9] Pneumonia of left lower lobe due to infectious organism  [R79.89] Elevated brain natriuretic peptide (BNP) level  [R79.89] Elevated troponin          ED Disposition Condition    Observation                   Charli Psiano Jr.,   12/29/24 2122

## 2024-12-30 PROBLEM — M54.2 NECK PAIN WITHOUT INJURY: Status: ACTIVE | Noted: 2024-12-30

## 2024-12-30 PROBLEM — L29.89 CHRONIC PRURITIC RASH IN ADULT: Status: ACTIVE | Noted: 2024-12-30

## 2024-12-30 PROBLEM — R51.9 INTRACTABLE HEADACHE: Status: ACTIVE | Noted: 2024-12-30

## 2024-12-30 PROBLEM — R79.89 ELEVATED TROPONIN: Status: ACTIVE | Noted: 2024-12-30

## 2024-12-30 PROBLEM — R65.20 SEVERE SEPSIS: Status: ACTIVE | Noted: 2024-12-29

## 2024-12-30 LAB
ADENOVIRUS: NOT DETECTED
ALBUMIN SERPL BCP-MCNC: 2.6 G/DL (ref 3.5–5.2)
ALP SERPL-CCNC: 75 U/L (ref 40–150)
ALT SERPL W/O P-5'-P-CCNC: 28 U/L (ref 10–44)
ANION GAP SERPL CALC-SCNC: 9 MMOL/L (ref 8–16)
AST SERPL-CCNC: 28 U/L (ref 10–40)
BILIRUB SERPL-MCNC: 0.4 MG/DL (ref 0.1–1)
BILIRUB UR QL STRIP: NEGATIVE
BILIRUB UR QL STRIP: NEGATIVE
BNP SERPL-MCNC: 1659 PG/ML (ref 0–99)
BORDETELLA PARAPERTUSSIS (IS1001): NOT DETECTED
BORDETELLA PERTUSSIS (PTXP): NOT DETECTED
BUN SERPL-MCNC: 12 MG/DL (ref 8–23)
CALCIUM SERPL-MCNC: 8.6 MG/DL (ref 8.7–10.5)
CHLAMYDIA PNEUMONIAE: NOT DETECTED
CHLORIDE SERPL-SCNC: 102 MMOL/L (ref 95–110)
CLARITY UR: CLEAR
CLARITY UR: CLEAR
CO2 SERPL-SCNC: 23 MMOL/L (ref 23–29)
COLOR UR: COLORLESS
COLOR UR: COLORLESS
CORONAVIRUS 229E, COMMON COLD VIRUS: NOT DETECTED
CORONAVIRUS HKU1, COMMON COLD VIRUS: NOT DETECTED
CORONAVIRUS NL63, COMMON COLD VIRUS: NOT DETECTED
CORONAVIRUS OC43, COMMON COLD VIRUS: NOT DETECTED
CREAT SERPL-MCNC: 0.9 MG/DL (ref 0.5–1.4)
ERYTHROCYTE [DISTWIDTH] IN BLOOD BY AUTOMATED COUNT: 13.2 % (ref 11.5–14.5)
EST. GFR  (NO RACE VARIABLE): >60 ML/MIN/1.73 M^2
FLUBV RNA NPH QL NAA+NON-PROBE: NOT DETECTED
GLUCOSE SERPL-MCNC: 118 MG/DL (ref 70–110)
GLUCOSE UR QL STRIP: NEGATIVE
GLUCOSE UR QL STRIP: NEGATIVE
HCT VFR BLD AUTO: 26.1 % (ref 37–48.5)
HGB BLD-MCNC: 8.5 G/DL (ref 12–16)
HGB UR QL STRIP: NEGATIVE
HGB UR QL STRIP: NEGATIVE
HPIV1 RNA NPH QL NAA+NON-PROBE: NOT DETECTED
HPIV2 RNA NPH QL NAA+NON-PROBE: NOT DETECTED
HPIV3 RNA NPH QL NAA+NON-PROBE: NOT DETECTED
HPIV4 RNA NPH QL NAA+NON-PROBE: NOT DETECTED
HUMAN METAPNEUMOVIRUS: NOT DETECTED
INFLUENZA A (SUBTYPES H1,H1-2009,H3): NOT DETECTED
KETONES UR QL STRIP: NEGATIVE
KETONES UR QL STRIP: NEGATIVE
LACTATE SERPL-SCNC: 0.9 MMOL/L (ref 0.5–2.2)
LEUKOCYTE ESTERASE UR QL STRIP: NEGATIVE
LEUKOCYTE ESTERASE UR QL STRIP: NEGATIVE
MAGNESIUM SERPL-MCNC: 1.8 MG/DL (ref 1.6–2.6)
MCH RBC QN AUTO: 30.7 PG (ref 27–31)
MCHC RBC AUTO-ENTMCNC: 32.6 G/DL (ref 32–36)
MCV RBC AUTO: 94 FL (ref 82–98)
MYCOPLASMA PNEUMONIAE: NOT DETECTED
NITRITE UR QL STRIP: NEGATIVE
NITRITE UR QL STRIP: NEGATIVE
PH UR STRIP: 7 [PH] (ref 5–8)
PH UR STRIP: 7 [PH] (ref 5–8)
PHOSPHATE SERPL-MCNC: 2.5 MG/DL (ref 2.7–4.5)
PLATELET # BLD AUTO: 172 K/UL (ref 150–450)
PMV BLD AUTO: 10.5 FL (ref 9.2–12.9)
POTASSIUM SERPL-SCNC: 3.4 MMOL/L (ref 3.5–5.1)
PROT SERPL-MCNC: 5 G/DL (ref 6–8.4)
PROT UR QL STRIP: NEGATIVE
PROT UR QL STRIP: NEGATIVE
RBC # BLD AUTO: 2.77 M/UL (ref 4–5.4)
RESPIRATORY INFECTION PANEL SOURCE: ABNORMAL
RSV RNA NPH QL NAA+NON-PROBE: NOT DETECTED
RV+EV RNA NPH QL NAA+NON-PROBE: DETECTED
SARS-COV-2 RNA RESP QL NAA+PROBE: NOT DETECTED
SODIUM SERPL-SCNC: 134 MMOL/L (ref 136–145)
SP GR UR STRIP: <1.005 (ref 1–1.03)
SP GR UR STRIP: <1.005 (ref 1–1.03)
TROPONIN I SERPL DL<=0.01 NG/ML-MCNC: 0.08 NG/ML (ref 0–0.03)
TROPONIN I SERPL DL<=0.01 NG/ML-MCNC: 0.17 NG/ML (ref 0–0.03)
TROPONIN I SERPL DL<=0.01 NG/ML-MCNC: 0.26 NG/ML (ref 0–0.03)
URN SPEC COLLECT METH UR: ABNORMAL
URN SPEC COLLECT METH UR: ABNORMAL
UROBILINOGEN UR STRIP-ACNC: NEGATIVE EU/DL
UROBILINOGEN UR STRIP-ACNC: NEGATIVE EU/DL
WBC # BLD AUTO: 9.77 K/UL (ref 3.9–12.7)

## 2024-12-30 PROCEDURE — 25000003 PHARM REV CODE 250

## 2024-12-30 PROCEDURE — 63600175 PHARM REV CODE 636 W HCPCS: Performed by: HOSPITALIST

## 2024-12-30 PROCEDURE — 25500020 PHARM REV CODE 255

## 2024-12-30 PROCEDURE — 36415 COLL VENOUS BLD VENIPUNCTURE: CPT

## 2024-12-30 PROCEDURE — 85027 COMPLETE CBC AUTOMATED: CPT

## 2024-12-30 PROCEDURE — 11000001 HC ACUTE MED/SURG PRIVATE ROOM

## 2024-12-30 PROCEDURE — 84100 ASSAY OF PHOSPHORUS: CPT

## 2024-12-30 PROCEDURE — 94761 N-INVAS EAR/PLS OXIMETRY MLT: CPT

## 2024-12-30 PROCEDURE — 25000003 PHARM REV CODE 250: Performed by: INTERNAL MEDICINE

## 2024-12-30 PROCEDURE — 87899 AGENT NOS ASSAY W/OPTIC: CPT | Performed by: HOSPITALIST

## 2024-12-30 PROCEDURE — 63600175 PHARM REV CODE 636 W HCPCS: Performed by: NURSE PRACTITIONER

## 2024-12-30 PROCEDURE — 83880 ASSAY OF NATRIURETIC PEPTIDE: CPT

## 2024-12-30 PROCEDURE — 80053 COMPREHEN METABOLIC PANEL: CPT

## 2024-12-30 PROCEDURE — 81003 URINALYSIS AUTO W/O SCOPE: CPT

## 2024-12-30 PROCEDURE — 87633 RESP VIRUS 12-25 TARGETS: CPT | Performed by: HOSPITALIST

## 2024-12-30 PROCEDURE — A9585 GADOBUTROL INJECTION: HCPCS

## 2024-12-30 PROCEDURE — 87449 NOS EACH ORGANISM AG IA: CPT | Performed by: HOSPITALIST

## 2024-12-30 PROCEDURE — 63600175 PHARM REV CODE 636 W HCPCS

## 2024-12-30 PROCEDURE — 83605 ASSAY OF LACTIC ACID: CPT

## 2024-12-30 PROCEDURE — 86787 VARICELLA-ZOSTER ANTIBODY: CPT | Performed by: NURSE PRACTITIONER

## 2024-12-30 PROCEDURE — 99223 1ST HOSP IP/OBS HIGH 75: CPT | Mod: ,,, | Performed by: NURSE PRACTITIONER

## 2024-12-30 PROCEDURE — 84484 ASSAY OF TROPONIN QUANT: CPT | Mod: 91

## 2024-12-30 PROCEDURE — 84484 ASSAY OF TROPONIN QUANT: CPT

## 2024-12-30 PROCEDURE — 83735 ASSAY OF MAGNESIUM: CPT

## 2024-12-30 PROCEDURE — 36415 COLL VENOUS BLD VENIPUNCTURE: CPT | Performed by: NURSE PRACTITIONER

## 2024-12-30 PROCEDURE — 25000003 PHARM REV CODE 250: Performed by: HOSPITALIST

## 2024-12-30 PROCEDURE — 99900035 HC TECH TIME PER 15 MIN (STAT)

## 2024-12-30 RX ORDER — GABAPENTIN 100 MG/1
200 CAPSULE ORAL 3 TIMES DAILY
Status: DISCONTINUED | OUTPATIENT
Start: 2024-12-30 | End: 2025-01-05 | Stop reason: HOSPADM

## 2024-12-30 RX ORDER — DIPHENHYDRAMINE HCL 25 MG
25 CAPSULE ORAL EVERY 6 HOURS PRN
Status: DISCONTINUED | OUTPATIENT
Start: 2024-12-30 | End: 2024-12-31

## 2024-12-30 RX ORDER — CEFTRIAXONE 2 G/1
2 INJECTION, POWDER, FOR SOLUTION INTRAMUSCULAR; INTRAVENOUS
Status: DISCONTINUED | OUTPATIENT
Start: 2024-12-30 | End: 2024-12-30

## 2024-12-30 RX ORDER — GADOBUTROL 604.72 MG/ML
INJECTION INTRAVENOUS
Status: COMPLETED
Start: 2024-12-30 | End: 2024-12-30

## 2024-12-30 RX ORDER — PREDNISONE 20 MG/1
60 TABLET ORAL DAILY
Status: DISCONTINUED | OUTPATIENT
Start: 2024-12-30 | End: 2024-12-31

## 2024-12-30 RX ORDER — FAMOTIDINE 20 MG/1
40 TABLET, FILM COATED ORAL
Status: DISCONTINUED | OUTPATIENT
Start: 2024-12-31 | End: 2024-12-31

## 2024-12-30 RX ORDER — FUROSEMIDE 10 MG/ML
20 INJECTION INTRAMUSCULAR; INTRAVENOUS EVERY 12 HOURS
Status: DISCONTINUED | OUTPATIENT
Start: 2024-12-30 | End: 2024-12-31

## 2024-12-30 RX ORDER — DIPHENHYDRAMINE HYDROCHLORIDE 50 MG/ML
25 INJECTION INTRAMUSCULAR; INTRAVENOUS
Status: DISCONTINUED | OUTPATIENT
Start: 2024-12-30 | End: 2024-12-31

## 2024-12-30 RX ORDER — POTASSIUM CHLORIDE 20 MEQ/1
40 TABLET, EXTENDED RELEASE ORAL ONCE
Status: COMPLETED | OUTPATIENT
Start: 2024-12-30 | End: 2024-12-30

## 2024-12-30 RX ORDER — AMOXICILLIN AND CLAVULANATE POTASSIUM 875; 125 MG/1; MG/1
1 TABLET, FILM COATED ORAL EVERY 12 HOURS
Status: DISCONTINUED | OUTPATIENT
Start: 2024-12-30 | End: 2024-12-31

## 2024-12-30 RX ORDER — DIPHENHYDRAMINE HYDROCHLORIDE 50 MG/ML
25 INJECTION INTRAMUSCULAR; INTRAVENOUS EVERY 6 HOURS PRN
Status: DISCONTINUED | OUTPATIENT
Start: 2024-12-30 | End: 2024-12-30

## 2024-12-30 RX ORDER — PREDNISONE 20 MG/1
40 TABLET ORAL DAILY
Status: DISCONTINUED | OUTPATIENT
Start: 2024-12-30 | End: 2024-12-30

## 2024-12-30 RX ORDER — CEFEPIME HYDROCHLORIDE 1 G/1
1 INJECTION, POWDER, FOR SOLUTION INTRAMUSCULAR; INTRAVENOUS
Status: DISCONTINUED | OUTPATIENT
Start: 2024-12-30 | End: 2024-12-30

## 2024-12-30 RX ORDER — PREDNISONE 20 MG/1
60 TABLET ORAL ONCE
Status: COMPLETED | OUTPATIENT
Start: 2024-12-30 | End: 2024-12-30

## 2024-12-30 RX ADMIN — CEFTRIAXONE SODIUM 2 G: 2 INJECTION, POWDER, FOR SOLUTION INTRAMUSCULAR; INTRAVENOUS at 10:12

## 2024-12-30 RX ADMIN — GABAPENTIN 200 MG: 100 CAPSULE ORAL at 01:12

## 2024-12-30 RX ADMIN — IOHEXOL 75 ML: 350 INJECTION, SOLUTION INTRAVENOUS at 03:12

## 2024-12-30 RX ADMIN — Medication 800 MG: at 10:12

## 2024-12-30 RX ADMIN — CEFEPIME 1 G: 1 INJECTION, POWDER, FOR SOLUTION INTRAMUSCULAR; INTRAVENOUS at 08:12

## 2024-12-30 RX ADMIN — ACETAMINOPHEN 650 MG: 325 TABLET ORAL at 04:12

## 2024-12-30 RX ADMIN — ATORVASTATIN CALCIUM 20 MG: 20 TABLET, FILM COATED ORAL at 10:12

## 2024-12-30 RX ADMIN — GADOBUTROL 5 ML: 604.72 INJECTION INTRAVENOUS at 07:12

## 2024-12-30 RX ADMIN — VANCOMYCIN HYDROCHLORIDE 750 MG: 750 INJECTION, POWDER, LYOPHILIZED, FOR SOLUTION INTRAVENOUS at 12:12

## 2024-12-30 RX ADMIN — POTASSIUM BICARBONATE 35 MEQ: 391 TABLET, EFFERVESCENT ORAL at 05:12

## 2024-12-30 RX ADMIN — ASPIRIN 81 MG: 81 TABLET, COATED ORAL at 08:12

## 2024-12-30 RX ADMIN — POTASSIUM CHLORIDE 40 MEQ: 1500 TABLET, EXTENDED RELEASE ORAL at 08:12

## 2024-12-30 RX ADMIN — PREDNISONE 60 MG: 20 TABLET ORAL at 03:12

## 2024-12-30 RX ADMIN — FUROSEMIDE 20 MG: 10 INJECTION, SOLUTION INTRAMUSCULAR; INTRAVENOUS at 08:12

## 2024-12-30 RX ADMIN — GABAPENTIN 200 MG: 100 CAPSULE ORAL at 10:12

## 2024-12-30 RX ADMIN — PREDNISONE 60 MG: 20 TABLET ORAL at 01:12

## 2024-12-30 RX ADMIN — LEVOTHYROXINE SODIUM 75 MCG: 0.03 TABLET ORAL at 05:12

## 2024-12-30 RX ADMIN — POTASSIUM & SODIUM PHOSPHATES POWDER PACK 280-160-250 MG 2 PACKET: 280-160-250 PACK at 05:12

## 2024-12-30 RX ADMIN — LACTOBACILLUS ACIDOPHILUS / LACTOBACILLUS BULGARICUS 1 EACH: 100 MILLION CFU STRENGTH GRANULES at 10:12

## 2024-12-30 RX ADMIN — FUROSEMIDE 20 MG: 10 INJECTION, SOLUTION INTRAMUSCULAR; INTRAVENOUS at 10:12

## 2024-12-30 RX ADMIN — LACTOBACILLUS ACIDOPHILUS / LACTOBACILLUS BULGARICUS 1 EACH: 100 MILLION CFU STRENGTH GRANULES at 08:12

## 2024-12-30 RX ADMIN — DIPHENHYDRAMINE HYDROCHLORIDE, ZINC ACETATE: 2; .1 CREAM TOPICAL at 12:12

## 2024-12-30 RX ADMIN — POTASSIUM & SODIUM PHOSPHATES POWDER PACK 280-160-250 MG 2 PACKET: 280-160-250 PACK at 10:12

## 2024-12-30 RX ADMIN — AMOXICILLIN AND CLAVULANATE POTASSIUM 1 TABLET: 875; 125 TABLET, FILM COATED ORAL at 10:12

## 2024-12-30 RX ADMIN — VALACYCLOVIR HYDROCHLORIDE 1000 MG: 500 TABLET, FILM COATED ORAL at 08:12

## 2024-12-30 RX ADMIN — Medication 800 MG: at 05:12

## 2024-12-30 NOTE — ASSESSMENT & PLAN NOTE
Left sided neck pain that is sharp and stabbing into head. Patient endorses that specific area on head is painful to touch. CT of neck shows degenerative changes      -MRI c-spine  -MRA neck

## 2024-12-30 NOTE — PROGRESS NOTES
Pharmacist Renal Dose Adjustment Note    Vivien Burton is a 79 y.o. female being treated with the medication cefepime    Patient Data:    Vital Signs (Most Recent):  Temp: (!) 100.5 °F (38.1 °C) (12/29/24 1730)  Pulse: 91 (12/29/24 2002)  Resp: 18 (12/29/24 1534)  BP: (!) 100/53 (12/29/24 2002)  SpO2: 98 % (12/29/24 2002) Vital Signs (72h Range):  Temp:  [99.4 °F (37.4 °C)-100.5 °F (38.1 °C)]   Pulse:  []   Resp:  [18]   BP: (100-190)/(51-79)   SpO2:  [92 %-98 %]      Recent Labs   Lab 12/29/24  1736   CREATININE 0.8     Serum creatinine: 0.8 mg/dL 12/29/24 1736  Estimated creatinine clearance: 42.3 mL/min    Medication:cefepime dose: 1g frequency q8h will be changed to medication:cefepime dose:1g frequency:q12h    Pharmacist's Name: Stephan Tavarez  Pharmacist's Extension: [phone]

## 2024-12-30 NOTE — ASSESSMENT & PLAN NOTE
This patient does have evidence of infective focus  My overall impression is sepsis.  Source: Respiratory and Unknown Urinalysis pending at time of note  Antibiotics given-   Antibiotics (72h ago, onward)      None          Latest lactate reviewed-  Recent Labs   Lab 12/29/24  1835 12/29/24  2237 12/30/24  0415   LACTATE  --    < > 0.9   POCLAC 0.53  --   --     < > = values in this interval not displayed.     Procalcitonin: 81.13  CRP: 110.4  Sed Rate 33      Organ dysfunction indicated by Acute on chronic  heart failure    Fluid challenge Contraindicated- Fluid bolus is contraindicated in this patient due to Congestive Heart Failure     Post- resuscitation assessment Yes Perfusion exam was performed within 6 hours of septic shock presentation after bolus shows Adequate tissue perfusion assessed by non-invasive monitoring   IP consult to infectious diease    Patient has low-grade fever and severe elevated procalcitonin, with unclear explanation.   Patient does have significant CRP/ ESR.   Patient has significant neck pain/ headache, however no photophobia, no altered mental status.   Even though I have low suspicion of CNS infection, however it should be consider has a differential diagnosis.  LP should be considered however I will defer to ID team.   Chest x-ray is not quite impressive.   Get a CT chest abdomen and pelvis with IV contrast to look for infectious source.     Noted all antibiotics has been discontinued by ID, follow up all the cultures.  Repeat procalcitonin in the a.m..

## 2024-12-30 NOTE — PLAN OF CARE
POC discussed with pt, pt verbalized understanding. Oriented x4. PIV cdi. NSR on tele. Pt ambulates to the restroom independently. Urine sample collected and set, respiratory panel collected and set to lab. Neurology and ID at bedside today. Mag, Phos, and Potassium replaced per orders. Pt denies pain throughout the shift. Call light in reach, bed alarm set, safety maintained. No complaints or requests at this time, will continue to monitor.

## 2024-12-30 NOTE — ASSESSMENT & PLAN NOTE
"Pruritic rash to chest and neck secondary to allergic reaction to omeprazole.   Per Dematology note on 12/4/24 "Biopsy proven drug eruption, cleared with prednisone and d/c of prilosec. She restarted prilosec and rash came back. She stopped it at end of November and had another course of steroids". Per chart review patient was placed on steroid taper starting 11/16    -Benadryl cream PRN  -PO benadryl Q 6 hours PRN   -Acyclovir initiated in ED for possible shingles and has been continued due to one lesion noted on neck  "

## 2024-12-30 NOTE — CONSULTS
"[facility]  Department of Neurology  Neurology Consultation Note        PATIENT NAME: Vivien Burton  MRN: [id number]  CSN: [id number]      TODAY'S DATE: 12/30/2024  ADMIT DATE: 12/29/2024                            CONSULTING PROVIDER: Car Tucker MD  CONSULT REQUESTED BY: Rosendo Storey MD      Reason for consult:  Headache      History obtained from chart review and the patient.    HPI per EMR: Vivien Burton is a 79 y.o. female with a history of anemia, basal cell carcinoma, breast cancer, CHF, streptococcal bacteremia, GERD, HLD, HTN, CAD, TAVR, hypothyroidism, accidental tylenol overdose and abnormal MRI who presented to the ED for a headache and neck pain. The patient endorses a left sided headache described as stabbing and radiating from left upper neck. It is associated with tenderness to palpation in the left parietal scalp daily and intermittently the left temporal area. The pain has been daily for one month and reports taking tylenol every 8 hours with relief but the pain returns. Today the pain became intractable which caused her to present to the ED. CT of head showed no acute process and CT of c-spine showed degenerative changes. While in ED she developed a fever, oxygen saturation of 92%, hypertension, and tachycardia. She endorsed having a non-productive cough. Sepsis bundle initiated and cardiac workup added. She was given toradol, 2mg of PO diluadid, gabapentin, and acyclovir. Her pain improved and her blood pressure did also. She was maintaining an oxygen saturation of 99% on 1 liter nasal cannula and denied any shortness of breath. Fever resolved with tylenol. 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 She most recently finished a steroid taper at end of November. ED initiated acyclovir due one "leison" noted to left sided neck and pain with palpation to scalp. Lab work showed " "elevated troponin, normal lactic acid, no leukocytosis, increased anemia over last month, procalcitonin 81.13, normal TSH, and AST of 54.  with no peripheral edema but chest xray showed "Mild interstitial opacities, suspicious for interstitial edema/CHF." She has no tachypnea or exertional SOB. FLU/RSV/ Covid negative.  Initially her blood pressure was too low for lasix administration but later improved and she was administered 20mg of IV lasix. Troponin trended up and then lowered on third draw. Urinalysis pending at time of note. Due to ESR of 33 and CRP of 110.4 with fever, scalp tenderness, and worsening headache she was empirically started on prednisone 60mg for one dose and to be continued by primary team pending MRA of head to assess for temporal arteritis. MRI of c-spine ordered. MRA of neck ordered.  One year ago patient presented to ED with right sided headache that was similar to this visit.  Patient admitted by hospital medicine for further evaluation and management.     Neurology consult:  Patient was seen and examined by me.  She presented to the hospital with headache on the left parieto-occipital region which she describes a stabbing pain, 10/10 in intensity.  She also endorses of sensitivity to parietal, temporal and occipital regions however no pain in the left temporal region.  She states that headache started in the beginning of December and initially the headache was persistent and Tylenol would help her however eventually Tylenol stopped helping her.  Currently her headache comes and goes and minute comes on it is 10/10 in intensity and last for few hours improves with some medications.  Denies any jaw claudication, pain or weakness in her upper or lower extremities proximally.    Patient had similar pain on the opposite side in January 20, 2023 and symptoms help with short period of Decadron at that time.  Workup with MRI brain was negative for intracranial pathology, lumbar puncture " was done at that time was negative as well.    PREVIOUS MEDICAL HISTORY:  Past Medical History:   Diagnosis Date    Acute on chronic combined systolic and diastolic heart failure 2022    Anemia     Basal cell carcinoma     Breast cancer     Breast cancer     Cancer     CHF (congestive heart failure)     Colon polyps     Coronary artery disease     GERD (gastroesophageal reflux disease)     Hyperlipidemia     Hypertension     Hypothyroidism     Nonrheumatic aortic (valve) stenosis 2018    Osteoporosis 2019    S/P TAVR (transcatheter aortic valve replacement) 2022    Squamous cell carcinoma of skin     Streptococcal bacteremia 2023    Thyroid disease     Transaminitis 2023     PREVIOUS SURGICAL HISTORY:  Past Surgical History:   Procedure Laterality Date    BREAST SURGERY      CARDIAC CATH COSURGEON N/A 2022    Procedure: Cardiac Cath Cosurgeon;  Surgeon: Dontae Wooten MD;  Location: [facility] CATH LAB;  Service: Cardiovascular;  Laterality: N/A;     SECTION, CLASSIC      COLONOSCOPY N/A 2018    Procedure: COLONOSCOPY;  Surgeon: Precious Castorena MD;  Location: [facility] ENDO;  Service: Endoscopy;  Laterality: N/A;    COLONOSCOPY N/A 3/31/2023    Procedure: COLONOSCOPY;  Surgeon: Mal Abrams MD;  Location: [facility] ENDO;  Service: Endoscopy;  Laterality: N/A;    HYSTERECTOMY      LEFT HEART CATHETERIZATION Left 2022    Procedure: Left heart cath;  Surgeon: Dontae Johns MD;  Location: [facility] CATH LAB;  Service: Cardiology;  Laterality: Left;    LEFT HEART CATHETERIZATION Left 2022    Procedure: Left heart cath;  Surgeon: Dontae Johns MD;  Location: [facility] CATH LAB;  Service: Cardiology;  Laterality: Left;    MASTECTOMY Right 2000    right breast      TONSILLECTOMY, ADENOIDECTOMY      TRANSCATHETER AORTIC VALVE REPLACEMENT (TAVR) N/A 2022    Procedure: REPLACEMENT, AORTIC VALVE, TRANSCATHETER (TAVR);  Surgeon: Dontae Johns MD;   Location: [facility];  Service: Cardiology;  Laterality: N/A;     FAMILY MEDICAL HISTORY:  Family History   Problem Relation Name Age of Onset    Cancer Sister      Liver cancer Sister      Melanoma Sister      Cancer Brother      Breast cancer Neg Hx      Psoriasis Neg Hx      Lupus Neg Hx      Eczema Neg Hx       ALLERGIES:  Review of patient's allergies indicates:   Allergen Reactions    Prilosec [omeprazole] Hives     HOME MEDICATIONS:  Prior to Admission medications    Medication Sig Start Date End Date Taking? Authorizing Provider   ascorbic acid (VITAMIN C ORAL) Take by mouth once daily.    Provider, Historical   aspirin (ECOTRIN) 81 MG EC tablet Take 81 mg by mouth once a week.    Provider, Historical   camphor-menthol 0.5-0.5% (SARNA ORIGINAL) lotion Apply topically as needed for Itching. 11/16/24   Osmar Belhcer NP   cetirizine HCl (ZYRTEC ORAL) Take by mouth once daily.    Provider, Historical   famotidine (PEPCID) 40 MG tablet Take 1 tablet (40 mg total) by mouth once daily. 12/18/24 12/18/25  Jeri Moreira MD   ferrous sulfate (FEOSOL) Tab tablet Take 1 tablet by mouth daily with breakfast. 27mg    Provider, Historical   ibandronate (BONIVA) 150 mg tablet Take 1 tablet (150 mg total) by mouth every 30 days. 6/18/24   Jeri Moreira MD   Lactobacillus acidophilus (ACIDOPHILUS ORAL) Take by mouth once daily.    Provider, Historical   levothyroxine (SYNTHROID) 75 MCG tablet TAKE 1 TABLET BY MOUTH EVERY DAY 3/18/24   Ellen Robert III, MD   losartan (COZAAR) 50 MG tablet Take 1 tablet (50 mg total) by mouth once daily. 11/22/24 11/22/25  Rick Mccoy MD   simvastatin (ZOCOR) 40 MG tablet Take 1 tablet (40 mg total) by mouth every evening. Need OFFICE VISIT before next refill, last seen 11/2023. This will be the LAST Rx if visit is not made. 10/28/24   Rick Mccoy MD   tiZANidine (ZANAFLEX) 4 MG tablet Take 1 tablet (4 mg total) by mouth every 8 (eight) hours. 12/18/24   Jeri Moreira  MD   triamcinolone acetonide 0.1% (KENALOG) 0.1 % cream Apply topically 2 (two) times daily. 12/4/24   Arleth Van MD     CURRENT SCHEDULED MEDICATIONS:   aspirin  81 mg Oral Daily    atorvastatin  20 mg Oral QHS    ceFEPime IV (PEDS and ADULTS)  1 g Intravenous Q12H    [START ON 12/31/2024] famotidine  40 mg Oral Q48H    furosemide (LASIX) injection  20 mg Intravenous Q12H    lactobacillus acidophilus & bulgar  1 packet Oral BID    levothyroxine  75 mcg Oral Before breakfast    valACYclovir  1,000 mg Oral TID    vancomycin (VANCOCIN) IV (PEDS and ADULTS)  750 mg Intravenous Q18H     CURRENT INFUSIONS:    CURRENT PRN MEDICATIONS:    Current Facility-Administered Medications:     acetaminophen, 650 mg, Oral, Q4H PRN    albuterol-ipratropium, 3 mL, Nebulization, Q6H PRN    aluminum-magnesium hydroxide-simethicone, 30 mL, Oral, QID PRN    dextrose 10%, 12.5 g, Intravenous, PRN    dextrose 10%, 25 g, Intravenous, PRN    diphenhydrAMINE, 25 mg, Oral, Q6H PRN    diphenhydrAMINE, 25 mg, Intravenous, PRN    diphenhydrAMINE-zinc acetate 2-0.1%, , Topical (Top), BID PRN    glucagon (human recombinant), 1 mg, Intramuscular, PRN    glucose, 16 g, Oral, PRN    glucose, 24 g, Oral, PRN    HYDROmorphone, 0.5 mg, Intravenous, Q4H PRN    magnesium oxide, 800 mg, Oral, PRN    magnesium oxide, 800 mg, Oral, PRN    melatonin, 6 mg, Oral, Nightly PRN    naloxone, 0.02 mg, Intravenous, PRN    ondansetron, 4 mg, Intravenous, Q8H PRN    oxyCODONE, 5 mg, Oral, Q4H PRN    oxyCODONE, 10 mg, Oral, Q4H PRN    potassium bicarbonate, 35 mEq, Oral, PRN    potassium bicarbonate, 50 mEq, Oral, PRN    potassium bicarbonate, 60 mEq, Oral, PRN    potassium, sodium phosphates, 2 packet, Oral, PRN    potassium, sodium phosphates, 2 packet, Oral, PRN    potassium, sodium phosphates, 2 packet, Oral, PRN    prochlorperazine, 5 mg, Intravenous, Q6H PRN    simethicone, 1 tablet, Oral, QID PRN    sodium chloride 0.9%, 10 mL, Intravenous, Q12H PRN     Pharmacy to dose Vancomycin consult, , , Once **AND** vancomycin - pharmacy to dose, , Intravenous, pharmacy to manage frequency    REVIEW OF SYSTEMS:  Please refer to the HPI for all pertinent positive and negative findings. A comprehensive review of all other systems was negative.    PHYSICAL EXAM:  Patient Vitals for the past 24 hrs:   BP Temp Temp src Pulse Resp SpO2 Height Weight   12/30/24 0821 -- -- -- 88 18 97 % -- --   12/30/24 0819 (!) 145/67 -- Axillary 88 17 97 % -- --   12/30/24 0346 132/62 98.7 °F (37.1 °C) Oral 96 18 (!) 94 % -- --   12/29/24 2259 -- -- -- 84 18 97 % -- --   12/29/24 2245 -- -- -- 91 -- -- -- --   12/29/24 2226 (!) 156/76 98.3 °F (36.8 °C) Oral 102 18 99 % -- --   12/29/24 2210 -- -- -- -- -- 99 % -- --   12/29/24 2122 -- 99 °F (37.2 °C) Oral -- -- -- -- --   12/29/24 2101 (!) 113/56 -- -- 93 -- 99 % -- --   12/29/24 2031 (!) 114/58 -- -- 92 -- 98 % -- --   12/29/24 2002 (!) 100/53 -- -- 91 -- 98 % -- --   12/29/24 1956 (!) 107/51 -- -- 92 -- 98 % -- --   12/29/24 1932 (!) 101/53 -- -- 94 -- 97 % -- --   12/29/24 1835 -- -- -- 103 -- 98 % -- --   12/29/24 1810 (!) 144/63 -- -- -- -- -- -- --   12/29/24 1730 -- (!) 100.5 °F (38.1 °C) -- -- -- -- -- --   12/29/24 1709 (!) 174/75 -- -- (!) 121 -- (!) 92 % -- --   12/29/24 1534 -- -- -- -- 18 -- -- --   12/29/24 1451 (!) 190/79 99.4 °F (37.4 °C) Oral (!) 111 18 97 % 5' (1.524 m) 54.4 kg (120 lb)       GENERAL APPEARANCE: Alert, well-developed, well-nourished female in no acute distress.  HEENT: Normocephalic and atraumatic. PERRL. Oropharynx unremarkable.  PULM: Normal respiratory effort. No accessory muscle use.  CV: RRR.  ABDOMEN: Soft, nontender.  EXTREMITIES: No obvious signs of vascular compromise. Pulses present. No cyanosis, clubbing or edema.  SKIN: Clear; no rashes, lesions or skin breaks in exposed areas.    NEURO:  MENTAL STATUS: Patient awake and oriented to time, place, and person, recent/remote memory normal, attention  span/concentration normal, and speech fluent without paraphasic errors.  Affect euthymic.    CRANIAL NERVES:  CN I: Not tested.  CN II: Fundoscopic exam deferred.  CN III, IV, VI: Pupils equal, round and reactive to light.  Extraocular movements full and intact.  CN V: Facial sensation normal.  CN VII: Facial asymmetry absent.  CN VIII: Hearing grossly normal and equal bilaterally.  No skew deviation or pathologic nystagmus.  CN IX, X: Palate elevates symmetrically. Speech/articulation is clear without dysarthria.  CN XI: Shoulder shrug and chin rotation equal with good strength.  CN XII: Tongue protrusion midline.    MOTOR:  Bulk normal. Tone normal and symmetric throughout.  Abnormal movements absent.  Tremor: none present.  Strength 5/5 throughout.    REFLEXES:  DTRs 2+ throughout.  Plantar response equivocal bilaterally.  SENSATION: grossly intact throughout.  COORDINATION: normal finger-to-nose.  STATION: not tested.  GAIT: not tested.    Labs:  Recent Labs   Lab 12/29/24 1736 12/29/24 2040 12/30/24 0415     --  134*   K 3.7  --  3.4*     --  102   CO2 22*  --  23   BUN 9  --  12   CREATININE 0.8  --  0.9   *  --  118*   CALCIUM 9.2  --  8.6*   PHOS  --   --  2.5*   MG  --  1.5* 1.8     Recent Labs   Lab 12/29/24 1737 12/30/24  0415   WBC 9.33 9.77   HGB 9.0* 8.5*   HCT 26.4* 26.1*    172     Recent Labs   Lab 12/29/24 1736 12/30/24  0415   ALBUMIN 3.0* 2.6*   PROT 5.6* 5.0*   BILITOT 0.5 0.4   ALKPHOS 85 75   ALT 38 28   AST 54* 28     Lab Results   Component Value Date    INR 1.1 01/28/2023     Lab Results   Component Value Date    TRIG 154 (H) 04/09/2024    HDL 66 04/09/2024    CHOLHDL 2.1 04/09/2024     Lab Results   Component Value Date    HGBA1C 4.6 04/09/2024     Lab Results   Component Value Date    PROTEINCSF 29 01/24/2023    GLUCCSF 78 (H) 01/24/2023       Imaging:  I have reviewed and interpreted the pertinent imaging and lab results.      MRI Brain Without  Contrast  Narrative: EXAMINATION:  MRI BRAIN WITHOUT CONTRAST    CLINICAL HISTORY:  Headache, new or worsening (Age >= 50y);.    TECHNIQUE:  Multiplanar multisequence MR imaging of the brain was performed without the administration of intravenous contrast.    COMPARISON:  CT head 12/29/2024, MRI brain 01/24/2023    FINDINGS:  Study is  degraded by motion artifact.    Ventricles and sulci are normal in size for age without evidence of hydrocephalus. No extra-axial blood or fluid collections.    Scattered foci of T2/FLAIR hyperintense signal within the supratentorial white matter, nonspecific and may reflect sequelae of mild chronic small vessel ischemic change.    Diffusion-weighted images demonstrate no evidence of an acute infarct.   Susceptibility weighted images demonstrate no evidence of acute or chronic hemorrhage. No significant intracranial mass effect or midline shift.    Normal vascular flow voids are present.    Diffusely heterogeneous calvarial bone marrow signal, nonspecific but similar to prior exam from 2023, and may be seen with red marrow reconversion associated with chronic anemic states, osteoporosis, hypoxia, or smoking.    Moderate opacification of the left maxillary sinus with mucosal membrane thickening and small air-fluid level.  Mild scattered mucosal membrane thickening elsewhere in the paranasal sinuses.  The visualized portions of the mastoids are unremarkable.    Bilateral lens replacements are noted.  Impression: 1. No acute intracranial abnormality.  2. Mild generalized cerebral volume loss and scattered T2/FLAIR hyperintense signal within the supratentorial white matter, nonspecific but probably reflecting sequelae of chronic small vessel ischemic change.  3. Left maxillary sinusitis.    Electronically signed by: Marek Moreno  Date:    12/30/2024  Time:    07:39         ASSESSMENT & PLAN:      Intractable headache     Plan:   Etiology of intractable headache is unknown however  differential includes occipital/cervical neuralgia.  Patient has elevated ESR and CRP however there is no vision changes, temporal tenderness, temporal headache, jaw claudication or proximal weakness and hence concern for Deysi cell arteritis/temporal arteritis is low.   Gabapentin 200 mg t.i.d. for headache.    Patient was empirically started on prednisone 60 mg daily for possible temporal arteritis.  MRI brain is negative for acute pathology. MR angio brain with temporal arteritis protocol showed no mural thickening or abnormal enhancement of the temporal arteries.  Can consider temporal artery biopsy  Further workup for metabolic derangements and infectious abnormalities per primary team.    Will follow         Thank you kindly for including us in the care of this patient. Please do not hesitate to contact us with any questions.          Car Tucker MD  Neurology/vascular Neurology  Date of Service: 12/30/2024  10:11 AM    --------------------------------------------------------------------------------------------------------------------------------------------------------------------------------------------------------------------------------------------------------------  Please note: This note was transcribed using voice recognition software. Because of this technology there are often uinintended grammatical, spelling, and other transcription errors. Please disregard these errors.

## 2024-12-30 NOTE — NURSING
Pt arrived on unit via stretcher bed.   Tele placed #[id number], safety maintained.   Noted Sepsis protocol, trending labs r/t Trop/BNP/SedRt/Lactic/CRP/Procalcitonin/Mg+ ,etc.  Personal belongings are at bedside, plugged cell phone into wall.  Assessment initiated, vitals obtained per facility protocol.  POC ongoing.

## 2024-12-30 NOTE — SUBJECTIVE & OBJECTIVE
Interval History:     Patient is seen and examined at multidisciplinary rounds, patient complaining of severe left-sided neck pain and headache for past 1 month associated with low-grade fever.  No photophobia.   No blurry vision/ jaw pain.  No chest pain or SOB.  No nausea vomiting diarrhea.  No Sick contacts/ recent travel history.    Review of Systems   Musculoskeletal:  Positive for neck pain.   Neurological:  Positive for headaches.     Objective:     Vital Signs (Most Recent):  Temp: 98.5 °F (36.9 °C) (12/30/24 1158)  Pulse: 83 (12/30/24 1158)  Resp: 16 (12/30/24 1158)  BP: 137/63 (12/30/24 1158)  SpO2: 99 % (12/30/24 1158) Vital Signs (24h Range):  Temp:  [98.3 °F (36.8 °C)-100.5 °F (38.1 °C)] 98.5 °F (36.9 °C)  Pulse:  [] 83  Resp:  [16-18] 16  SpO2:  [92 %-99 %] 99 %  BP: (100-190)/(51-79) 137/63     Weight: 54.4 kg (120 lb)  Body mass index is 23.44 kg/m².    Intake/Output Summary (Last 24 hours) at 12/30/2024 1235  Last data filed at 12/30/2024 1028  Gross per 24 hour   Intake 1328.51 ml   Output 1900 ml   Net -571.49 ml         Physical Exam  Constitutional:       Appearance: Normal appearance.   HENT:      Head: Normocephalic and atraumatic.      Nose: Nose normal.   Eyes:      Extraocular Movements: Extraocular movements intact.      Pupils: Pupils are equal, round, and reactive to light.   Cardiovascular:      Rate and Rhythm: Normal rate and regular rhythm.      Heart sounds: No murmur heard.  Pulmonary:      Effort: Pulmonary effort is normal.      Breath sounds: Normal breath sounds.   Abdominal:      General: Abdomen is flat.      Palpations: Abdomen is soft.   Musculoskeletal:         General: Normal range of motion.      Cervical back: Normal range of motion and neck supple.   Skin:     General: Skin is warm and dry.   Neurological:      General: No focal deficit present.      Mental Status: She is alert and oriented to person, place, and time.   Psychiatric:         Mood and Affect: Mood  normal.             Significant Labs: All pertinent labs within the past 24 hours have been reviewed.  CBC:   Recent Labs   Lab 12/29/24  1737 12/30/24  0415   WBC 9.33 9.77   HGB 9.0* 8.5*   HCT 26.4* 26.1*    172     CMP:   Recent Labs   Lab 12/29/24  1736 12/30/24  0415    134*   K 3.7 3.4*    102   CO2 22* 23   * 118*   BUN 9 12   CREATININE 0.8 0.9   CALCIUM 9.2 8.6*   PROT 5.6* 5.0*   ALBUMIN 3.0* 2.6*   BILITOT 0.5 0.4   ALKPHOS 85 75   AST 54* 28   ALT 38 28   ANIONGAP 11 9       Significant Imaging: I have reviewed all pertinent imaging results/findings within the past 24 hours.  I have reviewed and interpreted all pertinent imaging results/findings within the past 24 hours.    Scheduled Meds:   aspirin  81 mg Oral Daily    atorvastatin  20 mg Oral QHS    [START ON 12/31/2024] famotidine  40 mg Oral Q48H    furosemide (LASIX) injection  20 mg Intravenous Q12H    lactobacillus acidophilus & bulgar  1 packet Oral BID    levothyroxine  75 mcg Oral Before breakfast    predniSONE  60 mg Oral Daily     Continuous Infusions:  PRN Meds:.  Current Facility-Administered Medications:     acetaminophen, 650 mg, Oral, Q4H PRN    albuterol-ipratropium, 3 mL, Nebulization, Q6H PRN    aluminum-magnesium hydroxide-simethicone, 30 mL, Oral, QID PRN    dextrose 10%, 12.5 g, Intravenous, PRN    dextrose 10%, 25 g, Intravenous, PRN    diphenhydrAMINE, 25 mg, Oral, Q6H PRN    diphenhydrAMINE, 25 mg, Intravenous, PRN    diphenhydrAMINE-zinc acetate 2-0.1%, , Topical (Top), BID PRN    glucagon (human recombinant), 1 mg, Intramuscular, PRN    glucose, 16 g, Oral, PRN    glucose, 24 g, Oral, PRN    HYDROmorphone, 0.5 mg, Intravenous, Q4H PRN    magnesium oxide, 800 mg, Oral, PRN    magnesium oxide, 800 mg, Oral, PRN    melatonin, 6 mg, Oral, Nightly PRN    naloxone, 0.02 mg, Intravenous, PRN    ondansetron, 4 mg, Intravenous, Q8H PRN    oxyCODONE, 5 mg, Oral, Q4H PRN    oxyCODONE, 10 mg, Oral, Q4H PRN     potassium bicarbonate, 35 mEq, Oral, PRN    potassium bicarbonate, 50 mEq, Oral, PRN    potassium bicarbonate, 60 mEq, Oral, PRN    potassium, sodium phosphates, 2 packet, Oral, PRN    potassium, sodium phosphates, 2 packet, Oral, PRN    potassium, sodium phosphates, 2 packet, Oral, PRN    prochlorperazine, 5 mg, Intravenous, Q6H PRN    simethicone, 1 tablet, Oral, QID PRN    sodium chloride 0.9%, 10 mL, Intravenous, Q12H PRN    MRA Head for Temporal Arteritis W and WO Contrast    Result Date: 12/30/2024  EXAMINATION: MRA HEAD FOR TEMPORAL ARTERITIS W AND WO CONTRAST CLINICAL HISTORY: Neck pain, acute, infection suspected;Concern for Temporal Arteritis; TECHNIQUE: Time of flight and post contrast MR angiography sequences were performed before and following the IV administration of 5 mL of Gadavist..  Multiplanar and MIP images were performed. COMPARISON: MRI of the brain 12/30/2024 FINDINGS: The intracranial internal carotid arteries are patent bilaterally from the skull base to carotid terminus. Middle cerebral arteries are patent bilaterally. Anterior cerebral arteries are patent bilaterally. There are codominant vertebral arteries. Bilateral vertebral arteries are patent to the confluence into the basilar artery. Basilar artery is normal in caliber. Posterior cerebral arteries are patent bilaterally. No evidence of significant mural thickening or abnormal enhancement along the courses of the temporal arteries to specifically indicate active vascular inflammation. Visualized dural venous sinuses are patent without evidence of dural venous sinus thrombosis.     1. No intracranial high-grade stenosis, large vessel occlusion, aneurysm or arteriovenous malformation. 2. No evidence of significant mural thickening or abnormal enhancement along the courses of the temporal arteries to specifically indicate active vascular inflammation. Electronically signed by: Marek Moreno Date:    12/30/2024 Time:    11:17    MRI  Cervical Spine W WO Cont    Result Date: 12/30/2024  EXAMINATION: MRI CERVICAL SPINE W WO CONTRAST CLINICAL HISTORY: Neck pain, acute, infection suspected; TECHNIQUE: Multiplanar, multisequence MR images of the cervical spine were performed before and after the administration of intravenous contrast.  5 mL of Gadavist intravenous contrast were administered. COMPARISON: CT cervical spine 12/29/2024 FINDINGS: Study is mild to moderately degraded by motion artifact. Alignment: Reversal of the cervical lordosis, which may be secondary to positioning or muscle spasm.  Minimal anterolisthesis of C4 on C5 and retrolisthesis of C5 on C6. Vertebral Column: Vertebral body heights are maintained. No acute fracture is identified.  No evidence of an aggressive bone marrow replacement process. Multilevel disc degeneration, most pronounced at C5-6 and C6-7 with mild-to-moderate intervertebral disc space narrowing, disc desiccation, anterior marginal osteophyte formation and posterior disc osteophyte complexes. Cord: Normal signal and morphology. Skull base and craniocervical junction: Normal. Degenerative findings: C2-C3: Minimal posterior disc osteophyte complex.  Mild bilateral facet arthropathy.  No significant neural foraminal or spinal canal stenosis. C3-C4: Mild posterior disc osteophyte complex.  Moderate bilateral facet arthropathy.  Marked left and moderate right uncovertebral joint spurring.  Severe left and moderate right neural foraminal stenosis.  No significant spinal canal stenosis. C4-C5: Mild posterior disc osteophyte complex.  Moderate bilateral facet arthropathy.  Moderate left and mild right uncovertebral joint spurring.  Moderate left and mild right neural foraminal stenosis.  No significant spinal canal stenosis. C5-C6: Posterior disc osteophyte complex, which partially effaces the ventral thecal sac.  Moderate bilateral facet arthropathy.  Marked left and moderate right uncovertebral joint spurring.  Severe  left and moderate right neural foraminal stenosis.  Ligamentum flavum thickening, which partially effaces the dorsal thecal sac.  Mild spinal canal stenosis. C6-C7: Posterior disc osteophyte complex, which mildly effaces the ventral thecal sac.  Moderate bilateral facet arthropathy.  Moderate left and moderate right uncovertebral joint spurring.  Severe left and moderate right neural foraminal stenosis.  Ligamentum flavum thickening, which partially effaces the dorsal thecal sac.  Mild spinal canal stenosis. C7-T1: The disk is normal in configuration.  Mild bilateral facet arthropathy.  Mild bilateral uncovertebral joint spurring.  Mild left greater than right neural foraminal stenosis.  No significant spinal canal stenosis. Paraspinal muscles & soft tissues: Unremarkable. Normal signal voids are present in the vertebral arteries. No abnormal intraspinal enhancement identified.     1. No evidence of acute fracture, spondylodiscitis, high-grade spinal canal stenosis.  No cord compression, focal cord signal abnormality, or abnormal intraspinal enhancement. 2. Multilevel degenerative changes of the cervical spine, as detailed above, with findings most pronounced at C5-6 and C6-7 and resulting in severe left and moderate right neural foraminal and mild spinal canal stenosis. Electronically signed by: Marek Moreno Date:    12/30/2024 Time:    10:47    MRA Neck without contrast    Result Date: 12/30/2024  EXAMINATION: MRA NECK WITHOUT CONTRAST CLINICAL HISTORY: CNS vasculitis, known or suspected; TECHNIQUE: Time of flight MRA of the carotid and vertebral arteries was performed with 3D reconstructions according to the standard neck MRA protocol. COMPARISON: None FINDINGS: Study is mild to moderately degraded by motion artifact. The right common carotid artery demonstrates no hemodynamically significant stenosis. The cervical right internal carotid artery demonstrates no hemodynamically significant stenosis. The left common  carotid artery demonstrates no hemodynamically significant stenosis. The cervical left internal carotid artery demonstrates no hemodynamically significant stenosis. Extracranial vertebral arteries: Vertebral arteries are codominant.  Vertebral arteries are normal to the level of the foramen magnum.     No evidence of a hemodynamically significant stenosis, major branch occlusion or aneurysm in the neck arterial vasculature, allowing for motion degradation. Electronically signed by: Marek Moreno Date:    12/30/2024 Time:    10:23    MRI Brain Without Contrast    Result Date: 12/30/2024  EXAMINATION: MRI BRAIN WITHOUT CONTRAST CLINICAL HISTORY: Headache, new or worsening (Age >= 50y);. TECHNIQUE: Multiplanar multisequence MR imaging of the brain was performed without the administration of intravenous contrast. COMPARISON: CT head 12/29/2024, MRI brain 01/24/2023 FINDINGS: Study is  degraded by motion artifact. Ventricles and sulci are normal in size for age without evidence of hydrocephalus. No extra-axial blood or fluid collections. Scattered foci of T2/FLAIR hyperintense signal within the supratentorial white matter, nonspecific and may reflect sequelae of mild chronic small vessel ischemic change.    Diffusion-weighted images demonstrate no evidence of an acute infarct.   Susceptibility weighted images demonstrate no evidence of acute or chronic hemorrhage. No significant intracranial mass effect or midline shift. Normal vascular flow voids are present. Diffusely heterogeneous calvarial bone marrow signal, nonspecific but similar to prior exam from 2023, and may be seen with red marrow reconversion associated with chronic anemic states, osteoporosis, hypoxia, or smoking. Moderate opacification of the left maxillary sinus with mucosal membrane thickening and small air-fluid level.  Mild scattered mucosal membrane thickening elsewhere in the paranasal sinuses.  The visualized portions of the mastoids are unremarkable.  Bilateral lens replacements are noted.     1. No acute intracranial abnormality. 2. Mild generalized cerebral volume loss and scattered T2/FLAIR hyperintense signal within the supratentorial white matter, nonspecific but probably reflecting sequelae of chronic small vessel ischemic change. 3. Left maxillary sinusitis. Electronically signed by: Marek Moreno Date:    12/30/2024 Time:    07:39    X-Ray Chest AP Portable    Result Date: 12/29/2024  EXAMINATION: XR CHEST AP PORTABLE CLINICAL HISTORY: Chest Pain; TECHNIQUE: Single frontal view of the chest was performed. COMPARISON: 02/02/2023. FINDINGS: There are mild interstitial opacities, suspicious for interstitial edema/CHF.  The pleural spaces are clear the cardiac silhouette is borderline.  There are calcifications of the aortic arch.  There is a prosthetic aortic valve stent.  Osseous structures demonstrate degenerative changes.     Mild interstitial opacities, suspicious for interstitial edema/CHF. Electronically signed by: Anthony Andres Date:    12/29/2024 Time:    17:58    CT Head Without Contrast    Result Date: 12/29/2024  EXAMINATION: CT HEAD WITHOUT CONTRAST CLINICAL HISTORY: Headache, new or worsening (Age >= 50y); TECHNIQUE: Low dose axial CT images obtained throughout the head without intravenous contrast. Sagittal and coronal reconstructions were performed. COMPARISON: None. FINDINGS: Intracranial compartment: Ventricles and sulci are normal in size for age without evidence of hydrocephalus. No extra-axial blood or fluid collections. The brain parenchyma appears normal. No parenchymal mass, hemorrhage, edema or major vascular distribution infarct. Skull/extracranial contents (limited evaluation): No fracture. There is a left maxillary sinus air-fluid level.     There is no evidence acute intracranial abnormality.  There is left maxillary sinus disease. Electronically signed by: Susana Carrion MD Date:    12/29/2024 Time:    16:49    CT Cervical Spine  Without Contrast    Result Date: 12/29/2024  EXAMINATION: CT CERVICAL SPINE WITHOUT CONTRAST CLINICAL HISTORY: Neck pain, chronic, degenerative changes on xray;Neck pain, acute exacerbation with chronic degenerative changes; TECHNIQUE: Low dose axial images, sagittal and coronal reformations were performed though the cervical spine.  Contrast was not administered. COMPARISON: 12/18/2024 FINDINGS: There is no evidence fracture or malalignment.  There are degenerative changes throughout the cervical spine most prominent in the mid cervical spine.  There is no prevertebral soft tissue swelling.  The adjacent soft tissues are unremarkable.     There are degenerative changes throughout the cervical spine. Electronically signed by: Susana Carrion MD Date:    12/29/2024 Time:    15:48    X-Ray Cervical Spine AP And Lateral    Result Date: 12/18/2024  EXAMINATION: XR CERVICAL SPINE AP LATERAL CLINICAL HISTORY: Cervicalgia TECHNIQUE: AP, lateral and open mouth views of the cervical spine were performed. COMPARISON: None. FINDINGS: Vertebral body heights are preserved.Trace anterolisthesis of C4 on C5 and retrolisthesis of C5 on C6.Moderate disc height loss at C5-C6 and C6-C7 with shallow endplate centered osteophytes.  No abnormal prevertebral soft tissue thickening.     As above. Electronically signed by: Marek Burton Date:    12/18/2024 Time:    11:21    Mammo Digital Screening Left with Krishna    Result Date: 12/3/2024  Facility: [facility] [address] [place] [address] [phone] Name: Vivien Burton MRN: [id number] Result: Mammo Digital Screening Left with Krishna History: Patient is 79 y.o. and is seen for a screening mammogram. Films Compared: Compared to: 11/30/2023 Mammo Digital Screening Left with Krishna, 08/11/2022 Mammo Digital Screening Left with Krishna, and 08/09/2021 Mammo Digital Screening Left with Krishna Findings: This procedure was performed using tomosynthesis.  Computer-aided detection was utilized in the interpretation of this examination. There are scattered areas of fibroglandular density. There is no evidence of suspicious masses, microcalcifications or architectural distortion.      No mammographic evidence of malignancy. BI-RADS Category 1: Negative Recommendation: Routine screening mammogram in 1 year is recommended. Seun Amaro MD   - pulls last radiology orders

## 2024-12-30 NOTE — ASSESSMENT & PLAN NOTE
Most recent hemoglobin and hematocrit are listed below.  Recent Labs     12/29/24  1737   HGB 9.0*   HCT 26.4*     Plan  - Monitor serial CBC: Daily  - Transfuse PRBC if patient becomes hemodynamically unstable, symptomatic or H/H drops below 7/21.  - Patient has not received any PRBC transfusions to date  - Patient's anemia is currently stable

## 2024-12-30 NOTE — PLAN OF CARE
[facility] - Med/Surg  Initial Discharge Assessment       Primary Care Provider: Jeri Moreira MD    Admission Diagnosis: Sepsis, due to unspecified organism, unspecified whether acute organ dysfunction present [A41.9]    Admission Date: 12/29/2024  Expected Discharge Date: 1/2/2025    Transition of Care Barriers: None    Payor: MEDICARE / Plan: MEDICARE PART A & B / Product Type: Government /     Extended Emergency Contact Information  Primary Emergency Contact: Cleveland Burton           [facility]  Mobile Phone: [phone]  Relation: Son  Secondary Emergency Contact: Damari Burton   [place]  Mobile Phone: [phone]  Relation: Son  Preferred language: English   needed? No    Discharge Plan A: Home  Discharge Plan B: Home Health      [facility] - [facility], [place] - [address] [place] [address]  Phone: [phone] Fax: [phone]    DC assessment completed at bedside with pt, information on facesheet verified. Lives at listed address alone, reports one of her children will drive her home. PCP is Dr. Moreira. Pharm is [facility]. Denies coumadin, HH, HD, DME. Independent at baseline, drives self. Insurance verified. POA is sons. Denies recent admission. Planning to DC home when clear.       Initial Assessment (most recent)       Adult Discharge Assessment - 12/30/24 1212          Discharge Assessment    Assessment Type Discharge Planning Assessment     Confirmed/corrected address, phone number and insurance Yes     Confirmed Demographics Correct on Facesheet     Source of Information patient     Communicated MIRELLA with patient/caregiver Yes     People in Home alone     Facility Arrived From: home     Do you expect to return to your current living situation? Yes     Do you have help at home or someone to help you manage your care at home? No     Prior to hospitilization cognitive status:  Alert/Oriented     Current cognitive status: Alert/Oriented     Equipment Currently Used at Home none     Readmission within 30 days? No     Patient currently being followed by outpatient case management? No     Do you currently have service(s) that help you manage your care at home? No     Do you take prescription medications? Yes     Do you have prescription coverage? Yes     [place] [employer] [place]     Do you have any problems affording any of your prescribed medications? No     Is the patient taking medications as prescribed? yes     Who is going to help you get home at discharge? one of her children     How do you get to doctors appointments? car, drives self     Are you on dialysis? No     Do you take coumadin? No     Discharge Plan A Home     Discharge Plan B Home Health     DME Needed Upon Discharge  none     Discharge Plan discussed with: Patient     Transition of Care Barriers None

## 2024-12-30 NOTE — SUBJECTIVE & OBJECTIVE
Past Medical History:   Diagnosis Date    Acute on chronic combined systolic and diastolic heart failure 2022    Anemia     Basal cell carcinoma     Breast cancer     Breast cancer     Cancer     CHF (congestive heart failure)     Colon polyps     Coronary artery disease     GERD (gastroesophageal reflux disease)     Hyperlipidemia     Hypertension     Hypothyroidism     Nonrheumatic aortic (valve) stenosis 2018    Osteoporosis 2019    S/P TAVR (transcatheter aortic valve replacement) 2022    Squamous cell carcinoma of skin     Streptococcal bacteremia 2023    Thyroid disease     Transaminitis 2023       Past Surgical History:   Procedure Laterality Date    BREAST SURGERY      CARDIAC CATH COSURGEON N/A 2022    Procedure: Cardiac Cath Cosurgeon;  Surgeon: Dontae Wooten MD;  Location: [facility] CATH LAB;  Service: Cardiovascular;  Laterality: N/A;     SECTION, CLASSIC      COLONOSCOPY N/A 2018    Procedure: COLONOSCOPY;  Surgeon: Precious Castorena MD;  Location: [facility] ENDO;  Service: Endoscopy;  Laterality: N/A;    COLONOSCOPY N/A 3/31/2023    Procedure: COLONOSCOPY;  Surgeon: Mal Abrams MD;  Location: [facility] ENDO;  Service: Endoscopy;  Laterality: N/A;    HYSTERECTOMY      LEFT HEART CATHETERIZATION Left 2022    Procedure: Left heart cath;  Surgeon: Dontae Johns MD;  Location: [facility] CATH LAB;  Service: Cardiology;  Laterality: Left;    LEFT HEART CATHETERIZATION Left 2022    Procedure: Left heart cath;  Surgeon: Dontae Johns MD;  Location: [facility] CATH LAB;  Service: Cardiology;  Laterality: Left;    MASTECTOMY Right 2000    right breast      TONSILLECTOMY, ADENOIDECTOMY      TRANSCATHETER AORTIC VALVE REPLACEMENT (TAVR) N/A 2022    Procedure: REPLACEMENT, AORTIC VALVE, TRANSCATHETER (TAVR);  Surgeon: Dontae Johns MD;  Location: [facility] CATH LAB;  Service: Cardiology;  Laterality: N/A;       Review of patient's allergies  indicates:   Allergen Reactions    Prilosec [omeprazole] Hives       No current facility-administered medications on file prior to encounter.     Current Outpatient Medications on File Prior to Encounter   Medication Sig    ascorbic acid (VITAMIN C ORAL) Take by mouth once daily.    aspirin (ECOTRIN) 81 MG EC tablet Take 81 mg by mouth once a week.    camphor-menthol 0.5-0.5% (SARNA ORIGINAL) lotion Apply topically as needed for Itching.    cetirizine HCl (ZYRTEC ORAL) Take by mouth once daily.    famotidine (PEPCID) 40 MG tablet Take 1 tablet (40 mg total) by mouth once daily.    ferrous sulfate (FEOSOL) Tab tablet Take 1 tablet by mouth daily with breakfast. 27mg    ibandronate (BONIVA) 150 mg tablet Take 1 tablet (150 mg total) by mouth every 30 days.    Lactobacillus acidophilus (ACIDOPHILUS ORAL) Take by mouth once daily.    levothyroxine (SYNTHROID) 75 MCG tablet TAKE 1 TABLET BY MOUTH EVERY DAY    losartan (COZAAR) 50 MG tablet Take 1 tablet (50 mg total) by mouth once daily.    simvastatin (ZOCOR) 40 MG tablet Take 1 tablet (40 mg total) by mouth every evening. Need OFFICE VISIT before next refill, last seen 2023. This will be the LAST Rx if visit is not made.    tiZANidine (ZANAFLEX) 4 MG tablet Take 1 tablet (4 mg total) by mouth every 8 (eight) hours.    triamcinolone acetonide 0.1% (KENALOG) 0.1 % cream Apply topically 2 (two) times daily.     Family History       Problem Relation (Age of Onset)    Cancer Sister, Brother    Liver cancer Sister    Melanoma Sister          Tobacco Use    Smoking status: Former     Current packs/day: 0.00     Types: Cigarettes     Quit date: 2000     Years since quittin.8    Smokeless tobacco: Never    Tobacco comments:     quit 20 years ago   Substance and Sexual Activity    Alcohol use: Yes     Alcohol/week: 2.0 standard drinks of alcohol     Types: 2 Shots of liquor per week     Comment: per pt 2 burbon drinks per night however none in last month    Drug  use: No    Sexual activity: Not Currently     Review of Systems   Constitutional:  Positive for fatigue and fever.   Eyes:  Negative for photophobia and visual disturbance.   Respiratory:  Positive for cough. Negative for shortness of breath.         Non-productive cough   Musculoskeletal:  Positive for neck pain and neck stiffness.   Skin:  Positive for rash.   Neurological:  Positive for headaches.   All other systems reviewed and are negative.    Objective:     Vital Signs (Most Recent):  Temp: 98.3 °F (36.8 °C) (12/29/24 2226)  Pulse: 84 (12/29/24 2259)  Resp: 18 (12/29/24 2259)  BP: (!) 156/76 (12/29/24 2226)  SpO2: 97 % (12/29/24 2259) Vital Signs (24h Range):  Temp:  [98.3 °F (36.8 °C)-100.5 °F (38.1 °C)] 98.3 °F (36.8 °C)  Pulse:  [] 84  Resp:  [18] 18  SpO2:  [92 %-99 %] 97 %  BP: (100-190)/(51-79) 156/76     Weight: 54.4 kg (120 lb)  Body mass index is 23.44 kg/m².     Physical Exam  Vitals reviewed.   Constitutional:       Appearance: Normal appearance.   HENT:      Head: Normocephalic and atraumatic.      Nose: Nose normal.      Mouth/Throat:      Mouth: Mucous membranes are dry.      Pharynx: Oropharynx is clear.   Eyes:      Conjunctiva/sclera: Conjunctivae normal.      Pupils: Pupils are equal, round, and reactive to light.   Cardiovascular:      Rate and Rhythm: Regular rhythm. Tachycardia present.      Pulses: Normal pulses.      Heart sounds: Murmur heard.   Pulmonary:      Effort: Pulmonary effort is normal.      Comments: Mildly diminished   Chest:      Comments: Bilateral mastectomy   Abdominal:      General: Abdomen is protuberant. Bowel sounds are normal.      Palpations: Abdomen is soft.   Musculoskeletal:         General: Normal range of motion.      Cervical back: Normal range of motion and neck supple. Tenderness present. No rigidity.      Right lower leg: No edema.      Left lower leg: No edema.   Skin:     General: Skin is warm and dry.      Capillary Refill: Capillary refill takes  less than 2 seconds.   Neurological:      General: No focal deficit present.      Mental Status: She is alert and oriented to person, place, and time. Mental status is at baseline.      GCS: GCS eye subscore is 4. GCS verbal subscore is 5. GCS motor subscore is 6.      Comments: Stabbing headache and tenderness to palpation on left parietal scalp   Psychiatric:         Mood and Affect: Mood normal.         Behavior: Behavior normal.         Thought Content: Thought content normal.         Judgment: Judgment normal.              CRANIAL NERVES     CN III, IV, VI   Pupils are equal, round, and reactive to light.       Significant Labs: All pertinent labs within the past 24 hours have been reviewed.    Bilirubin:   Recent Labs   Lab 12/29/24  1736   BILITOT 0.5       BMP:   Recent Labs   Lab 12/29/24 1736 12/29/24 2040   *  --      --    K 3.7  --      --    CO2 22*  --    BUN 9  --    CREATININE 0.8  --    CALCIUM 9.2  --    MG  --  1.5*     CBC:   Recent Labs   Lab 12/29/24  1737   WBC 9.33   HGB 9.0*   HCT 26.4*        CMP:   Recent Labs   Lab 12/29/24  1736      K 3.7      CO2 22*   *   BUN 9   CREATININE 0.8   CALCIUM 9.2   PROT 5.6*   ALBUMIN 3.0*   BILITOT 0.5   ALKPHOS 85   AST 54*   ALT 38   ANIONGAP 11     Cardiac Markers:   Recent Labs   Lab 12/29/24  1736   *     Lactic Acid:   Recent Labs   Lab 12/29/24  2237   LACTATE 0.6       Magnesium:   Recent Labs   Lab 12/29/24  2040   MG 1.5*       Troponin:   Recent Labs   Lab 12/29/24  1736 12/29/24  2040 12/30/24  0014   TROPONINI 0.062* 0.303* 0.257*     TSH:   Recent Labs   Lab 12/29/24  1736   TSH 3.776         Significant Imaging: I have reviewed all pertinent imaging results/findings within the past 24 hours.  EXAMINATION:  CT CERVICAL SPINE WITHOUT CONTRAST     CLINICAL HISTORY:  Neck pain, chronic, degenerative changes on xray;Neck pain, acute exacerbation with chronic degenerative changes;      TECHNIQUE:  Low dose axial images, sagittal and coronal reformations were performed though the cervical spine.  Contrast was not administered.     COMPARISON:  12/18/2024     FINDINGS:  There is no evidence fracture or malalignment.  There are degenerative changes throughout the cervical spine most prominent in the mid cervical spine.  There is no prevertebral soft tissue swelling.  The adjacent soft tissues are unremarkable.     Impression:     There are degenerative changes throughout the cervical spine.        Electronically signed by:Susana Carrion MD  Date:                                            12/29/2024  Time:                                           15:48    EXAMINATION:  CT HEAD WITHOUT CONTRAST     CLINICAL HISTORY:  Headache, new or worsening (Age >= 50y);     TECHNIQUE:  Low dose axial CT images obtained throughout the head without intravenous contrast. Sagittal and coronal reconstructions were performed.     COMPARISON:  None.     FINDINGS:  Intracranial compartment:     Ventricles and sulci are normal in size for age without evidence of hydrocephalus. No extra-axial blood or fluid collections.     The brain parenchyma appears normal. No parenchymal mass, hemorrhage, edema or major vascular distribution infarct.     Skull/extracranial contents (limited evaluation): No fracture. There is a left maxillary sinus air-fluid level.     Impression:     There is no evidence acute intracranial abnormality.  There is left maxillary sinus disease.        Electronically signed by:Susana Carrion MD  Date:                                            12/29/2024  Time:                                           16:49    EXAMINATION:  XR CHEST AP PORTABLE     CLINICAL HISTORY:  Chest Pain;     TECHNIQUE:  Single frontal view of the chest was performed.     COMPARISON:  02/02/2023.     FINDINGS:  There are mild interstitial opacities, suspicious for interstitial edema/CHF.  The pleural spaces are clear the  cardiac silhouette is borderline.  There are calcifications of the aortic arch.  There is a prosthetic aortic valve stent.  Osseous structures demonstrate degenerative changes.     Impression:     Mild interstitial opacities, suspicious for interstitial edema/CHF.        Electronically signed by:Anthony Andres  Date:                                            12/29/2024  Time:                                           17:58

## 2024-12-30 NOTE — PROGRESS NOTES
"MRV with and without contrast ordered after patient had already received contrast for MRI studies this morning. Contrast was given around 7:30 this morning, and it takes 24 hours to flush out of the patient. Contrast has to be completely out of patient's system before a "without contrast" portion of a scan can be done. Spoke with Dr. Storey regarding delay on MRV scan. Pt will be done tomorrow after 0730.  "

## 2024-12-30 NOTE — H&P
[facility] Medicine  History & Physical    Patient Name: Vivien Burton  MRN: [id number]  Patient Class: OP- Observation  Admission Date: 12/29/2024  Attending Physician: Rosendo Storey MD   Primary Care Provider: Jeri Moreira MD         Patient information was obtained from patient, relative(s), past medical records, and ER records.     Subjective:     Principal Problem:Severe sepsis    Chief Complaint:   Chief Complaint   Patient presents with    Neck Pain     Chronic neck pain.  Patient states that she has taken everything she can and nothing helps.         HPI: Sydnee Burton is a 79 year old female with a previous medical history of anemia, basal cell carcinoma, breast cancer, CHF, streptococcal bacteremia, GERD, HLD, HTN, CAD, TAVR, hypothyroidism, accidental tylenol overdose and abnormal MRI who presented to the ED for a headache and neck pain. The patient endorses a left sided headache described as stabbing and radiating from left upper neck. It is associated with tenderness to palpation in the left parietal scalp daily and intermittently the left temporal area. The pain has been daily for one month and reports taking tylenol every 8 hours with relief but the pain returns. Today the pain became intractable which caused her to present to the ED. CT of head showed no acute process and CT of c-spine showed degenerative changes. While in ED she developed a fever, oxygen saturation of 92%, hypertension, and tachycardia. She endorsed having a non-productive cough. Sepsis bundle initiated and cardiac workup added. She was given toradol, 2mg of PO diluadid, gabapentin, and acyclovir. Her pain improved and her blood pressure did also. She was maintaining an oxygen saturation of 99% on 1 liter nasal cannula and denied any shortness of breath. Fever resolved with tylenol. Patient had a rash to chest and neck that is being followed by dermatology and is a biopsy  "proven drug reaction to omeprazole. She most recently finished a steroid taper at end of November. ED initiated acyclovir due one "leison" noted to left sided neck and pain with palpation to scalp. Lab work showed elevated troponin, normal lactic acid, no leukocytosis, increased anemia over last month, procalcitonin 81.13, normal TSH, and AST of 54.  with no peripheral edema but chest xray showed "Mild interstitial opacities, suspicious for interstitial edema/CHF." She has no tachypnea or exertional SOB. FLU/RSV/ Covid negative.  Initially her blood pressure was too low for lasix administration but later improved and she was administered 20mg of IV lasix. Troponin trended up and then lowered on third draw. Urinalysis pending at time of note. Due to ESR of 33 and CRP of 110.4 with fever, scalp tenderness, and worsening headache she was empirically started on prednisone 60mg for one dose and to be continued by primary team pending MRA of head to assess for temporal arteritis. MRI of c-spine ordered. MRA of neck ordered.  One year ago patient presented to ED with right sided headache that was similar to this visit. It was discovered she was septic due to pneumonia but on that visit she has 43K WBC and elevated lactic acid. Neurology was consulted at this time and MRI performed which was abnormal. Patient has no vision loss and is neurologically intact. She has been continued on IV vancomycin and IV cefepime and oral valcyclovir. Cardiology and ID consulted.  Course of care discussed with daughter in law Dottie Burton who agreed with plan of care.  Patient admitted by hospital medicine for further evaluation and management.     Past Medical History:   Diagnosis Date    Acute on chronic combined systolic and diastolic heart failure 02/23/2022    Anemia     Basal cell carcinoma 1999    Breast cancer     Breast cancer     Cancer     CHF (congestive heart failure)     Colon polyps     Coronary artery disease  "    GERD (gastroesophageal reflux disease)     Hyperlipidemia     Hypertension     Hypothyroidism     Nonrheumatic aortic (valve) stenosis 2018    Osteoporosis 2019    S/P TAVR (transcatheter aortic valve replacement) 2022    Squamous cell carcinoma of skin 2018    Streptococcal bacteremia 2023    Thyroid disease     Transaminitis 2023       Past Surgical History:   Procedure Laterality Date    BREAST SURGERY      CARDIAC CATH COSURGEON N/A 2022    Procedure: Cardiac Cath Cosurgeon;  Surgeon: Dontae Wooten MD;  Location: [facility] CATH LAB;  Service: Cardiovascular;  Laterality: N/A;     SECTION, CLASSIC      COLONOSCOPY N/A 2018    Procedure: COLONOSCOPY;  Surgeon: Precious Castorena MD;  Location: [facility] ENDO;  Service: Endoscopy;  Laterality: N/A;    COLONOSCOPY N/A 3/31/2023    Procedure: COLONOSCOPY;  Surgeon: Mal Abrams MD;  Location: [facility] ENDO;  Service: Endoscopy;  Laterality: N/A;    HYSTERECTOMY      LEFT HEART CATHETERIZATION Left 2022    Procedure: Left heart cath;  Surgeon: Dontae Johns MD;  Location: [facility] CATH LAB;  Service: Cardiology;  Laterality: Left;    LEFT HEART CATHETERIZATION Left 2022    Procedure: Left heart cath;  Surgeon: Dontae Johns MD;  Location: [facility] CATH LAB;  Service: Cardiology;  Laterality: Left;    MASTECTOMY Right 2000    right breast      TONSILLECTOMY, ADENOIDECTOMY      TRANSCATHETER AORTIC VALVE REPLACEMENT (TAVR) N/A 2022    Procedure: REPLACEMENT, AORTIC VALVE, TRANSCATHETER (TAVR);  Surgeon: Dontae Johns MD;  Location: [facility] CATH LAB;  Service: Cardiology;  Laterality: N/A;       Review of patient's allergies indicates:   Allergen Reactions    Prilosec [omeprazole] Hives       No current facility-administered medications on file prior to encounter.     Current Outpatient Medications on File Prior to Encounter   Medication Sig    ascorbic acid (VITAMIN C ORAL) Take by mouth once daily.     aspirin (ECOTRIN) 81 MG EC tablet Take 81 mg by mouth once a week.    camphor-menthol 0.5-0.5% (SARNA ORIGINAL) lotion Apply topically as needed for Itching.    cetirizine HCl (ZYRTEC ORAL) Take by mouth once daily.    famotidine (PEPCID) 40 MG tablet Take 1 tablet (40 mg total) by mouth once daily.    ferrous sulfate (FEOSOL) Tab tablet Take 1 tablet by mouth daily with breakfast. 27mg    ibandronate (BONIVA) 150 mg tablet Take 1 tablet (150 mg total) by mouth every 30 days.    Lactobacillus acidophilus (ACIDOPHILUS ORAL) Take by mouth once daily.    levothyroxine (SYNTHROID) 75 MCG tablet TAKE 1 TABLET BY MOUTH EVERY DAY    losartan (COZAAR) 50 MG tablet Take 1 tablet (50 mg total) by mouth once daily.    simvastatin (ZOCOR) 40 MG tablet Take 1 tablet (40 mg total) by mouth every evening. Need OFFICE VISIT before next refill, last seen 2023. This will be the LAST Rx if visit is not made.    tiZANidine (ZANAFLEX) 4 MG tablet Take 1 tablet (4 mg total) by mouth every 8 (eight) hours.    triamcinolone acetonide 0.1% (KENALOG) 0.1 % cream Apply topically 2 (two) times daily.     Family History       Problem Relation (Age of Onset)    Cancer Sister, Brother    Liver cancer Sister    Melanoma Sister          Tobacco Use    Smoking status: Former     Current packs/day: 0.00     Types: Cigarettes     Quit date: 2000     Years since quittin.8    Smokeless tobacco: Never    Tobacco comments:     quit 20 years ago   Substance and Sexual Activity    Alcohol use: Yes     Alcohol/week: 2.0 standard drinks of alcohol     Types: 2 Shots of liquor per week     Comment: per pt 2 burbon drinks per night however none in last month    Drug use: No    Sexual activity: Not Currently     Review of Systems   Constitutional:  Positive for fatigue and fever.   Eyes:  Negative for photophobia and visual disturbance.   Respiratory:  Positive for cough. Negative for shortness of breath.         Non-productive cough    Musculoskeletal:  Positive for neck pain and neck stiffness.   Skin:  Positive for rash.   Neurological:  Positive for headaches.   All other systems reviewed and are negative.    Objective:     Vital Signs (Most Recent):  Temp: 98.3 °F (36.8 °C) (12/29/24 2226)  Pulse: 84 (12/29/24 2259)  Resp: 18 (12/29/24 2259)  BP: (!) 156/76 (12/29/24 2226)  SpO2: 97 % (12/29/24 2259) Vital Signs (24h Range):  Temp:  [98.3 °F (36.8 °C)-100.5 °F (38.1 °C)] 98.3 °F (36.8 °C)  Pulse:  [] 84  Resp:  [18] 18  SpO2:  [92 %-99 %] 97 %  BP: (100-190)/(51-79) 156/76     Weight: 54.4 kg (120 lb)  Body mass index is 23.44 kg/m².     Physical Exam  Vitals reviewed.   Constitutional:       Appearance: Normal appearance.   HENT:      Head: Normocephalic and atraumatic.      Nose: Nose normal.      Mouth/Throat:      Mouth: Mucous membranes are dry.      Pharynx: Oropharynx is clear.   Eyes:      Conjunctiva/sclera: Conjunctivae normal.      Pupils: Pupils are equal, round, and reactive to light.   Cardiovascular:      Rate and Rhythm: Regular rhythm. Tachycardia present.      Pulses: Normal pulses.      Heart sounds: Murmur heard.   Pulmonary:      Effort: Pulmonary effort is normal.      Comments: Mildly diminished   Chest:      Comments: Bilateral mastectomy   Abdominal:      General: Abdomen is protuberant. Bowel sounds are normal.      Palpations: Abdomen is soft.   Musculoskeletal:         General: Normal range of motion.      Cervical back: Normal range of motion and neck supple. Tenderness present. No rigidity.      Right lower leg: No edema.      Left lower leg: No edema.   Skin:     General: Skin is warm and dry.      Capillary Refill: Capillary refill takes less than 2 seconds.   Neurological:      General: No focal deficit present.      Mental Status: She is alert and oriented to person, place, and time. Mental status is at baseline.      GCS: GCS eye subscore is 4. GCS verbal subscore is 5. GCS motor subscore is 6.       Comments: Stabbing headache and tenderness to palpation on left parietal scalp   Psychiatric:         Mood and Affect: Mood normal.         Behavior: Behavior normal.         Thought Content: Thought content normal.         Judgment: Judgment normal.              CRANIAL NERVES     CN III, IV, VI   Pupils are equal, round, and reactive to light.       Significant Labs: All pertinent labs within the past 24 hours have been reviewed.    Bilirubin:   Recent Labs   Lab 12/29/24  1736   BILITOT 0.5       BMP:   Recent Labs   Lab 12/29/24  1736 12/29/24  2040   *  --      --    K 3.7  --      --    CO2 22*  --    BUN 9  --    CREATININE 0.8  --    CALCIUM 9.2  --    MG  --  1.5*     CBC:   Recent Labs   Lab 12/29/24  1737   WBC 9.33   HGB 9.0*   HCT 26.4*        CMP:   Recent Labs   Lab 12/29/24  1736      K 3.7      CO2 22*   *   BUN 9   CREATININE 0.8   CALCIUM 9.2   PROT 5.6*   ALBUMIN 3.0*   BILITOT 0.5   ALKPHOS 85   AST 54*   ALT 38   ANIONGAP 11     Cardiac Markers:   Recent Labs   Lab 12/29/24  1736   *     Lactic Acid:   Recent Labs   Lab 12/29/24  2237   LACTATE 0.6       Magnesium:   Recent Labs   Lab 12/29/24  2040   MG 1.5*       Troponin:   Recent Labs   Lab 12/29/24  1736 12/29/24 2040 12/30/24  0014   TROPONINI 0.062* 0.303* 0.257*     TSH:   Recent Labs   Lab 12/29/24  1736   TSH 3.776         Significant Imaging: I have reviewed all pertinent imaging results/findings within the past 24 hours.  EXAMINATION:  CT CERVICAL SPINE WITHOUT CONTRAST     CLINICAL HISTORY:  Neck pain, chronic, degenerative changes on xray;Neck pain, acute exacerbation with chronic degenerative changes;     TECHNIQUE:  Low dose axial images, sagittal and coronal reformations were performed though the cervical spine.  Contrast was not administered.     COMPARISON:  12/18/2024     FINDINGS:  There is no evidence fracture or malalignment.  There are degenerative changes throughout  the cervical spine most prominent in the mid cervical spine.  There is no prevertebral soft tissue swelling.  The adjacent soft tissues are unremarkable.     Impression:     There are degenerative changes throughout the cervical spine.        Electronically signed by:Susana Carrion MD  Date:                                            12/29/2024  Time:                                           15:48    EXAMINATION:  CT HEAD WITHOUT CONTRAST     CLINICAL HISTORY:  Headache, new or worsening (Age >= 50y);     TECHNIQUE:  Low dose axial CT images obtained throughout the head without intravenous contrast. Sagittal and coronal reconstructions were performed.     COMPARISON:  None.     FINDINGS:  Intracranial compartment:     Ventricles and sulci are normal in size for age without evidence of hydrocephalus. No extra-axial blood or fluid collections.     The brain parenchyma appears normal. No parenchymal mass, hemorrhage, edema or major vascular distribution infarct.     Skull/extracranial contents (limited evaluation): No fracture. There is a left maxillary sinus air-fluid level.     Impression:     There is no evidence acute intracranial abnormality.  There is left maxillary sinus disease.        Electronically signed by:Susana Carrion MD  Date:                                            12/29/2024  Time:                                           16:49    EXAMINATION:  XR CHEST AP PORTABLE     CLINICAL HISTORY:  Chest Pain;     TECHNIQUE:  Single frontal view of the chest was performed.     COMPARISON:  02/02/2023.     FINDINGS:  There are mild interstitial opacities, suspicious for interstitial edema/CHF.  The pleural spaces are clear the cardiac silhouette is borderline.  There are calcifications of the aortic arch.  There is a prosthetic aortic valve stent.  Osseous structures demonstrate degenerative changes.     Impression:     Mild interstitial opacities, suspicious for interstitial edema/CHF.       "  Electronically signed by:Anthony Andres  Date:                                            12/29/2024  Time:                                           17:58  Assessment/Plan:     * Severe sepsis  This patient does have evidence of infective focus  My overall impression is sepsis.  Source: Respiratory and Unknown Urinalysis pending at time of note  Antibiotics given-   Antibiotics (72h ago, onward)      Start     Stop Route Frequency Ordered    12/30/24 0730  ceFEPIme injection 1 g         -- IV Every 12 hours (non-standard times) 12/30/24 0010    12/30/24 0109  vancomycin - pharmacy to dose  (vancomycin IVPB (PEDS and ADULTS))        Placed in "And" Linked Group    -- IV pharmacy to manage frequency 12/30/24 0009          Latest lactate reviewed-  Recent Labs   Lab 12/29/24  1835 12/29/24  2237   LACTATE  --  0.6   POCLAC 0.53  --    Procalcitonin: 81.13  CRP: 110.4  Sed Rate 33      Organ dysfunction indicated by Acute on chronic  heart failure    Fluid challenge Contraindicated- Fluid bolus is contraindicated in this patient due to Congestive Heart Failure     Post- resuscitation assessment Yes Perfusion exam was performed within 6 hours of septic shock presentation after bolus shows Adequate tissue perfusion assessed by non-invasive monitoring   IP consult to infectious diease    Will Not start Pressors- Levophed for MAP of 65  Source control achieved by: IV antibiotics and PO valcyclovir    Intractable headache  Left sided parietal and occipital headache for one month that is daily. Sharp and stabbing.  Alleviated by tylenol for short periods of time. Acutely worse today and unrelieved by tylenol. Associated with left sided neck pain and scalp tenderness. Patient endorses intermittent tenderness to palpation in temporal area. No vision changes. Fever began today. Pruritic rash to chest and back that is chronic but worsened today.     -MRI/MRA brain/neck  -MRI cspine  -ESR 33  -.4  -Procalcitonin " "81.13  -Prednisone 60mg PO one time please reorder pending MRA results  -Acyclovir initiated in ED for possible shingles and has been continued due to one lesion noted on neck      Neck pain without injury  Left sided neck pain that is sharp and stabbing into head. Patient endorses that specific area on head is painful to touch. CT of neck shows degenerative changes      -MRI c-spine  -MRA neck      Elevated troponin  Denies chest pain or Shortness of breath.    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   Per Dematology note on 12/4/24 "Biopsy proven drug eruption, cleared with prednisone and d/c of prilosec. She restarted prilosec and rash came back. She stopped it at end of November and had another course of steroids". Per chart review patient was placed on steroid taper starting 11/16    -Benadryl cream PRN  -PO benadryl Q 6 hours PRN   -Acyclovir initiated in ED for possible shingles and has been continued due to one lesion noted on neck    History of transcatheter aortic valve replacement (TAVR)  Chronic condition noted      HTN (hypertension)  Patient's blood pressure range in the last 24 hours was: BP  Min: 100/53  Max: 190/79.The patient's inpatient anti-hypertensive regimen is listed below:  Current Antihypertensives  furosemide injection 20 mg, Every 12 hours, Intravenous    Plan  BP is currently labile and patient being admitted for sepsis. Losartan 50mg QHS was held please restart as warranted    Coronary artery disease  Patient with known CAD s/p stent placement and CABG, which is controlled Will continue ASA and Statin and monitor for S/Sx of angina/ACS. Continue to monitor on telemetry.     Troponin trending up. Will continue to trend every four hours. Patient denies chest pain or SOB.   Cardiology consulted.     Acute on chronic HFrEF (heart failure with reduced " ejection fraction)  Supplemental O2 via nasal canula; titrate O2 saturation to >92%.  Serial Cardiac enzymes × 3.  Diuretic administration. Monitor electrolytes for hypokalemia and hypomagnesemia.  Cardiology Consultation.  2-D Echocardiogram for evaluation of left ventricle systolic function or any valvular heart disease.  Daily weights.  Strict I/Os.  Fluid restriction.  Na restriction <2g/d.  Echo    Result Date: 11/22/2024    Left Ventricle: The left ventricle is normal in size. Mildly increased   wall thickness. There is mild concentric hypertrophy. There is normal   systolic function with a visually estimated ejection fraction of 55 - 70%.   Ejection fraction is approximately 60%. Global longitudinal strain is   -18.0%. Global longitudinal strain is normal. There is normal diastolic   function.    Right Ventricle: Normal right ventricular cavity size. Systolic   function is normal. TAPSE is 1.90 cm. Right ventricular global   longitudinal strain is - 23.8%.    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 It is reported to be a 26 mm. Aortic valve area by VTI is 1.2   cm². Aortic valve peak velocity is 1.6 m/s. Mean gradient is 5.2 mmHg. The   dimensionless index is 0.59.    IVC/SVC: Normal venous pressure at 3 mmHg.    No definite change from Echo in 2/2023.                 Anemia   Most recent hemoglobin and hematocrit are listed below.  Recent Labs     12/29/24  1737   HGB 9.0*   HCT 26.4*     Plan  - Monitor serial CBC: Daily  - Transfuse PRBC if patient becomes hemodynamically unstable, symptomatic or H/H drops below 7/21.  - Patient has not received any PRBC transfusions to date  - Patient's anemia is currently stable    Unspecified hypothyroidism  Chronic condition noted  -Levothyroxine continued      Breast cancer  History of Breast Cancer in remission. Last treatment 24 years ago.       GERD (gastroesophageal reflux disease)  -Pepcid 40mg QHS        VTE Risk  Mitigation (From admission, onward)           Ordered     enoxaparin injection 40 mg  Daily         12/29/24 2021     IP VTE HIGH RISK PATIENT  Once         12/29/24 2021     Place sequential compression device  Until discontinued         12/29/24 2021                         On 12/30/2024, patient should be placed in hospital observation services under my care in collaboration with Dr. Rosendo Storey MD.      Pharmacokinetic Initial Assessment: IV Vancomycin    Assessment/Plan:    Initiate intravenous vancomycin with loading dose of 750 mg once followed by a maintenance dose of vancomycin 750mg IV every 18 hours  Desired empiric serum trough concentration is 15 to 20 mcg/mL  Draw vancomycin trough level 60 min prior to third dose on 12/31 at approximately 1230  Pharmacy will continue to follow and monitor vancomycin.      Please contact pharmacy at extension [phone] with any questions regarding this assessment.     Thank you for the consult,   Stephan Tavarez       Patient brief summary:  Vivien Burton is a 79 y.o. female initiated on antimicrobial therapy with IV Vancomycin for treatment of suspected sepsis    Drug Allergies:   Review of patient's allergies indicates:   Allergen Reactions    Prilosec [omeprazole] Hives       Actual Body Weight:   54.4 kg    Renal Function:   Estimated Creatinine Clearance: 42.3 mL/min (based on SCr of 0.8 mg/dL).,     Dialysis Method (if applicable):  N/A    CBC (last 72 hours):  Recent Labs   Lab Result Units 12/29/24  1737   WBC K/uL 9.33   Hemoglobin g/dL 9.0*   Hematocrit % 26.4*   Platelets K/uL 189   Gran % % 86.7*   Lymph % % 7.8*   Mono % % 4.5   Eosinophil % % 0.0   Basophil % % 0.2   Differential Method  Automated       Metabolic Panel (last 72 hours):  Recent Labs   Lab Result Units 12/29/24  1736 12/29/24  2040   Sodium mmol/L 137  --    Potassium mmol/L 3.7  --    Chloride mmol/L 104  --    CO2 mmol/L 22*  --    Glucose mg/dL 113*  --    BUN mg/dL 9  --    Creatinine mg/dL  "0.8  --    Albumin g/dL 3.0*  --    Total Bilirubin mg/dL 0.5  --    Alkaline Phosphatase U/L 85  --    AST U/L 54*  --    ALT U/L 38  --    Magnesium mg/dL  --  1.5*       Drug levels (last 3 results):  No results for input(s): "VANCOMYCINRA", "VANCORANDOM", "VANCOMYCINPE", "VANCOPEAK", "VANCOMYCINTR", "VANCOTROUGH" in the last 72 hours.    Microbiologic Results:  Microbiology Results (last 7 days)       Procedure Component Value Units Date/Time    Culture, Respiratory with Gram Stain [[id number]]     Order Status: No result Specimen: Respiratory     Influenza A & B by Molecular [[id number]] Collected: 12/29/24 1830    Order Status: Completed Specimen: Nasal Swab Updated: 12/29/24 1857     Influenza A, Molecular Negative     Influenza B, Molecular Negative     Flu A & B Source NP    Blood culture x two cultures. Draw prior to antibiotics. [[id number]] Collected: 12/29/24 1835    Order Status: Sent Specimen: Blood from Peripheral, Antecubital, Left     Blood culture x two cultures. Draw prior to antibiotics. [[id number]] Collected: 12/29/24 1835    Order Status: Sent Specimen: Blood from Peripheral, Hand, Left                  Elsa Coe NP  Department of Hospital Medicine  [facility]/Surg          "

## 2024-12-30 NOTE — PROGRESS NOTES
Pharmacist Renal Dose Adjustment Note    Vivien Burton is a 79 y.o. female being treated with the medication pepcid    Patient Data:    Vital Signs (Most Recent):  Temp: 98.7 °F (37.1 °C) (12/30/24 0346)  Pulse: 96 (12/30/24 0346)  Resp: 18 (12/30/24 0346)  BP: 132/62 (12/30/24 0346)  SpO2: (!) 94 % (12/30/24 0346) Vital Signs (72h Range):  Temp:  [98.3 °F (36.8 °C)-100.5 °F (38.1 °C)]   Pulse:  []   Resp:  [18]   BP: (100-190)/(51-79)   SpO2:  [92 %-99 %]      Recent Labs   Lab 12/29/24  1736 12/30/24  0415   CREATININE 0.8 0.9     Serum creatinine: 0.9 mg/dL 12/30/24 0415  Estimated creatinine clearance: 37.6 mL/min    Medication:pepcid dose: 40mg frequency nightly will be changed to medication:pepcid dose:40mg frequency:q48h    Pharmacist's Name: Stephan Tavarez  Pharmacist's Extension: [phone]

## 2024-12-30 NOTE — ASSESSMENT & PLAN NOTE
Left sided neck pain that is sharp and stabbing into head. Patient endorses that specific area on head is painful to touch. CT of neck shows degenerative changes      Patient has significant left-sided neck pain radiated to left occipital.   Has significant elevated CRP/ ESR,   We have to consider GCA?  Patient  also has significant cervical spondylosis, however her presentation is very atypical.   Get an MRV as well.     Consult neurology.   Continue empiric prednisone 60 mg once a day.

## 2024-12-30 NOTE — NURSING
Pt was transported via wheelchair to receive MRI. No SOB/AD noted and no c/o pain or other needs voiced. POC ongoing.

## 2024-12-30 NOTE — ASSESSMENT & PLAN NOTE
Patient with known CAD s/p stent placement and CABG, which is controlled Will continue ASA and Statin and monitor for S/Sx of angina/ACS. Continue to monitor on telemetry.     Troponin trending up. Will continue to trend every four hours. Patient denies chest pain or SOB.   Cardiology consulted.

## 2024-12-30 NOTE — ASSESSMENT & PLAN NOTE
Denies chest pain or Shortness of breath.    -trend troponin  -Cardiac monitoring  -Cardiology consulted

## 2024-12-30 NOTE — ASSESSMENT & PLAN NOTE
Patient's blood pressure range in the last 24 hours was: BP  Min: 100/53  Max: 190/79.The patient's inpatient anti-hypertensive regimen is listed below:  Current Antihypertensives  furosemide injection 20 mg, Every 12 hours, Intravenous    Plan  BP is currently labile and patient being admitted for sepsis. Losartan 50mg QHS was held please restart as warranted

## 2024-12-30 NOTE — PROGRESS NOTES
Vivien Hansen Combs [id number] is a 79 y.o. female who had been consulted for vancomycin dosing.    Vancomycin has been discontinued.  Pharmacy consult for vancomycin dosing in no longer required.      Thank you for allowing us to participate in this patient's care.     Cinthia Campo

## 2024-12-30 NOTE — PLAN OF CARE
POC reviewed with pt. Pt verbalized understanding.    Med changes include: ASA was changed to AM per pt request; Cefepime changed to q12H r/t Cr 0.8; Prednisone x1 dose was given and Vancomycin was added to pt med list; benadryl PO added PRN list.  UA was collected this shift. Awaiting results.  Abnormal labs include: K+,Mg+, and Phos. All were replaced and future doses scheduled to MAR.  Pain mgmt has been controlled with PRN tylenol and pain medication earlier in ER.  Pt has rash r/t recent Prilosec ingestion at home (allergy - hives) to chest and upper back area. PRN benadryl cream and PO is available.  Pt is tolerating IV ABX well with no adverse reactions noted.   Pt ambulates independently in room and to bathroom.   Bed alarm AMA is signed in pt folder.   Pt continues with Cardiac, 1500mL FR, tele 8622 NSR, trending labs r/t critical troponin and BNP, Other labs trended as ordered.    All fall precautions maintained. Frequent checks performed per protocol. Bed in lowest position, call light within reach, slip resistant socks maintained. Bed alarm on/functioning. POC ongoing.         Problem: Adult Inpatient Plan of Care  Goal: Plan of Care Review  Outcome: Progressing  Goal: Patient-Specific Goal (Individualized)  Outcome: Progressing  Goal: Absence of Hospital-Acquired Illness or Injury  Outcome: Progressing  Goal: Optimal Comfort and Wellbeing  Outcome: Progressing  Goal: Readiness for Transition of Care  Outcome: Progressing     Problem: Sepsis/Septic Shock  Goal: Optimal Coping  Outcome: Progressing  Goal: Absence of Bleeding  Outcome: Progressing  Goal: Blood Glucose Level Within Targeted Range  Outcome: Progressing  Goal: Absence of Infection Signs and Symptoms  Outcome: Progressing  Goal: Optimal Nutrition Intake  Outcome: Progressing

## 2024-12-30 NOTE — ASSESSMENT & PLAN NOTE
Supplemental O2 via nasal canula; titrate O2 saturation to >92%.  Serial Cardiac enzymes × 3.  Diuretic administration. Monitor electrolytes for hypokalemia and hypomagnesemia.  Cardiology Consultation.  2-D Echocardiogram for evaluation of left ventricle systolic function or any valvular heart disease.  Daily weights.  Strict I/Os.  Fluid restriction.  Na restriction <2g/d.  Echo    Result Date: 11/22/2024    Left Ventricle: The left ventricle is normal in size. Mildly increased   wall thickness. There is mild concentric hypertrophy. There is normal   systolic function with a visually estimated ejection fraction of 55 - 70%.   Ejection fraction is approximately 60%. Global longitudinal strain is   -18.0%. Global longitudinal strain is normal. There is normal diastolic   function.    Right Ventricle: Normal right ventricular cavity size. Systolic   function is normal. TAPSE is 1.90 cm. Right ventricular global   longitudinal strain is - 23.8%.    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 It is reported to be a 26 mm. Aortic valve area by VTI is 1.2   cm². Aortic valve peak velocity is 1.6 m/s. Mean gradient is 5.2 mmHg. The   dimensionless index is 0.59.    IVC/SVC: Normal venous pressure at 3 mmHg.    No definite change from Echo in 2/2023.

## 2024-12-30 NOTE — ASSESSMENT & PLAN NOTE
Left sided parietal and occipital headache for one month that is daily. Sharp and stabbing.  Alleviated by tylenol for short periods of time. Acutely worse today and unrelieved by tylenol. Associated with left sided neck pain and scalp tenderness. Patient endorses intermittent tenderness to palpation in temporal area. No vision changes. Fever began today. Pruritic rash to chest and back that is chronic but worsened today.     -MRI/MRA brain/neck  -MRI cspine  -ESR 33  -.4  -Procalcitonin 81.13  -Prednisone 60mg PO one time please reorder pending MRA results  -Acyclovir initiated in ED for possible shingles and has been continued due to one lesion noted on neck

## 2024-12-30 NOTE — PROGRESS NOTES
"Pharmacokinetic Initial Assessment: IV Vancomycin    Assessment/Plan:    Initiate intravenous vancomycin with loading dose of 750 mg once followed by a maintenance dose of vancomycin 750mg IV every 18 hours  Desired empiric serum trough concentration is 15 to 20 mcg/mL  Draw vancomycin trough level 60 min prior to third dose on 12/31 at approximately 1230  Pharmacy will continue to follow and monitor vancomycin.      Please contact pharmacy at extension [phone] with any questions regarding this assessment.     Thank you for the consult,   Stephan Tavarez       Patient brief summary:  Vivien Burton is a 79 y.o. female initiated on antimicrobial therapy with IV Vancomycin for treatment of suspected sepsis    Drug Allergies:   Review of patient's allergies indicates:   Allergen Reactions    Prilosec [omeprazole] Hives       Actual Body Weight:   54.4 kg    Renal Function:   Estimated Creatinine Clearance: 42.3 mL/min (based on SCr of 0.8 mg/dL).,     Dialysis Method (if applicable):  N/A    CBC (last 72 hours):  Recent Labs   Lab Result Units 12/29/24  1737   WBC K/uL 9.33   Hemoglobin g/dL 9.0*   Hematocrit % 26.4*   Platelets K/uL 189   Gran % % 86.7*   Lymph % % 7.8*   Mono % % 4.5   Eosinophil % % 0.0   Basophil % % 0.2   Differential Method  Automated       Metabolic Panel (last 72 hours):  Recent Labs   Lab Result Units 12/29/24  1736 12/29/24  2040   Sodium mmol/L 137  --    Potassium mmol/L 3.7  --    Chloride mmol/L 104  --    CO2 mmol/L 22*  --    Glucose mg/dL 113*  --    BUN mg/dL 9  --    Creatinine mg/dL 0.8  --    Albumin g/dL 3.0*  --    Total Bilirubin mg/dL 0.5  --    Alkaline Phosphatase U/L 85  --    AST U/L 54*  --    ALT U/L 38  --    Magnesium mg/dL  --  1.5*       Drug levels (last 3 results):  No results for input(s): "VANCOMYCINRA", "VANCORANDOM", "VANCOMYCINPE", "VANCOPEAK", "VANCOMYCINTR", "VANCOTROUGH" in the last 72 hours.    Microbiologic Results:  Microbiology Results (last 7 days)       " Procedure Component Value Units Date/Time    Culture, Respiratory with Gram Stain [[id number]]     Order Status: No result Specimen: Respiratory     Influenza A & B by Molecular [[id number]] Collected: 12/29/24 1830    Order Status: Completed Specimen: Nasal Swab Updated: 12/29/24 1857     Influenza A, Molecular Negative     Influenza B, Molecular Negative     Flu A & B Source NP    Blood culture x two cultures. Draw prior to antibiotics. [[id number]] Collected: 12/29/24 1835    Order Status: Sent Specimen: Blood from Peripheral, Antecubital, Left     Blood culture x two cultures. Draw prior to antibiotics. [[id number]] Collected: 12/29/24 1835    Order Status: Sent Specimen: Blood from Peripheral, Hand, Left

## 2024-12-30 NOTE — ASSESSMENT & PLAN NOTE
"This patient does have evidence of infective focus  My overall impression is sepsis.  Source: Respiratory and Unknown Urinalysis pending at time of note  Antibiotics given-   Antibiotics (72h ago, onward)      Start     Stop Route Frequency Ordered    12/30/24 0730  ceFEPIme injection 1 g         -- IV Every 12 hours (non-standard times) 12/30/24 0010    12/30/24 0109  vancomycin - pharmacy to dose  (vancomycin IVPB (PEDS and ADULTS))        Placed in "And" Linked Group    -- IV pharmacy to manage frequency 12/30/24 0009          Latest lactate reviewed-  Recent Labs   Lab 12/29/24  1835 12/29/24  2237   LACTATE  --  0.6   POCLAC 0.53  --    Procalcitonin: 81.13  CRP: 110.4  Sed Rate 33      Organ dysfunction indicated by Acute on chronic  heart failure    Fluid challenge Contraindicated- Fluid bolus is contraindicated in this patient due to Congestive Heart Failure     Post- resuscitation assessment Yes Perfusion exam was performed within 6 hours of septic shock presentation after bolus shows Adequate tissue perfusion assessed by non-invasive monitoring   IP consult to infectious diease    Will Not start Pressors- Levophed for MAP of 65  Source control achieved by: IV antibiotics and PO valcyclovir  "

## 2024-12-30 NOTE — HPI
"Sydnee Burton is a 79 year old female with a previous medical history of anemia, basal cell carcinoma, breast cancer, CHF, streptococcal bacteremia, GERD, HLD, HTN, CAD, TAVR, hypothyroidism, accidental tylenol overdose and abnormal MRI who presented to the ED for a headache and neck pain. The patient endorses a left sided headache described as stabbing and radiating from left upper neck. It is associated with tenderness to palpation in the left parietal scalp daily and intermittently the left temporal area. The pain has been daily for one month and reports taking tylenol every 8 hours with relief but the pain returns. Today the pain became intractable which caused her to present to the ED. CT of head showed no acute process and CT of c-spine showed degenerative changes. While in ED she developed a fever, oxygen saturation of 92%, hypertension, and tachycardia. She endorsed having a non-productive cough. Sepsis bundle initiated and cardiac workup added. She was given toradol, 2mg of PO diluadid, gabapentin, and acyclovir. Her pain improved and her blood pressure did also. She was maintaining an oxygen saturation of 99% on 1 liter nasal cannula and denied any shortness of breath. Fever resolved with tylenol. 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 She most recently finished a steroid taper at end of November. ED initiated acyclovir due one "leison" noted to left sided neck and pain with palpation to scalp. Lab work showed elevated troponin, normal lactic acid, no leukocytosis, increased anemia over last month, procalcitonin 81.13, normal TSH, and AST of 54.  with no peripheral edema but chest xray showed "Mild interstitial opacities, suspicious for interstitial edema/CHF." She has no tachypnea or exertional SOB. FLU/RSV/ Covid negative.  Initially her blood pressure was too low for lasix administration but later " improved and she was administered 20mg of IV lasix. Troponin trended up and then lowered on third draw. Urinalysis pending at time of note. Due to ESR of 33 and CRP of 110.4 with fever, scalp tenderness, and worsening headache she was empirically started on prednisone 60mg for one dose and to be continued by primary team pending MRA of head to assess for temporal arteritis. MRI of c-spine ordered. MRA of neck ordered.  One year ago patient presented to ED with right sided headache that was similar to this visit. It was discovered she was septic due to pneumonia but on that visit she has 43K WBC and elevated lactic acid. Neurology was consulted at this time and MRI performed which was abnormal. Patient has no vision loss and is neurologically intact. She has been continued on IV vancomycin and IV cefepime and oral valcyclovir. Cardiology and ID consulted.  Course of care discussed with daughter in law Dottie Burton who agreed with plan of care.  Patient admitted by hospital medicine for further evaluation and management.

## 2024-12-30 NOTE — CONSULTS
[facility]/Surg   Department of Infectious Disease  Consult Note        PATIENT NAME: Vivien Burton  MRN: [id number]  TODAY'S DATE: 12/30/2024  ADMIT DATE: 12/29/2024  LENGTH OF STAY: 0 DAYS      CHIEF COMPLAINT: Neck Pain (Chronic neck pain.  Patient states that she has taken everything she can and nothing helps. )    PRINCIPLE PROBLEM: Severe sepsis    REASON FOR CONSULT: Procalcitonin 81.3, fever, normal lactic, no leukocytosis, possible pneumonia vs chf exacerbation. Possible temporal arteritis      ASSESSMENT and PLAN     1.  Intractable left-sided neck pain and headache, occipital neuralgia  - 12/29/2024 blood cultures x2 sets pending  - T-max 100.5°  - .4, procalcitonin 81.13, ESR 33  - previous procalcitonin is elevated in January of 2023 ( also CRP and ESR elevated January 20, 2023) -  hospitalized with strep pneumoniae bacteremia    2. Acute exacerbation of chronic HFpEF s/p TAVR  - RVP and respiratory culture with Gram stain pending  - chest x-ray with interstitial edema possibly heart failure, Less likely pneumonia but does have an elevated pro CT  - BNP elevated 874-->1659  -Procalcitonin elevation?    3. Zoster Sine Herpeticus    4.  Left maxillary sinusitis    5.  History of breast cancer 20 years ago with right mastectomy    6.  Allergic exanthem - omeprazole    RECOMMENDATIONS:  Stop cefepime   Stop vancomycin   Stop valacyclovir  Obtain herpes zoster IgG   Await Neurology evaluation  Discussed case with Dr. Woo, does not feel that she needs  corticosteroids, antibiotics or an LP at this time, may need nerve block for occipital neuralgia  Await MRI/MRA read by Radiology    D/W Dr Woo    Thank you for this consult. Please send [employer] secure chat with any questions.    Antibiotics (From admission, onward)      None          Antifungals (From admission, onward)      None           Antivirals (From admission, onward)      None            HPI      Vivien Burton  is a 79 y.o. female   With chronic medical problems including anemia, HFpEF,  hypothyroidism, hypertension, coronary artery disease, hyperlipidemia, GERD and a history breast cancer and basal cell carcinoma who presented to the emergency room on 12/29 complaining neck pain and headache.  Patient states that she has had left sided neck pain around the mastoid area for weeks to months that has intermittent.  It usually responds to acetaminophen but then the pain comes back.  The pain seems to radiate from the left occipital area down to the mastoid area in his worse with palpation.  She said his not worse with active or passive range of motion in feels better under neck with pressure though she has tingling in her scalp  with palpation.  She said 2 years ago she was in the hospital with similar pain but it was on the right side of her neck but that went away after a course of steroid.  She said she has had a big outpatient workup and no one has been able to tell her what is going on.  Associated symptoms are nonproductive cough that started about a week ago, abdominal bloating possibly about a week ago but not sure, and poor appetite that has been going on.  She also has a rash to her chest.  She said back in August she had a diffuse pruritic erythematous patchy rash to  her trunk.  She saw a dermatologist and  pathology from biopsy was consistent with a allergic reaction.  Her new medicine was omeprazole which she stopped.  The rash went away after a course of steroids and stopping omeprazole.  She started omeprazole again in November at a lower dose and the rash returned but is just on her chest now.  She denies chest pain, palpitations, shortness a breath, abdominal pain, nausea or vomiting, dysuria or joint pain.  She also denies vision, hearing or speech changes, lateralized weakness, sore throat or sinus congestion. T-max 100.5° yesterday after admission.  Lab work with no leukocytosis with WBC 9.33, neutrophils  86.7%, no bands, no eosinophils, platelets 189, H&H was mild anemia 9.0/26.4, creatinine 0.8 and estimated creatinine clearance 37.6.  ESR 33, .4, procalcitonin 81.13.  COVID, influenza and RSV screens negative.  Lactic acid also negative.  Urinalysis with no signs of infection.  CT C-spine with degenerative changes, CT head without contrast with left maxillary sinus disease, chest x-ray with interstitial opacities suspicious for interstitial edema or heart failure.  She had an MRA of the neck, and had, MRV and MRI of C-spine which are pending.  An MRI of the brain without contrast with probable chronic small-vessel ischemic changes and left maxillary sinusitis. Respiratory PCR pending collection, sputum culture is pending, blood cultures x2 are pending.  She was started on cefepime, doxycycline, valacyclovir and vancomycin and given a dose of prednisone.  Currently she feels okay.  She said at baseline she feels healthy and lives independently and is active in the community.  The neck pain and headache interfere with daily activities.       Outdoor activities: Lives at home alone active, no pets, retired .  Implants:  TAVR  2 years ago  Antibiotic history:   none recent    Social History  Marital Status:   Alcohol History:  reports current alcohol use of about 2.0 standard drinks of alcohol per week.  Tobacco History:  reports that she quit smoking about 24 years ago. Her smoking use included cigarettes. She has never used smokeless tobacco.  Drug History:  reports no history of drug use.    Review of patient's allergies indicates:   Allergen Reactions    Prilosec [omeprazole] Hives       Past Medical History:   Diagnosis Date    Acute on chronic combined systolic and diastolic heart failure 02/23/2022    Anemia     Basal cell carcinoma 1999    Breast cancer     Breast cancer     Cancer     CHF (congestive heart failure)     Colon polyps     Coronary artery disease     GERD  (gastroesophageal reflux disease)     Hyperlipidemia     Hypertension     Hypothyroidism     Nonrheumatic aortic (valve) stenosis 2018    Osteoporosis 2019    S/P TAVR (transcatheter aortic valve replacement) 2022    Squamous cell carcinoma of skin 2018    Streptococcal bacteremia 2023    Thyroid disease     Transaminitis 2023     Past Surgical History:   Procedure Laterality Date    BREAST SURGERY      CARDIAC CATH COSURGEON N/A 2022    Procedure: Cardiac Cath Cosurgeon;  Surgeon: Dontae Wooten MD;  Location: [facility] CATH LAB;  Service: Cardiovascular;  Laterality: N/A;     SECTION, CLASSIC      COLONOSCOPY N/A 2018    Procedure: COLONOSCOPY;  Surgeon: Precious Castorena MD;  Location: [facility] ENDO;  Service: Endoscopy;  Laterality: N/A;    COLONOSCOPY N/A 3/31/2023    Procedure: COLONOSCOPY;  Surgeon: Mal Abrams MD;  Location: [facility] ENDO;  Service: Endoscopy;  Laterality: N/A;    HYSTERECTOMY      LEFT HEART CATHETERIZATION Left 2022    Procedure: Left heart cath;  Surgeon: Dontae Johns MD;  Location: [facility] CATH LAB;  Service: Cardiology;  Laterality: Left;    LEFT HEART CATHETERIZATION Left 2022    Procedure: Left heart cath;  Surgeon: Dontae Johns MD;  Location: [facility] CATH LAB;  Service: Cardiology;  Laterality: Left;    MASTECTOMY Right 2000    right breast      TONSILLECTOMY, ADENOIDECTOMY      TRANSCATHETER AORTIC VALVE REPLACEMENT (TAVR) N/A 2022    Procedure: REPLACEMENT, AORTIC VALVE, TRANSCATHETER (TAVR);  Surgeon: Dontae Johns MD;  Location: [facility] CATH LAB;  Service: Cardiology;  Laterality: N/A;     Family History   Problem Relation Name Age of Onset    Cancer Sister      Liver cancer Sister      Melanoma Sister      Cancer Brother      Breast cancer Neg Hx      Psoriasis Neg Hx      Lupus Neg Hx      Eczema Neg Hx         SUBJECTIVE     Review of Systems  Constitutional:  Denies fevers, chills, night sweats, + loss of  appetite; + fever yesterday 100.5 but did not notice any at home  HEENT: Denies visual changes, ear pain, sinus congestion, mouth pain or trouble swallowing, sore throat or dental pain.   Neck: + left sided neck pain, history of right sided neck pain  Respiratory: Denies shortness of breath, wheezing or hemoptysis. + nonproductive cough x 1 week  Cardiovascular:  Denies chest pain, palpitations or edema.  Gastrointestinal: Denies abdominal pain, nausea, vomiting, constipation or diarrhea. + bloating for a week  Genitourinary:  Denies dysuria, frequency, urgency or hematuria   Musculoskeletal:  Denies joint pain or swelling, difficulty walking.  Breast: Right mastectomy    Skin:  + rash to chest and itching  Neurologic:  Denies motor or sensory loss, lateralized weakness, or dizziness. + headache left side worse with palpation   Psychiatric:  Denies changes in mood or behavior.  Endo: No polyphagia, polydipsia or polyuria,  no heat or cold intolerance, +  thyroid disease    OBJECTIVE     Temp:  [98.3 °F (36.8 °C)-100.5 °F (38.1 °C)] 98.7 °F (37.1 °C)  Pulse:  [] 88  Resp:  [17-18] 18  SpO2:  [92 %-99 %] 97 %  BP: (100-190)/(51-79) 145/67  Temp:  [98.3 °F (36.8 °C)-100.5 °F (38.1 °C)]   Temp: 98.7 °F (37.1 °C) (12/30/24 0346)  Pulse: 88 (12/30/24 0821)  Resp: 18 (12/30/24 0821)  BP: (!) 145/67 (12/30/24 0819)  SpO2: 97 % (12/30/24 0821)    Intake/Output Summary (Last 24 hours) at 12/30/2024 0846  Last data filed at 12/30/2024 0444  Gross per 24 hour   Intake 1328.51 ml   Output 1100 ml   Net 228.51 ml      Examined@0802  Physical Exam  General: Elderly female, sitting up in bed eating breakfast, awake and alert,  she does not appear ill and is a good historian.  Eyes: Eyes with no icterus or injection. Vision grossly normal  Ears: Hearing grossly normal.  Head:   Normal hair distribution, no lesions noted to scalp, tenderness and tingling to palpation at left occipital area down to left mastoid  Nose: Nares  patent  Mouth: Moist mucous membranes, dentition is . No ulcerations, erythema or exudates.  Breast: Right mastectomy.   Neck: Supple, Tenderness to palpation left mastoid, no pain with active or passive range of motion, able to touch chin to chest.  Cardiovascular: Regular rate and rhythm, no murmurs, no edema.    Respiratory:  Clear to auscultation bilaterally anterior and posterior, no tachypnea or increased work of breathing, On RA, no coughing noted - when asked to cough she has a deep congested sounding cough.  Gastrointestinal:  Soft and rounded with active bowel sounds, no tenderness to palpation, + distension  Genitourinary:  No suprapubic tenderness. Voids independently.  Musculoskeletal:  Moves all extremities with equal strength.    Skin:  Warm and dry, pruritic, erythematous, patchy rash to chest.  Neuro:   Oriented, conversant, follows commands.  Psych: Good mood, normal affect.  VAD: PIV  Isolation:    Wounds  None    Significant Labs: All pertinent labs within the past 24 hours have been reviewed.    CBC LAST 7 DAYS  Recent Labs   Lab 12/29/24 1737 12/30/24 0415   WBC 9.33 9.77   RBC 2.91* 2.77*   HGB 9.0* 8.5*   HCT 26.4* 26.1*   MCV 91 94   MCH 30.9 30.7   MCHC 34.1 32.6   RDW 13.1 13.2    172   MPV 9.6 10.5   GRAN 86.7*  8.1*  --    LYMPH 7.8*  0.7*  --    MONO 4.5  0.4  --    BASO 0.02  --    NRBC 0  --        CHEMISTRY LAST 7 DAYS  Recent Labs   Lab 12/29/24  1736 12/29/24 2040 12/30/24  0415     --  134*   K 3.7  --  3.4*     --  102   CO2 22*  --  23   ANIONGAP 11  --  9   BUN 9  --  12   CREATININE 0.8  --  0.9   *  --  118*   CALCIUM 9.2  --  8.6*   MG  --  1.5* 1.8   ALBUMIN 3.0*  --  2.6*   PROT 5.6*  --  5.0*   ALKPHOS 85  --  75   ALT 38  --  28   AST 54*  --  28   BILITOT 0.5  --  0.4       Estimated Creatinine Clearance: 37.6 mL/min (based on SCr of 0.9 mg/dL).    INFLAMMATORY/PROCAL  LAST 7 DAYS  Recent Labs   Lab 12/29/24 2040   PROCAL 81.13*   CRP  "110.4*     No results found for: "ESR"  CRP   Date Value Ref Range Status   12/29/2024 110.4 (H) 0.0 - 8.2 mg/L Final   02/22/2023 1.6 <8.0 mg/L Final   02/01/2023 8.5 (H) <8.0 mg/L Final       PRIOR MICROBIOLOGY:  No results found for the last 90 days.      LAST 7 DAYS MICROBIOLOGY   Microbiology Results (last 7 days)       Procedure Component Value Units Date/Time    Respiratory Infection Panel (PCR), Nasopharyngeal [[id number]]     Order Status: No result Specimen: Nasopharyngeal Swab     Culture, Respiratory with Gram Stain [[id number]]     Order Status: No result Specimen: Respiratory     Influenza A & B by Molecular [[id number]] Collected: 12/29/24 1830    Order Status: Completed Specimen: Nasal Swab Updated: 12/29/24 1857     Influenza A, Molecular Negative     Influenza B, Molecular Negative     Flu A & B Source NP    Blood culture x two cultures. Draw prior to antibiotics. [[id number]] Collected: 12/29/24 1835    Order Status: Sent Specimen: Blood from Peripheral, Antecubital, Left     Blood culture x two cultures. Draw prior to antibiotics. [[id number]] Collected: 12/29/24 1835    Order Status: Sent Specimen: Blood from Peripheral, Hand, Left               CURRENT/PREVIOUS VISIT EKG  Results for orders placed or performed in visit on 11/22/24   IN OFFICE EKG 12-LEAD (to [place])    Collection Time: 11/22/24  8:44 AM   Result Value Ref Range    QRS Duration 70 ms    [facility] QTC Calculation 420 ms    Narrative    Test Reason : Z91.89,I11.9,    Vent. Rate :  83 BPM     Atrial Rate :  83 BPM     P-R Int : 146 ms          QRS Dur :  70 ms      QT Int : 358 ms       P-R-T Axes :  58  29  45 degrees    QTcB Int : 420 ms    Normal sinus rhythm  Normal ECG  When compared with ECG of 22-Nov-2023 10:26,  T wave amplitude has increased in Lateral leads  Confirmed by Rick Mccoy ([id number]) on 11/25/2024 4:41:49 PM    Referred By: AAAREFERRAL SELF           Confirmed By: Rick Mccoy     Echocardiography Findings 11/22/2024  Left " Ventricle The left ventricle is normal in size. Mildly increased wall thickness. There is mild concentric hypertrophy. Normal wall motion. There is normal systolic function with a visually estimated ejection fraction of 55 - 70%. Ejection fraction is approximately 60%. Global longitudinal strain is -18.0%. Global longitudinal strain is normal. There is normal diastolic function.   Right Ventricle Normal right ventricular cavity size. Systolic function is normal. TAPSE is 1.90 cm. Right ventricular global longitudinal strain is - 23.8%.   Left Atrium Normal left atrial size.   Right Atrium Normal right atrial size.   Aortic Valve There is a transcatheter valve replacement in the aortic position. It is reported to be a 26 mm . There is normal leaflet mobility. Aortic valve area by VTI is 1.2 cm². Aortic valve peak velocity is 1.6 m/s. Mean gradient is 5.2 mmHg. The dimensionless index is 0.59.   Mitral Valve The mitral valve is structurally normal. There is normal leaflet mobility. The mean pressure gradient across the mitral valve is 2 mmHg at a heart rate of  bpm. There is trace regurgitation.   Tricuspid Valve The tricuspid valve is structurally normal. There is normal leaflet mobility. There is trace regurgitation.   Pulmonic Valve The pulmonic valve is structurally normal. There is trace regurgitation.   IVC/SVC Normal venous pressure at 3 mmHg.   Ascending Aorta Aortic annulus is normal in size measuring 1.61 cm. Aortic root is normal in size measuring 1.87 cm. Ascending aorta is normal measuring 1.60 cm.   Pericardium and Other Findings Epicardial fat appears visualized. There is a trivial effusion. No indication of cardiac tamponade.   Pulmonary Artery The estimated pulmonary artery systolic pressure is 21 mmHg.   Summary    Left Ventricle: The left ventricle is normal in size. Mildly increased wall thickness. There is mild concentric hypertrophy. There is normal systolic function with a visually estimated  ejection fraction of 55 - 70%. Ejection fraction is approximately 60%. Global longitudinal strain is -18.0%. Global longitudinal strain is normal. There is normal diastolic function.    Right Ventricle: Normal right ventricular cavity size. Systolic function is normal. TAPSE is 1.90 cm. Right ventricular global longitudinal strain is - 23.8%.    Aortic Valve: There is a transcatheter valve replacement in the aortic position. It is reported to be a 26 mm. Aortic valve area by VTI is 1.2 cm². Aortic valve peak velocity is 1.6 m/s. Mean gradient is 5.2 mmHg. The dimensionless index is 0.59.    IVC/SVC: Normal venous pressure at 3 mmHg.    No definite change from Echo in 2/2023.    Significant Imaging: I have reviewed all relevant and available imaging results/findings within the past 24 hours.    CT Cervical Spine Without Contrast 12/29/24 1548   There is no evidence fracture or malalignment.  There are degenerative changes throughout the cervical spine most prominent in the mid cervical spine.  There is no prevertebral soft tissue swelling.  The adjacent soft tissues are unremarkable.   Impression:    There are degenerative changes throughout the cervical spine.    CT Head Without Contrast 12/29/24 1649   Intracranial compartment:   Ventricles and sulci are normal in size for age without evidence of hydrocephalus. No extra-axial blood or fluid collections.   The brain parenchyma appears normal. No parenchymal mass, hemorrhage, edema or major vascular distribution infarct.   Skull/extracranial contents (limited evaluation): No fracture. There is a left maxillary sinus air-fluid level.   Impression:    There is no evidence acute intracranial abnormality.  There is left maxillary sinus disease.    X-Ray Chest AP Portable 12/29/24 1758   There are mild interstitial opacities, suspicious for interstitial edema/CHF.  The pleural spaces are clear the cardiac silhouette is borderline.  There are calcifications of the aortic  arch.  There is a prosthetic aortic valve stent.  Osseous structures demonstrate degenerative changes.   Impression:    Mild interstitial opacities, suspicious for interstitial edema/CHF.    MRI Brain Without Contrast [[id number]]Resulted: 12/30/24 0739   1. No acute intracranial abnormality.   2. Mild generalized cerebral volume loss and scattered T2/FLAIR hyperintense signal within the supratentorial white matter, nonspecific but probably reflecting sequelae of chronic small vessel ischemic change.   3. Left maxillary sinusitis.      I spent a total of 80 minutes on the day of the visit.This includes face to face time and non-face to face time preparing to see the patient (eg, review of tests), obtaining and/or reviewing separately obtained history, documenting clinical information in the electronic or other health record, independently interpreting results and communicating results to the patient/family/caregiver, or care coordinator.      Tameka Saleem NP  Date of Service: 12/30/2024      This note was created using [employer]  voice recognition software that occasionally misinterpreted phrases or words.

## 2024-12-30 NOTE — PLAN OF CARE
12/29/24 2210   Patient Assessment/Suction   Level of Consciousness (AVPU) alert   Respiratory Effort Normal;Unlabored   Expansion/Accessory Muscles/Retractions expansion symmetric   All Lung Fields Breath Sounds clear;diminished   Rhythm/Pattern, Respiratory pattern regular   Cough Frequency no cough   PRE-TX-O2   Device (Oxygen Therapy) nasal cannula   Flow (L/min) (Oxygen Therapy) 1   SpO2 99 %   Pulse Oximetry Type Intermittent   Aerosol Therapy   $ Aerosol Therapy Charges PRN treatment not required   Tobacco Cessation Intervention   Do you use any type of tobacco product? No   Respiratory Evaluation   $ Care Plan Tech Time 15 min   $ Respiratory Evaluation Complete   Evaluation For New Orders   Admitting Diagnosis headache, neck pain   Cardiac Diagnosis HTN, CHF, CAD   Pulmonary Diagnosis na   Home Oxygen   Has Home Oxygen? No   Home Aerosol, MDI, DPI, and Other Treatments/Therapies   Home Respiratory Therapy Per Patient/Review of Chart No   Oxygen Care Plan   Oxygen Care Plan Per Protocol   SPO2 Goal (%) 92% non-cardiac   Rationale SpO2 is <92% (Non-cardiac Pt.)   Bronchodilator Care Plan   Bronchodilator Care Plan Aerosol   Aerosol Meds w/ frequency Albuterol - Ipratropium (DUO-NEB) 0.5mg-3mg(2.5ml) 3ml Nebulizer Solution2.5mg PRN   Atelectasis Care Plan   Rationale No Rational Found   Airway Clearance Care Plan   Rationale No rationale found

## 2024-12-30 NOTE — NURSING
Noted JESSIE Cramer notified GABBI Morgan of critical labs received.    0138 Trop I: 0.257    0517 Trop I: 0.168            BNP: 1659

## 2024-12-30 NOTE — PROGRESS NOTES
"[facility]  Progress Note    Patient Name: Vivien Burton  MRN: [id number]  Patient Class: IP- Inpatient   Admission Date: 12/29/2024  Length of Stay: 0 days  Attending Physician: Rosendo Storey MD  Primary Care Provider: Jeri Moreira MD        Subjective     Principal Problem:<principal problem not specified>        HPI:  Sydnee Burton is a 79 year old female with a previous medical history of anemia, basal cell carcinoma, breast cancer, CHF, streptococcal bacteremia, GERD, HLD, HTN, CAD, TAVR, hypothyroidism, accidental tylenol overdose and abnormal MRI who presented to the ED for a headache and neck pain. The patient endorses a left sided headache described as stabbing and radiating from left upper neck. It is associated with tenderness to palpation in the left parietal scalp daily and intermittently the left temporal area. The pain has been daily for one month and reports taking tylenol every 8 hours with relief but the pain returns. Today the pain became intractable which caused her to present to the ED. CT of head showed no acute process and CT of c-spine showed degenerative changes. While in ED she developed a fever, oxygen saturation of 92%, hypertension, and tachycardia. She endorsed having a non-productive cough. Sepsis bundle initiated and cardiac workup added. She was given toradol, 2mg of PO diluadid, gabapentin, and acyclovir. Her pain improved and her blood pressure did also. She was maintaining an oxygen saturation of 99% on 1 liter nasal cannula and denied any shortness of breath. Fever resolved with tylenol. 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 She most recently finished a steroid taper at end of November. ED initiated acyclovir due one "leison" noted to left sided neck and pain with palpation to scalp. Lab work showed elevated troponin, normal " "lactic acid, no leukocytosis, increased anemia over last month, procalcitonin 81.13, normal TSH, and AST of 54.  with no peripheral edema but chest xray showed "Mild interstitial opacities, suspicious for interstitial edema/CHF." She has no tachypnea or exertional SOB. FLU/RSV/ Covid negative.  Initially her blood pressure was too low for lasix administration but later improved and she was administered 20mg of IV lasix. Troponin trended up and then lowered on third draw. Urinalysis pending at time of note. Due to ESR of 33 and CRP of 110.4 with fever, scalp tenderness, and worsening headache she was empirically started on prednisone 60mg for one dose and to be continued by primary team pending MRA of head to assess for temporal arteritis. MRI of c-spine ordered. MRA of neck ordered.  One year ago patient presented to ED with right sided headache that was similar to this visit. It was discovered she was septic due to pneumonia but on that visit she has 43K WBC and elevated lactic acid. Neurology was consulted at this time and MRI performed which was abnormal. Patient has no vision loss and is neurologically intact. She has been continued on IV vancomycin and IV cefepime and oral valcyclovir. Cardiology and ID consulted.  Course of care discussed with daughter in law Dottie Burton who agreed with plan of care.  Patient admitted by hospital medicine for further evaluation and management.     Overview/Hospital Course:  No notes on file    Interval History:     Patient is seen and examined at multidisciplinary rounds, patient complaining of severe left-sided neck pain and headache for past 1 month associated with low-grade fever.  No photophobia.   No blurry vision/ jaw pain.  No chest pain or SOB.  No nausea vomiting diarrhea.  No Sick contacts/ recent travel history.    Review of Systems   Musculoskeletal:  Positive for neck pain.   Neurological:  Positive for headaches.     Objective:     Vital Signs " (Most Recent):  Temp: 98.5 °F (36.9 °C) (12/30/24 1158)  Pulse: 83 (12/30/24 1158)  Resp: 16 (12/30/24 1158)  BP: 137/63 (12/30/24 1158)  SpO2: 99 % (12/30/24 1158) Vital Signs (24h Range):  Temp:  [98.3 °F (36.8 °C)-100.5 °F (38.1 °C)] 98.5 °F (36.9 °C)  Pulse:  [] 83  Resp:  [16-18] 16  SpO2:  [92 %-99 %] 99 %  BP: (100-190)/(51-79) 137/63     Weight: 54.4 kg (120 lb)  Body mass index is 23.44 kg/m².    Intake/Output Summary (Last 24 hours) at 12/30/2024 1235  Last data filed at 12/30/2024 1028  Gross per 24 hour   Intake 1328.51 ml   Output 1900 ml   Net -571.49 ml         Physical Exam  Constitutional:       Appearance: Normal appearance.   HENT:      Head: Normocephalic and atraumatic.      Nose: Nose normal.   Eyes:      Extraocular Movements: Extraocular movements intact.      Pupils: Pupils are equal, round, and reactive to light.   Cardiovascular:      Rate and Rhythm: Normal rate and regular rhythm.      Heart sounds: No murmur heard.  Pulmonary:      Effort: Pulmonary effort is normal.      Breath sounds: Normal breath sounds.   Abdominal:      General: Abdomen is flat.      Palpations: Abdomen is soft.   Musculoskeletal:         General: Normal range of motion.      Cervical back: Normal range of motion and neck supple.   Skin:     General: Skin is warm and dry.   Neurological:      General: No focal deficit present.      Mental Status: She is alert and oriented to person, place, and time.   Psychiatric:         Mood and Affect: Mood normal.             Significant Labs: All pertinent labs within the past 24 hours have been reviewed.  CBC:   Recent Labs   Lab 12/29/24  1737 12/30/24  0415   WBC 9.33 9.77   HGB 9.0* 8.5*   HCT 26.4* 26.1*    172     CMP:   Recent Labs   Lab 12/29/24  1736 12/30/24  0415    134*   K 3.7 3.4*    102   CO2 22* 23   * 118*   BUN 9 12   CREATININE 0.8 0.9   CALCIUM 9.2 8.6*   PROT 5.6* 5.0*   ALBUMIN 3.0* 2.6*   BILITOT 0.5 0.4   ALKPHOS 85 75    AST 54* 28   ALT 38 28   ANIONGAP 11 9       Significant Imaging: I have reviewed all pertinent imaging results/findings within the past 24 hours.  I have reviewed and interpreted all pertinent imaging results/findings within the past 24 hours.    Scheduled Meds:   aspirin  81 mg Oral Daily    atorvastatin  20 mg Oral QHS    [START ON 12/31/2024] famotidine  40 mg Oral Q48H    furosemide (LASIX) injection  20 mg Intravenous Q12H    lactobacillus acidophilus & bulgar  1 packet Oral BID    levothyroxine  75 mcg Oral Before breakfast    predniSONE  60 mg Oral Daily     Continuous Infusions:  PRN Meds:.  Current Facility-Administered Medications:     acetaminophen, 650 mg, Oral, Q4H PRN    albuterol-ipratropium, 3 mL, Nebulization, Q6H PRN    aluminum-magnesium hydroxide-simethicone, 30 mL, Oral, QID PRN    dextrose 10%, 12.5 g, Intravenous, PRN    dextrose 10%, 25 g, Intravenous, PRN    diphenhydrAMINE, 25 mg, Oral, Q6H PRN    diphenhydrAMINE, 25 mg, Intravenous, PRN    diphenhydrAMINE-zinc acetate 2-0.1%, , Topical (Top), BID PRN    glucagon (human recombinant), 1 mg, Intramuscular, PRN    glucose, 16 g, Oral, PRN    glucose, 24 g, Oral, PRN    HYDROmorphone, 0.5 mg, Intravenous, Q4H PRN    magnesium oxide, 800 mg, Oral, PRN    magnesium oxide, 800 mg, Oral, PRN    melatonin, 6 mg, Oral, Nightly PRN    naloxone, 0.02 mg, Intravenous, PRN    ondansetron, 4 mg, Intravenous, Q8H PRN    oxyCODONE, 5 mg, Oral, Q4H PRN    oxyCODONE, 10 mg, Oral, Q4H PRN    potassium bicarbonate, 35 mEq, Oral, PRN    potassium bicarbonate, 50 mEq, Oral, PRN    potassium bicarbonate, 60 mEq, Oral, PRN    potassium, sodium phosphates, 2 packet, Oral, PRN    potassium, sodium phosphates, 2 packet, Oral, PRN    potassium, sodium phosphates, 2 packet, Oral, PRN    prochlorperazine, 5 mg, Intravenous, Q6H PRN    simethicone, 1 tablet, Oral, QID PRN    sodium chloride 0.9%, 10 mL, Intravenous, Q12H PRN    MRA Head for Temporal Arteritis W and WO  Contrast    Result Date: 12/30/2024  EXAMINATION: MRA HEAD FOR TEMPORAL ARTERITIS W AND WO CONTRAST CLINICAL HISTORY: Neck pain, acute, infection suspected;Concern for Temporal Arteritis; TECHNIQUE: Time of flight and post contrast MR angiography sequences were performed before and following the IV administration of 5 mL of Gadavist..  Multiplanar and MIP images were performed. COMPARISON: MRI of the brain 12/30/2024 FINDINGS: The intracranial internal carotid arteries are patent bilaterally from the skull base to carotid terminus. Middle cerebral arteries are patent bilaterally. Anterior cerebral arteries are patent bilaterally. There are codominant vertebral arteries. Bilateral vertebral arteries are patent to the confluence into the basilar artery. Basilar artery is normal in caliber. Posterior cerebral arteries are patent bilaterally. No evidence of significant mural thickening or abnormal enhancement along the courses of the temporal arteries to specifically indicate active vascular inflammation. Visualized dural venous sinuses are patent without evidence of dural venous sinus thrombosis.     1. No intracranial high-grade stenosis, large vessel occlusion, aneurysm or arteriovenous malformation. 2. No evidence of significant mural thickening or abnormal enhancement along the courses of the temporal arteries to specifically indicate active vascular inflammation. Electronically signed by: Marek Moreno Date:    12/30/2024 Time:    11:17    MRI Cervical Spine W WO Cont    Result Date: 12/30/2024  EXAMINATION: MRI CERVICAL SPINE W WO CONTRAST CLINICAL HISTORY: Neck pain, acute, infection suspected; TECHNIQUE: Multiplanar, multisequence MR images of the cervical spine were performed before and after the administration of intravenous contrast.  5 mL of Gadavist intravenous contrast were administered. COMPARISON: CT cervical spine 12/29/2024 FINDINGS: Study is mild to moderately degraded by motion artifact. Alignment:  Reversal of the cervical lordosis, which may be secondary to positioning or muscle spasm.  Minimal anterolisthesis of C4 on C5 and retrolisthesis of C5 on C6. Vertebral Column: Vertebral body heights are maintained. No acute fracture is identified.  No evidence of an aggressive bone marrow replacement process. Multilevel disc degeneration, most pronounced at C5-6 and C6-7 with mild-to-moderate intervertebral disc space narrowing, disc desiccation, anterior marginal osteophyte formation and posterior disc osteophyte complexes. Cord: Normal signal and morphology. Skull base and craniocervical junction: Normal. Degenerative findings: C2-C3: Minimal posterior disc osteophyte complex.  Mild bilateral facet arthropathy.  No significant neural foraminal or spinal canal stenosis. C3-C4: Mild posterior disc osteophyte complex.  Moderate bilateral facet arthropathy.  Marked left and moderate right uncovertebral joint spurring.  Severe left and moderate right neural foraminal stenosis.  No significant spinal canal stenosis. C4-C5: Mild posterior disc osteophyte complex.  Moderate bilateral facet arthropathy.  Moderate left and mild right uncovertebral joint spurring.  Moderate left and mild right neural foraminal stenosis.  No significant spinal canal stenosis. C5-C6: Posterior disc osteophyte complex, which partially effaces the ventral thecal sac.  Moderate bilateral facet arthropathy.  Marked left and moderate right uncovertebral joint spurring.  Severe left and moderate right neural foraminal stenosis.  Ligamentum flavum thickening, which partially effaces the dorsal thecal sac.  Mild spinal canal stenosis. C6-C7: Posterior disc osteophyte complex, which mildly effaces the ventral thecal sac.  Moderate bilateral facet arthropathy.  Moderate left and moderate right uncovertebral joint spurring.  Severe left and moderate right neural foraminal stenosis.  Ligamentum flavum thickening, which partially effaces the dorsal thecal  sac.  Mild spinal canal stenosis. C7-T1: The disk is normal in configuration.  Mild bilateral facet arthropathy.  Mild bilateral uncovertebral joint spurring.  Mild left greater than right neural foraminal stenosis.  No significant spinal canal stenosis. Paraspinal muscles & soft tissues: Unremarkable. Normal signal voids are present in the vertebral arteries. No abnormal intraspinal enhancement identified.     1. No evidence of acute fracture, spondylodiscitis, high-grade spinal canal stenosis.  No cord compression, focal cord signal abnormality, or abnormal intraspinal enhancement. 2. Multilevel degenerative changes of the cervical spine, as detailed above, with findings most pronounced at C5-6 and C6-7 and resulting in severe left and moderate right neural foraminal and mild spinal canal stenosis. Electronically signed by: Marek Moreno Date:    12/30/2024 Time:    10:47    MRA Neck without contrast    Result Date: 12/30/2024  EXAMINATION: MRA NECK WITHOUT CONTRAST CLINICAL HISTORY: CNS vasculitis, known or suspected; TECHNIQUE: Time of flight MRA of the carotid and vertebral arteries was performed with 3D reconstructions according to the standard neck MRA protocol. COMPARISON: None FINDINGS: Study is mild to moderately degraded by motion artifact. The right common carotid artery demonstrates no hemodynamically significant stenosis. The cervical right internal carotid artery demonstrates no hemodynamically significant stenosis. The left common carotid artery demonstrates no hemodynamically significant stenosis. The cervical left internal carotid artery demonstrates no hemodynamically significant stenosis. Extracranial vertebral arteries: Vertebral arteries are codominant.  Vertebral arteries are normal to the level of the foramen magnum.     No evidence of a hemodynamically significant stenosis, major branch occlusion or aneurysm in the neck arterial vasculature, allowing for motion degradation. Electronically  signed by: Marek Moreno Date:    12/30/2024 Time:    10:23    MRI Brain Without Contrast    Result Date: 12/30/2024  EXAMINATION: MRI BRAIN WITHOUT CONTRAST CLINICAL HISTORY: Headache, new or worsening (Age >= 50y);. TECHNIQUE: Multiplanar multisequence MR imaging of the brain was performed without the administration of intravenous contrast. COMPARISON: CT head 12/29/2024, MRI brain 01/24/2023 FINDINGS: Study is  degraded by motion artifact. Ventricles and sulci are normal in size for age without evidence of hydrocephalus. No extra-axial blood or fluid collections. Scattered foci of T2/FLAIR hyperintense signal within the supratentorial white matter, nonspecific and may reflect sequelae of mild chronic small vessel ischemic change.    Diffusion-weighted images demonstrate no evidence of an acute infarct.   Susceptibility weighted images demonstrate no evidence of acute or chronic hemorrhage. No significant intracranial mass effect or midline shift. Normal vascular flow voids are present. Diffusely heterogeneous calvarial bone marrow signal, nonspecific but similar to prior exam from 2023, and may be seen with red marrow reconversion associated with chronic anemic states, osteoporosis, hypoxia, or smoking. Moderate opacification of the left maxillary sinus with mucosal membrane thickening and small air-fluid level.  Mild scattered mucosal membrane thickening elsewhere in the paranasal sinuses.  The visualized portions of the mastoids are unremarkable. Bilateral lens replacements are noted.     1. No acute intracranial abnormality. 2. Mild generalized cerebral volume loss and scattered T2/FLAIR hyperintense signal within the supratentorial white matter, nonspecific but probably reflecting sequelae of chronic small vessel ischemic change. 3. Left maxillary sinusitis. Electronically signed by: Marek Moreno Date:    12/30/2024 Time:    07:39    X-Ray Chest AP Portable    Result Date: 12/29/2024  EXAMINATION: XR CHEST AP  PORTABLE CLINICAL HISTORY: Chest Pain; TECHNIQUE: Single frontal view of the chest was performed. COMPARISON: 02/02/2023. FINDINGS: There are mild interstitial opacities, suspicious for interstitial edema/CHF.  The pleural spaces are clear the cardiac silhouette is borderline.  There are calcifications of the aortic arch.  There is a prosthetic aortic valve stent.  Osseous structures demonstrate degenerative changes.     Mild interstitial opacities, suspicious for interstitial edema/CHF. Electronically signed by: Anthony Andres Date:    12/29/2024 Time:    17:58    CT Head Without Contrast    Result Date: 12/29/2024  EXAMINATION: CT HEAD WITHOUT CONTRAST CLINICAL HISTORY: Headache, new or worsening (Age >= 50y); TECHNIQUE: Low dose axial CT images obtained throughout the head without intravenous contrast. Sagittal and coronal reconstructions were performed. COMPARISON: None. FINDINGS: Intracranial compartment: Ventricles and sulci are normal in size for age without evidence of hydrocephalus. No extra-axial blood or fluid collections. The brain parenchyma appears normal. No parenchymal mass, hemorrhage, edema or major vascular distribution infarct. Skull/extracranial contents (limited evaluation): No fracture. There is a left maxillary sinus air-fluid level.     There is no evidence acute intracranial abnormality.  There is left maxillary sinus disease. Electronically signed by: Susana Carrion MD Date:    12/29/2024 Time:    16:49    CT Cervical Spine Without Contrast    Result Date: 12/29/2024  EXAMINATION: CT CERVICAL SPINE WITHOUT CONTRAST CLINICAL HISTORY: Neck pain, chronic, degenerative changes on xray;Neck pain, acute exacerbation with chronic degenerative changes; TECHNIQUE: Low dose axial images, sagittal and coronal reformations were performed though the cervical spine.  Contrast was not administered. COMPARISON: 12/18/2024 FINDINGS: There is no evidence fracture or malalignment.  There are degenerative  changes throughout the cervical spine most prominent in the mid cervical spine.  There is no prevertebral soft tissue swelling.  The adjacent soft tissues are unremarkable.     There are degenerative changes throughout the cervical spine. Electronically signed by: Susana Carrion MD Date:    12/29/2024 Time:    15:48    X-Ray Cervical Spine AP And Lateral    Result Date: 12/18/2024  EXAMINATION: XR CERVICAL SPINE AP LATERAL CLINICAL HISTORY: Cervicalgia TECHNIQUE: AP, lateral and open mouth views of the cervical spine were performed. COMPARISON: None. FINDINGS: Vertebral body heights are preserved.Trace anterolisthesis of C4 on C5 and retrolisthesis of C5 on C6.Moderate disc height loss at C5-C6 and C6-C7 with shallow endplate centered osteophytes.  No abnormal prevertebral soft tissue thickening.     As above. Electronically signed by: Marek Burton Date:    12/18/2024 Time:    11:21    Mammo Digital Screening Left with Krishna    Result Date: 12/3/2024  Facility: [facility] [address] [place] [address] [phone] Name: Vivien Burton MRN: [id number] Result: Mammo Digital Screening Left with Krishna History: Patient is 79 y.o. and is seen for a screening mammogram. Films Compared: Compared to: 11/30/2023 Mammo Digital Screening Left with Krishna, 08/11/2022 Mammo Digital Screening Left with Krishna, and 08/09/2021 Mammo Digital Screening Left with Krishna Findings: This procedure was performed using tomosynthesis. Computer-aided detection was utilized in the interpretation of this examination. There are scattered areas of fibroglandular density. There is no evidence of suspicious masses, microcalcifications or architectural distortion.      No mammographic evidence of malignancy. BI-RADS Category 1: Negative Recommendation: Routine screening mammogram in 1 year is recommended. Seun Amaro MD   - pulls last radiology orders      Assessment and Plan     Neck pain without  injury  Left sided neck pain that is sharp and stabbing into head. Patient endorses that specific area on head is painful to touch. CT of neck shows degenerative changes      Patient has significant left-sided neck pain radiated to left occipital.   Has significant elevated CRP/ ESR,   We have to consider GCA?  Patient  also has significant cervical spondylosis, however her presentation is very atypical.   Get an MRV as well.     Consult neurology.   Continue empiric prednisone 60 mg once a day.     Severe sepsis/ severe elevated procalcitonin/  This patient does have evidence of infective focus  My overall impression is sepsis.  Source: Respiratory and Unknown Urinalysis pending at time of note  Antibiotics given-   Antibiotics (72h ago, onward)      None          Latest lactate reviewed-  Recent Labs   Lab 12/29/24  1835 12/29/24  2237 12/30/24  0415   LACTATE  --    < > 0.9   POCLAC 0.53  --   --     < > = values in this interval not displayed.     Procalcitonin: 81.13  CRP: 110.4  Sed Rate 33      Organ dysfunction indicated by Acute on chronic  heart failure    Fluid challenge Contraindicated- Fluid bolus is contraindicated in this patient due to Congestive Heart Failure     Post- resuscitation assessment Yes Perfusion exam was performed within 6 hours of septic shock presentation after bolus shows Adequate tissue perfusion assessed by non-invasive monitoring   IP consult to infectious diease    Patient has low-grade fever and severe elevated procalcitonin, with unclear explanation.   Patient does have significant CRP/ ESR.   Patient has significant neck pain/ headache, however no photophobia, no altered mental status.   Even though I have low suspicion of CNS infection, however it should be consider has a differential diagnosis.  LP should be considered however I will defer to ID team.   Chest x-ray is not quite impressive.   Get a CT chest abdomen and pelvis with IV contrast to look for infectious source.  "    Noted all antibiotics has been discontinued by ID, follow up all the cultures.  Repeat procalcitonin in the a.m..         Intractable headache  Left sided parietal and occipital headache for one month that is daily. Sharp and stabbing.  Alleviated by tylenol for short periods of time. Acutely worse today and unrelieved by tylenol. Associated with left sided neck pain and scalp tenderness. Patient endorses intermittent tenderness to palpation in temporal area. No vision changes. Fever began today. Pruritic rash to chest and back that is chronic but worsened today.     -MRI/MRA brain/neck  -MRI cspine  -ESR 33  -.4  -Procalcitonin 81.13  -Prednisone 60mg PO one time please reorder pending MRA results  -Acyclovir initiated in ED for possible shingles and has been continued due to one lesion noted on neck      Elevated troponin  Denies chest pain or Shortness of breath.    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   Per Dematology note on 12/4/24 "Biopsy proven drug eruption, cleared with prednisone and d/c of prilosec. She restarted prilosec and rash came back. She stopped it at end of November and had another course of steroids". Per chart review patient was placed on steroid taper starting 11/16    -Benadryl cream PRN  -PO benadryl Q 6 hours PRN   -Acyclovir initiated in ED for possible shingles and has been continued due to one lesion noted on neck    History of transcatheter aortic valve replacement (TAVR)  Chronic condition noted      HTN (hypertension)  Patient's blood pressure range in the last 24 hours was: BP  Min: 100/53  Max: 190/79.The patient's inpatient anti-hypertensive regimen is listed below:  Current Antihypertensives  furosemide injection 20 mg, Every 12 hours, Intravenous    Plan  BP is currently labile and patient being admitted for sepsis. Losartan 50mg " QHS was held please restart as warranted    Coronary artery disease  Patient with known CAD s/p stent placement and CABG, which is controlled Will continue ASA and Statin and monitor for S/Sx of angina/ACS. Continue to monitor on telemetry.     Troponin trending up. Will continue to trend every four hours. Patient denies chest pain or SOB.   Cardiology consulted.     Acute on chronic HFrEF (heart failure with reduced ejection fraction)  Supplemental O2 via nasal canula; titrate O2 saturation to >92%.  Serial Cardiac enzymes × 3.  Diuretic administration. Monitor electrolytes for hypokalemia and hypomagnesemia.  Cardiology Consultation.  2-D Echocardiogram for evaluation of left ventricle systolic function or any valvular heart disease.  Daily weights.  Strict I/Os.  Fluid restriction.  Na restriction <2g/d.  Echo    Result Date: 11/22/2024    Left Ventricle: The left ventricle is normal in size. Mildly increased   wall thickness. There is mild concentric hypertrophy. There is normal   systolic function with a visually estimated ejection fraction of 55 - 70%.   Ejection fraction is approximately 60%. Global longitudinal strain is   -18.0%. Global longitudinal strain is normal. There is normal diastolic   function.    Right Ventricle: Normal right ventricular cavity size. Systolic   function is normal. TAPSE is 1.90 cm. Right ventricular global   longitudinal strain is - 23.8%.    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 It is reported to be a 26 mm. Aortic valve area by VTI is 1.2   cm². Aortic valve peak velocity is 1.6 m/s. Mean gradient is 5.2 mmHg. The   dimensionless index is 0.59.    IVC/SVC: Normal venous pressure at 3 mmHg.    No definite change from Echo in 2/2023.                 Anemia   Most recent hemoglobin and hematocrit are listed below.  Recent Labs     12/29/24  1737   HGB 9.0*   HCT 26.4*     Plan  - Monitor serial CBC: Daily  - Transfuse PRBC if patient  becomes hemodynamically unstable, symptomatic or H/H drops below 7/21.  - Patient has not received any PRBC transfusions to date  - Patient's anemia is currently stable    Unspecified hypothyroidism  Chronic condition noted  -Levothyroxine continued      Breast cancer  History of Breast Cancer in remission. Last treatment 24 years ago.       GERD (gastroesophageal reflux disease)  -Pepcid 40mg QHS        VTE Risk Mitigation (From admission, onward)           Ordered     IP VTE HIGH RISK PATIENT  Once         12/29/24 2021     Place sequential compression device  Until discontinued         12/29/24 2021                    Discharge Planning   MIRELLA: 1/2/2025     Code Status: Full Code   Medical Readiness for Discharge Date:   Discharge Plan A: Home                        Rosendo Storey MD  Department of Hospital Medicine   [facility]/Surg

## 2024-12-31 PROBLEM — B34.8 RHINOVIRUS INFECTION: Status: ACTIVE | Noted: 2024-12-31

## 2024-12-31 LAB
ACINETOBACTER CALCOACETICUS/BAUMANNII COMPLEX: NOT DETECTED
ALBUMIN SERPL BCP-MCNC: 2.9 G/DL (ref 3.5–5.2)
ALP SERPL-CCNC: 76 U/L (ref 40–150)
ALT SERPL W/O P-5'-P-CCNC: 23 U/L (ref 10–44)
ANION GAP SERPL CALC-SCNC: 10 MMOL/L (ref 8–16)
AORTIC ROOT ANNULUS: 1.64 CM
AORTIC VALVE CUSP SEPERATION: 1.11 CM
APICAL FOUR CHAMBER EJECTION FRACTION: 53 %
APICAL TWO CHAMBER EJECTION FRACTION: 44 %
ASCENDING AORTA: 2.29 CM
AST SERPL-CCNC: 14 U/L (ref 10–40)
AV INDEX (PROSTH): 0.57
AV MEAN GRADIENT: 10 MMHG
AV PEAK GRADIENT: 17.6 MMHG
AV REGURGITATION PRESSURE HALF TIME: 608.08 MS
AV VALVE AREA BY VELOCITY RATIO: 2.1 CM²
AV VALVE AREA: 1.8 CM²
AV VELOCITY RATIO: 0.67
BACTEROIDES FRAGILIS: NOT DETECTED
BASOPHILS # BLD AUTO: 0.02 K/UL (ref 0–0.2)
BASOPHILS NFR BLD: 0.2 % (ref 0–1.9)
BILIRUB SERPL-MCNC: 0.3 MG/DL (ref 0.1–1)
BSA FOR ECHO PROCEDURE: 1.52 M2
BUN SERPL-MCNC: 11 MG/DL (ref 8–23)
C GATTII+NEOFOR DNA CSF QL NAA+NON-PROBE: NOT DETECTED
CALCIUM SERPL-MCNC: 8.3 MG/DL (ref 8.7–10.5)
CANDIDA ALBICANS: NOT DETECTED
CANDIDA AURIS: NOT DETECTED
CANDIDA GLABRATA: NOT DETECTED
CANDIDA KRUSEI: NOT DETECTED
CANDIDA PARAPSILOSIS: NOT DETECTED
CANDIDA TROPICALIS: NOT DETECTED
CHLORIDE SERPL-SCNC: 99 MMOL/L (ref 95–110)
CLARITY CSF: CLEAR
CMV DNA CSF QL NAA+NON-PROBE: NOT DETECTED
CO2 SERPL-SCNC: 26 MMOL/L (ref 23–29)
COLOR CSF: COLORLESS
CREAT SERPL-MCNC: 0.7 MG/DL (ref 0.5–1.4)
CRYPTOCOCCUS NEOFORMANS/GATTII: NOT DETECTED
CSF TUBE NUMBER: 1
CSF TUBE NUMBER: 1
CTX-M GENE (ESBL PRODUCER): ABNORMAL
CV ECHO LV RWT: 0.53 CM
DIFFERENTIAL METHOD BLD: ABNORMAL
DOP CALC AO PEAK VEL: 2.1 M/S
DOP CALC AO VTI: 34.9 CM
DOP CALC LVOT AREA: 3.1 CM2
DOP CALC LVOT DIAMETER: 2 CM
DOP CALC LVOT PEAK VEL: 1.4 M/S
DOP CALC LVOT STROKE VOLUME: 62.5 CM3
DOP CALC MV VTI: 21.8 CM
DOP CALCLVOT PEAK VEL VTI: 19.9 CM
E COLI K1 DNA CSF QL NAA+NON-PROBE: NOT DETECTED
E WAVE DECELERATION TIME: 216.3 MSEC
E/A RATIO: 0.78
E/E' RATIO: 10.13 M/S
ECHO LV POSTERIOR WALL: 1 CM (ref 0.6–1.1)
ENTEROBACTER CLOACAE COMPLEX: NOT DETECTED
ENTEROBACTERALES: NOT DETECTED
ENTEROCOCCUS FAECALIS: NOT DETECTED
ENTEROCOCCUS FAECIUM: NOT DETECTED
EOSINOPHIL # BLD AUTO: 0 K/UL (ref 0–0.5)
EOSINOPHIL NFR BLD: 0 % (ref 0–8)
ERYTHROCYTE [DISTWIDTH] IN BLOOD BY AUTOMATED COUNT: 13.1 % (ref 11.5–14.5)
ESCHERICHIA COLI: NOT DETECTED
EST. GFR  (NO RACE VARIABLE): >60 ML/MIN/1.73 M^2
EV RNA CSF QL NAA+NON-PROBE: NOT DETECTED
FRACTIONAL SHORTENING: 21.1 % (ref 28–44)
GLUCOSE CSF-MCNC: 68 MG/DL (ref 40–70)
GLUCOSE SERPL-MCNC: 138 MG/DL (ref 70–110)
GP B STREP DNA CSF QL NAA+NON-PROBE: NOT DETECTED
HAEM INFLU DNA CSF QL NAA+NON-PROBE: NOT DETECTED
HAEM INFLU DNA CSF QL NAA+NON-PROBE: NOT DETECTED
HAEMOPHILUS INFLUENZAE: NOT DETECTED
HCT VFR BLD AUTO: 26.8 % (ref 37–48.5)
HGB BLD-MCNC: 8.7 G/DL (ref 12–16)
HHV6 DNA CSF QL NAA+NON-PROBE: NOT DETECTED
HSV1 DNA CSF QL NAA+NON-PROBE: NOT DETECTED
HSV2 DNA CSF QL NAA+NON-PROBE: NOT DETECTED
IMM GRANULOCYTES # BLD AUTO: 0.41 K/UL (ref 0–0.04)
IMM GRANULOCYTES NFR BLD AUTO: 3.9 % (ref 0–0.5)
IMP GENE (CARBAPENEM RESISTANT): ABNORMAL
INTERVENTRICULAR SEPTUM: 1.2 CM (ref 0.6–1.1)
IVRT: 102.76 MSEC
KLEBSIELLA AEROGENES: NOT DETECTED
KLEBSIELLA OXYTOCA: NOT DETECTED
KLEBSIELLA PNEUMONIAE GROUP: NOT DETECTED
KPC RESISTANCE GENE (CARBAPENEM): ABNORMAL
LA MAJOR: 5.57 CM
LEFT ATRIUM AREA SYSTOLIC (APICAL 2 CHAMBER): 19.8 CM2
LEFT ATRIUM AREA SYSTOLIC (APICAL 4 CHAMBER): 16.2 CM2
LEFT ATRIUM VOLUME INDEX MOD: 35.9 ML/M2
LEFT ATRIUM VOLUME MOD: 53.84 ML
LEFT INTERNAL DIMENSION IN SYSTOLE: 3 CM (ref 2.1–4)
LEFT VENTRICLE DIASTOLIC VOLUME INDEX: 40.31 ML/M2
LEFT VENTRICLE DIASTOLIC VOLUME: 60.46 ML
LEFT VENTRICLE END DIASTOLIC VOLUME APICAL 2 CHAMBER: 39.66 ML
LEFT VENTRICLE END DIASTOLIC VOLUME APICAL 4 CHAMBER: 59.75 ML
LEFT VENTRICLE END SYSTOLIC VOLUME APICAL 2 CHAMBER: 66.03 ML
LEFT VENTRICLE END SYSTOLIC VOLUME APICAL 4 CHAMBER: 38.74 ML
LEFT VENTRICLE MASS INDEX: 89.8 G/M2
LEFT VENTRICLE SYSTOLIC VOLUME INDEX: 23 ML/M2
LEFT VENTRICLE SYSTOLIC VOLUME: 34.45 ML
LEFT VENTRICULAR INTERNAL DIMENSION IN DIASTOLE: 3.8 CM (ref 3.5–6)
LEFT VENTRICULAR MASS: 134.7 G
LISTERIA MONOCYTOGENES: NOT DETECTED
LV LATERAL E/E' RATIO: 7.6 M/S
LV SEPTAL E/E' RATIO: 15.2 M/S
LVED V (TEICH): 60.46 ML
LVES V (TEICH): 34.45 ML
LVOT MG: 3.63 MMHG
LVOT MV: 0.83 CM/S
LYMPHOCYTES # BLD AUTO: 1.4 K/UL (ref 1–4.8)
LYMPHOCYTES NFR BLD: 13.7 % (ref 18–48)
MAGNESIUM SERPL-MCNC: 1.9 MG/DL (ref 1.6–2.6)
MCH RBC QN AUTO: 29.9 PG (ref 27–31)
MCHC RBC AUTO-ENTMCNC: 32.5 G/DL (ref 32–36)
MCR-1: ABNORMAL
MCV RBC AUTO: 92 FL (ref 82–98)
MEC A/C AND MREJ (MRSA): ABNORMAL
MEC A/C: ABNORMAL
MONOCYTES # BLD AUTO: 0.4 K/UL (ref 0.3–1)
MONOCYTES NFR BLD: 4 % (ref 4–15)
MV MEAN GRADIENT: 2 MMHG
MV PEAK A VEL: 0.98 M/S
MV PEAK E VEL: 0.76 M/S
MV PEAK GRADIENT: 5 MMHG
MV STENOSIS PRESSURE HALF TIME: 57.86 MS
MV VALVE AREA BY CONTINUITY EQUATION: 2.87 CM2
MV VALVE AREA P 1/2 METHOD: 3.8 CM2
N MEN DNA CSF QL NAA+NON-PROBE: NOT DETECTED
NDM GENE (CARBAPENEM RESISTANT): ABNORMAL
NEISSERIA MENINGITIDIS: NOT DETECTED
NEUTROPHILS # BLD AUTO: 8.3 K/UL (ref 1.8–7.7)
NEUTROPHILS NFR BLD: 78.2 % (ref 38–73)
NRBC BLD-RTO: 0 /100 WBC
OHS CV RV/LV RATIO: 0.66 CM
OHS LV EJECTION FRACTION SIMPSONS BIPLANE MOD: 50 %
OXA-48-LIKE (CARBAPENEM RESISTANT): ABNORMAL
PARECHOVIRUS A RNA CSF QL NAA+NON-PROBE: NOT DETECTED
PHOSPHATE SERPL-MCNC: 2 MG/DL (ref 2.7–4.5)
PISA AR MAX VEL: 3.1 M/S
PISA MRMAX VEL: 4.89 M/S
PISA TR MAX VEL: 3.14 M/S
PLATELET # BLD AUTO: 251 K/UL (ref 150–450)
PLATELET BLD QL SMEAR: ABNORMAL
PMV BLD AUTO: 9.8 FL (ref 9.2–12.9)
POTASSIUM SERPL-SCNC: 3.9 MMOL/L (ref 3.5–5.1)
PROCALCITONIN SERPL IA-MCNC: 202.03 NG/ML (ref 0–0.5)
PROT CSF-MCNC: 29 MG/DL (ref 15–40)
PROT SERPL-MCNC: 5.6 G/DL (ref 6–8.4)
PROTEUS SPECIES: NOT DETECTED
PSEUDOMONAS AERUGINOSA: NOT DETECTED
PV MV: 0.53 M/S
PV PEAK GRADIENT: 3 MMHG
PV PEAK VELOCITY: 0.86 M/S
RA MAJOR: 4.38 CM
RA PRESSURE ESTIMATED: 3 MMHG
RBC # BLD AUTO: 2.91 M/UL (ref 4–5.4)
RBC # CSF: 2 /CU MM
RIGHT VENTRICLE DIASTOLIC BASEL DIMENSION: 2.5 CM
RIGHT VENTRICLE DIASTOLIC LENGTH: 4.4 CM
RIGHT VENTRICULAR END-DIASTOLIC DIMENSION: 2.46 CM
RIGHT VENTRICULAR LENGTH IN DIASTOLE (APICAL 4-CHAMBER VIEW): 4.42 CM
RV TB RVSP: 6 MMHG
RV TISSUE DOPPLER FREE WALL SYSTOLIC VELOCITY 1 (APICAL 4 CHAMBER VIEW): 15.01 CM/S
S PNEUM DNA CSF QL NAA+NON-PROBE: NOT DETECTED
SALMONELLA SP: NOT DETECTED
SERRATIA MARCESCENS: NOT DETECTED
SODIUM SERPL-SCNC: 135 MMOL/L (ref 136–145)
SPECIMEN VOL CSF: 1.5 ML
STAPHYLOCOCCUS AUREUS: NOT DETECTED
STAPHYLOCOCCUS EPIDERMIDIS: NOT DETECTED
STAPHYLOCOCCUS LUGDUNESIS: NOT DETECTED
STAPHYLOCOCCUS SPECIES: NOT DETECTED
STENOTROPHOMONAS MALTOPHILIA: NOT DETECTED
STJ: 1.86 CM
STREPTOCOCCUS AGALACTIAE: NOT DETECTED
STREPTOCOCCUS PNEUMONIAE: NOT DETECTED
STREPTOCOCCUS PYOGENES: NOT DETECTED
STREPTOCOCCUS SPECIES: DETECTED
TDI LATERAL: 0.1 M/S
TDI SEPTAL: 0.05 M/S
TDI: 0.08 M/S
TR MAX PG: 39 MMHG
TRICUSPID ANNULAR PLANE SYSTOLIC EXCURSION: 2.39 CM
TV REST PULMONARY ARTERY PRESSURE: 42 MMHG
VAN A/B (VRE GENE): ABNORMAL
VIM GENE (CARBAPENEM RESISTANT): ABNORMAL
VZV DNA CSF QL NAA+NON-PROBE: NOT DETECTED
VZV IGG SER IA-ACNC: 0.5
VZV IGG SER QL IA: NEGATIVE
WBC # BLD AUTO: 10.54 K/UL (ref 3.9–12.7)
WBC # CSF: 2 /CU MM (ref 0–5)
Z-SCORE OF LEFT VENTRICULAR DIMENSION IN END DIASTOLE: -1.54
Z-SCORE OF LEFT VENTRICULAR DIMENSION IN END SYSTOLE: 0.68

## 2024-12-31 PROCEDURE — 99900035 HC TECH TIME PER 15 MIN (STAT)

## 2024-12-31 PROCEDURE — 87899 AGENT NOS ASSAY W/OPTIC: CPT | Performed by: HOSPITALIST

## 2024-12-31 PROCEDURE — 87205 SMEAR GRAM STAIN: CPT | Performed by: HOSPITALIST

## 2024-12-31 PROCEDURE — 25000003 PHARM REV CODE 250: Performed by: HOSPITALIST

## 2024-12-31 PROCEDURE — 63600175 PHARM REV CODE 636 W HCPCS

## 2024-12-31 PROCEDURE — 87529 HSV DNA AMP PROBE: CPT | Performed by: HOSPITALIST

## 2024-12-31 PROCEDURE — 87798 DETECT AGENT NOS DNA AMP: CPT | Performed by: HOSPITALIST

## 2024-12-31 PROCEDURE — 21000000 HC CCU ICU ROOM CHARGE

## 2024-12-31 PROCEDURE — 87483 CNS DNA AMP PROBE TYPE 12-25: CPT | Performed by: HOSPITALIST

## 2024-12-31 PROCEDURE — 009U3ZX DRAINAGE OF SPINAL CANAL, PERCUTANEOUS APPROACH, DIAGNOSTIC: ICD-10-PCS | Performed by: RADIOLOGY

## 2024-12-31 PROCEDURE — B01B1ZZ FLUOROSCOPY OF SPINAL CORD USING LOW OSMOLAR CONTRAST: ICD-10-PCS | Performed by: RADIOLOGY

## 2024-12-31 PROCEDURE — 87070 CULTURE OTHR SPECIMN AEROBIC: CPT | Performed by: HOSPITALIST

## 2024-12-31 PROCEDURE — 94761 N-INVAS EAR/PLS OXIMETRY MLT: CPT

## 2024-12-31 PROCEDURE — 25500020 PHARM REV CODE 255

## 2024-12-31 PROCEDURE — 99233 SBSQ HOSP IP/OBS HIGH 50: CPT | Mod: ,,, | Performed by: NURSE PRACTITIONER

## 2024-12-31 PROCEDURE — 86592 SYPHILIS TEST NON-TREP QUAL: CPT | Performed by: HOSPITALIST

## 2024-12-31 PROCEDURE — 25000003 PHARM REV CODE 250

## 2024-12-31 PROCEDURE — 25000003 PHARM REV CODE 250: Performed by: FAMILY MEDICINE

## 2024-12-31 PROCEDURE — 80053 COMPREHEN METABOLIC PANEL: CPT

## 2024-12-31 PROCEDURE — 25000003 PHARM REV CODE 250: Performed by: INTERNAL MEDICINE

## 2024-12-31 PROCEDURE — 86789 WEST NILE VIRUS ANTIBODY: CPT | Performed by: HOSPITALIST

## 2024-12-31 PROCEDURE — 89051 BODY FLUID CELL COUNT: CPT | Performed by: HOSPITALIST

## 2024-12-31 PROCEDURE — 36415 COLL VENOUS BLD VENIPUNCTURE: CPT | Performed by: HOSPITALIST

## 2024-12-31 PROCEDURE — 83735 ASSAY OF MAGNESIUM: CPT

## 2024-12-31 PROCEDURE — 82945 GLUCOSE OTHER FLUID: CPT | Performed by: HOSPITALIST

## 2024-12-31 PROCEDURE — 84100 ASSAY OF PHOSPHORUS: CPT

## 2024-12-31 PROCEDURE — 27000207 HC ISOLATION

## 2024-12-31 PROCEDURE — 63600175 PHARM REV CODE 636 W HCPCS: Performed by: NURSE PRACTITIONER

## 2024-12-31 PROCEDURE — A9585 GADOBUTROL INJECTION: HCPCS

## 2024-12-31 PROCEDURE — 84145 PROCALCITONIN (PCT): CPT | Performed by: HOSPITALIST

## 2024-12-31 PROCEDURE — 36415 COLL VENOUS BLD VENIPUNCTURE: CPT | Performed by: NURSE PRACTITIONER

## 2024-12-31 PROCEDURE — 84157 ASSAY OF PROTEIN OTHER: CPT | Performed by: HOSPITALIST

## 2024-12-31 PROCEDURE — 87040 BLOOD CULTURE FOR BACTERIA: CPT | Mod: 59 | Performed by: NURSE PRACTITIONER

## 2024-12-31 PROCEDURE — 85025 COMPLETE CBC W/AUTO DIFF WBC: CPT | Performed by: NURSE PRACTITIONER

## 2024-12-31 RX ORDER — FAMOTIDINE 20 MG/1
20 TABLET, FILM COATED ORAL DAILY
Status: DISCONTINUED | OUTPATIENT
Start: 2024-12-31 | End: 2025-01-05 | Stop reason: HOSPADM

## 2024-12-31 RX ORDER — CALCIUM CARBONATE 200(500)MG
500 TABLET,CHEWABLE ORAL 3 TIMES DAILY
Status: DISCONTINUED | OUTPATIENT
Start: 2024-12-31 | End: 2025-01-05 | Stop reason: HOSPADM

## 2024-12-31 RX ORDER — FAMOTIDINE 20 MG/1
20 TABLET, FILM COATED ORAL 2 TIMES DAILY
Status: DISCONTINUED | OUTPATIENT
Start: 2024-12-31 | End: 2024-12-31

## 2024-12-31 RX ORDER — ENOXAPARIN SODIUM 100 MG/ML
40 INJECTION SUBCUTANEOUS EVERY 24 HOURS
Status: DISCONTINUED | OUTPATIENT
Start: 2024-12-31 | End: 2025-01-05 | Stop reason: HOSPADM

## 2024-12-31 RX ORDER — GADOBUTROL 604.72 MG/ML
INJECTION INTRAVENOUS
Status: COMPLETED
Start: 2024-12-31 | End: 2024-12-31

## 2024-12-31 RX ORDER — GABAPENTIN 100 MG/1
100 CAPSULE ORAL ONCE
Status: DISCONTINUED | OUTPATIENT
Start: 2024-12-31 | End: 2025-01-05 | Stop reason: HOSPADM

## 2024-12-31 RX ORDER — DIPHENHYDRAMINE HCL 25 MG
25 CAPSULE ORAL EVERY 6 HOURS PRN
Status: DISCONTINUED | OUTPATIENT
Start: 2024-12-31 | End: 2025-01-05 | Stop reason: HOSPADM

## 2024-12-31 RX ORDER — CEFTRIAXONE 2 G/1
2 INJECTION, POWDER, FOR SOLUTION INTRAMUSCULAR; INTRAVENOUS
Status: DISCONTINUED | OUTPATIENT
Start: 2024-12-31 | End: 2024-12-31

## 2024-12-31 RX ORDER — CEFTRIAXONE 2 G/1
2 INJECTION, POWDER, FOR SOLUTION INTRAMUSCULAR; INTRAVENOUS
Status: DISCONTINUED | OUTPATIENT
Start: 2025-01-01 | End: 2025-01-05 | Stop reason: HOSPADM

## 2024-12-31 RX ORDER — CARVEDILOL 6.25 MG/1
6.25 TABLET ORAL 2 TIMES DAILY
Status: DISCONTINUED | OUTPATIENT
Start: 2024-12-31 | End: 2025-01-05 | Stop reason: HOSPADM

## 2024-12-31 RX ADMIN — ATORVASTATIN CALCIUM 20 MG: 20 TABLET, FILM COATED ORAL at 08:12

## 2024-12-31 RX ADMIN — LEVOTHYROXINE SODIUM 75 MCG: 0.03 TABLET ORAL at 06:12

## 2024-12-31 RX ADMIN — CARVEDILOL 6.25 MG: 6.25 TABLET, FILM COATED ORAL at 08:12

## 2024-12-31 RX ADMIN — ONDANSETRON 4 MG: 2 INJECTION INTRAMUSCULAR; INTRAVENOUS at 06:12

## 2024-12-31 RX ADMIN — GADOBUTROL: 604.72 INJECTION INTRAVENOUS at 12:12

## 2024-12-31 RX ADMIN — ASPIRIN 81 MG: 81 TABLET, COATED ORAL at 09:12

## 2024-12-31 RX ADMIN — OXYCODONE HYDROCHLORIDE 5 MG: 5 TABLET ORAL at 07:12

## 2024-12-31 RX ADMIN — CARVEDILOL 6.25 MG: 6.25 TABLET, FILM COATED ORAL at 09:12

## 2024-12-31 RX ADMIN — CALCIUM CARBONATE 500 MG: 500 TABLET, CHEWABLE ORAL at 08:12

## 2024-12-31 RX ADMIN — GABAPENTIN 200 MG: 100 CAPSULE ORAL at 04:12

## 2024-12-31 RX ADMIN — ALUMINUM HYDROXIDE, MAGNESIUM HYDROXIDE, AND SIMETHICONE 30 ML: 1200; 120; 1200 SUSPENSION ORAL at 06:12

## 2024-12-31 RX ADMIN — LACTOBACILLUS ACIDOPHILUS / LACTOBACILLUS BULGARICUS 1 EACH: 100 MILLION CFU STRENGTH GRANULES at 09:12

## 2024-12-31 RX ADMIN — GABAPENTIN 200 MG: 100 CAPSULE ORAL at 08:12

## 2024-12-31 RX ADMIN — POTASSIUM & SODIUM PHOSPHATES POWDER PACK 280-160-250 MG 2 PACKET: 280-160-250 PACK at 06:12

## 2024-12-31 RX ADMIN — CEFTRIAXONE SODIUM 2 G: 2 INJECTION, POWDER, FOR SOLUTION INTRAMUSCULAR; INTRAVENOUS at 09:12

## 2024-12-31 RX ADMIN — LACTOBACILLUS ACIDOPHILUS / LACTOBACILLUS BULGARICUS 1 EACH: 100 MILLION CFU STRENGTH GRANULES at 08:12

## 2024-12-31 RX ADMIN — SIMETHICONE 80 MG: 80 TABLET, CHEWABLE ORAL at 04:12

## 2024-12-31 RX ADMIN — GABAPENTIN 200 MG: 100 CAPSULE ORAL at 09:12

## 2024-12-31 RX ADMIN — FAMOTIDINE 20 MG: 20 TABLET, FILM COATED ORAL at 08:12

## 2024-12-31 NOTE — PLAN OF CARE
12/30/24 2010   Patient Assessment/Suction   Level of Consciousness (AVPU) alert   Respiratory Effort Normal;Unlabored   Expansion/Accessory Muscles/Retractions expansion symmetric   All Lung Fields Breath Sounds clear   Rhythm/Pattern, Respiratory pattern regular   Cough Frequency no cough   PRE-TX-O2   Device (Oxygen Therapy) room air   SpO2 96 %   Pulse Oximetry Type Intermittent   Aerosol Therapy   $ Aerosol Therapy Charges PRN treatment not required

## 2024-12-31 NOTE — NURSING TRANSFER
Nursing Transfer Note      12/31/2024   5:00PM    Nurse giving handoff: JESSIE Zheng  Nurse receiving handoff:JESSIE [place]    Reason patient is being transferred: Cardiology     Transfer From: [facility] to [facility]    Transfer via ambulance    Transfer with cardiac monitoring    Patient belongings transferred with patient: Yes    Chart send with patient: Yes      Patient reassessed at: 1700     Upon arrival to floor: cardiac monitor applied, patient oriented to room, call bell in reach, and bed in lowest position

## 2024-12-31 NOTE — NURSING
Attempted to place new PIV on pt before transporting to [facility]. Pt and pt's family  requesting to wait until midline is available due to being stuck multiple times, and limited spaces to place IV. Communicated to primary RN & charge RN.

## 2024-12-31 NOTE — PROGRESS NOTES
"[facility] Medicine  Progress Note    Patient Name: Vivien Burton  MRN: [id number]  Patient Class: IP- Inpatient   Admission Date: 12/29/2024  Length of Stay: 1 days  Attending Physician: Rosendo Storey MD  Primary Care Provider: Jeri Moreira MD        Subjective     Principal Problem:<principal problem not specified>        HPI:  Sydnee Burton is a 79 year old female with a previous medical history of anemia, basal cell carcinoma, breast cancer, CHF, streptococcal bacteremia, GERD, HLD, HTN, CAD, TAVR, hypothyroidism, accidental tylenol overdose and abnormal MRI who presented to the ED for a headache and neck pain. The patient endorses a left sided headache described as stabbing and radiating from left upper neck. It is associated with tenderness to palpation in the left parietal scalp daily and intermittently the left temporal area. The pain has been daily for one month and reports taking tylenol every 8 hours with relief but the pain returns. Today the pain became intractable which caused her to present to the ED. CT of head showed no acute process and CT of c-spine showed degenerative changes. While in ED she developed a fever, oxygen saturation of 92%, hypertension, and tachycardia. She endorsed having a non-productive cough. Sepsis bundle initiated and cardiac workup added. She was given toradol, 2mg of PO diluadid, gabapentin, and acyclovir. Her pain improved and her blood pressure did also. She was maintaining an oxygen saturation of 99% on 1 liter nasal cannula and denied any shortness of breath. Fever resolved with tylenol. 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 She most recently finished a steroid taper at end of November. ED initiated acyclovir due one "leison" noted to left sided neck and pain with palpation to scalp. Lab work showed elevated troponin, normal " "lactic acid, no leukocytosis, increased anemia over last month, procalcitonin 81.13, normal TSH, and AST of 54.  with no peripheral edema but chest xray showed "Mild interstitial opacities, suspicious for interstitial edema/CHF." She has no tachypnea or exertional SOB. FLU/RSV/ Covid negative.  Initially her blood pressure was too low for lasix administration but later improved and she was administered 20mg of IV lasix. Troponin trended up and then lowered on third draw. Urinalysis pending at time of note. Due to ESR of 33 and CRP of 110.4 with fever, scalp tenderness, and worsening headache she was empirically started on prednisone 60mg for one dose and to be continued by primary team pending MRA of head to assess for temporal arteritis. MRI of c-spine ordered. MRA of neck ordered.  One year ago patient presented to ED with right sided headache that was similar to this visit. It was discovered she was septic due to pneumonia but on that visit she has 43K WBC and elevated lactic acid. Neurology was consulted at this time and MRI performed which was abnormal. Patient has no vision loss and is neurologically intact. She has been continued on IV vancomycin and IV cefepime and oral valcyclovir. Cardiology and ID consulted.  Course of care discussed with daughter in law Dottie Burton who agreed with plan of care.  Patient admitted by hospital medicine for further evaluation and management.     Overview/Hospital Course:  No notes on file    No new subjective & objective note has been filed under this hospital service since the last note was generated.      Assessment and Plan     Neck pain without injury  Left sided neck pain that is sharp and stabbing into head. Patient endorses that specific area on head is painful to touch. CT of neck shows degenerative changes      Patient has significant left-sided neck pain radiated to left occipital.  Extensive imaging study is not quite impressive except severe " cervical spondylosis.   Status post LP-essential negative.   Discontinue steroids.   Follow up neurologist.         Severe sepsis/ strep viridans bacteremia  This patient does have evidence of infective focus  My overall impression is sepsis.  Source: Respiratory and Unknown Urinalysis pending at time of note  Antibiotics given-   Antibiotics (72h ago, onward)      Start     Stop Route Frequency Ordered    12/31/24 0830  cefTRIAXone injection 2 g         -- IV Every 12 hours (non-standard times) 12/31/24 0745          Latest lactate reviewed-  Recent Labs   Lab 12/29/24  1835 12/29/24  2237 12/30/24  0415   LACTATE  --    < > 0.9   POCLAC 0.53  --   --     < > = values in this interval not displayed.     Procalcitonin: 81.13  CRP: 110.4  Sed Rate 33      Organ dysfunction indicated by Acute on chronic  heart failure    Fluid challenge Contraindicated- Fluid bolus is contraindicated in this patient due to Congestive Heart Failure     Post- resuscitation assessment Yes Perfusion exam was performed within 6 hours of septic shock presentation after bolus shows Adequate tissue perfusion assessed by non-invasive monitoring     CT chest abdomen and pelvis with IV contrast negative for infectious source.   Status post LP 12/31, WBC 2, RBC 2, essentially negative.   TTE 12/31, negative for vegetation.   Patient has very poor dental hygiene, now patient has Streptococcus viridans bacteremia, patient also has a history of TAVR, highly concerned the patient may have underlying IE,  spoke with cardiologist, patient's may need [facility], recommended transfer to [facility] step-down unit for close monitor.     Continue IV Rocephin 2 g q.12 hours.       Rhinovirus infection    Aware, droplet precaution, conservative management    Intractable headache  As above    Elevated troponin  Denies chest pain or Shortness of breath.    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   Per Dematology note on 12/4/24 "Biopsy proven drug eruption, cleared with prednisone and d/c of prilosec. She restarted prilosec and rash came back. She stopped it at end of November and had another course of steroids". Per chart review patient was placed on steroid taper starting 11/16    History of transcatheter aortic valve replacement (TAVR)  Chronic condition noted      HTN (hypertension)  Patient's blood pressure range in the last 24 hours was: BP  Min: 100/53  Max: 190/79.The patient's inpatient anti-hypertensive regimen is listed below:  Current Antihypertensives  furosemide injection 20 mg, Every 12 hours, Intravenous    Plan  BP is currently labile and patient being admitted for sepsis. Losartan 50mg QHS was held please restart as warranted    Coronary artery disease  Patient with known CAD s/p stent placement and CABG, which is controlled Will continue ASA and Statin and monitor for S/Sx of angina/ACS. Continue to monitor on telemetry.     Troponin trending up. Will continue to trend every four hours. Patient denies chest pain or SOB.   Cardiology consulted.     Acute on chronic HFrEF (heart failure with reduced ejection fraction)    Echo Saline Bubble? No; Ultrasound enhancing contrast? Yes    Result Date: 12/31/2024    Left Ventricle: The left ventricle is normal in size. Mildly increased   wall thickness. There is concentric hypertrophy. There is normal systolic   function with a visually estimated ejection fraction of 55 - 60%. There is   normal diastolic function.    Right Ventricle: Normal right ventricular cavity size. Wall thickness   is normal. Systolic function is normal.    Left Atrium: Left atrium is mildly dilated.    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 .  This has no obvious vegetation identified on this valve.    Adequate function is noted    Mitral Valve: " The mitral valve is structurally normal. There is normal   leaflet mobility.    Pulmonary Artery: There is mild pulmonary hypertension. The estimated   pulmonary artery systolic pressure is 42 mmHg.    IVC/SVC: Normal venous pressure at 3 mmHg.    No echocardiographic evidence of vegetation noted        Echo    Result Date: 11/22/2024    Left Ventricle: The left ventricle is normal in size. Mildly increased   wall thickness. There is mild concentric hypertrophy. There is normal   systolic function with a visually estimated ejection fraction of 55 - 70%.   Ejection fraction is approximately 60%. Global longitudinal strain is   -18.0%. Global longitudinal strain is normal. There is normal diastolic   function.    Right Ventricle: Normal right ventricular cavity size. Systolic   function is normal. TAPSE is 1.90 cm. Right ventricular global   longitudinal strain is - 23.8%.    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 It is reported to be a 26 mm. Aortic valve area by VTI is 1.2   cm². Aortic valve peak velocity is 1.6 m/s. Mean gradient is 5.2 mmHg. The   dimensionless index is 0.59.    IVC/SVC: Normal venous pressure at 3 mmHg.    No definite change from Echo in 2/2023.           Patient looks euvolemic now, discontinue IV Lasix. -2.5 L since yesterday.       Anemia   Most recent hemoglobin and hematocrit are listed below.  Recent Labs     12/29/24  1737 12/30/24  0415 12/31/24  0237   HGB 9.0* 8.5* 8.7*   HCT 26.4* 26.1* 26.8*       Plan  - Monitor serial CBC: Daily  - Transfuse PRBC if patient becomes hemodynamically unstable, symptomatic or H/H drops below 7/21.  - Patient has not received any PRBC transfusions to date  - Patient's anemia is currently stable    Unspecified hypothyroidism  Chronic condition noted  -Levothyroxine continued      Breast cancer  History of Breast Cancer in remission. Last treatment 24 years ago.       GERD (gastroesophageal reflux  disease)  -Pepcid 40mg QHS        VTE Risk Mitigation (From admission, onward)           Ordered     IP VTE HIGH RISK PATIENT  Once         12/29/24 2021     Place sequential compression device  Until discontinued         12/29/24 2021                    Discharge Planning   MIRELLA: 1/3/2025     Code Status: Full Code   Medical Readiness for Discharge Date:   Discharge Plan A: Home            Patient will be transferred to [facility] step-down unit for close monitor.  I have updated with patient's, patient's daughter-in-law Dr. Burton, cardiologist, infectious disease, neurologist.             Rosendo Storey MD  Department of Hospital Medicine   [facility]/Surg

## 2024-12-31 NOTE — PROGRESS NOTES
[facility]   Department of Infectious Disease  Progress Note        PATIENT NAME: Vivien Burton  MRN: [id number]  TODAY'S DATE: 12/31/2024  ADMIT DATE: 12/29/2024  LENGTH OF STAY: 1 DAYS      CHIEF COMPLAINT: Neck Pain (Chronic neck pain.  Patient states that she has taken everything she can and nothing helps. )    PRINCIPLE PROBLEM: <principal problem not specified>    REASON FOR CONSULT: Procalcitonin 81.3, fever, normal lactic, no leukocytosis, possible pneumonia vs chf exacerbation. Possible temporal arteritis    INTERVAL HISTORY     12/31/2024@0822 (Denette): Seen in her room alone.  She is walking around without difficulty. She tells me that she does not feel good today.  It is nothing specific she just has a general feeling of unwellness. Headache is improved, still with nausea and poor appetite. She denies fevers, chills or sweats now but said she was having them last week, waking up at night with chills and then sweats.  She never had a fever that she was able to catch with a thermometer. She denies shortness or breath, cough is improved, she was wheezing yesterday but said that has much improved.  Per the nursing record she put out 5 L  of urine yesterday after 2 low doses of furosemide.She still has neck pain starting in the left occipital area going down to her left mastoid area intermittently but it is not as bad as it was. She denies tooth aches, oral ulcers, jaw pain, or facial pain, states she has no sore throat and a little postnasal drip but no real sinus congestion. We discussed that her blood cultures from 12/29 are positive with strep viridans and an LP is recommended. I let her know that we were going to obtain an echocardiogram this morning and repeat blood cultures, and resume IV antibiotics.  T-max 98.9° in the last 24 hours. CTCAP with no pneumoniae, left lower lobe nodules not seen on previous CT, improvement right-sided tree-in-bud and ground-glass opacities.   MRI head for temporal arteritis negative for large vessel occlusion, aneurysm or arteriovenous malformation and no evidence vascular inflammation.  MRI of C-spine with multilevel degenerative changes.  MRA of neck with no abnormalities.    Antibiotics (From admission, onward)      Start     Stop Route Frequency Ordered    12/31/24 0830  cefTRIAXone injection 2 g         -- IV Every 12 hours (non-standard times) 12/31/24 0745           Antifungals (From admission, onward)      None           Antivirals (From admission, onward)      None                ASSESSMENT and PLAN     1.  Intractable left-sided neck pain and headache, occipital neuralgia - Strep viridans bacteremia  - 12/29/2024 blood cultures x2 sets  with 3/3 bottles positive for viridans strep  - T-max 100.5° Shortly after admission  - .4, procalcitonin 81.13-->202, ESR 33  - previous procalcitonin is elevated in January of 2023 ( also CRP and ESR elevated January 20, 2023) -  hospitalized with strep pneumoniae bacteremia  - 12/31/2024 blood cultures x2 sets pending  - MRA of head for temporal arteritis with no evidence abnormal enhancement along temporal arteries to suggest vascular inflammation    2. Acute exacerbation of chronic HFpEF s/p TAVRF Feb 2022 at [facility]  - RVP positive for rhino/enterovirus and respiratory culture with Gram stain pending  - chest x-ray with interstitial edema possibly heart failure, Less likely pneumonia but does have an elevated procalcitonin  - BNP elevated 874-->1659  - diuresed well with Lasix  - CT chest with no pneumonia few left lower lobe nodular densities    3. Zoster Sine Herpeticus - not likely  -VZV IgG pending    4.  Left maxillary sinusitis -  on MRI    5.  History of breast cancer 20 years ago with right mastectomy    6.  Allergic exanthem - omeprazole    RECOMMENDATIONS:  Start ceftriaxone 2 grams IV every 12 hours  LP scheduled for today at 1100  Repeat blood cultures x2 this morning  Echocardiogram  ordered  Considering transfer to [facility] for possible [facility]  and close monitoring      D/W Dr Woo, Hospitalist    Please send Epic secure chat with any questions.    HPI      Vivien Burton is a 79 y.o. female   With chronic medical problems including anemia, HFpEF,  hypothyroidism, hypertension, coronary artery disease, hyperlipidemia, GERD and a history breast cancer and basal cell carcinoma who presented to the emergency room on 12/29 complaining neck pain and headache.  Patient states that she has had left sided neck pain around the mastoid area for weeks to months that has intermittent.  It usually responds to acetaminophen but then the pain comes back.  The pain seems to radiate from the left occipital area down to the mastoid area in his worse with palpation.  She said his not worse with active or passive range of motion in feels better under neck with pressure though she has tingling in her scalp  with palpation.  She said 2 years ago she was in the hospital with similar pain but it was on the right side of her neck but that went away after a course of steroid.  She said she has had a big outpatient workup and no one has been able to tell her what is going on.  Associated symptoms are nonproductive cough that started about a week ago, abdominal bloating possibly about a week ago but not sure, and poor appetite that has been going on.  She also has a rash to her chest.  She said back in August she had a diffuse pruritic erythematous patchy rash to  her trunk.  She saw a dermatologist and  pathology from biopsy was consistent with a allergic reaction.  Her new medicine was omeprazole which she stopped.  The rash went away after a course of steroids and stopping omeprazole.  She started omeprazole again in November at a lower dose and the rash returned but is just on her chest now.  She denies chest pain, palpitations, shortness a breath, abdominal pain, nausea or vomiting, dysuria or joint  pain.  She also denies vision, hearing or speech changes, lateralized weakness, sore throat or sinus congestion. T-max 100.5° yesterday after admission.  Lab work with no leukocytosis with WBC 9.33, neutrophils 86.7%, no bands, no eosinophils, platelets 189, H&H was mild anemia 9.0/26.4, creatinine 0.8 and estimated creatinine clearance 37.6.  ESR 33, .4, procalcitonin 81.13.  COVID, influenza and RSV screens negative.  Lactic acid also negative.  Urinalysis with no signs of infection.  CT C-spine with degenerative changes, CT head without contrast with left maxillary sinus disease, chest x-ray with interstitial opacities suspicious for interstitial edema or heart failure.  She had an MRA of the neck, and had, MRV and MRI of C-spine which are pending.  An MRI of the brain without contrast with probable chronic small-vessel ischemic changes and left maxillary sinusitis. Respiratory PCR pending collection, sputum culture is pending, blood cultures x2 are pending.  She was started on cefepime, doxycycline, valacyclovir and vancomycin and given a dose of prednisone.  Currently she feels okay.  She said at baseline she feels healthy and lives independently and is active in the community.  The neck pain and headache interfere with daily activities.     Outdoor activities: Lives at home alone active, no pets, retired .  Implants:  TAVR  2 years ago  Antibiotic history:   none recent    Social History  Marital Status:   Alcohol History:  reports current alcohol use of about 2.0 standard drinks of alcohol per week.  Tobacco History:  reports that she quit smoking about 24 years ago. Her smoking use included cigarettes. She has never used smokeless tobacco.  Drug History:  reports no history of drug use.    Review of patient's allergies indicates:   Allergen Reactions    Prilosec [omeprazole] Hives         SUBJECTIVE     Review of Systems  Twelve point review of systems obtained and negative  except as stated above in Interval History     OBJECTIVE     Temp:  [97.8 °F (36.6 °C)-98.9 °F (37.2 °C)] 98 °F (36.7 °C)  Pulse:  [79-94] 94  Resp:  [16-19] 16  SpO2:  [95 %-99 %] 98 %  BP: (137-180)/(60-76) 137/61  Temp:  [97.8 °F (36.6 °C)-98.9 °F (37.2 °C)]   Temp: 98 °F (36.7 °C) (12/31/24 0754)  Pulse: 94 (12/31/24 0800)  Resp: 16 (12/31/24 0754)  BP: 137/61 (12/31/24 0754)  SpO2: 98 % (12/31/24 0754)    Intake/Output Summary (Last 24 hours) at 12/31/2024 0924  Last data filed at 12/31/2024 0702  Gross per 24 hour   Intake 600 ml   Output 4350 ml   Net -3750 ml       Physical Exam  General:  alert and awake, ambulatory in room, does not appear ill but states she does not feel well.  Eyes: Eyes with no icterus or injection. Vision grossly normal  Ears: Hearing grossly normal.  Head:   Normal hair distribution, no lesions noted to scalp, tenderness and tingling to palpation at left occipital area down to left mastoid  Nose: Nares patent  Mouth: Moist mucous membranes, dentition is fair to good. No ulcerations, erythema or exudates.  Breast: Right mastectomy.   Neck: Supple, Tenderness to palpation left mastoid, no pain with active or passive range of motion.  Cardiovascular: Regular rate and rhythm, + murmur, no peripheral edema.    Respiratory:  Clear to auscultation bilaterally posterior, rare exp wheeze anteriorly. no tachypnea or increased work of breathing, On RA, no coughing noted.  Gastrointestinal:  Soft and rounded with active bowel sounds, no tenderness to palpation.  Genitourinary:  Voids independently.  Musculoskeletal:  Moves all extremities with equal strength.    Skin: Warm and dry, pruritic, erythematous, patchy rash to chest.  Neuro: Oriented, conversant, follows commands.  Psych: Good mood, normal affect.  VAD: PIV  Isolation: Droplet for Rhinivirus    Wounds  None    Significant Labs: All pertinent labs within the past 24 hours have been reviewed.    CBC LAST 7 DAYS  Recent Labs   Lab  "12/29/24 1737 12/30/24 0415 12/31/24  0237   WBC 9.33 9.77 10.54   RBC 2.91* 2.77* 2.91*   HGB 9.0* 8.5* 8.7*   HCT 26.4* 26.1* 26.8*   MCV 91 94 92   MCH 30.9 30.7 29.9   MCHC 34.1 32.6 32.5   RDW 13.1 13.2 13.1    172 251   MPV 9.6 10.5 9.8   GRAN 86.7*  8.1*  --  78.2*  8.3*   LYMPH 7.8*  0.7*  --  13.7*  1.4   MONO 4.5  0.4  --  4.0  0.4   BASO 0.02  --  0.02   NRBC 0  --  0       CHEMISTRY LAST 7 DAYS  Recent Labs   Lab 12/29/24 1736 12/29/24 2040 12/30/24 0415 12/31/24  0240     --  134* 135*   K 3.7  --  3.4* 3.9     --  102 99   CO2 22*  --  23 26   ANIONGAP 11  --  9 10   BUN 9  --  12 11   CREATININE 0.8  --  0.9 0.7   *  --  118* 138*   CALCIUM 9.2  --  8.6* 8.3*   MG  --  1.5* 1.8 1.9   ALBUMIN 3.0*  --  2.6* 2.9*   PROT 5.6*  --  5.0* 5.6*   ALKPHOS 85  --  75 76   ALT 38  --  28 23   AST 54*  --  28 14   BILITOT 0.5  --  0.4 0.3       Estimated Creatinine Clearance: 48.3 mL/min (based on SCr of 0.7 mg/dL).    INFLAMMATORY/PROCAL  LAST 7 DAYS  Recent Labs   Lab 12/29/24 2040 12/31/24  0240   PROCAL 81.13* 202.03*   .4*  --      No results found for: "ESR"  CRP   Date Value Ref Range Status   12/29/2024 110.4 (H) 0.0 - 8.2 mg/L Final   02/22/2023 1.6 <8.0 mg/L Final   02/01/2023 8.5 (H) <8.0 mg/L Final       PRIOR MICROBIOLOGY:  Susceptibility data from last 90 days.  Collected Specimen Info Organism   12/29/24 Blood from Peripheral, Antecubital, Left Viridans streptococcus   12/29/24 Blood from Peripheral, Hand, Left Viridans streptococcus       LAST 7 DAYS MICROBIOLOGY   Microbiology Results (last 7 days)       Procedure Component Value Units Date/Time    Blood culture [[id number]] Collected: 12/31/24 0850    Order Status: Sent Specimen: Blood from Peripheral, Antecubital, Left     Blood culture [[id number]] Collected: 12/31/24 0850    Order Status: Sent Specimen: Blood from Peripheral, Hand, Left     Cryptococcal antigen, CSF [[id number]]     Order Status: " No result Specimen: CSF (Spinal Fluid)     CSF culture [[id number]]     Order Status: No result Specimen: CSF (Spinal Fluid)     Blood culture x two cultures. Draw prior to antibiotics. [[id number]]  (Abnormal) Collected: 12/29/24 1835    Order Status: Completed Specimen: Blood from Peripheral, Antecubital, Left Updated: 12/31/24 0647     Blood Culture, Routine Gram stain aer bottle: Gram positive cocci      Results called to and read back by: hannah guy rn pcu      Gram stain osvaldo bottle: Gram positive cocci      Positive results previously called      VIRIDANS STREPTOCOCCUS  susceptibility pending      Narrative:      Aerobic and anaerobic    Blood culture x two cultures. Draw prior to antibiotics. [[id number]]  (Abnormal) Collected: 12/29/24 1835    Order Status: Completed Specimen: Blood from Peripheral, Hand, Left Updated: 12/31/24 0647     Blood Culture, Routine Gram stain peds bottle: Gram positive cocci      Positive results previously called      [id number]      VIRIDANS STREPTOCOCCUS  For susceptibility see order #[id number]      Narrative:      Aerobic and anaerobic    Rapid Organism ID by PCR (from Blood culture) [[id number]]  (Abnormal) Collected: 12/29/24 1835    Order Status: Completed Updated: 12/31/24 0246     Enterococcus faecalis Not Detected     Enterococcus faecium Not Detected     Listeria monocytogenes Not Detected     Staphylococcus spp. Not Detected     Staphylococcus aureus Not Detected     Staphylococcus epidermidis Not Detected     Staphylococcus lugdunensis Not Detected     Streptococcus species Detected     Streptococcus agalactiae Not Detected     Streptococcus pneumoniae Not Detected     Streptococcus pyogenes Not Detected     Acinetobacter calcoaceticus/baumannii complex Not Detected     Bacteroides fragilis Not Detected     Enterobacterales Not Detected     Enterobacter cloacae complex Not Detected     Escherichia coli Not Detected     Klebsiella aerogenes Not Detected      Klebsiella oxytoca Not Detected     Klebsiella pneumoniae group Not Detected     Proteus Not Detected     Salmonella sp Not Detected     Serratia marcescens Not Detected     Haemophilus influenzae Not Detected     Neisseria meningtidis Not Detected     Pseudomonas aeruginosa Not Detected     Stenotrophomonas maltophilia Not Detected     Candida albicans Not Detected     Candida auris Not Detected     Candida glabrata Not Detected     Candida krusei Not Detected     Candida parapsilosis Not Detected     Candida tropicalis Not Detected     Cryptococcus neoformans/gattii Not Detected     CTX-M (ESBL ) Test not applicable     IMP (Carbapenem resistant) Test not applicable     KPC resistance gene (Carbapenem resistant) Test not applicable     mcr-1  Test not applicable     mec A/C  Test not applicable     mec A/C and MREJ (MRSA) gene Test not applicable     NDM (Carbapenem resistant) Test not applicable     OXA-48-like (Carbapenem resistant) Test not applicable     van A/B (VRE gene) Test not applicable     VIM (Carbapenem resistant) Test not applicable    Narrative:      Aerobic and anaerobic    Respiratory Infection Panel (PCR), Nasopharyngeal [[id number]]  (Abnormal) Collected: 12/30/24 1347    Order Status: Completed Specimen: Nasopharyngeal Swab Updated: 12/30/24 2122     Respiratory Infection Panel Source NP Swab     Adenovirus Not Detected     Coronavirus 229E, Common Cold Virus Not Detected     Coronavirus HKU1, Common Cold Virus Not Detected     Coronavirus NL63, Common Cold Virus Not Detected     Coronavirus OC43, Common Cold Virus Not Detected     Comment: Coronavirus strains 229E, HKU1, NL63, and OC43 can cause the common   cold   and are not associated with the respiratory disease outbreak caused   by  the COVID-19 (SARS-CoV-2 novel Coronavirus) strain.           SARS-CoV2 (COVID-19) Qualitative PCR Not Detected     Human Metapneumovirus Not Detected     Human Rhinovirus/Enterovirus Detected      Influenza A (subtypes H1, H1-2009,H3) Not Detected     Influenza B Not Detected     Parainfluenza Virus 1 Not Detected     Parainfluenza Virus 2 Not Detected     Parainfluenza Virus 3 Not Detected     Parainfluenza Virus 4 Not Detected     Respiratory Syncytial Virus Not Detected     Bordetella Parapertussis (ND6838) Not Detected     Bordetella pertussis (ptxP) Not Detected     Chlamydia pneumoniae Not Detected     Mycoplasma pneumoniae Not Detected     Comment: Respiratory Infection Panel testing performed by Multiplex PCR.       Culture, Respiratory with Gram Stain [[id number]]     Order Status: No result Specimen: Respiratory     Influenza A & B by Molecular [[id number]] Collected: 12/29/24 1830    Order Status: Completed Specimen: Nasal Swab Updated: 12/29/24 1857     Influenza A, Molecular Negative     Influenza B, Molecular Negative     Flu A & B Source NP              CURRENT/PREVIOUS VISIT EKG  Results for orders placed or performed in visit on 11/22/24   IN OFFICE EKG 12-LEAD (to [place])    Collection Time: 11/22/24  8:44 AM   Result Value Ref Range    QRS Duration 70 ms    [facility] QTC Calculation 420 ms    Narrative    Test Reason : Z91.89,I11.9,    Vent. Rate :  83 BPM     Atrial Rate :  83 BPM     P-R Int : 146 ms          QRS Dur :  70 ms      QT Int : 358 ms       P-R-T Axes :  58  29  45 degrees    QTcB Int : 420 ms    Normal sinus rhythm  Normal ECG  When compared with ECG of 22-Nov-2023 10:26,  T wave amplitude has increased in Lateral leads  Confirmed by Rick Mccoy ([id number]) on 11/25/2024 4:41:49 PM    Referred By: AAAREFERRAL SELF           Confirmed By: Rick Mccoy     Echocardiography Findings 11/22/2024  Left Ventricle The left ventricle is normal in size. Mildly increased wall thickness. There is mild concentric hypertrophy. Normal wall motion. There is normal systolic function with a visually estimated ejection fraction of 55 - 70%. Ejection fraction is approximately 60%. Global longitudinal strain is  -18.0%. Global longitudinal strain is normal. There is normal diastolic function.   Right Ventricle Normal right ventricular cavity size. Systolic function is normal. TAPSE is 1.90 cm. Right ventricular global longitudinal strain is - 23.8%.   Left Atrium Normal left atrial size.   Right Atrium Normal right atrial size.   Aortic Valve There is a transcatheter valve replacement in the aortic position. It is reported to be a 26 mm . There is normal leaflet mobility. Aortic valve area by VTI is 1.2 cm². Aortic valve peak velocity is 1.6 m/s. Mean gradient is 5.2 mmHg. The dimensionless index is 0.59.   Mitral Valve The mitral valve is structurally normal. There is normal leaflet mobility. The mean pressure gradient across the mitral valve is 2 mmHg at a heart rate of  bpm. There is trace regurgitation.   Tricuspid Valve The tricuspid valve is structurally normal. There is normal leaflet mobility. There is trace regurgitation.   Pulmonic Valve The pulmonic valve is structurally normal. There is trace regurgitation.   IVC/SVC Normal venous pressure at 3 mmHg.   Ascending Aorta Aortic annulus is normal in size measuring 1.61 cm. Aortic root is normal in size measuring 1.87 cm. Ascending aorta is normal measuring 1.60 cm.   Pericardium and Other Findings Epicardial fat appears visualized. There is a trivial effusion. No indication of cardiac tamponade.   Pulmonary Artery The estimated pulmonary artery systolic pressure is 21 mmHg.   Summary    Left Ventricle: The left ventricle is normal in size. Mildly increased wall thickness. There is mild concentric hypertrophy. There is normal systolic function with a visually estimated ejection fraction of 55 - 70%. Ejection fraction is approximately 60%. Global longitudinal strain is -18.0%. Global longitudinal strain is normal. There is normal diastolic function.    Right Ventricle: Normal right ventricular cavity size. Systolic function is normal. TAPSE is 1.90 cm. Right  ventricular global longitudinal strain is - 23.8%.    Aortic Valve: There is a transcatheter valve replacement in the aortic position. It is reported to be a 26 mm. Aortic valve area by VTI is 1.2 cm². Aortic valve peak velocity is 1.6 m/s. Mean gradient is 5.2 mmHg. The dimensionless index is 0.59.    IVC/SVC: Normal venous pressure at 3 mmHg.    No definite change from Echo in 2/2023.    Significant Imaging: I have reviewed all relevant and available imaging results/findings within the past 24 hours.    CT Cervical Spine Without Contrast 12/29/24 1548   There is no evidence fracture or malalignment.  There are degenerative changes throughout the cervical spine most prominent in the mid cervical spine.  There is no prevertebral soft tissue swelling.  The adjacent soft tissues are unremarkable.   Impression:    There are degenerative changes throughout the cervical spine.    CT Head Without Contrast 12/29/24 1649   Intracranial compartment:   Ventricles and sulci are normal in size for age without evidence of hydrocephalus. No extra-axial blood or fluid collections.   The brain parenchyma appears normal. No parenchymal mass, hemorrhage, edema or major vascular distribution infarct.   Skull/extracranial contents (limited evaluation): No fracture. There is a left maxillary sinus air-fluid level.   Impression:    There is no evidence acute intracranial abnormality.  There is left maxillary sinus disease.    X-Ray Chest AP Portable 12/29/24 1758   There are mild interstitial opacities, suspicious for interstitial edema/CHF.  The pleural spaces are clear the cardiac silhouette is borderline.  There are calcifications of the aortic arch.  There is a prosthetic aortic valve stent.  Osseous structures demonstrate degenerative changes.   Impression:    Mild interstitial opacities, suspicious for interstitial edema/CHF.    MRI Brain Without Contrast  12/30/24 7066   1. No acute intracranial abnormality.   2. Mild generalized  cerebral volume loss and scattered T2/FLAIR hyperintense signal within the supratentorial white matter, nonspecific but probably reflecting sequelae of chronic small vessel ischemic change.   3. Left maxillary sinusitis.    MRA Neck without contrast 12/30/24 1023   No evidence of a hemodynamically significant stenosis, major branch occlusion or aneurysm in the neck arterial vasculature, allowing for motion degradation.     MRI Cervical Spine W WO Cont 12/30/24 1047   1. No evidence of acute fracture, spondylodiscitis, high-grade spinal canal stenosis.  No cord compression, focal cord signal abnormality, or abnormal intraspinal enhancement.   2. Multilevel degenerative changes of the cervical spine, as detailed above, with findings most pronounced at C5-6 and C6-7 and resulting in severe left and moderate right neural foraminal and mild spinal canal stenosis.    MRA Head for Temporal Arteritis W and WO Contrast 12/30/24 1117   1. No intracranial high-grade stenosis, large vessel occlusion, aneurysm or arteriovenous malformation.   2. No evidence of significant mural thickening or abnormal enhancement along the courses of the temporal arteries to specifically indicate active vascular inflammation.    CT Chest Abdomen Pelvis With IV Contrast (XPD) Routine Oral Contrast 12/30/24 1658    Prosthetic aortic valve or stent.  Coronary artery calcification stent.  No significant hilar or mediastinal adenopathy peer no pleural or pericardial effusion.  Mild right apical scarring.  Mild peripheral reticulation anteriorly right lung suggesting radiation change.  No longer the dense consolidative process right middle lobe or tree-in-bud and ground-glass opacities that was seen the prior exam.  8 mm juxtapleural nodular opacity in region the superior segment of the left lower lobe posteriorly series 6, image 190 which has become apparent since the prior exam.  Possibly atelectasis.  3 mm nodular density right upper lobe series 6,  image 152 also appearing new compared to the prior exam   Abdomen and pelvis; liver, spleen, adrenal glands unremarkable appearance.  Pancreas mildly atrophied.  Abdominal aorta tapers without aneurysmal dilatation.  Diverticulosis without CT findings of acute diverticulitis.  No CT findings of acute appendicitis.  Small hiatal hernia.  1.8 cm duodenal diverticulum.  No calcified stones the gallbladder or CT findings of acute cholecystitis.  Symmetrical renal enhancement.  No hydronephrosis.  10 mm left renal cyst.  Urinary bladder just very mildly distended at the time of the exam and as visualized is unremarkable appearance   Prior hysterectomy.  Adnexal region unremarkable appearance   Tiny fat containing ventral hernia.  Osseous degenerative changes without obvious aggressive appearing osseous lesion.  Severe degenerative change right shoulder. Endplate concavity suggesting Schmorl's nodes   Chest and abdominal wall soft tissues.  Right mastectomy.   Impression:    No longer the consolidative process and the tree-in-bud and ground-glass opacities that were seen on the prior study 01/23/2023.  New 2-3 mm nodule right lung and new 8 mm juxtapleural nodule left lung bladder somewhat flat and may represent atelectasis and consider short interval follow-up in 1-3 months.  Peripheral reticulation anteriorly right lung suggesting radiation change.   Small hiatal hernia.  Diverticulosis without CT findings of acute diverticulitis.  Prior hysterectomy.   Findings detailed above      I spent a total of 70 minutes on the day of the visit.This includes face to face time and non-face to face time preparing to see the patient (eg, review of tests), obtaining and/or reviewing separately obtained history, documenting clinical information in the electronic or other health record, independently interpreting results and communicating results to the patient/family/caregiver, or care coordinator.      Tameka Saleem NP  Date of  Service: 12/31/2024      This note was created using [employer]  voice recognition software that occasionally misinterpreted phrases or words.

## 2024-12-31 NOTE — PLAN OF CARE
Problem: Adult Inpatient Plan of Care  Goal: Plan of Care Review  Outcome: Progressing  Goal: Absence of Hospital-Acquired Illness or Injury  Outcome: Progressing  Goal: Optimal Comfort and Wellbeing  Outcome: Progressing     Problem: Sepsis/Septic Shock  Goal: Absence of Infection Signs and Symptoms  Outcome: Progressing     Problem: Fall Injury Risk  Goal: Absence of Fall and Fall-Related Injury  Outcome: Progressing     Problem: Pain Acute  Goal: Optimal Pain Control and Function  Outcome: Progressing

## 2024-12-31 NOTE — ASSESSMENT & PLAN NOTE
This patient does have evidence of infective focus  My overall impression is sepsis.  Source: Respiratory and Unknown Urinalysis pending at time of note  Antibiotics given-   Antibiotics (72h ago, onward)      Start     Stop Route Frequency Ordered    12/31/24 0830  cefTRIAXone injection 2 g         -- IV Every 12 hours (non-standard times) 12/31/24 0745          Latest lactate reviewed-  Recent Labs   Lab 12/29/24  1835 12/29/24  2237 12/30/24  0415   LACTATE  --    < > 0.9   POCLAC 0.53  --   --     < > = values in this interval not displayed.     Procalcitonin: 81.13  CRP: 110.4  Sed Rate 33      Organ dysfunction indicated by Acute on chronic  heart failure    Fluid challenge Contraindicated- Fluid bolus is contraindicated in this patient due to Congestive Heart Failure     Post- resuscitation assessment Yes Perfusion exam was performed within 6 hours of septic shock presentation after bolus shows Adequate tissue perfusion assessed by non-invasive monitoring     CT chest abdomen and pelvis with IV contrast negative for infectious source.   Status post LP 12/31, WBC 2, RBC 2, essentially negative.   TTE 12/31, negative for vegetation.   Patient has very poor dental hygiene, now patient has Streptococcus viridans bacteremia, patient also has a history of TAVR, highly concerned the patient may have underlying IE,  spoke with cardiologist, patient's may need [facility], recommended transfer to [facility] step-down unit for close monitor.     Continue IV Rocephin 2 g q.12 hours.

## 2024-12-31 NOTE — SUBJECTIVE & OBJECTIVE
Interval History:     Patient is seen and examined at multidisciplinary rounds, feel bad again, still complaining of left-sided neck pain, afebrile, no chest pain or SOB, -2.5L with IV Lasix, looks euvolemic now.  LP is done, WBC 2, RBC is 2.  TTE negative for vegetation.         Review of Systems   Musculoskeletal:  Positive for neck pain.   Neurological:  Positive for headaches.     Objective:     Vital Signs (Most Recent):  Temp: 98 °F (36.7 °C) (12/31/24 0754)  Pulse: 94 (12/31/24 0800)  Resp: 16 (12/31/24 0754)  BP: 137/61 (12/31/24 0754)  SpO2: 98 % (12/31/24 0754) Vital Signs (24h Range):  Temp:  [97.8 °F (36.6 °C)-98.9 °F (37.2 °C)] 98 °F (36.7 °C)  Pulse:  [79-94] 94  Resp:  [16-19] 16  SpO2:  [95 %-98 %] 98 %  BP: (137-180)/(60-76) 137/61     Weight: 54.4 kg (120 lb)  Body mass index is 23.44 kg/m².    Intake/Output Summary (Last 24 hours) at 12/31/2024 1302  Last data filed at 12/31/2024 0702  Gross per 24 hour   Intake 360 ml   Output 2850 ml   Net -2490 ml         Physical Exam  Constitutional:       Appearance: Normal appearance.   HENT:      Head: Normocephalic and atraumatic.      Nose: Nose normal.   Eyes:      Extraocular Movements: Extraocular movements intact.      Pupils: Pupils are equal, round, and reactive to light.   Cardiovascular:      Rate and Rhythm: Normal rate and regular rhythm.      Heart sounds: No murmur heard.  Pulmonary:      Effort: Pulmonary effort is normal.      Breath sounds: Normal breath sounds.   Abdominal:      General: Abdomen is flat.      Palpations: Abdomen is soft.   Musculoskeletal:         General: Normal range of motion.      Cervical back: Normal range of motion and neck supple.   Skin:     General: Skin is warm and dry.   Neurological:      General: No focal deficit present.      Mental Status: She is alert and oriented to person, place, and time.   Psychiatric:         Mood and Affect: Mood normal.             Significant Labs: All pertinent labs within the  past 24 hours have been reviewed.  CBC:   Recent Labs   Lab 12/29/24  1737 12/30/24  0415 12/31/24  0237   WBC 9.33 9.77 10.54   HGB 9.0* 8.5* 8.7*   HCT 26.4* 26.1* 26.8*    172 251     CMP:   Recent Labs   Lab 12/29/24  1736 12/30/24  0415 12/31/24  0240    134* 135*   K 3.7 3.4* 3.9    102 99   CO2 22* 23 26   * 118* 138*   BUN 9 12 11   CREATININE 0.8 0.9 0.7   CALCIUM 9.2 8.6* 8.3*   PROT 5.6* 5.0* 5.6*   ALBUMIN 3.0* 2.6* 2.9*   BILITOT 0.5 0.4 0.3   ALKPHOS 85 75 76   AST 54* 28 14   ALT 38 28 23   ANIONGAP 11 9 10       Significant Imaging: I have reviewed all pertinent imaging results/findings within the past 24 hours.  I have reviewed and interpreted all pertinent imaging results/findings within the past 24 hours.    Scheduled Meds:   aspirin  81 mg Oral Daily    atorvastatin  20 mg Oral QHS    carvediloL  6.25 mg Oral BID    cefTRIAXone (Rocephin) IV (PEDS and ADULTS)  2 g Intravenous Q12H    famotidine  40 mg Oral Q48H    gabapentin  200 mg Oral TID    gadobutroL        lactobacillus acidophilus & bulgar  1 packet Oral BID    levothyroxine  75 mcg Oral Before breakfast     Continuous Infusions:  PRN Meds:.  Current Facility-Administered Medications:     acetaminophen, 650 mg, Oral, Q4H PRN    albuterol-ipratropium, 3 mL, Nebulization, Q6H PRN    aluminum-magnesium hydroxide-simethicone, 30 mL, Oral, QID PRN    dextrose 10%, 12.5 g, Intravenous, PRN    dextrose 10%, 25 g, Intravenous, PRN    diphenhydrAMINE, 25 mg, Oral, Q6H PRN    diphenhydrAMINE, 25 mg, Intravenous, PRN    diphenhydrAMINE-zinc acetate 2-0.1%, , Topical (Top), BID PRN    gadobutroL, , ,     glucagon (human recombinant), 1 mg, Intramuscular, PRN    glucose, 16 g, Oral, PRN    glucose, 24 g, Oral, PRN    HYDROmorphone, 0.5 mg, Intravenous, Q4H PRN    magnesium oxide, 800 mg, Oral, PRN    magnesium oxide, 800 mg, Oral, PRN    melatonin, 6 mg, Oral, Nightly PRN    naloxone, 0.02 mg, Intravenous, PRN    ondansetron,  4 mg, Intravenous, Q8H PRN    oxyCODONE, 5 mg, Oral, Q4H PRN    oxyCODONE, 10 mg, Oral, Q4H PRN    potassium bicarbonate, 35 mEq, Oral, PRN    potassium bicarbonate, 50 mEq, Oral, PRN    potassium bicarbonate, 60 mEq, Oral, PRN    potassium, sodium phosphates, 2 packet, Oral, PRN    potassium, sodium phosphates, 2 packet, Oral, PRN    potassium, sodium phosphates, 2 packet, Oral, PRN    prochlorperazine, 5 mg, Intravenous, Q6H PRN    simethicone, 1 tablet, Oral, QID PRN    sodium chloride 0.9%, 10 mL, Intravenous, Q12H PRN    MRA Head for Temporal Arteritis W and WO Contrast    Result Date: 12/30/2024  EXAMINATION: MRA HEAD FOR TEMPORAL ARTERITIS W AND WO CONTRAST CLINICAL HISTORY: Neck pain, acute, infection suspected;Concern for Temporal Arteritis; TECHNIQUE: Time of flight and post contrast MR angiography sequences were performed before and following the IV administration of 5 mL of Gadavist..  Multiplanar and MIP images were performed. COMPARISON: MRI of the brain 12/30/2024 FINDINGS: The intracranial internal carotid arteries are patent bilaterally from the skull base to carotid terminus. Middle cerebral arteries are patent bilaterally. Anterior cerebral arteries are patent bilaterally. There are codominant vertebral arteries. Bilateral vertebral arteries are patent to the confluence into the basilar artery. Basilar artery is normal in caliber. Posterior cerebral arteries are patent bilaterally. No evidence of significant mural thickening or abnormal enhancement along the courses of the temporal arteries to specifically indicate active vascular inflammation. Visualized dural venous sinuses are patent without evidence of dural venous sinus thrombosis.     1. No intracranial high-grade stenosis, large vessel occlusion, aneurysm or arteriovenous malformation. 2. No evidence of significant mural thickening or abnormal enhancement along the courses of the temporal arteries to specifically indicate active vascular  inflammation. Electronically signed by: Marek Moreno Date:    12/30/2024 Time:    11:17    MRI Cervical Spine W WO Cont    Result Date: 12/30/2024  EXAMINATION: MRI CERVICAL SPINE W WO CONTRAST CLINICAL HISTORY: Neck pain, acute, infection suspected; TECHNIQUE: Multiplanar, multisequence MR images of the cervical spine were performed before and after the administration of intravenous contrast.  5 mL of Gadavist intravenous contrast were administered. COMPARISON: CT cervical spine 12/29/2024 FINDINGS: Study is mild to moderately degraded by motion artifact. Alignment: Reversal of the cervical lordosis, which may be secondary to positioning or muscle spasm.  Minimal anterolisthesis of C4 on C5 and retrolisthesis of C5 on C6. Vertebral Column: Vertebral body heights are maintained. No acute fracture is identified.  No evidence of an aggressive bone marrow replacement process. Multilevel disc degeneration, most pronounced at C5-6 and C6-7 with mild-to-moderate intervertebral disc space narrowing, disc desiccation, anterior marginal osteophyte formation and posterior disc osteophyte complexes. Cord: Normal signal and morphology. Skull base and craniocervical junction: Normal. Degenerative findings: C2-C3: Minimal posterior disc osteophyte complex.  Mild bilateral facet arthropathy.  No significant neural foraminal or spinal canal stenosis. C3-C4: Mild posterior disc osteophyte complex.  Moderate bilateral facet arthropathy.  Marked left and moderate right uncovertebral joint spurring.  Severe left and moderate right neural foraminal stenosis.  No significant spinal canal stenosis. C4-C5: Mild posterior disc osteophyte complex.  Moderate bilateral facet arthropathy.  Moderate left and mild right uncovertebral joint spurring.  Moderate left and mild right neural foraminal stenosis.  No significant spinal canal stenosis. C5-C6: Posterior disc osteophyte complex, which partially effaces the ventral thecal sac.  Moderate  bilateral facet arthropathy.  Marked left and moderate right uncovertebral joint spurring.  Severe left and moderate right neural foraminal stenosis.  Ligamentum flavum thickening, which partially effaces the dorsal thecal sac.  Mild spinal canal stenosis. C6-C7: Posterior disc osteophyte complex, which mildly effaces the ventral thecal sac.  Moderate bilateral facet arthropathy.  Moderate left and moderate right uncovertebral joint spurring.  Severe left and moderate right neural foraminal stenosis.  Ligamentum flavum thickening, which partially effaces the dorsal thecal sac.  Mild spinal canal stenosis. C7-T1: The disk is normal in configuration.  Mild bilateral facet arthropathy.  Mild bilateral uncovertebral joint spurring.  Mild left greater than right neural foraminal stenosis.  No significant spinal canal stenosis. Paraspinal muscles & soft tissues: Unremarkable. Normal signal voids are present in the vertebral arteries. No abnormal intraspinal enhancement identified.     1. No evidence of acute fracture, spondylodiscitis, high-grade spinal canal stenosis.  No cord compression, focal cord signal abnormality, or abnormal intraspinal enhancement. 2. Multilevel degenerative changes of the cervical spine, as detailed above, with findings most pronounced at C5-6 and C6-7 and resulting in severe left and moderate right neural foraminal and mild spinal canal stenosis. Electronically signed by: Marek Moreno Date:    12/30/2024 Time:    10:47    MRA Neck without contrast    Result Date: 12/30/2024  EXAMINATION: MRA NECK WITHOUT CONTRAST CLINICAL HISTORY: CNS vasculitis, known or suspected; TECHNIQUE: Time of flight MRA of the carotid and vertebral arteries was performed with 3D reconstructions according to the standard neck MRA protocol. COMPARISON: None FINDINGS: Study is mild to moderately degraded by motion artifact. The right common carotid artery demonstrates no hemodynamically significant stenosis. The cervical  right internal carotid artery demonstrates no hemodynamically significant stenosis. The left common carotid artery demonstrates no hemodynamically significant stenosis. The cervical left internal carotid artery demonstrates no hemodynamically significant stenosis. Extracranial vertebral arteries: Vertebral arteries are codominant.  Vertebral arteries are normal to the level of the foramen magnum.     No evidence of a hemodynamically significant stenosis, major branch occlusion or aneurysm in the neck arterial vasculature, allowing for motion degradation. Electronically signed by: Marek Moreno Date:    12/30/2024 Time:    10:23    MRI Brain Without Contrast    Result Date: 12/30/2024  EXAMINATION: MRI BRAIN WITHOUT CONTRAST CLINICAL HISTORY: Headache, new or worsening (Age >= 50y);. TECHNIQUE: Multiplanar multisequence MR imaging of the brain was performed without the administration of intravenous contrast. COMPARISON: CT head 12/29/2024, MRI brain 01/24/2023 FINDINGS: Study is  degraded by motion artifact. Ventricles and sulci are normal in size for age without evidence of hydrocephalus. No extra-axial blood or fluid collections. Scattered foci of T2/FLAIR hyperintense signal within the supratentorial white matter, nonspecific and may reflect sequelae of mild chronic small vessel ischemic change.    Diffusion-weighted images demonstrate no evidence of an acute infarct.   Susceptibility weighted images demonstrate no evidence of acute or chronic hemorrhage. No significant intracranial mass effect or midline shift. Normal vascular flow voids are present. Diffusely heterogeneous calvarial bone marrow signal, nonspecific but similar to prior exam from 2023, and may be seen with red marrow reconversion associated with chronic anemic states, osteoporosis, hypoxia, or smoking. Moderate opacification of the left maxillary sinus with mucosal membrane thickening and small air-fluid level.  Mild scattered mucosal membrane  thickening elsewhere in the paranasal sinuses.  The visualized portions of the mastoids are unremarkable. Bilateral lens replacements are noted.     1. No acute intracranial abnormality. 2. Mild generalized cerebral volume loss and scattered T2/FLAIR hyperintense signal within the supratentorial white matter, nonspecific but probably reflecting sequelae of chronic small vessel ischemic change. 3. Left maxillary sinusitis. Electronically signed by: Marek Moreno Date:    12/30/2024 Time:    07:39    X-Ray Chest AP Portable    Result Date: 12/29/2024  EXAMINATION: XR CHEST AP PORTABLE CLINICAL HISTORY: Chest Pain; TECHNIQUE: Single frontal view of the chest was performed. COMPARISON: 02/02/2023. FINDINGS: There are mild interstitial opacities, suspicious for interstitial edema/CHF.  The pleural spaces are clear the cardiac silhouette is borderline.  There are calcifications of the aortic arch.  There is a prosthetic aortic valve stent.  Osseous structures demonstrate degenerative changes.     Mild interstitial opacities, suspicious for interstitial edema/CHF. Electronically signed by: Anthony Andres Date:    12/29/2024 Time:    17:58    CT Head Without Contrast    Result Date: 12/29/2024  EXAMINATION: CT HEAD WITHOUT CONTRAST CLINICAL HISTORY: Headache, new or worsening (Age >= 50y); TECHNIQUE: Low dose axial CT images obtained throughout the head without intravenous contrast. Sagittal and coronal reconstructions were performed. COMPARISON: None. FINDINGS: Intracranial compartment: Ventricles and sulci are normal in size for age without evidence of hydrocephalus. No extra-axial blood or fluid collections. The brain parenchyma appears normal. No parenchymal mass, hemorrhage, edema or major vascular distribution infarct. Skull/extracranial contents (limited evaluation): No fracture. There is a left maxillary sinus air-fluid level.     There is no evidence acute intracranial abnormality.  There is left maxillary sinus  disease. Electronically signed by: Susana Carrion MD Date:    12/29/2024 Time:    16:49    CT Cervical Spine Without Contrast    Result Date: 12/29/2024  EXAMINATION: CT CERVICAL SPINE WITHOUT CONTRAST CLINICAL HISTORY: Neck pain, chronic, degenerative changes on xray;Neck pain, acute exacerbation with chronic degenerative changes; TECHNIQUE: Low dose axial images, sagittal and coronal reformations were performed though the cervical spine.  Contrast was not administered. COMPARISON: 12/18/2024 FINDINGS: There is no evidence fracture or malalignment.  There are degenerative changes throughout the cervical spine most prominent in the mid cervical spine.  There is no prevertebral soft tissue swelling.  The adjacent soft tissues are unremarkable.     There are degenerative changes throughout the cervical spine. Electronically signed by: Susana Carrion MD Date:    12/29/2024 Time:    15:48    X-Ray Cervical Spine AP And Lateral    Result Date: 12/18/2024  EXAMINATION: XR CERVICAL SPINE AP LATERAL CLINICAL HISTORY: Cervicalgia TECHNIQUE: AP, lateral and open mouth views of the cervical spine were performed. COMPARISON: None. FINDINGS: Vertebral body heights are preserved.Trace anterolisthesis of C4 on C5 and retrolisthesis of C5 on C6.Moderate disc height loss at C5-C6 and C6-C7 with shallow endplate centered osteophytes.  No abnormal prevertebral soft tissue thickening.     As above. Electronically signed by: Marek Burton Date:    12/18/2024 Time:    11:21    Mammo Digital Screening Left with Krishna    Result Date: 12/3/2024  Facility: [facility] [address] [place] [address] [phone] Name: Vivien Burton MRN: [id number] Result: Mammo Digital Screening Left with Krishna History: Patient is 79 y.o. and is seen for a screening mammogram. Films Compared: Compared to: 11/30/2023 Mammo Digital Screening Left with Krishna, 08/11/2022 Mammo Digital Screening Left with Krishna, and 08/09/2021  Mammo Digital Screening Left with Krishna Findings: This procedure was performed using tomosynthesis. Computer-aided detection was utilized in the interpretation of this examination. There are scattered areas of fibroglandular density. There is no evidence of suspicious masses, microcalcifications or architectural distortion.      No mammographic evidence of malignancy. BI-RADS Category 1: Negative Recommendation: Routine screening mammogram in 1 year is recommended. Seun Amaro MD   - pulls last radiology orders

## 2024-12-31 NOTE — ASSESSMENT & PLAN NOTE
Left sided neck pain that is sharp and stabbing into head. Patient endorses that specific area on head is painful to touch. CT of neck shows degenerative changes      Patient has significant left-sided neck pain radiated to left occipital.  Extensive imaging study is not quite impressive except severe cervical spondylosis.   Status post LP-essential negative.   Discontinue steroids.   Follow up neurologist.

## 2024-12-31 NOTE — HOSPITAL COURSE
79 year old female with a previous medical history of anemia, basal cell carcinoma, breast cancer, CHF, streptococcal bacteremia, GERD, HLD, HTN, CAD, TAVR, hypothyroidism, accidental tylenol overdose and abnormal MRI initially admitted on 12/29 for severe headache/ left-sided neck pain for around a month, patient also has intermittent low-grade fever.  Workup shows severe elevated CRP/ ESR, elevated procalcitonin, with unclear infectious source.  CT chest abdomen and pelvis is negative for infectious source.  Patient also has mild elevated troponin most likely secondary to sepsis as per cardiologist Dr. SISSY Garvin, patient also has elevated BNP 1680, possible acute CHF, patient received IV Lasix, -2.5 L. patient apparently euvolemic now.  Patient has a series of imaging study including CT head/ CT neck/ MRI of the cervical/ MRI of the brain which is insignificant other than shows significant cervical spondylosis.  Eventually the patient had LP 12/31 which showed RBC 2, WBC 2, essentially negative.   Patient had TTE 12/31 LVEF 55-60%, no significant valvular vegetations.   Now patient 2/2 blood culture showed strep viridans, possible source, patient does have very bad dental hygiene, patient is restarted on IV Rocephin by ID.  Regulo negative for vegetations.  Regarding neck pain,initially concern for patient's may have GCA, steroids started and discontinued once thought it is less likely.  Neurologist following. Gabapentin was uptitrated, patient had significant improvement in neck pain.   Patient also found to rhinovirus positive, continue droplet precaution.   MRV of the brain is done and negative for dural venous sinus thrombosis   Regarding her CHF, chest x-ray showed significant improvement in fluid status was euvolemic.  Infectious diseases planning for PICC line with outpatient antibiotics for 4 weeks.  Discussed REGULO with Dr. Barnes and no vegetation seen so antibiotic recs are for 4 weeks of IV ceftriaxone at  home with PICC line.  PICC line can be removed after completion of antibiotics. Left maxillary sinusitis.  On antibiotics for 10 days for this indication this indication and Infectious Disease recommended the patient follow up with her dentist while she is on the IV antibiotics.

## 2024-12-31 NOTE — NURSING
POST-OP LUMBAR PUNCTURE    Pt arrived from room 210 via stretcher.   Procedure explained and consent for procedure obtained by Dr. Davis.    Time out performed.   Lumbar puncture performed by  Dr. Davis.   Specimens collected and sent to laboratory per order.   Bandage applied to lower back. Dressing clean, dry and intact.  Patient transported back to room 210 via bed for recovery.

## 2024-12-31 NOTE — ASSESSMENT & PLAN NOTE
Most recent hemoglobin and hematocrit are listed below.  Recent Labs     12/29/24  1737 12/30/24  0415 12/31/24  0237   HGB 9.0* 8.5* 8.7*   HCT 26.4* 26.1* 26.8*       Plan  - Monitor serial CBC: Daily  - Transfuse PRBC if patient becomes hemodynamically unstable, symptomatic or H/H drops below 7/21.  - Patient has not received any PRBC transfusions to date  - Patient's anemia is currently stable

## 2024-12-31 NOTE — ASSESSMENT & PLAN NOTE
"Pruritic rash to chest and neck secondary to allergic reaction to omeprazole.   Per Dematology note on 12/4/24 "Biopsy proven drug eruption, cleared with prednisone and d/c of prilosec. She restarted prilosec and rash came back. She stopped it at end of November and had another course of steroids". Per chart review patient was placed on steroid taper starting 11/16  "

## 2025-01-01 PROBLEM — E83.51 HYPOCALCEMIA: Status: ACTIVE | Noted: 2025-01-01

## 2025-01-01 LAB
ALBUMIN SERPL BCP-MCNC: 3.1 G/DL (ref 3.5–5.2)
ALP SERPL-CCNC: 59 U/L (ref 55–135)
ALT SERPL W/O P-5'-P-CCNC: 12 U/L (ref 10–44)
ANION GAP SERPL CALC-SCNC: 5 MMOL/L (ref 8–16)
AST SERPL-CCNC: 8 U/L (ref 10–40)
BACTERIA BLD CULT: ABNORMAL
BASOPHILS # BLD AUTO: 0.04 K/UL (ref 0–0.2)
BASOPHILS NFR BLD: 0.4 % (ref 0–1.9)
BILIRUB SERPL-MCNC: 0.4 MG/DL (ref 0.1–1)
BUN SERPL-MCNC: 10 MG/DL (ref 8–23)
CA-I BLDV-SCNC: 1.11 MMOL/L (ref 1.06–1.42)
CALCIUM SERPL-MCNC: 7.3 MG/DL (ref 8.7–10.5)
CHLORIDE SERPL-SCNC: 98 MMOL/L (ref 95–110)
CO2 SERPL-SCNC: 28 MMOL/L (ref 23–29)
CREAT SERPL-MCNC: 0.6 MG/DL (ref 0.5–1.4)
DIFFERENTIAL METHOD BLD: ABNORMAL
EOSINOPHIL # BLD AUTO: 0.3 K/UL (ref 0–0.5)
EOSINOPHIL NFR BLD: 2.7 % (ref 0–8)
ERYTHROCYTE [DISTWIDTH] IN BLOOD BY AUTOMATED COUNT: 13.3 % (ref 11.5–14.5)
EST. GFR  (NO RACE VARIABLE): >60 ML/MIN/1.73 M^2
GLUCOSE SERPL-MCNC: 93 MG/DL (ref 70–110)
HCT VFR BLD AUTO: 25.4 % (ref 37–48.5)
HGB BLD-MCNC: 8.4 G/DL (ref 12–16)
IMM GRANULOCYTES # BLD AUTO: 0.22 K/UL (ref 0–0.04)
IMM GRANULOCYTES NFR BLD AUTO: 2 % (ref 0–0.5)
LYMPHOCYTES # BLD AUTO: 3 K/UL (ref 1–4.8)
LYMPHOCYTES NFR BLD: 27.8 % (ref 18–48)
MAGNESIUM SERPL-MCNC: 1.9 MG/DL (ref 1.6–2.6)
MCH RBC QN AUTO: 30.1 PG (ref 27–31)
MCHC RBC AUTO-ENTMCNC: 33.1 G/DL (ref 32–36)
MCV RBC AUTO: 91 FL (ref 82–98)
MONOCYTES # BLD AUTO: 0.7 K/UL (ref 0.3–1)
MONOCYTES NFR BLD: 6.7 % (ref 4–15)
NEUTROPHILS # BLD AUTO: 6.5 K/UL (ref 1.8–7.7)
NEUTROPHILS NFR BLD: 60.4 % (ref 38–73)
NRBC BLD-RTO: 0 /100 WBC
PHOSPHATE SERPL-MCNC: 1.3 MG/DL (ref 2.7–4.5)
PHOSPHATE SERPL-MCNC: 2.7 MG/DL (ref 2.7–4.5)
PLATELET # BLD AUTO: 257 K/UL (ref 150–450)
PMV BLD AUTO: 9.5 FL (ref 9.2–12.9)
POTASSIUM SERPL-SCNC: 4.3 MMOL/L (ref 3.5–5.1)
PROT SERPL-MCNC: 4.9 G/DL (ref 6–8.4)
RBC # BLD AUTO: 2.79 M/UL (ref 4–5.4)
SODIUM SERPL-SCNC: 131 MMOL/L (ref 136–145)
WBC # BLD AUTO: 10.82 K/UL (ref 3.9–12.7)

## 2025-01-01 PROCEDURE — 99233 SBSQ HOSP IP/OBS HIGH 50: CPT | Mod: ,,, | Performed by: INTERNAL MEDICINE

## 2025-01-01 PROCEDURE — 36415 COLL VENOUS BLD VENIPUNCTURE: CPT

## 2025-01-01 PROCEDURE — 27000207 HC ISOLATION

## 2025-01-01 PROCEDURE — 21000000 HC CCU ICU ROOM CHARGE

## 2025-01-01 PROCEDURE — 25000003 PHARM REV CODE 250: Performed by: HOSPITALIST

## 2025-01-01 PROCEDURE — 99223 1ST HOSP IP/OBS HIGH 75: CPT | Mod: ,,, | Performed by: INTERNAL MEDICINE

## 2025-01-01 PROCEDURE — 99900035 HC TECH TIME PER 15 MIN (STAT)

## 2025-01-01 PROCEDURE — 25000003 PHARM REV CODE 250: Performed by: INTERNAL MEDICINE

## 2025-01-01 PROCEDURE — 83735 ASSAY OF MAGNESIUM: CPT

## 2025-01-01 PROCEDURE — 82330 ASSAY OF CALCIUM: CPT

## 2025-01-01 PROCEDURE — 63600175 PHARM REV CODE 636 W HCPCS: Performed by: HOSPITALIST

## 2025-01-01 PROCEDURE — 84100 ASSAY OF PHOSPHORUS: CPT

## 2025-01-01 PROCEDURE — 99900031 HC PATIENT EDUCATION (STAT)

## 2025-01-01 PROCEDURE — 84100 ASSAY OF PHOSPHORUS: CPT | Mod: 91

## 2025-01-01 PROCEDURE — 25000003 PHARM REV CODE 250: Performed by: FAMILY MEDICINE

## 2025-01-01 PROCEDURE — 80053 COMPREHEN METABOLIC PANEL: CPT

## 2025-01-01 PROCEDURE — 25000003 PHARM REV CODE 250

## 2025-01-01 PROCEDURE — 63600175 PHARM REV CODE 636 W HCPCS: Performed by: INTERNAL MEDICINE

## 2025-01-01 PROCEDURE — 94761 N-INVAS EAR/PLS OXIMETRY MLT: CPT

## 2025-01-01 PROCEDURE — 85025 COMPLETE CBC W/AUTO DIFF WBC: CPT | Performed by: NURSE PRACTITIONER

## 2025-01-01 RX ORDER — MAGNESIUM SULFATE HEPTAHYDRATE 40 MG/ML
4 INJECTION, SOLUTION INTRAVENOUS
Status: DISCONTINUED | OUTPATIENT
Start: 2025-01-01 | End: 2025-01-05 | Stop reason: HOSPADM

## 2025-01-01 RX ORDER — CALCIUM GLUCONATE 20 MG/ML
2 INJECTION, SOLUTION INTRAVENOUS
Status: DISCONTINUED | OUTPATIENT
Start: 2025-01-01 | End: 2025-01-05 | Stop reason: HOSPADM

## 2025-01-01 RX ORDER — CALCIUM GLUCONATE 20 MG/ML
1 INJECTION, SOLUTION INTRAVENOUS
Status: DISCONTINUED | OUTPATIENT
Start: 2025-01-01 | End: 2025-01-05 | Stop reason: HOSPADM

## 2025-01-01 RX ORDER — POTASSIUM CHLORIDE 7.45 MG/ML
80 INJECTION INTRAVENOUS
Status: DISCONTINUED | OUTPATIENT
Start: 2025-01-01 | End: 2025-01-05 | Stop reason: HOSPADM

## 2025-01-01 RX ORDER — MAGNESIUM SULFATE HEPTAHYDRATE 40 MG/ML
2 INJECTION, SOLUTION INTRAVENOUS
Status: DISCONTINUED | OUTPATIENT
Start: 2025-01-01 | End: 2025-01-05 | Stop reason: HOSPADM

## 2025-01-01 RX ORDER — CALCIUM GLUCONATE 20 MG/ML
3 INJECTION, SOLUTION INTRAVENOUS
Status: DISCONTINUED | OUTPATIENT
Start: 2025-01-01 | End: 2025-01-05 | Stop reason: HOSPADM

## 2025-01-01 RX ORDER — POTASSIUM CHLORIDE 7.45 MG/ML
40 INJECTION INTRAVENOUS
Status: DISCONTINUED | OUTPATIENT
Start: 2025-01-01 | End: 2025-01-05 | Stop reason: HOSPADM

## 2025-01-01 RX ORDER — POTASSIUM CHLORIDE 7.45 MG/ML
60 INJECTION INTRAVENOUS
Status: DISCONTINUED | OUTPATIENT
Start: 2025-01-01 | End: 2025-01-05 | Stop reason: HOSPADM

## 2025-01-01 RX ADMIN — CALCIUM CARBONATE 500 MG: 500 TABLET, CHEWABLE ORAL at 03:01

## 2025-01-01 RX ADMIN — CALCIUM CARBONATE 500 MG: 500 TABLET, CHEWABLE ORAL at 08:01

## 2025-01-01 RX ADMIN — ATORVASTATIN CALCIUM 20 MG: 20 TABLET, FILM COATED ORAL at 08:01

## 2025-01-01 RX ADMIN — CEFTRIAXONE 2 G: 2 INJECTION, POWDER, FOR SOLUTION INTRAMUSCULAR; INTRAVENOUS at 08:01

## 2025-01-01 RX ADMIN — ENOXAPARIN SODIUM 40 MG: 40 INJECTION SUBCUTANEOUS at 06:01

## 2025-01-01 RX ADMIN — CARVEDILOL 6.25 MG: 6.25 TABLET, FILM COATED ORAL at 08:01

## 2025-01-01 RX ADMIN — FAMOTIDINE 20 MG: 20 TABLET, FILM COATED ORAL at 08:01

## 2025-01-01 RX ADMIN — POTASSIUM & SODIUM PHOSPHATES POWDER PACK 280-160-250 MG 2 PACKET: 280-160-250 PACK at 03:01

## 2025-01-01 RX ADMIN — LACTOBACILLUS ACIDOPHILUS / LACTOBACILLUS BULGARICUS 1 EACH: 100 MILLION CFU STRENGTH GRANULES at 08:01

## 2025-01-01 RX ADMIN — GABAPENTIN 200 MG: 100 CAPSULE ORAL at 03:01

## 2025-01-01 RX ADMIN — ASPIRIN 81 MG: 81 TABLET, COATED ORAL at 08:01

## 2025-01-01 RX ADMIN — POTASSIUM & SODIUM PHOSPHATES POWDER PACK 280-160-250 MG 2 PACKET: 280-160-250 PACK at 06:01

## 2025-01-01 RX ADMIN — GABAPENTIN 200 MG: 100 CAPSULE ORAL at 08:01

## 2025-01-01 RX ADMIN — POTASSIUM & SODIUM PHOSPHATES POWDER PACK 280-160-250 MG 2 PACKET: 280-160-250 PACK at 10:01

## 2025-01-01 RX ADMIN — OXYCODONE HYDROCHLORIDE 5 MG: 5 TABLET ORAL at 08:01

## 2025-01-01 NOTE — PLAN OF CARE
Problem: Adult Inpatient Plan of Care  Goal: Plan of Care Review  Outcome: Progressing  Goal: Patient-Specific Goal (Individualized)  Outcome: Progressing  Goal: Absence of Hospital-Acquired Illness or Injury  Outcome: Progressing  Goal: Optimal Comfort and Wellbeing  Outcome: Progressing  Goal: Readiness for Transition of Care  Outcome: Progressing     Problem: Sepsis/Septic Shock  Goal: Optimal Coping  Outcome: Progressing  Goal: Absence of Bleeding  Outcome: Progressing  Goal: Blood Glucose Level Within Targeted Range  Outcome: Progressing  Goal: Absence of Infection Signs and Symptoms  Outcome: Progressing  Goal: Optimal Nutrition Intake  Outcome: Progressing     Problem: Fall Injury Risk  Goal: Absence of Fall and Fall-Related Injury  Outcome: Progressing     Problem: Pain Acute  Goal: Optimal Pain Control and Function  Outcome: Progressing

## 2025-01-01 NOTE — PROGRESS NOTES
"[facility]  Department of Neurology  Progress Note  Date: 2025 12:50 PM          Patient Name: Vivien Burton   MRN: [id number]   : 1945    AGE: 79 y.o.    LOS: 2 days Hospital Day: 4  Admit date: 2024  2:56 PM       HPI per EMR: Vivien Burton is a 79 y.o. female with a history of anemia, basal cell carcinoma, breast cancer, CHF, streptococcal bacteremia, GERD, HLD, HTN, CAD, TAVR, hypothyroidism, accidental tylenol overdose and abnormal MRI who presented to the ED for a headache and neck pain. The patient endorses a left sided headache described as stabbing and radiating from left upper neck. It is associated with tenderness to palpation in the left parietal scalp daily and intermittently the left temporal area. The pain has been daily for one month and reports taking tylenol every 8 hours with relief but the pain returns. Today the pain became intractable which caused her to present to the ED. CT of head showed no acute process and CT of c-spine showed degenerative changes. While in ED she developed a fever, oxygen saturation of 92%, hypertension, and tachycardia. She endorsed having a non-productive cough. Sepsis bundle initiated and cardiac workup added. She was given toradol, 2mg of PO diluadid, gabapentin, and acyclovir. Her pain improved and her blood pressure did also. She was maintaining an oxygen saturation of 99% on 1 liter nasal cannula and denied any shortness of breath. Fever resolved with tylenol. 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 She most recently finished a steroid taper at end of November. ED initiated acyclovir due one "leison" noted to left sided neck and pain with palpation to scalp. Lab work showed elevated troponin, normal lactic acid, no leukocytosis, increased anemia over last month, procalcitonin 81.13, normal TSH, and AST of 54.  with no " "peripheral edema but chest xray showed "Mild interstitial opacities, suspicious for interstitial edema/CHF." She has no tachypnea or exertional SOB. FLU/RSV/ Covid negative.  Initially her blood pressure was too low for lasix administration but later improved and she was administered 20mg of IV lasix. Troponin trended up and then lowered on third draw. Urinalysis pending at time of note. Due to ESR of 33 and CRP of 110.4 with fever, scalp tenderness, and worsening headache she was empirically started on prednisone 60mg for one dose and to be continued by primary team pending MRA of head to assess for temporal arteritis. MRI of c-spine ordered. MRA of neck ordered.  One year ago patient presented to ED with right sided headache that was similar to this visit.  Patient admitted by hospital medicine for further evaluation and management.      Neurology consult:  Patient was seen and examined by me.  She presented to the hospital with headache on the left parieto-occipital region which she describes a stabbing pain, 10/10 in intensity.  She also endorses of sensitivity to parietal, temporal and occipital regions however no pain in the left temporal region.  She states that headache started in the beginning of December and initially the headache was persistent and Tylenol would help her however eventually Tylenol stopped helping her.  Currently her headache comes and goes and minute comes on it is 10/10 in intensity and last for few hours improves with some medications.  Denies any jaw claudication, pain or weakness in her upper or lower extremities proximally.     Patient had similar pain on the opposite side in January 20, 2023 and symptoms help with short period of Decadron at that time.  Workup with MRI brain was negative for intracranial pathology, lumbar puncture was done at that time was negative as well.       01/01/2025:  Workup revealed bacteremia with Streptococcus viridans.  Underlying infection was thought " to be the cause of elevated ESR/CRP and concern for temporal arteritis was low.  Steroids were discontinued.  No acute events overnight. Patient was seen and examined by me this morning.  Transferred to [facility] and further care.  Neuro exam is normal.  She states the headache is much better and states gabapentin is helping along with some oxygen.  Denies any other new complaints.    Vitals:  Patient Vitals for the past 24 hrs:   BP Temp Temp src Pulse Resp SpO2   01/01/25 1100 111/66 -- -- 70 20 98 %   01/01/25 0900 (!) 147/73 -- -- 81 20 97 %   01/01/25 0858 (!) 172/70 -- -- 80 -- --   01/01/25 0701 (!) 159/67 97.4 °F (36.3 °C) Oral 71 20 96 %   01/01/25 0500 (!) 141/61 97.7 °F (36.5 °C) Oral 71 15 (!) 93 %   01/01/25 0400 (!) 143/66 -- -- 71 14 99 %   01/01/25 0300 (!) 146/65 -- -- 68 12 100 %   01/01/25 0200 (!) 144/66 -- -- 70 13 99 %   01/01/25 0100 (!) 150/72 -- -- 72 11 100 %   01/01/25 0000 134/62 97.6 °F (36.4 °C) Axillary 74 12 (!) 94 %   12/31/24 2300 (!) 164/77 -- -- 78 14 (!) 92 %   12/31/24 2200 (!) 163/74 -- -- 80 18 (!) 90 %   12/31/24 2100 (!) 154/67 -- -- 79 20 97 %   12/31/24 2030 -- -- -- 83 17 99 %   12/31/24 2019 (!) 166/77 -- -- 83 -- --   12/31/24 1954 -- -- -- -- 20 --   12/31/24 1900 126/83 97.9 °F (36.6 °C) Oral 87 20 97 %   12/31/24 1800 132/60 -- -- 84 (!) 24 98 %   12/31/24 1659 (!) 133/59 97.5 °F (36.4 °C) Oral 84 19 99 %   12/31/24 1620 (!) 118/55 97.4 °F (36.3 °C) Axillary 84 18 97 %     PHYSICAL EXAM:     GENERAL APPEARANCE: Well-developed, well-nourished female in no acute distress.  HEENT: Normocephalic and atraumatic. PERRL. Oropharynx unremarkable.  PULM: Comfortable on room air.  CV: RRR.  ABDOMEN: Soft, nontender.  EXTREMITIES: No signs of vascular compromise. Pulses present. No cyanosis, clubbing or edema.  SKIN: Clear; no rashes, lesions or skin breaks in exposed areas.      NEURO:   MENTAL STATUS: Patient awake and oriented to time, place, and person. Affect  normal.  CRANIAL NERVES II-XII: Pupils equal, round and reactive to light. Extraocular movements full and intact. No facial asymmetry.  MOTOR: Normal bulk. Tone normal and symmetrical throughout.  No abnormal movements. No tremor.   Strength 5/5 throughout unless specified below.  REFLEXES: DTRs 2+; normal and symmetric throughout.   SENSATION: Sensation grossly intact to fine touch.  COORDINATION: Finger-to-nose normal for age and symmetric.  STATION: Romberg deferred.  GAIT: Deferred.    CURRENT SCHEDULED MEDICATIONS:   aspirin  81 mg Oral Daily    atorvastatin  20 mg Oral QHS    calcium carbonate  500 mg Oral TID    carvediloL  6.25 mg Oral BID    cefTRIAXone (Rocephin) IV (PEDS and ADULTS)  2 g Intravenous Q24H    enoxparin  40 mg Subcutaneous Q24H (prophylaxis, 1700)    famotidine  20 mg Oral Daily    gabapentin  100 mg Oral Once    gabapentin  200 mg Oral TID    lactobacillus acidophilus & bulgar  1 packet Oral BID    levothyroxine  75 mcg Oral Before breakfast     CURRENT INFUSIONS:    DATA:  Recent Labs   Lab 12/29/24  1736 12/29/24 2040 12/30/24  0415 12/31/24  0240 01/01/25  0446     --  134* 135* 131*   K 3.7  --  3.4* 3.9 4.3     --  102 99 98   CO2 22*  --  23 26 28   BUN 9  --  12 11 10   CREATININE 0.8  --  0.9 0.7 0.6   *  --  118* 138* 93   CALCIUM 9.2  --  8.6* 8.3* 7.3*   PHOS  --   --  2.5* 2.0* 1.3*   MG  --  1.5* 1.8 1.9 1.9   AST 54*  --  28 14 8*   ALT 38  --  28 23 12     Recent Labs   Lab 12/29/24  1737 12/30/24 0415 12/31/24  0237 01/01/25  0446   WBC 9.33 9.77 10.54 10.82   HGB 9.0* 8.5* 8.7* 8.4*   HCT 26.4* 26.1* 26.8* 25.4*    172 251 257     Lab Results   Component Value Date    PROTEINCSF 29 12/31/2024    GLUCCSF 68 12/31/2024     Hemoglobin A1C   Date Value Ref Range Status   04/09/2024 4.6 <5.7 % of total Hgb Final     Comment:     For the purpose of screening for the presence of  diabetes:     <5.7%       Consistent with the absence of  diabetes  5.7-6.4%    Consistent with increased risk for diabetes              (prediabetes)  > or =6.5%  Consistent with diabetes     This assay result is consistent with a decreased risk  of diabetes.     Currently, no consensus exists regarding use of  hemoglobin A1c for diagnosis of diabetes in children.     According to American Diabetes Association (ADA)  guidelines, hemoglobin A1c <7.0% represents optimal  control in non-pregnant diabetic patients. Different  metrics may apply to specific patient populations.   Standards of Medical Care in Diabetes(ADA).         10/17/2023 4.6 <5.7 % of total Hgb Final     Comment:     For the purpose of screening for the presence of  diabetes:     <5.7%       Consistent with the absence of diabetes  5.7-6.4%    Consistent with increased risk for diabetes              (prediabetes)  > or =6.5%  Consistent with diabetes     This assay result is consistent with a decreased risk  of diabetes.     Currently, no consensus exists regarding use of  hemoglobin A1c for diagnosis of diabetes in children.     According to American Diabetes Association (ADA)  guidelines, hemoglobin A1c <7.0% represents optimal  control in non-pregnant diabetic patients. Different  metrics may apply to specific patient populations.   Standards of Medical Care in Diabetes(ADA).         01/25/2023 4.7 4.0 - 5.6 % Final     Comment:     ADA Screening Guidelines:  5.7-6.4%  Consistent with prediabetes  >or=6.5%  Consistent with diabetes    High levels of fetal hemoglobin interfere with the HbA1C  assay. Heterozygous hemoglobin variants (HbS, HgC, etc)do  not significantly interfere with this assay.   However, presence of multiple variants may affect accuracy.              I have personally reviewed and interpreted the pertinent imaging and lab results.  Imaging Results              MRI Cervical Spine W WO Cont (Final result)  Result time 12/30/24 10:47:56      Final result by Marek Moreno MD (12/30/24  10:47:56)                   Impression:      1. No evidence of acute fracture, spondylodiscitis, high-grade spinal canal stenosis.  No cord compression, focal cord signal abnormality, or abnormal intraspinal enhancement.  2. Multilevel degenerative changes of the cervical spine, as detailed above, with findings most pronounced at C5-6 and C6-7 and resulting in severe left and moderate right neural foraminal and mild spinal canal stenosis.      Electronically signed by: Marek Moreno  Date:    12/30/2024  Time:    10:47               Narrative:    EXAMINATION:  MRI CERVICAL SPINE W WO CONTRAST    CLINICAL HISTORY:  Neck pain, acute, infection suspected;    TECHNIQUE:  Multiplanar, multisequence MR images of the cervical spine were performed before and after the administration of intravenous contrast.  5 mL of Gadavist intravenous contrast were administered.    COMPARISON:  CT cervical spine 12/29/2024    FINDINGS:  Study is mild to moderately degraded by motion artifact.    Alignment: Reversal of the cervical lordosis, which may be secondary to positioning or muscle spasm.  Minimal anterolisthesis of C4 on C5 and retrolisthesis of C5 on C6.    Vertebral Column: Vertebral body heights are maintained. No acute fracture is identified.  No evidence of an aggressive bone marrow replacement process. Multilevel disc degeneration, most pronounced at C5-6 and C6-7 with mild-to-moderate intervertebral disc space narrowing, disc desiccation, anterior marginal osteophyte formation and posterior disc osteophyte complexes.    Cord: Normal signal and morphology.    Skull base and craniocervical junction: Normal.    Degenerative findings:    C2-C3: Minimal posterior disc osteophyte complex.  Mild bilateral facet arthropathy.  No significant neural foraminal or spinal canal stenosis.    C3-C4: Mild posterior disc osteophyte complex.  Moderate bilateral facet arthropathy.  Marked left and moderate right uncovertebral joint spurring.   Severe left and moderate right neural foraminal stenosis.  No significant spinal canal stenosis.    C4-C5: Mild posterior disc osteophyte complex.  Moderate bilateral facet arthropathy.  Moderate left and mild right uncovertebral joint spurring.  Moderate left and mild right neural foraminal stenosis.  No significant spinal canal stenosis.    C5-C6: Posterior disc osteophyte complex, which partially effaces the ventral thecal sac.  Moderate bilateral facet arthropathy.  Marked left and moderate right uncovertebral joint spurring.  Severe left and moderate right neural foraminal stenosis.  Ligamentum flavum thickening, which partially effaces the dorsal thecal sac.  Mild spinal canal stenosis.    C6-C7: Posterior disc osteophyte complex, which mildly effaces the ventral thecal sac.  Moderate bilateral facet arthropathy.  Moderate left and moderate right uncovertebral joint spurring.  Severe left and moderate right neural foraminal stenosis.  Ligamentum flavum thickening, which partially effaces the dorsal thecal sac.  Mild spinal canal stenosis.    C7-T1: The disk is normal in configuration.  Mild bilateral facet arthropathy.  Mild bilateral uncovertebral joint spurring.  Mild left greater than right neural foraminal stenosis.  No significant spinal canal stenosis.    Paraspinal muscles & soft tissues: Unremarkable.    Normal signal voids are present in the vertebral arteries.    No abnormal intraspinal enhancement identified.                                       MRI Brain Without Contrast (Final result)  Result time 12/30/24 07:39:51      Final result by Marek Moreno MD (12/30/24 07:39:51)                   Impression:      1. No acute intracranial abnormality.  2. Mild generalized cerebral volume loss and scattered T2/FLAIR hyperintense signal within the supratentorial white matter, nonspecific but probably reflecting sequelae of chronic small vessel ischemic change.  3. Left maxillary  sinusitis.      Electronically signed by: Marek Moreno  Date:    12/30/2024  Time:    07:39               Narrative:    EXAMINATION:  MRI BRAIN WITHOUT CONTRAST    CLINICAL HISTORY:  Headache, new or worsening (Age >= 50y);.    TECHNIQUE:  Multiplanar multisequence MR imaging of the brain was performed without the administration of intravenous contrast.    COMPARISON:  CT head 12/29/2024, MRI brain 01/24/2023    FINDINGS:  Study is  degraded by motion artifact.    Ventricles and sulci are normal in size for age without evidence of hydrocephalus. No extra-axial blood or fluid collections.    Scattered foci of T2/FLAIR hyperintense signal within the supratentorial white matter, nonspecific and may reflect sequelae of mild chronic small vessel ischemic change.    Diffusion-weighted images demonstrate no evidence of an acute infarct.   Susceptibility weighted images demonstrate no evidence of acute or chronic hemorrhage. No significant intracranial mass effect or midline shift.    Normal vascular flow voids are present.    Diffusely heterogeneous calvarial bone marrow signal, nonspecific but similar to prior exam from 2023, and may be seen with red marrow reconversion associated with chronic anemic states, osteoporosis, hypoxia, or smoking.    Moderate opacification of the left maxillary sinus with mucosal membrane thickening and small air-fluid level.  Mild scattered mucosal membrane thickening elsewhere in the paranasal sinuses.  The visualized portions of the mastoids are unremarkable.    Bilateral lens replacements are noted.                                       X-Ray Chest AP Portable (Final result)  Result time 12/29/24 17:58:37      Final result by Anthony Andres DO (12/29/24 17:58:37)                   Impression:      Mild interstitial opacities, suspicious for interstitial edema/CHF.      Electronically signed by: Anthony Andres  Date:    12/29/2024  Time:    17:58               Narrative:     EXAMINATION:  XR CHEST AP PORTABLE    CLINICAL HISTORY:  Chest Pain;    TECHNIQUE:  Single frontal view of the chest was performed.    COMPARISON:  02/02/2023.    FINDINGS:  There are mild interstitial opacities, suspicious for interstitial edema/CHF.  The pleural spaces are clear the cardiac silhouette is borderline.  There are calcifications of the aortic arch.  There is a prosthetic aortic valve stent.  Osseous structures demonstrate degenerative changes.                                       CT Head Without Contrast (Final result)  Result time 12/29/24 16:49:58      Final result by Susana Carrion MD (12/29/24 16:49:58)                   Impression:      There is no evidence acute intracranial abnormality.  There is left maxillary sinus disease.      Electronically signed by: Susana Carrion MD  Date:    12/29/2024  Time:    16:49               Narrative:    EXAMINATION:  CT HEAD WITHOUT CONTRAST    CLINICAL HISTORY:  Headache, new or worsening (Age >= 50y);    TECHNIQUE:  Low dose axial CT images obtained throughout the head without intravenous contrast. Sagittal and coronal reconstructions were performed.    COMPARISON:  None.    FINDINGS:  Intracranial compartment:    Ventricles and sulci are normal in size for age without evidence of hydrocephalus. No extra-axial blood or fluid collections.    The brain parenchyma appears normal. No parenchymal mass, hemorrhage, edema or major vascular distribution infarct.    Skull/extracranial contents (limited evaluation): No fracture. There is a left maxillary sinus air-fluid level.                                       CT Cervical Spine Without Contrast (Final result)  Result time 12/29/24 15:48:04      Final result by Susana Carrion MD (12/29/24 15:48:04)                   Impression:      There are degenerative changes throughout the cervical spine.      Electronically signed by: Susana Carrion MD  Date:    12/29/2024  Time:    15:48               Narrative:     EXAMINATION:  CT CERVICAL SPINE WITHOUT CONTRAST    CLINICAL HISTORY:  Neck pain, chronic, degenerative changes on xray;Neck pain, acute exacerbation with chronic degenerative changes;    TECHNIQUE:  Low dose axial images, sagittal and coronal reformations were performed though the cervical spine.  Contrast was not administered.    COMPARISON:  12/18/2024    FINDINGS:  There is no evidence fracture or malalignment.  There are degenerative changes throughout the cervical spine most prominent in the mid cervical spine.  There is no prevertebral soft tissue swelling.  The adjacent soft tissues are unremarkable.                                           ASSESSMENT AND PLAN:       Intractable headache   Bacteremia     Plan:   Etiology of intractable headache is unknown however differential includes occipital/cervical neuralgia.  Patient has elevated ESR and CRP however there is no vision changes, temporal tenderness, temporal headache, jaw claudication or proximal weakness and hence concern for Giant cell arteritis/temporal arteritis is low.  Elevated inflammatory markers likely secondary to bacteremia.  Gabapentin 200 mg t.i.d. for headache.    Patient was empirically started on prednisone 60 mg daily for possible temporal arteritis however concern is low and steroids were discontinued..  MRI brain is negative for acute pathology. MR angio brain with temporal arteritis protocol showed no mural thickening or abnormal enhancement of the temporal arteries.    Headache improving.  If headache worsens, can consider increasing gabapentin to 300 mg t.i.d..  Further workup for metabolic derangements and infectious abnormalities per primary team.    Cardiology following-elsie pending.  Will follow peripherally.  Please call with any questions.  Outpatient Neurology follow-up recommended           Car Tucker MD  Neurology/vascular Neurology  Date of Service: 01/01/2025  12:50 PM    Please note: This note was transcribed  using voice recognition software. Because of this technology there are often uinintended grammatical, spelling, and other transcription errors. Please disregard these errors.

## 2025-01-01 NOTE — ASSESSMENT & PLAN NOTE
Patient with known CAD s/p stent placement and CABG, which is controlled Will continue ASA and Statin and monitor for S/Sx of angina/ACS. Continue to monitor on telemetry.     Trending troponin. Will continue to trend every four hours. Patient denies chest pain or SOB.   Cardiology following.   Likely demand  No acute STT wave changes

## 2025-01-01 NOTE — PROGRESS NOTES
[facility]   Department of Infectious Disease  Progress Note        PATIENT NAME: Vivien Burton  MRN: [id number]  TODAY'S DATE: 01/01/2025  ADMIT DATE: 12/29/2024  LOS: 2 days    CHIEF COMPLAINT: Neck Pain (Chronic neck pain.  Patient states that she has taken everything she can and nothing helps. )      PRINCIPLE PROBLEM: <principal problem not specified>    INTERVAL HISTORY      01/01/2025: No acute issues overnight.  Transferred to [facility] for [facility].  Repeat blood culture 12/31/2024 so far negative.      Antibiotics (From admission, onward)      Start     Stop Route Frequency Ordered    01/01/25 0900  cefTRIAXone injection 2 g         -- IV Every 24 hours (non-standard times) 12/31/24 1550          Antifungals (From admission, onward)      None           Antivirals (From admission, onward)      None            ASSESSMENT and PLAN      1. Left occipital neuralgia.  Continue management as per neurologist.       2. Left maxillary sinusitis.  On antibiotics for this indication     3. 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  No other focus of infection identified for this clinically.  TTE with no vegetation.  She will need [facility] though it sensitivity is not great for TAVR endocarditis.  Change ceftriaxone to 2 g Q 24 hours    RECOMMENDATIONS:    For [facility] tomorrow   Continue ceftriaxone   Probably treat for about 4-6 weeks    Please send Epic secure chat with any questions.  Discussed with patient and daughter in-law at bedside.      SUBJECTIVE    She has chronic medical problems including hypothyroidism, hypertension, CAD, hyperlipidemia and GERD. She has left-sided headache of about 2-3 months' duration that got worse in the last 2 weeks. Pain starts at focal point around the left mastoid and we will sometimes radiate to the neck. It is stabbing in character and feels like hospitalist. Also has dysphagia she touches her left scab Staph from the midline  on the left all the way down in temporal area. The pain is not constant but intermittent. Tylenol helps sometimes. Presented to the emergency room because it was bothersome. She has no fever or jaw pain. No visual problems. No other joint involved. She is very active and ADL independent. She had a similar episode on the right about 2 years ago that resolved after a course of steroid. Had not relapsed on the right since then.    Antimicrobials   Vancomycin: 12/29/2024 x1 dose   Valtrex for/20 09/24/2012/30/24   Doxycycline:  12/29/2024 x1 dose   Cefepime:  12/29/2024-12/30/2024   Augmentin:  12/30/2024 x1 dose   Ceftriaxone:  12/30/2024-    Microbiology  Blood culture 12/29/2024:  Viridans Streptococcus 2/2 sets   Respiratory panel 12/30/2024: Rhinovirus/enterovirus  Blood culture 12/31/2024:  NGTD   CSF culture 12/31/2024:  NGTD  CSF meningitis panel 12/31/2024: Negative      Review of Systems  Negative except as stated above in Interval History     OBJECTIVE   Temp:  [97.4 °F (36.3 °C)-97.9 °F (36.6 °C)] 97.4 °F (36.3 °C)  Pulse:  [68-87] 80  Resp:  [11-24] 20  SpO2:  [90 %-100 %] 96 %  BP: (118-172)/(55-83) 172/70  Temp:  [97.4 °F (36.3 °C)-97.9 °F (36.6 °C)]   Temp: 97.4 °F (36.3 °C) (01/01/25 0701)  Pulse: 80 (01/01/25 0858)  Resp: 20 (01/01/25 0701)  BP: (!) 172/70 (01/01/25 0858)  SpO2: 96 % (01/01/25 0701)    Intake/Output Summary (Last 24 hours) at 1/1/2025 1108  Last data filed at 1/1/2025 0421  Gross per 24 hour   Intake 860 ml   Output 1 ml   Net 859 ml       Physical Exam  General:  Pleasant well-groomed elderly woman lying in bed in no acute distress   CVS: S1 and 2 heard, no murmurs appreciated   Respiratory: To auscultation   Abdomen:  Full, soft, nontender no palpable organomegaly   Skin: No rash appreciated  CNS: No focal deficits   Musculoskeletal system: No joint or bony abnormalities appreciated    VAD:  PIV  ISOLATION:  Droplet isolation for rhinovirus     WOUNDS:  None    Significant Labs: All  "pertinent labs within the past 24 hours have been reviewed.    CBC LAST 7 DAYS  Recent Labs   Lab 12/29/24  1737 12/30/24  0415 12/31/24  0237 01/01/25  0446   WBC 9.33 9.77 10.54 10.82   RBC 2.91* 2.77* 2.91* 2.79*   HGB 9.0* 8.5* 8.7* 8.4*   HCT 26.4* 26.1* 26.8* 25.4*   MCV 91 94 92 91   MCH 30.9 30.7 29.9 30.1   MCHC 34.1 32.6 32.5 33.1   RDW 13.1 13.2 13.1 13.3    172 251 257   MPV 9.6 10.5 9.8 9.5   GRAN 86.7*  8.1*  --  78.2*  8.3* 60.4  6.5   LYMPH 7.8*  0.7*  --  13.7*  1.4 27.8  3.0   MONO 4.5  0.4  --  4.0  0.4 6.7  0.7   BASO 0.02  --  0.02 0.04   NRBC 0  --  0 0       CHEMISTRY LAST 7 DAYS  Recent Labs   Lab 12/29/24  1736 12/29/24  2040 12/30/24 0415 12/31/24  0240 01/01/25  0446     --  134* 135* 131*   K 3.7  --  3.4* 3.9 4.3     --  102 99 98   CO2 22*  --  23 26 28   ANIONGAP 11  --  9 10 5*   BUN 9  --  12 11 10   CREATININE 0.8  --  0.9 0.7 0.6   *  --  118* 138* 93   CALCIUM 9.2  --  8.6* 8.3* 7.3*   MG  --  1.5* 1.8 1.9 1.9   ALBUMIN 3.0*  --  2.6* 2.9* 3.1*   PROT 5.6*  --  5.0* 5.6* 4.9*   ALKPHOS 85  --  75 76 59   ALT 38  --  28 23 12   AST 54*  --  28 14 8*   BILITOT 0.5  --  0.4 0.3 0.4       Estimated Creatinine Clearance: 56.4 mL/min (based on SCr of 0.6 mg/dL).    INFLAMMATORY/PROCAL  LAST 7 DAYS  Recent Labs   Lab 12/29/24 2040 12/31/24  0240   PROCAL 81.13* 202.03*   .4*  --      No results found for: "ESR"  CRP   Date Value Ref Range Status   12/29/2024 110.4 (H) 0.0 - 8.2 mg/L Final   02/22/2023 1.6 <8.0 mg/L Final   02/01/2023 8.5 (H) <8.0 mg/L Final       PRIOR  MICROBIOLOGY:    Susceptibility data from last 90 days.  Collected Specimen Info Organism Cefepime Ceftriaxone Penicillin Vancomycin   12/29/24 Blood from Peripheral, Antecubital, Left Viridans streptococcus  S  S  I  S   12/29/24 Blood from Peripheral, Hand, Left Viridans streptococcus           LAST 7 DAYS MICROBIOLOGY   Microbiology Results (last 7 days)       Procedure " Component Value Units Date/Time    Blood culture x two cultures. Draw prior to antibiotics. [[id number]]  (Abnormal)  (Susceptibility) Collected: 12/29/24 1835    Order Status: Completed Specimen: Blood from Peripheral, Antecubital, Left Updated: 01/01/25 0625     Blood Culture, Routine Gram stain aer bottle: Gram positive cocci      Results called to and read back by: hannah guy rn pcu      Gram stain osvaldo bottle: Gram positive cocci      Positive results previously called      VIRIDANS STREPTOCOCCUS    Narrative:      Aerobic and anaerobic    Blood culture x two cultures. Draw prior to antibiotics. [[id number]]  (Abnormal) Collected: 12/29/24 1835    Order Status: Completed Specimen: Blood from Peripheral, Hand, Left Updated: 01/01/25 0625     Blood Culture, Routine Gram stain peds bottle: Gram positive cocci      Positive results previously called      [id number]      VIRIDANS STREPTOCOCCUS  For susceptibility see order #[id number]      Narrative:      Aerobic and anaerobic    Blood culture [[id number]] Collected: 12/31/24 0850    Order Status: Completed Specimen: Blood from Peripheral, Hand, Left Updated: 12/31/24 1958     Blood Culture, Routine No Growth to date    Blood culture [[id number]] Collected: 12/31/24 0850    Order Status: Completed Specimen: Blood from Peripheral, Antecubital, Left Updated: 12/31/24 1958     Blood Culture, Routine No Growth to date    CSF culture [[id number]] Collected: 12/31/24 1150    Order Status: Completed Specimen: CSF (Spinal Fluid) from CSF Tap, Tube 3 Updated: 12/31/24 1447     Gram Stain Result No WBC's, epithelial cells or organisms seen      12/31/2024  14:46 TN3    Cryptococcal antigen, CSF [[id number]] Collected: 12/31/24 1150    Order Status: Sent Specimen: CSF (Spinal Fluid) from CSF Tap, Tube 3 Updated: 12/31/24 1217    Rapid Organism ID by PCR (from Blood culture) [[id number]]  (Abnormal) Collected: 12/29/24 1835    Order Status: Completed Updated: 12/31/24 0246      Enterococcus faecalis Not Detected     Enterococcus faecium Not Detected     Listeria monocytogenes Not Detected     Staphylococcus spp. Not Detected     Staphylococcus aureus Not Detected     Staphylococcus epidermidis Not Detected     Staphylococcus lugdunensis Not Detected     Streptococcus species Detected     Streptococcus agalactiae Not Detected     Streptococcus pneumoniae Not Detected     Streptococcus pyogenes Not Detected     Acinetobacter calcoaceticus/baumannii complex Not Detected     Bacteroides fragilis Not Detected     Enterobacterales Not Detected     Enterobacter cloacae complex Not Detected     Escherichia coli Not Detected     Klebsiella aerogenes Not Detected     Klebsiella oxytoca Not Detected     Klebsiella pneumoniae group Not Detected     Proteus Not Detected     Salmonella sp Not Detected     Serratia marcescens Not Detected     Haemophilus influenzae Not Detected     Neisseria meningtidis Not Detected     Pseudomonas aeruginosa Not Detected     Stenotrophomonas maltophilia Not Detected     Candida albicans Not Detected     Candida auris Not Detected     Candida glabrata Not Detected     Candida krusei Not Detected     Candida parapsilosis Not Detected     Candida tropicalis Not Detected     Cryptococcus neoformans/gattii Not Detected     CTX-M (ESBL ) Test not applicable     IMP (Carbapenem resistant) Test not applicable     KPC resistance gene (Carbapenem resistant) Test not applicable     mcr-1  Test not applicable     mec A/C  Test not applicable     mec A/C and MREJ (MRSA) gene Test not applicable     NDM (Carbapenem resistant) Test not applicable     OXA-48-like (Carbapenem resistant) Test not applicable     van A/B (VRE gene) Test not applicable     VIM (Carbapenem resistant) Test not applicable    Narrative:      Aerobic and anaerobic    Respiratory Infection Panel (PCR), Nasopharyngeal [[id number]]  (Abnormal) Collected: 12/30/24 0607    Order Status: Completed Specimen:  Nasopharyngeal Swab Updated: 12/30/24 2126     Respiratory Infection Panel Source NP Swab     Adenovirus Not Detected     Coronavirus 229E, Common Cold Virus Not Detected     Coronavirus HKU1, Common Cold Virus Not Detected     Coronavirus NL63, Common Cold Virus Not Detected     Coronavirus OC43, Common Cold Virus Not Detected     Comment: Coronavirus strains 229E, HKU1, NL63, and OC43 can cause the common   cold   and are not associated with the respiratory disease outbreak caused   by  the COVID-19 (SARS-CoV-2 novel Coronavirus) strain.           SARS-CoV2 (COVID-19) Qualitative PCR Not Detected     Human Metapneumovirus Not Detected     Human Rhinovirus/Enterovirus Detected     Influenza A (subtypes H1, H1-2009,H3) Not Detected     Influenza B Not Detected     Parainfluenza Virus 1 Not Detected     Parainfluenza Virus 2 Not Detected     Parainfluenza Virus 3 Not Detected     Parainfluenza Virus 4 Not Detected     Respiratory Syncytial Virus Not Detected     Bordetella Parapertussis (SB1765) Not Detected     Bordetella pertussis (ptxP) Not Detected     Chlamydia pneumoniae Not Detected     Mycoplasma pneumoniae Not Detected     Comment: Respiratory Infection Panel testing performed by Multiplex PCR.       Culture, Respiratory with Gram Stain [[id number]]     Order Status: No result Specimen: Respiratory     Influenza A & B by Molecular [[id number]] Collected: 12/29/24 1830    Order Status: Completed Specimen: Nasal Swab Updated: 12/29/24 1857     Influenza A, Molecular Negative     Influenza B, Molecular Negative     Flu A & B Source NP          CURRENT/PREVIOUS VISIT EKG  Results for orders placed or performed during the hospital encounter of 12/29/24   EKG 12-lead    Collection Time: 12/29/24 12:00 AM    Narrative    Ordered by an unspecified provider.       Significant Imaging: I have reviewed all relevant and available imaging results/findings within the past 24 hours.    I spent a total of 40 minutes on the  day of the visit.This includes face to face time and non-face to face time preparing to see the patient (eg, review of tests), obtaining and/or reviewing separately obtained history, documenting clinical information in the electronic or other health record, independently interpreting results and communicating results to the patient/family/caregiver, or care coordinator.    Alin Woo MD  Date of Service: 01/01/2025      This note was created using [employer] voice recognition software that occasionally misinterpreted phrases or words.

## 2025-01-01 NOTE — CARE UPDATE
Went and evaluated patient by bedside.  Patient is stable and AAO x4. Patient said she is allergic to omeprazole and she has severe acid reflux.  Changed famotidine 20 mg b.i.d. from daily per patient's request.  Added calcium carbonate.  Patient also requested additional gabapentin since she said that really helps with her neck pain so one dose of 100 mg gabapentin ordered in addition to scheduled gabapentin.

## 2025-01-01 NOTE — SUBJECTIVE & OBJECTIVE
Interval History:   Patient is seen and examined at multidisciplinary rounds, feels much better, neck pain significantly improved    Calcium was low, ionized calcium ordered, IV replacement ordered.  Yesterday was transferred to [facility] main, possible [facility] tomorrow.  MRV did not show any evidence of dural venous sinus thrombosis.    Received gabapentin for neck pain that helped her.    Mild hyponatremia to 131    Phosphorus low at 1.3, replacement ordered    Review of Systems   Musculoskeletal:  Negative for neck pain.   Neurological:  Negative for headaches.     Objective:     Vital Signs (Most Recent):  Temp: 97.4 °F (36.3 °C) (01/01/25 0701)  Pulse: 72 (01/01/25 1300)  Resp: 18 (01/01/25 1300)  BP: 137/72 (01/01/25 1300)  SpO2: 98 % (01/01/25 1300) Vital Signs (24h Range):  Temp:  [97.4 °F (36.3 °C)-97.9 °F (36.6 °C)] 97.4 °F (36.3 °C)  Pulse:  [68-87] 72  Resp:  [11-24] 18  SpO2:  [90 %-100 %] 98 %  BP: (111-172)/(55-83) 137/72     Weight: 54.4 kg (120 lb)  Body mass index is 23.44 kg/m².    Intake/Output Summary (Last 24 hours) at 1/1/2025 1504  Last data filed at 1/1/2025 1415  Gross per 24 hour   Intake 1260 ml   Output 3 ml   Net 1257 ml         Physical Exam  Constitutional:       Appearance: Normal appearance.   HENT:      Head: Normocephalic and atraumatic.      Nose: Nose normal.   Eyes:      Extraocular Movements: Extraocular movements intact.      Pupils: Pupils are equal, round, and reactive to light.   Cardiovascular:      Rate and Rhythm: Normal rate and regular rhythm.      Heart sounds: No murmur heard.  Pulmonary:      Effort: Pulmonary effort is normal.      Breath sounds: Normal breath sounds.   Abdominal:      General: Abdomen is flat.      Palpations: Abdomen is soft.   Musculoskeletal:         General: Normal range of motion.      Cervical back: Normal range of motion and neck supple.   Skin:     General: Skin is warm and dry.   Neurological:      General: No focal deficit present.      Mental  Status: She is alert and oriented to person, place, and time.   Psychiatric:         Mood and Affect: Mood normal.             Significant Labs: All pertinent labs within the past 24 hours have been reviewed.  CBC:   Recent Labs   Lab 12/31/24  0237 01/01/25  0446   WBC 10.54 10.82   HGB 8.7* 8.4*   HCT 26.8* 25.4*    257     CMP:   Recent Labs   Lab 12/31/24  0240 01/01/25  0446   * 131*   K 3.9 4.3   CL 99 98   CO2 26 28   * 93   BUN 11 10   CREATININE 0.7 0.6   CALCIUM 8.3* 7.3*   PROT 5.6* 4.9*   ALBUMIN 2.9* 3.1*   BILITOT 0.3 0.4   ALKPHOS 76 59   AST 14 8*   ALT 23 12   ANIONGAP 10 5*       Significant Imaging: I have reviewed all pertinent imaging results/findings within the past 24 hours.  I have reviewed and interpreted all pertinent imaging results/findings within the past 24 hours.    Scheduled Meds:   aspirin  81 mg Oral Daily    atorvastatin  20 mg Oral QHS    calcium carbonate  500 mg Oral TID    carvediloL  6.25 mg Oral BID    cefTRIAXone (Rocephin) IV (PEDS and ADULTS)  2 g Intravenous Q24H    enoxparin  40 mg Subcutaneous Q24H (prophylaxis, 1700)    famotidine  20 mg Oral Daily    gabapentin  100 mg Oral Once    gabapentin  200 mg Oral TID    lactobacillus acidophilus & bulgar  1 packet Oral BID    levothyroxine  75 mcg Oral Before breakfast     Continuous Infusions:  PRN Meds:.  Current Facility-Administered Medications:     acetaminophen, 650 mg, Oral, Q4H PRN    albuterol-ipratropium, 3 mL, Nebulization, Q6H PRN    aluminum-magnesium hydroxide-simethicone, 30 mL, Oral, QID PRN    calcium gluconate IVPB, 1 g, Intravenous, PRN    calcium gluconate IVPB, 2 g, Intravenous, PRN    calcium gluconate IVPB, 3 g, Intravenous, PRN    dextrose 10%, 12.5 g, Intravenous, PRN    dextrose 10%, 25 g, Intravenous, PRN    diphenhydrAMINE, 25 mg, Oral, Q6H PRN    diphenhydrAMINE-zinc acetate 2-0.1%, , Topical (Top), BID PRN    glucagon (human recombinant), 1 mg, Intramuscular, PRN    glucose,  16 g, Oral, PRN    glucose, 24 g, Oral, PRN    HYDROmorphone, 0.5 mg, Intravenous, Q4H PRN    magnesium oxide, 800 mg, Oral, PRN    magnesium oxide, 800 mg, Oral, PRN    magnesium sulfate IVPB, 2 g, Intravenous, PRN    magnesium sulfate IVPB, 4 g, Intravenous, PRN    melatonin, 6 mg, Oral, Nightly PRN    naloxone, 0.02 mg, Intravenous, PRN    ondansetron, 4 mg, Intravenous, Q8H PRN    oxyCODONE, 5 mg, Oral, Q4H PRN    oxyCODONE, 10 mg, Oral, Q4H PRN    potassium bicarbonate, 35 mEq, Oral, PRN    potassium bicarbonate, 50 mEq, Oral, PRN    potassium bicarbonate, 60 mEq, Oral, PRN    potassium chloride, 40 mEq, Intravenous, PRN **AND** potassium chloride, 60 mEq, Intravenous, PRN **AND** potassium chloride, 80 mEq, Intravenous, PRN    potassium, sodium phosphates, 2 packet, Oral, PRN    potassium, sodium phosphates, 2 packet, Oral, PRN    potassium, sodium phosphates, 2 packet, Oral, PRN    prochlorperazine, 5 mg, Intravenous, Q6H PRN    simethicone, 1 tablet, Oral, QID PRN    sodium chloride 0.9%, 10 mL, Intravenous, Q12H PRN    sodium phosphate 15 mmol in D5W 250 mL IVPB, 15 mmol, Intravenous, PRN    sodium phosphate 20.01 mmol in D5W 250 mL IVPB, 20.01 mmol, Intravenous, PRN    sodium phosphate 30 mmol in D5W 250 mL IVPB, 30 mmol, Intravenous, PRN    MRA Head for Temporal Arteritis W and WO Contrast    Result Date: 12/30/2024  EXAMINATION: MRA HEAD FOR TEMPORAL ARTERITIS W AND WO CONTRAST CLINICAL HISTORY: Neck pain, acute, infection suspected;Concern for Temporal Arteritis; TECHNIQUE: Time of flight and post contrast MR angiography sequences were performed before and following the IV administration of 5 mL of Gadavist..  Multiplanar and MIP images were performed. COMPARISON: MRI of the brain 12/30/2024 FINDINGS: The intracranial internal carotid arteries are patent bilaterally from the skull base to carotid terminus. Middle cerebral arteries are patent bilaterally. Anterior cerebral arteries are patent  bilaterally. There are codominant vertebral arteries. Bilateral vertebral arteries are patent to the confluence into the basilar artery. Basilar artery is normal in caliber. Posterior cerebral arteries are patent bilaterally. No evidence of significant mural thickening or abnormal enhancement along the courses of the temporal arteries to specifically indicate active vascular inflammation. Visualized dural venous sinuses are patent without evidence of dural venous sinus thrombosis.     1. No intracranial high-grade stenosis, large vessel occlusion, aneurysm or arteriovenous malformation. 2. No evidence of significant mural thickening or abnormal enhancement along the courses of the temporal arteries to specifically indicate active vascular inflammation. Electronically signed by: Marek Moreno Date:    12/30/2024 Time:    11:17    MRI Cervical Spine W WO Cont    Result Date: 12/30/2024  EXAMINATION: MRI CERVICAL SPINE W WO CONTRAST CLINICAL HISTORY: Neck pain, acute, infection suspected; TECHNIQUE: Multiplanar, multisequence MR images of the cervical spine were performed before and after the administration of intravenous contrast.  5 mL of Gadavist intravenous contrast were administered. COMPARISON: CT cervical spine 12/29/2024 FINDINGS: Study is mild to moderately degraded by motion artifact. Alignment: Reversal of the cervical lordosis, which may be secondary to positioning or muscle spasm.  Minimal anterolisthesis of C4 on C5 and retrolisthesis of C5 on C6. Vertebral Column: Vertebral body heights are maintained. No acute fracture is identified.  No evidence of an aggressive bone marrow replacement process. Multilevel disc degeneration, most pronounced at C5-6 and C6-7 with mild-to-moderate intervertebral disc space narrowing, disc desiccation, anterior marginal osteophyte formation and posterior disc osteophyte complexes. Cord: Normal signal and morphology. Skull base and craniocervical junction: Normal.  Degenerative findings: C2-C3: Minimal posterior disc osteophyte complex.  Mild bilateral facet arthropathy.  No significant neural foraminal or spinal canal stenosis. C3-C4: Mild posterior disc osteophyte complex.  Moderate bilateral facet arthropathy.  Marked left and moderate right uncovertebral joint spurring.  Severe left and moderate right neural foraminal stenosis.  No significant spinal canal stenosis. C4-C5: Mild posterior disc osteophyte complex.  Moderate bilateral facet arthropathy.  Moderate left and mild right uncovertebral joint spurring.  Moderate left and mild right neural foraminal stenosis.  No significant spinal canal stenosis. C5-C6: Posterior disc osteophyte complex, which partially effaces the ventral thecal sac.  Moderate bilateral facet arthropathy.  Marked left and moderate right uncovertebral joint spurring.  Severe left and moderate right neural foraminal stenosis.  Ligamentum flavum thickening, which partially effaces the dorsal thecal sac.  Mild spinal canal stenosis. C6-C7: Posterior disc osteophyte complex, which mildly effaces the ventral thecal sac.  Moderate bilateral facet arthropathy.  Moderate left and moderate right uncovertebral joint spurring.  Severe left and moderate right neural foraminal stenosis.  Ligamentum flavum thickening, which partially effaces the dorsal thecal sac.  Mild spinal canal stenosis. C7-T1: The disk is normal in configuration.  Mild bilateral facet arthropathy.  Mild bilateral uncovertebral joint spurring.  Mild left greater than right neural foraminal stenosis.  No significant spinal canal stenosis. Paraspinal muscles & soft tissues: Unremarkable. Normal signal voids are present in the vertebral arteries. No abnormal intraspinal enhancement identified.     1. No evidence of acute fracture, spondylodiscitis, high-grade spinal canal stenosis.  No cord compression, focal cord signal abnormality, or abnormal intraspinal enhancement. 2. Multilevel  degenerative changes of the cervical spine, as detailed above, with findings most pronounced at C5-6 and C6-7 and resulting in severe left and moderate right neural foraminal and mild spinal canal stenosis. Electronically signed by: Marek Moreno Date:    12/30/2024 Time:    10:47    MRA Neck without contrast    Result Date: 12/30/2024  EXAMINATION: MRA NECK WITHOUT CONTRAST CLINICAL HISTORY: CNS vasculitis, known or suspected; TECHNIQUE: Time of flight MRA of the carotid and vertebral arteries was performed with 3D reconstructions according to the standard neck MRA protocol. COMPARISON: None FINDINGS: Study is mild to moderately degraded by motion artifact. The right common carotid artery demonstrates no hemodynamically significant stenosis. The cervical right internal carotid artery demonstrates no hemodynamically significant stenosis. The left common carotid artery demonstrates no hemodynamically significant stenosis. The cervical left internal carotid artery demonstrates no hemodynamically significant stenosis. Extracranial vertebral arteries: Vertebral arteries are codominant.  Vertebral arteries are normal to the level of the foramen magnum.     No evidence of a hemodynamically significant stenosis, major branch occlusion or aneurysm in the neck arterial vasculature, allowing for motion degradation. Electronically signed by: Marek Moreno Date:    12/30/2024 Time:    10:23    MRI Brain Without Contrast    Result Date: 12/30/2024  EXAMINATION: MRI BRAIN WITHOUT CONTRAST CLINICAL HISTORY: Headache, new or worsening (Age >= 50y);. TECHNIQUE: Multiplanar multisequence MR imaging of the brain was performed without the administration of intravenous contrast. COMPARISON: CT head 12/29/2024, MRI brain 01/24/2023 FINDINGS: Study is  degraded by motion artifact. Ventricles and sulci are normal in size for age without evidence of hydrocephalus. No extra-axial blood or fluid collections. Scattered foci of T2/FLAIR  hyperintense signal within the supratentorial white matter, nonspecific and may reflect sequelae of mild chronic small vessel ischemic change.    Diffusion-weighted images demonstrate no evidence of an acute infarct.   Susceptibility weighted images demonstrate no evidence of acute or chronic hemorrhage. No significant intracranial mass effect or midline shift. Normal vascular flow voids are present. Diffusely heterogeneous calvarial bone marrow signal, nonspecific but similar to prior exam from 2023, and may be seen with red marrow reconversion associated with chronic anemic states, osteoporosis, hypoxia, or smoking. Moderate opacification of the left maxillary sinus with mucosal membrane thickening and small air-fluid level.  Mild scattered mucosal membrane thickening elsewhere in the paranasal sinuses.  The visualized portions of the mastoids are unremarkable. Bilateral lens replacements are noted.     1. No acute intracranial abnormality. 2. Mild generalized cerebral volume loss and scattered T2/FLAIR hyperintense signal within the supratentorial white matter, nonspecific but probably reflecting sequelae of chronic small vessel ischemic change. 3. Left maxillary sinusitis. Electronically signed by: Marek Moreno Date:    12/30/2024 Time:    07:39    X-Ray Chest AP Portable    Result Date: 12/29/2024  EXAMINATION: XR CHEST AP PORTABLE CLINICAL HISTORY: Chest Pain; TECHNIQUE: Single frontal view of the chest was performed. COMPARISON: 02/02/2023. FINDINGS: There are mild interstitial opacities, suspicious for interstitial edema/CHF.  The pleural spaces are clear the cardiac silhouette is borderline.  There are calcifications of the aortic arch.  There is a prosthetic aortic valve stent.  Osseous structures demonstrate degenerative changes.     Mild interstitial opacities, suspicious for interstitial edema/CHF. Electronically signed by: Anthony Andres Date:    12/29/2024 Time:    17:58    CT Head Without  Contrast    Result Date: 12/29/2024  EXAMINATION: CT HEAD WITHOUT CONTRAST CLINICAL HISTORY: Headache, new or worsening (Age >= 50y); TECHNIQUE: Low dose axial CT images obtained throughout the head without intravenous contrast. Sagittal and coronal reconstructions were performed. COMPARISON: None. FINDINGS: Intracranial compartment: Ventricles and sulci are normal in size for age without evidence of hydrocephalus. No extra-axial blood or fluid collections. The brain parenchyma appears normal. No parenchymal mass, hemorrhage, edema or major vascular distribution infarct. Skull/extracranial contents (limited evaluation): No fracture. There is a left maxillary sinus air-fluid level.     There is no evidence acute intracranial abnormality.  There is left maxillary sinus disease. Electronically signed by: Susana Carrion MD Date:    12/29/2024 Time:    16:49    CT Cervical Spine Without Contrast    Result Date: 12/29/2024  EXAMINATION: CT CERVICAL SPINE WITHOUT CONTRAST CLINICAL HISTORY: Neck pain, chronic, degenerative changes on xray;Neck pain, acute exacerbation with chronic degenerative changes; TECHNIQUE: Low dose axial images, sagittal and coronal reformations were performed though the cervical spine.  Contrast was not administered. COMPARISON: 12/18/2024 FINDINGS: There is no evidence fracture or malalignment.  There are degenerative changes throughout the cervical spine most prominent in the mid cervical spine.  There is no prevertebral soft tissue swelling.  The adjacent soft tissues are unremarkable.     There are degenerative changes throughout the cervical spine. Electronically signed by: Susana Carrion MD Date:    12/29/2024 Time:    15:48    X-Ray Cervical Spine AP And Lateral    Result Date: 12/18/2024  EXAMINATION: XR CERVICAL SPINE AP LATERAL CLINICAL HISTORY: Cervicalgia TECHNIQUE: AP, lateral and open mouth views of the cervical spine were performed. COMPARISON: None. FINDINGS: Vertebral body  heights are preserved.Trace anterolisthesis of C4 on C5 and retrolisthesis of C5 on C6.Moderate disc height loss at C5-C6 and C6-C7 with shallow endplate centered osteophytes.  No abnormal prevertebral soft tissue thickening.     As above. Electronically signed by: Marek Burton Date:    12/18/2024 Time:    11:21    Mammo Digital Screening Left with Krishna    Result Date: 12/3/2024  Facility: [facility] [address] [place] [address] [phone] Name: Vivien Burton MRN: [id number] Result: Mammo Digital Screening Left with Krishna History: Patient is 79 y.o. and is seen for a screening mammogram. Films Compared: Compared to: 11/30/2023 Mammo Digital Screening Left with Krishna, 08/11/2022 Mammo Digital Screening Left with Krishna, and 08/09/2021 Mammo Digital Screening Left with Krishna Findings: This procedure was performed using tomosynthesis. Computer-aided detection was utilized in the interpretation of this examination. There are scattered areas of fibroglandular density. There is no evidence of suspicious masses, microcalcifications or architectural distortion.      No mammographic evidence of malignancy. BI-RADS Category 1: Negative Recommendation: Routine screening mammogram in 1 year is recommended. Seun Amaro MD   - pulls last radiology orders

## 2025-01-01 NOTE — PROGRESS NOTES
"[facility]  Progress Note    Patient Name: Vivien Burton  MRN: [id number]  Patient Class: IP- Inpatient   Admission Date: 12/29/2024  Length of Stay: 1 days  Attending Physician: Rosendo Storey MD  Primary Care Provider: Jeri Moreira MD        Subjective     Principal Problem:<principal problem not specified>        HPI:  Sydnee Burton is a 79 year old female with a previous medical history of anemia, basal cell carcinoma, breast cancer, CHF, streptococcal bacteremia, GERD, HLD, HTN, CAD, TAVR, hypothyroidism, accidental tylenol overdose and abnormal MRI who presented to the ED for a headache and neck pain. The patient endorses a left sided headache described as stabbing and radiating from left upper neck. It is associated with tenderness to palpation in the left parietal scalp daily and intermittently the left temporal area. The pain has been daily for one month and reports taking tylenol every 8 hours with relief but the pain returns. Today the pain became intractable which caused her to present to the ED. CT of head showed no acute process and CT of c-spine showed degenerative changes. While in ED she developed a fever, oxygen saturation of 92%, hypertension, and tachycardia. She endorsed having a non-productive cough. Sepsis bundle initiated and cardiac workup added. She was given toradol, 2mg of PO diluadid, gabapentin, and acyclovir. Her pain improved and her blood pressure did also. She was maintaining an oxygen saturation of 99% on 1 liter nasal cannula and denied any shortness of breath. Fever resolved with tylenol. 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 She most recently finished a steroid taper at end of November. ED initiated acyclovir due one "leison" noted to left sided neck and pain with palpation to scalp. Lab work showed elevated troponin, normal lactic " "acid, no leukocytosis, increased anemia over last month, procalcitonin 81.13, normal TSH, and AST of 54.  with no peripheral edema but chest xray showed "Mild interstitial opacities, suspicious for interstitial edema/CHF." She has no tachypnea or exertional SOB. FLU/RSV/ Covid negative.  Initially her blood pressure was too low for lasix administration but later improved and she was administered 20mg of IV lasix. Troponin trended up and then lowered on third draw. Urinalysis pending at time of note. Due to ESR of 33 and CRP of 110.4 with fever, scalp tenderness, and worsening headache she was empirically started on prednisone 60mg for one dose and to be continued by primary team pending MRA of head to assess for temporal arteritis. MRI of c-spine ordered. MRA of neck ordered.  One year ago patient presented to ED with right sided headache that was similar to this visit. It was discovered she was septic due to pneumonia but on that visit she has 43K WBC and elevated lactic acid. Neurology was consulted at this time and MRI performed which was abnormal. Patient has no vision loss and is neurologically intact. She has been continued on IV vancomycin and IV cefepime and oral valcyclovir. Cardiology and ID consulted.  Course of care discussed with daughter in law Dottie Burton who agreed with plan of care.  Patient admitted by hospital medicine for further evaluation and management.     Overview/Hospital Course:  79 year old female with a previous medical history of anemia, basal cell carcinoma, breast cancer, CHF, streptococcal bacteremia, GERD, HLD, HTN, CAD, TAVR, hypothyroidism, accidental tylenol overdose and abnormal MRI initially admitted on 12/29 for severe headache/ left-sided neck pain for around a month, patient also has intermittent low-grade fever.  Workup shows severe elevated CRP/ ESR, elevated procalcitonin, with unclear infectious source.  CT chest abdomen and pelvis is negative for " infectious source.  Patient also has mild elevated troponin most likely secondary to sepsis as per cardiologist Dr. SISSY Garvin, patient also has elevated BNP 1680, possible acute CHF, patient received IV Lasix, -2.5 L. patient apparently euvolemic now.  Patient has a series of imaging study including CT head/ CT neck/ MRI of the cervical/ MRI of the brain which is insignificant other than shows significant cervical spondylosis.  Eventually the patient had LP 12/31 which showed RBC 2, WBC 2, essentially negative.   Patient had TTE 12/31 LVEF 55-60%, no significant valvular vegetations.   Now patient 2/2 blood culture showed strep viridans, possible source ?  IE, patient does have very bad dental hygiene, patient is restarted on IV Rocephin by ID.   Spoke with cardiologist Dr. SISSY Garvin.  Patient will be transferred to step-down unit at [facility], may need a [facility].   Regarding neck pain, this is a concern for patient's may have GCA.  Neurologist following.   Patient also found to rhinovirus positive, continue droplet precaution.   MRV of the brain is done and pending for report.     Interval History:     Patient is seen and examined at multidisciplinary rounds, feel bad again, still complaining of left-sided neck pain, afebrile, no chest pain or SOB, -2.5L with IV Lasix, looks euvolemic now.  LP is done, WBC 2, RBC is 2.  TTE negative for vegetation.         Review of Systems   Musculoskeletal:  Positive for neck pain.   Neurological:  Positive for headaches.     Objective:     Vital Signs (Most Recent):  Temp: 97.5 °F (36.4 °C) (12/31/24 1659)  Pulse: 84 (12/31/24 1659)  Resp: 19 (12/31/24 1659)  BP: (!) 133/59 (12/31/24 1659)  SpO2: 99 % (12/31/24 1659) Vital Signs (24h Range):  Temp:  [97.4 °F (36.3 °C)-98.9 °F (37.2 °C)] 97.5 °F (36.4 °C)  Pulse:  [79-94] 84  Resp:  [16-19] 19  SpO2:  [95 %-99 %] 99 %  BP: (118-180)/(55-76) 133/59     Weight: 54.4 kg (120 lb)  Body mass index is 23.44 kg/m².    Intake/Output Summary (Last  24 hours) at 12/31/2024 1836  Last data filed at 12/31/2024 0702  Gross per 24 hour   Intake 360 ml   Output 2850 ml   Net -2490 ml         Physical Exam  Constitutional:       Appearance: Normal appearance.   HENT:      Head: Normocephalic and atraumatic.      Nose: Nose normal.   Eyes:      Extraocular Movements: Extraocular movements intact.      Pupils: Pupils are equal, round, and reactive to light.   Cardiovascular:      Rate and Rhythm: Normal rate and regular rhythm.      Heart sounds: No murmur heard.  Pulmonary:      Effort: Pulmonary effort is normal.      Breath sounds: Normal breath sounds.   Abdominal:      General: Abdomen is flat.      Palpations: Abdomen is soft.   Musculoskeletal:         General: Normal range of motion.      Cervical back: Normal range of motion and neck supple.   Skin:     General: Skin is warm and dry.   Neurological:      General: No focal deficit present.      Mental Status: She is alert and oriented to person, place, and time.   Psychiatric:         Mood and Affect: Mood normal.             Significant Labs: All pertinent labs within the past 24 hours have been reviewed.  CBC:   Recent Labs   Lab 12/30/24  0415 12/31/24  0237   WBC 9.77 10.54   HGB 8.5* 8.7*   HCT 26.1* 26.8*    251     CMP:   Recent Labs   Lab 12/30/24  0415 12/31/24  0240   * 135*   K 3.4* 3.9    99   CO2 23 26   * 138*   BUN 12 11   CREATININE 0.9 0.7   CALCIUM 8.6* 8.3*   PROT 5.0* 5.6*   ALBUMIN 2.6* 2.9*   BILITOT 0.4 0.3   ALKPHOS 75 76   AST 28 14   ALT 28 23   ANIONGAP 9 10       Significant Imaging: I have reviewed all pertinent imaging results/findings within the past 24 hours.  I have reviewed and interpreted all pertinent imaging results/findings within the past 24 hours.    Scheduled Meds:   aspirin  81 mg Oral Daily    atorvastatin  20 mg Oral QHS    calcium carbonate  500 mg Oral TID    carvediloL  6.25 mg Oral BID    [START ON 1/1/2025] cefTRIAXone (Rocephin) IV  (PEDS and ADULTS)  2 g Intravenous Q24H    enoxparin  40 mg Subcutaneous Q24H (prophylaxis, 1700)    famotidine  20 mg Oral BID    gabapentin  100 mg Oral Once    gabapentin  200 mg Oral TID    lactobacillus acidophilus & bulgar  1 packet Oral BID    levothyroxine  75 mcg Oral Before breakfast     Continuous Infusions:  PRN Meds:.  Current Facility-Administered Medications:     acetaminophen, 650 mg, Oral, Q4H PRN    albuterol-ipratropium, 3 mL, Nebulization, Q6H PRN    aluminum-magnesium hydroxide-simethicone, 30 mL, Oral, QID PRN    dextrose 10%, 12.5 g, Intravenous, PRN    dextrose 10%, 25 g, Intravenous, PRN    diphenhydrAMINE, 25 mg, Oral, Q6H PRN    diphenhydrAMINE-zinc acetate 2-0.1%, , Topical (Top), BID PRN    glucagon (human recombinant), 1 mg, Intramuscular, PRN    glucose, 16 g, Oral, PRN    glucose, 24 g, Oral, PRN    HYDROmorphone, 0.5 mg, Intravenous, Q4H PRN    magnesium oxide, 800 mg, Oral, PRN    magnesium oxide, 800 mg, Oral, PRN    melatonin, 6 mg, Oral, Nightly PRN    naloxone, 0.02 mg, Intravenous, PRN    ondansetron, 4 mg, Intravenous, Q8H PRN    oxyCODONE, 5 mg, Oral, Q4H PRN    oxyCODONE, 10 mg, Oral, Q4H PRN    potassium bicarbonate, 35 mEq, Oral, PRN    potassium bicarbonate, 50 mEq, Oral, PRN    potassium bicarbonate, 60 mEq, Oral, PRN    potassium, sodium phosphates, 2 packet, Oral, PRN    potassium, sodium phosphates, 2 packet, Oral, PRN    potassium, sodium phosphates, 2 packet, Oral, PRN    prochlorperazine, 5 mg, Intravenous, Q6H PRN    simethicone, 1 tablet, Oral, QID PRN    sodium chloride 0.9%, 10 mL, Intravenous, Q12H PRN    MRA Head for Temporal Arteritis W and WO Contrast    Result Date: 12/30/2024  EXAMINATION: MRA HEAD FOR TEMPORAL ARTERITIS W AND WO CONTRAST CLINICAL HISTORY: Neck pain, acute, infection suspected;Concern for Temporal Arteritis; TECHNIQUE: Time of flight and post contrast MR angiography sequences were performed before and following the IV administration of 5  mL of Gadavist..  Multiplanar and MIP images were performed. COMPARISON: MRI of the brain 12/30/2024 FINDINGS: The intracranial internal carotid arteries are patent bilaterally from the skull base to carotid terminus. Middle cerebral arteries are patent bilaterally. Anterior cerebral arteries are patent bilaterally. There are codominant vertebral arteries. Bilateral vertebral arteries are patent to the confluence into the basilar artery. Basilar artery is normal in caliber. Posterior cerebral arteries are patent bilaterally. No evidence of significant mural thickening or abnormal enhancement along the courses of the temporal arteries to specifically indicate active vascular inflammation. Visualized dural venous sinuses are patent without evidence of dural venous sinus thrombosis.     1. No intracranial high-grade stenosis, large vessel occlusion, aneurysm or arteriovenous malformation. 2. No evidence of significant mural thickening or abnormal enhancement along the courses of the temporal arteries to specifically indicate active vascular inflammation. Electronically signed by: Marek Moreno Date:    12/30/2024 Time:    11:17    MRI Cervical Spine W WO Cont    Result Date: 12/30/2024  EXAMINATION: MRI CERVICAL SPINE W WO CONTRAST CLINICAL HISTORY: Neck pain, acute, infection suspected; TECHNIQUE: Multiplanar, multisequence MR images of the cervical spine were performed before and after the administration of intravenous contrast.  5 mL of Gadavist intravenous contrast were administered. COMPARISON: CT cervical spine 12/29/2024 FINDINGS: Study is mild to moderately degraded by motion artifact. Alignment: Reversal of the cervical lordosis, which may be secondary to positioning or muscle spasm.  Minimal anterolisthesis of C4 on C5 and retrolisthesis of C5 on C6. Vertebral Column: Vertebral body heights are maintained. No acute fracture is identified.  No evidence of an aggressive bone marrow replacement process. Multilevel  disc degeneration, most pronounced at C5-6 and C6-7 with mild-to-moderate intervertebral disc space narrowing, disc desiccation, anterior marginal osteophyte formation and posterior disc osteophyte complexes. Cord: Normal signal and morphology. Skull base and craniocervical junction: Normal. Degenerative findings: C2-C3: Minimal posterior disc osteophyte complex.  Mild bilateral facet arthropathy.  No significant neural foraminal or spinal canal stenosis. C3-C4: Mild posterior disc osteophyte complex.  Moderate bilateral facet arthropathy.  Marked left and moderate right uncovertebral joint spurring.  Severe left and moderate right neural foraminal stenosis.  No significant spinal canal stenosis. C4-C5: Mild posterior disc osteophyte complex.  Moderate bilateral facet arthropathy.  Moderate left and mild right uncovertebral joint spurring.  Moderate left and mild right neural foraminal stenosis.  No significant spinal canal stenosis. C5-C6: Posterior disc osteophyte complex, which partially effaces the ventral thecal sac.  Moderate bilateral facet arthropathy.  Marked left and moderate right uncovertebral joint spurring.  Severe left and moderate right neural foraminal stenosis.  Ligamentum flavum thickening, which partially effaces the dorsal thecal sac.  Mild spinal canal stenosis. C6-C7: Posterior disc osteophyte complex, which mildly effaces the ventral thecal sac.  Moderate bilateral facet arthropathy.  Moderate left and moderate right uncovertebral joint spurring.  Severe left and moderate right neural foraminal stenosis.  Ligamentum flavum thickening, which partially effaces the dorsal thecal sac.  Mild spinal canal stenosis. C7-T1: The disk is normal in configuration.  Mild bilateral facet arthropathy.  Mild bilateral uncovertebral joint spurring.  Mild left greater than right neural foraminal stenosis.  No significant spinal canal stenosis. Paraspinal muscles & soft tissues: Unremarkable. Normal signal  voids are present in the vertebral arteries. No abnormal intraspinal enhancement identified.     1. No evidence of acute fracture, spondylodiscitis, high-grade spinal canal stenosis.  No cord compression, focal cord signal abnormality, or abnormal intraspinal enhancement. 2. Multilevel degenerative changes of the cervical spine, as detailed above, with findings most pronounced at C5-6 and C6-7 and resulting in severe left and moderate right neural foraminal and mild spinal canal stenosis. Electronically signed by: Marek Moreno Date:    12/30/2024 Time:    10:47    MRA Neck without contrast    Result Date: 12/30/2024  EXAMINATION: MRA NECK WITHOUT CONTRAST CLINICAL HISTORY: CNS vasculitis, known or suspected; TECHNIQUE: Time of flight MRA of the carotid and vertebral arteries was performed with 3D reconstructions according to the standard neck MRA protocol. COMPARISON: None FINDINGS: Study is mild to moderately degraded by motion artifact. The right common carotid artery demonstrates no hemodynamically significant stenosis. The cervical right internal carotid artery demonstrates no hemodynamically significant stenosis. The left common carotid artery demonstrates no hemodynamically significant stenosis. The cervical left internal carotid artery demonstrates no hemodynamically significant stenosis. Extracranial vertebral arteries: Vertebral arteries are codominant.  Vertebral arteries are normal to the level of the foramen magnum.     No evidence of a hemodynamically significant stenosis, major branch occlusion or aneurysm in the neck arterial vasculature, allowing for motion degradation. Electronically signed by: Marek Moreno Date:    12/30/2024 Time:    10:23    MRI Brain Without Contrast    Result Date: 12/30/2024  EXAMINATION: MRI BRAIN WITHOUT CONTRAST CLINICAL HISTORY: Headache, new or worsening (Age >= 50y);. TECHNIQUE: Multiplanar multisequence MR imaging of the brain was performed without the administration of  intravenous contrast. COMPARISON: CT head 12/29/2024, MRI brain 01/24/2023 FINDINGS: Study is  degraded by motion artifact. Ventricles and sulci are normal in size for age without evidence of hydrocephalus. No extra-axial blood or fluid collections. Scattered foci of T2/FLAIR hyperintense signal within the supratentorial white matter, nonspecific and may reflect sequelae of mild chronic small vessel ischemic change.    Diffusion-weighted images demonstrate no evidence of an acute infarct.   Susceptibility weighted images demonstrate no evidence of acute or chronic hemorrhage. No significant intracranial mass effect or midline shift. Normal vascular flow voids are present. Diffusely heterogeneous calvarial bone marrow signal, nonspecific but similar to prior exam from 2023, and may be seen with red marrow reconversion associated with chronic anemic states, osteoporosis, hypoxia, or smoking. Moderate opacification of the left maxillary sinus with mucosal membrane thickening and small air-fluid level.  Mild scattered mucosal membrane thickening elsewhere in the paranasal sinuses.  The visualized portions of the mastoids are unremarkable. Bilateral lens replacements are noted.     1. No acute intracranial abnormality. 2. Mild generalized cerebral volume loss and scattered T2/FLAIR hyperintense signal within the supratentorial white matter, nonspecific but probably reflecting sequelae of chronic small vessel ischemic change. 3. Left maxillary sinusitis. Electronically signed by: Marek Moreno Date:    12/30/2024 Time:    07:39    X-Ray Chest AP Portable    Result Date: 12/29/2024  EXAMINATION: XR CHEST AP PORTABLE CLINICAL HISTORY: Chest Pain; TECHNIQUE: Single frontal view of the chest was performed. COMPARISON: 02/02/2023. FINDINGS: There are mild interstitial opacities, suspicious for interstitial edema/CHF.  The pleural spaces are clear the cardiac silhouette is borderline.  There are calcifications of the aortic arch.   There is a prosthetic aortic valve stent.  Osseous structures demonstrate degenerative changes.     Mild interstitial opacities, suspicious for interstitial edema/CHF. Electronically signed by: Anthony Andres Date:    12/29/2024 Time:    17:58    CT Head Without Contrast    Result Date: 12/29/2024  EXAMINATION: CT HEAD WITHOUT CONTRAST CLINICAL HISTORY: Headache, new or worsening (Age >= 50y); TECHNIQUE: Low dose axial CT images obtained throughout the head without intravenous contrast. Sagittal and coronal reconstructions were performed. COMPARISON: None. FINDINGS: Intracranial compartment: Ventricles and sulci are normal in size for age without evidence of hydrocephalus. No extra-axial blood or fluid collections. The brain parenchyma appears normal. No parenchymal mass, hemorrhage, edema or major vascular distribution infarct. Skull/extracranial contents (limited evaluation): No fracture. There is a left maxillary sinus air-fluid level.     There is no evidence acute intracranial abnormality.  There is left maxillary sinus disease. Electronically signed by: Susana Carrion MD Date:    12/29/2024 Time:    16:49    CT Cervical Spine Without Contrast    Result Date: 12/29/2024  EXAMINATION: CT CERVICAL SPINE WITHOUT CONTRAST CLINICAL HISTORY: Neck pain, chronic, degenerative changes on xray;Neck pain, acute exacerbation with chronic degenerative changes; TECHNIQUE: Low dose axial images, sagittal and coronal reformations were performed though the cervical spine.  Contrast was not administered. COMPARISON: 12/18/2024 FINDINGS: There is no evidence fracture or malalignment.  There are degenerative changes throughout the cervical spine most prominent in the mid cervical spine.  There is no prevertebral soft tissue swelling.  The adjacent soft tissues are unremarkable.     There are degenerative changes throughout the cervical spine. Electronically signed by: Susana Carrion MD Date:    12/29/2024  Time:    15:48    X-Ray Cervical Spine AP And Lateral    Result Date: 12/18/2024  EXAMINATION: XR CERVICAL SPINE AP LATERAL CLINICAL HISTORY: Cervicalgia TECHNIQUE: AP, lateral and open mouth views of the cervical spine were performed. COMPARISON: None. FINDINGS: Vertebral body heights are preserved.Trace anterolisthesis of C4 on C5 and retrolisthesis of C5 on C6.Moderate disc height loss at C5-C6 and C6-C7 with shallow endplate centered osteophytes.  No abnormal prevertebral soft tissue thickening.     As above. Electronically signed by: Marek Burton Date:    12/18/2024 Time:    11:21    Mammo Digital Screening Left with Krishna    Result Date: 12/3/2024  Facility: [facility] [address] [place] [address] [phone] Name: Vivien Burton MRN: [id number] Result: Mammo Digital Screening Left with Krishna History: Patient is 79 y.o. and is seen for a screening mammogram. Films Compared: Compared to: 11/30/2023 Mammo Digital Screening Left with Krishna, 08/11/2022 Mammo Digital Screening Left with Krishna, and 08/09/2021 Mammo Digital Screening Left with Krishna Findings: This procedure was performed using tomosynthesis. Computer-aided detection was utilized in the interpretation of this examination. There are scattered areas of fibroglandular density. There is no evidence of suspicious masses, microcalcifications or architectural distortion.      No mammographic evidence of malignancy. BI-RADS Category 1: Negative Recommendation: Routine screening mammogram in 1 year is recommended. Seun Amaro MD   - pulls last radiology orders      Assessment and Plan     Neck pain without injury  Left sided neck pain that is sharp and stabbing into head. Patient endorses that specific area on head is painful to touch. CT of neck shows degenerative changes      Patient has significant left-sided neck pain radiated to left occipital.  Extensive imaging study is not quite impressive except severe  cervical spondylosis.   Status post LP-essential negative.   Discontinue steroids.   Follow up neurologist.         Severe sepsis/ strep viridans bacteremia  This patient does have evidence of infective focus  My overall impression is sepsis.  Source: Respiratory and Unknown Urinalysis pending at time of note  Antibiotics given-   Antibiotics (72h ago, onward)      Start     Stop Route Frequency Ordered    12/31/24 0830  cefTRIAXone injection 2 g         -- IV Every 12 hours (non-standard times) 12/31/24 0745          Latest lactate reviewed-  Recent Labs   Lab 12/29/24  1835 12/29/24  2237 12/30/24  0415   LACTATE  --    < > 0.9   POCLAC 0.53  --   --     < > = values in this interval not displayed.     Procalcitonin: 81.13  CRP: 110.4  Sed Rate 33      Organ dysfunction indicated by Acute on chronic  heart failure    Fluid challenge Contraindicated- Fluid bolus is contraindicated in this patient due to Congestive Heart Failure     Post- resuscitation assessment Yes Perfusion exam was performed within 6 hours of septic shock presentation after bolus shows Adequate tissue perfusion assessed by non-invasive monitoring     CT chest abdomen and pelvis with IV contrast negative for infectious source.   Status post LP 12/31, WBC 2, RBC 2, essentially negative.   TTE 12/31, negative for vegetation.   Patient has very poor dental hygiene, now patient has Streptococcus viridans bacteremia, patient also has a history of TAVR, highly concerned the patient may have underlying IE,  spoke with cardiologist, patient's may need [facility], recommended transfer to [facility] step-down unit for close monitor.     Continue IV Rocephin 2 g q.12 hours.       Rhinovirus infection    Aware, droplet precaution, conservative management    Intractable headache  As above    Elevated troponin  Denies chest pain or Shortness of breath.    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   Per Dematology note on 12/4/24 "Biopsy proven drug eruption, cleared with prednisone and d/c of prilosec. She restarted prilosec and rash came back. She stopped it at end of November and had another course of steroids". Per chart review patient was placed on steroid taper starting 11/16    History of transcatheter aortic valve replacement (TAVR)  Chronic condition noted      HTN (hypertension)  Patient's blood pressure range in the last 24 hours was: BP  Min: 100/53  Max: 190/79.The patient's inpatient anti-hypertensive regimen is listed below:  Current Antihypertensives  furosemide injection 20 mg, Every 12 hours, Intravenous    Plan  BP is currently labile and patient being admitted for sepsis. Losartan 50mg QHS was held please restart as warranted    Coronary artery disease  Patient with known CAD s/p stent placement and CABG, which is controlled Will continue ASA and Statin and monitor for S/Sx of angina/ACS. Continue to monitor on telemetry.     Troponin trending up. Will continue to trend every four hours. Patient denies chest pain or SOB.   Cardiology consulted.     Acute on chronic HFrEF (heart failure with reduced ejection fraction)    Echo Saline Bubble? No; Ultrasound enhancing contrast? Yes    Result Date: 12/31/2024    Left Ventricle: The left ventricle is normal in size. Mildly increased   wall thickness. There is concentric hypertrophy. There is normal systolic   function with a visually estimated ejection fraction of 55 - 60%. There is   normal diastolic function.    Right Ventricle: Normal right ventricular cavity size. Wall thickness   is normal. Systolic function is normal.    Left Atrium: Left atrium is mildly dilated.    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 .  This has no obvious vegetation identified on this valve.    Adequate function is noted    Mitral Valve: " The mitral valve is structurally normal. There is normal   leaflet mobility.    Pulmonary Artery: There is mild pulmonary hypertension. The estimated   pulmonary artery systolic pressure is 42 mmHg.    IVC/SVC: Normal venous pressure at 3 mmHg.    No echocardiographic evidence of vegetation noted        Echo    Result Date: 11/22/2024    Left Ventricle: The left ventricle is normal in size. Mildly increased   wall thickness. There is mild concentric hypertrophy. There is normal   systolic function with a visually estimated ejection fraction of 55 - 70%.   Ejection fraction is approximately 60%. Global longitudinal strain is   -18.0%. Global longitudinal strain is normal. There is normal diastolic   function.    Right Ventricle: Normal right ventricular cavity size. Systolic   function is normal. TAPSE is 1.90 cm. Right ventricular global   longitudinal strain is - 23.8%.    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 It is reported to be a 26 mm. Aortic valve area by VTI is 1.2   cm². Aortic valve peak velocity is 1.6 m/s. Mean gradient is 5.2 mmHg. The   dimensionless index is 0.59.    IVC/SVC: Normal venous pressure at 3 mmHg.    No definite change from Echo in 2/2023.           Patient looks euvolemic now, discontinue IV Lasix. -2.5 L since yesterday.       Anemia   Most recent hemoglobin and hematocrit are listed below.  Recent Labs     12/29/24  1737 12/30/24  0415 12/31/24  0237   HGB 9.0* 8.5* 8.7*   HCT 26.4* 26.1* 26.8*       Plan  - Monitor serial CBC: Daily  - Transfuse PRBC if patient becomes hemodynamically unstable, symptomatic or H/H drops below 7/21.  - Patient has not received any PRBC transfusions to date  - Patient's anemia is currently stable    Unspecified hypothyroidism  Chronic condition noted  -Levothyroxine continued      Breast cancer  History of Breast Cancer in remission. Last treatment 24 years ago.       GERD (gastroesophageal reflux  disease)  -Pepcid 40mg QHS        VTE Risk Mitigation (From admission, onward)           Ordered     enoxaparin injection 40 mg  Every 24 hours         12/31/24 1543     IP VTE HIGH RISK PATIENT  Once         12/29/24 2021     Place sequential compression device  Until discontinued         12/29/24 2021                    Discharge Planning   MIRELLA: 1/3/2025     Code Status: Full Code   Medical Readiness for Discharge Date:   Discharge Plan A: Home                        Rosendo Storye MD  Department of Hospital Medicine   [facility]

## 2025-01-01 NOTE — CARE UPDATE
01/01/25 0915   Patient Assessment/Suction   Level of Consciousness (AVPU) alert   Respiratory Effort Unlabored   Expansion/Accessory Muscles/Retractions no use of accessory muscles   Rhythm/Pattern, Respiratory no shortness of breath reported   Cough Frequency no cough   PRE-TX-O2   Device (Oxygen Therapy) room air   Pulse Oximetry Type Continuous   $ Pulse Oximetry - Multiple Charge Pulse Oximetry - Multiple   Aerosol Therapy   $ Aerosol Therapy Charges PRN treatment not required   Respiratory Treatment Status (SVN) PRN treatment not required   Education   $ Education Bronchodilator;Oxygen;15 min

## 2025-01-01 NOTE — SUBJECTIVE & OBJECTIVE
Interval History:     Patient is seen and examined at multidisciplinary rounds, feel bad again, still complaining of left-sided neck pain, afebrile, no chest pain or SOB, -2.5L with IV Lasix, looks euvolemic now.  LP is done, WBC 2, RBC is 2.  TTE negative for vegetation.         Review of Systems   Musculoskeletal:  Positive for neck pain.   Neurological:  Positive for headaches.     Objective:     Vital Signs (Most Recent):  Temp: 97.5 °F (36.4 °C) (12/31/24 1659)  Pulse: 84 (12/31/24 1659)  Resp: 19 (12/31/24 1659)  BP: (!) 133/59 (12/31/24 1659)  SpO2: 99 % (12/31/24 1659) Vital Signs (24h Range):  Temp:  [97.4 °F (36.3 °C)-98.9 °F (37.2 °C)] 97.5 °F (36.4 °C)  Pulse:  [79-94] 84  Resp:  [16-19] 19  SpO2:  [95 %-99 %] 99 %  BP: (118-180)/(55-76) 133/59     Weight: 54.4 kg (120 lb)  Body mass index is 23.44 kg/m².    Intake/Output Summary (Last 24 hours) at 12/31/2024 1836  Last data filed at 12/31/2024 0702  Gross per 24 hour   Intake 360 ml   Output 2850 ml   Net -2490 ml         Physical Exam  Constitutional:       Appearance: Normal appearance.   HENT:      Head: Normocephalic and atraumatic.      Nose: Nose normal.   Eyes:      Extraocular Movements: Extraocular movements intact.      Pupils: Pupils are equal, round, and reactive to light.   Cardiovascular:      Rate and Rhythm: Normal rate and regular rhythm.      Heart sounds: No murmur heard.  Pulmonary:      Effort: Pulmonary effort is normal.      Breath sounds: Normal breath sounds.   Abdominal:      General: Abdomen is flat.      Palpations: Abdomen is soft.   Musculoskeletal:         General: Normal range of motion.      Cervical back: Normal range of motion and neck supple.   Skin:     General: Skin is warm and dry.   Neurological:      General: No focal deficit present.      Mental Status: She is alert and oriented to person, place, and time.   Psychiatric:         Mood and Affect: Mood normal.             Significant Labs: All pertinent labs  within the past 24 hours have been reviewed.  CBC:   Recent Labs   Lab 12/30/24  0415 12/31/24  0237   WBC 9.77 10.54   HGB 8.5* 8.7*   HCT 26.1* 26.8*    251     CMP:   Recent Labs   Lab 12/30/24  0415 12/31/24  0240   * 135*   K 3.4* 3.9    99   CO2 23 26   * 138*   BUN 12 11   CREATININE 0.9 0.7   CALCIUM 8.6* 8.3*   PROT 5.0* 5.6*   ALBUMIN 2.6* 2.9*   BILITOT 0.4 0.3   ALKPHOS 75 76   AST 28 14   ALT 28 23   ANIONGAP 9 10       Significant Imaging: I have reviewed all pertinent imaging results/findings within the past 24 hours.  I have reviewed and interpreted all pertinent imaging results/findings within the past 24 hours.    Scheduled Meds:   aspirin  81 mg Oral Daily    atorvastatin  20 mg Oral QHS    calcium carbonate  500 mg Oral TID    carvediloL  6.25 mg Oral BID    [START ON 1/1/2025] cefTRIAXone (Rocephin) IV (PEDS and ADULTS)  2 g Intravenous Q24H    enoxparin  40 mg Subcutaneous Q24H (prophylaxis, 1700)    famotidine  20 mg Oral BID    gabapentin  100 mg Oral Once    gabapentin  200 mg Oral TID    lactobacillus acidophilus & bulgar  1 packet Oral BID    levothyroxine  75 mcg Oral Before breakfast     Continuous Infusions:  PRN Meds:.  Current Facility-Administered Medications:     acetaminophen, 650 mg, Oral, Q4H PRN    albuterol-ipratropium, 3 mL, Nebulization, Q6H PRN    aluminum-magnesium hydroxide-simethicone, 30 mL, Oral, QID PRN    dextrose 10%, 12.5 g, Intravenous, PRN    dextrose 10%, 25 g, Intravenous, PRN    diphenhydrAMINE, 25 mg, Oral, Q6H PRN    diphenhydrAMINE-zinc acetate 2-0.1%, , Topical (Top), BID PRN    glucagon (human recombinant), 1 mg, Intramuscular, PRN    glucose, 16 g, Oral, PRN    glucose, 24 g, Oral, PRN    HYDROmorphone, 0.5 mg, Intravenous, Q4H PRN    magnesium oxide, 800 mg, Oral, PRN    magnesium oxide, 800 mg, Oral, PRN    melatonin, 6 mg, Oral, Nightly PRN    naloxone, 0.02 mg, Intravenous, PRN    ondansetron, 4 mg, Intravenous, Q8H PRN     oxyCODONE, 5 mg, Oral, Q4H PRN    oxyCODONE, 10 mg, Oral, Q4H PRN    potassium bicarbonate, 35 mEq, Oral, PRN    potassium bicarbonate, 50 mEq, Oral, PRN    potassium bicarbonate, 60 mEq, Oral, PRN    potassium, sodium phosphates, 2 packet, Oral, PRN    potassium, sodium phosphates, 2 packet, Oral, PRN    potassium, sodium phosphates, 2 packet, Oral, PRN    prochlorperazine, 5 mg, Intravenous, Q6H PRN    simethicone, 1 tablet, Oral, QID PRN    sodium chloride 0.9%, 10 mL, Intravenous, Q12H PRN    MRA Head for Temporal Arteritis W and WO Contrast    Result Date: 12/30/2024  EXAMINATION: MRA HEAD FOR TEMPORAL ARTERITIS W AND WO CONTRAST CLINICAL HISTORY: Neck pain, acute, infection suspected;Concern for Temporal Arteritis; TECHNIQUE: Time of flight and post contrast MR angiography sequences were performed before and following the IV administration of 5 mL of Gadavist..  Multiplanar and MIP images were performed. COMPARISON: MRI of the brain 12/30/2024 FINDINGS: The intracranial internal carotid arteries are patent bilaterally from the skull base to carotid terminus. Middle cerebral arteries are patent bilaterally. Anterior cerebral arteries are patent bilaterally. There are codominant vertebral arteries. Bilateral vertebral arteries are patent to the confluence into the basilar artery. Basilar artery is normal in caliber. Posterior cerebral arteries are patent bilaterally. No evidence of significant mural thickening or abnormal enhancement along the courses of the temporal arteries to specifically indicate active vascular inflammation. Visualized dural venous sinuses are patent without evidence of dural venous sinus thrombosis.     1. No intracranial high-grade stenosis, large vessel occlusion, aneurysm or arteriovenous malformation. 2. No evidence of significant mural thickening or abnormal enhancement along the courses of the temporal arteries to specifically indicate active vascular inflammation. Electronically  signed by: Marek Moreno Date:    12/30/2024 Time:    11:17    MRI Cervical Spine W WO Cont    Result Date: 12/30/2024  EXAMINATION: MRI CERVICAL SPINE W WO CONTRAST CLINICAL HISTORY: Neck pain, acute, infection suspected; TECHNIQUE: Multiplanar, multisequence MR images of the cervical spine were performed before and after the administration of intravenous contrast.  5 mL of Gadavist intravenous contrast were administered. COMPARISON: CT cervical spine 12/29/2024 FINDINGS: Study is mild to moderately degraded by motion artifact. Alignment: Reversal of the cervical lordosis, which may be secondary to positioning or muscle spasm.  Minimal anterolisthesis of C4 on C5 and retrolisthesis of C5 on C6. Vertebral Column: Vertebral body heights are maintained. No acute fracture is identified.  No evidence of an aggressive bone marrow replacement process. Multilevel disc degeneration, most pronounced at C5-6 and C6-7 with mild-to-moderate intervertebral disc space narrowing, disc desiccation, anterior marginal osteophyte formation and posterior disc osteophyte complexes. Cord: Normal signal and morphology. Skull base and craniocervical junction: Normal. Degenerative findings: C2-C3: Minimal posterior disc osteophyte complex.  Mild bilateral facet arthropathy.  No significant neural foraminal or spinal canal stenosis. C3-C4: Mild posterior disc osteophyte complex.  Moderate bilateral facet arthropathy.  Marked left and moderate right uncovertebral joint spurring.  Severe left and moderate right neural foraminal stenosis.  No significant spinal canal stenosis. C4-C5: Mild posterior disc osteophyte complex.  Moderate bilateral facet arthropathy.  Moderate left and mild right uncovertebral joint spurring.  Moderate left and mild right neural foraminal stenosis.  No significant spinal canal stenosis. C5-C6: Posterior disc osteophyte complex, which partially effaces the ventral thecal sac.  Moderate bilateral facet arthropathy.   Marked left and moderate right uncovertebral joint spurring.  Severe left and moderate right neural foraminal stenosis.  Ligamentum flavum thickening, which partially effaces the dorsal thecal sac.  Mild spinal canal stenosis. C6-C7: Posterior disc osteophyte complex, which mildly effaces the ventral thecal sac.  Moderate bilateral facet arthropathy.  Moderate left and moderate right uncovertebral joint spurring.  Severe left and moderate right neural foraminal stenosis.  Ligamentum flavum thickening, which partially effaces the dorsal thecal sac.  Mild spinal canal stenosis. C7-T1: The disk is normal in configuration.  Mild bilateral facet arthropathy.  Mild bilateral uncovertebral joint spurring.  Mild left greater than right neural foraminal stenosis.  No significant spinal canal stenosis. Paraspinal muscles & soft tissues: Unremarkable. Normal signal voids are present in the vertebral arteries. No abnormal intraspinal enhancement identified.     1. No evidence of acute fracture, spondylodiscitis, high-grade spinal canal stenosis.  No cord compression, focal cord signal abnormality, or abnormal intraspinal enhancement. 2. Multilevel degenerative changes of the cervical spine, as detailed above, with findings most pronounced at C5-6 and C6-7 and resulting in severe left and moderate right neural foraminal and mild spinal canal stenosis. Electronically signed by: Marek Moreno Date:    12/30/2024 Time:    10:47    MRA Neck without contrast    Result Date: 12/30/2024  EXAMINATION: MRA NECK WITHOUT CONTRAST CLINICAL HISTORY: CNS vasculitis, known or suspected; TECHNIQUE: Time of flight MRA of the carotid and vertebral arteries was performed with 3D reconstructions according to the standard neck MRA protocol. COMPARISON: None FINDINGS: Study is mild to moderately degraded by motion artifact. The right common carotid artery demonstrates no hemodynamically significant stenosis. The cervical right internal carotid artery  demonstrates no hemodynamically significant stenosis. The left common carotid artery demonstrates no hemodynamically significant stenosis. The cervical left internal carotid artery demonstrates no hemodynamically significant stenosis. Extracranial vertebral arteries: Vertebral arteries are codominant.  Vertebral arteries are normal to the level of the foramen magnum.     No evidence of a hemodynamically significant stenosis, major branch occlusion or aneurysm in the neck arterial vasculature, allowing for motion degradation. Electronically signed by: Marek Moreno Date:    12/30/2024 Time:    10:23    MRI Brain Without Contrast    Result Date: 12/30/2024  EXAMINATION: MRI BRAIN WITHOUT CONTRAST CLINICAL HISTORY: Headache, new or worsening (Age >= 50y);. TECHNIQUE: Multiplanar multisequence MR imaging of the brain was performed without the administration of intravenous contrast. COMPARISON: CT head 12/29/2024, MRI brain 01/24/2023 FINDINGS: Study is  degraded by motion artifact. Ventricles and sulci are normal in size for age without evidence of hydrocephalus. No extra-axial blood or fluid collections. Scattered foci of T2/FLAIR hyperintense signal within the supratentorial white matter, nonspecific and may reflect sequelae of mild chronic small vessel ischemic change.    Diffusion-weighted images demonstrate no evidence of an acute infarct.   Susceptibility weighted images demonstrate no evidence of acute or chronic hemorrhage. No significant intracranial mass effect or midline shift. Normal vascular flow voids are present. Diffusely heterogeneous calvarial bone marrow signal, nonspecific but similar to prior exam from 2023, and may be seen with red marrow reconversion associated with chronic anemic states, osteoporosis, hypoxia, or smoking. Moderate opacification of the left maxillary sinus with mucosal membrane thickening and small air-fluid level.  Mild scattered mucosal membrane thickening elsewhere in the  paranasal sinuses.  The visualized portions of the mastoids are unremarkable. Bilateral lens replacements are noted.     1. No acute intracranial abnormality. 2. Mild generalized cerebral volume loss and scattered T2/FLAIR hyperintense signal within the supratentorial white matter, nonspecific but probably reflecting sequelae of chronic small vessel ischemic change. 3. Left maxillary sinusitis. Electronically signed by: Marek Moreno Date:    12/30/2024 Time:    07:39    X-Ray Chest AP Portable    Result Date: 12/29/2024  EXAMINATION: XR CHEST AP PORTABLE CLINICAL HISTORY: Chest Pain; TECHNIQUE: Single frontal view of the chest was performed. COMPARISON: 02/02/2023. FINDINGS: There are mild interstitial opacities, suspicious for interstitial edema/CHF.  The pleural spaces are clear the cardiac silhouette is borderline.  There are calcifications of the aortic arch.  There is a prosthetic aortic valve stent.  Osseous structures demonstrate degenerative changes.     Mild interstitial opacities, suspicious for interstitial edema/CHF. Electronically signed by: Anthony Andres Date:    12/29/2024 Time:    17:58    CT Head Without Contrast    Result Date: 12/29/2024  EXAMINATION: CT HEAD WITHOUT CONTRAST CLINICAL HISTORY: Headache, new or worsening (Age >= 50y); TECHNIQUE: Low dose axial CT images obtained throughout the head without intravenous contrast. Sagittal and coronal reconstructions were performed. COMPARISON: None. FINDINGS: Intracranial compartment: Ventricles and sulci are normal in size for age without evidence of hydrocephalus. No extra-axial blood or fluid collections. The brain parenchyma appears normal. No parenchymal mass, hemorrhage, edema or major vascular distribution infarct. Skull/extracranial contents (limited evaluation): No fracture. There is a left maxillary sinus air-fluid level.     There is no evidence acute intracranial abnormality.  There is left maxillary sinus disease. Electronically signed  by: Susana Carrion MD Date:    12/29/2024 Time:    16:49    CT Cervical Spine Without Contrast    Result Date: 12/29/2024  EXAMINATION: CT CERVICAL SPINE WITHOUT CONTRAST CLINICAL HISTORY: Neck pain, chronic, degenerative changes on xray;Neck pain, acute exacerbation with chronic degenerative changes; TECHNIQUE: Low dose axial images, sagittal and coronal reformations were performed though the cervical spine.  Contrast was not administered. COMPARISON: 12/18/2024 FINDINGS: There is no evidence fracture or malalignment.  There are degenerative changes throughout the cervical spine most prominent in the mid cervical spine.  There is no prevertebral soft tissue swelling.  The adjacent soft tissues are unremarkable.     There are degenerative changes throughout the cervical spine. Electronically signed by: Susana Carrion MD Date:    12/29/2024 Time:    15:48    X-Ray Cervical Spine AP And Lateral    Result Date: 12/18/2024  EXAMINATION: XR CERVICAL SPINE AP LATERAL CLINICAL HISTORY: Cervicalgia TECHNIQUE: AP, lateral and open mouth views of the cervical spine were performed. COMPARISON: None. FINDINGS: Vertebral body heights are preserved.Trace anterolisthesis of C4 on C5 and retrolisthesis of C5 on C6.Moderate disc height loss at C5-C6 and C6-C7 with shallow endplate centered osteophytes.  No abnormal prevertebral soft tissue thickening.     As above. Electronically signed by: Marek Burton Date:    12/18/2024 Time:    11:21    Mammo Digital Screening Left with Krishna    Result Date: 12/3/2024  Facility: [facility] [address] [place] [address] [phone] Name: Vivien Burton MRN: [id number] Result: Mammo Digital Screening Left with Krishna History: Patient is 79 y.o. and is seen for a screening mammogram. Films Compared: Compared to: 11/30/2023 Mammo Digital Screening Left with Krishna, 08/11/2022 Mammo Digital Screening Left with Krishna, and 08/09/2021 Mammo Digital Screening Left  with Krishna Findings: This procedure was performed using tomosynthesis. Computer-aided detection was utilized in the interpretation of this examination. There are scattered areas of fibroglandular density. There is no evidence of suspicious masses, microcalcifications or architectural distortion.      No mammographic evidence of malignancy. BI-RADS Category 1: Negative Recommendation: Routine screening mammogram in 1 year is recommended. Seun Amaro MD   - pulls last radiology orders

## 2025-01-01 NOTE — ASSESSMENT & PLAN NOTE
Left sided neck pain that is sharp and stabbing into head. Patient endorses that specific area on head is painful to touch. CT of neck shows degenerative changes      Patient has significant left-sided neck pain radiated to left occipital.  Extensive imaging study is not quite impressive except severe cervical spondylosis.   Status post LP- essential negative.   Discontinue steroids as GCA deemed less likely.   Neurology following  MRV negative for dural sinus thrombosis  Gabapentin helping with pain

## 2025-01-01 NOTE — CARE UPDATE
12/31/24 2030   Patient Assessment/Suction   Level of Consciousness (AVPU) alert   Respiratory Effort Normal;Unlabored   Expansion/Accessory Muscles/Retractions no retractions;no use of accessory muscles   All Lung Fields Breath Sounds diminished   BRAYAN Breath Sounds clear   LLL Breath Sounds clear   RUL Breath Sounds clear   RML Breath Sounds clear   RLL Breath Sounds clear   Rhythm/Pattern, Respiratory unlabored;pattern regular;no shortness of breath reported   Cough Frequency no cough   PRE-TX-O2   Device (Oxygen Therapy) room air   SpO2 99 %   Pulse Oximetry Type Intermittent   $ Pulse Oximetry - Multiple Charge Pulse Oximetry - Multiple   Pulse 83   Resp 17   Aerosol Therapy   $ Aerosol Therapy Charges PRN treatment not required

## 2025-01-01 NOTE — PROGRESS NOTES
Pharmacist Renal Dose Adjustment Note    Vivien Burton is a 79 y.o. female being treated with the medication Famotidine.    Patient Data:    Vital Signs (Most Recent):  Temp: 97.5 °F (36.4 °C) (12/31/24 1659)  Pulse: 84 (12/31/24 1800)  Resp: (!) 24 (12/31/24 1800)  BP: 132/60 (12/31/24 1800)  SpO2: 98 % (12/31/24 1800) Vital Signs (72h Range):  Temp:  [97.4 °F (36.3 °C)-100.5 °F (38.1 °C)]   Pulse:  []   Resp:  [16-24]   BP: (100-190)/(51-79)   SpO2:  [92 %-99 %]      Recent Labs   Lab 12/29/24  1736 12/30/24  0415 12/31/24  0240   CREATININE 0.8 0.9 0.7     Serum creatinine: 0.7 mg/dL 12/31/24 0240  Estimated creatinine clearance: 48.3 mL/min    Famotidine 20 mg po twice daily will be changed to Famotidine 20 mg po daily due to CrCl < 50 ml/min.    Pharmacist's Name: Enid Hou  Pharmacist's Extension: [phone]

## 2025-01-01 NOTE — ASSESSMENT & PLAN NOTE
Echo Saline Bubble? No; Ultrasound enhancing contrast? Yes    Result Date: 12/31/2024    Left Ventricle: The left ventricle is normal in size. Mildly increased   wall thickness. There is concentric hypertrophy. There is normal systolic   function with a visually estimated ejection fraction of 55 - 60%. There is   normal diastolic function.    Right Ventricle: Normal right ventricular cavity size. Wall thickness   is normal. Systolic function is normal.    Left Atrium: Left atrium is mildly dilated.    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 .  This has no obvious vegetation identified on this valve.    Adequate function is noted    Mitral Valve: The mitral valve is structurally normal. There is normal   leaflet mobility.    Pulmonary Artery: There is mild pulmonary hypertension. The estimated   pulmonary artery systolic pressure is 42 mmHg.    IVC/SVC: Normal venous pressure at 3 mmHg.    No echocardiographic evidence of vegetation noted        Echo    Result Date: 11/22/2024    Left Ventricle: The left ventricle is normal in size. Mildly increased   wall thickness. There is mild concentric hypertrophy. There is normal   systolic function with a visually estimated ejection fraction of 55 - 70%.   Ejection fraction is approximately 60%. Global longitudinal strain is   -18.0%. Global longitudinal strain is normal. There is normal diastolic   function.    Right Ventricle: Normal right ventricular cavity size. Systolic   function is normal. TAPSE is 1.90 cm. Right ventricular global   longitudinal strain is - 23.8%.    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 It is reported to be a 26 mm. Aortic valve area by VTI is 1.2   cm². Aortic valve peak velocity is 1.6 m/s. Mean gradient is 5.2 mmHg. The   dimensionless index is 0.59.    IVC/SVC: Normal venous pressure at 3 mmHg.    No definite change from Echo in 2/2023.           Patient  looks euvolemic now, off IV Lasix.

## 2025-01-01 NOTE — PROGRESS NOTES
"[facility] Medicine  Progress Note    Patient Name: Vivien Burton  MRN: [id number]  Patient Class: IP- Inpatient   Admission Date: 12/29/2024  Length of Stay: 2 days  Attending Physician: Cristy Fisher, *  Primary Care Provider: Jeri Moreira MD        Subjective     Principal Problem:<principal problem not specified>        HPI:  Sydnee Burton is a 79 year old female with a previous medical history of anemia, basal cell carcinoma, breast cancer, CHF, streptococcal bacteremia, GERD, HLD, HTN, CAD, TAVR, hypothyroidism, accidental tylenol overdose and abnormal MRI who presented to the ED for a headache and neck pain. The patient endorses a left sided headache described as stabbing and radiating from left upper neck. It is associated with tenderness to palpation in the left parietal scalp daily and intermittently the left temporal area. The pain has been daily for one month and reports taking tylenol every 8 hours with relief but the pain returns. Today the pain became intractable which caused her to present to the ED. CT of head showed no acute process and CT of c-spine showed degenerative changes. While in ED she developed a fever, oxygen saturation of 92%, hypertension, and tachycardia. She endorsed having a non-productive cough. Sepsis bundle initiated and cardiac workup added. She was given toradol, 2mg of PO diluadid, gabapentin, and acyclovir. Her pain improved and her blood pressure did also. She was maintaining an oxygen saturation of 99% on 1 liter nasal cannula and denied any shortness of breath. Fever resolved with tylenol. 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 She most recently finished a steroid taper at end of November. ED initiated acyclovir due one "leison" noted to left sided neck and pain with palpation to scalp. Lab work showed elevated troponin, normal " "lactic acid, no leukocytosis, increased anemia over last month, procalcitonin 81.13, normal TSH, and AST of 54.  with no peripheral edema but chest xray showed "Mild interstitial opacities, suspicious for interstitial edema/CHF." She has no tachypnea or exertional SOB. FLU/RSV/ Covid negative.  Initially her blood pressure was too low for lasix administration but later improved and she was administered 20mg of IV lasix. Troponin trended up and then lowered on third draw. Urinalysis pending at time of note. Due to ESR of 33 and CRP of 110.4 with fever, scalp tenderness, and worsening headache she was empirically started on prednisone 60mg for one dose and to be continued by primary team pending MRA of head to assess for temporal arteritis. MRI of c-spine ordered. MRA of neck ordered.  One year ago patient presented to ED with right sided headache that was similar to this visit. It was discovered she was septic due to pneumonia but on that visit she has 43K WBC and elevated lactic acid. Neurology was consulted at this time and MRI performed which was abnormal. Patient has no vision loss and is neurologically intact. She has been continued on IV vancomycin and IV cefepime and oral valcyclovir. Cardiology and ID consulted.  Course of care discussed with daughter in law Dottie Burton who agreed with plan of care.  Patient admitted by hospital medicine for further evaluation and management.     Overview/Hospital Course:  79 year old female with a previous medical history of anemia, basal cell carcinoma, breast cancer, CHF, streptococcal bacteremia, GERD, HLD, HTN, CAD, TAVR, hypothyroidism, accidental tylenol overdose and abnormal MRI initially admitted on 12/29 for severe headache/ left-sided neck pain for around a month, patient also has intermittent low-grade fever.  Workup shows severe elevated CRP/ ESR, elevated procalcitonin, with unclear infectious source.  CT chest abdomen and pelvis is negative for " infectious source.  Patient also has mild elevated troponin most likely secondary to sepsis as per cardiologist Dr. SISSY Garvin, patient also has elevated BNP 1680, possible acute CHF, patient received IV Lasix, -2.5 L. patient apparently euvolemic now.  Patient has a series of imaging study including CT head/ CT neck/ MRI of the cervical/ MRI of the brain which is insignificant other than shows significant cervical spondylosis.  Eventually the patient had LP 12/31 which showed RBC 2, WBC 2, essentially negative.   Patient had TTE 12/31 LVEF 55-60%, no significant valvular vegetations.   Now patient 2/2 blood culture showed strep viridans, possible source ?  IE, patient does have very bad dental hygiene, patient is restarted on IV Rocephin by ID.   Regarding neck pain, this is a concern for patient's may have GCA.  Neurologist following.   Patient also found to rhinovirus positive, continue droplet precaution.   MRV of the brain is done and negative for dural venous sinus thrombosis   Gabapentin was uptitrated, patient had significant improvement in neck pain.  [facility] planned on 01/02/2025.     Interval History:   Patient is seen and examined at multidisciplinary rounds, feels much better, neck pain significantly improved    Calcium was low, ionized calcium ordered, IV replacement ordered.  Yesterday was transferred [facility] main, possible [facility] tomorrow.  MRV did not show any evidence of dural venous sinus thrombosis.    Received gabapentin for neck pain that helped her.    Mild hyponatremia to 131    Phosphorus low at 1.3, replacement ordered    Review of Systems   Musculoskeletal:  Negative for neck pain.   Neurological:  Negative for headaches.     Objective:     Vital Signs (Most Recent):  Temp: 97.4 °F (36.3 °C) (01/01/25 0701)  Pulse: 72 (01/01/25 1300)  Resp: 18 (01/01/25 1300)  BP: 137/72 (01/01/25 1300)  SpO2: 98 % (01/01/25 1300) Vital Signs (24h Range):  Temp:  [97.4 °F (36.3 °C)-97.9 °F (36.6 °C)] 97.4 °F (36.3  °C)  Pulse:  [68-87] 72  Resp:  [11-24] 18  SpO2:  [90 %-100 %] 98 %  BP: (111-172)/(55-83) 137/72     Weight: 54.4 kg (120 lb)  Body mass index is 23.44 kg/m².    Intake/Output Summary (Last 24 hours) at 1/1/2025 1504  Last data filed at 1/1/2025 1415  Gross per 24 hour   Intake 1260 ml   Output 3 ml   Net 1257 ml         Physical Exam  Constitutional:       Appearance: Normal appearance.   HENT:      Head: Normocephalic and atraumatic.      Nose: Nose normal.   Eyes:      Extraocular Movements: Extraocular movements intact.      Pupils: Pupils are equal, round, and reactive to light.   Cardiovascular:      Rate and Rhythm: Normal rate and regular rhythm.      Heart sounds: No murmur heard.  Pulmonary:      Effort: Pulmonary effort is normal.      Breath sounds: Normal breath sounds.   Abdominal:      General: Abdomen is flat.      Palpations: Abdomen is soft.   Musculoskeletal:         General: Normal range of motion.      Cervical back: Normal range of motion and neck supple.   Skin:     General: Skin is warm and dry.   Neurological:      General: No focal deficit present.      Mental Status: She is alert and oriented to person, place, and time.   Psychiatric:         Mood and Affect: Mood normal.             Significant Labs: All pertinent labs within the past 24 hours have been reviewed.  CBC:   Recent Labs   Lab 12/31/24  0237 01/01/25  0446   WBC 10.54 10.82   HGB 8.7* 8.4*   HCT 26.8* 25.4*    257     CMP:   Recent Labs   Lab 12/31/24  0240 01/01/25  0446   * 131*   K 3.9 4.3   CL 99 98   CO2 26 28   * 93   BUN 11 10   CREATININE 0.7 0.6   CALCIUM 8.3* 7.3*   PROT 5.6* 4.9*   ALBUMIN 2.9* 3.1*   BILITOT 0.3 0.4   ALKPHOS 76 59   AST 14 8*   ALT 23 12   ANIONGAP 10 5*       Significant Imaging: I have reviewed all pertinent imaging results/findings within the past 24 hours.  I have reviewed and interpreted all pertinent imaging results/findings within the past 24 hours.    Scheduled  Meds:   aspirin  81 mg Oral Daily    atorvastatin  20 mg Oral QHS    calcium carbonate  500 mg Oral TID    carvediloL  6.25 mg Oral BID    cefTRIAXone (Rocephin) IV (PEDS and ADULTS)  2 g Intravenous Q24H    enoxparin  40 mg Subcutaneous Q24H (prophylaxis, 1700)    famotidine  20 mg Oral Daily    gabapentin  100 mg Oral Once    gabapentin  200 mg Oral TID    lactobacillus acidophilus & bulgar  1 packet Oral BID    levothyroxine  75 mcg Oral Before breakfast     Continuous Infusions:  PRN Meds:.  Current Facility-Administered Medications:     acetaminophen, 650 mg, Oral, Q4H PRN    albuterol-ipratropium, 3 mL, Nebulization, Q6H PRN    aluminum-magnesium hydroxide-simethicone, 30 mL, Oral, QID PRN    calcium gluconate IVPB, 1 g, Intravenous, PRN    calcium gluconate IVPB, 2 g, Intravenous, PRN    calcium gluconate IVPB, 3 g, Intravenous, PRN    dextrose 10%, 12.5 g, Intravenous, PRN    dextrose 10%, 25 g, Intravenous, PRN    diphenhydrAMINE, 25 mg, Oral, Q6H PRN    diphenhydrAMINE-zinc acetate 2-0.1%, , Topical (Top), BID PRN    glucagon (human recombinant), 1 mg, Intramuscular, PRN    glucose, 16 g, Oral, PRN    glucose, 24 g, Oral, PRN    HYDROmorphone, 0.5 mg, Intravenous, Q4H PRN    magnesium oxide, 800 mg, Oral, PRN    magnesium oxide, 800 mg, Oral, PRN    magnesium sulfate IVPB, 2 g, Intravenous, PRN    magnesium sulfate IVPB, 4 g, Intravenous, PRN    melatonin, 6 mg, Oral, Nightly PRN    naloxone, 0.02 mg, Intravenous, PRN    ondansetron, 4 mg, Intravenous, Q8H PRN    oxyCODONE, 5 mg, Oral, Q4H PRN    oxyCODONE, 10 mg, Oral, Q4H PRN    potassium bicarbonate, 35 mEq, Oral, PRN    potassium bicarbonate, 50 mEq, Oral, PRN    potassium bicarbonate, 60 mEq, Oral, PRN    potassium chloride, 40 mEq, Intravenous, PRN **AND** potassium chloride, 60 mEq, Intravenous, PRN **AND** potassium chloride, 80 mEq, Intravenous, PRN    potassium, sodium phosphates, 2 packet, Oral, PRN    potassium, sodium phosphates, 2 packet,  Oral, PRN    potassium, sodium phosphates, 2 packet, Oral, PRN    prochlorperazine, 5 mg, Intravenous, Q6H PRN    simethicone, 1 tablet, Oral, QID PRN    sodium chloride 0.9%, 10 mL, Intravenous, Q12H PRN    sodium phosphate 15 mmol in D5W 250 mL IVPB, 15 mmol, Intravenous, PRN    sodium phosphate 20.01 mmol in D5W 250 mL IVPB, 20.01 mmol, Intravenous, PRN    sodium phosphate 30 mmol in D5W 250 mL IVPB, 30 mmol, Intravenous, PRN    MRA Head for Temporal Arteritis W and WO Contrast    Result Date: 12/30/2024  EXAMINATION: MRA HEAD FOR TEMPORAL ARTERITIS W AND WO CONTRAST CLINICAL HISTORY: Neck pain, acute, infection suspected;Concern for Temporal Arteritis; TECHNIQUE: Time of flight and post contrast MR angiography sequences were performed before and following the IV administration of 5 mL of Gadavist..  Multiplanar and MIP images were performed. COMPARISON: MRI of the brain 12/30/2024 FINDINGS: The intracranial internal carotid arteries are patent bilaterally from the skull base to carotid terminus. Middle cerebral arteries are patent bilaterally. Anterior cerebral arteries are patent bilaterally. There are codominant vertebral arteries. Bilateral vertebral arteries are patent to the confluence into the basilar artery. Basilar artery is normal in caliber. Posterior cerebral arteries are patent bilaterally. No evidence of significant mural thickening or abnormal enhancement along the courses of the temporal arteries to specifically indicate active vascular inflammation. Visualized dural venous sinuses are patent without evidence of dural venous sinus thrombosis.     1. No intracranial high-grade stenosis, large vessel occlusion, aneurysm or arteriovenous malformation. 2. No evidence of significant mural thickening or abnormal enhancement along the courses of the temporal arteries to specifically indicate active vascular inflammation. Electronically signed by: Marek Moreno Date:    12/30/2024 Time:    11:17    MRI  Cervical Spine W WO Cont    Result Date: 12/30/2024  EXAMINATION: MRI CERVICAL SPINE W WO CONTRAST CLINICAL HISTORY: Neck pain, acute, infection suspected; TECHNIQUE: Multiplanar, multisequence MR images of the cervical spine were performed before and after the administration of intravenous contrast.  5 mL of Gadavist intravenous contrast were administered. COMPARISON: CT cervical spine 12/29/2024 FINDINGS: Study is mild to moderately degraded by motion artifact. Alignment: Reversal of the cervical lordosis, which may be secondary to positioning or muscle spasm.  Minimal anterolisthesis of C4 on C5 and retrolisthesis of C5 on C6. Vertebral Column: Vertebral body heights are maintained. No acute fracture is identified.  No evidence of an aggressive bone marrow replacement process. Multilevel disc degeneration, most pronounced at C5-6 and C6-7 with mild-to-moderate intervertebral disc space narrowing, disc desiccation, anterior marginal osteophyte formation and posterior disc osteophyte complexes. Cord: Normal signal and morphology. Skull base and craniocervical junction: Normal. Degenerative findings: C2-C3: Minimal posterior disc osteophyte complex.  Mild bilateral facet arthropathy.  No significant neural foraminal or spinal canal stenosis. C3-C4: Mild posterior disc osteophyte complex.  Moderate bilateral facet arthropathy.  Marked left and moderate right uncovertebral joint spurring.  Severe left and moderate right neural foraminal stenosis.  No significant spinal canal stenosis. C4-C5: Mild posterior disc osteophyte complex.  Moderate bilateral facet arthropathy.  Moderate left and mild right uncovertebral joint spurring.  Moderate left and mild right neural foraminal stenosis.  No significant spinal canal stenosis. C5-C6: Posterior disc osteophyte complex, which partially effaces the ventral thecal sac.  Moderate bilateral facet arthropathy.  Marked left and moderate right uncovertebral joint spurring.  Severe  left and moderate right neural foraminal stenosis.  Ligamentum flavum thickening, which partially effaces the dorsal thecal sac.  Mild spinal canal stenosis. C6-C7: Posterior disc osteophyte complex, which mildly effaces the ventral thecal sac.  Moderate bilateral facet arthropathy.  Moderate left and moderate right uncovertebral joint spurring.  Severe left and moderate right neural foraminal stenosis.  Ligamentum flavum thickening, which partially effaces the dorsal thecal sac.  Mild spinal canal stenosis. C7-T1: The disk is normal in configuration.  Mild bilateral facet arthropathy.  Mild bilateral uncovertebral joint spurring.  Mild left greater than right neural foraminal stenosis.  No significant spinal canal stenosis. Paraspinal muscles & soft tissues: Unremarkable. Normal signal voids are present in the vertebral arteries. No abnormal intraspinal enhancement identified.     1. No evidence of acute fracture, spondylodiscitis, high-grade spinal canal stenosis.  No cord compression, focal cord signal abnormality, or abnormal intraspinal enhancement. 2. Multilevel degenerative changes of the cervical spine, as detailed above, with findings most pronounced at C5-6 and C6-7 and resulting in severe left and moderate right neural foraminal and mild spinal canal stenosis. Electronically signed by: Marek Moreno Date:    12/30/2024 Time:    10:47    MRA Neck without contrast    Result Date: 12/30/2024  EXAMINATION: MRA NECK WITHOUT CONTRAST CLINICAL HISTORY: CNS vasculitis, known or suspected; TECHNIQUE: Time of flight MRA of the carotid and vertebral arteries was performed with 3D reconstructions according to the standard neck MRA protocol. COMPARISON: None FINDINGS: Study is mild to moderately degraded by motion artifact. The right common carotid artery demonstrates no hemodynamically significant stenosis. The cervical right internal carotid artery demonstrates no hemodynamically significant stenosis. The left common  carotid artery demonstrates no hemodynamically significant stenosis. The cervical left internal carotid artery demonstrates no hemodynamically significant stenosis. Extracranial vertebral arteries: Vertebral arteries are codominant.  Vertebral arteries are normal to the level of the foramen magnum.     No evidence of a hemodynamically significant stenosis, major branch occlusion or aneurysm in the neck arterial vasculature, allowing for motion degradation. Electronically signed by: Marek Moreno Date:    12/30/2024 Time:    10:23    MRI Brain Without Contrast    Result Date: 12/30/2024  EXAMINATION: MRI BRAIN WITHOUT CONTRAST CLINICAL HISTORY: Headache, new or worsening (Age >= 50y);. TECHNIQUE: Multiplanar multisequence MR imaging of the brain was performed without the administration of intravenous contrast. COMPARISON: CT head 12/29/2024, MRI brain 01/24/2023 FINDINGS: Study is  degraded by motion artifact. Ventricles and sulci are normal in size for age without evidence of hydrocephalus. No extra-axial blood or fluid collections. Scattered foci of T2/FLAIR hyperintense signal within the supratentorial white matter, nonspecific and may reflect sequelae of mild chronic small vessel ischemic change.    Diffusion-weighted images demonstrate no evidence of an acute infarct.   Susceptibility weighted images demonstrate no evidence of acute or chronic hemorrhage. No significant intracranial mass effect or midline shift. Normal vascular flow voids are present. Diffusely heterogeneous calvarial bone marrow signal, nonspecific but similar to prior exam from 2023, and may be seen with red marrow reconversion associated with chronic anemic states, osteoporosis, hypoxia, or smoking. Moderate opacification of the left maxillary sinus with mucosal membrane thickening and small air-fluid level.  Mild scattered mucosal membrane thickening elsewhere in the paranasal sinuses.  The visualized portions of the mastoids are unremarkable.  Bilateral lens replacements are noted.     1. No acute intracranial abnormality. 2. Mild generalized cerebral volume loss and scattered T2/FLAIR hyperintense signal within the supratentorial white matter, nonspecific but probably reflecting sequelae of chronic small vessel ischemic change. 3. Left maxillary sinusitis. Electronically signed by: Marek Moreno Date:    12/30/2024 Time:    07:39    X-Ray Chest AP Portable    Result Date: 12/29/2024  EXAMINATION: XR CHEST AP PORTABLE CLINICAL HISTORY: Chest Pain; TECHNIQUE: Single frontal view of the chest was performed. COMPARISON: 02/02/2023. FINDINGS: There are mild interstitial opacities, suspicious for interstitial edema/CHF.  The pleural spaces are clear the cardiac silhouette is borderline.  There are calcifications of the aortic arch.  There is a prosthetic aortic valve stent.  Osseous structures demonstrate degenerative changes.     Mild interstitial opacities, suspicious for interstitial edema/CHF. Electronically signed by: Anthony Andres Date:    12/29/2024 Time:    17:58    CT Head Without Contrast    Result Date: 12/29/2024  EXAMINATION: CT HEAD WITHOUT CONTRAST CLINICAL HISTORY: Headache, new or worsening (Age >= 50y); TECHNIQUE: Low dose axial CT images obtained throughout the head without intravenous contrast. Sagittal and coronal reconstructions were performed. COMPARISON: None. FINDINGS: Intracranial compartment: Ventricles and sulci are normal in size for age without evidence of hydrocephalus. No extra-axial blood or fluid collections. The brain parenchyma appears normal. No parenchymal mass, hemorrhage, edema or major vascular distribution infarct. Skull/extracranial contents (limited evaluation): No fracture. There is a left maxillary sinus air-fluid level.     There is no evidence acute intracranial abnormality.  There is left maxillary sinus disease. Electronically signed by: Susana Carrion MD Date:    12/29/2024 Time:    16:49    CT Cervical Spine  Without Contrast    Result Date: 12/29/2024  EXAMINATION: CT CERVICAL SPINE WITHOUT CONTRAST CLINICAL HISTORY: Neck pain, chronic, degenerative changes on xray;Neck pain, acute exacerbation with chronic degenerative changes; TECHNIQUE: Low dose axial images, sagittal and coronal reformations were performed though the cervical spine.  Contrast was not administered. COMPARISON: 12/18/2024 FINDINGS: There is no evidence fracture or malalignment.  There are degenerative changes throughout the cervical spine most prominent in the mid cervical spine.  There is no prevertebral soft tissue swelling.  The adjacent soft tissues are unremarkable.     There are degenerative changes throughout the cervical spine. Electronically signed by: Susana Carrion MD Date:    12/29/2024 Time:    15:48    X-Ray Cervical Spine AP And Lateral    Result Date: 12/18/2024  EXAMINATION: XR CERVICAL SPINE AP LATERAL CLINICAL HISTORY: Cervicalgia TECHNIQUE: AP, lateral and open mouth views of the cervical spine were performed. COMPARISON: None. FINDINGS: Vertebral body heights are preserved.Trace anterolisthesis of C4 on C5 and retrolisthesis of C5 on C6.Moderate disc height loss at C5-C6 and C6-C7 with shallow endplate centered osteophytes.  No abnormal prevertebral soft tissue thickening.     As above. Electronically signed by: Marek Burton Date:    12/18/2024 Time:    11:21    Mammo Digital Screening Left with Krishna    Result Date: 12/3/2024  Facility: [facility] [address] [place] [address] [phone] Name: Vivien Burton MRN: [id number] Result: Mammo Digital Screening Left with Krishna History: Patient is 79 y.o. and is seen for a screening mammogram. Films Compared: Compared to: 11/30/2023 Mammo Digital Screening Left with Krishna, 08/11/2022 Mammo Digital Screening Left with Krishna, and 08/09/2021 Mammo Digital Screening Left with Krishna Findings: This procedure was performed using tomosynthesis.  "Computer-aided detection was utilized in the interpretation of this examination. There are scattered areas of fibroglandular density. There is no evidence of suspicious masses, microcalcifications or architectural distortion.      No mammographic evidence of malignancy. BI-RADS Category 1: Negative Recommendation: Routine screening mammogram in 1 year is recommended. Seun Amaro MD   - pulls last radiology orders      Assessment and Plan     Hypocalcemia   The most recent calcium and albumin results listed below.  Recent Labs     12/30/24  0415 12/31/24  0240 01/01/25  0446   CALCIUM 8.6* 8.3* 7.3*   ALBUMIN 2.6* 2.9* 3.1*     Plan  - Will replace calcium and monitor electrolytes closely.  - The patient's hypocalcemia is worsening. Will adjust treatment as follows:  IV replacement  - calcium ionized ordered  - IV replacement  - Telemetry  - EKG PRN        Rhinovirus infection    Aware, droplet precaution, conservative management    Intractable headache  LP negative  MRV negative for dural venous sinus thrombosis   As above    Neck pain without injury  Left sided neck pain that is sharp and stabbing into head. Patient endorses that specific area on head is painful to touch. CT of neck shows degenerative changes      Patient has significant left-sided neck pain radiated to left occipital.  Extensive imaging study is not quite impressive except severe cervical spondylosis.   Status post LP- essential negative.   Discontinue steroids as GCA deemed less likely.   Neurology following  MRV negative for dural sinus thrombosis  Gabapentin helping with pain        Elevated troponin  Denies chest pain or Shortness of breath.    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   Per Dematology note on 12/4/24 "Biopsy proven drug eruption, cleared with prednisone and d/c of prilosec. She restarted " "prilosec and rash came back. She stopped it at end of November and had another course of steroids". Per chart review patient was placed on steroid taper starting 11/16    History of transcatheter aortic valve replacement (TAVR)  Chronic condition noted      HTN (hypertension)  Patient's blood pressure range in the last 24 hours was: BP  Min: 100/53  Max: 190/79.The patient's inpatient anti-hypertensive regimen is listed below:  Current Antihypertensives  furosemide injection 20 mg, Every 12 hours, Intravenous    Plan  BP is currently labile and patient being admitted for sepsis. Losartan 50mg QHS was held please restart as warranted    Severe sepsis/ strep viridans bacteremia  This patient does have evidence of infective focus  My overall impression is sepsis.  Source: Respiratory and Unknown Urinalysis pending at time of note  Antibiotics given-   Antibiotics (72h ago, onward)      Start     Stop Route Frequency Ordered    01/01/25 0900  cefTRIAXone injection 2 g         -- IV Every 24 hours (non-standard times) 12/31/24 1550          Latest lactate reviewed-  Recent Labs   Lab 12/29/24  1835 12/29/24  2237 12/30/24  0415   LACTATE  --    < > 0.9   POCLAC 0.53  --   --     < > = values in this interval not displayed.     Procalcitonin: 81.13  CRP: 110.4  Sed Rate 33      Organ dysfunction indicated by Acute on chronic  heart failure    Fluid challenge Contraindicated- Fluid bolus is contraindicated in this patient due to Congestive Heart Failure     Post- resuscitation assessment Yes Perfusion exam was performed within 6 hours of septic shock presentation after bolus shows Adequate tissue perfusion assessed by non-invasive monitoring     CT chest abdomen and pelvis with IV contrast negative for infectious source.   Status post LP 12/31, WBC 2, RBC 2, essentially negative.   TTE 12/31, negative for vegetation.   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       Coronary artery disease  Patient with known CAD s/p stent placement and CABG, which is controlled Will continue ASA and Statin and monitor for S/Sx of angina/ACS. Continue to monitor on telemetry.     Trending troponin. Will continue to trend every four hours. Patient denies chest pain or SOB.   Cardiology following.   Likely demand  No acute STT wave changes    Acute on chronic HFrEF (heart failure with reduced ejection fraction)    Echo Saline Bubble? No; Ultrasound enhancing contrast? Yes    Result Date: 12/31/2024    Left Ventricle: The left ventricle is normal in size. Mildly increased   wall thickness. There is concentric hypertrophy. There is normal systolic   function with a visually estimated ejection fraction of 55 - 60%. There is   normal diastolic function.    Right Ventricle: Normal right ventricular cavity size. Wall thickness   is normal. Systolic function is normal.    Left Atrium: Left atrium is mildly dilated.    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 .  This has no obvious vegetation identified on this valve.    Adequate function is noted    Mitral Valve: The mitral valve is structurally normal. There is normal   leaflet mobility.    Pulmonary Artery: There is mild pulmonary hypertension. The estimated   pulmonary artery systolic pressure is 42 mmHg.    IVC/SVC: Normal venous pressure at 3 mmHg.    No echocardiographic evidence of vegetation noted        Echo    Result Date: 11/22/2024    Left Ventricle: The left ventricle is normal in size. Mildly increased   wall thickness. There is mild concentric hypertrophy. There is normal   systolic function with a visually estimated ejection fraction of 55 - 70%.   Ejection fraction is approximately 60%. Global longitudinal strain is   -18.0%. Global  longitudinal strain is normal. There is normal diastolic   function.    Right Ventricle: Normal right ventricular cavity size. Systolic   function is normal. TAPSE is 1.90 cm. Right ventricular global   longitudinal strain is - 23.8%.    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 It is reported to be a 26 mm. Aortic valve area by VTI is 1.2   cm². Aortic valve peak velocity is 1.6 m/s. Mean gradient is 5.2 mmHg. The   dimensionless index is 0.59.    IVC/SVC: Normal venous pressure at 3 mmHg.    No definite change from Echo in 2/2023.           Patient looks euvolemic now, off IV Lasix.     Anemia   Most recent hemoglobin and hematocrit are listed below.  Recent Labs     12/29/24  1737 12/30/24  0415 12/31/24  0237   HGB 9.0* 8.5* 8.7*   HCT 26.4* 26.1* 26.8*       Plan  - Monitor serial CBC: Daily  - Transfuse PRBC if patient becomes hemodynamically unstable, symptomatic or H/H drops below 7/21.  - Patient has not received any PRBC transfusions to date  - Patient's anemia is currently stable    Unspecified hypothyroidism  Chronic condition noted  -Levothyroxine continued      Breast cancer  History of Breast Cancer in remission. Last treatment 24 years ago.       GERD (gastroesophageal reflux disease)  -Pepcid b.i.d.        VTE Risk Mitigation (From admission, onward)           Ordered     enoxaparin injection 40 mg  Every 24 hours         12/31/24 1543     IP VTE HIGH RISK PATIENT  Once         12/29/24 2021     Place sequential compression device  Until discontinued         12/29/24 2021                    Discharge Planning   MIRELLA:      Code Status: Full Code   Medical Readiness for Discharge Date:   Discharge Plan A: Home                        Cristy Fisher MD  Department of Hospital Medicine   [facility]

## 2025-01-01 NOTE — ASSESSMENT & PLAN NOTE
The most recent calcium and albumin results listed below.  Recent Labs     12/30/24  0415 12/31/24  0240 01/01/25  0446   CALCIUM 8.6* 8.3* 7.3*   ALBUMIN 2.6* 2.9* 3.1*     Plan  - Will replace calcium and monitor electrolytes closely.  - The patient's hypocalcemia is worsening. Will adjust treatment as follows:  IV replacement  - calcium ionized ordered  - IV replacement  - Telemetry  - EKG PRN

## 2025-01-02 ENCOUNTER — ANESTHESIA (OUTPATIENT)
Dept: CARDIOLOGY | Facility: HOSPITAL | Age: 80
End: 2025-01-02
Payer: MEDICARE

## 2025-01-02 ENCOUNTER — CLINICAL SUPPORT (OUTPATIENT)
Dept: CARDIOLOGY | Facility: HOSPITAL | Age: 80
End: 2025-01-02
Attending: INTERNAL MEDICINE
Payer: MEDICARE

## 2025-01-02 ENCOUNTER — ANESTHESIA EVENT (OUTPATIENT)
Dept: CARDIOLOGY | Facility: HOSPITAL | Age: 80
End: 2025-01-02
Payer: MEDICARE

## 2025-01-02 LAB
APTT PPP: 29.6 SEC (ref 21–32)
BASOPHILS # BLD AUTO: 0.06 K/UL (ref 0–0.2)
BASOPHILS NFR BLD: 0.6 % (ref 0–1.9)
CRYPTOC AG TITR CSF IA: NEGATIVE {TITER}
DIFFERENTIAL METHOD BLD: ABNORMAL
EOSINOPHIL # BLD AUTO: 0.3 K/UL (ref 0–0.5)
EOSINOPHIL NFR BLD: 2.8 % (ref 0–8)
ERYTHROCYTE [DISTWIDTH] IN BLOOD BY AUTOMATED COUNT: 13.1 % (ref 11.5–14.5)
HCT VFR BLD AUTO: 26.2 % (ref 37–48.5)
HGB BLD-MCNC: 8.7 G/DL (ref 12–16)
HSV1, PCR, CSF: NEGATIVE
HSV2, PCR, CSF: NEGATIVE
IMM GRANULOCYTES # BLD AUTO: 0.33 K/UL (ref 0–0.04)
IMM GRANULOCYTES NFR BLD AUTO: 3.4 % (ref 0–0.5)
INR PPP: 1 (ref 0.8–1.2)
LYMPHOCYTES # BLD AUTO: 3.4 K/UL (ref 1–4.8)
LYMPHOCYTES NFR BLD: 35.1 % (ref 18–48)
MCH RBC QN AUTO: 30.7 PG (ref 27–31)
MCHC RBC AUTO-ENTMCNC: 33.2 G/DL (ref 32–36)
MCV RBC AUTO: 93 FL (ref 82–98)
MONOCYTES # BLD AUTO: 0.7 K/UL (ref 0.3–1)
MONOCYTES NFR BLD: 7.6 % (ref 4–15)
NEUTROPHILS # BLD AUTO: 5 K/UL (ref 1.8–7.7)
NEUTROPHILS NFR BLD: 50.5 % (ref 38–73)
NRBC BLD-RTO: 0 /100 WBC
PLATELET # BLD AUTO: 257 K/UL (ref 150–450)
PMV BLD AUTO: 9.2 FL (ref 9.2–12.9)
PROTHROMBIN TIME: 10.9 SEC (ref 9–12.5)
RBC # BLD AUTO: 2.83 M/UL (ref 4–5.4)
VDRL CSF QL: NEGATIVE
WBC # BLD AUTO: 9.78 K/UL (ref 3.9–12.7)

## 2025-01-02 PROCEDURE — 99900031 HC PATIENT EDUCATION (STAT)

## 2025-01-02 PROCEDURE — 36415 COLL VENOUS BLD VENIPUNCTURE: CPT | Performed by: INTERNAL MEDICINE

## 2025-01-02 PROCEDURE — 85025 COMPLETE CBC W/AUTO DIFF WBC: CPT | Performed by: NURSE PRACTITIONER

## 2025-01-02 PROCEDURE — 25000003 PHARM REV CODE 250

## 2025-01-02 PROCEDURE — 36415 COLL VENOUS BLD VENIPUNCTURE: CPT

## 2025-01-02 PROCEDURE — 27000207 HC ISOLATION

## 2025-01-02 PROCEDURE — 94761 N-INVAS EAR/PLS OXIMETRY MLT: CPT

## 2025-01-02 PROCEDURE — 99233 SBSQ HOSP IP/OBS HIGH 50: CPT | Mod: ,,, | Performed by: INTERNAL MEDICINE

## 2025-01-02 PROCEDURE — 85730 THROMBOPLASTIN TIME PARTIAL: CPT | Performed by: INTERNAL MEDICINE

## 2025-01-02 PROCEDURE — 63600175 PHARM REV CODE 636 W HCPCS: Performed by: NURSE ANESTHETIST, CERTIFIED REGISTERED

## 2025-01-02 PROCEDURE — 85610 PROTHROMBIN TIME: CPT | Performed by: INTERNAL MEDICINE

## 2025-01-02 PROCEDURE — 25000003 PHARM REV CODE 250: Performed by: HOSPITALIST

## 2025-01-02 PROCEDURE — 99900035 HC TECH TIME PER 15 MIN (STAT)

## 2025-01-02 PROCEDURE — 37000008 HC ANESTHESIA 1ST 15 MINUTES: Performed by: ANESTHESIOLOGY

## 2025-01-02 PROCEDURE — 93320 DOPPLER ECHO COMPLETE: CPT

## 2025-01-02 PROCEDURE — 63600175 PHARM REV CODE 636 W HCPCS: Performed by: INTERNAL MEDICINE

## 2025-01-02 PROCEDURE — 87040 BLOOD CULTURE FOR BACTERIA: CPT | Mod: 59

## 2025-01-02 PROCEDURE — 63600175 PHARM REV CODE 636 W HCPCS: Performed by: HOSPITALIST

## 2025-01-02 PROCEDURE — 37000009 HC ANESTHESIA EA ADD 15 MINS: Performed by: ANESTHESIOLOGY

## 2025-01-02 PROCEDURE — 25000003 PHARM REV CODE 250: Performed by: FAMILY MEDICINE

## 2025-01-02 PROCEDURE — 25000003 PHARM REV CODE 250: Performed by: INTERNAL MEDICINE

## 2025-01-02 PROCEDURE — 25000003 PHARM REV CODE 250: Performed by: NURSE ANESTHETIST, CERTIFIED REGISTERED

## 2025-01-02 PROCEDURE — 21000000 HC CCU ICU ROOM CHARGE

## 2025-01-02 RX ORDER — PROPOFOL 10 MG/ML
VIAL (ML) INTRAVENOUS
Status: DISCONTINUED | OUTPATIENT
Start: 2025-01-02 | End: 2025-01-02

## 2025-01-02 RX ORDER — HYDRALAZINE HYDROCHLORIDE 20 MG/ML
5 INJECTION INTRAMUSCULAR; INTRAVENOUS EVERY 6 HOURS PRN
Status: DISCONTINUED | OUTPATIENT
Start: 2025-01-02 | End: 2025-01-05 | Stop reason: HOSPADM

## 2025-01-02 RX ADMIN — GABAPENTIN 200 MG: 100 CAPSULE ORAL at 08:01

## 2025-01-02 RX ADMIN — CALCIUM CARBONATE 500 MG: 500 TABLET, CHEWABLE ORAL at 02:01

## 2025-01-02 RX ADMIN — LACTOBACILLUS ACIDOPHILUS / LACTOBACILLUS BULGARICUS 1 EACH: 100 MILLION CFU STRENGTH GRANULES at 08:01

## 2025-01-02 RX ADMIN — FAMOTIDINE 20 MG: 20 TABLET, FILM COATED ORAL at 08:01

## 2025-01-02 RX ADMIN — GABAPENTIN 200 MG: 100 CAPSULE ORAL at 02:01

## 2025-01-02 RX ADMIN — ENOXAPARIN SODIUM 40 MG: 40 INJECTION SUBCUTANEOUS at 03:01

## 2025-01-02 RX ADMIN — OXYCODONE HYDROCHLORIDE 5 MG: 5 TABLET ORAL at 08:01

## 2025-01-02 RX ADMIN — LACTOBACILLUS ACIDOPHILUS / LACTOBACILLUS BULGARICUS 1 EACH: 100 MILLION CFU STRENGTH GRANULES at 10:01

## 2025-01-02 RX ADMIN — PROPOFOL 50 MG: 10 INJECTION, EMULSION INTRAVENOUS at 09:01

## 2025-01-02 RX ADMIN — ASPIRIN 81 MG: 81 TABLET, COATED ORAL at 08:01

## 2025-01-02 RX ADMIN — CARVEDILOL 6.25 MG: 6.25 TABLET, FILM COATED ORAL at 10:01

## 2025-01-02 RX ADMIN — CALCIUM CARBONATE 500 MG: 500 TABLET, CHEWABLE ORAL at 08:01

## 2025-01-02 RX ADMIN — ATORVASTATIN CALCIUM 20 MG: 20 TABLET, FILM COATED ORAL at 08:01

## 2025-01-02 RX ADMIN — SODIUM CHLORIDE: 0.9 INJECTION, SOLUTION INTRAVENOUS at 09:01

## 2025-01-02 RX ADMIN — CEFTRIAXONE 2 G: 2 INJECTION, POWDER, FOR SOLUTION INTRAMUSCULAR; INTRAVENOUS at 08:01

## 2025-01-02 RX ADMIN — PROPOFOL 30 MG: 10 INJECTION, EMULSION INTRAVENOUS at 09:01

## 2025-01-02 RX ADMIN — CARVEDILOL 6.25 MG: 6.25 TABLET, FILM COATED ORAL at 08:01

## 2025-01-02 NOTE — PROGRESS NOTES
"[facility] Medicine  Progress Note    Patient Name: Vivien Burton  MRN: [id number]  Patient Class: IP- Inpatient   Admission Date: 12/29/2024  Length of Stay: 3 days  Attending Physician: Cristy Fisher, *  Primary Care Provider: Jeri Moreira MD        Subjective     Principal Problem:Severe sepsis        HPI:  Sydnee Burton is a 79 year old female with a previous medical history of anemia, basal cell carcinoma, breast cancer, CHF, streptococcal bacteremia, GERD, HLD, HTN, CAD, TAVR, hypothyroidism, accidental tylenol overdose and abnormal MRI who presented to the ED for a headache and neck pain. The patient endorses a left sided headache described as stabbing and radiating from left upper neck. It is associated with tenderness to palpation in the left parietal scalp daily and intermittently the left temporal area. The pain has been daily for one month and reports taking tylenol every 8 hours with relief but the pain returns. Today the pain became intractable which caused her to present to the ED. CT of head showed no acute process and CT of c-spine showed degenerative changes. While in ED she developed a fever, oxygen saturation of 92%, hypertension, and tachycardia. She endorsed having a non-productive cough. Sepsis bundle initiated and cardiac workup added. She was given toradol, 2mg of PO diluadid, gabapentin, and acyclovir. Her pain improved and her blood pressure did also. She was maintaining an oxygen saturation of 99% on 1 liter nasal cannula and denied any shortness of breath. Fever resolved with tylenol. 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 She most recently finished a steroid taper at end of November. ED initiated acyclovir due one "leison" noted to left sided neck and pain with palpation to scalp. Lab work showed elevated troponin, normal lactic acid, no " "leukocytosis, increased anemia over last month, procalcitonin 81.13, normal TSH, and AST of 54.  with no peripheral edema but chest xray showed "Mild interstitial opacities, suspicious for interstitial edema/CHF." She has no tachypnea or exertional SOB. FLU/RSV/ Covid negative.  Initially her blood pressure was too low for lasix administration but later improved and she was administered 20mg of IV lasix. Troponin trended up and then lowered on third draw. Urinalysis pending at time of note. Due to ESR of 33 and CRP of 110.4 with fever, scalp tenderness, and worsening headache she was empirically started on prednisone 60mg for one dose and to be continued by primary team pending MRA of head to assess for temporal arteritis. MRI of c-spine ordered. MRA of neck ordered.  One year ago patient presented to ED with right sided headache that was similar to this visit. It was discovered she was septic due to pneumonia but on that visit she has 43K WBC and elevated lactic acid. Neurology was consulted at this time and MRI performed which was abnormal. Patient has no vision loss and is neurologically intact. She has been continued on IV vancomycin and IV cefepime and oral valcyclovir. Cardiology and ID consulted.  Course of care discussed with daughter in law Dottie Burton who agreed with plan of care.  Patient admitted by hospital medicine for further evaluation and management.     Overview/Hospital Course:  79 year old female with a previous medical history of anemia, basal cell carcinoma, breast cancer, CHF, streptococcal bacteremia, GERD, HLD, HTN, CAD, TAVR, hypothyroidism, accidental tylenol overdose and abnormal MRI initially admitted on 12/29 for severe headache/ left-sided neck pain for around a month, patient also has intermittent low-grade fever.  Workup shows severe elevated CRP/ ESR, elevated procalcitonin, with unclear infectious source.  CT chest abdomen and pelvis is negative for infectious " source.  Patient also has mild elevated troponin most likely secondary to sepsis as per cardiologist Dr. SISSY Garvin, patient also has elevated BNP 1680, possible acute CHF, patient received IV Lasix, -2.5 L. patient apparently euvolemic now.  Patient has a series of imaging study including CT head/ CT neck/ MRI of the cervical/ MRI of the brain which is insignificant other than shows significant cervical spondylosis.  Eventually the patient had LP 12/31 which showed RBC 2, WBC 2, essentially negative.   Patient had TTE 12/31 LVEF 55-60%, no significant valvular vegetations.   Now patient 2/2 blood culture showed strep viridans, possible source, patient does have very bad dental hygiene, patient is restarted on IV Rocephin by ID.  Regulo negative for vegetations.  Regarding neck pain, this is a concern for patient's may have GCA.  Neurologist following. Gabapentin was uptitrated, patient had significant improvement in neck pain.   Patient also found to rhinovirus positive, continue droplet precaution.   MRV of the brain is done and negative for dural venous sinus thrombosis   Regarding her CHF, chest x-ray showed significant improvement in fluid status was euvolemic.  Infectious diseases planning for PICC line with outpatient antibiotics for 4-6 weeks.  Stable to transfer out of step-down.  Discussed with Dr. Burton.      Interval History:   Patient is seen and examined at multidisciplinary rounds, feels much better, neck pain significantly improved    NPO this morning for REGULO.      Blood cultures finalized as Streptococcus viridans sensitive to cefepime, ceftriaxone, vancomycin      Review of Systems   Musculoskeletal:  Negative for neck pain.   Neurological:  Negative for headaches.     Objective:     Vital Signs (Most Recent):  Temp: 97.7 °F (36.5 °C) (01/02/25 1102)  Pulse: 72 (01/02/25 1400)  Resp: (!) 22 (01/02/25 1400)  BP: (!) 114/58 (01/02/25 1400)  SpO2: 97 % (01/02/25 1400) Vital Signs (24h Range):  Temp:   [97.3 °F (36.3 °C)-98.4 °F (36.9 °C)] 97.7 °F (36.5 °C)  Pulse:  [66-97] 72  Resp:  [10-27] 22  SpO2:  [93 %-100 %] 97 %  BP: ()/(45-99) 114/58     Weight: 54.4 kg (120 lb)  Body mass index is 23.44 kg/m².    Intake/Output Summary (Last 24 hours) at 1/2/2025 1458  Last data filed at 1/2/2025 0959  Gross per 24 hour   Intake 440 ml   Output 2 ml   Net 438 ml         Physical Exam  Constitutional:       Appearance: Normal appearance.   HENT:      Head: Normocephalic and atraumatic.      Nose: Nose normal.   Eyes:      Extraocular Movements: Extraocular movements intact.      Pupils: Pupils are equal, round, and reactive to light.   Cardiovascular:      Rate and Rhythm: Normal rate and regular rhythm.      Heart sounds: No murmur heard.  Pulmonary:      Effort: Pulmonary effort is normal.      Breath sounds: Normal breath sounds.   Abdominal:      General: Abdomen is flat.      Palpations: Abdomen is soft.   Musculoskeletal:         General: Normal range of motion.      Cervical back: Normal range of motion and neck supple.   Skin:     General: Skin is warm and dry.   Neurological:      General: No focal deficit present.      Mental Status: She is alert and oriented to person, place, and time.   Psychiatric:         Mood and Affect: Mood normal.             Significant Labs: All pertinent labs within the past 24 hours have been reviewed.  CBC:   Recent Labs   Lab 01/01/25  0446 01/02/25  0436   WBC 10.82 9.78   HGB 8.4* 8.7*   HCT 25.4* 26.2*    257     CMP:   Recent Labs   Lab 01/01/25  0446   *   K 4.3   CL 98   CO2 28   GLU 93   BUN 10   CREATININE 0.6   CALCIUM 7.3*   PROT 4.9*   ALBUMIN 3.1*   BILITOT 0.4   ALKPHOS 59   AST 8*   ALT 12   ANIONGAP 5*       Significant Imaging: I have reviewed all pertinent imaging results/findings within the past 24 hours.  I have reviewed and interpreted all pertinent imaging results/findings within the past 24 hours.    Scheduled Meds:   aspirin  81 mg Oral  Daily    atorvastatin  20 mg Oral QHS    calcium carbonate  500 mg Oral TID    carvediloL  6.25 mg Oral BID    cefTRIAXone (Rocephin) IV (PEDS and ADULTS)  2 g Intravenous Q24H    enoxparin  40 mg Subcutaneous Q24H (prophylaxis, 1700)    famotidine  20 mg Oral Daily    gabapentin  100 mg Oral Once    gabapentin  200 mg Oral TID    lactobacillus acidophilus & bulgar  1 packet Oral BID    levothyroxine  75 mcg Oral Before breakfast     Continuous Infusions:  PRN Meds:.  Current Facility-Administered Medications:     acetaminophen, 650 mg, Oral, Q4H PRN    albuterol-ipratropium, 3 mL, Nebulization, Q6H PRN    aluminum-magnesium hydroxide-simethicone, 30 mL, Oral, QID PRN    calcium gluconate IVPB, 1 g, Intravenous, PRN    calcium gluconate IVPB, 2 g, Intravenous, PRN    calcium gluconate IVPB, 3 g, Intravenous, PRN    dextrose 10%, 12.5 g, Intravenous, PRN    dextrose 10%, 25 g, Intravenous, PRN    diphenhydrAMINE, 25 mg, Oral, Q6H PRN    diphenhydrAMINE-zinc acetate 2-0.1%, , Topical (Top), BID PRN    glucagon (human recombinant), 1 mg, Intramuscular, PRN    glucose, 16 g, Oral, PRN    glucose, 24 g, Oral, PRN    HYDROmorphone, 0.5 mg, Intravenous, Q4H PRN    magnesium oxide, 800 mg, Oral, PRN    magnesium oxide, 800 mg, Oral, PRN    magnesium sulfate IVPB, 2 g, Intravenous, PRN    magnesium sulfate IVPB, 4 g, Intravenous, PRN    melatonin, 6 mg, Oral, Nightly PRN    naloxone, 0.02 mg, Intravenous, PRN    ondansetron, 4 mg, Intravenous, Q8H PRN    oxyCODONE, 5 mg, Oral, Q4H PRN    oxyCODONE, 10 mg, Oral, Q4H PRN    potassium bicarbonate, 35 mEq, Oral, PRN    potassium bicarbonate, 50 mEq, Oral, PRN    potassium bicarbonate, 60 mEq, Oral, PRN    potassium chloride, 40 mEq, Intravenous, PRN **AND** potassium chloride, 60 mEq, Intravenous, PRN **AND** potassium chloride, 80 mEq, Intravenous, PRN    potassium, sodium phosphates, 2 packet, Oral, PRN    potassium, sodium phosphates, 2 packet, Oral, PRN    potassium, sodium  phosphates, 2 packet, Oral, PRN    prochlorperazine, 5 mg, Intravenous, Q6H PRN    simethicone, 1 tablet, Oral, QID PRN    sodium chloride 0.9%, 10 mL, Intravenous, Q12H PRN    sodium phosphate 15 mmol in D5W 250 mL IVPB, 15 mmol, Intravenous, PRN    sodium phosphate 20.01 mmol in D5W 250 mL IVPB, 20.01 mmol, Intravenous, PRN    sodium phosphate 30 mmol in D5W 250 mL IVPB, 30 mmol, Intravenous, PRN    MRA Head for Temporal Arteritis W and WO Contrast    Result Date: 12/30/2024  EXAMINATION: MRA HEAD FOR TEMPORAL ARTERITIS W AND WO CONTRAST CLINICAL HISTORY: Neck pain, acute, infection suspected;Concern for Temporal Arteritis; TECHNIQUE: Time of flight and post contrast MR angiography sequences were performed before and following the IV administration of 5 mL of Gadavist..  Multiplanar and MIP images were performed. COMPARISON: MRI of the brain 12/30/2024 FINDINGS: The intracranial internal carotid arteries are patent bilaterally from the skull base to carotid terminus. Middle cerebral arteries are patent bilaterally. Anterior cerebral arteries are patent bilaterally. There are codominant vertebral arteries. Bilateral vertebral arteries are patent to the confluence into the basilar artery. Basilar artery is normal in caliber. Posterior cerebral arteries are patent bilaterally. No evidence of significant mural thickening or abnormal enhancement along the courses of the temporal arteries to specifically indicate active vascular inflammation. Visualized dural venous sinuses are patent without evidence of dural venous sinus thrombosis.     1. No intracranial high-grade stenosis, large vessel occlusion, aneurysm or arteriovenous malformation. 2. No evidence of significant mural thickening or abnormal enhancement along the courses of the temporal arteries to specifically indicate active vascular inflammation. Electronically signed by: Marek Moreno Date:    12/30/2024 Time:    11:17    MRI Cervical Spine W WO Cont    Result  Date: 12/30/2024  EXAMINATION: MRI CERVICAL SPINE W WO CONTRAST CLINICAL HISTORY: Neck pain, acute, infection suspected; TECHNIQUE: Multiplanar, multisequence MR images of the cervical spine were performed before and after the administration of intravenous contrast.  5 mL of Gadavist intravenous contrast were administered. COMPARISON: CT cervical spine 12/29/2024 FINDINGS: Study is mild to moderately degraded by motion artifact. Alignment: Reversal of the cervical lordosis, which may be secondary to positioning or muscle spasm.  Minimal anterolisthesis of C4 on C5 and retrolisthesis of C5 on C6. Vertebral Column: Vertebral body heights are maintained. No acute fracture is identified.  No evidence of an aggressive bone marrow replacement process. Multilevel disc degeneration, most pronounced at C5-6 and C6-7 with mild-to-moderate intervertebral disc space narrowing, disc desiccation, anterior marginal osteophyte formation and posterior disc osteophyte complexes. Cord: Normal signal and morphology. Skull base and craniocervical junction: Normal. Degenerative findings: C2-C3: Minimal posterior disc osteophyte complex.  Mild bilateral facet arthropathy.  No significant neural foraminal or spinal canal stenosis. C3-C4: Mild posterior disc osteophyte complex.  Moderate bilateral facet arthropathy.  Marked left and moderate right uncovertebral joint spurring.  Severe left and moderate right neural foraminal stenosis.  No significant spinal canal stenosis. C4-C5: Mild posterior disc osteophyte complex.  Moderate bilateral facet arthropathy.  Moderate left and mild right uncovertebral joint spurring.  Moderate left and mild right neural foraminal stenosis.  No significant spinal canal stenosis. C5-C6: Posterior disc osteophyte complex, which partially effaces the ventral thecal sac.  Moderate bilateral facet arthropathy.  Marked left and moderate right uncovertebral joint spurring.  Severe left and moderate right neural  foraminal stenosis.  Ligamentum flavum thickening, which partially effaces the dorsal thecal sac.  Mild spinal canal stenosis. C6-C7: Posterior disc osteophyte complex, which mildly effaces the ventral thecal sac.  Moderate bilateral facet arthropathy.  Moderate left and moderate right uncovertebral joint spurring.  Severe left and moderate right neural foraminal stenosis.  Ligamentum flavum thickening, which partially effaces the dorsal thecal sac.  Mild spinal canal stenosis. C7-T1: The disk is normal in configuration.  Mild bilateral facet arthropathy.  Mild bilateral uncovertebral joint spurring.  Mild left greater than right neural foraminal stenosis.  No significant spinal canal stenosis. Paraspinal muscles & soft tissues: Unremarkable. Normal signal voids are present in the vertebral arteries. No abnormal intraspinal enhancement identified.     1. No evidence of acute fracture, spondylodiscitis, high-grade spinal canal stenosis.  No cord compression, focal cord signal abnormality, or abnormal intraspinal enhancement. 2. Multilevel degenerative changes of the cervical spine, as detailed above, with findings most pronounced at C5-6 and C6-7 and resulting in severe left and moderate right neural foraminal and mild spinal canal stenosis. Electronically signed by: Marek Moreno Date:    12/30/2024 Time:    10:47    MRA Neck without contrast    Result Date: 12/30/2024  EXAMINATION: MRA NECK WITHOUT CONTRAST CLINICAL HISTORY: CNS vasculitis, known or suspected; TECHNIQUE: Time of flight MRA of the carotid and vertebral arteries was performed with 3D reconstructions according to the standard neck MRA protocol. COMPARISON: None FINDINGS: Study is mild to moderately degraded by motion artifact. The right common carotid artery demonstrates no hemodynamically significant stenosis. The cervical right internal carotid artery demonstrates no hemodynamically significant stenosis. The left common carotid artery demonstrates no  hemodynamically significant stenosis. The cervical left internal carotid artery demonstrates no hemodynamically significant stenosis. Extracranial vertebral arteries: Vertebral arteries are codominant.  Vertebral arteries are normal to the level of the foramen magnum.     No evidence of a hemodynamically significant stenosis, major branch occlusion or aneurysm in the neck arterial vasculature, allowing for motion degradation. Electronically signed by: Marek Moreno Date:    12/30/2024 Time:    10:23    MRI Brain Without Contrast    Result Date: 12/30/2024  EXAMINATION: MRI BRAIN WITHOUT CONTRAST CLINICAL HISTORY: Headache, new or worsening (Age >= 50y);. TECHNIQUE: Multiplanar multisequence MR imaging of the brain was performed without the administration of intravenous contrast. COMPARISON: CT head 12/29/2024, MRI brain 01/24/2023 FINDINGS: Study is  degraded by motion artifact. Ventricles and sulci are normal in size for age without evidence of hydrocephalus. No extra-axial blood or fluid collections. Scattered foci of T2/FLAIR hyperintense signal within the supratentorial white matter, nonspecific and may reflect sequelae of mild chronic small vessel ischemic change.    Diffusion-weighted images demonstrate no evidence of an acute infarct.   Susceptibility weighted images demonstrate no evidence of acute or chronic hemorrhage. No significant intracranial mass effect or midline shift. Normal vascular flow voids are present. Diffusely heterogeneous calvarial bone marrow signal, nonspecific but similar to prior exam from 2023, and may be seen with red marrow reconversion associated with chronic anemic states, osteoporosis, hypoxia, or smoking. Moderate opacification of the left maxillary sinus with mucosal membrane thickening and small air-fluid level.  Mild scattered mucosal membrane thickening elsewhere in the paranasal sinuses.  The visualized portions of the mastoids are unremarkable. Bilateral lens replacements  are noted.     1. No acute intracranial abnormality. 2. Mild generalized cerebral volume loss and scattered T2/FLAIR hyperintense signal within the supratentorial white matter, nonspecific but probably reflecting sequelae of chronic small vessel ischemic change. 3. Left maxillary sinusitis. Electronically signed by: Marek Moreno Date:    12/30/2024 Time:    07:39    X-Ray Chest AP Portable    Result Date: 12/29/2024  EXAMINATION: XR CHEST AP PORTABLE CLINICAL HISTORY: Chest Pain; TECHNIQUE: Single frontal view of the chest was performed. COMPARISON: 02/02/2023. FINDINGS: There are mild interstitial opacities, suspicious for interstitial edema/CHF.  The pleural spaces are clear the cardiac silhouette is borderline.  There are calcifications of the aortic arch.  There is a prosthetic aortic valve stent.  Osseous structures demonstrate degenerative changes.     Mild interstitial opacities, suspicious for interstitial edema/CHF. Electronically signed by: Anthony Andres Date:    12/29/2024 Time:    17:58    CT Head Without Contrast    Result Date: 12/29/2024  EXAMINATION: CT HEAD WITHOUT CONTRAST CLINICAL HISTORY: Headache, new or worsening (Age >= 50y); TECHNIQUE: Low dose axial CT images obtained throughout the head without intravenous contrast. Sagittal and coronal reconstructions were performed. COMPARISON: None. FINDINGS: Intracranial compartment: Ventricles and sulci are normal in size for age without evidence of hydrocephalus. No extra-axial blood or fluid collections. The brain parenchyma appears normal. No parenchymal mass, hemorrhage, edema or major vascular distribution infarct. Skull/extracranial contents (limited evaluation): No fracture. There is a left maxillary sinus air-fluid level.     There is no evidence acute intracranial abnormality.  There is left maxillary sinus disease. Electronically signed by: Susana Carrion MD Date:    12/29/2024 Time:    16:49    CT Cervical Spine Without Contrast    Result  Date: 12/29/2024  EXAMINATION: CT CERVICAL SPINE WITHOUT CONTRAST CLINICAL HISTORY: Neck pain, chronic, degenerative changes on xray;Neck pain, acute exacerbation with chronic degenerative changes; TECHNIQUE: Low dose axial images, sagittal and coronal reformations were performed though the cervical spine.  Contrast was not administered. COMPARISON: 12/18/2024 FINDINGS: There is no evidence fracture or malalignment.  There are degenerative changes throughout the cervical spine most prominent in the mid cervical spine.  There is no prevertebral soft tissue swelling.  The adjacent soft tissues are unremarkable.     There are degenerative changes throughout the cervical spine. Electronically signed by: Susana Carrion MD Date:    12/29/2024 Time:    15:48    X-Ray Cervical Spine AP And Lateral    Result Date: 12/18/2024  EXAMINATION: XR CERVICAL SPINE AP LATERAL CLINICAL HISTORY: Cervicalgia TECHNIQUE: AP, lateral and open mouth views of the cervical spine were performed. COMPARISON: None. FINDINGS: Vertebral body heights are preserved.Trace anterolisthesis of C4 on C5 and retrolisthesis of C5 on C6.Moderate disc height loss at C5-C6 and C6-C7 with shallow endplate centered osteophytes.  No abnormal prevertebral soft tissue thickening.     As above. Electronically signed by: Marek Burton Date:    12/18/2024 Time:    11:21    Mammo Digital Screening Left with Krishna    Result Date: 12/3/2024  Facility: [facility] [address] [place] [address] [phone] Name: Vivien Burton MRN: [id number] Result: Mammo Digital Screening Left with Krishna History: Patient is 79 y.o. and is seen for a screening mammogram. Films Compared: Compared to: 11/30/2023 Mammo Digital Screening Left with Krishna, 08/11/2022 Mammo Digital Screening Left with Krishna, and 08/09/2021 Mammo Digital Screening Left with Krishna Findings: This procedure was performed using tomosynthesis. Computer-aided detection was utilized  "in the interpretation of this examination. There are scattered areas of fibroglandular density. There is no evidence of suspicious masses, microcalcifications or architectural distortion.      No mammographic evidence of malignancy. BI-RADS Category 1: Negative Recommendation: Routine screening mammogram in 1 year is recommended. Seun Amaro MD   - pulls last radiology orders      Assessment and Plan     * Severe sepsis/ strep viridans bacteremia  This patient does have evidence of infective focus  My overall impression is sepsis.  Source: Respiratory and Unknown Urinalysis pending at time of note  Antibiotics given-   Antibiotics (72h ago, onward)      Start     Stop Route Frequency Ordered    01/01/25 0900  cefTRIAXone injection 2 g         -- IV Every 24 hours (non-standard times) 12/31/24 1550          Latest lactate reviewed-  No results for input(s): "LACTATE", "POCLAC" in the last 72 hours.  Procalcitonin: 81.13  CRP: 110.4  Sed Rate 33      Organ dysfunction indicated by Acute on chronic  heart failure    Fluid challenge Contraindicated- Fluid bolus is contraindicated in this patient due to Congestive Heart Failure     Post- resuscitation assessment Yes Perfusion exam was performed within 6 hours of septic shock presentation after bolus shows Adequate tissue perfusion assessed by non-invasive monitoring     CT chest abdomen and pelvis with IV contrast negative for infectious source.   Status post LP 12/31, WBC 2, RBC 2, essentially negative.   TTE 12/31, negative for vegetation.   Patient has very poor dental hygiene, has Streptococcus viridans bacteremia, patient also has a history of TAVR  [facility] today, full report pending, prelim: no vegetations  Continue IV Rocephin 2 g q.12 hours.   Infectious Disease planning for IV antibiotics for about 4-6 weeks, PICC line tomorrow based on repeat blood cultures      Hypocalcemia   The most recent calcium and albumin results listed below.  Recent Labs     " "12/31/24  0240 01/01/25  0446 01/01/25  0857   CALCIUM 8.3* 7.3*  --    ALBUMIN 2.9* 3.1*  --    CAION  --   --  1.11       Plan  - Will replace calcium and monitor electrolytes closely.  - The patient's hypocalcemia is worsening. Will adjust treatment as follows:  IV replacement  - calcium ionized normal  - IV replacement  - Telemetry  - EKG PRN  - add on CMP        Rhinovirus infection    Aware, droplet precaution, conservative management    Intractable headache  LP negative  MRV negative for dural venous sinus thrombosis   Resolved    Neck pain without injury  Left sided neck pain that is sharp and stabbing into head. Patient endorses that specific area on head is painful to touch. CT of neck shows degenerative changes      Patient has significant left-sided neck pain radiated to left occipital.  Extensive imaging study is not quite impressive except severe cervical spondylosis.   Status post LP- essential negative.   Discontinue steroids as GCA deemed less likely.   Neurology following  MRV negative for dural sinus thrombosis  Gabapentin helping with pain  Significantly improved        Elevated troponin  Denies chest pain or Shortness of breath.    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   Per Dematology note on 12/4/24 "Biopsy proven drug eruption, cleared with prednisone and d/c of prilosec. She restarted prilosec and rash came back. She stopped it at end of November and had another course of steroids". Per chart review patient was placed on steroid taper starting 11/16    History of transcatheter aortic valve replacement (TAVR)  Chronic condition noted      HTN (hypertension)  Patient's blood pressure range in the last 24 hours was: BP  Min: 85/55  Max: 202/97.The patient's inpatient anti-hypertensive regimen is listed below:  Current Antihypertensives  carvediloL tablet 6.25 " mg, 2 times daily, Oral    Plan  Currently on Coreg  Hydralazine p.r.n.   Losartan 50mg QHS was held please restart as warranted    Coronary artery disease  Patient with known CAD s/p stent placement and CABG, which is controlled Will continue ASA and Statin and monitor for S/Sx of angina/ACS. Continue to monitor on telemetry.     Trending troponin. Will continue to trend every four hours. Patient denies chest pain or SOB.   Cardiology following.   Likely demand  No acute STT wave changes    Acute on chronic HFrEF (heart failure with reduced ejection fraction)    Echo Saline Bubble? No; Ultrasound enhancing contrast? Yes    Result Date: 12/31/2024    Left Ventricle: The left ventricle is normal in size. Mildly increased   wall thickness. There is concentric hypertrophy. There is normal systolic   function with a visually estimated ejection fraction of 55 - 60%. There is   normal diastolic function.    Right Ventricle: Normal right ventricular cavity size. Wall thickness   is normal. Systolic function is normal.    Left Atrium: Left atrium is mildly dilated.    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 .  This has no obvious vegetation identified on this valve.    Adequate function is noted    Mitral Valve: The mitral valve is structurally normal. There is normal   leaflet mobility.    Pulmonary Artery: There is mild pulmonary hypertension. The estimated   pulmonary artery systolic pressure is 42 mmHg.    IVC/SVC: Normal venous pressure at 3 mmHg.    No echocardiographic evidence of vegetation noted        Echo    Result Date: 11/22/2024    Left Ventricle: The left ventricle is normal in size. Mildly increased   wall thickness. There is mild concentric hypertrophy. There is normal   systolic function with a visually estimated ejection fraction of 55 - 70%.   Ejection fraction is approximately 60%. Global longitudinal strain is   -18.0%. Global longitudinal strain is normal.  There is normal diastolic   function.    Right Ventricle: Normal right ventricular cavity size. Systolic   function is normal. TAPSE is 1.90 cm. Right ventricular global   longitudinal strain is - 23.8%.    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 It is reported to be a 26 mm. Aortic valve area by VTI is 1.2   cm². Aortic valve peak velocity is 1.6 m/s. Mean gradient is 5.2 mmHg. The   dimensionless index is 0.59.    IVC/SVC: Normal venous pressure at 3 mmHg.    No definite change from Echo in 2/2023.           Patient looks euvolemic now, off IV Lasix.  Repeat chest x-ray significant improvement in fluid status    Anemia   Most recent hemoglobin and hematocrit are listed below.  Recent Labs     12/29/24  1737 12/30/24  0415 12/31/24  0237   HGB 9.0* 8.5* 8.7*   HCT 26.4* 26.1* 26.8*       Plan  - Monitor serial CBC: Daily  - Transfuse PRBC if patient becomes hemodynamically unstable, symptomatic or H/H drops below 7/21.  - Patient has not received any PRBC transfusions to date  - Patient's anemia is currently stable    Unspecified hypothyroidism  Chronic condition noted  -Levothyroxine continued      Breast cancer  History of Breast Cancer in remission. Last treatment 24 years ago.       GERD (gastroesophageal reflux disease)  -Pepcid b.i.d.        VTE Risk Mitigation (From admission, onward)           Ordered     enoxaparin injection 40 mg  Every 24 hours         12/31/24 1543     IP VTE HIGH RISK PATIENT  Once         12/29/24 2021     Place sequential compression device  Until discontinued         12/29/24 2021                    Discharge Planning   MIRELLA:      Code Status: Full Code   Medical Readiness for Discharge Date:   Discharge Plan A: Home                        Cristy Fisher MD  Department of Hospital Medicine   [facility]

## 2025-01-02 NOTE — SUBJECTIVE & OBJECTIVE
Interval History:   Patient is seen and examined at multidisciplinary rounds, feels much better, neck pain significantly improved    NPO this morning for [facility].      Blood cultures finalized as Streptococcus viridans sensitive to cefepime, ceftriaxone, vancomycin      Review of Systems   Musculoskeletal:  Negative for neck pain.   Neurological:  Negative for headaches.     Objective:     Vital Signs (Most Recent):  Temp: 97.7 °F (36.5 °C) (01/02/25 1102)  Pulse: 72 (01/02/25 1400)  Resp: (!) 22 (01/02/25 1400)  BP: (!) 114/58 (01/02/25 1400)  SpO2: 97 % (01/02/25 1400) Vital Signs (24h Range):  Temp:  [97.3 °F (36.3 °C)-98.4 °F (36.9 °C)] 97.7 °F (36.5 °C)  Pulse:  [66-97] 72  Resp:  [10-27] 22  SpO2:  [93 %-100 %] 97 %  BP: ()/(45-99) 114/58     Weight: 54.4 kg (120 lb)  Body mass index is 23.44 kg/m².    Intake/Output Summary (Last 24 hours) at 1/2/2025 1454  Last data filed at 1/2/2025 0959  Gross per 24 hour   Intake 440 ml   Output 2 ml   Net 438 ml         Physical Exam  Constitutional:       Appearance: Normal appearance.   HENT:      Head: Normocephalic and atraumatic.      Nose: Nose normal.   Eyes:      Extraocular Movements: Extraocular movements intact.      Pupils: Pupils are equal, round, and reactive to light.   Cardiovascular:      Rate and Rhythm: Normal rate and regular rhythm.      Heart sounds: No murmur heard.  Pulmonary:      Effort: Pulmonary effort is normal.      Breath sounds: Normal breath sounds.   Abdominal:      General: Abdomen is flat.      Palpations: Abdomen is soft.   Musculoskeletal:         General: Normal range of motion.      Cervical back: Normal range of motion and neck supple.   Skin:     General: Skin is warm and dry.   Neurological:      General: No focal deficit present.      Mental Status: She is alert and oriented to person, place, and time.   Psychiatric:         Mood and Affect: Mood normal.             Significant Labs: All pertinent labs within the past 24 hours  have been reviewed.  CBC:   Recent Labs   Lab 01/01/25  0446 01/02/25  0436   WBC 10.82 9.78   HGB 8.4* 8.7*   HCT 25.4* 26.2*    257     CMP:   Recent Labs   Lab 01/01/25  0446   *   K 4.3   CL 98   CO2 28   GLU 93   BUN 10   CREATININE 0.6   CALCIUM 7.3*   PROT 4.9*   ALBUMIN 3.1*   BILITOT 0.4   ALKPHOS 59   AST 8*   ALT 12   ANIONGAP 5*       Significant Imaging: I have reviewed all pertinent imaging results/findings within the past 24 hours.  I have reviewed and interpreted all pertinent imaging results/findings within the past 24 hours.    Scheduled Meds:   aspirin  81 mg Oral Daily    atorvastatin  20 mg Oral QHS    calcium carbonate  500 mg Oral TID    carvediloL  6.25 mg Oral BID    cefTRIAXone (Rocephin) IV (PEDS and ADULTS)  2 g Intravenous Q24H    enoxparin  40 mg Subcutaneous Q24H (prophylaxis, 1700)    famotidine  20 mg Oral Daily    gabapentin  100 mg Oral Once    gabapentin  200 mg Oral TID    lactobacillus acidophilus & bulgar  1 packet Oral BID    levothyroxine  75 mcg Oral Before breakfast     Continuous Infusions:  PRN Meds:.  Current Facility-Administered Medications:     acetaminophen, 650 mg, Oral, Q4H PRN    albuterol-ipratropium, 3 mL, Nebulization, Q6H PRN    aluminum-magnesium hydroxide-simethicone, 30 mL, Oral, QID PRN    calcium gluconate IVPB, 1 g, Intravenous, PRN    calcium gluconate IVPB, 2 g, Intravenous, PRN    calcium gluconate IVPB, 3 g, Intravenous, PRN    dextrose 10%, 12.5 g, Intravenous, PRN    dextrose 10%, 25 g, Intravenous, PRN    diphenhydrAMINE, 25 mg, Oral, Q6H PRN    diphenhydrAMINE-zinc acetate 2-0.1%, , Topical (Top), BID PRN    glucagon (human recombinant), 1 mg, Intramuscular, PRN    glucose, 16 g, Oral, PRN    glucose, 24 g, Oral, PRN    HYDROmorphone, 0.5 mg, Intravenous, Q4H PRN    magnesium oxide, 800 mg, Oral, PRN    magnesium oxide, 800 mg, Oral, PRN    magnesium sulfate IVPB, 2 g, Intravenous, PRN    magnesium sulfate IVPB, 4 g, Intravenous,  PRN    melatonin, 6 mg, Oral, Nightly PRN    naloxone, 0.02 mg, Intravenous, PRN    ondansetron, 4 mg, Intravenous, Q8H PRN    oxyCODONE, 5 mg, Oral, Q4H PRN    oxyCODONE, 10 mg, Oral, Q4H PRN    potassium bicarbonate, 35 mEq, Oral, PRN    potassium bicarbonate, 50 mEq, Oral, PRN    potassium bicarbonate, 60 mEq, Oral, PRN    potassium chloride, 40 mEq, Intravenous, PRN **AND** potassium chloride, 60 mEq, Intravenous, PRN **AND** potassium chloride, 80 mEq, Intravenous, PRN    potassium, sodium phosphates, 2 packet, Oral, PRN    potassium, sodium phosphates, 2 packet, Oral, PRN    potassium, sodium phosphates, 2 packet, Oral, PRN    prochlorperazine, 5 mg, Intravenous, Q6H PRN    simethicone, 1 tablet, Oral, QID PRN    sodium chloride 0.9%, 10 mL, Intravenous, Q12H PRN    sodium phosphate 15 mmol in D5W 250 mL IVPB, 15 mmol, Intravenous, PRN    sodium phosphate 20.01 mmol in D5W 250 mL IVPB, 20.01 mmol, Intravenous, PRN    sodium phosphate 30 mmol in D5W 250 mL IVPB, 30 mmol, Intravenous, PRN    MRA Head for Temporal Arteritis W and WO Contrast    Result Date: 12/30/2024  EXAMINATION: MRA HEAD FOR TEMPORAL ARTERITIS W AND WO CONTRAST CLINICAL HISTORY: Neck pain, acute, infection suspected;Concern for Temporal Arteritis; TECHNIQUE: Time of flight and post contrast MR angiography sequences were performed before and following the IV administration of 5 mL of Gadavist..  Multiplanar and MIP images were performed. COMPARISON: MRI of the brain 12/30/2024 FINDINGS: The intracranial internal carotid arteries are patent bilaterally from the skull base to carotid terminus. Middle cerebral arteries are patent bilaterally. Anterior cerebral arteries are patent bilaterally. There are codominant vertebral arteries. Bilateral vertebral arteries are patent to the confluence into the basilar artery. Basilar artery is normal in caliber. Posterior cerebral arteries are patent bilaterally. No evidence of significant mural thickening or  abnormal enhancement along the courses of the temporal arteries to specifically indicate active vascular inflammation. Visualized dural venous sinuses are patent without evidence of dural venous sinus thrombosis.     1. No intracranial high-grade stenosis, large vessel occlusion, aneurysm or arteriovenous malformation. 2. No evidence of significant mural thickening or abnormal enhancement along the courses of the temporal arteries to specifically indicate active vascular inflammation. Electronically signed by: Marek Moreno Date:    12/30/2024 Time:    11:17    MRI Cervical Spine W WO Cont    Result Date: 12/30/2024  EXAMINATION: MRI CERVICAL SPINE W WO CONTRAST CLINICAL HISTORY: Neck pain, acute, infection suspected; TECHNIQUE: Multiplanar, multisequence MR images of the cervical spine were performed before and after the administration of intravenous contrast.  5 mL of Gadavist intravenous contrast were administered. COMPARISON: CT cervical spine 12/29/2024 FINDINGS: Study is mild to moderately degraded by motion artifact. Alignment: Reversal of the cervical lordosis, which may be secondary to positioning or muscle spasm.  Minimal anterolisthesis of C4 on C5 and retrolisthesis of C5 on C6. Vertebral Column: Vertebral body heights are maintained. No acute fracture is identified.  No evidence of an aggressive bone marrow replacement process. Multilevel disc degeneration, most pronounced at C5-6 and C6-7 with mild-to-moderate intervertebral disc space narrowing, disc desiccation, anterior marginal osteophyte formation and posterior disc osteophyte complexes. Cord: Normal signal and morphology. Skull base and craniocervical junction: Normal. Degenerative findings: C2-C3: Minimal posterior disc osteophyte complex.  Mild bilateral facet arthropathy.  No significant neural foraminal or spinal canal stenosis. C3-C4: Mild posterior disc osteophyte complex.  Moderate bilateral facet arthropathy.  Marked left and moderate right  uncovertebral joint spurring.  Severe left and moderate right neural foraminal stenosis.  No significant spinal canal stenosis. C4-C5: Mild posterior disc osteophyte complex.  Moderate bilateral facet arthropathy.  Moderate left and mild right uncovertebral joint spurring.  Moderate left and mild right neural foraminal stenosis.  No significant spinal canal stenosis. C5-C6: Posterior disc osteophyte complex, which partially effaces the ventral thecal sac.  Moderate bilateral facet arthropathy.  Marked left and moderate right uncovertebral joint spurring.  Severe left and moderate right neural foraminal stenosis.  Ligamentum flavum thickening, which partially effaces the dorsal thecal sac.  Mild spinal canal stenosis. C6-C7: Posterior disc osteophyte complex, which mildly effaces the ventral thecal sac.  Moderate bilateral facet arthropathy.  Moderate left and moderate right uncovertebral joint spurring.  Severe left and moderate right neural foraminal stenosis.  Ligamentum flavum thickening, which partially effaces the dorsal thecal sac.  Mild spinal canal stenosis. C7-T1: The disk is normal in configuration.  Mild bilateral facet arthropathy.  Mild bilateral uncovertebral joint spurring.  Mild left greater than right neural foraminal stenosis.  No significant spinal canal stenosis. Paraspinal muscles & soft tissues: Unremarkable. Normal signal voids are present in the vertebral arteries. No abnormal intraspinal enhancement identified.     1. No evidence of acute fracture, spondylodiscitis, high-grade spinal canal stenosis.  No cord compression, focal cord signal abnormality, or abnormal intraspinal enhancement. 2. Multilevel degenerative changes of the cervical spine, as detailed above, with findings most pronounced at C5-6 and C6-7 and resulting in severe left and moderate right neural foraminal and mild spinal canal stenosis. Electronically signed by: Marek Moreno Date:    12/30/2024 Time:    10:47    MRA Neck  without contrast    Result Date: 12/30/2024  EXAMINATION: MRA NECK WITHOUT CONTRAST CLINICAL HISTORY: CNS vasculitis, known or suspected; TECHNIQUE: Time of flight MRA of the carotid and vertebral arteries was performed with 3D reconstructions according to the standard neck MRA protocol. COMPARISON: None FINDINGS: Study is mild to moderately degraded by motion artifact. The right common carotid artery demonstrates no hemodynamically significant stenosis. The cervical right internal carotid artery demonstrates no hemodynamically significant stenosis. The left common carotid artery demonstrates no hemodynamically significant stenosis. The cervical left internal carotid artery demonstrates no hemodynamically significant stenosis. Extracranial vertebral arteries: Vertebral arteries are codominant.  Vertebral arteries are normal to the level of the foramen magnum.     No evidence of a hemodynamically significant stenosis, major branch occlusion or aneurysm in the neck arterial vasculature, allowing for motion degradation. Electronically signed by: Marek Moreno Date:    12/30/2024 Time:    10:23    MRI Brain Without Contrast    Result Date: 12/30/2024  EXAMINATION: MRI BRAIN WITHOUT CONTRAST CLINICAL HISTORY: Headache, new or worsening (Age >= 50y);. TECHNIQUE: Multiplanar multisequence MR imaging of the brain was performed without the administration of intravenous contrast. COMPARISON: CT head 12/29/2024, MRI brain 01/24/2023 FINDINGS: Study is  degraded by motion artifact. Ventricles and sulci are normal in size for age without evidence of hydrocephalus. No extra-axial blood or fluid collections. Scattered foci of T2/FLAIR hyperintense signal within the supratentorial white matter, nonspecific and may reflect sequelae of mild chronic small vessel ischemic change.    Diffusion-weighted images demonstrate no evidence of an acute infarct.   Susceptibility weighted images demonstrate no evidence of acute or chronic  hemorrhage. No significant intracranial mass effect or midline shift. Normal vascular flow voids are present. Diffusely heterogeneous calvarial bone marrow signal, nonspecific but similar to prior exam from 2023, and may be seen with red marrow reconversion associated with chronic anemic states, osteoporosis, hypoxia, or smoking. Moderate opacification of the left maxillary sinus with mucosal membrane thickening and small air-fluid level.  Mild scattered mucosal membrane thickening elsewhere in the paranasal sinuses.  The visualized portions of the mastoids are unremarkable. Bilateral lens replacements are noted.     1. No acute intracranial abnormality. 2. Mild generalized cerebral volume loss and scattered T2/FLAIR hyperintense signal within the supratentorial white matter, nonspecific but probably reflecting sequelae of chronic small vessel ischemic change. 3. Left maxillary sinusitis. Electronically signed by: Marek Moreno Date:    12/30/2024 Time:    07:39    X-Ray Chest AP Portable    Result Date: 12/29/2024  EXAMINATION: XR CHEST AP PORTABLE CLINICAL HISTORY: Chest Pain; TECHNIQUE: Single frontal view of the chest was performed. COMPARISON: 02/02/2023. FINDINGS: There are mild interstitial opacities, suspicious for interstitial edema/CHF.  The pleural spaces are clear the cardiac silhouette is borderline.  There are calcifications of the aortic arch.  There is a prosthetic aortic valve stent.  Osseous structures demonstrate degenerative changes.     Mild interstitial opacities, suspicious for interstitial edema/CHF. Electronically signed by: Anthony Andres Date:    12/29/2024 Time:    17:58    CT Head Without Contrast    Result Date: 12/29/2024  EXAMINATION: CT HEAD WITHOUT CONTRAST CLINICAL HISTORY: Headache, new or worsening (Age >= 50y); TECHNIQUE: Low dose axial CT images obtained throughout the head without intravenous contrast. Sagittal and coronal reconstructions were performed. COMPARISON: None.  FINDINGS: Intracranial compartment: Ventricles and sulci are normal in size for age without evidence of hydrocephalus. No extra-axial blood or fluid collections. The brain parenchyma appears normal. No parenchymal mass, hemorrhage, edema or major vascular distribution infarct. Skull/extracranial contents (limited evaluation): No fracture. There is a left maxillary sinus air-fluid level.     There is no evidence acute intracranial abnormality.  There is left maxillary sinus disease. Electronically signed by: Susana Carrion MD Date:    12/29/2024 Time:    16:49    CT Cervical Spine Without Contrast    Result Date: 12/29/2024  EXAMINATION: CT CERVICAL SPINE WITHOUT CONTRAST CLINICAL HISTORY: Neck pain, chronic, degenerative changes on xray;Neck pain, acute exacerbation with chronic degenerative changes; TECHNIQUE: Low dose axial images, sagittal and coronal reformations were performed though the cervical spine.  Contrast was not administered. COMPARISON: 12/18/2024 FINDINGS: There is no evidence fracture or malalignment.  There are degenerative changes throughout the cervical spine most prominent in the mid cervical spine.  There is no prevertebral soft tissue swelling.  The adjacent soft tissues are unremarkable.     There are degenerative changes throughout the cervical spine. Electronically signed by: Susana Carrion MD Date:    12/29/2024 Time:    15:48    X-Ray Cervical Spine AP And Lateral    Result Date: 12/18/2024  EXAMINATION: XR CERVICAL SPINE AP LATERAL CLINICAL HISTORY: Cervicalgia TECHNIQUE: AP, lateral and open mouth views of the cervical spine were performed. COMPARISON: None. FINDINGS: Vertebral body heights are preserved.Trace anterolisthesis of C4 on C5 and retrolisthesis of C5 on C6.Moderate disc height loss at C5-C6 and C6-C7 with shallow endplate centered osteophytes.  No abnormal prevertebral soft tissue thickening.     As above. Electronically signed by: Marek Burton Date:    12/18/2024  Time:    11:21    Mammo Digital Screening Left with Krishna    Result Date: 12/3/2024  Facility: [facility] [address] [place] [address] [phone] Name: Vivien Burton MRN: [id number] Result: Mammo Digital Screening Left with Krishna History: Patient is 79 y.o. and is seen for a screening mammogram. Films Compared: Compared to: 11/30/2023 Mammo Digital Screening Left with Krishna, 08/11/2022 Mammo Digital Screening Left with Krishna, and 08/09/2021 Mammo Digital Screening Left with Krishna Findings: This procedure was performed using tomosynthesis. Computer-aided detection was utilized in the interpretation of this examination. There are scattered areas of fibroglandular density. There is no evidence of suspicious masses, microcalcifications or architectural distortion.      No mammographic evidence of malignancy. BI-RADS Category 1: Negative Recommendation: Routine screening mammogram in 1 year is recommended. Seun Amaro MD   - pulls last radiology orders

## 2025-01-02 NOTE — ASSESSMENT & PLAN NOTE
Patient's blood pressure range in the last 24 hours was: BP  Min: 85/55  Max: 202/97.The patient's inpatient anti-hypertensive regimen is listed below:  Current Antihypertensives  carvediloL tablet 6.25 mg, 2 times daily, Oral    Plan  Currently on Coreg  Hydralazine p.r.n.   Losartan 50mg QHS was held please restart as warranted

## 2025-01-02 NOTE — PROGRESS NOTES
[facility]   Department of Infectious Disease  Progress Note        PATIENT NAME: Vivien Burton  MRN: [id number]  TODAY'S DATE: 01/02/2025  ADMIT DATE: 12/29/2024  LOS: 3 days    CHIEF COMPLAINT: Neck Pain (Chronic neck pain.  Patient states that she has taken everything she can and nothing helps. )      PRINCIPLE PROBLEM: <principal problem not specified>    INTERVAL HISTORY      01/01/2025: No acute issues overnight.  Transferred to [facility] main for [facility].  Repeat blood culture 12/31/2024 so far negative.      01/02/2025: Seen in a.m rounds.  No acute issues overnight.  For [facility] today.  Repeat blood cultures remain negative.    Antibiotics (From admission, onward)      Start     Stop Route Frequency Ordered    01/01/25 0900  cefTRIAXone injection 2 g         -- IV Every 24 hours (non-standard times) 12/31/24 1550          Antifungals (From admission, onward)      None           Antivirals (From admission, onward)      None            ASSESSMENT and PLAN      1. Left occipital neuralgia.  Continue management as per neurologist.       2. Left maxillary sinusitis.  On antibiotics for this indication     3. 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  No other focus of infection identified for this clinically.  TTE with no vegetation.  For [facility] today though it sensitivity is not great for TAVR endocarditis.  Change ceftriaxone to 2 g Q 24 hours    RECOMMENDATIONS:    For [facility] today   Continue ceftriaxone   Probably treat for about 4-6 weeks  Can place PICC line tomorrow for prolonged IV antibiotics    Please send Epic secure chat with any questions.  Discussed with patient and daughter in-law at bedside.      SUBJECTIVE    She has chronic medical problems including hypothyroidism, hypertension, CAD, hyperlipidemia and GERD. She has left-sided headache of about 2-3 months' duration that got worse in the last 2 weeks. Pain starts at focal point around the left  mastoid and we will sometimes radiate to the neck. It is stabbing in character and feels like hospitalist. Also has dysphagia she touches her left scab Staph from the midline on the left all the way down in temporal area. The pain is not constant but intermittent. Tylenol helps sometimes. Presented to the emergency room because it was bothersome. She has no fever or jaw pain. No visual problems. No other joint involved. She is very active and ADL independent. She had a similar episode on the right about 2 years ago that resolved after a course of steroid. Had not relapsed on the right since then.    Antimicrobials   Vancomycin: 12/29/2024 x1 dose   Valtrex for/20 09/24/2012/30/24   Doxycycline:  12/29/2024 x1 dose   Cefepime:  12/29/2024-12/30/2024   Augmentin:  12/30/2024 x1 dose   Ceftriaxone:  12/30/2024-    Microbiology  Blood culture 12/29/2024:  Viridans Streptococcus 2/2 sets   Respiratory panel 12/30/2024: Rhinovirus/enterovirus  Blood culture 12/31/2024:  NGTD   CSF culture 12/31/2024:  NGTD  CSF meningitis panel 12/31/2024: Negative      Review of Systems  Negative except as stated above in Interval History     OBJECTIVE   Temp:  [97.3 °F (36.3 °C)-98.4 °F (36.9 °C)] 98.4 °F (36.9 °C)  Pulse:  [66-97] 76  Resp:  [10-27] 27  SpO2:  [93 %-100 %] 95 %  BP: ()/(45-99) 169/73  Temp:  [97.3 °F (36.3 °C)-98.4 °F (36.9 °C)]   Temp: 98.4 °F (36.9 °C) (01/02/25 0701)  Pulse: 76 (01/02/25 1301)  Resp: (!) 27 (01/02/25 1301)  BP: (!) 169/73 (01/02/25 1301)  SpO2: 95 % (01/02/25 1301)    Intake/Output Summary (Last 24 hours) at 1/2/2025 1315  Last data filed at 1/2/2025 0959  Gross per 24 hour   Intake 640 ml   Output 3 ml   Net 637 ml       Physical Exam  General:  Pleasant well-groomed elderly woman lying in bed in no acute distress   CVS: S1 and 2 heard, no murmurs appreciated   Respiratory: To auscultation   Abdomen:  Full, soft, nontender no palpable organomegaly   Skin: No rash appreciated  CNS: No focal  "deficits   Musculoskeletal system: No joint or bony abnormalities appreciated    VAD:  PIV  ISOLATION:  Droplet isolation for rhinovirus     WOUNDS:  None    Significant Labs: All pertinent labs within the past 24 hours have been reviewed.    CBC LAST 7 DAYS  Recent Labs   Lab 12/29/24  1737 12/30/24 0415 12/31/24 0237 01/01/25  0446 01/02/25  0436   WBC 9.33 9.77 10.54 10.82 9.78   RBC 2.91* 2.77* 2.91* 2.79* 2.83*   HGB 9.0* 8.5* 8.7* 8.4* 8.7*   HCT 26.4* 26.1* 26.8* 25.4* 26.2*   MCV 91 94 92 91 93   MCH 30.9 30.7 29.9 30.1 30.7   MCHC 34.1 32.6 32.5 33.1 33.2   RDW 13.1 13.2 13.1 13.3 13.1    172 251 257 257   MPV 9.6 10.5 9.8 9.5 9.2   GRAN 86.7*  8.1*  --  78.2*  8.3* 60.4  6.5 50.5  5.0   LYMPH 7.8*  0.7*  --  13.7*  1.4 27.8  3.0 35.1  3.4   MONO 4.5  0.4  --  4.0  0.4 6.7  0.7 7.6  0.7   BASO 0.02  --  0.02 0.04 0.06   NRBC 0  --  0 0 0       CHEMISTRY LAST 7 DAYS  Recent Labs   Lab 12/29/24  1736 12/29/24  2040 12/30/24 0415 12/31/24  0240 01/01/25  0446     --  134* 135* 131*   K 3.7  --  3.4* 3.9 4.3     --  102 99 98   CO2 22*  --  23 26 28   ANIONGAP 11  --  9 10 5*   BUN 9  --  12 11 10   CREATININE 0.8  --  0.9 0.7 0.6   *  --  118* 138* 93   CALCIUM 9.2  --  8.6* 8.3* 7.3*   MG  --  1.5* 1.8 1.9 1.9   ALBUMIN 3.0*  --  2.6* 2.9* 3.1*   PROT 5.6*  --  5.0* 5.6* 4.9*   ALKPHOS 85  --  75 76 59   ALT 38  --  28 23 12   AST 54*  --  28 14 8*   BILITOT 0.5  --  0.4 0.3 0.4       Estimated Creatinine Clearance: 56.4 mL/min (based on SCr of 0.6 mg/dL).    INFLAMMATORY/PROCAL  LAST 7 DAYS  Recent Labs   Lab 12/29/24 2040 12/31/24  0240   PROCAL 81.13* 202.03*   .4*  --      No results found for: "ESR"  CRP   Date Value Ref Range Status   12/29/2024 110.4 (H) 0.0 - 8.2 mg/L Final   02/22/2023 1.6 <8.0 mg/L Final   02/01/2023 8.5 (H) <8.0 mg/L Final       PRIOR  MICROBIOLOGY:    Susceptibility data from last 90 days.  Collected Specimen Info Organism Cefepime " Ceftriaxone Penicillin Vancomycin   12/29/24 Blood from Peripheral, Antecubital, Left Viridans streptococcus  S  S  I  S   12/29/24 Blood from Peripheral, Hand, Left Viridans streptococcus           LAST 7 DAYS MICROBIOLOGY   Microbiology Results (last 7 days)       Procedure Component Value Units Date/Time    Blood culture [[id number]] Collected: 01/02/25 1311    Order Status: Sent Specimen: Blood     Blood culture [[id number]] Collected: 01/02/25 1310    Order Status: Sent Specimen: Blood     CSF culture [[id number]] Collected: 12/31/24 1150    Order Status: Completed Specimen: CSF (Spinal Fluid) from CSF Tap, Tube 3 Updated: 01/02/25 1057     CSF CULTURE No Growth to date     Gram Stain Result No WBC's, epithelial cells or organisms seen      12/31/2024  14:46 TN3    Blood culture [[id number]] Collected: 12/31/24 0850    Order Status: Completed Specimen: Blood from Peripheral, Antecubital, Left Updated: 01/01/25 1432     Blood Culture, Routine No Growth to date      No Growth to date    Blood culture [[id number]] Collected: 12/31/24 0850    Order Status: Completed Specimen: Blood from Peripheral, Hand, Left Updated: 01/01/25 1432     Blood Culture, Routine No Growth to date      No Growth to date    Blood culture x two cultures. Draw prior to antibiotics. [[id number]]  (Abnormal)  (Susceptibility) Collected: 12/29/24 1835    Order Status: Completed Specimen: Blood from Peripheral, Antecubital, Left Updated: 01/01/25 0625     Blood Culture, Routine Gram stain aer bottle: Gram positive cocci      Results called to and read back by: hannah guy rn pcu      Gram stain osvaldo bottle: Gram positive cocci      Positive results previously called      VIRIDANS STREPTOCOCCUS    Narrative:      Aerobic and anaerobic    Blood culture x two cultures. Draw prior to antibiotics. [[id number]]  (Abnormal) Collected: 12/29/24 1835    Order Status: Completed Specimen: Blood from Peripheral, Hand, Left Updated: 01/01/25 0625     Blood  Culture, Routine Gram stain peds bottle: Gram positive cocci      Positive results previously called      [id number]      VIRIDANS STREPTOCOCCUS  For susceptibility see order #[id number]      Narrative:      Aerobic and anaerobic    Cryptococcal antigen, CSF [[id number]] Collected: 12/31/24 1150    Order Status: Sent Specimen: CSF (Spinal Fluid) from CSF Tap, Tube 3 Updated: 12/31/24 1217    Rapid Organism ID by PCR (from Blood culture) [[id number]]  (Abnormal) Collected: 12/29/24 1835    Order Status: Completed Updated: 12/31/24 0246     Enterococcus faecalis Not Detected     Enterococcus faecium Not Detected     Listeria monocytogenes Not Detected     Staphylococcus spp. Not Detected     Staphylococcus aureus Not Detected     Staphylococcus epidermidis Not Detected     Staphylococcus lugdunensis Not Detected     Streptococcus species Detected     Streptococcus agalactiae Not Detected     Streptococcus pneumoniae Not Detected     Streptococcus pyogenes Not Detected     Acinetobacter calcoaceticus/baumannii complex Not Detected     Bacteroides fragilis Not Detected     Enterobacterales Not Detected     Enterobacter cloacae complex Not Detected     Escherichia coli Not Detected     Klebsiella aerogenes Not Detected     Klebsiella oxytoca Not Detected     Klebsiella pneumoniae group Not Detected     Proteus Not Detected     Salmonella sp Not Detected     Serratia marcescens Not Detected     Haemophilus influenzae Not Detected     Neisseria meningtidis Not Detected     Pseudomonas aeruginosa Not Detected     Stenotrophomonas maltophilia Not Detected     Candida albicans Not Detected     Candida auris Not Detected     Candida glabrata Not Detected     Candida krusei Not Detected     Candida parapsilosis Not Detected     Candida tropicalis Not Detected     Cryptococcus neoformans/gattii Not Detected     CTX-M (ESBL ) Test not applicable     IMP (Carbapenem resistant) Test not applicable     KPC resistance gene  (Carbapenem resistant) Test not applicable     mcr-1  Test not applicable     mec A/C  Test not applicable     mec A/C and MREJ (MRSA) gene Test not applicable     NDM (Carbapenem resistant) Test not applicable     OXA-48-like (Carbapenem resistant) Test not applicable     van A/B (VRE gene) Test not applicable     VIM (Carbapenem resistant) Test not applicable    Narrative:      Aerobic and anaerobic    Respiratory Infection Panel (PCR), Nasopharyngeal [[id number]]  (Abnormal) Collected: 12/30/24 1347    Order Status: Completed Specimen: Nasopharyngeal Swab Updated: 12/30/24 2126     Respiratory Infection Panel Source NP Swab     Adenovirus Not Detected     Coronavirus 229E, Common Cold Virus Not Detected     Coronavirus HKU1, Common Cold Virus Not Detected     Coronavirus NL63, Common Cold Virus Not Detected     Coronavirus OC43, Common Cold Virus Not Detected     Comment: Coronavirus strains 229E, HKU1, NL63, and OC43 can cause the common   cold   and are not associated with the respiratory disease outbreak caused   by  the COVID-19 (SARS-CoV-2 novel Coronavirus) strain.           SARS-CoV2 (COVID-19) Qualitative PCR Not Detected     Human Metapneumovirus Not Detected     Human Rhinovirus/Enterovirus Detected     Influenza A (subtypes H1, H1-2009,H3) Not Detected     Influenza B Not Detected     Parainfluenza Virus 1 Not Detected     Parainfluenza Virus 2 Not Detected     Parainfluenza Virus 3 Not Detected     Parainfluenza Virus 4 Not Detected     Respiratory Syncytial Virus Not Detected     Bordetella Parapertussis (BQ9531) Not Detected     Bordetella pertussis (ptxP) Not Detected     Chlamydia pneumoniae Not Detected     Mycoplasma pneumoniae Not Detected     Comment: Respiratory Infection Panel testing performed by Multiplex PCR.       Culture, Respiratory with Gram Stain [[id number]]     Order Status: No result Specimen: Respiratory     Influenza A & B by Molecular [[id number]] Collected: 12/29/24 5955     Order Status: Completed Specimen: Nasal Swab Updated: 12/29/24 1857     Influenza A, Molecular Negative     Influenza B, Molecular Negative     Flu A & B Source NP          CURRENT/PREVIOUS VISIT EKG  Results for orders placed or performed during the hospital encounter of 12/29/24   EKG 12-lead    Collection Time: 12/29/24 12:00 AM    Narrative    Ordered by an unspecified provider.       Significant Imaging: I have reviewed all relevant and available imaging results/findings within the past 24 hours.    I spent a total of 40 minutes on the day of the visit.This includes face to face time and non-face to face time preparing to see the patient (eg, review of tests), obtaining and/or reviewing separately obtained history, documenting clinical information in the electronic or other health record, independently interpreting results and communicating results to the patient/family/caregiver, or care coordinator.    Alin Woo MD  Date of Service: 01/02/2025      This note was created using [employer] voice recognition software that occasionally misinterpreted phrases or words.

## 2025-01-02 NOTE — ASSESSMENT & PLAN NOTE
"This patient does have evidence of infective focus  My overall impression is sepsis.  Source: Respiratory and Unknown Urinalysis pending at time of note  Antibiotics given-   Antibiotics (72h ago, onward)      Start     Stop Route Frequency Ordered    01/01/25 0900  cefTRIAXone injection 2 g         -- IV Every 24 hours (non-standard times) 12/31/24 1550          Latest lactate reviewed-  No results for input(s): "LACTATE", "POCLAC" in the last 72 hours.  Procalcitonin: 81.13  CRP: 110.4  Sed Rate 33      Organ dysfunction indicated by Acute on chronic  heart failure    Fluid challenge Contraindicated- Fluid bolus is contraindicated in this patient due to Congestive Heart Failure     Post- resuscitation assessment Yes Perfusion exam was performed within 6 hours of septic shock presentation after bolus shows Adequate tissue perfusion assessed by non-invasive monitoring     CT chest abdomen and pelvis with IV contrast negative for infectious source.   Status post LP 12/31, WBC 2, RBC 2, essentially negative.   TTE 12/31, negative for vegetation.   Patient has very poor dental hygiene, has Streptococcus viridans bacteremia, patient also has a history of TAVR  [facility] today, full report pending, prelim: no vegetations  Continue IV Rocephin 2 g q.12 hours.   Infectious Disease planning for IV antibiotics for about 4-6 weeks, PICC line tomorrow based on repeat blood cultures    "

## 2025-01-02 NOTE — ANESTHESIA POSTPROCEDURE EVALUATION
Anesthesia Post Evaluation    Patient: Vivien Burton    Procedure(s) Performed: * No procedures listed *    Final Anesthesia Type: general      Patient location during evaluation: telemetry step down  Patient participation: Yes- Able to Participate  Level of consciousness: awake and alert and oriented  Post-procedure vital signs: reviewed and stable  Pain management: adequate  Airway patency: patent    PONV status at discharge: No PONV  Anesthetic complications: no      Cardiovascular status: blood pressure returned to baseline, hemodynamically stable and stable  Respiratory status: unassisted, spontaneous ventilation and room air  Hydration status: euvolemic  Follow-up not needed.              Vitals Value Taken Time   /58 01/02/25 1017   Temp 36.9 °C (98.4 °F) 01/02/25 0701   Pulse 72 01/02/25 1018   Resp 25 01/02/25 1018   SpO2 97 % 01/02/25 1018   Vitals shown include unfiled device data.      No case tracking events are documented in the log.      Pain/Lizy Score: Pain Rating Prior to Med Admin: 5 (1/1/2025  8:29 PM)  Pain Rating Post Med Admin: 0 (1/1/2025  9:29 PM)

## 2025-01-02 NOTE — CONSULTS
"[facility]  Department of Cardiology  Consult Note      PATIENT NAME: Vivien Burton    MRN: [id number]  TODAY'S DATE: 01/01/2025  ADMIT DATE: 12/29/2024                          CONSULT REQUESTED BY: Cristy Fisher, *    SUBJECTIVE     PRINCIPAL PROBLEM: <principal problem not specified>      REASON FOR CONSULT:  [facility]      Medicine admission HPI:  Sydnee Burton is a 79 year old female with a previous medical history of anemia, basal cell carcinoma, breast cancer, CHF, streptococcal bacteremia, GERD, HLD, HTN, CAD, TAVR, hypothyroidism, accidental tylenol overdose and abnormal MRI who presented to the ED for a headache and neck pain. The patient endorses a left sided headache described as stabbing and radiating from left upper neck. It is associated with tenderness to palpation in the left parietal scalp daily and intermittently the left temporal area. The pain has been daily for one month and reports taking tylenol every 8 hours with relief but the pain returns. Today the pain became intractable which caused her to present to the ED. CT of head showed no acute process and CT of c-spine showed degenerative changes. While in ED she developed a fever, oxygen saturation of 92%, hypertension, and tachycardia. She endorsed having a non-productive cough. Sepsis bundle initiated and cardiac workup added. She was given toradol, 2mg of PO diluadid, gabapentin, and acyclovir. Her pain improved and her blood pressure did also. She was maintaining an oxygen saturation of 99% on 1 liter nasal cannula and denied any shortness of breath. Fever resolved with tylenol. 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 She most recently finished a steroid taper at end of November. ED initiated acyclovir due one "leison" noted to left sided neck and pain with palpation to scalp. Lab work showed elevated troponin, normal " "lactic acid, no leukocytosis, increased anemia over last month, procalcitonin 81.13, normal TSH, and AST of 54.  with no peripheral edema but chest xray showed "Mild interstitial opacities, suspicious for interstitial edema/CHF." She has no tachypnea or exertional SOB. FLU/RSV/ Covid negative.  Initially her blood pressure was too low for lasix administration but later improved and she was administered 20mg of IV lasix. Troponin trended up and then lowered on third draw. Urinalysis pending at time of note. Due to ESR of 33 and CRP of 110.4 with fever, scalp tenderness, and worsening headache she was empirically started on prednisone 60mg for one dose and to be continued by primary team pending MRA of head to assess for temporal arteritis. MRI of c-spine ordered. MRA of neck ordered.  One year ago patient presented to ED with right sided headache that was similar to this visit. It was discovered she was septic due to pneumonia but on that visit she has 43K WBC and elevated lactic acid. Neurology was consulted at this time and MRI performed which was abnormal. Patient has no vision loss and is neurologically intact. She has been continued on IV vancomycin and IV cefepime and oral valcyclovir. Cardiology and ID consulted.  Course of care discussed with daughter in law Dottie Burton who agreed with plan of care.  Patient admitted by hospital medicine for further evaluation and management.          Cardiology consult: Cardiology was consulted for [facility] evaluation due to bacteremia.  Patient with a history of TAVR.  Denies any history of upper GI pathology in the past.  Denies any history of bleeding.  Denies dysphagia.      Review of patient's allergies indicates:   Allergen Reactions    Prilosec [omeprazole] Hives       Past Medical History:   Diagnosis Date    Acute on chronic combined systolic and diastolic heart failure 02/23/2022    Anemia     Basal cell carcinoma 1999    Breast cancer     Breast cancer  "    Cancer     CHF (congestive heart failure)     Colon polyps     Coronary artery disease     GERD (gastroesophageal reflux disease)     Hyperlipidemia     Hypertension     Hypothyroidism     Nonrheumatic aortic (valve) stenosis 2018    Osteoporosis 2019    S/P TAVR (transcatheter aortic valve replacement) 2022    Squamous cell carcinoma of skin     Streptococcal bacteremia 2023    Thyroid disease     Transaminitis 2023     Past Surgical History:   Procedure Laterality Date    BREAST SURGERY      CARDIAC CATH COSURGEON N/A 2022    Procedure: Cardiac Cath Cosurgeon;  Surgeon: Dontae Wooten MD;  Location: [facility] CATH LAB;  Service: Cardiovascular;  Laterality: N/A;     SECTION, CLASSIC      COLONOSCOPY N/A 2018    Procedure: COLONOSCOPY;  Surgeon: Precious Castorena MD;  Location: [facility] ENDO;  Service: Endoscopy;  Laterality: N/A;    COLONOSCOPY N/A 3/31/2023    Procedure: COLONOSCOPY;  Surgeon: Mal Abrams MD;  Location: [facility] ENDO;  Service: Endoscopy;  Laterality: N/A;    HYSTERECTOMY      LEFT HEART CATHETERIZATION Left 2022    Procedure: Left heart cath;  Surgeon: Dontae Johns MD;  Location: [facility] CATH LAB;  Service: Cardiology;  Laterality: Left;    LEFT HEART CATHETERIZATION Left 2022    Procedure: Left heart cath;  Surgeon: Dontae Johns MD;  Location: [facility] CATH LAB;  Service: Cardiology;  Laterality: Left;    MASTECTOMY Right 2000    right breast      TONSILLECTOMY, ADENOIDECTOMY      TRANSCATHETER AORTIC VALVE REPLACEMENT (TAVR) N/A 2022    Procedure: REPLACEMENT, AORTIC VALVE, TRANSCATHETER (TAVR);  Surgeon: Dontae Johns MD;  Location: [facility] CATH LAB;  Service: Cardiology;  Laterality: N/A;     Social History     Tobacco Use    Smoking status: Former     Current packs/day: 0.00     Types: Cigarettes     Quit date: 2000     Years since quittin.8    Smokeless tobacco: Never    Tobacco comments:     quit 20 years  ago   Substance Use Topics    Alcohol use: Yes     Alcohol/week: 2.0 standard drinks of alcohol     Types: 2 Shots of liquor per week     Comment: per pt 2 burbon drinks per night however none in last month    Drug use: No        REVIEW OF SYSTEMS  All other systems negative except as mentioned in HPI.     OBJECTIVE     VITAL SIGNS (Most Recent)  Temp: 97.9 °F (36.6 °C) (01/01/25 2300)  Pulse: 73 (01/01/25 2300)  Resp: 13 (01/01/25 2300)  BP: (!) 148/65 (01/01/25 2300)  SpO2: 97 % (01/01/25 2300)    VENTILATION STATUS  Resp: 13 (01/01/25 2300)  SpO2: 97 % (01/01/25 2300)           I & O (Last 24H):  Intake/Output Summary (Last 24 hours) at 1/1/2025 2349  Last data filed at 1/1/2025 1919  Gross per 24 hour   Intake 640 ml   Output 5 ml   Net 635 ml       WEIGHTS  Wt Readings from Last 1 Encounters:   12/31/24 0800 54.4 kg (120 lb)   12/29/24 1451 54.4 kg (120 lb)       PHYSICAL EXAM  GENERAL:  NAD  HEENT: Normocephalic  NECK: No JVD  CARDIAC: Regular rate and rhythm. S1 is normal.S2 is normal.  No murmurs.   LUNGS:  CTA b/l  ABDOMEN:  Nondistended  EXTREMITIES:  No edema/cyanosis.    CNS:  AAO x3  PSYCH: normal affect    HOME MEDICATIONS:  No current facility-administered medications on file prior to encounter.     Current Outpatient Medications on File Prior to Encounter   Medication Sig Dispense Refill    ascorbic acid (VITAMIN C ORAL) Take by mouth once daily.      aspirin (ECOTRIN) 81 MG EC tablet Take 81 mg by mouth once a week.      camphor-menthol 0.5-0.5% (SARNA ORIGINAL) lotion Apply topically as needed for Itching. 222 mL 2    cetirizine HCl (ZYRTEC ORAL) Take by mouth once daily.      famotidine (PEPCID) 40 MG tablet Take 1 tablet (40 mg total) by mouth once daily. 30 tablet 11    ferrous sulfate (FEOSOL) Tab tablet Take 1 tablet by mouth daily with breakfast. 27mg      ibandronate (BONIVA) 150 mg tablet Take 1 tablet (150 mg total) by mouth every 30 days. 3 tablet 3    Lactobacillus acidophilus  (ACIDOPHILUS ORAL) Take by mouth once daily.      levothyroxine (SYNTHROID) 75 MCG tablet TAKE 1 TABLET BY MOUTH EVERY DAY 90 tablet 4    losartan (COZAAR) 50 MG tablet Take 1 tablet (50 mg total) by mouth once daily. 90 tablet 3    simvastatin (ZOCOR) 40 MG tablet Take 1 tablet (40 mg total) by mouth every evening. Need OFFICE VISIT before next refill, last seen 11/2023. This will be the LAST Rx if visit is not made. 90 tablet 0    tiZANidine (ZANAFLEX) 4 MG tablet Take 1 tablet (4 mg total) by mouth every 8 (eight) hours. 30 tablet 1    triamcinolone acetonide 0.1% (KENALOG) 0.1 % cream Apply topically 2 (two) times daily. 80 g 1       SCHEDULED MEDS:   aspirin  81 mg Oral Daily    atorvastatin  20 mg Oral QHS    calcium carbonate  500 mg Oral TID    carvediloL  6.25 mg Oral BID    cefTRIAXone (Rocephin) IV (PEDS and ADULTS)  2 g Intravenous Q24H    enoxparin  40 mg Subcutaneous Q24H (prophylaxis, 1700)    famotidine  20 mg Oral Daily    gabapentin  100 mg Oral Once    gabapentin  200 mg Oral TID    lactobacillus acidophilus & bulgar  1 packet Oral BID    levothyroxine  75 mcg Oral Before breakfast       CONTINUOUS INFUSIONS:    PRN MEDS:  Current Facility-Administered Medications:     acetaminophen, 650 mg, Oral, Q4H PRN    albuterol-ipratropium, 3 mL, Nebulization, Q6H PRN    aluminum-magnesium hydroxide-simethicone, 30 mL, Oral, QID PRN    calcium gluconate IVPB, 1 g, Intravenous, PRN    calcium gluconate IVPB, 2 g, Intravenous, PRN    calcium gluconate IVPB, 3 g, Intravenous, PRN    dextrose 10%, 12.5 g, Intravenous, PRN    dextrose 10%, 25 g, Intravenous, PRN    diphenhydrAMINE, 25 mg, Oral, Q6H PRN    diphenhydrAMINE-zinc acetate 2-0.1%, , Topical (Top), BID PRN    glucagon (human recombinant), 1 mg, Intramuscular, PRN    glucose, 16 g, Oral, PRN    glucose, 24 g, Oral, PRN    HYDROmorphone, 0.5 mg, Intravenous, Q4H PRN    magnesium oxide, 800 mg, Oral, PRN    magnesium oxide, 800 mg, Oral, PRN    magnesium  "sulfate IVPB, 2 g, Intravenous, PRN    magnesium sulfate IVPB, 4 g, Intravenous, PRN    melatonin, 6 mg, Oral, Nightly PRN    naloxone, 0.02 mg, Intravenous, PRN    ondansetron, 4 mg, Intravenous, Q8H PRN    oxyCODONE, 5 mg, Oral, Q4H PRN    oxyCODONE, 10 mg, Oral, Q4H PRN    potassium bicarbonate, 35 mEq, Oral, PRN    potassium bicarbonate, 50 mEq, Oral, PRN    potassium bicarbonate, 60 mEq, Oral, PRN    potassium chloride, 40 mEq, Intravenous, PRN **AND** potassium chloride, 60 mEq, Intravenous, PRN **AND** potassium chloride, 80 mEq, Intravenous, PRN    potassium, sodium phosphates, 2 packet, Oral, PRN    potassium, sodium phosphates, 2 packet, Oral, PRN    potassium, sodium phosphates, 2 packet, Oral, PRN    prochlorperazine, 5 mg, Intravenous, Q6H PRN    simethicone, 1 tablet, Oral, QID PRN    sodium chloride 0.9%, 10 mL, Intravenous, Q12H PRN    sodium phosphate 15 mmol in D5W 250 mL IVPB, 15 mmol, Intravenous, PRN    sodium phosphate 20.01 mmol in D5W 250 mL IVPB, 20.01 mmol, Intravenous, PRN    sodium phosphate 30 mmol in D5W 250 mL IVPB, 30 mmol, Intravenous, PRN    LABS AND DIAGNOSTICS     CBC LAST 3 DAYS  Recent Labs   Lab 12/29/24  1737 12/30/24  0415 12/31/24  0237 01/01/25  0446   WBC 9.33 9.77 10.54 10.82   RBC 2.91* 2.77* 2.91* 2.79*   HGB 9.0* 8.5* 8.7* 8.4*   HCT 26.4* 26.1* 26.8* 25.4*   MCV 91 94 92 91   MCH 30.9 30.7 29.9 30.1   MCHC 34.1 32.6 32.5 33.1   RDW 13.1 13.2 13.1 13.3    172 251 257   MPV 9.6 10.5 9.8 9.5   GRAN 86.7*  8.1*  --  78.2*  8.3* 60.4  6.5   LYMPH 7.8*  0.7*  --  13.7*  1.4 27.8  3.0   MONO 4.5  0.4  --  4.0  0.4 6.7  0.7   BASO 0.02  --  0.02 0.04   NRBC 0  --  0 0       COAGULATION LAST 3 DAYS  No results for input(s): "LABPT", "INR", "APTT" in the last 168 hours.    CHEMISTRY LAST 3 DAYS  Recent Labs   Lab 12/30/24  0415 12/31/24  0240 01/01/25  0446   * 135* 131*   K 3.4* 3.9 4.3    99 98   CO2 23 26 28   ANIONGAP 9 10 5*   BUN 12 11 10 " "  CREATININE 0.9 0.7 0.6   * 138* 93   CALCIUM 8.6* 8.3* 7.3*   MG 1.8 1.9 1.9   ALBUMIN 2.6* 2.9* 3.1*   PROT 5.0* 5.6* 4.9*   ALKPHOS 75 76 59   ALT 28 23 12   AST 28 14 8*   BILITOT 0.4 0.3 0.4       CARDIAC PROFILE LAST 3 DAYS  Recent Labs   Lab 12/29/24  1736 12/29/24 2040 12/30/24  0014 12/30/24  0415 12/30/24  0812   *  --   --  1,659*  --    TROPONINI 0.062*   < > 0.257* 0.168* 0.083*    < > = values in this interval not displayed.       ENDOCRINE LAST 3 DAYS  Recent Labs   Lab 12/29/24  1736 12/29/24 2040 12/31/24  0240   TSH 3.776  --   --    PROCAL  --  81.13* 202.03*       LAST ARTERIAL BLOOD GAS  ABG  No results for input(s): "PH", "PO2", "PCO2", "HCO3", "BE" in the last 168 hours.    LAST 7 DAYS MICROBIOLOGY   Microbiology Results (last 7 days)       Procedure Component Value Units Date/Time    CSF culture [[id number]] Collected: 12/31/24 1150    Order Status: Completed Specimen: CSF (Spinal Fluid) from CSF Tap, Tube 3 Updated: 01/01/25 1608     CSF CULTURE No Growth to date     Gram Stain Result No WBC's, epithelial cells or organisms seen      12/31/2024  14:46 TN3    Blood culture [[id number]] Collected: 12/31/24 0850    Order Status: Completed Specimen: Blood from Peripheral, Antecubital, Left Updated: 01/01/25 1432     Blood Culture, Routine No Growth to date      No Growth to date    Blood culture [[id number]] Collected: 12/31/24 0850    Order Status: Completed Specimen: Blood from Peripheral, Hand, Left Updated: 01/01/25 1432     Blood Culture, Routine No Growth to date      No Growth to date    Blood culture x two cultures. Draw prior to antibiotics. [[id number]]  (Abnormal)  (Susceptibility) Collected: 12/29/24 1835    Order Status: Completed Specimen: Blood from Peripheral, Antecubital, Left Updated: 01/01/25 0625     Blood Culture, Routine Gram stain aer bottle: Gram positive cocci      Results called to and read back by: hannah guy rn pcu      Gram stain osvaldo bottle: Gram " positive cocci      Positive results previously called      VIRIDANS STREPTOCOCCUS    Narrative:      Aerobic and anaerobic    Blood culture x two cultures. Draw prior to antibiotics. [[id number]]  (Abnormal) Collected: 12/29/24 1835    Order Status: Completed Specimen: Blood from Peripheral, Hand, Left Updated: 01/01/25 0625     Blood Culture, Routine Gram stain peds bottle: Gram positive cocci      Positive results previously called      [id number]      VIRIDANS STREPTOCOCCUS  For susceptibility see order #[id number]      Narrative:      Aerobic and anaerobic    Cryptococcal antigen, CSF [[id number]] Collected: 12/31/24 1150    Order Status: Sent Specimen: CSF (Spinal Fluid) from CSF Tap, Tube 3 Updated: 12/31/24 1217    Rapid Organism ID by PCR (from Blood culture) [[id number]]  (Abnormal) Collected: 12/29/24 1835    Order Status: Completed Updated: 12/31/24 0246     Enterococcus faecalis Not Detected     Enterococcus faecium Not Detected     Listeria monocytogenes Not Detected     Staphylococcus spp. Not Detected     Staphylococcus aureus Not Detected     Staphylococcus epidermidis Not Detected     Staphylococcus lugdunensis Not Detected     Streptococcus species Detected     Streptococcus agalactiae Not Detected     Streptococcus pneumoniae Not Detected     Streptococcus pyogenes Not Detected     Acinetobacter calcoaceticus/baumannii complex Not Detected     Bacteroides fragilis Not Detected     Enterobacterales Not Detected     Enterobacter cloacae complex Not Detected     Escherichia coli Not Detected     Klebsiella aerogenes Not Detected     Klebsiella oxytoca Not Detected     Klebsiella pneumoniae group Not Detected     Proteus Not Detected     Salmonella sp Not Detected     Serratia marcescens Not Detected     Haemophilus influenzae Not Detected     Neisseria meningtidis Not Detected     Pseudomonas aeruginosa Not Detected     Stenotrophomonas maltophilia Not Detected     Candida albicans Not Detected      Candida auris Not Detected     Candida glabrata Not Detected     Candida krusei Not Detected     Candida parapsilosis Not Detected     Candida tropicalis Not Detected     Cryptococcus neoformans/gattii Not Detected     CTX-M (ESBL ) Test not applicable     IMP (Carbapenem resistant) Test not applicable     KPC resistance gene (Carbapenem resistant) Test not applicable     mcr-1  Test not applicable     mec A/C  Test not applicable     mec A/C and MREJ (MRSA) gene Test not applicable     NDM (Carbapenem resistant) Test not applicable     OXA-48-like (Carbapenem resistant) Test not applicable     van A/B (VRE gene) Test not applicable     VIM (Carbapenem resistant) Test not applicable    Narrative:      Aerobic and anaerobic    Respiratory Infection Panel (PCR), Nasopharyngeal [[id number]]  (Abnormal) Collected: 12/30/24 1347    Order Status: Completed Specimen: Nasopharyngeal Swab Updated: 12/30/24 2126     Respiratory Infection Panel Source NP Swab     Adenovirus Not Detected     Coronavirus 229E, Common Cold Virus Not Detected     Coronavirus HKU1, Common Cold Virus Not Detected     Coronavirus NL63, Common Cold Virus Not Detected     Coronavirus OC43, Common Cold Virus Not Detected     Comment: Coronavirus strains 229E, HKU1, NL63, and OC43 can cause the common   cold   and are not associated with the respiratory disease outbreak caused   by  the COVID-19 (SARS-CoV-2 novel Coronavirus) strain.           SARS-CoV2 (COVID-19) Qualitative PCR Not Detected     Human Metapneumovirus Not Detected     Human Rhinovirus/Enterovirus Detected     Influenza A (subtypes H1, H1-2009,H3) Not Detected     Influenza B Not Detected     Parainfluenza Virus 1 Not Detected     Parainfluenza Virus 2 Not Detected     Parainfluenza Virus 3 Not Detected     Parainfluenza Virus 4 Not Detected     Respiratory Syncytial Virus Not Detected     Bordetella Parapertussis (JH7817) Not Detected     Bordetella pertussis (ptxP) Not Detected      Chlamydia pneumoniae Not Detected     Mycoplasma pneumoniae Not Detected     Comment: Respiratory Infection Panel testing performed by Multiplex PCR.       Culture, Respiratory with Gram Stain [[id number]]     Order Status: No result Specimen: Respiratory     Influenza A & B by Molecular [[id number]] Collected: 12/29/24 1830    Order Status: Completed Specimen: Nasal Swab Updated: 12/29/24 1857     Influenza A, Molecular Negative     Influenza B, Molecular Negative     Flu A & B Source NP            MOST RECENT IMAGING  MRV Brain With & W/O Contrast  Narrative: EXAMINATION:  MRV BRAIN WITH & WITHOUT CONTRAST    CLINICAL HISTORY:  Dural venous sinus thrombosis suspected;Headache, papilledema;    TECHNIQUE:  Magnetic resonance venography of the dural venous sinuses was performed using noncontrast time of flight technique and postcontrast technique.  10 mL of Gadavist intravenous contrast were administered.  These images were used to generate maximum intensity projections (MIP) reconstructions.    COMPARISON:  MRA brain with without contrast 12/30/2024    FINDINGS:  Normal signal is demonstrated within the superior sagittal and transverse sinuses. The jugular veins are normal. No luminal filling defects are seen.  Impression: No evidence of dural venous sinus thrombosis.    Electronically signed by: Marek Moreno  Date:    12/31/2024  Time:    16:31  FL Lumbar Puncture (xpd)  Narrative: EXAMINATION:  FL LUMBAR PUNCTURE DIAGNOSTIC WITH IMAGING    CLINICAL HISTORY:  Rule Out CNS Infection;    TECHNIQUE:  Written, informed consent was obtained.  Time out was performed.  With the patient prone on the procedure table, fluoroscopic imaging was used to identify an appropriate entry site.  The skin was prepped and draped in usual sterile fashion.  The skin and underlying soft tissues were anesthetized with 1% lidocaine.  Using fluoroscopic guidance, a 22 gauge spinal needle was advanced into the thecal sac at the L3-4 level  and clear CSF was returned.  A total of 6 cc was collected in 4 vials and submitted to the laboratory.  The needle was removed and hemostasis was achieved.  The patient tolerated the procedure well.  There were no immediate postprocedure complications.  Estimated blood loss was minimal.    COMPARISON:  None    FINDINGS:  8 cc clear CSF collected.  Impression: Successful fluoroscopic guided lumbar puncture.    Electronically signed by: Seun Davis MD  Date:    12/31/2024  Time:    13:34  Echo Saline Bubble? No; Ultrasound enhancing contrast? Yes    Left Ventricle: The left ventricle is normal in size. Mildly increased   wall thickness. There is concentric hypertrophy. There is normal systolic   function with a visually estimated ejection fraction of 55 - 60%. There is   normal diastolic function.    Right Ventricle: Normal right ventricular cavity size. Wall thickness   is normal. Systolic function is normal.    Left Atrium: Left atrium is mildly dilated.    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 .  This has no obvious vegetation identified on this valve.    Adequate function is noted    Mitral Valve: The mitral valve is structurally normal. There is normal   leaflet mobility.    Pulmonary Artery: There is mild pulmonary hypertension. The estimated   pulmonary artery systolic pressure is 42 mmHg.    IVC/SVC: Normal venous pressure at 3 mmHg.    No echocardiographic evidence of vegetation noted      ECHOCARDIOGRAM RESULTS (last 5)  Results for orders placed during the hospital encounter of 12/29/24    Echo Saline Bubble? No; Ultrasound enhancing contrast? Yes    Interpretation Summary    Left Ventricle: The left ventricle is normal in size. Mildly increased wall thickness. There is concentric hypertrophy. There is normal systolic function with a visually estimated ejection fraction of 55 - 60%. There is normal diastolic function.    Right Ventricle: Normal right  ventricular cavity size. Wall thickness is normal. Systolic function is normal.    Left Atrium: Left atrium is mildly dilated.    Aortic Valve: There is a transcatheter valve replacement in the aortic position. .  This has no obvious vegetation identified on this valve.  Adequate function is noted    Mitral Valve: The mitral valve is structurally normal. There is normal leaflet mobility.    Pulmonary Artery: There is mild pulmonary hypertension. The estimated pulmonary artery systolic pressure is 42 mmHg.    IVC/SVC: Normal venous pressure at 3 mmHg.    No echocardiographic evidence of vegetation noted      Results for orders placed during the hospital encounter of 11/22/24    Echo    Interpretation Summary    Left Ventricle: The left ventricle is normal in size. Mildly increased wall thickness. There is mild concentric hypertrophy. There is normal systolic function with a visually estimated ejection fraction of 55 - 70%. Ejection fraction is approximately 60%. Global longitudinal strain is -18.0%. Global longitudinal strain is normal. There is normal diastolic function.    Right Ventricle: Normal right ventricular cavity size. Systolic function is normal. TAPSE is 1.90 cm. Right ventricular global longitudinal strain is - 23.8%.    Aortic Valve: There is a transcatheter valve replacement in the aortic position. It is reported to be a 26 mm. Aortic valve area by VTI is 1.2 cm². Aortic valve peak velocity is 1.6 m/s. Mean gradient is 5.2 mmHg. The dimensionless index is 0.59.    IVC/SVC: Normal venous pressure at 3 mmHg.    No definite change from Echo in 2/2023.      Results for orders placed during the hospital encounter of 02/28/23    Echo    Interpretation Summary  · The left ventricle is mildly enlarged with normal systolic function. The estimated ejection fraction is 60%.  · Normal right ventricular size with normal right ventricular systolic function.  · Indeterminate left ventricular diastolic function.  ·  There is a 26 mm Evolut Pro transcutaneously-placed aortic bioprosthesis present. There is no aortic insufficiency present. Prosthetic aortic valve is normal.  · The aortic valve mean gradient is 4 mmHg with a dimensionless index of 0.53.  · Normal central venous pressure (3 mmHg).      Results for orders placed during the hospital encounter of 01/23/23    Echo    Interpretation Summary  · The left ventricle is normal in size with normal systolic function.  · The estimated ejection fraction is 55%.  · Indeterminate left ventricular diastolic function with elevated left atrial pressure.  · Atrial fibrillation not observed.  · Normal right ventricular size with normal right ventricular systolic function.  · There is a 26 mm transcutaneously-placed aortic bioprosthesis present. There is no aortic insufficiency present. Prosthetic aortic valve is normal.  · The aortic valve mean gradient is 9 mmHg with a dimensionless index of 0.74.  · Mild mitral regurgitation.  · There is mild pulmonary hypertension.  · Mild to moderate tricuspid regurgitation.  · Normal central venous pressure (3 mmHg).  · The estimated PA systolic pressure is 48 mmHg.      Results for orders placed during the hospital encounter of 03/29/22    Echo    Interpretation Summary  · The left ventricle is normal in size with eccentric hypertrophy and low normal systolic function. The estimated ejection fraction is 50%.  · The quantitatively derived ejection fraction is 48%.  · Normal right ventricular size with normal right ventricular systolic function.  · Grade II left ventricular diastolic dysfunction.  · Moderate left atrial enlargement.  · There is a 26 mm Evolut transcutaneously-placed aortic bioprosthesis present. There is trivial paravalvular aortic insufficiency present.  · The aortic valve mean gradient is 9 mmHg with a dimensionless index of 0.46.  · The estimated PA systolic pressure is 24 mmHg.  · Normal central venous pressure (3  mmHg).      CURRENT/PREVIOUS VISIT EKG  Results for orders placed or performed during the hospital encounter of 12/29/24   EKG 12-lead    Collection Time: 12/29/24 12:00 AM    Narrative    Ordered by an unspecified provider.         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    Elevated troponin    Neck pain without injury    Intractable headache    Severe sepsis/ strep viridans bacteremia    HTN (hypertension)    History of transcatheter aortic valve replacement (TAVR)    Coronary artery disease    Acute on chronic HFrEF (heart failure with reduced ejection fraction)    Anemia    Unspecified hypothyroidism     Dx updated per 2019 IMO Load      Breast cancer    GERD (gastroesophageal reflux disease)       Assessment:  Strep viridans bacteremia  History of TAVR  Rhino virus infection  History of CAD    Plan:  Cardiology was consulted for KAISER to rule out endocarditis.  Discussed with the patient regarding KAISER procedure and patient agreed to undergo the procedure.  NPO after midnight.        Lan Tineo MD  [facility]  Department of Cardiology  Date of Service: 01/01/2025

## 2025-01-02 NOTE — CARE UPDATE
01/01/25 1937   Patient Assessment/Suction   Level of Consciousness (AVPU) alert   Respiratory Effort Normal   Expansion/Accessory Muscles/Retractions no use of accessory muscles   All Lung Fields Breath Sounds clear   Rhythm/Pattern, Respiratory unlabored   Cough Frequency no cough   PRE-TX-O2   Device (Oxygen Therapy) room air   SpO2 97 %   Pulse Oximetry Type Continuous   $ Pulse Oximetry - Multiple Charge Pulse Oximetry - Multiple   Pulse 77   Resp (!) 24   Aerosol Therapy   $ Aerosol Therapy Charges PRN treatment not required   Education   $ Education Bronchodilator;15 min   Respiratory Evaluation   $ Care Plan Tech Time 15 min

## 2025-01-02 NOTE — ANESTHESIA PREPROCEDURE EVALUATION
01/02/2025  Vivien Burton is a 79 y.o., female.      Patient Active Problem List   Diagnosis    GERD (gastroesophageal reflux disease)    Breast cancer    Unspecified hypothyroidism    Breast cancer, post right chest radiation    White coat syndrome with diagnosis of hypertension    Malignant neoplasm of female breast    Encounter for long-term (current) use of medications    Headache(784.0)    Engages in binge consumption of alcohol    Heart murmur, since childhood    Dyslipidemia, baseline     Cardiovascular event risk, ASCVD 10-year risk 11.3%, on simvastatin 20 mg, 2017    Bilateral carotid bruits    Nonrheumatic aortic (valve) stenosis    ISIS (generalized anxiety disorder)    Family history of colon cancer    Anemia    LVH (left ventricular hypertrophy) due to hypertensive disease, without heart failure    Acute on chronic HFrEF (heart failure with reduced ejection fraction)    S/P TAVR (transcatheter aortic valve replacement), 26 mm    Chronic combined systolic and diastolic heart failure, HFimpEF    Coronary artery disease    Aortic atherosclerosis    Status post insertion of drug eluting coronary artery stent    Iron deficiency    Pseudopolyposis of colon without complication, unspecified part of colon    Protein-calorie malnutrition, unspecified severity    Degenerative disease of nervous system, unspecified    Hypertriglyceridemia    Allergy history, drug, Prilosec    Severe sepsis/ strep viridans bacteremia    HTN (hypertension)    History of transcatheter aortic valve replacement (TAVR)    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    Elevated troponin    Neck pain without injury    Intractable headache    Rhinovirus infection    Hypocalcemia       Past Surgical History:   Procedure Laterality Date    BREAST SURGERY      CARDIAC CATH COSURGEON N/A 2/22/2022    Procedure: Cardiac Cath  [place];  Surgeon: Dontae Wooten MD;  Location: [facility] CATH LAB;  Service: Cardiovascular;  Laterality: N/A;     SECTION, CLASSIC      COLONOSCOPY N/A 2018    Procedure: COLONOSCOPY;  Surgeon: Precious Castorena MD;  Location: [facility] ENDO;  Service: Endoscopy;  Laterality: N/A;    COLONOSCOPY N/A 3/31/2023    Procedure: COLONOSCOPY;  Surgeon: Mal Abrams MD;  Location: [facility] ENDO;  Service: Endoscopy;  Laterality: N/A;    HYSTERECTOMY      LEFT HEART CATHETERIZATION Left 2022    Procedure: Left heart cath;  Surgeon: Dontae Johns MD;  Location: [facility] CATH LAB;  Service: Cardiology;  Laterality: Left;    LEFT HEART CATHETERIZATION Left 2022    Procedure: Left heart cath;  Surgeon: Dontae Johns MD;  Location: [facility] CATH LAB;  Service: Cardiology;  Laterality: Left;    MASTECTOMY Right 2000    right breast      TONSILLECTOMY, ADENOIDECTOMY      TRANSCATHETER AORTIC VALVE REPLACEMENT (TAVR) N/A 2022    Procedure: REPLACEMENT, AORTIC VALVE, TRANSCATHETER (TAVR);  Surgeon: Dontae Johns MD;  Location: [facility] CATH LAB;  Service: Cardiology;  Laterality: N/A;        Tobacco Use:  The patient  reports that she quit smoking about 24 years ago. Her smoking use included cigarettes. She has never used smokeless tobacco.     Results for orders placed or performed during the hospital encounter of 24   EKG 12-lead    Collection Time: 24 12:00 AM    Narrative    Ordered by an unspecified provider.        Imaging Results              MRI Cervical Spine W WO Cont (Final result)  Result time 24 10:47:56      Final result by Marek Moreno MD (24 10:47:56)                   Impression:      1. No evidence of acute fracture, spondylodiscitis, high-grade spinal canal stenosis.  No cord compression, focal cord signal abnormality, or abnormal intraspinal enhancement.  2. Multilevel degenerative changes of the cervical spine, as detailed above, with findings most  pronounced at C5-6 and C6-7 and resulting in severe left and moderate right neural foraminal and mild spinal canal stenosis.      Electronically signed by: Marek Moreno  Date:    12/30/2024  Time:    10:47               Narrative:    EXAMINATION:  MRI CERVICAL SPINE W WO CONTRAST    CLINICAL HISTORY:  Neck pain, acute, infection suspected;    TECHNIQUE:  Multiplanar, multisequence MR images of the cervical spine were performed before and after the administration of intravenous contrast.  5 mL of Gadavist intravenous contrast were administered.    COMPARISON:  CT cervical spine 12/29/2024    FINDINGS:  Study is mild to moderately degraded by motion artifact.    Alignment: Reversal of the cervical lordosis, which may be secondary to positioning or muscle spasm.  Minimal anterolisthesis of C4 on C5 and retrolisthesis of C5 on C6.    Vertebral Column: Vertebral body heights are maintained. No acute fracture is identified.  No evidence of an aggressive bone marrow replacement process. Multilevel disc degeneration, most pronounced at C5-6 and C6-7 with mild-to-moderate intervertebral disc space narrowing, disc desiccation, anterior marginal osteophyte formation and posterior disc osteophyte complexes.    Cord: Normal signal and morphology.    Skull base and craniocervical junction: Normal.    Degenerative findings:    C2-C3: Minimal posterior disc osteophyte complex.  Mild bilateral facet arthropathy.  No significant neural foraminal or spinal canal stenosis.    C3-C4: Mild posterior disc osteophyte complex.  Moderate bilateral facet arthropathy.  Marked left and moderate right uncovertebral joint spurring.  Severe left and moderate right neural foraminal stenosis.  No significant spinal canal stenosis.    C4-C5: Mild posterior disc osteophyte complex.  Moderate bilateral facet arthropathy.  Moderate left and mild right uncovertebral joint spurring.  Moderate left and mild right neural foraminal stenosis.  No significant  spinal canal stenosis.    C5-C6: Posterior disc osteophyte complex, which partially effaces the ventral thecal sac.  Moderate bilateral facet arthropathy.  Marked left and moderate right uncovertebral joint spurring.  Severe left and moderate right neural foraminal stenosis.  Ligamentum flavum thickening, which partially effaces the dorsal thecal sac.  Mild spinal canal stenosis.    C6-C7: Posterior disc osteophyte complex, which mildly effaces the ventral thecal sac.  Moderate bilateral facet arthropathy.  Moderate left and moderate right uncovertebral joint spurring.  Severe left and moderate right neural foraminal stenosis.  Ligamentum flavum thickening, which partially effaces the dorsal thecal sac.  Mild spinal canal stenosis.    C7-T1: The disk is normal in configuration.  Mild bilateral facet arthropathy.  Mild bilateral uncovertebral joint spurring.  Mild left greater than right neural foraminal stenosis.  No significant spinal canal stenosis.    Paraspinal muscles & soft tissues: Unremarkable.    Normal signal voids are present in the vertebral arteries.    No abnormal intraspinal enhancement identified.                                       MRI Brain Without Contrast (Final result)  Result time 12/30/24 07:39:51      Final result by Marek Moreno MD (12/30/24 07:39:51)                   Impression:      1. No acute intracranial abnormality.  2. Mild generalized cerebral volume loss and scattered T2/FLAIR hyperintense signal within the supratentorial white matter, nonspecific but probably reflecting sequelae of chronic small vessel ischemic change.  3. Left maxillary sinusitis.      Electronically signed by: Marek Moreno  Date:    12/30/2024  Time:    07:39               Narrative:    EXAMINATION:  MRI BRAIN WITHOUT CONTRAST    CLINICAL HISTORY:  Headache, new or worsening (Age >= 50y);.    TECHNIQUE:  Multiplanar multisequence MR imaging of the brain was performed without the administration of intravenous  contrast.    COMPARISON:  CT head 12/29/2024, MRI brain 01/24/2023    FINDINGS:  Study is  degraded by motion artifact.    Ventricles and sulci are normal in size for age without evidence of hydrocephalus. No extra-axial blood or fluid collections.    Scattered foci of T2/FLAIR hyperintense signal within the supratentorial white matter, nonspecific and may reflect sequelae of mild chronic small vessel ischemic change.    Diffusion-weighted images demonstrate no evidence of an acute infarct.   Susceptibility weighted images demonstrate no evidence of acute or chronic hemorrhage. No significant intracranial mass effect or midline shift.    Normal vascular flow voids are present.    Diffusely heterogeneous calvarial bone marrow signal, nonspecific but similar to prior exam from 2023, and may be seen with red marrow reconversion associated with chronic anemic states, osteoporosis, hypoxia, or smoking.    Moderate opacification of the left maxillary sinus with mucosal membrane thickening and small air-fluid level.  Mild scattered mucosal membrane thickening elsewhere in the paranasal sinuses.  The visualized portions of the mastoids are unremarkable.    Bilateral lens replacements are noted.                                       X-Ray Chest AP Portable (Final result)  Result time 12/29/24 17:58:37      Final result by Anthony Andres DO (12/29/24 17:58:37)                   Impression:      Mild interstitial opacities, suspicious for interstitial edema/CHF.      Electronically signed by: Anthony Andres  Date:    12/29/2024  Time:    17:58               Narrative:    EXAMINATION:  XR CHEST AP PORTABLE    CLINICAL HISTORY:  Chest Pain;    TECHNIQUE:  Single frontal view of the chest was performed.    COMPARISON:  02/02/2023.    FINDINGS:  There are mild interstitial opacities, suspicious for interstitial edema/CHF.  The pleural spaces are clear the cardiac silhouette is borderline.  There are calcifications of the aortic  arch.  There is a prosthetic aortic valve stent.  Osseous structures demonstrate degenerative changes.                                       CT Head Without Contrast (Final result)  Result time 12/29/24 16:49:58      Final result by Susana Carrion MD (12/29/24 16:49:58)                   Impression:      There is no evidence acute intracranial abnormality.  There is left maxillary sinus disease.      Electronically signed by: Susana Carrion MD  Date:    12/29/2024  Time:    16:49               Narrative:    EXAMINATION:  CT HEAD WITHOUT CONTRAST    CLINICAL HISTORY:  Headache, new or worsening (Age >= 50y);    TECHNIQUE:  Low dose axial CT images obtained throughout the head without intravenous contrast. Sagittal and coronal reconstructions were performed.    COMPARISON:  None.    FINDINGS:  Intracranial compartment:    Ventricles and sulci are normal in size for age without evidence of hydrocephalus. No extra-axial blood or fluid collections.    The brain parenchyma appears normal. No parenchymal mass, hemorrhage, edema or major vascular distribution infarct.    Skull/extracranial contents (limited evaluation): No fracture. There is a left maxillary sinus air-fluid level.                                       CT Cervical Spine Without Contrast (Final result)  Result time 12/29/24 15:48:04      Final result by Susana Carrion MD (12/29/24 15:48:04)                   Impression:      There are degenerative changes throughout the cervical spine.      Electronically signed by: Susana Carrion MD  Date:    12/29/2024  Time:    15:48               Narrative:    EXAMINATION:  CT CERVICAL SPINE WITHOUT CONTRAST    CLINICAL HISTORY:  Neck pain, chronic, degenerative changes on xray;Neck pain, acute exacerbation with chronic degenerative changes;    TECHNIQUE:  Low dose axial images, sagittal and coronal reformations were performed though the cervical spine.  Contrast was not  administered.    COMPARISON:  12/18/2024    FINDINGS:  There is no evidence fracture or malalignment.  There are degenerative changes throughout the cervical spine most prominent in the mid cervical spine.  There is no prevertebral soft tissue swelling.  The adjacent soft tissues are unremarkable.                                       Lab Results   Component Value Date    WBC 9.78 01/02/2025    HGB 8.7 (L) 01/02/2025    HCT 26.2 (L) 01/02/2025    MCV 93 01/02/2025     01/02/2025     BMP  Lab Results   Component Value Date     (L) 01/01/2025    K 4.3 01/01/2025    CL 98 01/01/2025    CO2 28 01/01/2025    BUN 10 01/01/2025    CREATININE 0.6 01/01/2025    CALCIUM 7.3 (L) 01/01/2025    ANIONGAP 5 (L) 01/01/2025    GLU 93 01/01/2025     (H) 12/31/2024     (H) 12/30/2024       Results for orders placed during the hospital encounter of 12/29/24    Echo Saline Bubble? No; Ultrasound enhancing contrast? Yes    Interpretation Summary    Left Ventricle: The left ventricle is normal in size. Mildly increased wall thickness. There is concentric hypertrophy. There is normal systolic function with a visually estimated ejection fraction of 55 - 60%. There is normal diastolic function.    Right Ventricle: Normal right ventricular cavity size. Wall thickness is normal. Systolic function is normal.    Left Atrium: Left atrium is mildly dilated.    Aortic Valve: There is a transcatheter valve replacement in the aortic position. .  This has no obvious vegetation identified on this valve.  Adequate function is noted    Mitral Valve: The mitral valve is structurally normal. There is normal leaflet mobility.    Pulmonary Artery: There is mild pulmonary hypertension. The estimated pulmonary artery systolic pressure is 42 mmHg.    IVC/SVC: Normal venous pressure at 3 mmHg.    No echocardiographic evidence of vegetation noted         Pre-op Assessment    I have reviewed the Patient Summary Reports.     I have  reviewed the Nursing Notes. I have reviewed the NPO Status.   I have reviewed the Medications.     Review of Systems  Anesthesia Hx:  No problems with previous Anesthesia   Neg history of prior surgery.          Denies Family Hx of Anesthesia complications.    Denies Personal Hx of Anesthesia complications.                    Social:  Former Smoker, Alcohol Use       Hematology/Oncology:  Hematology Normal                       --  Cancer in past history:       Breast          Oncology Comments: Basal and squamous     EENT/Dental:  EENT/Dental Normal           Cardiovascular:     Hypertension Valvular problems/Murmurs (s/p TAVR)  CAD       CHF             Coronary Artery Disease:               Congestive Heart Failure (CHF)                Hypertension         Pulmonary:  Pulmonary Normal                       Renal/:  Renal/ Normal                 Hepatic/GI:     GERD         Gerd          Musculoskeletal:  Musculoskeletal Normal                Neurological:      Headaches      Dx of Headaches                           Endocrine:   Hypothyroidism       Hypothyroidism          Dermatological:  Skin Normal    Psych:  Psychiatric History                  Physical Exam  General: Well nourished and Alert    Airway:  Mallampati: II   Mouth Opening: Normal  TM Distance: Normal  Tongue: Normal  Neck ROM: Normal ROM    Dental:  Intact    Chest/Lungs:  Clear to auscultation, Normal Respiratory Rate    Heart:  Rate: Normal  Rhythm: Regular Rhythm  Sounds: Normal        Anesthesia Plan  Type of Anesthesia, risks & benefits discussed:    Anesthesia Type: Gen Natural Airway  Intra-op Monitoring Plan: Standard ASA Monitors  Post Op Pain Control Plan:   (medical reason for not using multimodal pain management)  Induction:  IV  Informed Consent: Patient consented to blood products? Yes  ASA Score: 3    Ready For Surgery From Anesthesia Perspective.     .

## 2025-01-02 NOTE — CARE UPDATE
01/02/25 0732   Patient Assessment/Suction   Level of Consciousness (AVPU) alert   Respiratory Effort Unlabored   Expansion/Accessory Muscles/Retractions no use of accessory muscles   All Lung Fields Breath Sounds clear;diminished   Rhythm/Pattern, Respiratory unlabored;pattern regular;depth regular   Cough Frequency no cough   PRE-TX-O2   Device (Oxygen Therapy) room air   SpO2 (!) 93 %   Pulse Oximetry Type Continuous   $ Pulse Oximetry - Multiple Charge Pulse Oximetry - Multiple   Pulse 77   Resp 17   Aerosol Therapy   $ Aerosol Therapy Charges PRN treatment not required   Education   $ Education Bronchodilator;15 min   Respiratory Evaluation   $ Care Plan Tech Time 15 min

## 2025-01-02 NOTE — TRANSFER OF CARE
Anesthesia Transfer of Care Note    Patient: Vivien Burton    Procedure(s) Performed: * No procedures listed *    Patient location: Telemetry/Step Down Unit    Anesthesia Type: general    Transport from OR: Transported from OR on 6-10 L/min O2 by face mask with adequate spontaneous ventilation    Post pain: adequate analgesia    Post assessment: no apparent anesthetic complications    Post vital signs: stable    Level of consciousness: awake    Nausea/Vomiting: no nausea/vomiting    Complications: none    Transfer of care protocol was followed    Last vitals: Visit Vitals  BP (!) 95/49   Pulse 66   Temp 36.9 °C (98.4 °F) (Oral)   Resp 14   Ht 5' (1.524 m)   Wt 54.4 kg (120 lb)   SpO2 100%   Breastfeeding No   BMI 23.44 kg/m²

## 2025-01-02 NOTE — PLAN OF CARE
Awaiting cultures, ID recs.        01/02/25 [id number]   Discharge Reassessment   Assessment Type Discharge Planning Reassessment   Discharge Plan discussed with: Patient

## 2025-01-02 NOTE — NURSING
MD, Anesthesia, ECHO tech, and RN present. KAISER to be performed.   Time out @ 4071  Probe down @ 9533  Probe out @ 0795

## 2025-01-02 NOTE — ASSESSMENT & PLAN NOTE
Left sided neck pain that is sharp and stabbing into head. Patient endorses that specific area on head is painful to touch. CT of neck shows degenerative changes      Patient has significant left-sided neck pain radiated to left occipital.  Extensive imaging study is not quite impressive except severe cervical spondylosis.   Status post LP- essential negative.   Discontinue steroids as GCA deemed less likely.   Neurology following  MRV negative for dural sinus thrombosis  Gabapentin helping with pain  Significantly improved

## 2025-01-02 NOTE — ASSESSMENT & PLAN NOTE
The most recent calcium and albumin results listed below.  Recent Labs     12/31/24  0240 01/01/25  0446 01/01/25  0857   CALCIUM 8.3* 7.3*  --    ALBUMIN 2.9* 3.1*  --    CAION  --   --  1.11       Plan  - Will replace calcium and monitor electrolytes closely.  - The patient's hypocalcemia is worsening. Will adjust treatment as follows:  IV replacement  - calcium ionized normal  - IV replacement  - Telemetry  - EKG PRN  - add on CMP

## 2025-01-02 NOTE — ASSESSMENT & PLAN NOTE
Echo Saline Bubble? No; Ultrasound enhancing contrast? Yes    Result Date: 12/31/2024    Left Ventricle: The left ventricle is normal in size. Mildly increased   wall thickness. There is concentric hypertrophy. There is normal systolic   function with a visually estimated ejection fraction of 55 - 60%. There is   normal diastolic function.    Right Ventricle: Normal right ventricular cavity size. Wall thickness   is normal. Systolic function is normal.    Left Atrium: Left atrium is mildly dilated.    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 .  This has no obvious vegetation identified on this valve.    Adequate function is noted    Mitral Valve: The mitral valve is structurally normal. There is normal   leaflet mobility.    Pulmonary Artery: There is mild pulmonary hypertension. The estimated   pulmonary artery systolic pressure is 42 mmHg.    IVC/SVC: Normal venous pressure at 3 mmHg.    No echocardiographic evidence of vegetation noted        Echo    Result Date: 11/22/2024    Left Ventricle: The left ventricle is normal in size. Mildly increased   wall thickness. There is mild concentric hypertrophy. There is normal   systolic function with a visually estimated ejection fraction of 55 - 70%.   Ejection fraction is approximately 60%. Global longitudinal strain is   -18.0%. Global longitudinal strain is normal. There is normal diastolic   function.    Right Ventricle: Normal right ventricular cavity size. Systolic   function is normal. TAPSE is 1.90 cm. Right ventricular global   longitudinal strain is - 23.8%.    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 It is reported to be a 26 mm. Aortic valve area by VTI is 1.2   cm². Aortic valve peak velocity is 1.6 m/s. Mean gradient is 5.2 mmHg. The   dimensionless index is 0.59.    IVC/SVC: Normal venous pressure at 3 mmHg.    No definite change from Echo in 2/2023.           Patient  looks euvolemic now, off IV Lasix.  Repeat chest x-ray significant improvement in fluid status

## 2025-01-03 LAB
ANION GAP SERPL CALC-SCNC: 0 MMOL/L (ref 8–16)
BASOPHILS # BLD AUTO: 0.08 K/UL (ref 0–0.2)
BASOPHILS NFR BLD: 0.7 % (ref 0–1.9)
BUN SERPL-MCNC: 9 MG/DL (ref 8–23)
CALCIUM SERPL-MCNC: 9.1 MG/DL (ref 8.7–10.5)
CHLORIDE SERPL-SCNC: 105 MMOL/L (ref 95–110)
CO2 SERPL-SCNC: 29 MMOL/L (ref 23–29)
CREAT SERPL-MCNC: 0.7 MG/DL (ref 0.5–1.4)
DIFFERENTIAL METHOD BLD: ABNORMAL
EOSINOPHIL # BLD AUTO: 0.4 K/UL (ref 0–0.5)
EOSINOPHIL NFR BLD: 3.4 % (ref 0–8)
ERYTHROCYTE [DISTWIDTH] IN BLOOD BY AUTOMATED COUNT: 13.2 % (ref 11.5–14.5)
EST. GFR  (NO RACE VARIABLE): >60 ML/MIN/1.73 M^2
GLUCOSE SERPL-MCNC: 91 MG/DL (ref 70–110)
HCT VFR BLD AUTO: 27.8 % (ref 37–48.5)
HGB BLD-MCNC: 9 G/DL (ref 12–16)
IMM GRANULOCYTES # BLD AUTO: 0.4 K/UL (ref 0–0.04)
IMM GRANULOCYTES NFR BLD AUTO: 3.6 % (ref 0–0.5)
LYMPHOCYTES # BLD AUTO: 3.5 K/UL (ref 1–4.8)
LYMPHOCYTES NFR BLD: 30.8 % (ref 18–48)
MCH RBC QN AUTO: 30.3 PG (ref 27–31)
MCHC RBC AUTO-ENTMCNC: 32.4 G/DL (ref 32–36)
MCV RBC AUTO: 94 FL (ref 82–98)
MONOCYTES # BLD AUTO: 0.9 K/UL (ref 0.3–1)
MONOCYTES NFR BLD: 7.8 % (ref 4–15)
NEUTROPHILS # BLD AUTO: 6 K/UL (ref 1.8–7.7)
NEUTROPHILS NFR BLD: 53.7 % (ref 38–73)
NRBC BLD-RTO: 0 /100 WBC
OHS QRS DURATION: 78 MS
OHS QTC CALCULATION: 430 MS
PLATELET # BLD AUTO: 295 K/UL (ref 150–450)
PMV BLD AUTO: 9.1 FL (ref 9.2–12.9)
POTASSIUM SERPL-SCNC: 4.7 MMOL/L (ref 3.5–5.1)
RBC # BLD AUTO: 2.97 M/UL (ref 4–5.4)
SODIUM SERPL-SCNC: 134 MMOL/L (ref 136–145)
WBC # BLD AUTO: 11.2 K/UL (ref 3.9–12.7)
WEST NILE VIRUS CSF INTERP: NORMAL
WNV IGG CSF QL: NEGATIVE
WNV IGM CSF QL IA: NEGATIVE
WNV RNA CSF QL NAA+PROBE: NEGATIVE

## 2025-01-03 PROCEDURE — 36573 INSJ PICC RS&I 5 YR+: CPT

## 2025-01-03 PROCEDURE — C1751 CATH, INF, PER/CENT/MIDLINE: HCPCS

## 2025-01-03 PROCEDURE — 21400001 HC TELEMETRY ROOM

## 2025-01-03 PROCEDURE — 63600175 PHARM REV CODE 636 W HCPCS: Performed by: HOSPITALIST

## 2025-01-03 PROCEDURE — 80048 BASIC METABOLIC PNL TOTAL CA: CPT | Performed by: NURSE PRACTITIONER

## 2025-01-03 PROCEDURE — 25000003 PHARM REV CODE 250

## 2025-01-03 PROCEDURE — 25000003 PHARM REV CODE 250: Performed by: HOSPITALIST

## 2025-01-03 PROCEDURE — 27000207 HC ISOLATION

## 2025-01-03 PROCEDURE — 99233 SBSQ HOSP IP/OBS HIGH 50: CPT | Mod: ,,, | Performed by: INTERNAL MEDICINE

## 2025-01-03 PROCEDURE — 63600175 PHARM REV CODE 636 W HCPCS: Performed by: INTERNAL MEDICINE

## 2025-01-03 PROCEDURE — 12000002 HC ACUTE/MED SURGE SEMI-PRIVATE ROOM

## 2025-01-03 PROCEDURE — 85025 COMPLETE CBC W/AUTO DIFF WBC: CPT | Performed by: NURSE PRACTITIONER

## 2025-01-03 PROCEDURE — 25000003 PHARM REV CODE 250: Performed by: FAMILY MEDICINE

## 2025-01-03 PROCEDURE — 94761 N-INVAS EAR/PLS OXIMETRY MLT: CPT

## 2025-01-03 PROCEDURE — 02HV33Z INSERTION OF INFUSION DEVICE INTO SUPERIOR VENA CAVA, PERCUTANEOUS APPROACH: ICD-10-PCS

## 2025-01-03 PROCEDURE — 99900035 HC TECH TIME PER 15 MIN (STAT)

## 2025-01-03 PROCEDURE — 36415 COLL VENOUS BLD VENIPUNCTURE: CPT | Performed by: NURSE PRACTITIONER

## 2025-01-03 PROCEDURE — 25000003 PHARM REV CODE 250: Performed by: INTERNAL MEDICINE

## 2025-01-03 RX ORDER — MUPIROCIN 20 MG/G
OINTMENT TOPICAL 2 TIMES DAILY
Status: DISCONTINUED | OUTPATIENT
Start: 2025-01-03 | End: 2025-01-05 | Stop reason: HOSPADM

## 2025-01-03 RX ADMIN — CARVEDILOL 6.25 MG: 6.25 TABLET, FILM COATED ORAL at 08:01

## 2025-01-03 RX ADMIN — ENOXAPARIN SODIUM 40 MG: 40 INJECTION SUBCUTANEOUS at 05:01

## 2025-01-03 RX ADMIN — LEVOTHYROXINE SODIUM 75 MCG: 0.03 TABLET ORAL at 06:01

## 2025-01-03 RX ADMIN — CALCIUM CARBONATE 500 MG: 500 TABLET, CHEWABLE ORAL at 08:01

## 2025-01-03 RX ADMIN — ATORVASTATIN CALCIUM 20 MG: 20 TABLET, FILM COATED ORAL at 08:01

## 2025-01-03 RX ADMIN — GABAPENTIN 200 MG: 100 CAPSULE ORAL at 08:01

## 2025-01-03 RX ADMIN — FAMOTIDINE 20 MG: 20 TABLET, FILM COATED ORAL at 08:01

## 2025-01-03 RX ADMIN — ASPIRIN 81 MG: 81 TABLET, COATED ORAL at 08:01

## 2025-01-03 RX ADMIN — LACTOBACILLUS ACIDOPHILUS / LACTOBACILLUS BULGARICUS 1 EACH: 100 MILLION CFU STRENGTH GRANULES at 08:01

## 2025-01-03 RX ADMIN — CEFTRIAXONE 2 G: 2 INJECTION, POWDER, FOR SOLUTION INTRAMUSCULAR; INTRAVENOUS at 08:01

## 2025-01-03 RX ADMIN — CALCIUM CARBONATE 500 MG: 500 TABLET, CHEWABLE ORAL at 03:01

## 2025-01-03 RX ADMIN — MUPIROCIN 1 G: 20 OINTMENT TOPICAL at 10:01

## 2025-01-03 RX ADMIN — GABAPENTIN 200 MG: 100 CAPSULE ORAL at 03:01

## 2025-01-03 NOTE — CONSULTS
Double lumen PICC to left basilic vein.  34 cm in length, 28 cm arm circumference and 0 cm exposed.   Lot # [id number].

## 2025-01-03 NOTE — ASSESSMENT & PLAN NOTE
Denies chest pain or Shortness of breath.    -trend troponin  -Cardiac monitoring  -Cardiology consulted  -Demand  -Clinically stable

## 2025-01-03 NOTE — ASSESSMENT & PLAN NOTE
Echo Saline Bubble? No; Ultrasound enhancing contrast? Yes    Result Date: 12/31/2024    Left Ventricle: The left ventricle is normal in size. Mildly increased   wall thickness. There is concentric hypertrophy. There is normal systolic   function with a visually estimated ejection fraction of 55 - 60%. There is   normal diastolic function.    Right Ventricle: Normal right ventricular cavity size. Wall thickness   is normal. Systolic function is normal.    Left Atrium: Left atrium is mildly dilated.    Aortic Valve: There is a transcatheter valve replacement in the aortic   position. .  This has no obvious vegetation identified on this valve.    Adequate function is noted    Mitral Valve: The mitral valve is structurally normal. There is normal   leaflet mobility.    Pulmonary Artery: There is mild pulmonary hypertension. The estimated   pulmonary artery systolic pressure is 42 mmHg.    IVC/SVC: Normal venous pressure at 3 mmHg.    No echocardiographic evidence of vegetation noted        Echo    Result Date: 11/22/2024    Left Ventricle: The left ventricle is normal in size. Mildly increased   wall thickness. There is mild concentric hypertrophy. There is normal   systolic function with a visually estimated ejection fraction of 55 - 70%.   Ejection fraction is approximately 60%. Global longitudinal strain is   -18.0%. Global longitudinal strain is normal. There is normal diastolic   function.    Right Ventricle: Normal right ventricular cavity size. Systolic   function is normal. TAPSE is 1.90 cm. Right ventricular global   longitudinal strain is - 23.8%.    Aortic Valve: There is a transcatheter valve replacement in the aortic   position. It is reported to be a 26 mm. Aortic valve area by VTI is 1.2   cm². Aortic valve peak velocity is 1.6 m/s. Mean gradient is 5.2 mmHg. The   dimensionless index is 0.59.    IVC/SVC: Normal venous pressure at 3 mmHg.    No definite change from Echo in 2/2023.            Patient looks euvolemic now, off IV Lasix.  Repeat chest x-ray significant improvement in fluid status

## 2025-01-03 NOTE — ASSESSMENT & PLAN NOTE
Left sided neck pain that is sharp and stabbing into head. Patient endorses that specific area on head is painful to touch. CT of neck shows degenerative changes      Patient has significant left-sided neck pain radiated to left occipital.  Extensive imaging study is not quite impressive except severe cervical spondylosis.   Status post LP- essential negative.   Discontinue steroids as GCA deemed less likely.   Neurology following  MRV negative for dural sinus thrombosis  Gabapentin helped with pain  Significantly improved

## 2025-01-03 NOTE — PROGRESS NOTES
[facility]   Department of Infectious Disease  Progress Note        PATIENT NAME: Vivien Burton  MRN: [id number]  TODAY'S DATE: 01/03/2025  ADMIT DATE: 12/29/2024  LOS: 4 days    CHIEF COMPLAINT: Neck Pain (Chronic neck pain.  Patient states that she has taken everything she can and nothing helps. )      PRINCIPLE PROBLEM: Severe sepsis    INTERVAL HISTORY      01/01/2025: No acute issues overnight.  Transferred to [facility] for [facility].  Repeat blood culture 12/31/2024 so far negative.      01/02/2025: Seen in a.m rounds.  No acute issues overnight.  For [facility] today.  Repeat blood cultures remain negative.    01/03/2024: Had [facility] yesterday.  Report not yet in.  However, patient states it was negative.  Repeat blood cultures remain negative.    Antibiotics (From admission, onward)      Start     Stop Route Frequency Ordered    01/01/25 0900  cefTRIAXone injection 2 g         -- IV Every 24 hours (non-standard times) 12/31/24 1550          Antifungals (From admission, onward)      None           Antivirals (From admission, onward)      None            ASSESSMENT and PLAN      1. Left occipital neuralgia.  Continue management as per neurologist and hospitalist.       2. Left maxillary sinusitis.  On antibiotics for 10 days for this indication this indication     3. 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  No other focus of infection identified for this clinically.  TTE with no vegetation.  Pending [facility] report which was reportedly negative..  I will still treat as complicated viridans Streptococcus bacteremia even if [facility] negative for vegetation.  We will plan to treat for 4 weeks   Extend to 6 weeks if vegetation found on [facility].    Recommendations  Ceftriaxone 2 g Q 24 hours for 4 weeks through 01/28/2024   Check CBC, BMP, CRP and ESR every Monday   Remove PICC line after completing IV antibiotics  Follow up with ID in 2-3 weeks    Thank you for involving ID in  the care of this patient.  ID service will see in 2 weeks post discharge for follow up.  Please call with questions.  Please send Epic secure chat with any questions.       SUBJECTIVE    She has chronic medical problems including hypothyroidism, hypertension, CAD, hyperlipidemia and GERD. She has left-sided headache of about 2-3 months' duration that got worse in the last 2 weeks. Pain starts at focal point around the left mastoid and we will sometimes radiate to the neck. It is stabbing in character and feels like hospitalist. Also has dysphagia she touches her left scab Staph from the midline on the left all the way down in temporal area. The pain is not constant but intermittent. Tylenol helps sometimes. Presented to the emergency room because it was bothersome. She has no fever or jaw pain. No visual problems. No other joint involved. She is very active and ADL independent. She had a similar episode on the right about 2 years ago that resolved after a course of steroid. Had not relapsed on the right since then.    Antimicrobials   Vancomycin: 12/29/2024 x1 dose   Valtrex for/20 09/24/2012/30/24   Doxycycline:  12/29/2024 x1 dose   Cefepime:  12/29/2024-12/30/2024   Augmentin:  12/30/2024 x1 dose   Ceftriaxone:  12/30/2024-    Microbiology  Blood culture 12/29/2024:  Viridans Streptococcus 2/2 sets   Respiratory panel 12/30/2024: Rhinovirus/enterovirus  Blood culture 12/31/2024:  NGTD   CSF culture 12/31/2024:  NGTD  CSF meningitis panel 12/31/2024: Negative      Review of Systems  Negative except as stated above in Interval History     OBJECTIVE   Temp:  [97.6 °F (36.4 °C)-99.2 °F (37.3 °C)] 98.6 °F (37 °C)  Pulse:  [66-77] 71  Resp:  [17-38] 38  SpO2:  [93 %-100 %] 97 %  BP: (110-169)/(58-76) 110/76  Temp:  [97.6 °F (36.4 °C)-99.2 °F (37.3 °C)]   Temp: 98.6 °F (37 °C) (01/03/25 0701)  Pulse: 71 (01/03/25 1200)  Resp: (!) 38 (01/03/25 1200)  BP: 110/76 (01/03/25 1200)  SpO2: 97 % (01/03/25 1200)    Intake/Output  Summary (Last 24 hours) at 1/3/2025 1250  Last data filed at 1/3/2025 0918  Gross per 24 hour   Intake 720 ml   Output --   Net 720 ml       Physical Exam  General:  Pleasant well-groomed elderly woman lying in bed in no acute distress   CVS: S1 and 2 heard, no murmurs appreciated   Respiratory: To auscultation   Abdomen:  Full, soft, nontender no palpable organomegaly   Skin: No rash appreciated  CNS: No focal deficits   Musculoskeletal system: No joint or bony abnormalities appreciated    VAD:  PIV  ISOLATION:  Droplet isolation for rhinovirus     WOUNDS:  None    Significant Labs: All pertinent labs within the past 24 hours have been reviewed.    CBC LAST 7 DAYS  Recent Labs   Lab 12/29/24  1737 12/30/24  0415 12/31/24  0237 01/01/25  0446 01/02/25  0436 01/03/25  0407   WBC 9.33 9.77 10.54 10.82 9.78 11.20   RBC 2.91* 2.77* 2.91* 2.79* 2.83* 2.97*   HGB 9.0* 8.5* 8.7* 8.4* 8.7* 9.0*   HCT 26.4* 26.1* 26.8* 25.4* 26.2* 27.8*   MCV 91 94 92 91 93 94   MCH 30.9 30.7 29.9 30.1 30.7 30.3   MCHC 34.1 32.6 32.5 33.1 33.2 32.4   RDW 13.1 13.2 13.1 13.3 13.1 13.2    172 251 257 257 295   MPV 9.6 10.5 9.8 9.5 9.2 9.1*   GRAN 86.7*  8.1*  --  78.2*  8.3* 60.4  6.5 50.5  5.0 53.7  6.0   LYMPH 7.8*  0.7*  --  13.7*  1.4 27.8  3.0 35.1  3.4 30.8  3.5   MONO 4.5  0.4  --  4.0  0.4 6.7  0.7 7.6  0.7 7.8  0.9   BASO 0.02  --  0.02 0.04 0.06 0.08   NRBC 0  --  0 0 0 0       CHEMISTRY LAST 7 DAYS  Recent Labs   Lab 12/29/24  1736 12/29/24  2040 12/30/24  0415 12/31/24  0240 01/01/25  0446 01/03/25  0407     --  134* 135* 131* 134*   K 3.7  --  3.4* 3.9 4.3 4.7     --  102 99 98 105   CO2 22*  --  23 26 28 29   ANIONGAP 11  --  9 10 5* 0*   BUN 9  --  12 11 10 9   CREATININE 0.8  --  0.9 0.7 0.6 0.7   *  --  118* 138* 93 91   CALCIUM 9.2  --  8.6* 8.3* 7.3* 9.1   MG  --  1.5* 1.8 1.9 1.9  --    ALBUMIN 3.0*  --  2.6* 2.9* 3.1*  --    PROT 5.6*  --  5.0* 5.6* 4.9*  --    ALKPHOS 85  --  75 76  "59  --    ALT 38  --  28 23 12  --    AST 54*  --  28 14 8*  --    BILITOT 0.5  --  0.4 0.3 0.4  --        Estimated Creatinine Clearance: 48.3 mL/min (based on SCr of 0.7 mg/dL).    INFLAMMATORY/PROCAL  LAST 7 DAYS  Recent Labs   Lab 12/29/24 2040 12/31/24  0240   PROCAL 81.13* 202.03*   .4*  --      No results found for: "ESR"  CRP   Date Value Ref Range Status   12/29/2024 110.4 (H) 0.0 - 8.2 mg/L Final   02/22/2023 1.6 <8.0 mg/L Final   02/01/2023 8.5 (H) <8.0 mg/L Final       PRIOR  MICROBIOLOGY:    Susceptibility data from last 90 days.  Collected Specimen Info Organism Cefepime Ceftriaxone Penicillin Vancomycin   12/29/24 Blood from Peripheral, Antecubital, Left Viridans streptococcus  S  S  I  S   12/29/24 Blood from Peripheral, Hand, Left Viridans streptococcus           LAST 7 DAYS MICROBIOLOGY   Microbiology Results (last 7 days)       Procedure Component Value Units Date/Time    CSF culture [[id number]] Collected: 12/31/24 1150    Order Status: Completed Specimen: CSF (Spinal Fluid) from CSF Tap, Tube 3 Updated: 01/03/25 0745     CSF CULTURE No Growth to date     Gram Stain Result No WBC's, epithelial cells or organisms seen      12/31/2024  14:46 TN3    Blood culture [[id number]] Collected: 01/02/25 1310    Order Status: Completed Specimen: Blood Updated: 01/02/25 2117     Blood Culture, Routine No Growth to date    Blood culture [[id number]] Collected: 01/02/25 1311    Order Status: Completed Specimen: Blood Updated: 01/02/25 2117     Blood Culture, Routine No Growth to date    Blood culture [[id number]] Collected: 12/31/24 0850    Order Status: Completed Specimen: Blood from Peripheral, Antecubital, Left Updated: 01/02/25 1432     Blood Culture, Routine No Growth to date      No Growth to date      No Growth to date    Blood culture [[id number]] Collected: 12/31/24 0850    Order Status: Completed Specimen: Blood from Peripheral, Hand, Left Updated: 01/02/25 1432     Blood Culture, Routine No " Growth to date      No Growth to date      No Growth to date    Blood culture x two cultures. Draw prior to antibiotics. [[id number]]  (Abnormal)  (Susceptibility) Collected: 12/29/24 1835    Order Status: Completed Specimen: Blood from Peripheral, Antecubital, Left Updated: 01/01/25 0625     Blood Culture, Routine Gram stain aer bottle: Gram positive cocci      Results called to and read back by: hannah guy rn pcu      Gram stain osvaldo bottle: Gram positive cocci      Positive results previously called      VIRIDANS STREPTOCOCCUS    Narrative:      Aerobic and anaerobic    Blood culture x two cultures. Draw prior to antibiotics. [[id number]]  (Abnormal) Collected: 12/29/24 1835    Order Status: Completed Specimen: Blood from Peripheral, Hand, Left Updated: 01/01/25 0625     Blood Culture, Routine Gram stain peds bottle: Gram positive cocci      Positive results previously called      [id number]      VIRIDANS STREPTOCOCCUS  For susceptibility see order #[id number]      Narrative:      Aerobic and anaerobic    Cryptococcal antigen, CSF [[id number]] Collected: 12/31/24 1150    Order Status: Sent Specimen: CSF (Spinal Fluid) from CSF Tap, Tube 3 Updated: 12/31/24 1217    Rapid Organism ID by PCR (from Blood culture) [[id number]]  (Abnormal) Collected: 12/29/24 1835    Order Status: Completed Updated: 12/31/24 0246     Enterococcus faecalis Not Detected     Enterococcus faecium Not Detected     Listeria monocytogenes Not Detected     Staphylococcus spp. Not Detected     Staphylococcus aureus Not Detected     Staphylococcus epidermidis Not Detected     Staphylococcus lugdunensis Not Detected     Streptococcus species Detected     Streptococcus agalactiae Not Detected     Streptococcus pneumoniae Not Detected     Streptococcus pyogenes Not Detected     Acinetobacter calcoaceticus/baumannii complex Not Detected     Bacteroides fragilis Not Detected     Enterobacterales Not Detected     Enterobacter cloacae complex Not  Detected     Escherichia coli Not Detected     Klebsiella aerogenes Not Detected     Klebsiella oxytoca Not Detected     Klebsiella pneumoniae group Not Detected     Proteus Not Detected     Salmonella sp Not Detected     Serratia marcescens Not Detected     Haemophilus influenzae Not Detected     Neisseria meningtidis Not Detected     Pseudomonas aeruginosa Not Detected     Stenotrophomonas maltophilia Not Detected     Candida albicans Not Detected     Candida auris Not Detected     Candida glabrata Not Detected     Candida krusei Not Detected     Candida parapsilosis Not Detected     Candida tropicalis Not Detected     Cryptococcus neoformans/gattii Not Detected     CTX-M (ESBL ) Test not applicable     IMP (Carbapenem resistant) Test not applicable     KPC resistance gene (Carbapenem resistant) Test not applicable     mcr-1  Test not applicable     mec A/C  Test not applicable     mec A/C and MREJ (MRSA) gene Test not applicable     NDM (Carbapenem resistant) Test not applicable     OXA-48-like (Carbapenem resistant) Test not applicable     van A/B (VRE gene) Test not applicable     VIM (Carbapenem resistant) Test not applicable    Narrative:      Aerobic and anaerobic    Respiratory Infection Panel (PCR), Nasopharyngeal [[id number]]  (Abnormal) Collected: 12/30/24 1347    Order Status: Completed Specimen: Nasopharyngeal Swab Updated: 12/30/24 2127     Respiratory Infection Panel Source NP Swab     Adenovirus Not Detected     Coronavirus 229E, Common Cold Virus Not Detected     Coronavirus HKU1, Common Cold Virus Not Detected     Coronavirus NL63, Common Cold Virus Not Detected     Coronavirus OC43, Common Cold Virus Not Detected     Comment: Coronavirus strains 229E, HKU1, NL63, and OC43 can cause the common   cold   and are not associated with the respiratory disease outbreak caused   by  the COVID-19 (SARS-CoV-2 novel Coronavirus) strain.           SARS-CoV2 (COVID-19) Qualitative PCR Not Detected      Human Metapneumovirus Not Detected     Human Rhinovirus/Enterovirus Detected     Influenza A (subtypes H1, H1-2009,H3) Not Detected     Influenza B Not Detected     Parainfluenza Virus 1 Not Detected     Parainfluenza Virus 2 Not Detected     Parainfluenza Virus 3 Not Detected     Parainfluenza Virus 4 Not Detected     Respiratory Syncytial Virus Not Detected     Bordetella Parapertussis (BK4426) Not Detected     Bordetella pertussis (ptxP) Not Detected     Chlamydia pneumoniae Not Detected     Mycoplasma pneumoniae Not Detected     Comment: Respiratory Infection Panel testing performed by Multiplex PCR.       Culture, Respiratory with Gram Stain [[id number]]     Order Status: No result Specimen: Respiratory     Influenza A & B by Molecular [[id number]] Collected: 12/29/24 1830    Order Status: Completed Specimen: Nasal Swab Updated: 12/29/24 1857     Influenza A, Molecular Negative     Influenza B, Molecular Negative     Flu A & B Source NP          CURRENT/PREVIOUS VISIT EKG  Results for orders placed or performed during the hospital encounter of 12/29/24   EKG 12-lead    Collection Time: 12/29/24 12:00 AM    Narrative    Ordered by an unspecified provider.       Significant Imaging: I have reviewed all relevant and available imaging results/findings within the past 24 hours.    I spent a total of 40 minutes on the day of the visit.This includes face to face time and non-face to face time preparing to see the patient (eg, review of tests), obtaining and/or reviewing separately obtained history, documenting clinical information in the electronic or other health record, independently interpreting results and communicating results to the patient/family/caregiver, or care coordinator.    Alin Woo MD  Date of Service: 01/03/2025      This note was created using [employer] voice recognition software that occasionally misinterpreted phrases or words.

## 2025-01-03 NOTE — ASSESSMENT & PLAN NOTE
The most recent calcium and albumin results listed below.  Recent Labs     01/01/25  0446 01/01/25  0857 01/03/25  0407   CALCIUM 7.3*  --  9.1   ALBUMIN 3.1*  --   --    CAION  --  1.11  --        Plan  - Will replace calcium and monitor electrolytes closely.  - The patient's hypocalcemia is worsening. Will adjust treatment as follows:  IV replacement  - calcium ionized normal  - IV replacement  - Telemetry  - EKG PRN  - Improved  - Asymptomatic

## 2025-01-03 NOTE — CARE UPDATE
12/31/24 2030   Patient Assessment/Suction   Level of Consciousness (AVPU) alert   Respiratory Effort Normal;Unlabored   Expansion/Accessory Muscles/Retractions no retractions;no use of accessory muscles   All Lung Fields Breath Sounds diminished   BRAYAN Breath Sounds clear   LLL Breath Sounds clear   RUL Breath Sounds clear   RML Breath Sounds clear   RLL Breath Sounds clear   Rhythm/Pattern, Respiratory unlabored;pattern regular;no shortness of breath reported   Cough Frequency no cough   PRE-TX-O2   Device (Oxygen Therapy) room air   SpO2 99 %   Pulse Oximetry Type Intermittent   $ Pulse Oximetry - Multiple Charge Pulse Oximetry - Multiple   Pulse 83   Resp 17   Positioning   Body Position position changed independently   Head of Bed (HOB) Positioning HOB elevated   Positioning/Transfer Devices pillows;in use   Aerosol Therapy   $ Aerosol Therapy Charges PRN treatment not required

## 2025-01-03 NOTE — CARE UPDATE
01/02/25 2012   Patient Assessment/Suction   Level of Consciousness (AVPU) alert   Respiratory Effort Normal;Unlabored   Expansion/Accessory Muscles/Retractions no use of accessory muscles;no retractions   BRAYAN Breath Sounds clear   LLL Breath Sounds clear   RUL Breath Sounds clear   RML Breath Sounds clear   RLL Breath Sounds clear   Rhythm/Pattern, Respiratory unlabored;pattern regular;no shortness of breath reported   Cough Frequency no cough   PRE-TX-O2   Device (Oxygen Therapy) room air   SpO2 97 %   Pulse Oximetry Type Continuous   $ Pulse Oximetry - Multiple Charge Pulse Oximetry - Multiple   Pulse 77   Resp 20   Aerosol Therapy   $ Aerosol Therapy Charges PRN treatment not required

## 2025-01-03 NOTE — PLAN OF CARE
Met with patient at bedside. Patient requesting [facility] due to being with them in the past.  Information sent via Epic.      Demographics and ID note sent to [facility] for review.- Per Joyce, running thru insurance and will update us/patient with specifics.         01/03/25 1439   Post-Acute Status   Post-Acute Authorization Home Health;IV Infusion   Home Health Status Referrals Sent   IV Infusion Status Referral(s) sent   Patient choice form signed by patient/caregiver List with quality metrics by geographic area provided

## 2025-01-03 NOTE — PROCEDURES
PICC  Date/Time: 1/3/2025 9:43 AM  Performed by: Junior Linton RN  Assisting provider: Dickson Faust RN  Pre-operative Diagnosis: Bacteremia  Time out: Immediately prior to procedure a time out was called to verify the correct patient, procedure, equipment, support staff and site/side marked as required  Indications: med administration  Anesthesia: local infiltration  Local anesthetic: lidocaine 1% without epinephrine  Anesthetic Total (mL): 3  Preparation: skin prepped with Betadine and skin prepped with ChloraPrep  Skin prep agent dried: skin prep agent completely dried prior to procedure  Sterile barriers: all five maximum sterile barriers used - cap, mask, sterile gown, sterile gloves, and large sterile sheet  Hand hygiene: hand hygiene performed prior to central venous catheter insertion  Location details: left basilic  Catheter type: double lumen  Catheter size: 5 Fr  Catheter Length: 34cm    Ultrasound guidance: yes  Vessel Caliber: medium and patent, compressibility normal  Vascular Doppler: not done  Needle advanced into vessel with real time Ultrasound guidance.  Guidewire confirmed in vessel.  Image recorded and saved.  Sterile sheath used.  no esophageal manometryNumber of attempts: 1  Post-procedure: blood return through all ports, chlorhexidine patch and sterile dressing applied  Technical procedures used: modified seldinger techniquer  Estimated blood loss (mL): 0  Specimens: No  Implants: No  Assessment: placement verified by x-ray, no pneumothorax on x-ray and successful placement  Complications: none

## 2025-01-03 NOTE — ASSESSMENT & PLAN NOTE
"This patient does have evidence of infective focus  My overall impression is sepsis.  Source: Respiratory and Unknown Urinalysis pending at time of note  Antibiotics given-   Antibiotics (72h ago, onward)      Start     Stop Route Frequency Ordered    01/01/25 0900  cefTRIAXone injection 2 g         -- IV Every 24 hours (non-standard times) 12/31/24 1550          Latest lactate reviewed-  No results for input(s): "LACTATE", "POCLAC" in the last 72 hours.  Procalcitonin: 81.13  CRP: 110.4  Sed Rate 33      Organ dysfunction indicated by Acute on chronic  heart failure    Fluid challenge Contraindicated- Fluid bolus is contraindicated in this patient due to Congestive Heart Failure     Post- resuscitation assessment Yes Perfusion exam was performed within 6 hours of septic shock presentation after bolus shows Adequate tissue perfusion assessed by non-invasive monitoring     CT chest abdomen and pelvis with IV contrast negative for infectious source.   Status post LP 12/31, WBC 2, RBC 2, essentially negative.   TTE 12/31, negative for vegetation.   Patient has very poor dental hygiene, has Streptococcus viridans bacteremia, patient also has a history of TAVR  [facility] today, full report pending, prelim: no vegetations  Continue IV Rocephin 2 g q.12 hours.   Infectious Disease planning for IV antibiotics for about 4-6 weeks, PICC line placed today, decide final duration based on repeat blood cultures    "

## 2025-01-03 NOTE — PROGRESS NOTES
"[facility] Medicine  Progress Note    Patient Name: Vivien Burton  MRN: [id number]  Patient Class: IP- Inpatient   Admission Date: 12/29/2024  Length of Stay: 4 days  Attending Physician: Cristy Fisher, *  Primary Care Provider: Jeri Moreira MD        Subjective     Principal Problem:Severe sepsis        HPI:  Sydnee Burton is a 79 year old female with a previous medical history of anemia, basal cell carcinoma, breast cancer, CHF, streptococcal bacteremia, GERD, HLD, HTN, CAD, TAVR, hypothyroidism, accidental tylenol overdose and abnormal MRI who presented to the ED for a headache and neck pain. The patient endorses a left sided headache described as stabbing and radiating from left upper neck. It is associated with tenderness to palpation in the left parietal scalp daily and intermittently the left temporal area. The pain has been daily for one month and reports taking tylenol every 8 hours with relief but the pain returns. Today the pain became intractable which caused her to present to the ED. CT of head showed no acute process and CT of c-spine showed degenerative changes. While in ED she developed a fever, oxygen saturation of 92%, hypertension, and tachycardia. She endorsed having a non-productive cough. Sepsis bundle initiated and cardiac workup added. She was given toradol, 2mg of PO diluadid, gabapentin, and acyclovir. Her pain improved and her blood pressure did also. She was maintaining an oxygen saturation of 99% on 1 liter nasal cannula and denied any shortness of breath. Fever resolved with tylenol. 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 She most recently finished a steroid taper at end of November. ED initiated acyclovir due one "leison" noted to left sided neck and pain with palpation to scalp. Lab work showed elevated troponin, normal lactic acid, no " "leukocytosis, increased anemia over last month, procalcitonin 81.13, normal TSH, and AST of 54.  with no peripheral edema but chest xray showed "Mild interstitial opacities, suspicious for interstitial edema/CHF." She has no tachypnea or exertional SOB. FLU/RSV/ Covid negative.  Initially her blood pressure was too low for lasix administration but later improved and she was administered 20mg of IV lasix. Troponin trended up and then lowered on third draw. Urinalysis pending at time of note. Due to ESR of 33 and CRP of 110.4 with fever, scalp tenderness, and worsening headache she was empirically started on prednisone 60mg for one dose and to be continued by primary team pending MRA of head to assess for temporal arteritis. MRI of c-spine ordered. MRA of neck ordered.  One year ago patient presented to ED with right sided headache that was similar to this visit. It was discovered she was septic due to pneumonia but on that visit she has 43K WBC and elevated lactic acid. Neurology was consulted at this time and MRI performed which was abnormal. Patient has no vision loss and is neurologically intact. She has been continued on IV vancomycin and IV cefepime and oral valcyclovir. Cardiology and ID consulted.  Course of care discussed with daughter in law Dottie Burton who agreed with plan of care.  Patient admitted by hospital medicine for further evaluation and management.     Overview/Hospital Course:  79 year old female with a previous medical history of anemia, basal cell carcinoma, breast cancer, CHF, streptococcal bacteremia, GERD, HLD, HTN, CAD, TAVR, hypothyroidism, accidental tylenol overdose and abnormal MRI initially admitted on 12/29 for severe headache/ left-sided neck pain for around a month, patient also has intermittent low-grade fever.  Workup shows severe elevated CRP/ ESR, elevated procalcitonin, with unclear infectious source.  CT chest abdomen and pelvis is negative for infectious " source.  Patient also has mild elevated troponin most likely secondary to sepsis as per cardiologist Dr. SISSY Garvin, patient also has elevated BNP 1680, possible acute CHF, patient received IV Lasix, -2.5 L. patient apparently euvolemic now.  Patient has a series of imaging study including CT head/ CT neck/ MRI of the cervical/ MRI of the brain which is insignificant other than shows significant cervical spondylosis.  Eventually the patient had LP 12/31 which showed RBC 2, WBC 2, essentially negative.   Patient had TTE 12/31 LVEF 55-60%, no significant valvular vegetations.   Now patient 2/2 blood culture showed strep viridans, possible source, patient does have very bad dental hygiene, patient is restarted on IV Rocephin by ID.  Regulo negative for vegetations.  Regarding neck pain, this is a concern for patient's may have GCA.  Neurologist following. Gabapentin was uptitrated, patient had significant improvement in neck pain.   Patient also found to rhinovirus positive, continue droplet precaution.   MRV of the brain is done and negative for dural venous sinus thrombosis   Regarding her CHF, chest x-ray showed significant improvement in fluid status was euvolemic.  Infectious diseases planning for PICC line with outpatient antibiotics for 4-6 weeks.  Stable to transfer out of step-down.  Discussed with Dr. Burton. Follow up full REGULO report.    Interval History:   Patient is seen and examined at multidisciplinary rounds, feels much better, neck pain significantly improved. Today had mild itching over her rash, topical benadryl requested.    REGULO yesterday, no vegetations as per team, full report pending    CXR showed significant improvement in fluid status    Stable to transfer out of step down    PICC line ordered    Repeat blood culture negative till date      Review of Systems   Musculoskeletal:  Negative for neck pain.   Neurological:  Negative for headaches.     Objective:     Vital Signs (Most Recent):  Temp:  98.6 °F (37 °C) (01/03/25 0701)  Pulse: 74 (01/03/25 1300)  Resp: (!) 21 (01/03/25 1300)  BP: 135/63 (01/03/25 1300)  SpO2: 98 % (01/03/25 1300) Vital Signs (24h Range):  Temp:  [97.6 °F (36.4 °C)-99.2 °F (37.3 °C)] 98.6 °F (37 °C)  Pulse:  [66-77] 74  Resp:  [17-38] 21  SpO2:  [93 %-100 %] 98 %  BP: (110-164)/(58-76) 135/63     Weight: 54.4 kg (120 lb)  Body mass index is 23.44 kg/m².    Intake/Output Summary (Last 24 hours) at 1/3/2025 1404  Last data filed at 1/3/2025 0918  Gross per 24 hour   Intake 720 ml   Output --   Net 720 ml         Physical Exam  Constitutional:       Appearance: Normal appearance.   HENT:      Head: Normocephalic and atraumatic.      Nose: Nose normal.   Eyes:      Extraocular Movements: Extraocular movements intact.      Pupils: Pupils are equal, round, and reactive to light.   Cardiovascular:      Rate and Rhythm: Normal rate and regular rhythm.      Heart sounds: No murmur heard.  Pulmonary:      Effort: Pulmonary effort is normal.      Breath sounds: Normal breath sounds.   Abdominal:      General: Abdomen is flat.      Palpations: Abdomen is soft.   Musculoskeletal:         General: Normal range of motion.      Cervical back: Normal range of motion and neck supple.   Skin:     General: Skin is warm and dry.   Neurological:      General: No focal deficit present.      Mental Status: She is alert and oriented to person, place, and time.   Psychiatric:         Mood and Affect: Mood normal.             Significant Labs: All pertinent labs within the past 24 hours have been reviewed.  CBC:   Recent Labs   Lab 01/02/25  0436 01/03/25  0407   WBC 9.78 11.20   HGB 8.7* 9.0*   HCT 26.2* 27.8*    295     CMP:   Recent Labs   Lab 01/03/25  0407   *   K 4.7      CO2 29   GLU 91   BUN 9   CREATININE 0.7   CALCIUM 9.1   ANIONGAP 0*       Significant Imaging: I have reviewed all pertinent imaging results/findings within the past 24 hours.  I have reviewed and interpreted all  pertinent imaging results/findings within the past 24 hours.    Scheduled Meds:   aspirin  81 mg Oral Daily    atorvastatin  20 mg Oral QHS    calcium carbonate  500 mg Oral TID    carvediloL  6.25 mg Oral BID    cefTRIAXone (Rocephin) IV (PEDS and ADULTS)  2 g Intravenous Q24H    enoxparin  40 mg Subcutaneous Q24H (prophylaxis, 1700)    famotidine  20 mg Oral Daily    gabapentin  100 mg Oral Once    gabapentin  200 mg Oral TID    lactobacillus acidophilus & bulgar  1 packet Oral BID    levothyroxine  75 mcg Oral Before breakfast     Continuous Infusions:  PRN Meds:.  Current Facility-Administered Medications:     acetaminophen, 650 mg, Oral, Q4H PRN    albuterol-ipratropium, 3 mL, Nebulization, Q6H PRN    aluminum-magnesium hydroxide-simethicone, 30 mL, Oral, QID PRN    calcium gluconate IVPB, 1 g, Intravenous, PRN    calcium gluconate IVPB, 2 g, Intravenous, PRN    calcium gluconate IVPB, 3 g, Intravenous, PRN    dextrose 10%, 12.5 g, Intravenous, PRN    dextrose 10%, 25 g, Intravenous, PRN    diphenhydrAMINE, 25 mg, Oral, Q6H PRN    diphenhydrAMINE-zinc acetate 2-0.1%, , Topical (Top), BID PRN    glucagon (human recombinant), 1 mg, Intramuscular, PRN    glucose, 16 g, Oral, PRN    glucose, 24 g, Oral, PRN    hydrALAZINE, 5 mg, Intravenous, Q6H PRN    HYDROmorphone, 0.5 mg, Intravenous, Q4H PRN    magnesium oxide, 800 mg, Oral, PRN    magnesium oxide, 800 mg, Oral, PRN    magnesium sulfate IVPB, 2 g, Intravenous, PRN    magnesium sulfate IVPB, 4 g, Intravenous, PRN    melatonin, 6 mg, Oral, Nightly PRN    naloxone, 0.02 mg, Intravenous, PRN    ondansetron, 4 mg, Intravenous, Q8H PRN    oxyCODONE, 5 mg, Oral, Q4H PRN    oxyCODONE, 10 mg, Oral, Q4H PRN    potassium bicarbonate, 35 mEq, Oral, PRN    potassium bicarbonate, 50 mEq, Oral, PRN    potassium bicarbonate, 60 mEq, Oral, PRN    potassium chloride, 40 mEq, Intravenous, PRN **AND** potassium chloride, 60 mEq, Intravenous, PRN **AND** potassium chloride, 80 mEq,  Intravenous, PRN    potassium, sodium phosphates, 2 packet, Oral, PRN    potassium, sodium phosphates, 2 packet, Oral, PRN    potassium, sodium phosphates, 2 packet, Oral, PRN    prochlorperazine, 5 mg, Intravenous, Q6H PRN    simethicone, 1 tablet, Oral, QID PRN    sodium chloride 0.9%, 10 mL, Intravenous, Q12H PRN    sodium phosphate 15 mmol in D5W 250 mL IVPB, 15 mmol, Intravenous, PRN    sodium phosphate 20.01 mmol in D5W 250 mL IVPB, 20.01 mmol, Intravenous, PRN    sodium phosphate 30 mmol in D5W 250 mL IVPB, 30 mmol, Intravenous, PRN    MRA Head for Temporal Arteritis W and WO Contrast    Result Date: 12/30/2024  EXAMINATION: MRA HEAD FOR TEMPORAL ARTERITIS W AND WO CONTRAST CLINICAL HISTORY: Neck pain, acute, infection suspected;Concern for Temporal Arteritis; TECHNIQUE: Time of flight and post contrast MR angiography sequences were performed before and following the IV administration of 5 mL of Gadavist..  Multiplanar and MIP images were performed. COMPARISON: MRI of the brain 12/30/2024 FINDINGS: The intracranial internal carotid arteries are patent bilaterally from the skull base to carotid terminus. Middle cerebral arteries are patent bilaterally. Anterior cerebral arteries are patent bilaterally. There are codominant vertebral arteries. Bilateral vertebral arteries are patent to the confluence into the basilar artery. Basilar artery is normal in caliber. Posterior cerebral arteries are patent bilaterally. No evidence of significant mural thickening or abnormal enhancement along the courses of the temporal arteries to specifically indicate active vascular inflammation. Visualized dural venous sinuses are patent without evidence of dural venous sinus thrombosis.     1. No intracranial high-grade stenosis, large vessel occlusion, aneurysm or arteriovenous malformation. 2. No evidence of significant mural thickening or abnormal enhancement along the courses of the temporal arteries to specifically indicate  active vascular inflammation. Electronically signed by: Marek Moreno Date:    12/30/2024 Time:    11:17    MRI Cervical Spine W WO Cont    Result Date: 12/30/2024  EXAMINATION: MRI CERVICAL SPINE W WO CONTRAST CLINICAL HISTORY: Neck pain, acute, infection suspected; TECHNIQUE: Multiplanar, multisequence MR images of the cervical spine were performed before and after the administration of intravenous contrast.  5 mL of Gadavist intravenous contrast were administered. COMPARISON: CT cervical spine 12/29/2024 FINDINGS: Study is mild to moderately degraded by motion artifact. Alignment: Reversal of the cervical lordosis, which may be secondary to positioning or muscle spasm.  Minimal anterolisthesis of C4 on C5 and retrolisthesis of C5 on C6. Vertebral Column: Vertebral body heights are maintained. No acute fracture is identified.  No evidence of an aggressive bone marrow replacement process. Multilevel disc degeneration, most pronounced at C5-6 and C6-7 with mild-to-moderate intervertebral disc space narrowing, disc desiccation, anterior marginal osteophyte formation and posterior disc osteophyte complexes. Cord: Normal signal and morphology. Skull base and craniocervical junction: Normal. Degenerative findings: C2-C3: Minimal posterior disc osteophyte complex.  Mild bilateral facet arthropathy.  No significant neural foraminal or spinal canal stenosis. C3-C4: Mild posterior disc osteophyte complex.  Moderate bilateral facet arthropathy.  Marked left and moderate right uncovertebral joint spurring.  Severe left and moderate right neural foraminal stenosis.  No significant spinal canal stenosis. C4-C5: Mild posterior disc osteophyte complex.  Moderate bilateral facet arthropathy.  Moderate left and mild right uncovertebral joint spurring.  Moderate left and mild right neural foraminal stenosis.  No significant spinal canal stenosis. C5-C6: Posterior disc osteophyte complex, which partially effaces the ventral thecal sac.   Moderate bilateral facet arthropathy.  Marked left and moderate right uncovertebral joint spurring.  Severe left and moderate right neural foraminal stenosis.  Ligamentum flavum thickening, which partially effaces the dorsal thecal sac.  Mild spinal canal stenosis. C6-C7: Posterior disc osteophyte complex, which mildly effaces the ventral thecal sac.  Moderate bilateral facet arthropathy.  Moderate left and moderate right uncovertebral joint spurring.  Severe left and moderate right neural foraminal stenosis.  Ligamentum flavum thickening, which partially effaces the dorsal thecal sac.  Mild spinal canal stenosis. C7-T1: The disk is normal in configuration.  Mild bilateral facet arthropathy.  Mild bilateral uncovertebral joint spurring.  Mild left greater than right neural foraminal stenosis.  No significant spinal canal stenosis. Paraspinal muscles & soft tissues: Unremarkable. Normal signal voids are present in the vertebral arteries. No abnormal intraspinal enhancement identified.     1. No evidence of acute fracture, spondylodiscitis, high-grade spinal canal stenosis.  No cord compression, focal cord signal abnormality, or abnormal intraspinal enhancement. 2. Multilevel degenerative changes of the cervical spine, as detailed above, with findings most pronounced at C5-6 and C6-7 and resulting in severe left and moderate right neural foraminal and mild spinal canal stenosis. Electronically signed by: Marek Moreno Date:    12/30/2024 Time:    10:47    MRA Neck without contrast    Result Date: 12/30/2024  EXAMINATION: MRA NECK WITHOUT CONTRAST CLINICAL HISTORY: CNS vasculitis, known or suspected; TECHNIQUE: Time of flight MRA of the carotid and vertebral arteries was performed with 3D reconstructions according to the standard neck MRA protocol. COMPARISON: None FINDINGS: Study is mild to moderately degraded by motion artifact. The right common carotid artery demonstrates no hemodynamically significant stenosis. The  cervical right internal carotid artery demonstrates no hemodynamically significant stenosis. The left common carotid artery demonstrates no hemodynamically significant stenosis. The cervical left internal carotid artery demonstrates no hemodynamically significant stenosis. Extracranial vertebral arteries: Vertebral arteries are codominant.  Vertebral arteries are normal to the level of the foramen magnum.     No evidence of a hemodynamically significant stenosis, major branch occlusion or aneurysm in the neck arterial vasculature, allowing for motion degradation. Electronically signed by: Marek Moreno Date:    12/30/2024 Time:    10:23    MRI Brain Without Contrast    Result Date: 12/30/2024  EXAMINATION: MRI BRAIN WITHOUT CONTRAST CLINICAL HISTORY: Headache, new or worsening (Age >= 50y);. TECHNIQUE: Multiplanar multisequence MR imaging of the brain was performed without the administration of intravenous contrast. COMPARISON: CT head 12/29/2024, MRI brain 01/24/2023 FINDINGS: Study is  degraded by motion artifact. Ventricles and sulci are normal in size for age without evidence of hydrocephalus. No extra-axial blood or fluid collections. Scattered foci of T2/FLAIR hyperintense signal within the supratentorial white matter, nonspecific and may reflect sequelae of mild chronic small vessel ischemic change.    Diffusion-weighted images demonstrate no evidence of an acute infarct.   Susceptibility weighted images demonstrate no evidence of acute or chronic hemorrhage. No significant intracranial mass effect or midline shift. Normal vascular flow voids are present. Diffusely heterogeneous calvarial bone marrow signal, nonspecific but similar to prior exam from 2023, and may be seen with red marrow reconversion associated with chronic anemic states, osteoporosis, hypoxia, or smoking. Moderate opacification of the left maxillary sinus with mucosal membrane thickening and small air-fluid level.  Mild scattered mucosal  membrane thickening elsewhere in the paranasal sinuses.  The visualized portions of the mastoids are unremarkable. Bilateral lens replacements are noted.     1. No acute intracranial abnormality. 2. Mild generalized cerebral volume loss and scattered T2/FLAIR hyperintense signal within the supratentorial white matter, nonspecific but probably reflecting sequelae of chronic small vessel ischemic change. 3. Left maxillary sinusitis. Electronically signed by: Marek Moreno Date:    12/30/2024 Time:    07:39    X-Ray Chest AP Portable    Result Date: 12/29/2024  EXAMINATION: XR CHEST AP PORTABLE CLINICAL HISTORY: Chest Pain; TECHNIQUE: Single frontal view of the chest was performed. COMPARISON: 02/02/2023. FINDINGS: There are mild interstitial opacities, suspicious for interstitial edema/CHF.  The pleural spaces are clear the cardiac silhouette is borderline.  There are calcifications of the aortic arch.  There is a prosthetic aortic valve stent.  Osseous structures demonstrate degenerative changes.     Mild interstitial opacities, suspicious for interstitial edema/CHF. Electronically signed by: Anthony Andres Date:    12/29/2024 Time:    17:58    CT Head Without Contrast    Result Date: 12/29/2024  EXAMINATION: CT HEAD WITHOUT CONTRAST CLINICAL HISTORY: Headache, new or worsening (Age >= 50y); TECHNIQUE: Low dose axial CT images obtained throughout the head without intravenous contrast. Sagittal and coronal reconstructions were performed. COMPARISON: None. FINDINGS: Intracranial compartment: Ventricles and sulci are normal in size for age without evidence of hydrocephalus. No extra-axial blood or fluid collections. The brain parenchyma appears normal. No parenchymal mass, hemorrhage, edema or major vascular distribution infarct. Skull/extracranial contents (limited evaluation): No fracture. There is a left maxillary sinus air-fluid level.     There is no evidence acute intracranial abnormality.  There is left maxillary  sinus disease. Electronically signed by: Susana Carrion MD Date:    12/29/2024 Time:    16:49    CT Cervical Spine Without Contrast    Result Date: 12/29/2024  EXAMINATION: CT CERVICAL SPINE WITHOUT CONTRAST CLINICAL HISTORY: Neck pain, chronic, degenerative changes on xray;Neck pain, acute exacerbation with chronic degenerative changes; TECHNIQUE: Low dose axial images, sagittal and coronal reformations were performed though the cervical spine.  Contrast was not administered. COMPARISON: 12/18/2024 FINDINGS: There is no evidence fracture or malalignment.  There are degenerative changes throughout the cervical spine most prominent in the mid cervical spine.  There is no prevertebral soft tissue swelling.  The adjacent soft tissues are unremarkable.     There are degenerative changes throughout the cervical spine. Electronically signed by: Susana Carrion MD Date:    12/29/2024 Time:    15:48    X-Ray Cervical Spine AP And Lateral    Result Date: 12/18/2024  EXAMINATION: XR CERVICAL SPINE AP LATERAL CLINICAL HISTORY: Cervicalgia TECHNIQUE: AP, lateral and open mouth views of the cervical spine were performed. COMPARISON: None. FINDINGS: Vertebral body heights are preserved.Trace anterolisthesis of C4 on C5 and retrolisthesis of C5 on C6.Moderate disc height loss at C5-C6 and C6-C7 with shallow endplate centered osteophytes.  No abnormal prevertebral soft tissue thickening.     As above. Electronically signed by: Marek Burton Date:    12/18/2024 Time:    11:21    Mammo Digital Screening Left with Krishna    Result Date: 12/3/2024  Facility: [facility] [address] [place] [address] [phone] Name: Vivien Burton MRN: [id number] Result: Mammo Digital Screening Left with Krishna History: Patient is 79 y.o. and is seen for a screening mammogram. Films Compared: Compared to: 11/30/2023 Mammo Digital Screening Left with Krishna, 08/11/2022 Mammo Digital Screening Left with Krishna, and  "08/09/2021 Mammo Digital Screening Left with Krishna Findings: This procedure was performed using tomosynthesis. Computer-aided detection was utilized in the interpretation of this examination. There are scattered areas of fibroglandular density. There is no evidence of suspicious masses, microcalcifications or architectural distortion.      No mammographic evidence of malignancy. BI-RADS Category 1: Negative Recommendation: Routine screening mammogram in 1 year is recommended. Seun Amaro MD   - pulls last radiology orders      Assessment and Plan     * Severe sepsis/ strep viridans bacteremia  This patient does have evidence of infective focus  My overall impression is sepsis.  Source: Respiratory and Unknown Urinalysis pending at time of note  Antibiotics given-   Antibiotics (72h ago, onward)      Start     Stop Route Frequency Ordered    01/01/25 0900  cefTRIAXone injection 2 g         -- IV Every 24 hours (non-standard times) 12/31/24 1550          Latest lactate reviewed-  No results for input(s): "LACTATE", "POCLAC" in the last 72 hours.  Procalcitonin: 81.13  CRP: 110.4  Sed Rate 33      Organ dysfunction indicated by Acute on chronic  heart failure    Fluid challenge Contraindicated- Fluid bolus is contraindicated in this patient due to Congestive Heart Failure     Post- resuscitation assessment Yes Perfusion exam was performed within 6 hours of septic shock presentation after bolus shows Adequate tissue perfusion assessed by non-invasive monitoring     CT chest abdomen and pelvis with IV contrast negative for infectious source.   Status post LP 12/31, WBC 2, RBC 2, essentially negative.   TTE 12/31, negative for vegetation.   Patient has very poor dental hygiene, has Streptococcus viridans bacteremia, patient also has a history of TAVR  [facility] today, full report pending, prelim: no vegetations  Continue IV Rocephin 2 g q.12 hours.   Infectious Disease planning for IV antibiotics for about 4-6 weeks, " "PICC line placed today, decide final duration based on repeat blood cultures      Hypocalcemia   The most recent calcium and albumin results listed below.  Recent Labs     01/01/25  0446 01/01/25  0857 01/03/25  0407   CALCIUM 7.3*  --  9.1   ALBUMIN 3.1*  --   --    CAION  --  1.11  --        Plan  - Will replace calcium and monitor electrolytes closely.  - The patient's hypocalcemia is worsening. Will adjust treatment as follows:  IV replacement  - calcium ionized normal  - IV replacement  - Telemetry  - EKG PRN  - Improved  - Asymptomatic        Rhinovirus infection    Aware, droplet precaution, conservative management    Intractable headache  LP negative  MRV negative for dural venous sinus thrombosis   Resolved    Neck pain without injury  Left sided neck pain that is sharp and stabbing into head. Patient endorses that specific area on head is painful to touch. CT of neck shows degenerative changes      Patient has significant left-sided neck pain radiated to left occipital.  Extensive imaging study is not quite impressive except severe cervical spondylosis.   Status post LP- essential negative.   Discontinue steroids as GCA deemed less likely.   Neurology following  MRV negative for dural sinus thrombosis  Gabapentin helped with pain  Significantly improved        Elevated troponin  Denies chest pain or Shortness of breath.    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   Per Dematology note on 12/4/24 "Biopsy proven drug eruption, cleared with prednisone and d/c of prilosec. She restarted prilosec and rash came back. She stopped it at end of November and had another course of steroids". Per chart review patient was placed on steroid taper starting 11/16    Benadryl PO and topical in place    History of transcatheter aortic valve replacement " (TAVR)  Chronic condition noted      HTN (hypertension)  Patient's blood pressure range in the last 24 hours was: BP  Min: 110/76  Max: 164/70.The patient's inpatient anti-hypertensive regimen is listed below:  Current Antihypertensives  carvediloL tablet 6.25 mg, 2 times daily, Oral  hydrALAZINE injection 5 mg, Every 6 hours PRN, Intravenous    Plan  Currently on Coreg  Hydralazine p.r.n.   Losartan 50mg QHS was held please restart as warranted    Coronary artery disease  Patient with known CAD s/p stent placement and CABG, which is controlled Will continue ASA and Statin and monitor for S/Sx of angina/ACS. Continue to monitor on telemetry.     Trending troponin. Will continue to trend every four hours. Patient denies chest pain or SOB.   Cardiology following.   Likely demand  No acute STT wave changes    Acute on chronic HFrEF (heart failure with reduced ejection fraction)    Echo Saline Bubble? No; Ultrasound enhancing contrast? Yes    Result Date: 12/31/2024    Left Ventricle: The left ventricle is normal in size. Mildly increased   wall thickness. There is concentric hypertrophy. There is normal systolic   function with a visually estimated ejection fraction of 55 - 60%. There is   normal diastolic function.    Right Ventricle: Normal right ventricular cavity size. Wall thickness   is normal. Systolic function is normal.    Left Atrium: Left atrium is mildly dilated.    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 .  This has no obvious vegetation identified on this valve.    Adequate function is noted    Mitral Valve: The mitral valve is structurally normal. There is normal   leaflet mobility.    Pulmonary Artery: There is mild pulmonary hypertension. The estimated   pulmonary artery systolic pressure is 42 mmHg.    IVC/SVC: Normal venous pressure at 3 mmHg.    No echocardiographic evidence of vegetation noted        Echo    Result Date: 11/22/2024    Left Ventricle: The  left ventricle is normal in size. Mildly increased   wall thickness. There is mild concentric hypertrophy. There is normal   systolic function with a visually estimated ejection fraction of 55 - 70%.   Ejection fraction is approximately 60%. Global longitudinal strain is   -18.0%. Global longitudinal strain is normal. There is normal diastolic   function.    Right Ventricle: Normal right ventricular cavity size. Systolic   function is normal. TAPSE is 1.90 cm. Right ventricular global   longitudinal strain is - 23.8%.    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 It is reported to be a 26 mm. Aortic valve area by VTI is 1.2   cm². Aortic valve peak velocity is 1.6 m/s. Mean gradient is 5.2 mmHg. The   dimensionless index is 0.59.    IVC/SVC: Normal venous pressure at 3 mmHg.    No definite change from Echo in 2/2023.           Patient looks euvolemic now, off IV Lasix.  Repeat chest x-ray significant improvement in fluid status    Anemia   Most recent hemoglobin and hematocrit are listed below.  Recent Labs     01/01/25  0446 01/02/25  0436 01/03/25  0407   HGB 8.4* 8.7* 9.0*   HCT 25.4* 26.2* 27.8*       Plan  - Monitor serial CBC: Daily  - Transfuse PRBC if patient becomes hemodynamically unstable, symptomatic or H/H drops below 7/21.  - Patient has not received any PRBC transfusions to date  - Patient's anemia is currently stable    Unspecified hypothyroidism  Chronic condition noted  -Levothyroxine continued      Breast cancer  History of Breast Cancer in remission. Last treatment 24 years ago.       GERD (gastroesophageal reflux disease)  -Pepcid b.i.d.        VTE Risk Mitigation (From admission, onward)           Ordered     enoxaparin injection 40 mg  Every 24 hours         12/31/24 1543     IP VTE HIGH RISK PATIENT  Once         12/29/24 2021     Place sequential compression device  Until discontinued         12/29/24 2021                    Discharge Planning   MIRELLA:  1/5/2025     Code Status: Full Code   Medical Readiness for Discharge Date:   Discharge Plan A: Home                        Cristy Fisher MD  Department of Hospital Medicine   [facility]

## 2025-01-03 NOTE — ASSESSMENT & PLAN NOTE
"Pruritic rash to chest and neck secondary to allergic reaction to omeprazole.   Per Dematology note on 12/4/24 "Biopsy proven drug eruption, cleared with prednisone and d/c of prilosec. She restarted prilosec and rash came back. She stopped it at end of November and had another course of steroids". Per chart review patient was placed on steroid taper starting 11/16    Benadryl PO and topical in place  "

## 2025-01-03 NOTE — SUBJECTIVE & OBJECTIVE
Interval History:   Patient is seen and examined at multidisciplinary rounds, feels much better, neck pain significantly improved. Today had mild itching over her rash, topical benadryl requested.    [facility] yesterday, no vegetations as per team, full report pending    CXR showed significant improvement in fluid status    Stable to transfer out of step down    PICC line ordered    Repeat blood culture negative till date      Review of Systems   Musculoskeletal:  Negative for neck pain.   Neurological:  Negative for headaches.     Objective:     Vital Signs (Most Recent):  Temp: 98.6 °F (37 °C) (01/03/25 0701)  Pulse: 74 (01/03/25 1300)  Resp: (!) 21 (01/03/25 1300)  BP: 135/63 (01/03/25 1300)  SpO2: 98 % (01/03/25 1300) Vital Signs (24h Range):  Temp:  [97.6 °F (36.4 °C)-99.2 °F (37.3 °C)] 98.6 °F (37 °C)  Pulse:  [66-77] 74  Resp:  [17-38] 21  SpO2:  [93 %-100 %] 98 %  BP: (110-164)/(58-76) 135/63     Weight: 54.4 kg (120 lb)  Body mass index is 23.44 kg/m².    Intake/Output Summary (Last 24 hours) at 1/3/2025 1404  Last data filed at 1/3/2025 0918  Gross per 24 hour   Intake 720 ml   Output --   Net 720 ml         Physical Exam  Constitutional:       Appearance: Normal appearance.   HENT:      Head: Normocephalic and atraumatic.      Nose: Nose normal.   Eyes:      Extraocular Movements: Extraocular movements intact.      Pupils: Pupils are equal, round, and reactive to light.   Cardiovascular:      Rate and Rhythm: Normal rate and regular rhythm.      Heart sounds: No murmur heard.  Pulmonary:      Effort: Pulmonary effort is normal.      Breath sounds: Normal breath sounds.   Abdominal:      General: Abdomen is flat.      Palpations: Abdomen is soft.   Musculoskeletal:         General: Normal range of motion.      Cervical back: Normal range of motion and neck supple.   Skin:     General: Skin is warm and dry.   Neurological:      General: No focal deficit present.      Mental Status: She is alert and oriented to  person, place, and time.   Psychiatric:         Mood and Affect: Mood normal.             Significant Labs: All pertinent labs within the past 24 hours have been reviewed.  CBC:   Recent Labs   Lab 01/02/25  0436 01/03/25  0407   WBC 9.78 11.20   HGB 8.7* 9.0*   HCT 26.2* 27.8*    295     CMP:   Recent Labs   Lab 01/03/25  0407   *   K 4.7      CO2 29   GLU 91   BUN 9   CREATININE 0.7   CALCIUM 9.1   ANIONGAP 0*       Significant Imaging: I have reviewed all pertinent imaging results/findings within the past 24 hours.  I have reviewed and interpreted all pertinent imaging results/findings within the past 24 hours.    Scheduled Meds:   aspirin  81 mg Oral Daily    atorvastatin  20 mg Oral QHS    calcium carbonate  500 mg Oral TID    carvediloL  6.25 mg Oral BID    cefTRIAXone (Rocephin) IV (PEDS and ADULTS)  2 g Intravenous Q24H    enoxparin  40 mg Subcutaneous Q24H (prophylaxis, 1700)    famotidine  20 mg Oral Daily    gabapentin  100 mg Oral Once    gabapentin  200 mg Oral TID    lactobacillus acidophilus & bulgar  1 packet Oral BID    levothyroxine  75 mcg Oral Before breakfast     Continuous Infusions:  PRN Meds:.  Current Facility-Administered Medications:     acetaminophen, 650 mg, Oral, Q4H PRN    albuterol-ipratropium, 3 mL, Nebulization, Q6H PRN    aluminum-magnesium hydroxide-simethicone, 30 mL, Oral, QID PRN    calcium gluconate IVPB, 1 g, Intravenous, PRN    calcium gluconate IVPB, 2 g, Intravenous, PRN    calcium gluconate IVPB, 3 g, Intravenous, PRN    dextrose 10%, 12.5 g, Intravenous, PRN    dextrose 10%, 25 g, Intravenous, PRN    diphenhydrAMINE, 25 mg, Oral, Q6H PRN    diphenhydrAMINE-zinc acetate 2-0.1%, , Topical (Top), BID PRN    glucagon (human recombinant), 1 mg, Intramuscular, PRN    glucose, 16 g, Oral, PRN    glucose, 24 g, Oral, PRN    hydrALAZINE, 5 mg, Intravenous, Q6H PRN    HYDROmorphone, 0.5 mg, Intravenous, Q4H PRN    magnesium oxide, 800 mg, Oral, PRN    magnesium  oxide, 800 mg, Oral, PRN    magnesium sulfate IVPB, 2 g, Intravenous, PRN    magnesium sulfate IVPB, 4 g, Intravenous, PRN    melatonin, 6 mg, Oral, Nightly PRN    naloxone, 0.02 mg, Intravenous, PRN    ondansetron, 4 mg, Intravenous, Q8H PRN    oxyCODONE, 5 mg, Oral, Q4H PRN    oxyCODONE, 10 mg, Oral, Q4H PRN    potassium bicarbonate, 35 mEq, Oral, PRN    potassium bicarbonate, 50 mEq, Oral, PRN    potassium bicarbonate, 60 mEq, Oral, PRN    potassium chloride, 40 mEq, Intravenous, PRN **AND** potassium chloride, 60 mEq, Intravenous, PRN **AND** potassium chloride, 80 mEq, Intravenous, PRN    potassium, sodium phosphates, 2 packet, Oral, PRN    potassium, sodium phosphates, 2 packet, Oral, PRN    potassium, sodium phosphates, 2 packet, Oral, PRN    prochlorperazine, 5 mg, Intravenous, Q6H PRN    simethicone, 1 tablet, Oral, QID PRN    sodium chloride 0.9%, 10 mL, Intravenous, Q12H PRN    sodium phosphate 15 mmol in D5W 250 mL IVPB, 15 mmol, Intravenous, PRN    sodium phosphate 20.01 mmol in D5W 250 mL IVPB, 20.01 mmol, Intravenous, PRN    sodium phosphate 30 mmol in D5W 250 mL IVPB, 30 mmol, Intravenous, PRN    MRA Head for Temporal Arteritis W and WO Contrast    Result Date: 12/30/2024  EXAMINATION: MRA HEAD FOR TEMPORAL ARTERITIS W AND WO CONTRAST CLINICAL HISTORY: Neck pain, acute, infection suspected;Concern for Temporal Arteritis; TECHNIQUE: Time of flight and post contrast MR angiography sequences were performed before and following the IV administration of 5 mL of Gadavist..  Multiplanar and MIP images were performed. COMPARISON: MRI of the brain 12/30/2024 FINDINGS: The intracranial internal carotid arteries are patent bilaterally from the skull base to carotid terminus. Middle cerebral arteries are patent bilaterally. Anterior cerebral arteries are patent bilaterally. There are codominant vertebral arteries. Bilateral vertebral arteries are patent to the confluence into the basilar artery. Basilar artery  is normal in caliber. Posterior cerebral arteries are patent bilaterally. No evidence of significant mural thickening or abnormal enhancement along the courses of the temporal arteries to specifically indicate active vascular inflammation. Visualized dural venous sinuses are patent without evidence of dural venous sinus thrombosis.     1. No intracranial high-grade stenosis, large vessel occlusion, aneurysm or arteriovenous malformation. 2. No evidence of significant mural thickening or abnormal enhancement along the courses of the temporal arteries to specifically indicate active vascular inflammation. Electronically signed by: Marek Moreno Date:    12/30/2024 Time:    11:17    MRI Cervical Spine W WO Cont    Result Date: 12/30/2024  EXAMINATION: MRI CERVICAL SPINE W WO CONTRAST CLINICAL HISTORY: Neck pain, acute, infection suspected; TECHNIQUE: Multiplanar, multisequence MR images of the cervical spine were performed before and after the administration of intravenous contrast.  5 mL of Gadavist intravenous contrast were administered. COMPARISON: CT cervical spine 12/29/2024 FINDINGS: Study is mild to moderately degraded by motion artifact. Alignment: Reversal of the cervical lordosis, which may be secondary to positioning or muscle spasm.  Minimal anterolisthesis of C4 on C5 and retrolisthesis of C5 on C6. Vertebral Column: Vertebral body heights are maintained. No acute fracture is identified.  No evidence of an aggressive bone marrow replacement process. Multilevel disc degeneration, most pronounced at C5-6 and C6-7 with mild-to-moderate intervertebral disc space narrowing, disc desiccation, anterior marginal osteophyte formation and posterior disc osteophyte complexes. Cord: Normal signal and morphology. Skull base and craniocervical junction: Normal. Degenerative findings: C2-C3: Minimal posterior disc osteophyte complex.  Mild bilateral facet arthropathy.  No significant neural foraminal or spinal canal  stenosis. C3-C4: Mild posterior disc osteophyte complex.  Moderate bilateral facet arthropathy.  Marked left and moderate right uncovertebral joint spurring.  Severe left and moderate right neural foraminal stenosis.  No significant spinal canal stenosis. C4-C5: Mild posterior disc osteophyte complex.  Moderate bilateral facet arthropathy.  Moderate left and mild right uncovertebral joint spurring.  Moderate left and mild right neural foraminal stenosis.  No significant spinal canal stenosis. C5-C6: Posterior disc osteophyte complex, which partially effaces the ventral thecal sac.  Moderate bilateral facet arthropathy.  Marked left and moderate right uncovertebral joint spurring.  Severe left and moderate right neural foraminal stenosis.  Ligamentum flavum thickening, which partially effaces the dorsal thecal sac.  Mild spinal canal stenosis. C6-C7: Posterior disc osteophyte complex, which mildly effaces the ventral thecal sac.  Moderate bilateral facet arthropathy.  Moderate left and moderate right uncovertebral joint spurring.  Severe left and moderate right neural foraminal stenosis.  Ligamentum flavum thickening, which partially effaces the dorsal thecal sac.  Mild spinal canal stenosis. C7-T1: The disk is normal in configuration.  Mild bilateral facet arthropathy.  Mild bilateral uncovertebral joint spurring.  Mild left greater than right neural foraminal stenosis.  No significant spinal canal stenosis. Paraspinal muscles & soft tissues: Unremarkable. Normal signal voids are present in the vertebral arteries. No abnormal intraspinal enhancement identified.     1. No evidence of acute fracture, spondylodiscitis, high-grade spinal canal stenosis.  No cord compression, focal cord signal abnormality, or abnormal intraspinal enhancement. 2. Multilevel degenerative changes of the cervical spine, as detailed above, with findings most pronounced at C5-6 and C6-7 and resulting in severe left and moderate right neural  foraminal and mild spinal canal stenosis. Electronically signed by: Marek Moreno Date:    12/30/2024 Time:    10:47    MRA Neck without contrast    Result Date: 12/30/2024  EXAMINATION: MRA NECK WITHOUT CONTRAST CLINICAL HISTORY: CNS vasculitis, known or suspected; TECHNIQUE: Time of flight MRA of the carotid and vertebral arteries was performed with 3D reconstructions according to the standard neck MRA protocol. COMPARISON: None FINDINGS: Study is mild to moderately degraded by motion artifact. The right common carotid artery demonstrates no hemodynamically significant stenosis. The cervical right internal carotid artery demonstrates no hemodynamically significant stenosis. The left common carotid artery demonstrates no hemodynamically significant stenosis. The cervical left internal carotid artery demonstrates no hemodynamically significant stenosis. Extracranial vertebral arteries: Vertebral arteries are codominant.  Vertebral arteries are normal to the level of the foramen magnum.     No evidence of a hemodynamically significant stenosis, major branch occlusion or aneurysm in the neck arterial vasculature, allowing for motion degradation. Electronically signed by: Marek Moreno Date:    12/30/2024 Time:    10:23    MRI Brain Without Contrast    Result Date: 12/30/2024  EXAMINATION: MRI BRAIN WITHOUT CONTRAST CLINICAL HISTORY: Headache, new or worsening (Age >= 50y);. TECHNIQUE: Multiplanar multisequence MR imaging of the brain was performed without the administration of intravenous contrast. COMPARISON: CT head 12/29/2024, MRI brain 01/24/2023 FINDINGS: Study is  degraded by motion artifact. Ventricles and sulci are normal in size for age without evidence of hydrocephalus. No extra-axial blood or fluid collections. Scattered foci of T2/FLAIR hyperintense signal within the supratentorial white matter, nonspecific and may reflect sequelae of mild chronic small vessel ischemic change.    Diffusion-weighted images  demonstrate no evidence of an acute infarct.   Susceptibility weighted images demonstrate no evidence of acute or chronic hemorrhage. No significant intracranial mass effect or midline shift. Normal vascular flow voids are present. Diffusely heterogeneous calvarial bone marrow signal, nonspecific but similar to prior exam from 2023, and may be seen with red marrow reconversion associated with chronic anemic states, osteoporosis, hypoxia, or smoking. Moderate opacification of the left maxillary sinus with mucosal membrane thickening and small air-fluid level.  Mild scattered mucosal membrane thickening elsewhere in the paranasal sinuses.  The visualized portions of the mastoids are unremarkable. Bilateral lens replacements are noted.     1. No acute intracranial abnormality. 2. Mild generalized cerebral volume loss and scattered T2/FLAIR hyperintense signal within the supratentorial white matter, nonspecific but probably reflecting sequelae of chronic small vessel ischemic change. 3. Left maxillary sinusitis. Electronically signed by: Marek Moreno Date:    12/30/2024 Time:    07:39    X-Ray Chest AP Portable    Result Date: 12/29/2024  EXAMINATION: XR CHEST AP PORTABLE CLINICAL HISTORY: Chest Pain; TECHNIQUE: Single frontal view of the chest was performed. COMPARISON: 02/02/2023. FINDINGS: There are mild interstitial opacities, suspicious for interstitial edema/CHF.  The pleural spaces are clear the cardiac silhouette is borderline.  There are calcifications of the aortic arch.  There is a prosthetic aortic valve stent.  Osseous structures demonstrate degenerative changes.     Mild interstitial opacities, suspicious for interstitial edema/CHF. Electronically signed by: Anthony Andres Date:    12/29/2024 Time:    17:58    CT Head Without Contrast    Result Date: 12/29/2024  EXAMINATION: CT HEAD WITHOUT CONTRAST CLINICAL HISTORY: Headache, new or worsening (Age >= 50y); TECHNIQUE: Low dose axial CT images obtained  throughout the head without intravenous contrast. Sagittal and coronal reconstructions were performed. COMPARISON: None. FINDINGS: Intracranial compartment: Ventricles and sulci are normal in size for age without evidence of hydrocephalus. No extra-axial blood or fluid collections. The brain parenchyma appears normal. No parenchymal mass, hemorrhage, edema or major vascular distribution infarct. Skull/extracranial contents (limited evaluation): No fracture. There is a left maxillary sinus air-fluid level.     There is no evidence acute intracranial abnormality.  There is left maxillary sinus disease. Electronically signed by: Susana Carrion MD Date:    12/29/2024 Time:    16:49    CT Cervical Spine Without Contrast    Result Date: 12/29/2024  EXAMINATION: CT CERVICAL SPINE WITHOUT CONTRAST CLINICAL HISTORY: Neck pain, chronic, degenerative changes on xray;Neck pain, acute exacerbation with chronic degenerative changes; TECHNIQUE: Low dose axial images, sagittal and coronal reformations were performed though the cervical spine.  Contrast was not administered. COMPARISON: 12/18/2024 FINDINGS: There is no evidence fracture or malalignment.  There are degenerative changes throughout the cervical spine most prominent in the mid cervical spine.  There is no prevertebral soft tissue swelling.  The adjacent soft tissues are unremarkable.     There are degenerative changes throughout the cervical spine. Electronically signed by: Susana Carrion MD Date:    12/29/2024 Time:    15:48    X-Ray Cervical Spine AP And Lateral    Result Date: 12/18/2024  EXAMINATION: XR CERVICAL SPINE AP LATERAL CLINICAL HISTORY: Cervicalgia TECHNIQUE: AP, lateral and open mouth views of the cervical spine were performed. COMPARISON: None. FINDINGS: Vertebral body heights are preserved.Trace anterolisthesis of C4 on C5 and retrolisthesis of C5 on C6.Moderate disc height loss at C5-C6 and C6-C7 with shallow endplate centered osteophytes.  No  abnormal prevertebral soft tissue thickening.     As above. Electronically signed by: Marek Burton Date:    12/18/2024 Time:    11:21    Mammo Digital Screening Left with Krishna    Result Date: 12/3/2024  Facility: [facility] [address] [place] [address] [phone] Name: Vivien Burton MRN: [id number] Result: Mammo Digital Screening Left with Krishna History: Patient is 79 y.o. and is seen for a screening mammogram. Films Compared: Compared to: 11/30/2023 Mammo Digital Screening Left with Krishna, 08/11/2022 Mammo Digital Screening Left with Krishna, and 08/09/2021 Mammo Digital Screening Left with Krishna Findings: This procedure was performed using tomosynthesis. Computer-aided detection was utilized in the interpretation of this examination. There are scattered areas of fibroglandular density. There is no evidence of suspicious masses, microcalcifications or architectural distortion.      No mammographic evidence of malignancy. BI-RADS Category 1: Negative Recommendation: Routine screening mammogram in 1 year is recommended. Seun Amaro MD   - pulls last radiology orders

## 2025-01-03 NOTE — ASSESSMENT & PLAN NOTE
Patient's blood pressure range in the last 24 hours was: BP  Min: 110/76  Max: 164/70.The patient's inpatient anti-hypertensive regimen is listed below:  Current Antihypertensives  carvediloL tablet 6.25 mg, 2 times daily, Oral  hydrALAZINE injection 5 mg, Every 6 hours PRN, Intravenous    Plan  Currently on Coreg  Hydralazine p.r.n.   Losartan 50mg QHS was held please restart as warranted

## 2025-01-03 NOTE — ASSESSMENT & PLAN NOTE
Most recent hemoglobin and hematocrit are listed below.  Recent Labs     01/01/25  0446 01/02/25  0436 01/03/25  0407   HGB 8.4* 8.7* 9.0*   HCT 25.4* 26.2* 27.8*       Plan  - Monitor serial CBC: Daily  - Transfuse PRBC if patient becomes hemodynamically unstable, symptomatic or H/H drops below 7/21.  - Patient has not received any PRBC transfusions to date  - Patient's anemia is currently stable

## 2025-01-04 ENCOUNTER — PATIENT MESSAGE (OUTPATIENT)
Dept: CARDIOLOGY | Facility: CLINIC | Age: 80
End: 2025-01-04
Payer: MEDICARE

## 2025-01-04 LAB
BASOPHILS # BLD AUTO: 0.09 K/UL (ref 0–0.2)
BASOPHILS NFR BLD: 0.7 % (ref 0–1.9)
DIFFERENTIAL METHOD BLD: ABNORMAL
EOSINOPHIL # BLD AUTO: 0.5 K/UL (ref 0–0.5)
EOSINOPHIL NFR BLD: 3.8 % (ref 0–8)
ERYTHROCYTE [DISTWIDTH] IN BLOOD BY AUTOMATED COUNT: 13.3 % (ref 11.5–14.5)
HCT VFR BLD AUTO: 27.5 % (ref 37–48.5)
HGB BLD-MCNC: 9.2 G/DL (ref 12–16)
IMM GRANULOCYTES # BLD AUTO: 0.48 K/UL (ref 0–0.04)
IMM GRANULOCYTES NFR BLD AUTO: 3.6 % (ref 0–0.5)
LYMPHOCYTES # BLD AUTO: 2.5 K/UL (ref 1–4.8)
LYMPHOCYTES NFR BLD: 18.4 % (ref 18–48)
MCH RBC QN AUTO: 30.8 PG (ref 27–31)
MCHC RBC AUTO-ENTMCNC: 33.5 G/DL (ref 32–36)
MCV RBC AUTO: 92 FL (ref 82–98)
MONOCYTES # BLD AUTO: 1 K/UL (ref 0.3–1)
MONOCYTES NFR BLD: 7.1 % (ref 4–15)
NEUTROPHILS # BLD AUTO: 8.9 K/UL (ref 1.8–7.7)
NEUTROPHILS NFR BLD: 66.4 % (ref 38–73)
NRBC BLD-RTO: 0 /100 WBC
PLATELET # BLD AUTO: 296 K/UL (ref 150–450)
PMV BLD AUTO: 9.3 FL (ref 9.2–12.9)
RBC # BLD AUTO: 2.99 M/UL (ref 4–5.4)
WBC # BLD AUTO: 13.38 K/UL (ref 3.9–12.7)

## 2025-01-04 PROCEDURE — 27000207 HC ISOLATION

## 2025-01-04 PROCEDURE — 25000003 PHARM REV CODE 250: Performed by: FAMILY MEDICINE

## 2025-01-04 PROCEDURE — 99900031 HC PATIENT EDUCATION (STAT)

## 2025-01-04 PROCEDURE — 63600175 PHARM REV CODE 636 W HCPCS

## 2025-01-04 PROCEDURE — 25000003 PHARM REV CODE 250: Performed by: HOSPITALIST

## 2025-01-04 PROCEDURE — 63600175 PHARM REV CODE 636 W HCPCS: Performed by: INTERNAL MEDICINE

## 2025-01-04 PROCEDURE — 25000003 PHARM REV CODE 250

## 2025-01-04 PROCEDURE — 99900035 HC TECH TIME PER 15 MIN (STAT)

## 2025-01-04 PROCEDURE — 94761 N-INVAS EAR/PLS OXIMETRY MLT: CPT

## 2025-01-04 PROCEDURE — 21400001 HC TELEMETRY ROOM

## 2025-01-04 PROCEDURE — 25000003 PHARM REV CODE 250: Performed by: INTERNAL MEDICINE

## 2025-01-04 PROCEDURE — 85025 COMPLETE CBC W/AUTO DIFF WBC: CPT | Performed by: NURSE PRACTITIONER

## 2025-01-04 PROCEDURE — 63600175 PHARM REV CODE 636 W HCPCS: Performed by: HOSPITALIST

## 2025-01-04 RX ORDER — SENNOSIDES 8.6 MG/1
8.6 TABLET ORAL DAILY
Status: DISCONTINUED | OUTPATIENT
Start: 2025-01-04 | End: 2025-01-04

## 2025-01-04 RX ORDER — POLYETHYLENE GLYCOL 3350 17 G/17G
17 POWDER, FOR SOLUTION ORAL DAILY
Status: DISCONTINUED | OUTPATIENT
Start: 2025-01-04 | End: 2025-01-05 | Stop reason: HOSPADM

## 2025-01-04 RX ORDER — SENNOSIDES 8.6 MG/1
8.6 TABLET ORAL NIGHTLY
Status: DISCONTINUED | OUTPATIENT
Start: 2025-01-04 | End: 2025-01-05 | Stop reason: HOSPADM

## 2025-01-04 RX ORDER — LOSARTAN POTASSIUM 50 MG/1
50 TABLET ORAL DAILY
Status: DISCONTINUED | OUTPATIENT
Start: 2025-01-04 | End: 2025-01-05 | Stop reason: HOSPADM

## 2025-01-04 RX ADMIN — POLYETHYLENE GLYCOL 3350 17 G: 17 POWDER, FOR SOLUTION ORAL at 01:01

## 2025-01-04 RX ADMIN — HYDRALAZINE HYDROCHLORIDE 5 MG: 20 INJECTION, SOLUTION INTRAMUSCULAR; INTRAVENOUS at 06:01

## 2025-01-04 RX ADMIN — FAMOTIDINE 20 MG: 20 TABLET, FILM COATED ORAL at 09:01

## 2025-01-04 RX ADMIN — CALCIUM CARBONATE 500 MG: 500 TABLET, CHEWABLE ORAL at 10:01

## 2025-01-04 RX ADMIN — CEFTRIAXONE 2 G: 2 INJECTION, POWDER, FOR SOLUTION INTRAMUSCULAR; INTRAVENOUS at 09:01

## 2025-01-04 RX ADMIN — ATORVASTATIN CALCIUM 20 MG: 20 TABLET, FILM COATED ORAL at 10:01

## 2025-01-04 RX ADMIN — GABAPENTIN 200 MG: 100 CAPSULE ORAL at 10:01

## 2025-01-04 RX ADMIN — LOSARTAN POTASSIUM 50 MG: 50 TABLET, FILM COATED ORAL at 11:01

## 2025-01-04 RX ADMIN — ASPIRIN 81 MG: 81 TABLET, COATED ORAL at 09:01

## 2025-01-04 RX ADMIN — GABAPENTIN 200 MG: 100 CAPSULE ORAL at 09:01

## 2025-01-04 RX ADMIN — MUPIROCIN 1 G: 20 OINTMENT TOPICAL at 10:01

## 2025-01-04 RX ADMIN — LEVOTHYROXINE SODIUM 75 MCG: 0.03 TABLET ORAL at 06:01

## 2025-01-04 RX ADMIN — CARVEDILOL 6.25 MG: 6.25 TABLET, FILM COATED ORAL at 09:01

## 2025-01-04 RX ADMIN — MUPIROCIN 1 G: 20 OINTMENT TOPICAL at 09:01

## 2025-01-04 RX ADMIN — GABAPENTIN 200 MG: 100 CAPSULE ORAL at 02:01

## 2025-01-04 RX ADMIN — CALCIUM CARBONATE 500 MG: 500 TABLET, CHEWABLE ORAL at 09:01

## 2025-01-04 RX ADMIN — LACTOBACILLUS ACIDOPHILUS / LACTOBACILLUS BULGARICUS 1 EACH: 100 MILLION CFU STRENGTH GRANULES at 09:01

## 2025-01-04 RX ADMIN — ENOXAPARIN SODIUM 40 MG: 40 INJECTION SUBCUTANEOUS at 05:01

## 2025-01-04 RX ADMIN — CALCIUM CARBONATE 500 MG: 500 TABLET, CHEWABLE ORAL at 02:01

## 2025-01-04 RX ADMIN — SENNOSIDES 8.6 MG: 8.6 TABLET ORAL at 10:01

## 2025-01-04 RX ADMIN — CARVEDILOL 6.25 MG: 6.25 TABLET, FILM COATED ORAL at 10:01

## 2025-01-04 RX ADMIN — LACTOBACILLUS ACIDOPHILUS / LACTOBACILLUS BULGARICUS 1 EACH: 100 MILLION CFU STRENGTH GRANULES at 10:01

## 2025-01-04 NOTE — PLAN OF CARE
[facility] called CM to inquire if pt still needs home infusion. CM reports yes she does. [facility] will review case and call CM back today or tomorrow.      9:16  CM called [facility] ,pt choice to confirm acceptance.  Awaiting return call from agency.    [facility] rep (Magdalena) states she will call CM after [place]. Magdalena states insurance can't be run until tomorrow therefore pt would have to agree to pay $150 up front and put credit card on file to begin service.     CM informed pt of requirements for infusion company and she is agreeable to payment up front.  CM to update pt when HH is confirmed.

## 2025-01-04 NOTE — PLAN OF CARE
Problem: Adult Inpatient Plan of Care  Goal: Plan of Care Review  Outcome: Progressing  Goal: Patient-Specific Goal (Individualized)  Outcome: Progressing  Goal: Absence of Hospital-Acquired Illness or Injury  Outcome: Progressing  Goal: Optimal Comfort and Wellbeing  Outcome: Progressing  Goal: Readiness for Transition of Care  Outcome: Progressing     Problem: Sepsis/Septic Shock  Goal: Optimal Coping  Outcome: Progressing  Goal: Absence of Bleeding  Outcome: Progressing  Goal: Blood Glucose Level Within Targeted Range  Outcome: Progressing  Goal: Absence of Infection Signs and Symptoms  Outcome: Progressing  Goal: Optimal Nutrition Intake  Outcome: Progressing     Problem: Fall Injury Risk  Goal: Absence of Fall and Fall-Related Injury  Outcome: Progressing     Problem: Pain Acute  Goal: Optimal Pain Control and Function  Outcome: Progressing     Problem: Infection  Goal: Absence of Infection Signs and Symptoms  Outcome: Progressing

## 2025-01-04 NOTE — SUBJECTIVE & OBJECTIVE
Interval History:  Patient seen and examined, no acute complaints.  Still awaiting [facility] read to know duration of antibiotics, possible discharge tomorrow once read is in.    Review of Systems   Musculoskeletal:  Negative for neck pain.   Neurological:  Negative for headaches.     Objective:     Vital Signs (Most Recent):  Temp: 97.9 °F (36.6 °C) (01/04/25 1135)  Pulse: 77 (01/04/25 1410)  Resp: 14 (01/04/25 1410)  BP: (!) 171/88 (01/04/25 1135)  SpO2: 96 % (01/04/25 1410) Vital Signs (24h Range):  Temp:  [97.6 °F (36.4 °C)-98.2 °F (36.8 °C)] 97.9 °F (36.6 °C)  Pulse:  [70-85] 77  Resp:  [14-30] 14  SpO2:  [93 %-99 %] 96 %  BP: (141-172)/(62-88) 171/88     Weight: 54.4 kg (120 lb)  Body mass index is 23.44 kg/m².    Intake/Output Summary (Last 24 hours) at 1/4/2025 1620  Last data filed at 1/4/2025 1306  Gross per 24 hour   Intake 360 ml   Output --   Net 360 ml         Physical Exam  Constitutional:       Appearance: Normal appearance.   HENT:      Head: Normocephalic and atraumatic.      Nose: Nose normal.   Eyes:      Extraocular Movements: Extraocular movements intact.      Pupils: Pupils are equal, round, and reactive to light.   Cardiovascular:      Rate and Rhythm: Normal rate and regular rhythm.      Heart sounds: No murmur heard.  Pulmonary:      Effort: Pulmonary effort is normal.      Breath sounds: Normal breath sounds.   Abdominal:      General: Abdomen is flat.      Palpations: Abdomen is soft.   Musculoskeletal:         General: Normal range of motion.      Cervical back: Normal range of motion and neck supple.   Skin:     General: Skin is warm and dry.   Neurological:      General: No focal deficit present.      Mental Status: She is alert and oriented to person, place, and time.   Psychiatric:         Mood and Affect: Mood normal.             Significant Labs: All pertinent labs within the past 24 hours have been reviewed.  CBC:   Recent Labs   Lab 01/03/25  0407 01/04/25  0616   WBC 11.20 13.38*    HGB 9.0* 9.2*   HCT 27.8* 27.5*    296     CMP:   Recent Labs   Lab 01/03/25  0407   *   K 4.7      CO2 29   GLU 91   BUN 9   CREATININE 0.7   CALCIUM 9.1   ANIONGAP 0*       Significant Imaging: I have reviewed all pertinent imaging results/findings within the past 24 hours.  I have reviewed and interpreted all pertinent imaging results/findings within the past 24 hours.    Scheduled Meds:   aspirin  81 mg Oral Daily    atorvastatin  20 mg Oral QHS    calcium carbonate  500 mg Oral TID    carvediloL  6.25 mg Oral BID    cefTRIAXone (Rocephin) IV (PEDS and ADULTS)  2 g Intravenous Q24H    enoxparin  40 mg Subcutaneous Q24H (prophylaxis, 1700)    famotidine  20 mg Oral Daily    gabapentin  100 mg Oral Once    gabapentin  200 mg Oral TID    lactobacillus acidophilus & bulgar  1 packet Oral BID    levothyroxine  75 mcg Oral Before breakfast    losartan  50 mg Oral Daily    mupirocin   Nasal BID    polyethylene glycol  17 g Oral Daily    senna  8.6 mg Oral QHS     Continuous Infusions:  PRN Meds:.  Current Facility-Administered Medications:     acetaminophen, 650 mg, Oral, Q4H PRN    albuterol-ipratropium, 3 mL, Nebulization, Q6H PRN    aluminum-magnesium hydroxide-simethicone, 30 mL, Oral, QID PRN    calcium gluconate IVPB, 1 g, Intravenous, PRN    calcium gluconate IVPB, 2 g, Intravenous, PRN    calcium gluconate IVPB, 3 g, Intravenous, PRN    dextrose 10%, 12.5 g, Intravenous, PRN    dextrose 10%, 25 g, Intravenous, PRN    diphenhydrAMINE, 25 mg, Oral, Q6H PRN    diphenhydrAMINE-zinc acetate 1-0.1%, , Topical (Top), BID PRN    glucagon (human recombinant), 1 mg, Intramuscular, PRN    glucose, 16 g, Oral, PRN    glucose, 24 g, Oral, PRN    hydrALAZINE, 5 mg, Intravenous, Q6H PRN    HYDROmorphone, 0.5 mg, Intravenous, Q4H PRN    magnesium oxide, 800 mg, Oral, PRN    magnesium oxide, 800 mg, Oral, PRN    magnesium sulfate IVPB, 2 g, Intravenous, PRN    magnesium sulfate IVPB, 4 g, Intravenous, PRN     melatonin, 6 mg, Oral, Nightly PRN    naloxone, 0.02 mg, Intravenous, PRN    ondansetron, 4 mg, Intravenous, Q8H PRN    oxyCODONE, 5 mg, Oral, Q4H PRN    oxyCODONE, 10 mg, Oral, Q4H PRN    potassium bicarbonate, 35 mEq, Oral, PRN    potassium bicarbonate, 50 mEq, Oral, PRN    potassium bicarbonate, 60 mEq, Oral, PRN    potassium chloride, 40 mEq, Intravenous, PRN **AND** potassium chloride, 60 mEq, Intravenous, PRN **AND** potassium chloride, 80 mEq, Intravenous, PRN    potassium, sodium phosphates, 2 packet, Oral, PRN    potassium, sodium phosphates, 2 packet, Oral, PRN    potassium, sodium phosphates, 2 packet, Oral, PRN    prochlorperazine, 5 mg, Intravenous, Q6H PRN    simethicone, 1 tablet, Oral, QID PRN    sodium chloride 0.9%, 10 mL, Intravenous, Q12H PRN    sodium phosphate 15 mmol in D5W 250 mL IVPB, 15 mmol, Intravenous, PRN    sodium phosphate 20.01 mmol in D5W 250 mL IVPB, 20.01 mmol, Intravenous, PRN    sodium phosphate 30 mmol in D5W 250 mL IVPB, 30 mmol, Intravenous, PRN    MRA Head for Temporal Arteritis W and WO Contrast    Result Date: 12/30/2024  EXAMINATION: MRA HEAD FOR TEMPORAL ARTERITIS W AND WO CONTRAST CLINICAL HISTORY: Neck pain, acute, infection suspected;Concern for Temporal Arteritis; TECHNIQUE: Time of flight and post contrast MR angiography sequences were performed before and following the IV administration of 5 mL of Gadavist..  Multiplanar and MIP images were performed. COMPARISON: MRI of the brain 12/30/2024 FINDINGS: The intracranial internal carotid arteries are patent bilaterally from the skull base to carotid terminus. Middle cerebral arteries are patent bilaterally. Anterior cerebral arteries are patent bilaterally. There are codominant vertebral arteries. Bilateral vertebral arteries are patent to the confluence into the basilar artery. Basilar artery is normal in caliber. Posterior cerebral arteries are patent bilaterally. No evidence of significant mural thickening or  abnormal enhancement along the courses of the temporal arteries to specifically indicate active vascular inflammation. Visualized dural venous sinuses are patent without evidence of dural venous sinus thrombosis.     1. No intracranial high-grade stenosis, large vessel occlusion, aneurysm or arteriovenous malformation. 2. No evidence of significant mural thickening or abnormal enhancement along the courses of the temporal arteries to specifically indicate active vascular inflammation. Electronically signed by: Marek Moreno Date:    12/30/2024 Time:    11:17    MRI Cervical Spine W WO Cont    Result Date: 12/30/2024  EXAMINATION: MRI CERVICAL SPINE W WO CONTRAST CLINICAL HISTORY: Neck pain, acute, infection suspected; TECHNIQUE: Multiplanar, multisequence MR images of the cervical spine were performed before and after the administration of intravenous contrast.  5 mL of Gadavist intravenous contrast were administered. COMPARISON: CT cervical spine 12/29/2024 FINDINGS: Study is mild to moderately degraded by motion artifact. Alignment: Reversal of the cervical lordosis, which may be secondary to positioning or muscle spasm.  Minimal anterolisthesis of C4 on C5 and retrolisthesis of C5 on C6. Vertebral Column: Vertebral body heights are maintained. No acute fracture is identified.  No evidence of an aggressive bone marrow replacement process. Multilevel disc degeneration, most pronounced at C5-6 and C6-7 with mild-to-moderate intervertebral disc space narrowing, disc desiccation, anterior marginal osteophyte formation and posterior disc osteophyte complexes. Cord: Normal signal and morphology. Skull base and craniocervical junction: Normal. Degenerative findings: C2-C3: Minimal posterior disc osteophyte complex.  Mild bilateral facet arthropathy.  No significant neural foraminal or spinal canal stenosis. C3-C4: Mild posterior disc osteophyte complex.  Moderate bilateral facet arthropathy.  Marked left and moderate right  uncovertebral joint spurring.  Severe left and moderate right neural foraminal stenosis.  No significant spinal canal stenosis. C4-C5: Mild posterior disc osteophyte complex.  Moderate bilateral facet arthropathy.  Moderate left and mild right uncovertebral joint spurring.  Moderate left and mild right neural foraminal stenosis.  No significant spinal canal stenosis. C5-C6: Posterior disc osteophyte complex, which partially effaces the ventral thecal sac.  Moderate bilateral facet arthropathy.  Marked left and moderate right uncovertebral joint spurring.  Severe left and moderate right neural foraminal stenosis.  Ligamentum flavum thickening, which partially effaces the dorsal thecal sac.  Mild spinal canal stenosis. C6-C7: Posterior disc osteophyte complex, which mildly effaces the ventral thecal sac.  Moderate bilateral facet arthropathy.  Moderate left and moderate right uncovertebral joint spurring.  Severe left and moderate right neural foraminal stenosis.  Ligamentum flavum thickening, which partially effaces the dorsal thecal sac.  Mild spinal canal stenosis. C7-T1: The disk is normal in configuration.  Mild bilateral facet arthropathy.  Mild bilateral uncovertebral joint spurring.  Mild left greater than right neural foraminal stenosis.  No significant spinal canal stenosis. Paraspinal muscles & soft tissues: Unremarkable. Normal signal voids are present in the vertebral arteries. No abnormal intraspinal enhancement identified.     1. No evidence of acute fracture, spondylodiscitis, high-grade spinal canal stenosis.  No cord compression, focal cord signal abnormality, or abnormal intraspinal enhancement. 2. Multilevel degenerative changes of the cervical spine, as detailed above, with findings most pronounced at C5-6 and C6-7 and resulting in severe left and moderate right neural foraminal and mild spinal canal stenosis. Electronically signed by: Marek Moreno Date:    12/30/2024 Time:    10:47    MRA Neck  without contrast    Result Date: 12/30/2024  EXAMINATION: MRA NECK WITHOUT CONTRAST CLINICAL HISTORY: CNS vasculitis, known or suspected; TECHNIQUE: Time of flight MRA of the carotid and vertebral arteries was performed with 3D reconstructions according to the standard neck MRA protocol. COMPARISON: None FINDINGS: Study is mild to moderately degraded by motion artifact. The right common carotid artery demonstrates no hemodynamically significant stenosis. The cervical right internal carotid artery demonstrates no hemodynamically significant stenosis. The left common carotid artery demonstrates no hemodynamically significant stenosis. The cervical left internal carotid artery demonstrates no hemodynamically significant stenosis. Extracranial vertebral arteries: Vertebral arteries are codominant.  Vertebral arteries are normal to the level of the foramen magnum.     No evidence of a hemodynamically significant stenosis, major branch occlusion or aneurysm in the neck arterial vasculature, allowing for motion degradation. Electronically signed by: Marek Moreno Date:    12/30/2024 Time:    10:23    MRI Brain Without Contrast    Result Date: 12/30/2024  EXAMINATION: MRI BRAIN WITHOUT CONTRAST CLINICAL HISTORY: Headache, new or worsening (Age >= 50y);. TECHNIQUE: Multiplanar multisequence MR imaging of the brain was performed without the administration of intravenous contrast. COMPARISON: CT head 12/29/2024, MRI brain 01/24/2023 FINDINGS: Study is  degraded by motion artifact. Ventricles and sulci are normal in size for age without evidence of hydrocephalus. No extra-axial blood or fluid collections. Scattered foci of T2/FLAIR hyperintense signal within the supratentorial white matter, nonspecific and may reflect sequelae of mild chronic small vessel ischemic change.    Diffusion-weighted images demonstrate no evidence of an acute infarct.   Susceptibility weighted images demonstrate no evidence of acute or chronic  hemorrhage. No significant intracranial mass effect or midline shift. Normal vascular flow voids are present. Diffusely heterogeneous calvarial bone marrow signal, nonspecific but similar to prior exam from 2023, and may be seen with red marrow reconversion associated with chronic anemic states, osteoporosis, hypoxia, or smoking. Moderate opacification of the left maxillary sinus with mucosal membrane thickening and small air-fluid level.  Mild scattered mucosal membrane thickening elsewhere in the paranasal sinuses.  The visualized portions of the mastoids are unremarkable. Bilateral lens replacements are noted.     1. No acute intracranial abnormality. 2. Mild generalized cerebral volume loss and scattered T2/FLAIR hyperintense signal within the supratentorial white matter, nonspecific but probably reflecting sequelae of chronic small vessel ischemic change. 3. Left maxillary sinusitis. Electronically signed by: Marek Moreno Date:    12/30/2024 Time:    07:39    X-Ray Chest AP Portable    Result Date: 12/29/2024  EXAMINATION: XR CHEST AP PORTABLE CLINICAL HISTORY: Chest Pain; TECHNIQUE: Single frontal view of the chest was performed. COMPARISON: 02/02/2023. FINDINGS: There are mild interstitial opacities, suspicious for interstitial edema/CHF.  The pleural spaces are clear the cardiac silhouette is borderline.  There are calcifications of the aortic arch.  There is a prosthetic aortic valve stent.  Osseous structures demonstrate degenerative changes.     Mild interstitial opacities, suspicious for interstitial edema/CHF. Electronically signed by: Anthony Andres Date:    12/29/2024 Time:    17:58    CT Head Without Contrast    Result Date: 12/29/2024  EXAMINATION: CT HEAD WITHOUT CONTRAST CLINICAL HISTORY: Headache, new or worsening (Age >= 50y); TECHNIQUE: Low dose axial CT images obtained throughout the head without intravenous contrast. Sagittal and coronal reconstructions were performed. COMPARISON: None.  FINDINGS: Intracranial compartment: Ventricles and sulci are normal in size for age without evidence of hydrocephalus. No extra-axial blood or fluid collections. The brain parenchyma appears normal. No parenchymal mass, hemorrhage, edema or major vascular distribution infarct. Skull/extracranial contents (limited evaluation): No fracture. There is a left maxillary sinus air-fluid level.     There is no evidence acute intracranial abnormality.  There is left maxillary sinus disease. Electronically signed by: Susana Carrion MD Date:    12/29/2024 Time:    16:49    CT Cervical Spine Without Contrast    Result Date: 12/29/2024  EXAMINATION: CT CERVICAL SPINE WITHOUT CONTRAST CLINICAL HISTORY: Neck pain, chronic, degenerative changes on xray;Neck pain, acute exacerbation with chronic degenerative changes; TECHNIQUE: Low dose axial images, sagittal and coronal reformations were performed though the cervical spine.  Contrast was not administered. COMPARISON: 12/18/2024 FINDINGS: There is no evidence fracture or malalignment.  There are degenerative changes throughout the cervical spine most prominent in the mid cervical spine.  There is no prevertebral soft tissue swelling.  The adjacent soft tissues are unremarkable.     There are degenerative changes throughout the cervical spine. Electronically signed by: Susana Carrion MD Date:    12/29/2024 Time:    15:48    X-Ray Cervical Spine AP And Lateral    Result Date: 12/18/2024  EXAMINATION: XR CERVICAL SPINE AP LATERAL CLINICAL HISTORY: Cervicalgia TECHNIQUE: AP, lateral and open mouth views of the cervical spine were performed. COMPARISON: None. FINDINGS: Vertebral body heights are preserved.Trace anterolisthesis of C4 on C5 and retrolisthesis of C5 on C6.Moderate disc height loss at C5-C6 and C6-C7 with shallow endplate centered osteophytes.  No abnormal prevertebral soft tissue thickening.     As above. Electronically signed by: Marek Burton Date:    12/18/2024  Time:    11:21    Mammo Digital Screening Left with Krishna    Result Date: 12/3/2024  Facility: [facility] [address] [place] [address] [phone] Name: Vivien Burton MRN: [id number] Result: Mammo Digital Screening Left with Krishna History: Patient is 79 y.o. and is seen for a screening mammogram. Films Compared: Compared to: 11/30/2023 Mammo Digital Screening Left with Krishna, 08/11/2022 Mammo Digital Screening Left with Krishna, and 08/09/2021 Mammo Digital Screening Left with Krishna Findings: This procedure was performed using tomosynthesis. Computer-aided detection was utilized in the interpretation of this examination. There are scattered areas of fibroglandular density. There is no evidence of suspicious masses, microcalcifications or architectural distortion.      No mammographic evidence of malignancy. BI-RADS Category 1: Negative Recommendation: Routine screening mammogram in 1 year is recommended. Seun Amaro MD   - pulls last radiology orders

## 2025-01-04 NOTE — PROGRESS NOTES
"[facility]  Progress Note    Patient Name: Vivien Burton  MRN: [id number]  Patient Class: IP- Inpatient   Admission Date: 12/29/2024  Length of Stay: 5 days  Attending Physician: Connor Junior DO  Primary Care Provider: Jeri Moreira MD        Subjective     Principal Problem:Severe sepsis        HPI:  Sydnee Burton is a 79 year old female with a previous medical history of anemia, basal cell carcinoma, breast cancer, CHF, streptococcal bacteremia, GERD, HLD, HTN, CAD, TAVR, hypothyroidism, accidental tylenol overdose and abnormal MRI who presented to the ED for a headache and neck pain. The patient endorses a left sided headache described as stabbing and radiating from left upper neck. It is associated with tenderness to palpation in the left parietal scalp daily and intermittently the left temporal area. The pain has been daily for one month and reports taking tylenol every 8 hours with relief but the pain returns. Today the pain became intractable which caused her to present to the ED. CT of head showed no acute process and CT of c-spine showed degenerative changes. While in ED she developed a fever, oxygen saturation of 92%, hypertension, and tachycardia. She endorsed having a non-productive cough. Sepsis bundle initiated and cardiac workup added. She was given toradol, 2mg of PO diluadid, gabapentin, and acyclovir. Her pain improved and her blood pressure did also. She was maintaining an oxygen saturation of 99% on 1 liter nasal cannula and denied any shortness of breath. Fever resolved with tylenol. 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 She most recently finished a steroid taper at end of November. ED initiated acyclovir due one "leison" noted to left sided neck and pain with palpation to scalp. Lab work showed elevated troponin, normal lactic acid, no " "leukocytosis, increased anemia over last month, procalcitonin 81.13, normal TSH, and AST of 54.  with no peripheral edema but chest xray showed "Mild interstitial opacities, suspicious for interstitial edema/CHF." She has no tachypnea or exertional SOB. FLU/RSV/ Covid negative.  Initially her blood pressure was too low for lasix administration but later improved and she was administered 20mg of IV lasix. Troponin trended up and then lowered on third draw. Urinalysis pending at time of note. Due to ESR of 33 and CRP of 110.4 with fever, scalp tenderness, and worsening headache she was empirically started on prednisone 60mg for one dose and to be continued by primary team pending MRA of head to assess for temporal arteritis. MRI of c-spine ordered. MRA of neck ordered.  One year ago patient presented to ED with right sided headache that was similar to this visit. It was discovered she was septic due to pneumonia but on that visit she has 43K WBC and elevated lactic acid. Neurology was consulted at this time and MRI performed which was abnormal. Patient has no vision loss and is neurologically intact. She has been continued on IV vancomycin and IV cefepime and oral valcyclovir. Cardiology and ID consulted.  Course of care discussed with daughter in law Dottie Burton who agreed with plan of care.  Patient admitted by hospital medicine for further evaluation and management.     Overview/Hospital Course:  79 year old female with a previous medical history of anemia, basal cell carcinoma, breast cancer, CHF, streptococcal bacteremia, GERD, HLD, HTN, CAD, TAVR, hypothyroidism, accidental tylenol overdose and abnormal MRI initially admitted on 12/29 for severe headache/ left-sided neck pain for around a month, patient also has intermittent low-grade fever.  Workup shows severe elevated CRP/ ESR, elevated procalcitonin, with unclear infectious source.  CT chest abdomen and pelvis is negative for infectious " source.  Patient also has mild elevated troponin most likely secondary to sepsis as per cardiologist Dr. SISSY Garvin, patient also has elevated BNP 1680, possible acute CHF, patient received IV Lasix, -2.5 L. patient apparently euvolemic now.  Patient has a series of imaging study including CT head/ CT neck/ MRI of the cervical/ MRI of the brain which is insignificant other than shows significant cervical spondylosis.  Eventually the patient had LP 12/31 which showed RBC 2, WBC 2, essentially negative.   Patient had TTE 12/31 LVEF 55-60%, no significant valvular vegetations.   Now patient 2/2 blood culture showed strep viridans, possible source, patient does have very bad dental hygiene, patient is restarted on IV Rocephin by ID.  Regulo negative for vegetations.  Regarding neck pain,initially concern for patient's may have GCA, steroids started and discontinued once thought it is less likely.  Neurologist following. Gabapentin was uptitrated, patient had significant improvement in neck pain.   Patient also found to rhinovirus positive, continue droplet precaution.   MRV of the brain is done and negative for dural venous sinus thrombosis   Regarding her CHF, chest x-ray showed significant improvement in fluid status was euvolemic.  Infectious diseases planning for PICC line with outpatient antibiotics for 4-6 weeks.  Stable to transfer out of step-down.  Discussed with Dr. Burton. Follow up full REGULO report.    Interval History:  Patient seen and examined, no acute complaints.  Still awaiting REGULO read to know duration of antibiotics, possible discharge tomorrow once read is in.    Review of Systems   Musculoskeletal:  Negative for neck pain.   Neurological:  Negative for headaches.     Objective:     Vital Signs (Most Recent):  Temp: 97.9 °F (36.6 °C) (01/04/25 1135)  Pulse: 77 (01/04/25 1410)  Resp: 14 (01/04/25 1410)  BP: (!) 171/88 (01/04/25 1135)  SpO2: 96 % (01/04/25 1410) Vital Signs (24h Range):  Temp:  [97.6  °F (36.4 °C)-98.2 °F (36.8 °C)] 97.9 °F (36.6 °C)  Pulse:  [70-85] 77  Resp:  [14-30] 14  SpO2:  [93 %-99 %] 96 %  BP: (141-172)/(62-88) 171/88     Weight: 54.4 kg (120 lb)  Body mass index is 23.44 kg/m².    Intake/Output Summary (Last 24 hours) at 1/4/2025 1620  Last data filed at 1/4/2025 1306  Gross per 24 hour   Intake 360 ml   Output --   Net 360 ml         Physical Exam  Constitutional:       Appearance: Normal appearance.   HENT:      Head: Normocephalic and atraumatic.      Nose: Nose normal.   Eyes:      Extraocular Movements: Extraocular movements intact.      Pupils: Pupils are equal, round, and reactive to light.   Cardiovascular:      Rate and Rhythm: Normal rate and regular rhythm.      Heart sounds: No murmur heard.  Pulmonary:      Effort: Pulmonary effort is normal.      Breath sounds: Normal breath sounds.   Abdominal:      General: Abdomen is flat.      Palpations: Abdomen is soft.   Musculoskeletal:         General: Normal range of motion.      Cervical back: Normal range of motion and neck supple.   Skin:     General: Skin is warm and dry.   Neurological:      General: No focal deficit present.      Mental Status: She is alert and oriented to person, place, and time.   Psychiatric:         Mood and Affect: Mood normal.             Significant Labs: All pertinent labs within the past 24 hours have been reviewed.  CBC:   Recent Labs   Lab 01/03/25  0407 01/04/25  0616   WBC 11.20 13.38*   HGB 9.0* 9.2*   HCT 27.8* 27.5*    296     CMP:   Recent Labs   Lab 01/03/25  0407   *   K 4.7      CO2 29   GLU 91   BUN 9   CREATININE 0.7   CALCIUM 9.1   ANIONGAP 0*       Significant Imaging: I have reviewed all pertinent imaging results/findings within the past 24 hours.  I have reviewed and interpreted all pertinent imaging results/findings within the past 24 hours.    Scheduled Meds:   aspirin  81 mg Oral Daily    atorvastatin  20 mg Oral QHS    calcium carbonate  500 mg Oral TID     carvediloL  6.25 mg Oral BID    cefTRIAXone (Rocephin) IV (PEDS and ADULTS)  2 g Intravenous Q24H    enoxparin  40 mg Subcutaneous Q24H (prophylaxis, 1700)    famotidine  20 mg Oral Daily    gabapentin  100 mg Oral Once    gabapentin  200 mg Oral TID    lactobacillus acidophilus & bulgar  1 packet Oral BID    levothyroxine  75 mcg Oral Before breakfast    losartan  50 mg Oral Daily    mupirocin   Nasal BID    polyethylene glycol  17 g Oral Daily    senna  8.6 mg Oral QHS     Continuous Infusions:  PRN Meds:.  Current Facility-Administered Medications:     acetaminophen, 650 mg, Oral, Q4H PRN    albuterol-ipratropium, 3 mL, Nebulization, Q6H PRN    aluminum-magnesium hydroxide-simethicone, 30 mL, Oral, QID PRN    calcium gluconate IVPB, 1 g, Intravenous, PRN    calcium gluconate IVPB, 2 g, Intravenous, PRN    calcium gluconate IVPB, 3 g, Intravenous, PRN    dextrose 10%, 12.5 g, Intravenous, PRN    dextrose 10%, 25 g, Intravenous, PRN    diphenhydrAMINE, 25 mg, Oral, Q6H PRN    diphenhydrAMINE-zinc acetate 1-0.1%, , Topical (Top), BID PRN    glucagon (human recombinant), 1 mg, Intramuscular, PRN    glucose, 16 g, Oral, PRN    glucose, 24 g, Oral, PRN    hydrALAZINE, 5 mg, Intravenous, Q6H PRN    HYDROmorphone, 0.5 mg, Intravenous, Q4H PRN    magnesium oxide, 800 mg, Oral, PRN    magnesium oxide, 800 mg, Oral, PRN    magnesium sulfate IVPB, 2 g, Intravenous, PRN    magnesium sulfate IVPB, 4 g, Intravenous, PRN    melatonin, 6 mg, Oral, Nightly PRN    naloxone, 0.02 mg, Intravenous, PRN    ondansetron, 4 mg, Intravenous, Q8H PRN    oxyCODONE, 5 mg, Oral, Q4H PRN    oxyCODONE, 10 mg, Oral, Q4H PRN    potassium bicarbonate, 35 mEq, Oral, PRN    potassium bicarbonate, 50 mEq, Oral, PRN    potassium bicarbonate, 60 mEq, Oral, PRN    potassium chloride, 40 mEq, Intravenous, PRN **AND** potassium chloride, 60 mEq, Intravenous, PRN **AND** potassium chloride, 80 mEq, Intravenous, PRN    potassium, sodium phosphates, 2 packet,  Oral, PRN    potassium, sodium phosphates, 2 packet, Oral, PRN    potassium, sodium phosphates, 2 packet, Oral, PRN    prochlorperazine, 5 mg, Intravenous, Q6H PRN    simethicone, 1 tablet, Oral, QID PRN    sodium chloride 0.9%, 10 mL, Intravenous, Q12H PRN    sodium phosphate 15 mmol in D5W 250 mL IVPB, 15 mmol, Intravenous, PRN    sodium phosphate 20.01 mmol in D5W 250 mL IVPB, 20.01 mmol, Intravenous, PRN    sodium phosphate 30 mmol in D5W 250 mL IVPB, 30 mmol, Intravenous, PRN    MRA Head for Temporal Arteritis W and WO Contrast    Result Date: 12/30/2024  EXAMINATION: MRA HEAD FOR TEMPORAL ARTERITIS W AND WO CONTRAST CLINICAL HISTORY: Neck pain, acute, infection suspected;Concern for Temporal Arteritis; TECHNIQUE: Time of flight and post contrast MR angiography sequences were performed before and following the IV administration of 5 mL of Gadavist..  Multiplanar and MIP images were performed. COMPARISON: MRI of the brain 12/30/2024 FINDINGS: The intracranial internal carotid arteries are patent bilaterally from the skull base to carotid terminus. Middle cerebral arteries are patent bilaterally. Anterior cerebral arteries are patent bilaterally. There are codominant vertebral arteries. Bilateral vertebral arteries are patent to the confluence into the basilar artery. Basilar artery is normal in caliber. Posterior cerebral arteries are patent bilaterally. No evidence of significant mural thickening or abnormal enhancement along the courses of the temporal arteries to specifically indicate active vascular inflammation. Visualized dural venous sinuses are patent without evidence of dural venous sinus thrombosis.     1. No intracranial high-grade stenosis, large vessel occlusion, aneurysm or arteriovenous malformation. 2. No evidence of significant mural thickening or abnormal enhancement along the courses of the temporal arteries to specifically indicate active vascular inflammation. Electronically signed by: Marek  Moreno Date:    12/30/2024 Time:    11:17    MRI Cervical Spine W WO Cont    Result Date: 12/30/2024  EXAMINATION: MRI CERVICAL SPINE W WO CONTRAST CLINICAL HISTORY: Neck pain, acute, infection suspected; TECHNIQUE: Multiplanar, multisequence MR images of the cervical spine were performed before and after the administration of intravenous contrast.  5 mL of Gadavist intravenous contrast were administered. COMPARISON: CT cervical spine 12/29/2024 FINDINGS: Study is mild to moderately degraded by motion artifact. Alignment: Reversal of the cervical lordosis, which may be secondary to positioning or muscle spasm.  Minimal anterolisthesis of C4 on C5 and retrolisthesis of C5 on C6. Vertebral Column: Vertebral body heights are maintained. No acute fracture is identified.  No evidence of an aggressive bone marrow replacement process. Multilevel disc degeneration, most pronounced at C5-6 and C6-7 with mild-to-moderate intervertebral disc space narrowing, disc desiccation, anterior marginal osteophyte formation and posterior disc osteophyte complexes. Cord: Normal signal and morphology. Skull base and craniocervical junction: Normal. Degenerative findings: C2-C3: Minimal posterior disc osteophyte complex.  Mild bilateral facet arthropathy.  No significant neural foraminal or spinal canal stenosis. C3-C4: Mild posterior disc osteophyte complex.  Moderate bilateral facet arthropathy.  Marked left and moderate right uncovertebral joint spurring.  Severe left and moderate right neural foraminal stenosis.  No significant spinal canal stenosis. C4-C5: Mild posterior disc osteophyte complex.  Moderate bilateral facet arthropathy.  Moderate left and mild right uncovertebral joint spurring.  Moderate left and mild right neural foraminal stenosis.  No significant spinal canal stenosis. C5-C6: Posterior disc osteophyte complex, which partially effaces the ventral thecal sac.  Moderate bilateral facet arthropathy.  Marked left and  moderate right uncovertebral joint spurring.  Severe left and moderate right neural foraminal stenosis.  Ligamentum flavum thickening, which partially effaces the dorsal thecal sac.  Mild spinal canal stenosis. C6-C7: Posterior disc osteophyte complex, which mildly effaces the ventral thecal sac.  Moderate bilateral facet arthropathy.  Moderate left and moderate right uncovertebral joint spurring.  Severe left and moderate right neural foraminal stenosis.  Ligamentum flavum thickening, which partially effaces the dorsal thecal sac.  Mild spinal canal stenosis. C7-T1: The disk is normal in configuration.  Mild bilateral facet arthropathy.  Mild bilateral uncovertebral joint spurring.  Mild left greater than right neural foraminal stenosis.  No significant spinal canal stenosis. Paraspinal muscles & soft tissues: Unremarkable. Normal signal voids are present in the vertebral arteries. No abnormal intraspinal enhancement identified.     1. No evidence of acute fracture, spondylodiscitis, high-grade spinal canal stenosis.  No cord compression, focal cord signal abnormality, or abnormal intraspinal enhancement. 2. Multilevel degenerative changes of the cervical spine, as detailed above, with findings most pronounced at C5-6 and C6-7 and resulting in severe left and moderate right neural foraminal and mild spinal canal stenosis. Electronically signed by: Marek Moreno Date:    12/30/2024 Time:    10:47    MRA Neck without contrast    Result Date: 12/30/2024  EXAMINATION: MRA NECK WITHOUT CONTRAST CLINICAL HISTORY: CNS vasculitis, known or suspected; TECHNIQUE: Time of flight MRA of the carotid and vertebral arteries was performed with 3D reconstructions according to the standard neck MRA protocol. COMPARISON: None FINDINGS: Study is mild to moderately degraded by motion artifact. The right common carotid artery demonstrates no hemodynamically significant stenosis. The cervical right internal carotid artery demonstrates no  hemodynamically significant stenosis. The left common carotid artery demonstrates no hemodynamically significant stenosis. The cervical left internal carotid artery demonstrates no hemodynamically significant stenosis. Extracranial vertebral arteries: Vertebral arteries are codominant.  Vertebral arteries are normal to the level of the foramen magnum.     No evidence of a hemodynamically significant stenosis, major branch occlusion or aneurysm in the neck arterial vasculature, allowing for motion degradation. Electronically signed by: Marek Moreno Date:    12/30/2024 Time:    10:23    MRI Brain Without Contrast    Result Date: 12/30/2024  EXAMINATION: MRI BRAIN WITHOUT CONTRAST CLINICAL HISTORY: Headache, new or worsening (Age >= 50y);. TECHNIQUE: Multiplanar multisequence MR imaging of the brain was performed without the administration of intravenous contrast. COMPARISON: CT head 12/29/2024, MRI brain 01/24/2023 FINDINGS: Study is  degraded by motion artifact. Ventricles and sulci are normal in size for age without evidence of hydrocephalus. No extra-axial blood or fluid collections. Scattered foci of T2/FLAIR hyperintense signal within the supratentorial white matter, nonspecific and may reflect sequelae of mild chronic small vessel ischemic change.    Diffusion-weighted images demonstrate no evidence of an acute infarct.   Susceptibility weighted images demonstrate no evidence of acute or chronic hemorrhage. No significant intracranial mass effect or midline shift. Normal vascular flow voids are present. Diffusely heterogeneous calvarial bone marrow signal, nonspecific but similar to prior exam from 2023, and may be seen with red marrow reconversion associated with chronic anemic states, osteoporosis, hypoxia, or smoking. Moderate opacification of the left maxillary sinus with mucosal membrane thickening and small air-fluid level.  Mild scattered mucosal membrane thickening elsewhere in the paranasal sinuses.  The  visualized portions of the mastoids are unremarkable. Bilateral lens replacements are noted.     1. No acute intracranial abnormality. 2. Mild generalized cerebral volume loss and scattered T2/FLAIR hyperintense signal within the supratentorial white matter, nonspecific but probably reflecting sequelae of chronic small vessel ischemic change. 3. Left maxillary sinusitis. Electronically signed by: Marek oMreno Date:    12/30/2024 Time:    07:39    X-Ray Chest AP Portable    Result Date: 12/29/2024  EXAMINATION: XR CHEST AP PORTABLE CLINICAL HISTORY: Chest Pain; TECHNIQUE: Single frontal view of the chest was performed. COMPARISON: 02/02/2023. FINDINGS: There are mild interstitial opacities, suspicious for interstitial edema/CHF.  The pleural spaces are clear the cardiac silhouette is borderline.  There are calcifications of the aortic arch.  There is a prosthetic aortic valve stent.  Osseous structures demonstrate degenerative changes.     Mild interstitial opacities, suspicious for interstitial edema/CHF. Electronically signed by: Anthony Andres Date:    12/29/2024 Time:    17:58    CT Head Without Contrast    Result Date: 12/29/2024  EXAMINATION: CT HEAD WITHOUT CONTRAST CLINICAL HISTORY: Headache, new or worsening (Age >= 50y); TECHNIQUE: Low dose axial CT images obtained throughout the head without intravenous contrast. Sagittal and coronal reconstructions were performed. COMPARISON: None. FINDINGS: Intracranial compartment: Ventricles and sulci are normal in size for age without evidence of hydrocephalus. No extra-axial blood or fluid collections. The brain parenchyma appears normal. No parenchymal mass, hemorrhage, edema or major vascular distribution infarct. Skull/extracranial contents (limited evaluation): No fracture. There is a left maxillary sinus air-fluid level.     There is no evidence acute intracranial abnormality.  There is left maxillary sinus disease. Electronically signed by: Susana Carrion MD  Date:    12/29/2024 Time:    16:49    CT Cervical Spine Without Contrast    Result Date: 12/29/2024  EXAMINATION: CT CERVICAL SPINE WITHOUT CONTRAST CLINICAL HISTORY: Neck pain, chronic, degenerative changes on xray;Neck pain, acute exacerbation with chronic degenerative changes; TECHNIQUE: Low dose axial images, sagittal and coronal reformations were performed though the cervical spine.  Contrast was not administered. COMPARISON: 12/18/2024 FINDINGS: There is no evidence fracture or malalignment.  There are degenerative changes throughout the cervical spine most prominent in the mid cervical spine.  There is no prevertebral soft tissue swelling.  The adjacent soft tissues are unremarkable.     There are degenerative changes throughout the cervical spine. Electronically signed by: Susana Carrion MD Date:    12/29/2024 Time:    15:48    X-Ray Cervical Spine AP And Lateral    Result Date: 12/18/2024  EXAMINATION: XR CERVICAL SPINE AP LATERAL CLINICAL HISTORY: Cervicalgia TECHNIQUE: AP, lateral and open mouth views of the cervical spine were performed. COMPARISON: None. FINDINGS: Vertebral body heights are preserved.Trace anterolisthesis of C4 on C5 and retrolisthesis of C5 on C6.Moderate disc height loss at C5-C6 and C6-C7 with shallow endplate centered osteophytes.  No abnormal prevertebral soft tissue thickening.     As above. Electronically signed by: Marek Burton Date:    12/18/2024 Time:    11:21    Mammo Digital Screening Left with Krishna    Result Date: 12/3/2024  Facility: [facility] [address] [place] [address] [phone] Name: Vivien Burton MRN: [id number] Result: Mammo Digital Screening Left with Krishna History: Patient is 79 y.o. and is seen for a screening mammogram. Films Compared: Compared to: 11/30/2023 Mammo Digital Screening Left with Krishna, 08/11/2022 Mammo Digital Screening Left with Krishna, and 08/09/2021 Mammo Digital Screening Left with Krishna Findings: This  "procedure was performed using tomosynthesis. Computer-aided detection was utilized in the interpretation of this examination. There are scattered areas of fibroglandular density. There is no evidence of suspicious masses, microcalcifications or architectural distortion.      No mammographic evidence of malignancy. BI-RADS Category 1: Negative Recommendation: Routine screening mammogram in 1 year is recommended. Seun Amaro MD   - pulls last radiology orders      Assessment and Plan     * Severe sepsis/ strep viridans bacteremia  This patient does have evidence of infective focus  My overall impression is sepsis.  Source: Respiratory and Unknown Urinalysis pending at time of note  Antibiotics given-   Antibiotics (72h ago, onward)      Start     Stop Route Frequency Ordered    01/01/25 0900  cefTRIAXone injection 2 g         -- IV Every 24 hours (non-standard times) 12/31/24 1550          Latest lactate reviewed-  No results for input(s): "LACTATE", "POCLAC" in the last 72 hours.  Procalcitonin: 81.13  CRP: 110.4  Sed Rate 33      Organ dysfunction indicated by Acute on chronic  heart failure    Fluid challenge Contraindicated- Fluid bolus is contraindicated in this patient due to Congestive Heart Failure     Post- resuscitation assessment Yes Perfusion exam was performed within 6 hours of septic shock presentation after bolus shows Adequate tissue perfusion assessed by non-invasive monitoring     CT chest abdomen and pelvis with IV contrast negative for infectious source.   Status post LP 12/31, WBC 2, RBC 2, essentially negative.   TTE 12/31, negative for vegetation.   Patient has very poor dental hygiene, has Streptococcus viridans bacteremia, patient also has a history of TAVR  [facility] today, full report pending, prelim: no vegetations  Continue IV Rocephin 2 g q.12 hours.   Infectious Disease planning for IV antibiotics for about 4-6 weeks, PICC line placed today, decide final duration based on repeat blood " "cultures      Hypocalcemia   The most recent calcium and albumin results listed below.  Recent Labs     01/01/25  0446 01/01/25  0857 01/03/25  0407   CALCIUM 7.3*  --  9.1   ALBUMIN 3.1*  --   --    CAION  --  1.11  --        Plan  - Will replace calcium and monitor electrolytes closely.  - The patient's hypocalcemia is worsening. Will adjust treatment as follows:  IV replacement  - calcium ionized normal  - IV replacement  - Telemetry  - EKG PRN  - Improved  - Asymptomatic        Rhinovirus infection    Aware, droplet precaution, conservative management    Intractable headache  LP negative  MRV negative for dural venous sinus thrombosis   Resolved    Neck pain without injury  Left sided neck pain that is sharp and stabbing into head. Patient endorses that specific area on head is painful to touch. CT of neck shows degenerative changes      Patient has significant left-sided neck pain radiated to left occipital.  Extensive imaging study is not quite impressive except severe cervical spondylosis.   Status post LP- essential negative.   Discontinue steroids as GCA deemed less likely.   Neurology following  MRV negative for dural sinus thrombosis  Gabapentin helped with pain  Significantly improved        Elevated troponin  Denies chest pain or Shortness of breath.    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   Per Dematology note on 12/4/24 "Biopsy proven drug eruption, cleared with prednisone and d/c of prilosec. She restarted prilosec and rash came back. She stopped it at end of November and had another course of steroids". Per chart review patient was placed on steroid taper starting 11/16    Benadryl PO and topical in place    History of transcatheter aortic valve replacement (TAVR)  Chronic condition noted      HTN (hypertension)  Patient's blood " pressure range in the last 24 hours was: BP  Min: 110/76  Max: 164/70.The patient's inpatient anti-hypertensive regimen is listed below:  Current Antihypertensives  carvediloL tablet 6.25 mg, 2 times daily, Oral  hydrALAZINE injection 5 mg, Every 6 hours PRN, Intravenous    Plan  Currently on Coreg  Hydralazine p.r.n.   Losartan 50mg QHS was held please restart as warranted    Coronary artery disease  Patient with known CAD s/p stent placement and CABG, which is controlled Will continue ASA and Statin and monitor for S/Sx of angina/ACS. Continue to monitor on telemetry.     Trending troponin. Will continue to trend every four hours. Patient denies chest pain or SOB.   Cardiology following.   Likely demand  No acute STT wave changes    Acute on chronic HFrEF (heart failure with reduced ejection fraction)    Echo Saline Bubble? No; Ultrasound enhancing contrast? Yes    Result Date: 12/31/2024    Left Ventricle: The left ventricle is normal in size. Mildly increased   wall thickness. There is concentric hypertrophy. There is normal systolic   function with a visually estimated ejection fraction of 55 - 60%. There is   normal diastolic function.    Right Ventricle: Normal right ventricular cavity size. Wall thickness   is normal. Systolic function is normal.    Left Atrium: Left atrium is mildly dilated.    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 .  This has no obvious vegetation identified on this valve.    Adequate function is noted    Mitral Valve: The mitral valve is structurally normal. There is normal   leaflet mobility.    Pulmonary Artery: There is mild pulmonary hypertension. The estimated   pulmonary artery systolic pressure is 42 mmHg.    IVC/SVC: Normal venous pressure at 3 mmHg.    No echocardiographic evidence of vegetation noted        Echo    Result Date: 11/22/2024    Left Ventricle: The left ventricle is normal in size. Mildly increased   wall thickness. There  is mild concentric hypertrophy. There is normal   systolic function with a visually estimated ejection fraction of 55 - 70%.   Ejection fraction is approximately 60%. Global longitudinal strain is   -18.0%. Global longitudinal strain is normal. There is normal diastolic   function.    Right Ventricle: Normal right ventricular cavity size. Systolic   function is normal. TAPSE is 1.90 cm. Right ventricular global   longitudinal strain is - 23.8%.    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 It is reported to be a 26 mm. Aortic valve area by VTI is 1.2   cm². Aortic valve peak velocity is 1.6 m/s. Mean gradient is 5.2 mmHg. The   dimensionless index is 0.59.    IVC/SVC: Normal venous pressure at 3 mmHg.    No definite change from Echo in 2/2023.           Patient looks euvolemic now, off IV Lasix.  Repeat chest x-ray significant improvement in fluid status    Anemia   Most recent hemoglobin and hematocrit are listed below.  Recent Labs     01/01/25  0446 01/02/25  0436 01/03/25  0407   HGB 8.4* 8.7* 9.0*   HCT 25.4* 26.2* 27.8*       Plan  - Monitor serial CBC: Daily  - Transfuse PRBC if patient becomes hemodynamically unstable, symptomatic or H/H drops below 7/21.  - Patient has not received any PRBC transfusions to date  - Patient's anemia is currently stable    Unspecified hypothyroidism  Chronic condition noted  -Levothyroxine continued      Breast cancer  History of Breast Cancer in remission. Last treatment 24 years ago.       GERD (gastroesophageal reflux disease)  -Pepcid b.i.d.        VTE Risk Mitigation (From admission, onward)           Ordered     enoxaparin injection 40 mg  Every 24 hours         12/31/24 1543     IP VTE HIGH RISK PATIENT  Once         12/29/24 2021     Place sequential compression device  Until discontinued         12/29/24 2021                    Discharge Planning   MIRELLA: 1/5/2025     Code Status: Full Code   Medical Readiness for Discharge Date:    Discharge Plan A: Home          Treatment, consult recommendation, PT/OT, dispo planning.              Connor Junior DO  Department of Hospital Medicine   [facility]

## 2025-01-04 NOTE — CARE UPDATE
01/04/25 0845   Patient Assessment/Suction   Level of Consciousness (AVPU) alert   Respiratory Effort Unlabored   Expansion/Accessory Muscles/Retractions no use of accessory muscles   Rhythm/Pattern, Respiratory pattern regular;depth regular   Cough Frequency infrequent   Cough Type nonproductive   PRE-TX-O2   Device (Oxygen Therapy) room air   Pulse Oximetry Type Intermittent   $ Pulse Oximetry - Multiple Charge Pulse Oximetry - Multiple   Probe Placed On (Pulse Ox) Right:;finger   Oximetry Probe Status Assessed   Pulse 78   Resp 14   Aerosol Therapy   $ Aerosol Therapy Charges PRN treatment not required   Respiratory Treatment Status (SVN) PRN treatment not required   Education   $ Education Bronchodilator;Oxygen;15 min

## 2025-01-05 VITALS
OXYGEN SATURATION: 98 % | DIASTOLIC BLOOD PRESSURE: 61 MMHG | HEIGHT: 60 IN | SYSTOLIC BLOOD PRESSURE: 119 MMHG | BODY MASS INDEX: 23.56 KG/M2 | TEMPERATURE: 98 F | HEART RATE: 77 BPM | RESPIRATION RATE: 16 BRPM | WEIGHT: 120 LBS

## 2025-01-05 PROBLEM — A41.9 SEVERE SEPSIS: Status: RESOLVED | Noted: 2024-12-29 | Resolved: 2025-01-05

## 2025-01-05 PROBLEM — R51.9 INTRACTABLE HEADACHE: Status: RESOLVED | Noted: 2024-12-30 | Resolved: 2025-01-05

## 2025-01-05 PROBLEM — R65.20 SEVERE SEPSIS: Status: RESOLVED | Noted: 2024-12-29 | Resolved: 2025-01-05

## 2025-01-05 PROBLEM — R79.89 ELEVATED TROPONIN: Status: RESOLVED | Noted: 2024-12-30 | Resolved: 2025-01-05

## 2025-01-05 PROBLEM — E83.51 HYPOCALCEMIA: Status: RESOLVED | Noted: 2025-01-01 | Resolved: 2025-01-05

## 2025-01-05 LAB
BACTERIA BLD CULT: NORMAL
BACTERIA BLD CULT: NORMAL
BACTERIA CSF CULT: NO GROWTH
BASOPHILS # BLD AUTO: 0.08 K/UL (ref 0–0.2)
BASOPHILS NFR BLD: 0.6 % (ref 0–1.9)
BSA FOR ECHO PROCEDURE: 1.52 M2
DIFFERENTIAL METHOD BLD: ABNORMAL
EOSINOPHIL # BLD AUTO: 0.6 K/UL (ref 0–0.5)
EOSINOPHIL NFR BLD: 4.5 % (ref 0–8)
ERYTHROCYTE [DISTWIDTH] IN BLOOD BY AUTOMATED COUNT: 14.2 % (ref 11.5–14.5)
GRAM STN SPEC: NORMAL
GRAM STN SPEC: NORMAL
HCT VFR BLD AUTO: 26.7 % (ref 37–48.5)
HGB BLD-MCNC: 9 G/DL (ref 12–16)
IMM GRANULOCYTES # BLD AUTO: 0.43 K/UL (ref 0–0.04)
IMM GRANULOCYTES NFR BLD AUTO: 3.1 % (ref 0–0.5)
LYMPHOCYTES # BLD AUTO: 2.4 K/UL (ref 1–4.8)
LYMPHOCYTES NFR BLD: 16.7 % (ref 18–48)
MCH RBC QN AUTO: 30.7 PG (ref 27–31)
MCHC RBC AUTO-ENTMCNC: 33.7 G/DL (ref 32–36)
MCV RBC AUTO: 91 FL (ref 82–98)
MONOCYTES # BLD AUTO: 1.1 K/UL (ref 0.3–1)
MONOCYTES NFR BLD: 7.7 % (ref 4–15)
NEUTROPHILS # BLD AUTO: 9.5 K/UL (ref 1.8–7.7)
NEUTROPHILS NFR BLD: 67.4 % (ref 38–73)
NRBC BLD-RTO: 0 /100 WBC
PLATELET # BLD AUTO: 286 K/UL (ref 150–450)
PMV BLD AUTO: 9.2 FL (ref 9.2–12.9)
RBC # BLD AUTO: 2.93 M/UL (ref 4–5.4)
S PNEUM AG UR QL: NOT DETECTED
WBC # BLD AUTO: 14.09 K/UL (ref 3.9–12.7)

## 2025-01-05 PROCEDURE — 25000003 PHARM REV CODE 250: Performed by: FAMILY MEDICINE

## 2025-01-05 PROCEDURE — 63600175 PHARM REV CODE 636 W HCPCS: Performed by: INTERNAL MEDICINE

## 2025-01-05 PROCEDURE — 25000003 PHARM REV CODE 250: Performed by: HOSPITALIST

## 2025-01-05 PROCEDURE — 85025 COMPLETE CBC W/AUTO DIFF WBC: CPT | Performed by: NURSE PRACTITIONER

## 2025-01-05 PROCEDURE — 25000003 PHARM REV CODE 250

## 2025-01-05 PROCEDURE — 25000003 PHARM REV CODE 250: Performed by: INTERNAL MEDICINE

## 2025-01-05 RX ORDER — GABAPENTIN 100 MG/1
100 CAPSULE ORAL 3 TIMES DAILY
Qty: 90 CAPSULE | Refills: 0 | Status: SHIPPED | OUTPATIENT
Start: 2025-01-05 | End: 2025-02-04

## 2025-01-05 RX ORDER — MUPIROCIN 20 MG/G
OINTMENT TOPICAL 2 TIMES DAILY
Qty: 1 G | Refills: 0 | Status: SHIPPED | OUTPATIENT
Start: 2025-01-05 | End: 2025-01-08

## 2025-01-05 RX ORDER — CEFTRIAXONE 2 G/1
2 INJECTION, POWDER, FOR SOLUTION INTRAMUSCULAR; INTRAVENOUS DAILY
Start: 2025-01-05 | End: 2025-01-28

## 2025-01-05 RX ORDER — FAMOTIDINE 20 MG/1
20 TABLET, FILM COATED ORAL DAILY
Qty: 30 TABLET | Refills: 0 | Status: SHIPPED | OUTPATIENT
Start: 2025-01-05 | End: 2025-02-04

## 2025-01-05 RX ORDER — CARVEDILOL 6.25 MG/1
6.25 TABLET ORAL 2 TIMES DAILY
Qty: 60 TABLET | Refills: 2 | Status: SHIPPED | OUTPATIENT
Start: 2025-01-05 | End: 2025-04-05

## 2025-01-05 RX ADMIN — FAMOTIDINE 20 MG: 20 TABLET, FILM COATED ORAL at 09:01

## 2025-01-05 RX ADMIN — LACTOBACILLUS ACIDOPHILUS / LACTOBACILLUS BULGARICUS 1 EACH: 100 MILLION CFU STRENGTH GRANULES at 09:01

## 2025-01-05 RX ADMIN — CALCIUM CARBONATE 500 MG: 500 TABLET, CHEWABLE ORAL at 09:01

## 2025-01-05 RX ADMIN — CARVEDILOL 6.25 MG: 6.25 TABLET, FILM COATED ORAL at 09:01

## 2025-01-05 RX ADMIN — LEVOTHYROXINE SODIUM 75 MCG: 0.03 TABLET ORAL at 06:01

## 2025-01-05 RX ADMIN — LOSARTAN POTASSIUM 50 MG: 50 TABLET, FILM COATED ORAL at 09:01

## 2025-01-05 RX ADMIN — CEFTRIAXONE 2 G: 2 INJECTION, POWDER, FOR SOLUTION INTRAMUSCULAR; INTRAVENOUS at 09:01

## 2025-01-05 RX ADMIN — GABAPENTIN 200 MG: 100 CAPSULE ORAL at 09:01

## 2025-01-05 RX ADMIN — MUPIROCIN 1 G: 20 OINTMENT TOPICAL at 09:01

## 2025-01-05 RX ADMIN — ASPIRIN 81 MG: 81 TABLET, COATED ORAL at 09:01

## 2025-01-05 RX ADMIN — POLYETHYLENE GLYCOL 3350 17 G: 17 POWDER, FOR SOLUTION ORAL at 09:01

## 2025-01-05 NOTE — PLAN OF CARE
Cleared for discharge from case management standpoint.  CM sent in basket request to Infectious Disease and Cardiology to call patient to schedule follow up in 3 weeks.  HH confirmed  [facility] confirmed  Patient son to provide transportation home.   01/05/25 1249   Final Note   Assessment Type Final Discharge Note   Hospital Resources/Appts/Education Provided Post-Acute resouces added to AVS

## 2025-01-05 NOTE — DISCHARGE SUMMARY
"[facility] Medicine  Discharge Summary      Patient Name: Vivien Burton  MRN: [id number]  JYOTSNA: [id number]  Patient Class: IP- Inpatient  Admission Date: 12/29/2024  Hospital Length of Stay: 6 days  Discharge Date and Time:  01/05/2025 2:36 PM  Attending Physician: Connor Junior DO   Discharging Provider: Connor Junior DO  Primary Care Provider: Jeri Moreira MD    Primary Care Team: Networked reference to record PCT     HPI:   Sydnee Burton is a 79 year old female with a previous medical history of anemia, basal cell carcinoma, breast cancer, CHF, streptococcal bacteremia, GERD, HLD, HTN, CAD, TAVR, hypothyroidism, accidental tylenol overdose and abnormal MRI who presented to the ED for a headache and neck pain. The patient endorses a left sided headache described as stabbing and radiating from left upper neck. It is associated with tenderness to palpation in the left parietal scalp daily and intermittently the left temporal area. The pain has been daily for one month and reports taking tylenol every 8 hours with relief but the pain returns. Today the pain became intractable which caused her to present to the ED. CT of head showed no acute process and CT of c-spine showed degenerative changes. While in ED she developed a fever, oxygen saturation of 92%, hypertension, and tachycardia. She endorsed having a non-productive cough. Sepsis bundle initiated and cardiac workup added. She was given toradol, 2mg of PO diluadid, gabapentin, and acyclovir. Her pain improved and her blood pressure did also. She was maintaining an oxygen saturation of 99% on 1 liter nasal cannula and denied any shortness of breath. Fever resolved with tylenol. 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 She most recently finished a steroid taper at end of November. ED initiated acyclovir due one "leison" " "noted to left sided neck and pain with palpation to scalp. Lab work showed elevated troponin, normal lactic acid, no leukocytosis, increased anemia over last month, procalcitonin 81.13, normal TSH, and AST of 54.  with no peripheral edema but chest xray showed "Mild interstitial opacities, suspicious for interstitial edema/CHF." She has no tachypnea or exertional SOB. FLU/RSV/ Covid negative.  Initially her blood pressure was too low for lasix administration but later improved and she was administered 20mg of IV lasix. Troponin trended up and then lowered on third draw. Urinalysis pending at time of note. Due to ESR of 33 and CRP of 110.4 with fever, scalp tenderness, and worsening headache she was empirically started on prednisone 60mg for one dose and to be continued by primary team pending MRA of head to assess for temporal arteritis. MRI of c-spine ordered. MRA of neck ordered.  One year ago patient presented to ED with right sided headache that was similar to this visit. It was discovered she was septic due to pneumonia but on that visit she has 43K WBC and elevated lactic acid. Neurology was consulted at this time and MRI performed which was abnormal. Patient has no vision loss and is neurologically intact. She has been continued on IV vancomycin and IV cefepime and oral valcyclovir. Cardiology and ID consulted.  Course of care discussed with daughter in law Dottie Burton who agreed with plan of care.  Patient admitted by hospital medicine for further evaluation and management.     Procedure(s) (LRB):  Transesophageal echo ([facility]) intra-procedure log documentation (N/A)      Hospital Course:   79 year old female with a previous medical history of anemia, basal cell carcinoma, breast cancer, CHF, streptococcal bacteremia, GERD, HLD, HTN, CAD, TAVR, hypothyroidism, accidental tylenol overdose and abnormal MRI initially admitted on 12/29 for severe headache/ left-sided neck pain for around a month, " patient also has intermittent low-grade fever.  Workup shows severe elevated CRP/ ESR, elevated procalcitonin, with unclear infectious source.  CT chest abdomen and pelvis is negative for infectious source.  Patient also has mild elevated troponin most likely secondary to sepsis as per cardiologist Dr. SISSY Garvin, patient also has elevated BNP 1680, possible acute CHF, patient received IV Lasix, -2.5 L. patient apparently euvolemic now.  Patient has a series of imaging study including CT head/ CT neck/ MRI of the cervical/ MRI of the brain which is insignificant other than shows significant cervical spondylosis.  Eventually the patient had LP 12/31 which showed RBC 2, WBC 2, essentially negative.   Patient had TTE 12/31 LVEF 55-60%, no significant valvular vegetations.   Now patient 2/2 blood culture showed strep viridans, possible source, patient does have very bad dental hygiene, patient is restarted on IV Rocephin by ID.  Regulo negative for vegetations.  Regarding neck pain,initially concern for patient's may have GCA, steroids started and discontinued once thought it is less likely.  Neurologist following. Gabapentin was uptitrated, patient had significant improvement in neck pain.   Patient also found to rhinovirus positive, continue droplet precaution.   MRV of the brain is done and negative for dural venous sinus thrombosis   Regarding her CHF, chest x-ray showed significant improvement in fluid status was euvolemic.  Infectious diseases planning for PICC line with outpatient antibiotics for 4 weeks.  Discussed REGULO with Dr. Barnes and no vegetation seen so antibiotic recs are for 4 weeks of IV ceftriaxone at home with PICC line.  PICC line can be removed after completion of antibiotics. Left maxillary sinusitis.  On antibiotics for 10 days for this indication this indication and Infectious Disease recommended the patient follow up with her dentist while she is on the IV antibiotics.     Goals of Care Treatment  Preferences:  Code Status: Full Code    Living Will: Yes              SDOH Screening:  The patient was screened for utility difficulties, food insecurity, transport difficulties, housing insecurity, and interpersonal safety and there were no concerns identified this admission.     Consults:   Consults (From admission, onward)          Status Ordering Provider     Inpatient consult to Social Work/Case Management  Once        Provider:  (Not yet assigned)    Acknowledged ALIN CORDON     Inpatient consult to Social Work/Case Management  Once        Provider:  (Not yet assigned)    Acknowledged ALIN CORDON     Inpatient consult to PICC Line Nurse  Once        Provider:  (Not yet assigned)    Completed SOILA DE LEON     Anesthesiology  Once        Provider:  Abdoulaye Sky Jr., MD    Acknowledged DENISSE ENRIQUEZ     Inpatient consult to Cardiology  Once        Provider:  Ignacio Garvin MD    Completed MICHAEL MERCEDES     Inpatient consult to Neurology  Once        Provider:  Car Tucker MD    Completed MICHAEL MERCEDES     Inpatient consult to Infectious Diseases  Once        Provider:  Alin Cordon MD    Completed KARIN SKELTON            Rhinovirus infection    Aware, droplet precaution, conservative management    Neck pain without injury  Left sided neck pain that is sharp and stabbing into head. Patient endorses that specific area on head is painful to touch. CT of neck shows degenerative changes      Patient has significant left-sided neck pain radiated to left occipital.  Extensive imaging study is not quite impressive except severe cervical spondylosis.   Status post LP- essential negative.   Discontinue steroids as GCA deemed less likely.   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  "  Per Dematology note on 12/4/24 "Biopsy proven drug eruption, cleared with prednisone and d/c of prilosec. She restarted prilosec and rash came back. She stopped it at end of November and had another course of steroids". Per chart review patient was placed on steroid taper starting 11/16    Benadryl PO and topical in place    History of transcatheter aortic valve replacement (TAVR)  Chronic condition noted      HTN (hypertension)  Patient's blood pressure range in the last 24 hours was: BP  Min: 110/76  Max: 164/70.The patient's inpatient anti-hypertensive regimen is listed below:  Current Antihypertensives  carvediloL tablet 6.25 mg, 2 times daily, Oral  hydrALAZINE injection 5 mg, Every 6 hours PRN, Intravenous    Plan  Currently on Coreg  Hydralazine p.r.n.   Losartan 50mg QHS was held please restart as warranted    Coronary artery disease  Patient with known CAD s/p stent placement and CABG, which is controlled Will continue ASA and Statin and monitor for S/Sx of angina/ACS. Continue to monitor on telemetry.     Trending troponin. Will continue to trend every four hours. Patient denies chest pain or SOB.   Cardiology following.   Likely demand  No acute STT wave changes    Acute on chronic HFrEF (heart failure with reduced ejection fraction)    Echo Saline Bubble? No; Ultrasound enhancing contrast? Yes    Result Date: 12/31/2024    Left Ventricle: The left ventricle is normal in size. Mildly increased   wall thickness. There is concentric hypertrophy. There is normal systolic   function with a visually estimated ejection fraction of 55 - 60%. There is   normal diastolic function.    Right Ventricle: Normal right ventricular cavity size. Wall thickness   is normal. Systolic function is normal.    Left Atrium: Left atrium is mildly dilated.    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 .  This has no obvious vegetation identified on this valve.    Adequate function is " noted    Mitral Valve: The mitral valve is structurally normal. There is normal   leaflet mobility.    Pulmonary Artery: There is mild pulmonary hypertension. The estimated   pulmonary artery systolic pressure is 42 mmHg.    IVC/SVC: Normal venous pressure at 3 mmHg.    No echocardiographic evidence of vegetation noted        Echo    Result Date: 11/22/2024    Left Ventricle: The left ventricle is normal in size. Mildly increased   wall thickness. There is mild concentric hypertrophy. There is normal   systolic function with a visually estimated ejection fraction of 55 - 70%.   Ejection fraction is approximately 60%. Global longitudinal strain is   -18.0%. Global longitudinal strain is normal. There is normal diastolic   function.    Right Ventricle: Normal right ventricular cavity size. Systolic   function is normal. TAPSE is 1.90 cm. Right ventricular global   longitudinal strain is - 23.8%.    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 It is reported to be a 26 mm. Aortic valve area by VTI is 1.2   cm². Aortic valve peak velocity is 1.6 m/s. Mean gradient is 5.2 mmHg. The   dimensionless index is 0.59.    IVC/SVC: Normal venous pressure at 3 mmHg.    No definite change from Echo in 2/2023.           Patient looks euvolemic now, off IV Lasix.  Repeat chest x-ray significant improvement in fluid status    Anemia   Most recent hemoglobin and hematocrit are listed below.  Recent Labs     01/01/25  0446 01/02/25  0436 01/03/25  0407   HGB 8.4* 8.7* 9.0*   HCT 25.4* 26.2* 27.8*       Plan  - Monitor serial CBC: Daily  - Transfuse PRBC if patient becomes hemodynamically unstable, symptomatic or H/H drops below 7/21.  - Patient has not received any PRBC transfusions to date  - Patient's anemia is currently stable    Unspecified hypothyroidism  Chronic condition noted  -Levothyroxine continued      Breast cancer  History of Breast Cancer in remission. Last treatment 24 years ago.        GERD (gastroesophageal reflux disease)  -Pepcid b.i.d.        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 [L29.89] 12/30/2024 Yes    Neck pain without injury [M54.2] 12/30/2024 Yes    HTN (hypertension) [I10] 12/29/2024 Yes    History of transcatheter aortic valve replacement (TAVR) [Z95.2] 12/29/2024 Not Applicable    Coronary artery disease [I25.10] 02/23/2022 Yes    Acute on chronic HFrEF (heart failure with reduced ejection fraction) [I50.23] 12/22/2021 Yes    Anemia [D64.9] 12/10/2021 Yes    Unspecified hypothyroidism [E03.9] 07/22/2015 Yes    Breast cancer [C50.919] 03/11/2014 Yes    GERD (gastroesophageal reflux disease) [K21.9]  Yes      Problems Resolved During this Admission:    Diagnosis Date Noted Date Resolved POA    PRINCIPAL PROBLEM:  Severe sepsis/ strep viridans bacteremia [A41.9, R65.20] 12/29/2024 01/05/2025 Yes    Hypocalcemia [E83.51] 01/01/2025 01/05/2025 Yes    Elevated troponin [R79.89] 12/30/2024 01/05/2025 Yes    Intractable headache [R51.9] 12/30/2024 01/05/2025 Yes       Discharged Condition: fair    Disposition: [place]Health [place] [facility] [place]    Follow Up:   Contact information for follow-up providers       Jeri Moreira MD Follow up.    Specialty: Family Medicine  Why: As needed  Contact information:  [address] [facility] [place] [address]  [phone]               Alin Woo MD Follow up in 3 week(s).    Specialty: Infectious Diseases  Contact information:  [address] [facility] [address]  [phone]               Rick Mccoy MD Follow up in 3 week(s).    Specialties: Cardiology, General Surgery  Contact information:  [address]  [address]  [facility] [address]  [phone]                       Contact information for after-discharge care       Dialysis/Infusion       [facility] [place] .    Service: Home Infusion and Injection  Contact information:  [address]  [facility] [address]  [phone]                     Home Medical Care       [facility] .    Service: Home Health Services  Contact information:  [address] [address] [phone]                                 Patient Instructions:      Ambulatory referral/consult to Outpatient Case Management   Referral Priority: Routine Referral Type: Consultation   Referral Reason: Specialty Services Required   Number of Visits Requested: 1     Ambulatory referral/consult to Home Health   Standing Status: Future   Referral Priority: Routine Referral Type: Home Health   Referral Reason: Specialty Services Required   Requested Specialty: Home Health Services   Number of Visits Requested: 1     Ambulatory referral/consult to Allergy   Standing Status: Future   Referral Priority: Routine Referral Type: Allergy Testing   Referral Reason: Specialty Services Required   Requested Specialty: Allergy   Number of Visits Requested: 1     Notify your health care provider if you experience any of the following:  temperature >100.4     Notify your health care provider if you experience any of the following:  persistent nausea and vomiting or diarrhea     Notify your health care provider if you experience any of the following:  severe uncontrolled pain     Notify your health care provider if you experience any of the following:  difficulty breathing or increased cough     Notify your health care provider if you experience any of the following:  persistent dizziness, light-headedness, or visual disturbances     Activity as tolerated       Significant Diagnostic Studies: Labs: BMP: No results for input(s): "GLU", "NA", "K", "CL", "CO2", "BUN", "CREATININE", "CALCIUM", "MG" in the last 48 hours., CMP No results for input(s): "NA", "K", "CL", "CO2", "GLU", "BUN", "CREATININE", "CALCIUM", "PROT", "ALBUMIN", "BILITOT", "ALKPHOS", "AST", "ALT", "ANIONGAP", "ESTGFRAFRICA", " ""EGFRNONAA" in the last 48 hours., CBC   Recent Labs   Lab 01/04/25  0616 01/05/25  0620   WBC 13.38* 14.09*   HGB 9.2* 9.0*   HCT 27.5* 26.7*    286   , INR   Lab Results   Component Value Date    INR 1.0 01/02/2025    INR 1.1 01/28/2023    INR 1.1 01/27/2023   , Lipid Panel   Lab Results   Component Value Date    CHOL 140 04/09/2024    HDL 66 04/09/2024    LDLCALC 50 04/09/2024    TRIG 154 (H) 04/09/2024    CHOLHDL 2.1 04/09/2024   , Troponin   Recent Labs   Lab 12/30/24  0014 12/30/24  0415 12/30/24  0812   TROPONINI 0.257* 0.168* 0.083*   , A1C: No results for input(s): "HGBA1C" in the last 4320 hours., and All labs within the past 24 hours have been reviewed  Microbiology: Blood Culture   Lab Results   Component Value Date    LABBLOO No Growth to date 01/02/2025    LABBLOO No Growth to date 01/02/2025    LABBLOO No Growth to date 01/02/2025    LABBLOO No Growth to date 01/02/2025   , Sputum Culture No results found for: "GSRESP", "RESPIRATORYC", Urine Culture    Lab Results   Component Value Date    LABURIN No growth 06/05/2018     Radiology:   Imaging Results              MRI Cervical Spine W WO Cont (Final result)  Result time 12/30/24 10:47:56      Final result by Marek Moreno MD (12/30/24 10:47:56)                   Impression:      1. No evidence of acute fracture, spondylodiscitis, high-grade spinal canal stenosis.  No cord compression, focal cord signal abnormality, or abnormal intraspinal enhancement.  2. Multilevel degenerative changes of the cervical spine, as detailed above, with findings most pronounced at C5-6 and C6-7 and resulting in severe left and moderate right neural foraminal and mild spinal canal stenosis.      Electronically signed by: Marek Moreno  Date:    12/30/2024  Time:    10:47               Narrative:    EXAMINATION:  MRI CERVICAL SPINE W WO CONTRAST    CLINICAL HISTORY:  Neck pain, acute, infection suspected;    TECHNIQUE:  Multiplanar, multisequence MR images of the " cervical spine were performed before and after the administration of intravenous contrast.  5 mL of Gadavist intravenous contrast were administered.    COMPARISON:  CT cervical spine 12/29/2024    FINDINGS:  Study is mild to moderately degraded by motion artifact.    Alignment: Reversal of the cervical lordosis, which may be secondary to positioning or muscle spasm.  Minimal anterolisthesis of C4 on C5 and retrolisthesis of C5 on C6.    Vertebral Column: Vertebral body heights are maintained. No acute fracture is identified.  No evidence of an aggressive bone marrow replacement process. Multilevel disc degeneration, most pronounced at C5-6 and C6-7 with mild-to-moderate intervertebral disc space narrowing, disc desiccation, anterior marginal osteophyte formation and posterior disc osteophyte complexes.    Cord: Normal signal and morphology.    Skull base and craniocervical junction: Normal.    Degenerative findings:    C2-C3: Minimal posterior disc osteophyte complex.  Mild bilateral facet arthropathy.  No significant neural foraminal or spinal canal stenosis.    C3-C4: Mild posterior disc osteophyte complex.  Moderate bilateral facet arthropathy.  Marked left and moderate right uncovertebral joint spurring.  Severe left and moderate right neural foraminal stenosis.  No significant spinal canal stenosis.    C4-C5: Mild posterior disc osteophyte complex.  Moderate bilateral facet arthropathy.  Moderate left and mild right uncovertebral joint spurring.  Moderate left and mild right neural foraminal stenosis.  No significant spinal canal stenosis.    C5-C6: Posterior disc osteophyte complex, which partially effaces the ventral thecal sac.  Moderate bilateral facet arthropathy.  Marked left and moderate right uncovertebral joint spurring.  Severe left and moderate right neural foraminal stenosis.  Ligamentum flavum thickening, which partially effaces the dorsal thecal sac.  Mild spinal canal stenosis.    C6-C7:  Posterior disc osteophyte complex, which mildly effaces the ventral thecal sac.  Moderate bilateral facet arthropathy.  Moderate left and moderate right uncovertebral joint spurring.  Severe left and moderate right neural foraminal stenosis.  Ligamentum flavum thickening, which partially effaces the dorsal thecal sac.  Mild spinal canal stenosis.    C7-T1: The disk is normal in configuration.  Mild bilateral facet arthropathy.  Mild bilateral uncovertebral joint spurring.  Mild left greater than right neural foraminal stenosis.  No significant spinal canal stenosis.    Paraspinal muscles & soft tissues: Unremarkable.    Normal signal voids are present in the vertebral arteries.    No abnormal intraspinal enhancement identified.                                       MRI Brain Without Contrast (Final result)  Result time 12/30/24 07:39:51      Final result by Marek Moreno MD (12/30/24 07:39:51)                   Impression:      1. No acute intracranial abnormality.  2. Mild generalized cerebral volume loss and scattered T2/FLAIR hyperintense signal within the supratentorial white matter, nonspecific but probably reflecting sequelae of chronic small vessel ischemic change.  3. Left maxillary sinusitis.      Electronically signed by: Marek Moreno  Date:    12/30/2024  Time:    07:39               Narrative:    EXAMINATION:  MRI BRAIN WITHOUT CONTRAST    CLINICAL HISTORY:  Headache, new or worsening (Age >= 50y);.    TECHNIQUE:  Multiplanar multisequence MR imaging of the brain was performed without the administration of intravenous contrast.    COMPARISON:  CT head 12/29/2024, MRI brain 01/24/2023    FINDINGS:  Study is  degraded by motion artifact.    Ventricles and sulci are normal in size for age without evidence of hydrocephalus. No extra-axial blood or fluid collections.    Scattered foci of T2/FLAIR hyperintense signal within the supratentorial white matter, nonspecific and may reflect sequelae of mild chronic  small vessel ischemic change.    Diffusion-weighted images demonstrate no evidence of an acute infarct.   Susceptibility weighted images demonstrate no evidence of acute or chronic hemorrhage. No significant intracranial mass effect or midline shift.    Normal vascular flow voids are present.    Diffusely heterogeneous calvarial bone marrow signal, nonspecific but similar to prior exam from 2023, and may be seen with red marrow reconversion associated with chronic anemic states, osteoporosis, hypoxia, or smoking.    Moderate opacification of the left maxillary sinus with mucosal membrane thickening and small air-fluid level.  Mild scattered mucosal membrane thickening elsewhere in the paranasal sinuses.  The visualized portions of the mastoids are unremarkable.    Bilateral lens replacements are noted.                                       X-Ray Chest AP Portable (Final result)  Result time 12/29/24 17:58:37      Final result by Anthony Andres DO (12/29/24 17:58:37)                   Impression:      Mild interstitial opacities, suspicious for interstitial edema/CHF.      Electronically signed by: Anthony Andres  Date:    12/29/2024  Time:    17:58               Narrative:    EXAMINATION:  XR CHEST AP PORTABLE    CLINICAL HISTORY:  Chest Pain;    TECHNIQUE:  Single frontal view of the chest was performed.    COMPARISON:  02/02/2023.    FINDINGS:  There are mild interstitial opacities, suspicious for interstitial edema/CHF.  The pleural spaces are clear the cardiac silhouette is borderline.  There are calcifications of the aortic arch.  There is a prosthetic aortic valve stent.  Osseous structures demonstrate degenerative changes.                                       CT Head Without Contrast (Final result)  Result time 12/29/24 16:49:58      Final result by Susana Carrion MD (12/29/24 16:49:58)                   Impression:      There is no evidence acute intracranial abnormality.  There is left maxillary  sinus disease.      Electronically signed by: Susana Carrion MD  Date:    12/29/2024  Time:    16:49               Narrative:    EXAMINATION:  CT HEAD WITHOUT CONTRAST    CLINICAL HISTORY:  Headache, new or worsening (Age >= 50y);    TECHNIQUE:  Low dose axial CT images obtained throughout the head without intravenous contrast. Sagittal and coronal reconstructions were performed.    COMPARISON:  None.    FINDINGS:  Intracranial compartment:    Ventricles and sulci are normal in size for age without evidence of hydrocephalus. No extra-axial blood or fluid collections.    The brain parenchyma appears normal. No parenchymal mass, hemorrhage, edema or major vascular distribution infarct.    Skull/extracranial contents (limited evaluation): No fracture. There is a left maxillary sinus air-fluid level.                                       CT Cervical Spine Without Contrast (Final result)  Result time 12/29/24 15:48:04      Final result by Susana Carrion MD (12/29/24 15:48:04)                   Impression:      There are degenerative changes throughout the cervical spine.      Electronically signed by: Susana Carrion MD  Date:    12/29/2024  Time:    15:48               Narrative:    EXAMINATION:  CT CERVICAL SPINE WITHOUT CONTRAST    CLINICAL HISTORY:  Neck pain, chronic, degenerative changes on xray;Neck pain, acute exacerbation with chronic degenerative changes;    TECHNIQUE:  Low dose axial images, sagittal and coronal reformations were performed though the cervical spine.  Contrast was not administered.    COMPARISON:  12/18/2024    FINDINGS:  There is no evidence fracture or malalignment.  There are degenerative changes throughout the cervical spine most prominent in the mid cervical spine.  There is no prevertebral soft tissue swelling.  The adjacent soft tissues are unremarkable.                                      Cardiac Graphics: Echocardiogram: 2D echo with color flow doppler: No results found for this  or any previous visit. and Transthoracic echo (TTE) complete (Cupid Only):   Results for orders placed or performed during the hospital encounter of 12/29/24   Echo Saline Bubble? No; Ultrasound enhancing contrast? Yes   Result Value Ref Range    Kim's Biplane MOD Ejection Fraction 50 %    A2C EF 44 %    A4C EF 53 %    LVOT stroke volume 62.5 cm3    LVIDd 3.8 3.5 - 6.0 cm    LV Systolic Volume 34.45 mL    LV Systolic Volume Index 23.0 mL/m2    LVIDs 3.0 2.1 - 4.0 cm    LV ESV A2C 66.03 mL    LV Diastolic Volume 60.46 mL    LV ESV A4C 38.74 mL    LV Diastolic Volume Index 40.31 mL/m2    LV EDV A2C 39.645877144178176 mL    LV EDV A4C 59.75 mL    Left Ventricular End Systolic Volume by Teichholz Method 34.45 mL    Left Ventricular End Diastolic Volume by Teichholz Method 60.46 mL    IVS 1.2 (A) 0.6 - 1.1 cm    LVOT diameter 2.0 cm    LVOT area 3.1 cm2    FS 21.1 (A) 28 - 44 %    Left Ventricle Relative Wall Thickness 0.53 cm    PW 1.0 0.6 - 1.1 cm    LV mass 134.7 g    LV Mass Index 89.8 g/m2    MV Peak E Ruben 0.76 m/s    TDI LATERAL 0.10 m/s    TDI SEPTAL 0.05 m/s    E/E' ratio 10.13 m/s    MV Peak A Ruben 0.98 m/s    TR Max Ruben 3.14 m/s    E/A ratio 0.78     IVRT 102.76 msec    E wave deceleration time 216.30 msec    LV SEPTAL E/E' RATIO 15.20 m/s    LV LATERAL E/E' RATIO 7.60 m/s    LVOT peak ruben 1.4 m/s    Left Ventricular Outflow Tract Mean Velocity 0.83 cm/s    Left Ventricular Outflow Tract Mean Gradient 3.63 mmHg    RV- cordova basal diam 2.5 cm    RV Basal Diameter 4.42 cm    RV-cordova length 4.4 cm    RV S' 15.01 cm/s    TAPSE 2.39 cm    RV/LV Ratio 0.66 cm    Left Atrium Major Axis 5.57 cm    LA Vol (MOD) 53.84 mL    LISBET (MOD) 35.9 mL/m2    RA Major Axis 4.38 cm    AV regurgitation pressure 1/2 time 608.871187913763606 ms    AR Max Ruben 3.10 m/s    AV mean gradient 10.0 mmHg    AV peak gradient 17.6 mmHg    Ao peak ruben 2.1 m/s    Ao VTI 34.9 cm    LVOT peak VTI 19.9 cm    AV valve area 1.8 cm²    AV Velocity  Ratio 0.67     AV index (prosthetic) 0.57     BREANNA by Velocity Ratio 2.1 cm²    Mr max raul 4.89 m/s    MV mean gradient 2 mmHg    MV peak gradient 5 mmHg    MV stenosis pressure 1/2 time 57.86 ms    MV valve area p 1/2 method 3.80 cm2    MV valve area by continuity eq 2.87 cm2    MV VTI 21.8 cm    Triscuspid Valve Regurgitation Peak Gradient 39 mmHg    PV PEAK VELOCITY 0.86 m/s    PV peak gradient 3 mmHg    Pulmonary Valve Mean Velocity 0.53 m/s    Ao root annulus 1.64 cm    STJ 1.86 cm    Ascending aorta 2.29 cm    Mean e' 0.08 m/s    ZLVIDS 0.68     ZLVIDD -1.54     LA area A4C 16.20 cm2    LA area A2C 19.80 cm2    RVDD 2.46 cm    AORTIC VALVE CUSP SEPERATION 1.11 cm    BSA 1.52 m2    TV resting pulmonary artery pressure 42 mmHg    RV TB RVSP 6 mmHg    Est. RA pres 3 mmHg    Narrative      Left Ventricle: The left ventricle is normal in size. Mildly increased   wall thickness. There is concentric hypertrophy. There is normal systolic   function with a visually estimated ejection fraction of 55 - 60%. There is   normal diastolic function.    Right Ventricle: Normal right ventricular cavity size. Wall thickness   is normal. Systolic function is normal.    Left Atrium: Left atrium is mildly dilated.    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 .  This has no obvious vegetation identified on this valve.    Adequate function is noted    Mitral Valve: The mitral valve is structurally normal. There is normal   leaflet mobility.    Pulmonary Artery: There is mild pulmonary hypertension. The estimated   pulmonary artery systolic pressure is 42 mmHg.    IVC/SVC: Normal venous pressure at 3 mmHg.    No echocardiographic evidence of vegetation noted         Pending Diagnostic Studies:       Procedure Component Value Units Date/Time    Legionella antigen, urine [[id number]] Collected: 12/30/24 1350    Order Status: Sent Lab Status: In process Updated: 12/30/24 5952    Specimen: Urine,  Clean Catch     Strep Pneumo AG Urine [[id number]] Collected: 12/30/24 1350    Order Status: Sent Lab Status: In process Updated: 12/30/24 1437    Specimen: Urine, Clean Catch            Medications:  Reconciled Home Medications:      Medication List        START taking these medications      carvediloL 6.25 MG tablet  Commonly known as: COREG  Take 1 tablet (6.25 mg total) by mouth 2 (two) times daily.     cefTRIAXone 2 gram injection  Commonly known as: ROCEPHIN  Inject 2 g into the vein once daily. for 23 days     gabapentin 100 MG capsule  Commonly known as: NEURONTIN  Take 1 capsule (100 mg total) by mouth 3 (three) times daily.     mupirocin 2 % ointment  Commonly known as: BACTROBAN  by Nasal route 2 (two) times daily. for 3 days            CHANGE how you take these medications      famotidine 20 MG tablet  Commonly known as: PEPCID  Take 1 tablet (20 mg total) by mouth once daily.  What changed:   medication strength  how much to take            CONTINUE taking these medications      ACIDOPHILUS ORAL  Take by mouth once daily.     aspirin 81 MG EC tablet  Commonly known as: ECOTRIN  Take 81 mg by mouth once a week.     ferrous sulfate Tab tablet  Commonly known as: FEOSOL  Take 1 tablet by mouth daily with breakfast. 27mg     ibandronate 150 mg tablet  Commonly known as: BONIVA  Take 1 tablet (150 mg total) by mouth every 30 days.     levothyroxine 75 MCG tablet  Commonly known as: SYNTHROID  TAKE 1 TABLET BY MOUTH EVERY DAY     losartan 50 MG tablet  Commonly known as: COZAAR  Take 1 tablet (50 mg total) by mouth once daily.     Sarna OriginaL lotion  Generic drug: camphor-menthol 0.5-0.5%  Apply topically as needed for Itching.     simvastatin 40 MG tablet  Commonly known as: ZOCOR  Take 1 tablet (40 mg total) by mouth every evening. Need OFFICE VISIT before next refill, last seen 11/2023. This will be the LAST Rx if visit is not made.     triamcinolone acetonide 0.1% 0.1 % cream  Commonly known as:  KENALOG  Apply topically 2 (two) times daily.     VITAMIN C ORAL  Take by mouth once daily.     ZYRTEC ORAL  Take by mouth once daily.            STOP taking these medications      LIDOcaine 5 %  Commonly known as: LIDODERM     tiZANidine 4 MG tablet  Commonly known as: ZANAFLEX              Indwelling Lines/Drains at time of discharge:   Lines/Drains/Airways       Peripherally Inserted Central Catheter Line  Duration             PICC Double Lumen 01/03/25 0943 left basilic 2 days                    Time spent on the discharge of patient: 36 minutes         Connor Junior DO  Department of Hospital Medicine  [facility]

## 2025-01-05 NOTE — PLAN OF CARE
[facility] confirmed Start of Care 01/06/25  [facility] to provide pt education and medication at bedside prior to discharge.  Patient requests medications be sent to [facility] and she will  tomorrow.  No DME needs   01/05/25 1234   Discharge Reassessment   Discharge Plan A Home Health   Discharge Plan B Home Health   DME Needed Upon Discharge  none   Transition of Care Barriers None   Post-Acute Status   Post-Acute Authorization IV Infusion   Home Health Status Set-up Complete/Auth obtained   IV Infusion Status Set-up Complete/Auth obtained   Discharge Delays None known at this time

## 2025-01-06 LAB — L PNEUMO AG UR QL IA: NEGATIVE

## 2025-01-07 LAB
BACTERIA BLD CULT: NORMAL
BACTERIA BLD CULT: NORMAL

## 2025-01-08 ENCOUNTER — OUTPATIENT CASE MANAGEMENT (OUTPATIENT)
Dept: ADMINISTRATIVE | Facility: OTHER | Age: 80
End: 2025-01-08
Payer: MEDICARE

## 2025-01-08 NOTE — LETTER
Vivien Burton  [address] [place] [address]      Dear Vivien Burton,     I work with [facility] Outpatient Care Management Department. We received a referral to call you to discuss your medical history. These services are free of charge and are offered to [facility] patients who have recently been discharged from any of our facilities or who have medical conditions that may require the skill of a nurse to assist with management.     I am a Registered Nurse who specializes in connecting patients with available medical and financial resources as well as addressing any educational needs that may be indicated.    I attempted to reach you by telephone, but I was unsuccessful. Please call our department so that we can go over some questions with you, regarding your health.    The Outpatient Care Management Department can be reached at [phone], from 8:00AM to 4:30 PM, on Monday thru Friday.     Additionally, [facility] also has a program where a nurse is available 24/7 to answer questions or provide medical advice, their number is [phone].      Thanks,    Judit Lindsey RN [facility]   Outpatient Care Management  Phone #: [phone]

## 2025-01-08 NOTE — PROGRESS NOTES
1/8/2025  1st attempt to complete Initial Assessment for Outpatient Care Management, left message. Attempt letter sent thru [facility] portal.

## 2025-01-09 ENCOUNTER — PATIENT OUTREACH (OUTPATIENT)
Dept: ADMINISTRATIVE | Facility: CLINIC | Age: 80
End: 2025-01-09
Payer: MEDICARE

## 2025-01-09 ENCOUNTER — TELEPHONE (OUTPATIENT)
Dept: DERMATOLOGY | Facility: CLINIC | Age: 80
End: 2025-01-09
Payer: MEDICARE

## 2025-01-09 ENCOUNTER — PATIENT MESSAGE (OUTPATIENT)
Dept: ADMINISTRATIVE | Facility: CLINIC | Age: 80
End: 2025-01-09
Payer: MEDICARE

## 2025-01-09 NOTE — TELEPHONE ENCOUNTER
Pt contacted and first available appt scheduled.  Pt advised to see her PCP or go to Urgent Care if need be before the appt as she is c/o rash that is painful.

## 2025-01-09 NOTE — PROGRESS NOTES
C3 nurse attempted to contact Vivien Burton for a TCC post hospital discharge follow up call. No answer. Left voicemail with callback information. The patient has a scheduled HOSFU appointment with Ashok Ricketts on 01/21/25 @ 1400.

## 2025-01-09 NOTE — TELEPHONE ENCOUNTER
----- Message from Baljinder sent at 1/9/2025  8:16 AM CST -----  Contact: Self  Type: Needs Medical Advice    Who Called:  Patient    Best Call Back Number: [phone]    Additional Information: Patient is requesting a call back, would not specify, but states it is important.

## 2025-01-09 NOTE — PROGRESS NOTES
C3 nurse spoke with Vivien Burton for a TCC post hospital discharge follow up call. The patient has a scheduled HOSFU appointment with Ashok Ricketts on 01/21/25 @ 1400.

## 2025-01-13 ENCOUNTER — LAB VISIT (OUTPATIENT)
Dept: LAB | Facility: HOSPITAL | Age: 80
End: 2025-01-13
Attending: INTERNAL MEDICINE
Payer: MEDICARE

## 2025-01-13 DIAGNOSIS — A41.9 SEPTICEMIA DURING LABOR: Primary | ICD-10-CM

## 2025-01-13 LAB
ANION GAP SERPL CALC-SCNC: 11 MMOL/L (ref 8–16)
BASOPHILS # BLD AUTO: 0.09 K/UL (ref 0–0.2)
BASOPHILS NFR BLD: 1.2 % (ref 0–1.9)
BUN SERPL-MCNC: 11 MG/DL (ref 8–23)
CALCIUM SERPL-MCNC: 9.4 MG/DL (ref 8.7–10.5)
CHLORIDE SERPL-SCNC: 108 MMOL/L (ref 95–110)
CO2 SERPL-SCNC: 21 MMOL/L (ref 23–29)
CREAT SERPL-MCNC: 0.8 MG/DL (ref 0.5–1.4)
CRP SERPL-MCNC: 18.5 MG/L (ref 0–8.2)
DIFFERENTIAL METHOD BLD: ABNORMAL
EOSINOPHIL # BLD AUTO: 0.7 K/UL (ref 0–0.5)
EOSINOPHIL NFR BLD: 10 % (ref 0–8)
ERYTHROCYTE [DISTWIDTH] IN BLOOD BY AUTOMATED COUNT: 16.4 % (ref 11.5–14.5)
ERYTHROCYTE [SEDIMENTATION RATE] IN BLOOD BY WESTERGREN METHOD: 35 MM/HR (ref 0–20)
EST. GFR  (NO RACE VARIABLE): >60 ML/MIN/1.73 M^2
GLUCOSE SERPL-MCNC: 154 MG/DL (ref 70–110)
HCT VFR BLD AUTO: 21 % (ref 37–48.5)
HGB BLD-MCNC: 6.9 G/DL (ref 12–16)
IMM GRANULOCYTES # BLD AUTO: 0.07 K/UL (ref 0–0.04)
IMM GRANULOCYTES NFR BLD AUTO: 1 % (ref 0–0.5)
LYMPHOCYTES # BLD AUTO: 1.8 K/UL (ref 1–4.8)
LYMPHOCYTES NFR BLD: 24.9 % (ref 18–48)
MCH RBC QN AUTO: 30.8 PG (ref 27–31)
MCHC RBC AUTO-ENTMCNC: 32.9 G/DL (ref 32–36)
MCV RBC AUTO: 94 FL (ref 82–98)
MONOCYTES # BLD AUTO: 0.5 K/UL (ref 0.3–1)
MONOCYTES NFR BLD: 7.2 % (ref 4–15)
NEUTROPHILS # BLD AUTO: 4.1 K/UL (ref 1.8–7.7)
NEUTROPHILS NFR BLD: 55.7 % (ref 38–73)
NRBC BLD-RTO: 0 /100 WBC
PLATELET # BLD AUTO: 211 K/UL (ref 150–450)
PMV BLD AUTO: 10.4 FL (ref 9.2–12.9)
POTASSIUM SERPL-SCNC: 4.1 MMOL/L (ref 3.5–5.1)
RBC # BLD AUTO: 2.24 M/UL (ref 4–5.4)
SODIUM SERPL-SCNC: 140 MMOL/L (ref 136–145)
WBC # BLD AUTO: 7.27 K/UL (ref 3.9–12.7)

## 2025-01-13 PROCEDURE — 80048 BASIC METABOLIC PNL TOTAL CA: CPT | Performed by: INTERNAL MEDICINE

## 2025-01-13 PROCEDURE — 85025 COMPLETE CBC W/AUTO DIFF WBC: CPT | Performed by: INTERNAL MEDICINE

## 2025-01-13 PROCEDURE — 85651 RBC SED RATE NONAUTOMATED: CPT | Performed by: INTERNAL MEDICINE

## 2025-01-13 PROCEDURE — 36415 COLL VENOUS BLD VENIPUNCTURE: CPT | Performed by: INTERNAL MEDICINE

## 2025-01-13 PROCEDURE — 86140 C-REACTIVE PROTEIN: CPT | Performed by: INTERNAL MEDICINE

## 2025-01-13 NOTE — PROGRESS NOTES
I spoke with patient to advise   Labs reviewed.  All stable except for hemoglobin that is low at 7.  We will continue to monitor and see for her appointment.  Notify PCP of her lab results ; per Dr Woo's orders  I faxed the lab results to Dr Harish Hernandez [facility]  1/13/2025

## 2025-01-15 ENCOUNTER — OUTPATIENT CASE MANAGEMENT (OUTPATIENT)
Dept: ADMINISTRATIVE | Facility: OTHER | Age: 80
End: 2025-01-15
Payer: MEDICARE

## 2025-01-15 NOTE — PROGRESS NOTES
1/15/2025  3rd attempt to complete Initial Assessment for Outpatient Care Management, left message. OPCM Case Closure.

## 2025-01-19 ENCOUNTER — PATIENT MESSAGE (OUTPATIENT)
Dept: FAMILY MEDICINE | Facility: CLINIC | Age: 80
End: 2025-01-19
Payer: MEDICARE

## 2025-01-20 ENCOUNTER — LAB VISIT (OUTPATIENT)
Dept: LAB | Facility: HOSPITAL | Age: 80
End: 2025-01-20
Attending: INTERNAL MEDICINE
Payer: MEDICARE

## 2025-01-20 DIAGNOSIS — A40.9 SEPTICEMIA, STREPTOCOCCAL: Primary | ICD-10-CM

## 2025-01-20 LAB
ANION GAP SERPL CALC-SCNC: 10 MMOL/L (ref 8–16)
BASOPHILS # BLD AUTO: 0.07 K/UL (ref 0–0.2)
BASOPHILS NFR BLD: 0.9 % (ref 0–1.9)
BUN SERPL-MCNC: 9 MG/DL (ref 8–23)
CALCIUM SERPL-MCNC: 9.1 MG/DL (ref 8.7–10.5)
CHLORIDE SERPL-SCNC: 107 MMOL/L (ref 95–110)
CO2 SERPL-SCNC: 21 MMOL/L (ref 23–29)
CREAT SERPL-MCNC: 0.7 MG/DL (ref 0.5–1.4)
CRP SERPL-MCNC: 5.9 MG/L (ref 0–8.2)
DIFFERENTIAL METHOD BLD: ABNORMAL
EOSINOPHIL # BLD AUTO: 0.5 K/UL (ref 0–0.5)
EOSINOPHIL NFR BLD: 6.3 % (ref 0–8)
ERYTHROCYTE [DISTWIDTH] IN BLOOD BY AUTOMATED COUNT: 19.5 % (ref 11.5–14.5)
ERYTHROCYTE [SEDIMENTATION RATE] IN BLOOD BY WESTERGREN METHOD: 18 MM/HR (ref 0–20)
EST. GFR  (NO RACE VARIABLE): >60 ML/MIN/1.73 M^2
GLUCOSE SERPL-MCNC: 80 MG/DL (ref 70–110)
HCT VFR BLD AUTO: 24.2 % (ref 37–48.5)
HGB BLD-MCNC: 7.8 G/DL (ref 12–16)
IMM GRANULOCYTES # BLD AUTO: 0.04 K/UL (ref 0–0.04)
IMM GRANULOCYTES NFR BLD AUTO: 0.5 % (ref 0–0.5)
LYMPHOCYTES # BLD AUTO: 1.4 K/UL (ref 1–4.8)
LYMPHOCYTES NFR BLD: 18 % (ref 18–48)
MCH RBC QN AUTO: 31.6 PG (ref 27–31)
MCHC RBC AUTO-ENTMCNC: 32.2 G/DL (ref 32–36)
MCV RBC AUTO: 98 FL (ref 82–98)
MONOCYTES # BLD AUTO: 0.6 K/UL (ref 0.3–1)
MONOCYTES NFR BLD: 8 % (ref 4–15)
NEUTROPHILS # BLD AUTO: 5.3 K/UL (ref 1.8–7.7)
NEUTROPHILS NFR BLD: 66.3 % (ref 38–73)
NRBC BLD-RTO: 0 /100 WBC
PLATELET # BLD AUTO: 240 K/UL (ref 150–450)
PMV BLD AUTO: 9.8 FL (ref 9.2–12.9)
POTASSIUM SERPL-SCNC: 4.2 MMOL/L (ref 3.5–5.1)
RBC # BLD AUTO: 2.47 M/UL (ref 4–5.4)
SODIUM SERPL-SCNC: 138 MMOL/L (ref 136–145)
WBC # BLD AUTO: 7.98 K/UL (ref 3.9–12.7)

## 2025-01-20 PROCEDURE — 80048 BASIC METABOLIC PNL TOTAL CA: CPT | Performed by: INTERNAL MEDICINE

## 2025-01-20 PROCEDURE — 36415 COLL VENOUS BLD VENIPUNCTURE: CPT | Performed by: INTERNAL MEDICINE

## 2025-01-20 PROCEDURE — 85025 COMPLETE CBC W/AUTO DIFF WBC: CPT | Performed by: INTERNAL MEDICINE

## 2025-01-20 PROCEDURE — 86140 C-REACTIVE PROTEIN: CPT | Performed by: INTERNAL MEDICINE

## 2025-01-20 PROCEDURE — 85651 RBC SED RATE NONAUTOMATED: CPT | Performed by: INTERNAL MEDICINE

## 2025-01-21 ENCOUNTER — OFFICE VISIT (OUTPATIENT)
Dept: FAMILY MEDICINE | Facility: CLINIC | Age: 80
End: 2025-01-21
Payer: MEDICARE

## 2025-01-21 DIAGNOSIS — Z86.19 HISTORY OF SEPSIS: Primary | ICD-10-CM

## 2025-01-21 PROCEDURE — 98005 SYNCH AUDIO-VIDEO EST LOW 20: CPT | Mod: 95,,, | Performed by: STUDENT IN AN ORGANIZED HEALTH CARE EDUCATION/TRAINING PROGRAM

## 2025-01-21 RX ORDER — LEVOCETIRIZINE DIHYDROCHLORIDE 5 MG/1
5 TABLET, FILM COATED ORAL NIGHTLY
Qty: 30 TABLET | Refills: 11 | Status: SHIPPED | OUTPATIENT
Start: 2025-01-21 | End: 2025-01-21

## 2025-01-21 RX ORDER — AZELASTINE 1 MG/ML
1 SPRAY, METERED NASAL 2 TIMES DAILY
Qty: 18.2 ML | Refills: 0 | Status: SHIPPED | OUTPATIENT
Start: 2025-01-21 | End: 2025-01-21

## 2025-01-21 RX ORDER — GUAIFENESIN 600 MG/1
1200 TABLET, EXTENDED RELEASE ORAL 2 TIMES DAILY
Qty: 30 TABLET | Refills: 0 | Status: SHIPPED | OUTPATIENT
Start: 2025-01-21 | End: 2025-01-21

## 2025-01-21 RX ORDER — AMOXICILLIN 500 MG/1
500 TABLET, FILM COATED ORAL EVERY 12 HOURS
Qty: 20 TABLET | Refills: 0 | Status: SHIPPED | OUTPATIENT
Start: 2025-01-21 | End: 2025-01-21

## 2025-01-21 NOTE — PROGRESS NOTES
The patient location is: [place]  The chief complaint leading to consultation is: Hospital Follow up    Visit type: audiovisual    Face to Face time with patient: 17 minute encounter  25 minutes of total time spent on the encounter, which includes face to face time and non-face to face time preparing to see the patient (eg, review of tests), Obtaining and/or reviewing separately obtained history, Documenting clinical information in the electronic or other health record, Independently interpreting results (not separately reported) and communicating results to the patient/family/caregiver, or Care coordination (not separately reported).         Each patient to whom he or she provides medical services by telemedicine is:  (1) informed of the relationship between the physician and patient and the respective role of any other health care provider with respect to management of the patient; and (2) notified that he or she may decline to receive medical services by telemedicine and may withdraw from such care at any time.    Notes:   Answers submitted by the patient for this visit:  Review of Systems Questionnaire (Submitted on 1/20/2025)  activity change: Yes  unexpected weight change: No  neck pain: No  hearing loss: No  rhinorrhea: No  trouble swallowing: No  eye discharge: No  visual disturbance: No  chest tightness: No  wheezing: No  chest pain: No  palpitations: No  blood in stool: No  constipation: No  vomiting: No  diarrhea: No  polydipsia: No  polyuria: No  difficulty urinating: No  hematuria: No  menstrual problem: No  dysuria: No  joint swelling: No  arthralgias: No  headaches: No  weakness: Yes  confusion: No  dysphoric mood: No    SUBJECTIVE:    CHIEF COMPLAINT: No chief complaint on file.          274}    HISTORY OF PRESENT ILLNESS:  Vivien Burton is a 79 y.o. female who presents to the clinic today   History of Present Illness    CHIEF COMPLAINT:  Ms. Burton presents today for follow-up after a  "recent hospitalization for a bacterial infection.    HISTORY OF PRESENT ILLNESS:  She was recently hospitalized due to sepsis after presenting to the emergency room with severe headache. After extensive testing including MRIs, CT, LP, TTE/KAIS[facility], a bacterial infection of unknown origin was identified to be viridans streptococcus. She was hospitalized for approximately one week and discharged around the beginning of the year with a PICC line. She is currently in her third week of a four-week course of IV antibiotics administered through the PICC line. Patient f/u w/ ID next month. She denies any current symptoms related to the bacterial infection. Home health services are monitoring her condition and managing the PICC line, including regular blood cultures and dressing changes. Denies any fever, chills, night sweat. PICC line site show no sign of infection. BP initially elevated in the hospital but has return to normal now. Patient has appt w/ cardiologist. Echo showed 55 - 60% w/o any endocarditis. bioprosthetic valve in the aortic position present. Neurologist was consulted and was start on gabapentin for her neck pain and headache which patient reports to be helping.     INTEGUMENTARY:  She reports a history of hives occurring in the fall with persistent skin irritation after the hives resolved. She describes the irritation as painful, noting it was "past the hive stage." She has been self-treating with cortisone cream and lidocaine. The irritation is now limited to two spots that are somewhat painful but tolerable. She notes improvement after two weeks of antibiotic treatment at home. Biopsy in sept showed ALLERGIC URTICARIAL REACTION which was suspected to be from prilosec    CARDIOVASCULAR:  She reports a recent home blood pressure reading of 128 and is currently taking losartan 50 mg and coreg 6.25 mg bid    MEDICATIONS:  She takes Gabapentin 100 mg 3 times daily for nerve pain with reported significant " improvement, antihypertensive medication, Pepcid daily, thyroid medication, cholesterol medication, and low-dose aspirin. She is currently receiving IV rocephin via PICC line.    ALLERGIES:  She has an allergy to Prilosec which caused hives and skin problems.      ROS:  General: -fever, -chills, -fatigue, -weight gain, -weight loss  Eyes: -vision changes, -redness, -discharge  ENT: -ear pain, -nasal congestion, -sore throat  Cardiovascular: -chest pain, -palpitations, -lower extremity edema  Respiratory: -cough, -shortness of breath  Gastrointestinal: -abdominal pain, -nausea, -vomiting, -diarrhea, -constipation, -blood in stool  Genitourinary: -dysuria, -hematuria, -frequency  Musculoskeletal: -joint pain, -muscle pain  Skin: -rash, -lesion  Neurological: +headache, -dizziness, -numbness, -tingling  Psychiatric: -anxiety, -depression, -sleep difficulty          PAST MEDICAL HISTORY:     274}  Past Medical History:   Diagnosis Date    Acute on chronic combined systolic and diastolic heart failure 2022    Anemia     Basal cell carcinoma     Breast cancer     Breast cancer     Cancer     CHF (congestive heart failure)     Colon polyps     Coronary artery disease     GERD (gastroesophageal reflux disease)     Hyperlipidemia     Hypertension     Hypothyroidism     Nonrheumatic aortic (valve) stenosis 2018    Osteoporosis 2019    S/P TAVR (transcatheter aortic valve replacement) 2022    Squamous cell carcinoma of skin     Streptococcal bacteremia 2023    Thyroid disease     Transaminitis 2023       PAST SURGICAL HISTORY:  Past Surgical History:   Procedure Laterality Date    BREAST SURGERY      CARDIAC CATH COSURGEON N/A 2022    Procedure: Cardiac Cath Cosurgeon;  Surgeon: Dontae Wooten MD;  Location: [facility] CATH LAB;  Service: Cardiovascular;  Laterality: N/A;     SECTION, CLASSIC      COLONOSCOPY N/A 2018    Procedure: COLONOSCOPY;  Surgeon: Precious MIMS  MD Raffaele;  Location: [facility] ENDO;  Service: Endoscopy;  Laterality: N/A;    COLONOSCOPY N/A 3/31/2023    Procedure: COLONOSCOPY;  Surgeon: Mal Abrams MD;  Location: [facility] ENDO;  Service: Endoscopy;  Laterality: N/A;    HYSTERECTOMY      LEFT HEART CATHETERIZATION Left 2022    Procedure: Left heart cath;  Surgeon: Dontae Johns MD;  Location: [facility] CATH LAB;  Service: Cardiology;  Laterality: Left;    LEFT HEART CATHETERIZATION Left 2022    Procedure: Left heart cath;  Surgeon: Dontae Johns MD;  Location: [facility] CATH LAB;  Service: Cardiology;  Laterality: Left;    MASTECTOMY Right 2000    right breast      TONSILLECTOMY, ADENOIDECTOMY      TRANSCATHETER AORTIC VALVE REPLACEMENT (TAVR) N/A 2022    Procedure: REPLACEMENT, AORTIC VALVE, TRANSCATHETER (TAVR);  Surgeon: Dontae Johns MD;  Location: [facility] CATH LAB;  Service: Cardiology;  Laterality: N/A;       SOCIAL HISTORY:  Social History     Socioeconomic History    Marital status:    Tobacco Use    Smoking status: Former     Current packs/day: 0.00     Types: Cigarettes     Quit date: 2000     Years since quittin.9    Smokeless tobacco: Never    Tobacco comments:     quit 20 years ago   Substance and Sexual Activity    Alcohol use: Yes     Alcohol/week: 2.0 standard drinks of alcohol     Types: 2 Shots of liquor per week     Comment: per pt 2 burbon drinks per night however none in last month    Drug use: No    Sexual activity: Not Currently     Social Drivers of Health     Financial Resource Strain: Low Risk  (2024)    Overall Financial Resource Strain (CARDIA)     Difficulty of Paying Living Expenses: Not very hard   Food Insecurity: No Food Insecurity (2024)    Hunger Vital Sign     Worried About Running Out of Food in the Last Year: Never true     Ran Out of Food in the Last Year: Never true   Transportation Needs: No Transportation Needs (2024)    TRANSPORTATION NEEDS     Transportation : No    Physical Activity: Sufficiently Active (11/15/2024)    Exercise Vital Sign     Days of Exercise per Week: 5 days     Minutes of Exercise per Session: 40 min   Stress: No Stress Concern Present (12/29/2024)    [employer] [employer] - Occupational Stress Questionnaire     Feeling of Stress : Not at all   Housing Stability: Low Risk  (12/29/2024)    Housing Stability Vital Sign     Unable to Pay for Housing in the Last Year: No     Homeless in the Last Year: No       FAMILY HISTORY:       Family History   Problem Relation Name Age of Onset    Cancer Sister      Liver cancer Sister      Melanoma Sister      Cancer Brother      Breast cancer Neg Hx      Psoriasis Neg Hx      Lupus Neg Hx      Eczema Neg Hx         ALLERGIES AND MEDICATIONS: updated and reviewed.      274}  Review of patient's allergies indicates:   Allergen Reactions    Prilosec [omeprazole] Hives     Medication List with Changes/Refills   Current Medications    ASCORBIC ACID (VITAMIN C ORAL)    Take by mouth once daily.    ASPIRIN (ECOTRIN) 81 MG EC TABLET    Take 81 mg by mouth once daily.    CARVEDILOL (COREG) 6.25 MG TABLET    Take 1 tablet (6.25 mg total) by mouth 2 (two) times daily.    CEFTRIAXONE (ROCEPHIN) 2 GRAM INJECTION    Inject 2 g into the vein once daily. for 23 days    CETIRIZINE HCL (ZYRTEC ORAL)    Take by mouth once daily.    FAMOTIDINE (PEPCID) 20 MG TABLET    Take 1 tablet (20 mg total) by mouth once daily.    FERROUS SULFATE (FEOSOL) TAB TABLET    Take 1 tablet by mouth 3 (three) times a week. 27mg    GABAPENTIN (NEURONTIN) 100 MG CAPSULE    Take 1 capsule (100 mg total) by mouth 3 (three) times daily.    IBANDRONATE (BONIVA) 150 MG TABLET    Take 1 tablet (150 mg total) by mouth every 30 days.    LACTOBACILLUS ACIDOPHILUS (ACIDOPHILUS ORAL)    Take by mouth once daily.    LEVOTHYROXINE (SYNTHROID) 75 MCG TABLET    TAKE 1 TABLET BY MOUTH EVERY DAY    LOSARTAN (COZAAR) 50 MG TABLET    Take 1 tablet (50 mg total)  by mouth once daily.    SIMVASTATIN (ZOCOR) 40 MG TABLET    Take 1 tablet (40 mg total) by mouth every evening. Need OFFICE VISIT before next refill, last seen 11/2023. This will be the LAST Rx if visit is not made.   Discontinued Medications    CAMPHOR-MENTHOL 0.5-0.5% (SARNA ORIGINAL) LOTION    Apply topically as needed for Itching.    TRIAMCINOLONE ACETONIDE 0.1% (KENALOG) 0.1 % CREAM    Apply topically 2 (two) times daily.       SCREENING HISTORY:    274}  Health Maintenance         Date Due Completion Date    TETANUS VACCINE Never done ---    RSV Vaccine (Age 60+ and Pregnant patients) (1 - 1-dose 75+ series) Never done ---    Shingles Vaccine (1 of 2) 12/18/2025 (Originally 2/11/1964) ---    COVID-19 Vaccine (3 - Pfizer risk series) 12/18/2025 (Originally 2/27/2021) 1/30/2021    Mammogram 12/03/2025 12/3/2024    DEXA Scan 08/13/2027 8/13/2024    Lipid Panel 04/09/2029 4/9/2024            REVIEW OF SYSTEMS:   Review of Systems   HENT:  Negative for hearing loss.    Eyes:  Negative for discharge.   Respiratory:  Negative for wheezing.    Cardiovascular:  Negative for chest pain and palpitations.   Gastrointestinal:  Negative for blood in stool, constipation, diarrhea and vomiting.   Genitourinary:  Negative for dysuria and hematuria.   Musculoskeletal:  Negative for neck pain.   Neurological:  Positive for weakness. Negative for headaches.   Endo/Heme/Allergies:  Negative for polydipsia.       PHYSICAL EXAM:      274}  There were no vitals taken for this visit.  Wt Readings from Last 3 Encounters:   12/31/24 54.4 kg (120 lb)   12/18/24 56.3 kg (124 lb 3.2 oz)   12/13/24 54.4 kg (120 lb)     BP Readings from Last 3 Encounters:   01/05/25 119/61   12/18/24 (!) 146/64   12/13/24 (!) 144/64     Estimated body mass index is 23.44 kg/m² as calculated from the following:    Height as of 12/31/24: 5' (1.524 m).    Weight as of 12/31/24: 54.4 kg (120 lb).     Physical Exam  Constitutional:       Appearance: Normal  appearance.   Skin:     Comments: Picc line in left UE clean, dressed, patent   Neurological:      Mental Status: She is alert.         LABS:   274}  I have reviewed old labs below:  Lab Results   Component Value Date    WBC 7.98 01/20/2025    HGB 7.8 (L) 01/20/2025    HCT 24.2 (L) 01/20/2025    MCV 98 01/20/2025     01/20/2025     01/20/2025    K 4.2 01/20/2025     01/20/2025    CALCIUM 9.1 01/20/2025    PHOS 2.7 01/01/2025    CO2 21 (L) 01/20/2025    GLU 80 01/20/2025    BUN 9 01/20/2025    CREATININE 0.7 01/20/2025    ANIONGAP 10 01/20/2025    ESTGFRAFRICA 99 04/13/2022    EGFRNONAA 86 04/13/2022    PROT 4.9 (L) 01/01/2025    ALBUMIN 3.1 (L) 01/01/2025    BILITOT 0.4 01/01/2025    ALKPHOS 59 01/01/2025    ALT 12 01/01/2025    AST 8 (L) 01/01/2025    INR 1.0 01/02/2025    CHOL 140 04/09/2024    TRIG 154 (H) 04/09/2024    HDL 66 04/09/2024    LDLCALC 50 04/09/2024    TSH 3.776 12/29/2024    HGBA1C 4.6 04/09/2024       ASSESSMENT AND PLAN:  274}  Assessment & Plan    IMPRESSION:  Reviewed recent hospitalization for sepsis and bacterial infection of unknown origin with viridans streptococcus   Noted blood cultures from early December positive for viridans streptococcus  Evaluated PICC line placement and functionality, managed and dressed by   Assessed blood pressure control with current medication regimen  Noted extensive workup including imaging and lumbar puncture for work up    CHRONIC COMBINED SYSTOLIC AND DIASTOLIC HEART FAILURE:  - Follow-up appointment scheduled with cardiologist Dr. Mccoy on Monday.  - Reviewed patient's blood pressure, which has improved from initially high levels in the hospital to a controlled 128.  - Current blood pressure medication regimen to be continued.    BACTERIAL INFECTION:  - Continue IV antibiotics via PICC line for approximately 2 more weeks, completing the 4-week course.  - Follow-up appointment with ID scheduled.  - Last blood culture from January 2nd was  negative, showing no bacterial growth.  - Ms. Burton is not experiencing any systemic symptoms such as fever, chills, or night sweats.  - Monitor patient through home health with regular blood cultures to assess the effectiveness of the antibiotic treatment.  - Note: Ms. Burton was recently hospitalized for a bacterial infection of unknown origin.    SKIN CONDITION:  - The condition has improved after 2 weeks of antibiotic treatment, now reduced to 2 somewhat painful but tolerable spots.  - Continue treating with 1% hydrocortisone cream and lidocaine for symptom relief.  - Appointment with dermatologist Dr. Van scheduled for January 5th for further evaluation.  - Previous skin biopsies from September and December of last year to be reviewed.    HYPERTENSION:  - Home blood pressure readings have been well-controlled.    THYROID DISORDER:  - Continue current thyroid medication regimen.    HYPERLIPIDEMIA:  - Continue current cholesterol medication regimen.    CERVICAL SPINE ARTHRITIS:  - CTs revealed evidence of arthritis in the patient's cervical spine.    MEDICATION ALLERGY:  - Documented patient's allergy to Prilosec, which caused hives and residual skin problems.    NERVE PAIN:  - Ms. Burton initially experienced a slight headache behind the left ear, initially controlled with Tylenol.  - Continue Gabapentin regimen for ongoing nerve pain management.    MEDICATIONS/SUPPLEMENTS:  - Continue low-dose aspirin regimen.        1. History of sepsis      No orders of the defined types were placed in this encounter.      Follow up in about 3 months (around 4/21/2025). or sooner as needed.

## 2025-01-22 ENCOUNTER — TELEPHONE (OUTPATIENT)
Dept: INFECTIOUS DISEASES | Facility: HOSPITAL | Age: 80
End: 2025-01-22
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
## 2025-01-27 ENCOUNTER — LAB VISIT (OUTPATIENT)
Dept: LAB | Facility: HOSPITAL | Age: 80
End: 2025-01-27
Attending: INTERNAL MEDICINE
Payer: MEDICARE

## 2025-01-27 DIAGNOSIS — A40.9 SEPTICEMIA, STREPTOCOCCAL: Primary | ICD-10-CM

## 2025-01-27 LAB
ANION GAP SERPL CALC-SCNC: 10 MMOL/L (ref 8–16)
BASOPHILS # BLD AUTO: 0.02 K/UL (ref 0–0.2)
BASOPHILS NFR BLD: 0.4 % (ref 0–1.9)
BUN SERPL-MCNC: 9 MG/DL (ref 8–23)
CALCIUM SERPL-MCNC: 9 MG/DL (ref 8.7–10.5)
CHLORIDE SERPL-SCNC: 104 MMOL/L (ref 95–110)
CO2 SERPL-SCNC: 21 MMOL/L (ref 23–29)
CREAT SERPL-MCNC: 0.8 MG/DL (ref 0.5–1.4)
CRP SERPL-MCNC: 28.7 MG/L (ref 0–8.2)
DIFFERENTIAL METHOD BLD: ABNORMAL
EOSINOPHIL # BLD AUTO: 0.4 K/UL (ref 0–0.5)
EOSINOPHIL NFR BLD: 6.9 % (ref 0–8)
ERYTHROCYTE [DISTWIDTH] IN BLOOD BY AUTOMATED COUNT: 16.3 % (ref 11.5–14.5)
ERYTHROCYTE [SEDIMENTATION RATE] IN BLOOD BY WESTERGREN METHOD: 16 MM/HR (ref 0–20)
EST. GFR  (NO RACE VARIABLE): >60 ML/MIN/1.73 M^2
GLUCOSE SERPL-MCNC: 209 MG/DL (ref 70–110)
HCT VFR BLD AUTO: 25.4 % (ref 37–48.5)
HGB BLD-MCNC: 7.9 G/DL (ref 12–16)
IMM GRANULOCYTES # BLD AUTO: 0.01 K/UL (ref 0–0.04)
IMM GRANULOCYTES NFR BLD AUTO: 0.2 % (ref 0–0.5)
LYMPHOCYTES # BLD AUTO: 1.1 K/UL (ref 1–4.8)
LYMPHOCYTES NFR BLD: 20.3 % (ref 18–48)
MCH RBC QN AUTO: 30.4 PG (ref 27–31)
MCHC RBC AUTO-ENTMCNC: 31.1 G/DL (ref 32–36)
MCV RBC AUTO: 98 FL (ref 82–98)
MONOCYTES # BLD AUTO: 0.4 K/UL (ref 0.3–1)
MONOCYTES NFR BLD: 7.3 % (ref 4–15)
NEUTROPHILS # BLD AUTO: 3.4 K/UL (ref 1.8–7.7)
NEUTROPHILS NFR BLD: 64.9 % (ref 38–73)
NRBC BLD-RTO: 0 /100 WBC
PLATELET # BLD AUTO: 166 K/UL (ref 150–450)
PMV BLD AUTO: 10 FL (ref 9.2–12.9)
POTASSIUM SERPL-SCNC: 3.4 MMOL/L (ref 3.5–5.1)
RBC # BLD AUTO: 2.6 M/UL (ref 4–5.4)
SODIUM SERPL-SCNC: 135 MMOL/L (ref 136–145)
WBC # BLD AUTO: 5.23 K/UL (ref 3.9–12.7)

## 2025-01-27 PROCEDURE — 36415 COLL VENOUS BLD VENIPUNCTURE: CPT | Performed by: INTERNAL MEDICINE

## 2025-01-27 PROCEDURE — 85025 COMPLETE CBC W/AUTO DIFF WBC: CPT | Performed by: INTERNAL MEDICINE

## 2025-01-27 PROCEDURE — 85651 RBC SED RATE NONAUTOMATED: CPT | Performed by: INTERNAL MEDICINE

## 2025-01-27 PROCEDURE — 80048 BASIC METABOLIC PNL TOTAL CA: CPT | Performed by: INTERNAL MEDICINE

## 2025-01-27 PROCEDURE — 86140 C-REACTIVE PROTEIN: CPT | Performed by: INTERNAL MEDICINE

## 2025-01-28 ENCOUNTER — OFFICE VISIT (OUTPATIENT)
Dept: INFECTIOUS DISEASES | Facility: CLINIC | Age: 80
End: 2025-01-28
Payer: MEDICARE

## 2025-01-28 VITALS
DIASTOLIC BLOOD PRESSURE: 62 MMHG | WEIGHT: 119.19 LBS | TEMPERATURE: 99 F | BODY MASS INDEX: 23.4 KG/M2 | HEIGHT: 60 IN | OXYGEN SATURATION: 98 % | SYSTOLIC BLOOD PRESSURE: 118 MMHG | HEART RATE: 92 BPM

## 2025-01-28 DIAGNOSIS — J01.00 ACUTE MAXILLARY SINUSITIS, RECURRENCE NOT SPECIFIED: ICD-10-CM

## 2025-01-28 DIAGNOSIS — A49.1 INFECTION BY STREPTOCOCCUS, VIRIDANS GROUP: Primary | ICD-10-CM

## 2025-01-28 DIAGNOSIS — M54.81 OCCIPITAL NEURALGIA OF LEFT SIDE: ICD-10-CM

## 2025-01-28 PROCEDURE — 99214 OFFICE O/P EST MOD 30 MIN: CPT | Mod: S$PBB,,, | Performed by: INTERNAL MEDICINE

## 2025-01-28 PROCEDURE — 99214 OFFICE O/P EST MOD 30 MIN: CPT | Mod: PBBFAC,PN | Performed by: INTERNAL MEDICINE

## 2025-01-28 PROCEDURE — 99999 PR PBB SHADOW E&M-EST. PATIENT-LVL IV: CPT | Mod: PBBFAC,,, | Performed by: INTERNAL MEDICINE
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
## 2025-01-28 NOTE — PATIENT INSTRUCTIONS
You will complete antibiotics 01/29/2025   PICC line will be removed after that   I will plan to see you again in 4 weeks   Also plan to do blood test around the time of your next visit

## 2025-01-29 ENCOUNTER — PATIENT MESSAGE (OUTPATIENT)
Dept: CARDIOLOGY | Facility: CLINIC | Age: 80
End: 2025-01-29
Payer: MEDICARE

## 2025-02-05 ENCOUNTER — OFFICE VISIT (OUTPATIENT)
Dept: DERMATOLOGY | Facility: CLINIC | Age: 80
End: 2025-02-05
Payer: MEDICARE

## 2025-02-05 DIAGNOSIS — L50.9 URTICARIAL DERMATITIS: ICD-10-CM

## 2025-02-05 DIAGNOSIS — L29.9 PRURITUS: Primary | ICD-10-CM

## 2025-02-05 PROCEDURE — 99214 OFFICE O/P EST MOD 30 MIN: CPT | Mod: S$GLB,,, | Performed by: STUDENT IN AN ORGANIZED HEALTH CARE EDUCATION/TRAINING PROGRAM

## 2025-02-05 RX ORDER — CLOBETASOL PROPIONATE 0.5 MG/G
CREAM TOPICAL
Qty: 60 G | Refills: 0 | Status: SHIPPED | OUTPATIENT
Start: 2025-02-05

## 2025-02-05 RX ORDER — GABAPENTIN 300 MG/1
300 CAPSULE ORAL 2 TIMES DAILY
Qty: 60 CAPSULE | Refills: 0 | Status: SHIPPED | OUTPATIENT
Start: 2025-02-05 | End: 2025-03-07

## 2025-02-05 RX ORDER — TACROLIMUS 1 MG/G
OINTMENT TOPICAL 2 TIMES DAILY
Qty: 60 G | Refills: 1 | Status: SHIPPED | OUTPATIENT
Start: 2025-02-05

## 2025-02-05 NOTE — PATIENT INSTRUCTIONS
Apply clobetasol on affected area twice daily x 2 weeks then switch to protopic ointment twice daily   Apply lidocaine cream as needed twice daily   Stop hydrocortisone cream   Increase gabapentin to 300mg twice daily
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
## 2025-02-10 ENCOUNTER — TELEPHONE (OUTPATIENT)
Dept: SPINE | Facility: CLINIC | Age: 80
End: 2025-02-10
Payer: MEDICARE

## 2025-02-10 NOTE — TELEPHONE ENCOUNTER
----- Message from Kayy sent at 2/10/2025  4:20 PM CST -----  Regarding: Call  Type:  Patient Returning Call    Who Called:Pt    Who Left Message for Patient:nurse    Does the patient know what this is regarding?:n/a    Would the patient rather a call back or a response via [facility]? Call    Best Call Back Number:[phone]    Additional Information: Pt is requesting a call back. Thanks

## 2025-02-10 NOTE — TELEPHONE ENCOUNTER
Called pt regarding appt with [facility], offered pt alternative provider Dr. Jaquan Garner with [facility], pt agreed with provider change, scheduled pt for new pt appt with Dr. Garner, cancelled appt with Magdalene Street PA-C.

## 2025-02-14 ENCOUNTER — OFFICE VISIT (OUTPATIENT)
Dept: SPINE | Facility: CLINIC | Age: 80
End: 2025-02-14
Payer: MEDICARE

## 2025-02-14 VITALS — HEIGHT: 60 IN | BODY MASS INDEX: 23.41 KG/M2 | WEIGHT: 119.25 LBS

## 2025-02-14 DIAGNOSIS — M50.30 DDD (DEGENERATIVE DISC DISEASE), CERVICAL: Primary | ICD-10-CM

## 2025-02-14 PROCEDURE — 99999 PR PBB SHADOW E&M-EST. PATIENT-LVL III: CPT | Mod: PBBFAC,,, | Performed by: PHYSICAL MEDICINE & REHABILITATION

## 2025-02-14 PROCEDURE — 99213 OFFICE O/P EST LOW 20 MIN: CPT | Mod: PBBFAC,PN | Performed by: PHYSICAL MEDICINE & REHABILITATION

## 2025-02-14 NOTE — PROGRESS NOTES
SUBJECTIVE:    Patient ID: Vivien Burton is a 80 y.o. female.    Chief Complaint: skin irritation    This is a 80-year-old woman who sees Dr. Moreira for her primary care.  History of hypertension hypothyroidism hypertension hyperlipidemia status post aortic valve replacement and coronary artery disease status post stent.  Remote history of breast cancer.  Presented to Dermatology on 02/05/2025 with complaints of a painful itchy rash in the lower neck and upper anterior chest.  Dermatology his suspected her symptoms were neurologic in nature and actually referred her for consultation with Neurology but she ended up being contacted by Neurosurgery who subsequently sent her to me.  Patient at 1 point had occipital posterior neck discomfort but that has resolved.  She has no radicular symptoms down her arms and no thoracic radicular symptoms.  She is not having any pain at all.  Dermatology put her on clobetasol and increase her gabapentin to 300 mg twice a day and she feels significantly better.      I personally reviewed an MRI of the cervical spine done 12/30/2024 which is summarized below:      FINDINGS:  Study is mild to moderately degraded by motion artifact.     Alignment: Reversal of the cervical lordosis, which may be secondary to positioning or muscle spasm.  Minimal anterolisthesis of C4 on C5 and retrolisthesis of C5 on C6.     Vertebral Column: Vertebral body heights are maintained. No acute fracture is identified.  No evidence of an aggressive bone marrow replacement process. Multilevel disc degeneration, most pronounced at C5-6 and C6-7 with mild-to-moderate intervertebral disc space narrowing, disc desiccation, anterior marginal osteophyte formation and posterior disc osteophyte complexes.     Cord: Normal signal and morphology.     Skull base and craniocervical junction: Normal.     Degenerative findings:     C2-C3: Minimal posterior disc osteophyte complex.  Mild bilateral facet arthropathy.   No significant neural foraminal or spinal canal stenosis.     C3-C4: Mild posterior disc osteophyte complex.  Moderate bilateral facet arthropathy.  Marked left and moderate right uncovertebral joint spurring.  Severe left and moderate right neural foraminal stenosis.  No significant spinal canal stenosis.     C4-C5: Mild posterior disc osteophyte complex.  Moderate bilateral facet arthropathy.  Moderate left and mild right uncovertebral joint spurring.  Moderate left and mild right neural foraminal stenosis.  No significant spinal canal stenosis.     C5-C6: Posterior disc osteophyte complex, which partially effaces the ventral thecal sac.  Moderate bilateral facet arthropathy.  Marked left and moderate right uncovertebral joint spurring.  Severe left and moderate right neural foraminal stenosis.  Ligamentum flavum thickening, which partially effaces the dorsal thecal sac.  Mild spinal canal stenosis.     C6-C7: Posterior disc osteophyte complex, which mildly effaces the ventral thecal sac.  Moderate bilateral facet arthropathy.  Moderate left and moderate right uncovertebral joint spurring.  Severe left and moderate right neural foraminal stenosis.  Ligamentum flavum thickening, which partially effaces the dorsal thecal sac.  Mild spinal canal stenosis.     C7-T1: The disk is normal in configuration.  Mild bilateral facet arthropathy.  Mild bilateral uncovertebral joint spurring.  Mild left greater than right neural foraminal stenosis.  No significant spinal canal stenosis.     Paraspinal muscles & soft tissues: Unremarkable.     Normal signal voids are present in the vertebral arteries.     No abnormal intraspinal enhancement identified.     Impression:     1. No evidence of acute fracture, spondylodiscitis, high-grade spinal canal stenosis.  No cord compression, focal cord signal abnormality, or abnormal intraspinal enhancement.  2. Multilevel degenerative changes of the cervical spine, as detailed above, with  findings most pronounced at C5-6 and C6-7 and resulting in severe left and moderate right neural foraminal and mild spinal canal stenosis.            Past Medical History:   Diagnosis Date    Acute on chronic combined systolic and diastolic heart failure 2022    Anemia     Basal cell carcinoma     Breast cancer     Breast cancer     Cancer     CHF (congestive heart failure)     Colon polyps     Coronary artery disease     GERD (gastroesophageal reflux disease)     Hyperlipidemia     Hypertension     Hypothyroidism     Nonrheumatic aortic (valve) stenosis 2018    Osteoporosis 2019    S/P TAVR (transcatheter aortic valve replacement) 2022    Squamous cell carcinoma of skin     Streptococcal bacteremia 2023    Thyroid disease     Transaminitis 2023     Social History     Socioeconomic History    Marital status:    Tobacco Use    Smoking status: Former     Current packs/day: 0.00     Types: Cigarettes     Quit date: 2000     Years since quittin.0    Smokeless tobacco: Never    Tobacco comments:     quit 20 years ago   Substance and Sexual Activity    Alcohol use: Yes     Alcohol/week: 2.0 standard drinks of alcohol     Types: 2 Shots of liquor per week     Comment: per pt 2 burbon drinks per night however none in last month    Drug use: No    Sexual activity: Not Currently     Social Drivers of Health     Financial Resource Strain: Low Risk  (2024)    Overall Financial Resource Strain (CARDIA)     Difficulty of Paying Living Expenses: Not very hard   Food Insecurity: No Food Insecurity (2024)    Hunger Vital Sign     Worried About Running Out of Food in the Last Year: Never true     Ran Out of Food in the Last Year: Never true   Transportation Needs: No Transportation Needs (2024)    TRANSPORTATION NEEDS     Transportation : No   Physical Activity: Sufficiently Active (11/15/2024)    Exercise Vital Sign     Days of Exercise per Week: 5 days      Minutes of Exercise per Session: 40 min   Stress: No Stress Concern Present (2024)    [employer] [place] [employer] - Occupational Stress Questionnaire     Feeling of Stress : Not at all   Housing Stability: Unknown (2024)    Housing Stability Vital Sign     Unable to Pay for Housing in the Last Year: No     Homeless in the Last Year: No     Past Surgical History:   Procedure Laterality Date    BREAST SURGERY      CARDIAC CATH COSURGEON N/A 2022    Procedure: Cardiac Cath Cosurgeon;  Surgeon: Dontae Wooten MD;  Location: [facility] CATH LAB;  Service: Cardiovascular;  Laterality: N/A;     SECTION, CLASSIC      COLONOSCOPY N/A 2018    Procedure: COLONOSCOPY;  Surgeon: Precious Castorena MD;  Location: [facility] ENDO;  Service: Endoscopy;  Laterality: N/A;    COLONOSCOPY N/A 3/31/2023    Procedure: COLONOSCOPY;  Surgeon: Mal Abrams MD;  Location: [facility] ENDO;  Service: Endoscopy;  Laterality: N/A;    HYSTERECTOMY      LEFT HEART CATHETERIZATION Left 2022    Procedure: Left heart cath;  Surgeon: Dontae Johns MD;  Location: [facility] CATH LAB;  Service: Cardiology;  Laterality: Left;    LEFT HEART CATHETERIZATION Left 2022    Procedure: Left heart cath;  Surgeon: Dontae Johns MD;  Location: [facility] CATH LAB;  Service: Cardiology;  Laterality: Left;    MASTECTOMY Right 2000    right breast      TONSILLECTOMY, ADENOIDECTOMY      TRANSCATHETER AORTIC VALVE REPLACEMENT (TAVR) N/A 2022    Procedure: REPLACEMENT, AORTIC VALVE, TRANSCATHETER (TAVR);  Surgeon: Dontae Johns MD;  Location: [facility] CATH LAB;  Service: Cardiology;  Laterality: N/A;     Family History   Problem Relation Name Age of Onset    Cancer Sister      Liver cancer Sister      Melanoma Sister      Cancer Brother      Breast cancer Neg Hx      Psoriasis Neg Hx      Lupus Neg Hx      Eczema Neg Hx       Vitals:    25 0954   Weight: 54.1 kg (119 lb 4.3 oz)   Height: 5' (1.524 m)       Review  of Systems   Constitutional:  Negative for chills, diaphoresis, fatigue, fever and unexpected weight change.   HENT:  Negative for trouble swallowing.    Eyes:  Negative for visual disturbance.   Respiratory:  Negative for shortness of breath.    Cardiovascular:  Negative for chest pain.   Gastrointestinal:  Negative for abdominal pain, constipation, nausea and vomiting.   Genitourinary:  Negative for difficulty urinating.   Musculoskeletal:  Negative for arthralgias, back pain, gait problem, joint swelling, myalgias, neck pain and neck stiffness.   Neurological:  Negative for dizziness, speech difficulty, weakness, light-headedness, numbness and headaches.          Objective:      Physical Exam  Neurological:      Mental Status: She is alert and oriented to person, place, and time.      Comments: She is awake and in no acute distress  Reflexes- +1-+2 reflexes at the following:   C5-Biceps   C6-Brachioradialis   C7-Triceps   L3/4-Patellar   S1-Achilles   Xena sign is negative bilaterally  Strength testing- 5/5 strength in the following muscle groups:  C5-Elbow flexion  C6-Wrist extension  C7-Elbow extension  C8-Finger flexion  T1-Finger abduction  L2-Hip flexion  L3-Knee extension  L4-Ankle dorsiflexion  L5-Great toe extension  S1-Ankle plantar flexion                    Assessment:       1. DDD (degenerative disc disease), cervical           Plan:     She has a nonfocal neurological examination and no historical red flags.  I reassured her there are no worrisome findings on her MRI.  She is improving with the treatment started with Dermatology.  I do not think this is a spine related issue.  I have advised the patient to continue as is for now and if her symptoms do not resolve to her satisfaction consider consultation with neurology that time.  She can follow up here as needed      DDD (degenerative disc disease), cervical

## 2025-02-17 ENCOUNTER — EXTERNAL HOME HEALTH (OUTPATIENT)
Dept: HOME HEALTH SERVICES | Facility: HOSPITAL | Age: 80
End: 2025-02-17
Payer: MEDICARE

## 2025-02-17 ENCOUNTER — OFFICE VISIT (OUTPATIENT)
Dept: CARDIOLOGY | Facility: CLINIC | Age: 80
End: 2025-02-17
Payer: MEDICARE

## 2025-02-17 VITALS
HEIGHT: 60 IN | BODY MASS INDEX: 23.75 KG/M2 | SYSTOLIC BLOOD PRESSURE: 192 MMHG | HEART RATE: 77 BPM | OXYGEN SATURATION: 98 % | DIASTOLIC BLOOD PRESSURE: 72 MMHG | WEIGHT: 121 LBS

## 2025-02-17 DIAGNOSIS — E88.09 HYPOALBUMINEMIA: ICD-10-CM

## 2025-02-17 DIAGNOSIS — Z95.2 HISTORY OF TRANSCATHETER AORTIC VALVE REPLACEMENT (TAVR): ICD-10-CM

## 2025-02-17 DIAGNOSIS — K21.00 GASTROESOPHAGEAL REFLUX DISEASE WITH ESOPHAGITIS WITHOUT HEMORRHAGE: ICD-10-CM

## 2025-02-17 DIAGNOSIS — I50.42 CHRONIC COMBINED SYSTOLIC AND DIASTOLIC HEART FAILURE: ICD-10-CM

## 2025-02-17 DIAGNOSIS — Z95.3 S/P TAVR (TRANSCATHETER AORTIC VALVE REPLACEMENT): ICD-10-CM

## 2025-02-17 DIAGNOSIS — F10.10 EXCESSIVE DRINKING OF ALCOHOL: ICD-10-CM

## 2025-02-17 DIAGNOSIS — I50.23 ACUTE ON CHRONIC HFREF (HEART FAILURE WITH REDUCED EJECTION FRACTION): ICD-10-CM

## 2025-02-17 DIAGNOSIS — Z91.89 CARDIOVASCULAR EVENT RISK: ICD-10-CM

## 2025-02-17 DIAGNOSIS — E87.6 HYPOKALEMIA: ICD-10-CM

## 2025-02-17 DIAGNOSIS — I11.0 LVH (LEFT VENTRICULAR HYPERTROPHY) DUE TO HYPERTENSIVE DISEASE, WITH HEART FAILURE: ICD-10-CM

## 2025-02-17 DIAGNOSIS — A40.9 SEPSIS DUE TO STREPTOCOCCUS SPECIES WITHOUT ACUTE ORGAN DYSFUNCTION: Primary | ICD-10-CM

## 2025-02-17 PROCEDURE — 99214 OFFICE O/P EST MOD 30 MIN: CPT | Mod: PBBFAC | Performed by: INTERNAL MEDICINE

## 2025-02-17 RX ORDER — TIZANIDINE 2 MG/1
4 TABLET ORAL EVERY 8 HOURS
COMMUNITY
Start: 2024-12-18 | End: 2025-02-17

## 2025-02-17 RX ORDER — CHOLECALCIFEROL (VITAMIN D3) 25 MCG
1000 TABLET ORAL DAILY
COMMUNITY

## 2025-02-17 RX ORDER — FAMOTIDINE 20 MG/1
20 TABLET, FILM COATED ORAL DAILY
Qty: 90 TABLET | Refills: 3 | Status: SHIPPED | OUTPATIENT
Start: 2025-02-17 | End: 2026-02-12

## 2025-02-17 RX ORDER — VIT C/E/ZN/COPPR/LUTEIN/ZEAXAN 250MG-90MG
500 CAPSULE ORAL 2 TIMES DAILY
COMMUNITY

## 2025-02-17 NOTE — PROGRESS NOTES
Subjective:    Patient ID:  Vivien Burton is a 80 y.o. female who presents for evaluation of Follow-up (3 week)    For critical AS with HFrEF, CAD post LAWRENCE 1/2022, post TAVR 2/2022 on DAPT, HLD on 20 mg of simvastatin, LDL-C greater than 70, post DC for sepsis 1/5/2025  Eye: Dr. Robertson  PCP: Jeri Moreira MD   Urologist and referred by daughter-in-law Dr. ANDREW [place] for markedly elevated BNP with SOB  Interventional cardiologist: Dontae Johns MD  Valve and Structural Heart Disease: Marley Rudolph PA-C, last seen 3/2022  Dermatologist: Arleth Van MD, [place]  Lives alone, no pets, son, Damari, comes and goes.   Retired     Health literacy: high   Vaccinations: up-to-date, completed COVID, had infection in 8/2024, no residual problem  Activities: do own housework, yard work and no more home schooling, no problem, not limited, do not get tired.  Nicotine: quit 1995, 30 years < 1 ppd  Alcohol: 2-3 shots daily, 4.5 oz of whiskey, max 3 shots in any 24 hours. Average 14 shots weekly  Illicit drugs: none  Cardiac symptoms: none  Home BP: 130 /70 to 80   Medication compliance: yes, taking iron 3 times weekly  Diet: regular, low salt  Caffeine: 1 cpd  Labs:   Lab Results   Component Value Date    TSH 3.776 12/29/2024        Lab Results   Component Value Date    LABA1C 4.7 06/21/2018    HGBA1C 4.6 04/09/2024       Lab Results   Component Value Date    WBC 5.23 01/27/2025    HGB 7.9 (L) 01/27/2025    HCT 25.4 (L) 01/27/2025    MCV 98 01/27/2025     01/27/2025       CMP @CBC  Sodium   Date Value Ref Range Status   01/27/2025 135 (L) 136 - 145 mmol/L Final     Potassium   Date Value Ref Range Status   01/27/2025 3.4 (L) 3.5 - 5.1 mmol/L Final     Chloride   Date Value Ref Range Status   01/27/2025 104 95 - 110 mmol/L Final     CO2   Date Value Ref Range Status   01/27/2025 21 (L) 23 - 29 mmol/L Final     Glucose   Date Value Ref Range Status   01/27/2025 209 (H) 70  - 110 mg/dL Final     BUN   Date Value Ref Range Status   01/27/2025 9 8 - 23 mg/dL Final     Creatinine   Date Value Ref Range Status   01/27/2025 0.8 0.5 - 1.4 mg/dL Final     Calcium   Date Value Ref Range Status   01/27/2025 9.0 8.7 - 10.5 mg/dL Final     Total Protein   Date Value Ref Range Status   01/01/2025 4.9 (L) 6.0 - 8.4 g/dL Final     Albumin   Date Value Ref Range Status   01/01/2025 3.1 (L) 3.5 - 5.2 g/dL Final     Total Bilirubin   Date Value Ref Range Status   01/01/2025 0.4 0.1 - 1.0 mg/dL Final     Comment:     For infants and newborns, interpretation of results should be based  on gestational age, weight and in agreement with clinical  observations.    Premature Infant recommended reference ranges:  Up to 24 hours.............<8.0 mg/dL  Up to 48 hours............<12.0 mg/dL  3-5 days..................<15.0 mg/dL  6-29 days.................<15.0 mg/dL       Alkaline Phosphatase   Date Value Ref Range Status   01/01/2025 59 55 - 135 U/L Final     AST   Date Value Ref Range Status   01/01/2025 8 (L) 10 - 40 U/L Final     ALT   Date Value Ref Range Status   01/01/2025 12 10 - 44 U/L Final     Anion Gap   Date Value Ref Range Status   01/27/2025 10 8 - 16 mmol/L Final     eGFR if    Date Value Ref Range Status   04/13/2022 99 > OR = 60 mL/min/1.73m2 Final     eGFR if non    Date Value Ref Range Status   04/13/2022 86 > OR = 60 mL/min/1.73m2 Final     @labrcntip(troponini)@    BNP   Date Value Ref Range Status   12/30/2024 1,659 (H) 0 - 99 pg/mL Final     Comment:     Values of less than 100 pg/ml are consistent with non-CHF populations.   }   Lab Results   Component Value Date    CHOL 140 04/09/2024    CHOL 141 10/17/2023    CHOL 144 10/04/2022     Lab Results   Component Value Date    HDL 66 04/09/2024    HDL 75 10/17/2023    HDL 71 10/04/2022     Lab Results   Component Value Date    LDLCALC 50 04/09/2024    LDLCALC 50 10/17/2023    LDLCALC 57 10/04/2022     Lab  "Results   Component Value Date    TRIG 154 (H) 04/09/2024    TRIG 76 10/17/2023    TRIG 77 10/04/2022     Lab Results   Component Value Date    CHOLHDL 2.1 04/09/2024    CHOLHDL 1.9 10/17/2023    CHOLHDL 2.0 10/04/2022     Lab Results   Component Value Date    IRON 84 04/09/2024    TIBC 375 04/09/2024    FERRITIN 135 04/09/2024      UA 8/2021 no protein.    Last Echo: 12/2024, [facility] 1/2/2025  Last stress test: 6/2018  Cardiovascular angiogram: 1/2022, with PCI  ECG: NSR, rate 83, normal  Fundoscopic exam: within the past year, negative for retinopathy    In 6/2018:  WF with history of HTN around the time breast CA diagnosis, treated with radiation to the right chest and chemo. Stopped HTN Rx about a year ago with normal BP. Here due to heart murmur, first detected in childhood and then no mention until recent examination. Active, own housework, own yard work, caring to grandchildren, no problem, occasional soreness. Quit smoking in 1998, after 15 pack-years, admit to drinking [place] about 7.5 oz daily. Get sleepy not drunk, do not drive after. ECG is normal, rate 87. Labs reviewed: LDL 57, ASCVD 10-year event risk of 11.3%, intermediate.     Echo read by Dr. Koehler - Conclusion   Left ventricle shows mild concentric hypertrophy.  Mitral valve shows mild regurgitation.  Tricuspid valve shows trace regurgitation.  There is mild valvular aortic stenosis.  Mild regurgitation is present in the aortic valve.  Left ventricular diastolic filling is abnormal.     In 12/2021, return at advise of daughter for severely elevated BNP. SOB with first pneumonia about a month ago and then second pneumonia this week. No CP. Continue with excess alcohol use.     Dr. BRETT Robert III noted 12/8/2021 "2 week f/u pt states she isn't feeling good. She states she is sob and it makes her very fatigue. She has abd pain, sore throat)"    CXR - Impression:     1. Bibasilar pulmonary infiltrates with small bilateral pleural effusions and bibasilar " "dependent atelectasis.  Correlate clinically with possible fever and/or elevated white count.  2. Underlying COPD with mild chronic changes of interstitial fibrosis.  3. Bone demineralization.    Stress test 6/2018 - Arrhythmias during stress: rare PACs.  The EKG portion of this study is negative for myocardial ischemia.  The test was stopped because and the patient complained of fatigue.  The patient reported shortness of breath during the stress test.  Negative for ischemia with good exercise tolerance, 7 METS    Carotid US 6/2018 - No significant stenosis noted  Homogenous plaques in the ICAs  Antegrade flows in the vertebral arteries    In 6/2022, return for CV follow up. Complex recent history with critical AS and HFrEF. Now no symptoms, able to do everything she wants to do. Remain with excess alcohol intake.     Marley Rudolph PA-C noted "TAVR Hospital Course:  Vivien Burton was admitted and underwent successful placement of a 26 mm Evolut TAVR via RTF access under MAC sedation on 2/22/22. Please see full cath report for details. A transthoracic echo was performed immediately post procedure which showed no paravalvular leak. An aortic valve maximum velocity through the aortic valve of 1.0 m/s. She was transported to the CCU in stable condition with a TVP and arterial line in place. She remained hemodynamically stable overnight. EKG remained stable post TAVR therefore no EP consult was warranted. This morning, she ambulated without difficulty and was eager to go home. It was felt she was stable for discharge and will go home on ASA/Plavix for antithrombotic therapy post TAVR.      Interval History  She is doing very well today with no complaint. Was able to ride in a [place] 1 week after TAVR. Denies SOB, CLIFFORD, LE swelling, weight gain, and orthopnea.     S/p LAD PCI with LAWRENCE on 1/7/22. DAPT for 1 year. Continue statin.  [facility] 1/2022 - The Prox LAD lesion was 99% stenosed with 0% stenosis " "post-intervention.  A STENT RESOLUTE DONTRELL [id number] stent was successfully placed at 14 LORRI for 10 sec.    Follow up with [facility] in 1 year with labs and Echo.  ASA indefinitely.   Plavix for 1 year post PCI (OK to discontinue 1/7/23).   No non sterile procedures which could cause endocarditis for 6 months post TAVR including dental work, endoscopy, colonoscopy, and  procedures.   SBE prophylaxis for life."    Echo 3/2022 - The left ventricle is normal in size with eccentric hypertrophy and low normal systolic function. The estimated ejection fraction is 50%.  The quantitatively derived ejection fraction is 48%.  Normal right ventricular size with normal right ventricular systolic function.  Grade II left ventricular diastolic dysfunction.  Moderate left atrial enlargement.  There is a 26 mm Evolut transcutaneously-placed aortic bioprosthesis present. There is trivial paravalvular aortic insufficiency present.  The aortic valve mean gradient is 9 mmHg with a dimensionless index of 0.46.  The estimated PA systolic pressure is 24 mmHg.  Normal central venous pressure (3 mmHg).    In 11/2022 for pre-endoscopy clearance. Feeling fine, do not tire anymore. Continue with excess alcohol and home [facility] always shows high reading which are way off.     Rosi Del Real NP noted 10/12/2022 "Pt to make f/u with Dr. Mccoy for cardiac clearance  If cleared, schedule EGD and c-scope    In 11/2023, return for annual review and cataract clearance. No heart issues, been remaining active without problem. Continue with excess alcohol use.    Marley Rudolph PA-C, Valve and Structural Heart Disease, noted in 2/2023  TTE 2/28/23  The left ventricle is mildly enlarged with normal systolic function. The estimated ejection fraction is 60%.  Normal right ventricular size with normal right ventricular systolic function.  Indeterminate left ventricular diastolic function.  There is a 26 mm Evolut Pro " transcutaneously-placed aortic bioprosthesis present. There is no aortic insufficiency present. Prosthetic aortic valve is normal.  The aortic valve mean gradient is 4 mmHg with a dimensionless index of 0.53.  Normal central venous pressure (3 mmHg).    S/P TAVR (transcatheter aortic valve replacement), 26 mm  Successful right transfemoral 26 mm Evolut Pro TAVR. No PVL reviewed on TTE today.      Coronary artery disease  S/p LAD PCI with LAWRENCE on 1/7/22. DAPT for 1 year. Continue statin.      Chronic combined systolic and diastolic heart failure, HFimpEF  Chronic. Controlled. Follow up with Cardiology/PCP.    Follow up with [facility] as needed.   ASA indefinitely.   SBE prophylaxis for life.    In 11/2024, return for annual review. Problem with severe allergy to Prilosec and used Medrol pack and then retry Prilosec and back on steroid. Elevated BP and dizziness, combine vertigo and lightheadedness. No falling. No other heart worries. Still doing binge [place].     HPI comments: in 2/2025, post DC for strep sepsis. No dental problem but had a skin and sinus infections. No heart worries.    CXR 1/3/2025 - placement of a left PICC line with the tip overlying the superior vena cava.  There is no pneumothorax.     There is atelectasis in left lung base.  There are no confluent infiltrates or pleural effusions.     The cardiac silhouette is normal in size.  There is an aortic endovascular stent.    [facility] 1/20/2025 -  Left Ventricle: The left ventricle is normal in size. There is normal systolic function with a visually estimated ejection fraction of 55 - 60%.    Right Ventricle: Normal right ventricular cavity size. Systolic function is normal.    Left Atrium: Left atrium is severely dilated. There is no thrombus in the left atrial appendage.    Right Atrium: Right atrium is moderately dilated.    Aortic Valve: There is a bioprosthetic valve in the aortic position.    Mitral Valve: There is mild regurgitation.     "Tricuspid Valve: There is mild regurgitation.    No clear evidence of endocarditis.  If clinical suspicion persist, consider repeat [facility] in 1 to 2 weeks    TTE 12/2024 -  Left Ventricle: The left ventricle is normal in size. Mildly increased wall thickness. There is concentric hypertrophy. There is normal systolic function with a visually estimated ejection fraction of 55 - 60%. There is normal diastolic function.    Right Ventricle: Normal right ventricular cavity size. Wall thickness is normal. Systolic function is normal.    Left Atrium: Left atrium is mildly dilated.    Aortic Valve: There is a transcatheter valve replacement in the aortic position. .  This has no obvious vegetation identified on this valve.  Adequate function is noted    Mitral Valve: The mitral valve is structurally normal. There is normal leaflet mobility.    Pulmonary Artery: There is mild pulmonary hypertension. The estimated pulmonary artery systolic pressure is 42 mmHg.    IVC/SVC: Normal venous pressure at 3 mmHg.    No echocardiographic evidence of vegetation noted    [facility] 1/5/2025 "admitted on 12/29 for severe headache/ left-sided neck pain for around a month, patient also has intermittent low-grade fever.  Workup shows severe elevated CRP/ ESR, elevated procalcitonin, with unclear infectious source.  CT chest abdomen and pelvis is negative for infectious source.  Patient also has mild elevated troponin most likely secondary to sepsis as per cardiologist Dr. SISSY Garvin, patient also has elevated BNP 1680, possible acute CHF, patient received IV Lasix, -2.5 L. patient apparently euvolemic now.  Patient has a series of imaging study including CT head/ CT neck/ MRI of the cervical/ MRI of the brain which is insignificant other than shows significant cervical spondylosis.  Eventually the patient had LP 12/31 which showed RBC 2, WBC 2, essentially negative.   Patient had TTE 12/31 LVEF 55-60%, no significant valvular vegetations.   Now patient " "2/2 blood culture showed strep viridans, possible source, patient does have very bad dental hygiene, patient is restarted on IV Rocephin by ID.  Regulo negative for vegetations.  Regarding neck pain,initially concern for patient's may have GCA, steroids started and discontinued once thought it is less likely.  Neurologist following. Gabapentin was uptitrated, patient had significant improvement in neck pain.   Patient also found to rhinovirus positive, continue droplet precaution.   MRV of the brain is done and negative for dural venous sinus thrombosis   Regarding her CHF, chest x-ray showed significant improvement in fluid status was euvolemic.  Infectious diseases planning for PICC line with outpatient antibiotics for 4 weeks.  Discussed REGULO with Dr. Barnes and no vegetation seen so antibiotic recs are for 4 weeks of IV ceftriaxone at home with PICC line.  PICC line can be removed after completion of antibiotics. Left maxillary sinusitis.  On antibiotics for 10 days for this indication this indication and Infectious Disease recommended the patient follow up with her dentist while she is on the IV antibiotics."      Review of Systems   Constitutional: Positive for weight loss. Negative for chills, decreased appetite, diaphoresis (down 4 lbs from 11/2024), fever, malaise/fatigue, night sweats and weight gain.        Weight stable from 6/2018   HENT:  Negative for hearing loss, hoarse voice, nosebleeds, sore throat and tinnitus.    Eyes:  Negative for discharge, double vision, pain and visual disturbance.   Cardiovascular:  Negative for chest pain, claudication, cyanosis, irregular heartbeat, leg swelling, near-syncope, orthopnea, palpitations and paroxysmal nocturnal dyspnea. Dyspnea on exertion: with grass work.  Respiratory:  Negative for cough, shortness of breath, sleep disturbances due to breathing, snoring, sputum production and wheezing.         [employer] score 0, today a 5, awaken refreshed.   Endocrine: Negative " "for cold intolerance, heat intolerance, polydipsia and polyuria.   Hematologic/Lymphatic: Negative for bleeding problem. Bruises/bleeds easily.   Skin:  Positive for dry skin. Negative for color change, flushing, nail changes, poor wound healing and suspicious lesions.   Musculoskeletal:  Negative for arthritis, falls, gout, joint pain, joint swelling, muscle cramps, muscle weakness, myalgias and neck pain.   Gastrointestinal:  Positive for heartburn. Negative for bloating, change in bowel habit, constipation, diarrhea, dysphagia, hematemesis, hematochezia, jaundice, melena, nausea and vomiting.   Genitourinary:  Negative for bladder incontinence, dysuria, frequency, hematuria and hesitancy.   Neurological:  Negative for disturbances in coordination, excessive daytime sleepiness, focal weakness, loss of balance, numbness, paresthesias, seizures and weakness. Headaches: sinus.  Psychiatric/Behavioral:  Negative for depression, memory loss and substance abuse. The patient does not have insomnia and is not nervous/anxious.         Some claustrophobia   Allergic/Immunologic: Positive for environmental allergies.     Answers submitted by the patient for this visit:  Review of Symptoms (Submitted on 2/10/2025)  Sweats?: Yes  chest tightness: No  Difficulty breathing when lying down?: No  syncope: No       Objective:    Physical Exam  Constitutional:       Appearance: She is well-developed.      Comments: RA O2 sat 98%  Orthostatic BP: sitting 135/65 standing 192/72   HENT:      Head: Normocephalic.   Eyes:      Conjunctiva/sclera: Conjunctivae normal.      Pupils: Pupils are equal, round, and reactive to light.   Neck:      Thyroid: No thyromegaly.      Vascular: No JVD.      Comments: Circumference 12"  Cardiovascular:      Rate and Rhythm: Normal rate and regular rhythm.      Pulses: Intact distal pulses.           Carotid pulses are 2+ on the right side and 2+ on the left side.       Radial pulses are 2+ on the right " "side and 2+ on the left side.        Dorsalis pedis pulses are 1+ on the right side and 1+ on the left side.        Posterior tibial pulses are 1+ on the right side and 1+ on the left side.      Heart sounds: Murmur heard.      Early systolic murmur is present with a grade of 2/6.      No friction rub. No gallop.   Pulmonary:      Effort: Pulmonary effort is normal.      Breath sounds: Rales: left basiler.      Comments: Diminished breath sounds and prolong expiration.  Chest:      Chest wall: No tenderness.   Abdominal:      General: Bowel sounds are normal.      Palpations: Abdomen is soft.      Tenderness: There is no abdominal tenderness.      Comments: Waist 31.5", hip 35"   Musculoskeletal:         General: Normal range of motion.      Cervical back: Normal range of motion and neck supple.   Lymphadenopathy:      Cervical: No cervical adenopathy.   Skin:     General: Skin is warm and dry.      Findings: Rash: mild stasis dermatitis.      Comments: Decreased skin turgor    Neurological:      Mental Status: She is alert and oriented to person, place, and time.           Assessment:       1. Sepsis due to Streptococcus species without acute organ dysfunction    2. Cardiovascular event risk, ASCVD 10-year risk 11.3%, on simvastatin 20 mg, 2017    3. LVH (left ventricular hypertrophy) due to hypertensive disease, with heart failure    4. Acute on chronic HFrEF (heart failure with reduced ejection fraction)    5. S/P TAVR (transcatheter aortic valve replacement), 26 mm    6. Chronic combined systolic and diastolic heart failure    7. Excessive drinking of alcohol    8. Hypokalemia    9. Hypoalbuminemia    10. History of transcatheter aortic valve replacement (TAVR)           Plan:       Vivien was seen today for follow-up.    Diagnoses and all orders for this visit:    Sepsis due to Streptococcus species without acute organ dysfunction    Cardiovascular event risk, ASCVD 10-year risk 11.3%, on simvastatin 20 mg, " 2017    LVH (left ventricular hypertrophy) due to hypertensive disease, with heart failure    Acute on chronic HFrEF (heart failure with reduced ejection fraction)    S/P TAVR (transcatheter aortic valve replacement), 26 mm    Chronic combined systolic and diastolic heart failure    Excessive drinking of alcohol    Hypokalemia    Hypoalbuminemia    History of transcatheter aortic valve replacement (TAVR)    - All medical issues reviewed, continue current Rx  - Warning signs of MI and stroke given, if symptoms last more than 5 minutes, stop immediately and call 911, then chew 2-4 low-dose ASA (81 mg).  - Consider use of Potassium chloride salt substitute, Perdomo Nu-Salt.   - Instruction for Mediterranean, high potassium diet and heart healthy exercise given.  - Check home blood pressure, 2 days weekly, do 2 readings within 5 minutes in AM and PM, keep log for review. Target resting BP is less than 130/80.  - Need good exercise program, 4 key elements: 1. Aerobic (walking, swimming, dancing, jogging, biking, etc, 2. Muscle strengthening / resistance exercise, need to do 2-3 times weekly, 3. Stretching daily, good stretch takes a whole  total minute. 4. Balance exercise daily.  - Highly recommend 30-60 minutes of exercise daily, can have Sunday off, with 2-3 sessions of muscle strengthening weekly. A  would be very helpful.  - Discussed healthy daily limit of 0.5 oz of pure alcohol in any 24 hours (roughly 1 12-oz beers, 5 oz of wine, or 0.75 oz of liquor (80 proof)), can not save up.  - Recommend healthy living: moderate alcohol, healthy diet and regular exercise aiming for fitness, restorative sleep and weight control  - Consider Yoga, Siddhartha-Chi and or meditation  - Will keep prior follow up appointment on 11/21/2025. Patient's preference.  - Phone review / encourage use of [facility]         Patient Active Problem List   Diagnosis    GERD (gastroesophageal reflux disease)    Breast cancer    Unspecified  hypothyroidism    Breast cancer, post right chest radiation    White coat syndrome with diagnosis of hypertension    Malignant neoplasm of female breast    Encounter for long-term (current) use of medications    Headache(784.0)    Excessive drinking of alcohol    Heart murmur, since childhood    Dyslipidemia, baseline     Cardiovascular event risk, ASCVD 10-year risk 11.3%, on simvastatin 20 mg, 2017    Bilateral carotid bruits    Nonrheumatic aortic (valve) stenosis    ISIS (generalized anxiety disorder)    Family history of colon cancer    Elevated brain natriuretic peptide (BNP) level    Anemia    LVH (left ventricular hypertrophy) due to hypertensive disease, with heart failure    Acute on chronic HFrEF (heart failure with reduced ejection fraction)    S/P TAVR (transcatheter aortic valve replacement), 26 mm    Chronic combined systolic and diastolic heart failure, HFimpEF    Coronary artery disease    Aortic atherosclerosis    Status post insertion of drug eluting coronary artery stent    Iron deficiency    Hypokalemia    Pseudopolyposis of colon without complication, unspecified part of colon    Protein-calorie malnutrition, unspecified severity    Degenerative disease of nervous system, unspecified    Hypertriglyceridemia    Allergy history, drug, Prilosec    HTN (hypertension)    History of transcatheter aortic valve replacement (TAVR)    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 Greater than 50% of the time was spent in counseling and coordination of care. The above assessment and plan have been discussed at length. Referring provider's note reviewed. Labs and procedure over the last 6 months reviewed. Problem List updated. Asked to bring in all active medications / pills bottles  with next visit. Will send note to referring / PCP.

## 2025-02-24 ENCOUNTER — DOCUMENT SCAN (OUTPATIENT)
Dept: HOME HEALTH SERVICES | Facility: HOSPITAL | Age: 80
End: 2025-02-24
Payer: MEDICARE

## 2025-02-27 ENCOUNTER — OFFICE VISIT (OUTPATIENT)
Dept: INFECTIOUS DISEASES | Facility: CLINIC | Age: 80
End: 2025-02-27
Payer: MEDICARE

## 2025-02-27 ENCOUNTER — RESULTS FOLLOW-UP (OUTPATIENT)
Dept: INFECTIOUS DISEASES | Facility: HOSPITAL | Age: 80
End: 2025-02-27

## 2025-02-27 ENCOUNTER — LAB VISIT (OUTPATIENT)
Dept: LAB | Facility: HOSPITAL | Age: 80
End: 2025-02-27
Attending: INTERNAL MEDICINE
Payer: MEDICARE

## 2025-02-27 VITALS
SYSTOLIC BLOOD PRESSURE: 140 MMHG | DIASTOLIC BLOOD PRESSURE: 84 MMHG | HEART RATE: 67 BPM | TEMPERATURE: 98 F | BODY MASS INDEX: 23.72 KG/M2 | WEIGHT: 120.81 LBS | OXYGEN SATURATION: 97 % | HEIGHT: 60 IN

## 2025-02-27 DIAGNOSIS — J01.00 ACUTE MAXILLARY SINUSITIS, RECURRENCE NOT SPECIFIED: ICD-10-CM

## 2025-02-27 DIAGNOSIS — M54.81 OCCIPITAL NEURALGIA OF LEFT SIDE: ICD-10-CM

## 2025-02-27 DIAGNOSIS — A49.1 INFECTION BY STREPTOCOCCUS, VIRIDANS GROUP: Primary | ICD-10-CM

## 2025-02-27 DIAGNOSIS — A49.1 INFECTION BY STREPTOCOCCUS, VIRIDANS GROUP: ICD-10-CM

## 2025-02-27 LAB
BASOPHILS # BLD AUTO: 0.07 K/UL (ref 0–0.2)
BASOPHILS NFR BLD: 0.9 % (ref 0–1.9)
CRP SERPL-MCNC: 4.5 MG/L (ref 0–8.2)
DIFFERENTIAL METHOD BLD: ABNORMAL
EOSINOPHIL # BLD AUTO: 0.5 K/UL (ref 0–0.5)
EOSINOPHIL NFR BLD: 6.8 % (ref 0–8)
ERYTHROCYTE [DISTWIDTH] IN BLOOD BY AUTOMATED COUNT: 14.8 % (ref 11.5–14.5)
ERYTHROCYTE [SEDIMENTATION RATE] IN BLOOD BY WESTERGREN METHOD: 12 MM/HR (ref 0–20)
HCT VFR BLD AUTO: 35.6 % (ref 37–48.5)
HGB BLD-MCNC: 11.4 G/DL (ref 12–16)
IMM GRANULOCYTES # BLD AUTO: 0.04 K/UL (ref 0–0.04)
IMM GRANULOCYTES NFR BLD AUTO: 0.5 % (ref 0–0.5)
LYMPHOCYTES # BLD AUTO: 1.9 K/UL (ref 1–4.8)
LYMPHOCYTES NFR BLD: 23.6 % (ref 18–48)
MCH RBC QN AUTO: 29.6 PG (ref 27–31)
MCHC RBC AUTO-ENTMCNC: 32 G/DL (ref 32–36)
MCV RBC AUTO: 93 FL (ref 82–98)
MONOCYTES # BLD AUTO: 0.5 K/UL (ref 0.3–1)
MONOCYTES NFR BLD: 6.4 % (ref 4–15)
NEUTROPHILS # BLD AUTO: 4.9 K/UL (ref 1.8–7.7)
NEUTROPHILS NFR BLD: 61.8 % (ref 38–73)
NRBC BLD-RTO: 0 /100 WBC
PLATELET # BLD AUTO: 206 K/UL (ref 150–450)
PMV BLD AUTO: 9.5 FL (ref 9.2–12.9)
RBC # BLD AUTO: 3.85 M/UL (ref 4–5.4)
WBC # BLD AUTO: 7.98 K/UL (ref 3.9–12.7)

## 2025-02-27 PROCEDURE — 36415 COLL VENOUS BLD VENIPUNCTURE: CPT | Performed by: INTERNAL MEDICINE

## 2025-02-27 PROCEDURE — 85025 COMPLETE CBC W/AUTO DIFF WBC: CPT | Performed by: INTERNAL MEDICINE

## 2025-02-27 PROCEDURE — 86140 C-REACTIVE PROTEIN: CPT | Performed by: INTERNAL MEDICINE

## 2025-02-27 PROCEDURE — 99214 OFFICE O/P EST MOD 30 MIN: CPT | Mod: PBBFAC,PN | Performed by: INTERNAL MEDICINE

## 2025-02-27 PROCEDURE — 85651 RBC SED RATE NONAUTOMATED: CPT | Performed by: INTERNAL MEDICINE

## 2025-02-27 PROCEDURE — 99999 PR PBB SHADOW E&M-EST. PATIENT-LVL IV: CPT | Mod: PBBFAC,,, | Performed by: INTERNAL MEDICINE

## 2025-02-27 NOTE — PATIENT INSTRUCTIONS
I have orders and blood tests for you including inflammatory markers and CBC   We will call with results   We will plan for now to see you 1 more time in about 6 weeks unless things change

## 2025-02-27 NOTE — PROGRESS NOTES
Subjective:      Reason for consult:     HPI: Vivien Burton is a 80 y.o. female with chronic medical problems including hypothyroidism, hypertension, CAD, hyperlipidemia and GERD. She has left-sided headache of about 2-3 months' duration that got worse in the last 2 weeks. Pain starts at focal point around the left mastoid and we will sometimes radiate to the neck. It is stabbing in character and feels like hospitalist. Also has dysphagia she touches her left scab Staph from the midline on the left all the way down in temporal area. The pain is not constant but intermittent. Tylenol helps sometimes. Presented to the emergency room because it was bothersome. She has no fever or jaw pain. No visual problems. No other joint involved. She is very active and ADL independent. She had a similar episode on the right about 2 years ago that resolved after a course of steroid. Had not relapsed on the right since then.     Antimicrobials   Vancomycin: 12/29/2024 x1 dose   Valtrex for/20 09/24/2012/30/24   Doxycycline:  12/29/2024 x1 dose   Cefepime:  12/29/2024-12/30/2024   Augmentin:  12/30/2024 x1 dose   Ceftriaxone:  12/30/2024-     Microbiology  Blood culture 12/29/2024:  Viridans Streptococcus 2/2 sets   Respiratory panel 12/30/2024: Rhinovirus/enterovirus  Blood culture 12/31/2024:  NGTD   CSF culture 12/31/2024:  NGTD  CSF meningitis panel 12/31/2024: Negative    01/28/2025: She is here for follow up.  Recently hospitalized with chronic non resolving posterior headache.  She was diagnosed with occipital neuralgia.  Blood culture was also positive with strep viridans.  TTE and KAISER were both negative.  Decision was made to treat for 4 weeks because she has TAVR.  She missed about 2 days of antibiotics due to the snow storm.  She is scheduled to complete antibiotics 01/29/2025.  Her headache has improved on gabapentin.  Upper chest rash continues to improve.  Has seen her dentist says discharge and was informed  she does not have any dental infection.    25:  She is feeling great.  Left occipital headache pretty much resolved.  Gabapentin was increased to 300 mg twice daily by her dermatologist.  Also since last visit she has been started on tacrolimus cream for her rash.  It has also improved.  She completed Antibiotics 2025.  No other acute issues at this time.    Review of patient's allergies indicates:   Allergen Reactions    Prilosec [omeprazole] Hives     Past Medical History:   Diagnosis Date    Acute on chronic combined systolic and diastolic heart failure 2022    Anemia     Basal cell carcinoma     Breast cancer     Breast cancer     Cancer     CHF (congestive heart failure)     Colon polyps     Coronary artery disease     GERD (gastroesophageal reflux disease)     Hyperlipidemia     Hypertension     Hypothyroidism     Nonrheumatic aortic (valve) stenosis 2018    Osteoporosis 2019    S/P TAVR (transcatheter aortic valve replacement) 2022    Squamous cell carcinoma of skin     Streptococcal bacteremia 2023    Thyroid disease     Transaminitis 2023     Past Surgical History:   Procedure Laterality Date    BREAST SURGERY      CARDIAC CATH COSURGEON N/A 2022    Procedure: Cardiac Cath Cosurgeon;  Surgeon: Dontae Wooten MD;  Location: [facility] CATH LAB;  Service: Cardiovascular;  Laterality: N/A;     SECTION, CLASSIC      COLONOSCOPY N/A 2018    Procedure: COLONOSCOPY;  Surgeon: Precious Castorena MD;  Location: [facility] ENDO;  Service: Endoscopy;  Laterality: N/A;    COLONOSCOPY N/A 3/31/2023    Procedure: COLONOSCOPY;  Surgeon: Mal Abrams MD;  Location: [facility] ENDO;  Service: Endoscopy;  Laterality: N/A;    HYSTERECTOMY      LEFT HEART CATHETERIZATION Left 2022    Procedure: Left heart cath;  Surgeon: Dontae Johns MD;  Location: [facility] CATH LAB;  Service: Cardiology;  Laterality: Left;    LEFT HEART CATHETERIZATION Left 2022     Procedure: Left heart cath;  Surgeon: Dontae Johns MD;  Location: [facility] CATH LAB;  Service: Cardiology;  Laterality: Left;    MASTECTOMY Right 2000    right breast      TONSILLECTOMY, ADENOIDECTOMY      TRANSCATHETER AORTIC VALVE REPLACEMENT (TAVR) N/A 2022    Procedure: REPLACEMENT, AORTIC VALVE, TRANSCATHETER (TAVR);  Surgeon: Dontae Johns MD;  Location: [facility] CATH LAB;  Service: Cardiology;  Laterality: N/A;      Social History     Tobacco Use    Smoking status: Former     Current packs/day: 0.00     Types: Cigarettes     Quit date: 2000     Years since quittin.0    Smokeless tobacco: Never    Tobacco comments:     quit 20 years ago   Substance Use Topics    Alcohol use: Yes     Alcohol/week: 2.0 standard drinks of alcohol     Types: 2 Shots of liquor per week     Comment: per pt 2 burbon drinks per night however none in last month     Family History   Problem Relation Name Age of Onset    Cancer Sister      Liver cancer Sister      Melanoma Sister      Cancer Brother      Breast cancer Neg Hx      Psoriasis Neg Hx      Lupus Neg Hx      Eczema Neg Hx         Pertinent medications noted:     Review of Systems  10 system review unremarkable.    Outdoor activities:  ADL independent.  Travel:  No recent travel  Implants:  TAVR   Antibiotic History:  As in HPI      Objective:      Blood pressure (!) 140/84, pulse 67, temperature 97.6 °F (36.4 °C), temperature source Temporal, height 5' (1.524 m), weight 54.8 kg (120 lb 12.8 oz), SpO2 97%. Body mass index is 23.59 kg/m².  Physical Exam      General:  Pleasant well-groomed elderly woman in no acute distress   HEENT neck is supple.  No lesions appreciated   CVS:  S1-S2 heard, systolic murmur   Respiratory: Clear to auscultation   Abdomen: Full, soft, nontender, no palpable organomegaly   CNS: No focal deficits   Musculoskeletal: No joint abnormalities appreciated  Psychiatric: Normal mood, speech,  demeanor     Wound:  None    VAC:  none      General Labs reviewed:  Lab Results   Component Value Date    WBC 5.23 01/27/2025    RBC 2.60 (L) 01/27/2025    HGB 7.9 (L) 01/27/2025    HCT 25.4 (L) 01/27/2025    MCV 98 01/27/2025    MCH 30.4 01/27/2025    MCHC 31.1 (L) 01/27/2025    RDW 16.3 (H) 01/27/2025     01/27/2025    MPV 10.0 01/27/2025    GRAN 3.4 01/27/2025    GRAN 64.9 01/27/2025    LYMPH 1.1 01/27/2025    LYMPH 20.3 01/27/2025    MONO 0.4 01/27/2025    MONO 7.3 01/27/2025    EOS 0.4 01/27/2025    BASO 0.02 01/27/2025    EOSINOPHIL 6.9 01/27/2025    BASOPHIL 0.4 01/27/2025     CMP  Sodium   Date Value Ref Range Status   01/27/2025 135 (L) 136 - 145 mmol/L Final     Potassium   Date Value Ref Range Status   01/27/2025 3.4 (L) 3.5 - 5.1 mmol/L Final     Chloride   Date Value Ref Range Status   01/27/2025 104 95 - 110 mmol/L Final     CO2   Date Value Ref Range Status   01/27/2025 21 (L) 23 - 29 mmol/L Final     Glucose   Date Value Ref Range Status   01/27/2025 209 (H) 70 - 110 mg/dL Final     BUN   Date Value Ref Range Status   01/27/2025 9 8 - 23 mg/dL Final     Creatinine   Date Value Ref Range Status   01/27/2025 0.8 0.5 - 1.4 mg/dL Final     Calcium   Date Value Ref Range Status   01/27/2025 9.0 8.7 - 10.5 mg/dL Final     Total Protein   Date Value Ref Range Status   01/01/2025 4.9 (L) 6.0 - 8.4 g/dL Final     Albumin   Date Value Ref Range Status   01/01/2025 3.1 (L) 3.5 - 5.2 g/dL Final     Total Bilirubin   Date Value Ref Range Status   01/01/2025 0.4 0.1 - 1.0 mg/dL Final     Comment:     For infants and newborns, interpretation of results should be based  on gestational age, weight and in agreement with clinical  observations.    Premature Infant recommended reference ranges:  Up to 24 hours.............<8.0 mg/dL  Up to 48 hours............<12.0 mg/dL  3-5 days..................<15.0 mg/dL  6-29 days.................<15.0 mg/dL       Alkaline Phosphatase   Date Value Ref Range Status   01/01/2025 59 55 - 135 U/L Final     AST    Date Value Ref Range Status   01/01/2025 8 (L) 10 - 40 U/L Final     ALT   Date Value Ref Range Status   01/01/2025 12 10 - 44 U/L Final     Anion Gap   Date Value Ref Range Status   01/27/2025 10 8 - 16 mmol/L Final     eGFR   Date Value Ref Range Status   01/27/2025 >60.0 >60 mL/min/1.73 m^2 Final   04/24/2024 76 > OR = 60 mL/min/1.73m2 Final           Micro:  Microbiology Results (last 7 days)       ** No results found for the last 168 hours. **          Imaging Reviewed:          Assessment:     1. Left occipital neuralgia.  Improved on gabapentin.  Management as per her PCP.       2. Left maxillary sinusitis.  Completed antibiotics for this indication.  Resolved.     3. 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  No other focus of infection identified for this clinically.  TTE and elsie negative for vegetation.  Completed plan 4 week course of antibiotics 01/29/2025.  We will check CRP and ESR today.  This has clinically resolved.    4. Anemia.  Related to above.  Check CBC.    I will see again in 6 weeks if needed.  We will call with results.  She will continue to follow up with her PCP, cardiologist and dermatologist.  Please call with any questions.    Problem List Items Addressed This Visit    None       Plan:         As above  There are no diagnoses linked to this encounter.    This note was created using Dragon voice recognition software that occasionally misinterpreted phrases or words.

## 2025-03-12 ENCOUNTER — RESULTS FOLLOW-UP (OUTPATIENT)
Dept: FAMILY MEDICINE | Facility: CLINIC | Age: 80
End: 2025-03-12
Payer: MEDICARE

## 2025-03-17 ENCOUNTER — DOCUMENT SCAN (OUTPATIENT)
Dept: HOME HEALTH SERVICES | Facility: HOSPITAL | Age: 80
End: 2025-03-17
Payer: MEDICARE

## 2025-03-28 ENCOUNTER — TELEPHONE (OUTPATIENT)
Dept: FAMILY MEDICINE | Facility: CLINIC | Age: 80
End: 2025-03-28
Payer: MEDICARE

## 2025-03-31 ENCOUNTER — TELEPHONE (OUTPATIENT)
Dept: FAMILY MEDICINE | Facility: CLINIC | Age: 80
End: 2025-03-31
Payer: MEDICARE

## 2025-03-31 NOTE — TELEPHONE ENCOUNTER
Called patient to reschedule her appointment with Dr. Moreira  from 04/21/25  to 04/07/25 8:00am

## 2025-04-07 ENCOUNTER — OFFICE VISIT (OUTPATIENT)
Dept: FAMILY MEDICINE | Facility: CLINIC | Age: 80
End: 2025-04-07
Payer: MEDICARE

## 2025-04-07 VITALS
BODY MASS INDEX: 23.95 KG/M2 | HEIGHT: 60 IN | HEART RATE: 68 BPM | DIASTOLIC BLOOD PRESSURE: 64 MMHG | SYSTOLIC BLOOD PRESSURE: 136 MMHG | WEIGHT: 122 LBS | RESPIRATION RATE: 15 BRPM | OXYGEN SATURATION: 100 %

## 2025-04-07 DIAGNOSIS — C50.911 MALIGNANT NEOPLASM OF RIGHT FEMALE BREAST, UNSPECIFIED ESTROGEN RECEPTOR STATUS, UNSPECIFIED SITE OF BREAST: ICD-10-CM

## 2025-04-07 DIAGNOSIS — Z13.1 DIABETES MELLITUS SCREENING: ICD-10-CM

## 2025-04-07 DIAGNOSIS — K51.40 PSEUDOPOLYPOSIS OF COLON WITHOUT COMPLICATION, UNSPECIFIED PART OF COLON: ICD-10-CM

## 2025-04-07 DIAGNOSIS — I70.0 AORTIC ATHEROSCLEROSIS: ICD-10-CM

## 2025-04-07 DIAGNOSIS — R79.9 ABNORMAL FINDING OF BLOOD CHEMISTRY, UNSPECIFIED: ICD-10-CM

## 2025-04-07 DIAGNOSIS — Z79.899 OTHER LONG TERM (CURRENT) DRUG THERAPY: ICD-10-CM

## 2025-04-07 DIAGNOSIS — I10 HYPERTENSION, UNSPECIFIED TYPE: Primary | ICD-10-CM

## 2025-04-07 DIAGNOSIS — E03.9 HYPOTHYROIDISM, UNSPECIFIED TYPE: ICD-10-CM

## 2025-04-07 DIAGNOSIS — Z00.00 ANNUAL VISIT FOR GENERAL ADULT MEDICAL EXAMINATION WITHOUT ABNORMAL FINDINGS: ICD-10-CM

## 2025-04-07 PROCEDURE — 99999 PR PBB SHADOW E&M-EST. PATIENT-LVL IV: CPT | Mod: PBBFAC,,, | Performed by: FAMILY MEDICINE

## 2025-04-07 PROCEDURE — 99214 OFFICE O/P EST MOD 30 MIN: CPT | Mod: S$PBB,,, | Performed by: FAMILY MEDICINE

## 2025-04-07 PROCEDURE — 99214 OFFICE O/P EST MOD 30 MIN: CPT | Mod: PBBFAC | Performed by: FAMILY MEDICINE

## 2025-04-07 PROCEDURE — G2211 COMPLEX E/M VISIT ADD ON: HCPCS | Mod: S$PBB,,, | Performed by: FAMILY MEDICINE

## 2025-04-07 NOTE — PROGRESS NOTES
Patient ID: Vivien Burton is a 80 y.o. female.    Chief Complaint: Annual Exam    History of Present Illness    CHIEF COMPLAINT:  Vivien presents today for routine follow-up.    RECENT HOSPITALIZATION:  She was hospitalized for sepsis due to Strep viridans infection on December 29th, requiring one week of inpatient treatment followed by PICC line placement and home health care for one month. She experienced headache at time of admission but denies feeling particularly ill during the course of infection.    MEDICAL HISTORY:  She has a history of colorectal cancer that has been treated. She has arthritis, which she describes as previously disabling but less severe with current medication management. She also has degenerative disc disease.    CARDIAC HISTORY:  She has a history of bioprosthetic aortic valve replacement and three cardiac electrical procedures.    MEDICATIONS:  She recently discontinued Gabapentin, which was initially prescribed for a potentially neurological headache, reporting no longer needing the medication.    SOCIAL HISTORY:  She is , with her  passing away approximately 11 years ago.      ROS:  ROS as indicated in HPI.         Physical Exam  Constitutional:       Appearance: Normal appearance.   HENT:      Head: Normocephalic and atraumatic.      Nose: Nose normal.   Eyes:      Extraocular Movements: Extraocular movements intact.      Pupils: Pupils are equal, round, and reactive to light.   Cardiovascular:      Rate and Rhythm: Normal rate and regular rhythm.      Pulses: Normal pulses.   Pulmonary:      Effort: Pulmonary effort is normal. No respiratory distress.      Breath sounds: Normal breath sounds. No wheezing or rhonchi.   Musculoskeletal:         General: Normal range of motion.   Skin:     General: Skin is warm and dry.   Neurological:      General: No focal deficit present.      Mental Status: She is alert and oriented to person, place, and time.   Psychiatric:          Mood and Affect: Mood normal.         Behavior: Behavior normal.         Thought Content: Thought content normal.          Assessment & Plan    A40.8 Other streptococcal sepsis  M51.86 Other intervertebral disc disorders, lumbar region  Z85.038 Personal history of other malignant neoplasm of large intestine  Z95.2 Presence of prosthetic heart valve    IMPRESSION:  - Reviewed recent hospitalization for sepsis due to Streptococcus viridans, likely originating from oral cavity.  - Noted history of bioprosthetic aortic valve, increasing risk for endocarditis.  - Assessed recent echocardiogram results, noting normal EF and no vegetation on prosthetic valve.  - Evaluated recovery from sepsis episode, noting resolution of symptoms.  - Discontinued gabapentin as headaches have resolved and medication is no longer necessary.    STREPTOCOCCAL SEPSIS:  - Monitored the patient's recovery from sepsis caused by Strep viridans in December, which resulted in a week-long hospitalization.  - Evaluated the patient's condition through blood cultures and echocardiogram, which showed no vegetation around the heart valve.  - Explained the potential link between oral bacteria and systemic infections, particularly the risk of native valve endocarditis.  - Administered various antibiotics during hospitalization and continued treatment at home with a PICC line for 1 month.  - Provided home health care during the recovery period.  - Referred the patient to an infectious disease specialist, Dr. Brady in [place], for follow-up.  - Advised the patient to inform dentists about their condition and potentially take antibiotics before dental procedures.  - Emphasized the importance of maintaining good oral hygiene to prevent future infections.      INTERVERTEBRAL DISC DISORDERS:  - Evaluated the MRI of cervical spine, which showed multi-level degenerative changes.    HISTORY OF COLORECTAL CANCER:  - Monitored the patient's history of  colorectal cancer through regular colonoscopies.  - Emphasized that the patient will not age out of colonoscopy screenings due to their history of colorectal cancer.  - Scheduled ongoing colonoscopies for continued monitoring.    PROSTHETIC HEART VALVE:  - Monitored the patient's bioprosthetic valve in the aorta, which was checked during the recent hospitalization.  - Evaluated the echocardiogram results, which showed normal left ventricular function with EF 55-60%, trace aortic regurgitation, and no vegetation around the valve.  - Discussed the need for antibiotic prophylaxis before dental procedures due to the prosthetic heart valve.    Plan:          Hypertension, unspecified type  -     Microalbumin/Creatinine Ratio, Urine; Future; Expected date: 04/07/2025  -     CBC Auto Differential; Future; Expected date: 04/07/2025  -     Comprehensive Metabolic Panel; Future; Expected date: 04/07/2025  -     TSH; Future; Expected date: 04/07/2025    Aortic atherosclerosis  -     Lipid Panel; Future; Expected date: 04/07/2025    Other long term (current) drug therapy  -     Vitamin D; Future; Expected date: 04/07/2025  -     Vitamin B12; Future; Expected date: 04/07/2025    Hypothyroidism, unspecified type  -     TSH; Future; Expected date: 04/07/2025    Annual visit for general adult medical examination without abnormal findings  -     Microalbumin/Creatinine Ratio, Urine; Future; Expected date: 04/07/2025  -     Vitamin D; Future; Expected date: 04/07/2025  -     Vitamin B12; Future; Expected date: 04/07/2025  -     Lipid Panel; Future; Expected date: 04/07/2025  -     CBC Auto Differential; Future; Expected date: 04/07/2025  -     Comprehensive Metabolic Panel; Future; Expected date: 04/07/2025  -     Hemoglobin A1C; Future; Expected date: 04/07/2025  -     TSH; Future; Expected date: 04/07/2025    Diabetes mellitus screening  -     Hemoglobin A1C; Future; Expected date: 04/07/2025    Abnormal finding of blood chemistry,  unspecified  -     Hemoglobin A1C; Future; Expected date: 04/07/2025    Malignant neoplasm of right female breast, unspecified estrogen receptor status, unspecified site of breast  Comments:  Monitored, mammogram order placed    Pseudopolyposis of colon without complication, unspecified part of colon  Comments:  Is seen by GI for colonoscopy every 3 years, negative iFOBT 04/2024  Orders:  -     Fecal Immunochemical Test (iFOBT); Future; Expected date: 04/07/2025        Follow up in about 6 months (around 10/7/2025), or if symptoms worsen or fail to improve.      In excessive of 40 minutes total time spent for evaluation and management services. Time included elements of the following: time spent preparing to see patient, obtaining and reviewing separately obtained history, exam, evaluation, counseling and education of patient/family member or care giver, documenting in the HMR, independently interpreting results and communication of results, coordination of care ordering medications, tests, or procedures, referral and communication to other health care professionals.        This note was generated with the assistance of ambient listening technology. Verbal consent was obtained by the patient and accompanying visitor(s) for the recording of patient appointment to facilitate this note. I attest to having reviewed and edited the generated note for accuracy, though some syntax or spelling errors may persist. Please contact the author of this note for any clarification.

## 2025-04-08 ENCOUNTER — LAB VISIT (OUTPATIENT)
Dept: LAB | Facility: HOSPITAL | Age: 80
End: 2025-04-08
Attending: FAMILY MEDICINE
Payer: MEDICARE

## 2025-04-08 ENCOUNTER — RESULTS FOLLOW-UP (OUTPATIENT)
Dept: FAMILY MEDICINE | Facility: CLINIC | Age: 80
End: 2025-04-08
Payer: MEDICARE

## 2025-04-08 DIAGNOSIS — Z79.899 OTHER LONG TERM (CURRENT) DRUG THERAPY: ICD-10-CM

## 2025-04-08 DIAGNOSIS — Z13.1 DIABETES MELLITUS SCREENING: ICD-10-CM

## 2025-04-08 DIAGNOSIS — I10 HYPERTENSION, UNSPECIFIED TYPE: ICD-10-CM

## 2025-04-08 DIAGNOSIS — I70.0 AORTIC ATHEROSCLEROSIS: ICD-10-CM

## 2025-04-08 DIAGNOSIS — E03.9 HYPOTHYROIDISM, UNSPECIFIED TYPE: ICD-10-CM

## 2025-04-08 DIAGNOSIS — Z00.00 ANNUAL VISIT FOR GENERAL ADULT MEDICAL EXAMINATION WITHOUT ABNORMAL FINDINGS: ICD-10-CM

## 2025-04-08 DIAGNOSIS — R79.9 ABNORMAL FINDING OF BLOOD CHEMISTRY, UNSPECIFIED: ICD-10-CM

## 2025-04-08 LAB
25(OH)D3+25(OH)D2 SERPL-MCNC: 67 NG/ML (ref 30–96)
ABSOLUTE EOSINOPHIL (OHS): 0.53 K/UL
ABSOLUTE MONOCYTE (OHS): 0.6 K/UL (ref 0.3–1)
ABSOLUTE NEUTROPHIL COUNT (OHS): 5.05 K/UL (ref 1.8–7.7)
ALBUMIN SERPL BCP-MCNC: 3.9 G/DL (ref 3.5–5.2)
ALP SERPL-CCNC: 61 UNIT/L (ref 40–150)
ALT SERPL W/O P-5'-P-CCNC: 13 UNIT/L (ref 10–44)
ANION GAP (OHS): 9 MMOL/L (ref 8–16)
AST SERPL-CCNC: 14 UNIT/L (ref 11–45)
BASOPHILS # BLD AUTO: 0.07 K/UL
BASOPHILS NFR BLD AUTO: 0.9 %
BILIRUB SERPL-MCNC: 0.6 MG/DL (ref 0.1–1)
BUN SERPL-MCNC: 10 MG/DL (ref 8–23)
CALCIUM SERPL-MCNC: 10 MG/DL (ref 8.7–10.5)
CHLORIDE SERPL-SCNC: 105 MMOL/L (ref 95–110)
CHOLEST SERPL-MCNC: 149 MG/DL (ref 120–199)
CHOLEST/HDLC SERPL: 2.3 {RATIO} (ref 2–5)
CO2 SERPL-SCNC: 25 MMOL/L (ref 23–29)
CREAT SERPL-MCNC: 0.7 MG/DL (ref 0.5–1.4)
EAG (OHS): 91 MG/DL (ref 68–131)
ERYTHROCYTE [DISTWIDTH] IN BLOOD BY AUTOMATED COUNT: 14 % (ref 11.5–14.5)
GFR SERPLBLD CREATININE-BSD FMLA CKD-EPI: >60 ML/MIN/1.73/M2
GLUCOSE SERPL-MCNC: 92 MG/DL (ref 70–110)
HBA1C MFR BLD: 4.8 % (ref 4–5.6)
HCT VFR BLD AUTO: 35.7 % (ref 37–48.5)
HDLC SERPL-MCNC: 64 MG/DL (ref 40–75)
HDLC SERPL: 43 % (ref 20–50)
HGB BLD-MCNC: 12 GM/DL (ref 12–16)
IMM GRANULOCYTES # BLD AUTO: 0.04 K/UL (ref 0–0.04)
IMM GRANULOCYTES NFR BLD AUTO: 0.5 % (ref 0–0.5)
LDLC SERPL CALC-MCNC: 71.2 MG/DL (ref 63–159)
LYMPHOCYTES # BLD AUTO: 1.86 K/UL (ref 1–4.8)
MCH RBC QN AUTO: 29.9 PG (ref 27–31)
MCHC RBC AUTO-ENTMCNC: 33.6 G/DL (ref 32–36)
MCV RBC AUTO: 89 FL (ref 82–98)
NONHDLC SERPL-MCNC: 85 MG/DL
NUCLEATED RBC (/100WBC) (OHS): 0 /100 WBC
PLATELET # BLD AUTO: 202 K/UL (ref 150–450)
PMV BLD AUTO: 9.3 FL (ref 9.2–12.9)
POTASSIUM SERPL-SCNC: 4.5 MMOL/L (ref 3.5–5.1)
PROT SERPL-MCNC: 6.1 GM/DL (ref 6–8.4)
RBC # BLD AUTO: 4.01 M/UL (ref 4–5.4)
RELATIVE EOSINOPHIL (OHS): 6.5 %
RELATIVE LYMPHOCYTE (OHS): 22.8 % (ref 18–48)
RELATIVE MONOCYTE (OHS): 7.4 % (ref 4–15)
RELATIVE NEUTROPHIL (OHS): 61.9 % (ref 38–73)
SODIUM SERPL-SCNC: 139 MMOL/L (ref 136–145)
TRIGL SERPL-MCNC: 69 MG/DL (ref 30–150)
TSH SERPL-ACNC: 2.46 UIU/ML (ref 0.4–4)
VIT B12 SERPL-MCNC: 379 PG/ML (ref 210–950)
WBC # BLD AUTO: 8.15 K/UL (ref 3.9–12.7)

## 2025-04-08 PROCEDURE — 80053 COMPREHEN METABOLIC PANEL: CPT

## 2025-04-08 PROCEDURE — 83036 HEMOGLOBIN GLYCOSYLATED A1C: CPT

## 2025-04-08 PROCEDURE — 36415 COLL VENOUS BLD VENIPUNCTURE: CPT

## 2025-04-08 PROCEDURE — 82607 VITAMIN B-12: CPT

## 2025-04-08 PROCEDURE — 82465 ASSAY BLD/SERUM CHOLESTEROL: CPT

## 2025-04-08 PROCEDURE — 85025 COMPLETE CBC W/AUTO DIFF WBC: CPT

## 2025-04-08 PROCEDURE — 84443 ASSAY THYROID STIM HORMONE: CPT

## 2025-04-08 PROCEDURE — 82306 VITAMIN D 25 HYDROXY: CPT

## 2025-04-10 ENCOUNTER — OFFICE VISIT (OUTPATIENT)
Dept: INFECTIOUS DISEASES | Facility: CLINIC | Age: 80
End: 2025-04-10
Payer: MEDICARE

## 2025-04-10 VITALS
SYSTOLIC BLOOD PRESSURE: 138 MMHG | TEMPERATURE: 97 F | DIASTOLIC BLOOD PRESSURE: 68 MMHG | OXYGEN SATURATION: 98 % | WEIGHT: 120.5 LBS | HEIGHT: 60 IN | BODY MASS INDEX: 23.66 KG/M2

## 2025-04-10 DIAGNOSIS — A49.1 INFECTION BY STREPTOCOCCUS, VIRIDANS GROUP: Primary | ICD-10-CM

## 2025-04-10 DIAGNOSIS — D64.9 ANEMIA, UNSPECIFIED TYPE: ICD-10-CM

## 2025-04-10 DIAGNOSIS — M54.81 OCCIPITAL NEURALGIA OF LEFT SIDE: ICD-10-CM

## 2025-04-10 PROCEDURE — 99214 OFFICE O/P EST MOD 30 MIN: CPT | Mod: PBBFAC,PN | Performed by: INTERNAL MEDICINE

## 2025-04-10 PROCEDURE — 99999 PR PBB SHADOW E&M-EST. PATIENT-LVL IV: CPT | Mod: PBBFAC,,, | Performed by: INTERNAL MEDICINE

## 2025-04-10 NOTE — PROGRESS NOTES
Subjective:      Reason for consult:     HPI: Vivien Burton is a 80 y.o. female with chronic medical problems including hypothyroidism, hypertension, CAD, hyperlipidemia and GERD. She has left-sided headache of about 2-3 months' duration that got worse in the last 2 weeks. Pain starts at focal point around the left mastoid and we will sometimes radiate to the neck. It is stabbing in character and feels like hospitalist. Also has dysphagia she touches her left scab Staph from the midline on the left all the way down in temporal area. The pain is not constant but intermittent. Tylenol helps sometimes. Presented to the emergency room because it was bothersome. She has no fever or jaw pain. No visual problems. No other joint involved. She is very active and ADL independent. She had a similar episode on the right about 2 years ago that resolved after a course of steroid. Had not relapsed on the right since then.     Antimicrobials   Vancomycin: 12/29/2024 x1 dose   Valtrex for/20 09/24/2012/30/24   Doxycycline:  12/29/2024 x1 dose   Cefepime:  12/29/2024-12/30/2024   Augmentin:  12/30/2024 x1 dose   Ceftriaxone:  12/30/2024-     Microbiology  Blood culture 12/29/2024:  Viridans Streptococcus 2/2 sets   Respiratory panel 12/30/2024: Rhinovirus/enterovirus  Blood culture 12/31/2024:  NGTD   CSF culture 12/31/2024:  NGTD  CSF meningitis panel 12/31/2024: Negative    01/28/2025: She is here for follow up.  Recently hospitalized with chronic non resolving posterior headache.  She was diagnosed with occipital neuralgia.  Blood culture was also positive with strep viridans.  TTE and KAISER were both negative.  Decision was made to treat for 4 weeks because she has TAVR.  She missed about 2 days of antibiotics due to the snow storm.  She is scheduled to complete antibiotics 01/29/2025.  Her headache has improved on gabapentin.  Upper chest rash continues to improve.  Has seen her dentist says discharge and was informed  she does not have any dental infection.    25:  She is feeling great.  Left occipital headache pretty much resolved.  Gabapentin was increased to 300 mg twice daily by her dermatologist.  Also since last visit she has been started on tacrolimus cream for her rash.  It has also improved.  She completed Antibiotics 2025.  No other acute issues at this time.    04/10/2025:  No acute issues since last visit.  Continues to improve.  Skin better as well though itches.  Completed tacrolimus cream and now using Vaseline or Eucerin cream for the itching.    Review of patient's allergies indicates:   Allergen Reactions    Prilosec [omeprazole] Hives     Past Medical History:   Diagnosis Date    Acute on chronic combined systolic and diastolic heart failure 2022    Anemia     Basal cell carcinoma     Breast cancer     Breast cancer     Cancer     Cataract     CHF (congestive heart failure)     Colon polyps     Coronary artery disease     GERD (gastroesophageal reflux disease)     Heart murmur     Hyperlipidemia     Hypertension     Hypothyroidism     Nonrheumatic aortic (valve) stenosis 2018    Osteoporosis 2019    Radiation     S/P TAVR (transcatheter aortic valve replacement) 2022    Seasonal allergies     Squamous cell carcinoma of skin     Streptococcal bacteremia 2023    Thyroid disease     Transaminitis 2023     Past Surgical History:   Procedure Laterality Date    BREAST SURGERY      CARDIAC CATH COSURGEON N/A 2022    Procedure: Cardiac Cath Cosurgeon;  Surgeon: Dontae Wooten MD;  Location: [facility] CATH LAB;  Service: Cardiovascular;  Laterality: N/A;     SECTION, CLASSIC      COLONOSCOPY N/A 2018    Procedure: COLONOSCOPY;  Surgeon: Precious Castorena MD;  Location: [facility];  Service: Endoscopy;  Laterality: N/A;    COLONOSCOPY N/A 3/31/2023    Procedure: COLONOSCOPY;  Surgeon: Mal Abrams MD;  Location: [facility];  Service:  Endoscopy;  Laterality: N/A;    HYSTERECTOMY      LEFT HEART CATHETERIZATION Left 2022    Procedure: Left heart cath;  Surgeon: Dontae Johns MD;  Location: [facility] CATH LAB;  Service: Cardiology;  Laterality: Left;    LEFT HEART CATHETERIZATION Left 2022    Procedure: Left heart cath;  Surgeon: Dontae Johns MD;  Location: [facility] CATH LAB;  Service: Cardiology;  Laterality: Left;    MASTECTOMY Right 2000    right breast      TONSILLECTOMY, ADENOIDECTOMY      TRANSCATHETER AORTIC VALVE REPLACEMENT (TAVR) N/A 2022    Procedure: REPLACEMENT, AORTIC VALVE, TRANSCATHETER (TAVR);  Surgeon: Dontae Johns MD;  Location: [facility] CATH LAB;  Service: Cardiology;  Laterality: N/A;      Social History     Tobacco Use    Smoking status: Former     Current packs/day: 0.00     Types: Cigarettes     Quit date: 2000     Years since quittin.1    Smokeless tobacco: Never    Tobacco comments:     quit 20 years ago   Substance Use Topics    Alcohol use: Yes     Alcohol/week: 2.0 standard drinks of alcohol     Types: 2 Shots of liquor per week     Comment: per pt 2 burbon drinks per night however none in last month     Family History   Problem Relation Name Age of Onset    Cancer Sister Meghana     Liver cancer Sister Meghana     Melanoma Sister Meghana     Cancer Brother Kippy     Alcohol abuse Brother Jaxson     Cancer Brother Willian     Breast cancer Neg Hx      Psoriasis Neg Hx      Lupus Neg Hx      Eczema Neg Hx         Pertinent medications noted:     Review of Systems  10 system review unremarkable.    Outdoor activities:  ADL independent.  Travel:  No recent travel  Implants:  TAVR   Antibiotic History:  As in HPI      Objective:      Blood pressure 138/68, pulse (P) 66, temperature 97.4 °F (36.3 °C), temperature source Temporal, height 5' (1.524 m), weight 54.7 kg (120 lb 8 oz), SpO2 98%. Body mass index is 23.53 kg/m².  Physical Exam      General:  Pleasant well-groomed elderly woman in no acute distress    HEENT neck is supple.  No lesions appreciated   CVS:  S1-S2 heard, systolic murmur   Respiratory: Clear to auscultation   Abdomen: Full, soft, nontender, no palpable organomegaly   CNS: No focal deficits   Musculoskeletal: No joint abnormalities appreciated  Psychiatric: Normal mood, speech,  demeanor     Wound:  None    VAC: none      General Labs reviewed:  Lab Results   Component Value Date    WBC 8.15 04/08/2025    RBC 4.01 04/08/2025    HGB 12.0 04/08/2025    HCT 35.7 (L) 04/08/2025    MCV 89 04/08/2025    MCH 29.9 04/08/2025    MCHC 33.6 04/08/2025    RDW 14.0 04/08/2025     04/08/2025    MPV 9.3 04/08/2025    GRAN 4.9 02/27/2025    GRAN 61.8 02/27/2025    LYMPH 22.8 04/08/2025    LYMPH 1.86 04/08/2025    MONO 7.4 04/08/2025    MONO 0.60 04/08/2025    EOS 6.5 04/08/2025    EOS 0.53 (H) 04/08/2025    BASO 0.07 02/27/2025    EOSINOPHIL 6.8 02/27/2025    BASOPHIL 0.9 04/08/2025    BASOPHIL 0.07 04/08/2025     CMP  Sodium   Date Value Ref Range Status   04/08/2025 139 136 - 145 mmol/L Final   01/27/2025 135 (L) 136 - 145 mmol/L Final     Potassium   Date Value Ref Range Status   04/08/2025 4.5 3.5 - 5.1 mmol/L Final   01/27/2025 3.4 (L) 3.5 - 5.1 mmol/L Final     Chloride   Date Value Ref Range Status   04/08/2025 105 95 - 110 mmol/L Final   01/27/2025 104 95 - 110 mmol/L Final     CO2   Date Value Ref Range Status   04/08/2025 25 23 - 29 mmol/L Final   01/27/2025 21 (L) 23 - 29 mmol/L Final     Glucose   Date Value Ref Range Status   01/27/2025 209 (H) 70 - 110 mg/dL Final     BUN   Date Value Ref Range Status   04/08/2025 10 8 - 23 mg/dL Final     Creatinine   Date Value Ref Range Status   04/08/2025 0.7 0.5 - 1.4 mg/dL Final     Calcium   Date Value Ref Range Status   04/08/2025 10.0 8.7 - 10.5 mg/dL Final   01/27/2025 9.0 8.7 - 10.5 mg/dL Final     Total Protein   Date Value Ref Range Status   01/01/2025 4.9 (L) 6.0 - 8.4 g/dL Final     Albumin   Date Value Ref Range Status   04/08/2025 3.9 3.5 - 5.2  g/dL Final   01/01/2025 3.1 (L) 3.5 - 5.2 g/dL Final     Total Bilirubin   Date Value Ref Range Status   01/01/2025 0.4 0.1 - 1.0 mg/dL Final     Comment:     For infants and newborns, interpretation of results should be based  on gestational age, weight and in agreement with clinical  observations.    Premature Infant recommended reference ranges:  Up to 24 hours.............<8.0 mg/dL  Up to 48 hours............<12.0 mg/dL  3-5 days..................<15.0 mg/dL  6-29 days.................<15.0 mg/dL       Bilirubin Total   Date Value Ref Range Status   04/08/2025 0.6 0.1 - 1.0 mg/dL Final     Comment:     For infants and newborns, interpretation of results should be based   on gestational age, weight and in agreement with clinical   observations.    Premature Infant recommended reference ranges:   0-24 hours:  <8.0 mg/dL   24-48 hours: <12.0 mg/dL   3-5 days:    <15.0 mg/dL   6-29 days:   <15.0 mg/dL     Alkaline Phosphatase   Date Value Ref Range Status   01/01/2025 59 55 - 135 U/L Final     ALP   Date Value Ref Range Status   04/08/2025 61 40 - 150 unit/L Final     AST   Date Value Ref Range Status   04/08/2025 14 11 - 45 unit/L Final   01/01/2025 8 (L) 10 - 40 U/L Final     ALT   Date Value Ref Range Status   04/08/2025 13 10 - 44 unit/L Final   01/01/2025 12 10 - 44 U/L Final     Anion Gap   Date Value Ref Range Status   04/08/2025 9 8 - 16 mmol/L Final     eGFR   Date Value Ref Range Status   04/08/2025 >60 >60 mL/min/1.73/m2 Final     Comment:     Estimated GFR calculated using the CKD-EPI creatinine (2021) equation.   01/27/2025 >60.0 >60 mL/min/1.73 m^2 Final   04/24/2024 76 > OR = 60 mL/min/1.73m2 Final           Micro:  Microbiology Results (last 7 days)       ** No results found for the last 168 hours. **          Imaging Reviewed:          Assessment:     1. Left occipital neuralgia.  Improved on gabapentin.  Management as per her PCP.       2. Left maxillary sinusitis.  Completed antibiotics for this  indication.  Resolved.     3. 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  No other focus of infection identified for this clinically.  TTE and elsie negative for vegetation.  Completed planned 4 week course of antibiotics 01/29/2025.  CRP 12 and ESR 4.5 milligram/liter on 02/27/2025.  This has clinically resolved.    4. Anemia.  Related to above.  Almost resolved.  HB 12 on 04/08/2025.    She will be discharged from ID Clinic today.  I will be happy to see again as clinically indicated.  She will continue follow up with her PCP and cardiologist.    Problem List Items Addressed This Visit    None       Plan:         As above  There are no diagnoses linked to this encounter.    This note was created using Dragon voice recognition software that occasionally misinterpreted phrases or words.

## 2025-04-10 NOTE — PATIENT INSTRUCTIONS
You have done well so far   We will discharge from ID Clinic   I will be happy to see you again in the future if clinically needed

## 2025-05-02 ENCOUNTER — PATIENT MESSAGE (OUTPATIENT)
Dept: FAMILY MEDICINE | Facility: CLINIC | Age: 80
End: 2025-05-02
Payer: MEDICARE

## 2025-05-02 DIAGNOSIS — Z79.899 ENCOUNTER FOR LONG-TERM (CURRENT) USE OF HIGH-RISK MEDICATION: ICD-10-CM

## 2025-05-02 RX ORDER — LEVOTHYROXINE SODIUM 75 UG/1
75 TABLET ORAL DAILY
Qty: 90 TABLET | Refills: 3 | Status: SHIPPED | OUTPATIENT
Start: 2025-05-02

## 2025-05-02 NOTE — TELEPHONE ENCOUNTER
No care due was identified.  [facility] Embedded Care Due Messages. Reference number: [id number].   5/02/2025 10:48:15 AM CDT

## 2025-05-02 NOTE — TELEPHONE ENCOUNTER
Refill Decision Note   [place] [facility]  is requesting a refill authorization.    Brief Assessment and Rationale for Refill:   Approve       Medication Therapy Plan:         Comments:     Note composed:11:55 AM 05/02/2025

## 2025-05-07 NOTE — SUBJECTIVE & OBJECTIVE
"Interval History: see "Hospital Course"    Review of Systems   Constitutional:  Positive for activity change and appetite change. Negative for chills, diaphoresis and fever.   HENT:  Negative for congestion, nosebleeds and tinnitus.    Eyes:  Negative for photophobia and visual disturbance.   Respiratory:  Positive for cough, shortness of breath and wheezing. Negative for chest tightness.    Cardiovascular:  Negative for chest pain, palpitations and leg swelling.   Gastrointestinal:  Negative for abdominal distention, abdominal pain, constipation, diarrhea, nausea and vomiting.   Endocrine: Negative for cold intolerance and heat intolerance.   Genitourinary:  Negative for difficulty urinating, dysuria, frequency, hematuria and urgency.   Musculoskeletal:  Negative for arthralgias, back pain and myalgias.   Skin:  Negative for pallor, rash and wound.   Allergic/Immunologic: Negative for immunocompromised state.   Neurological:  Positive for headaches. Negative for dizziness, tremors, facial asymmetry, speech difficulty and weakness.   Hematological:  Negative for adenopathy. Does not bruise/bleed easily.   Psychiatric/Behavioral:  Negative for confusion and sleep disturbance. The patient is not nervous/anxious.    Objective:     Vital Signs (Most Recent):  Temp: 97.8 °F (36.6 °C) (01/24/23 0745)  Pulse: 85 (01/24/23 0745)  Resp: 18 (01/24/23 0745)  BP: (!) 197/80 (01/24/23 0745)  SpO2: 98 % (01/24/23 0745)   Vital Signs (24h Range):  Temp:  [97.6 °F (36.4 °C)-98.5 °F (36.9 °C)] 97.8 °F (36.6 °C)  Pulse:  [] 85  Resp:  [16-22] 18  SpO2:  [95 %-98 %] 98 %  BP: (137-197)/(49-91) 197/80     Weight: 53.8 kg (118 lb 9.7 oz)  Body mass index is 23.16 kg/m².    Intake/Output Summary (Last 24 hours) at 1/24/2023 0730  Last data filed at 1/24/2023 0626  Gross per 24 hour   Intake 1148.44 ml   Output --   Net 1148.44 ml      Physical Exam  Vitals and nursing note reviewed.   Constitutional:       General: She is not in " acute distress.     Appearance: She is well-developed. She is ill-appearing. She is not diaphoretic.   HENT:      Head: Normocephalic and atraumatic.      Right Ear: External ear normal.      Left Ear: External ear normal.      Nose: Nose normal.      Mouth/Throat:      Mouth: Mucous membranes are dry.      Pharynx: Oropharynx is clear.   Eyes:      General: No scleral icterus.     Extraocular Movements: Extraocular movements intact.      Conjunctiva/sclera: Conjunctivae normal.   Neck:      Vascular: No JVD.   Cardiovascular:      Rate and Rhythm: Normal rate and regular rhythm.      Heart sounds: Murmur heard.     No friction rub. No gallop.   Pulmonary:      Effort: Pulmonary effort is normal. No respiratory distress.      Breath sounds: Wheezing present. No rales.   Abdominal:      General: Bowel sounds are normal. There is no distension.      Palpations: Abdomen is soft.      Tenderness: There is no abdominal tenderness. There is no guarding or rebound.   Musculoskeletal:         General: No tenderness. Normal range of motion.      Cervical back: Normal range of motion and neck supple.   Lymphadenopathy:      Cervical: No cervical adenopathy.   Skin:     General: Skin is warm and dry.      Coloration: Skin is not pale.      Findings: No erythema or rash.   Neurological:      General: No focal deficit present.      Mental Status: She is alert and oriented to person, place, and time. Mental status is at baseline.      Cranial Nerves: No cranial nerve deficit.      Sensory: No sensory deficit.      Coordination: Coordination normal.      Deep Tendon Reflexes: Reflexes normal.   Psychiatric:         Behavior: Behavior normal.         Thought Content: Thought content normal.         Judgment: Judgment normal.       Significant Labs: All pertinent labs within the past 24 hours have been reviewed.    Significant Imaging: I have reviewed all pertinent imaging results/findings within the past 24 hours.   PAST SURGICAL HISTORY:  No significant past surgical history

## 2025-05-13 DIAGNOSIS — E78.5 DYSLIPIDEMIA: ICD-10-CM

## 2025-05-13 DIAGNOSIS — Z95.5 STATUS POST INSERTION OF DRUG ELUTING CORONARY ARTERY STENT: ICD-10-CM

## 2025-05-13 RX ORDER — SIMVASTATIN 40 MG/1
40 TABLET, FILM COATED ORAL NIGHTLY
Qty: 90 TABLET | Refills: 3 | Status: SHIPPED | OUTPATIENT
Start: 2025-05-13

## 2025-06-03 DIAGNOSIS — Z79.899 ENCOUNTER FOR LONG-TERM (CURRENT) USE OF HIGH-RISK MEDICATION: ICD-10-CM

## 2025-06-03 RX ORDER — LEVOTHYROXINE SODIUM 75 UG/1
75 TABLET ORAL DAILY
Qty: 90 TABLET | Refills: 3 | Status: SHIPPED | OUTPATIENT
Start: 2025-06-03

## 2025-06-16 DIAGNOSIS — K21.00 GASTROESOPHAGEAL REFLUX DISEASE WITH ESOPHAGITIS WITHOUT HEMORRHAGE: ICD-10-CM

## 2025-06-16 DIAGNOSIS — M81.0 OSTEOPOROSIS WITHOUT CURRENT PATHOLOGICAL FRACTURE, UNSPECIFIED OSTEOPOROSIS TYPE: ICD-10-CM

## 2025-06-16 RX ORDER — FAMOTIDINE 20 MG/1
20 TABLET, FILM COATED ORAL DAILY
Qty: 90 TABLET | Refills: 2 | Status: SHIPPED | OUTPATIENT
Start: 2025-06-16 | End: 2026-06-11

## 2025-06-16 NOTE — TELEPHONE ENCOUNTER
Refill request for famotidine 20 mg sent to [facility] in [place], [place].     Last office visit was 02/17/2025.    ISAAC Cook 06/16/2025

## 2025-06-16 NOTE — TELEPHONE ENCOUNTER
No care due was identified.  [facility] Embedded Care Due Messages. Reference number: [id number].   6/16/2025 5:50:03 PM CDT

## 2025-06-17 RX ORDER — IBANDRONATE SODIUM 150 MG/1
150 TABLET, FILM COATED ORAL
Qty: 3 TABLET | Refills: 3 | Status: SHIPPED | OUTPATIENT
Start: 2025-06-17

## 2025-06-23 ENCOUNTER — PATIENT MESSAGE (OUTPATIENT)
Dept: DERMATOLOGY | Facility: CLINIC | Age: 80
End: 2025-06-23
Payer: MEDICARE

## 2025-07-03 ENCOUNTER — PATIENT MESSAGE (OUTPATIENT)
Dept: DERMATOLOGY | Facility: CLINIC | Age: 80
End: 2025-07-03
Payer: MEDICARE

## (undated) DEVICE — CATH TRUE FLOW 18MM 3.5X110CM

## (undated) DEVICE — OMNIPAQUE 350 200ML

## (undated) DEVICE — GUIDE WIRE BMW 014 X190

## (undated) DEVICE — GUIDEWIRE EMERALD 150CM PTFE

## (undated) DEVICE — GUIDEWIRE SUPRA CORE 035 190CM

## (undated) DEVICE — GUIDEWIRE STF .035X180CM ANG

## (undated) DEVICE — GUIDE VISTA XB 3.5

## (undated) DEVICE — PROTECTION STATION PLUS

## (undated) DEVICE — GUIDEWIRE X SPORT .014IN 190CM

## (undated) DEVICE — GUIDEWIRE STF .035X260CM STR

## (undated) DEVICE — CABLE PACER

## (undated) DEVICE — GUIDEWIRE CONFIDA BECKER CURVE

## (undated) DEVICE — DEVICE PERCLOSE SUT CLSR 6FR

## (undated) DEVICE — KIT CO-PILOT

## (undated) DEVICE — PAD DEFIB CADENCE ADULT R2

## (undated) DEVICE — CATH DXTERITY AL20 100CM 6FR

## (undated) DEVICE — CATH INFINITI JUDKINS JR4

## (undated) DEVICE — LINE 60IN PRESSURE MON.

## (undated) DEVICE — SHEATH INTRODUCER 6FR 11CM

## (undated) DEVICE — KIT CUSTOM MANIFOLD

## (undated) DEVICE — CATH IMPULSE PIGTAIL 6F 110CM

## (undated) DEVICE — CATH DXTERITY IMA 100CM 6FR

## (undated) DEVICE — CATH BLLN FG APEX MR 2.50X15MM

## (undated) DEVICE — SHEATH INTRODUCER 8FR 11CM

## (undated) DEVICE — COVER BAND BAG 40 X 40

## (undated) DEVICE — INFLATOR ENCORE 26 BLLN INFL

## (undated) DEVICE — KIT MICROINTRO 4F .018X40X7CM

## (undated) DEVICE — CATH TEMP PACER 5.0FR

## (undated) DEVICE — STOPCOCK 3-WAY

## (undated) DEVICE — GUIDE VISTA 6FR XB 3

## (undated) DEVICE — SEE MEDLINE ITEM 156894

## (undated) DEVICE — LINE INJECTION CLEARACIL 25.4

## (undated) DEVICE — SEE MEDLINE ITEM 107746

## (undated) DEVICE — CATH ALII 4FR INFINITY

## (undated) DEVICE — KIT PERCUTANEOUS SHEATH

## (undated) DEVICE — CATH MPA2 INFINITI 4FR 100CM

## (undated) DEVICE — SPIKE CONTRAST CONTROLLER

## (undated) DEVICE — FLEXSHEATH DRYSEAL 14FR 33CM

## (undated) DEVICE — CATH JL5 4FR INFINITY

## (undated) DEVICE — CATH DXTERITY AL-I 100CM 6FR

## (undated) DEVICE — CATH AL1 4FR